# Patient Record
Sex: MALE | Race: WHITE | NOT HISPANIC OR LATINO | Employment: OTHER | URBAN - METROPOLITAN AREA
[De-identification: names, ages, dates, MRNs, and addresses within clinical notes are randomized per-mention and may not be internally consistent; named-entity substitution may affect disease eponyms.]

---

## 2017-11-05 ENCOUNTER — NURSE TRIAGE (OUTPATIENT)
Dept: ADMINISTRATIVE | Facility: CLINIC | Age: 67
End: 2017-11-05

## 2017-11-05 NOTE — TELEPHONE ENCOUNTER
Reason for Disposition   Busy signal.  First attempt to contact caller.  Follow-up call scheduled within 15 minutes.    Protocols used: ST NO CONTACT OR DUPLICATE CONTACT CALL-A-AH

## 2018-11-05 ENCOUNTER — TELEPHONE (OUTPATIENT)
Dept: INFECTIOUS DISEASES | Facility: CLINIC | Age: 68
End: 2018-11-05

## 2021-05-30 ENCOUNTER — HOSPITAL ENCOUNTER (EMERGENCY)
Facility: HOSPITAL | Age: 71
Discharge: HOME | End: 2021-05-30
Attending: EMERGENCY MEDICINE
Payer: COMMERCIAL

## 2021-05-30 VITALS
TEMPERATURE: 99 F | BODY MASS INDEX: 29.62 KG/M2 | HEIGHT: 69 IN | HEART RATE: 78 BPM | DIASTOLIC BLOOD PRESSURE: 60 MMHG | RESPIRATION RATE: 14 BRPM | OXYGEN SATURATION: 100 % | WEIGHT: 200 LBS | SYSTOLIC BLOOD PRESSURE: 110 MMHG

## 2021-05-30 DIAGNOSIS — R94.31 ABNORMAL EKG: ICD-10-CM

## 2021-05-30 DIAGNOSIS — N17.9 AKI (ACUTE KIDNEY INJURY): Primary | ICD-10-CM

## 2021-05-30 DIAGNOSIS — E83.42 HYPOMAGNESEMIA: ICD-10-CM

## 2021-05-30 DIAGNOSIS — R55 SYNCOPE: ICD-10-CM

## 2021-05-30 LAB
ALBUMIN SERPL BCP-MCNC: 2.9 G/DL (ref 3.5–5.2)
ALP SERPL-CCNC: 77 U/L (ref 55–135)
ALT SERPL W/O P-5'-P-CCNC: 34 U/L (ref 10–44)
ANION GAP SERPL CALC-SCNC: 14 MMOL/L (ref 8–16)
AST SERPL-CCNC: 40 U/L (ref 10–40)
BASOPHILS # BLD AUTO: 0.03 K/UL (ref 0–0.2)
BASOPHILS NFR BLD: 0.4 % (ref 0–1.9)
BILIRUB SERPL-MCNC: 1.2 MG/DL (ref 0.1–1)
BUN SERPL-MCNC: 19 MG/DL (ref 8–23)
BUN SERPL-MCNC: 23 MG/DL (ref 6–30)
CALCIUM SERPL-MCNC: 9.7 MG/DL (ref 8.7–10.5)
CHLORIDE SERPL-SCNC: 98 MMOL/L (ref 95–110)
CHLORIDE SERPL-SCNC: 98 MMOL/L (ref 95–110)
CO2 SERPL-SCNC: 20 MMOL/L (ref 23–29)
CREAT SERPL-MCNC: 1.5 MG/DL (ref 0.5–1.4)
CREAT SERPL-MCNC: 1.7 MG/DL (ref 0.5–1.4)
DIFFERENTIAL METHOD: ABNORMAL
EOSINOPHIL # BLD AUTO: 0.1 K/UL (ref 0–0.5)
EOSINOPHIL NFR BLD: 0.8 % (ref 0–8)
ERYTHROCYTE [DISTWIDTH] IN BLOOD BY AUTOMATED COUNT: 12.4 % (ref 11.5–14.5)
EST. GFR  (AFRICAN AMERICAN): 46.5 ML/MIN/1.73 M^2
EST. GFR  (NON AFRICAN AMERICAN): 40.3 ML/MIN/1.73 M^2
GLUCOSE SERPL-MCNC: 133 MG/DL (ref 70–110)
GLUCOSE SERPL-MCNC: 182 MG/DL (ref 70–110)
HCT VFR BLD AUTO: 36.4 % (ref 40–54)
HCT VFR BLD CALC: 35 %PCV (ref 36–54)
HGB BLD-MCNC: 12.4 G/DL (ref 14–18)
IMM GRANULOCYTES # BLD AUTO: 0.06 K/UL (ref 0–0.04)
IMM GRANULOCYTES NFR BLD AUTO: 0.7 % (ref 0–0.5)
LYMPHOCYTES # BLD AUTO: 1 K/UL (ref 1–4.8)
LYMPHOCYTES NFR BLD: 11.3 % (ref 18–48)
MAGNESIUM SERPL-MCNC: 1.2 MG/DL (ref 1.6–2.6)
MCH RBC QN AUTO: 34.7 PG (ref 27–31)
MCHC RBC AUTO-ENTMCNC: 34.1 G/DL (ref 32–36)
MCV RBC AUTO: 102 FL (ref 82–98)
MONOCYTES # BLD AUTO: 0.6 K/UL (ref 0.3–1)
MONOCYTES NFR BLD: 7.1 % (ref 4–15)
NEUTROPHILS # BLD AUTO: 6.8 K/UL (ref 1.8–7.7)
NEUTROPHILS NFR BLD: 79.7 % (ref 38–73)
NRBC BLD-RTO: 0 /100 WBC
PHOSPHATE SERPL-MCNC: 2.2 MG/DL (ref 2.7–4.5)
PLATELET # BLD AUTO: 99 K/UL (ref 150–450)
PMV BLD AUTO: 9.5 FL (ref 9.2–12.9)
POC IONIZED CALCIUM: 1.08 MMOL/L (ref 1.06–1.42)
POC TCO2 (MEASURED): 23 MMOL/L (ref 23–29)
POTASSIUM BLD-SCNC: 5.1 MMOL/L (ref 3.5–5.1)
POTASSIUM SERPL-SCNC: 4.6 MMOL/L (ref 3.5–5.1)
PROT SERPL-MCNC: 6 G/DL (ref 6–8.4)
RBC # BLD AUTO: 3.57 M/UL (ref 4.6–6.2)
SAMPLE: ABNORMAL
SODIUM BLD-SCNC: 131 MMOL/L (ref 136–145)
SODIUM SERPL-SCNC: 132 MMOL/L (ref 136–145)
WBC # BLD AUTO: 8.57 K/UL (ref 3.9–12.7)

## 2021-05-30 PROCEDURE — 93010 ELECTROCARDIOGRAM REPORT: CPT | Mod: ,,, | Performed by: INTERNAL MEDICINE

## 2021-05-30 PROCEDURE — 93010 EKG 12-LEAD: ICD-10-PCS | Mod: ,,, | Performed by: INTERNAL MEDICINE

## 2021-05-30 PROCEDURE — 80047 BASIC METABLC PNL IONIZED CA: CPT

## 2021-05-30 PROCEDURE — 85025 COMPLETE CBC W/AUTO DIFF WBC: CPT | Performed by: STUDENT IN AN ORGANIZED HEALTH CARE EDUCATION/TRAINING PROGRAM

## 2021-05-30 PROCEDURE — 86803 HEPATITIS C AB TEST: CPT | Performed by: EMERGENCY MEDICINE

## 2021-05-30 PROCEDURE — 99284 PR EMERGENCY DEPT VISIT,LEVEL IV: ICD-10-PCS | Mod: ,,, | Performed by: EMERGENCY MEDICINE

## 2021-05-30 PROCEDURE — 99284 EMERGENCY DEPT VISIT MOD MDM: CPT | Mod: ,,, | Performed by: EMERGENCY MEDICINE

## 2021-05-30 PROCEDURE — 80053 COMPREHEN METABOLIC PANEL: CPT | Performed by: STUDENT IN AN ORGANIZED HEALTH CARE EDUCATION/TRAINING PROGRAM

## 2021-05-30 PROCEDURE — 25000003 PHARM REV CODE 250: Performed by: STUDENT IN AN ORGANIZED HEALTH CARE EDUCATION/TRAINING PROGRAM

## 2021-05-30 PROCEDURE — 96361 HYDRATE IV INFUSION ADD-ON: CPT

## 2021-05-30 PROCEDURE — 83735 ASSAY OF MAGNESIUM: CPT | Performed by: STUDENT IN AN ORGANIZED HEALTH CARE EDUCATION/TRAINING PROGRAM

## 2021-05-30 PROCEDURE — 96365 THER/PROPH/DIAG IV INF INIT: CPT

## 2021-05-30 PROCEDURE — 82330 ASSAY OF CALCIUM: CPT

## 2021-05-30 PROCEDURE — 84100 ASSAY OF PHOSPHORUS: CPT | Performed by: STUDENT IN AN ORGANIZED HEALTH CARE EDUCATION/TRAINING PROGRAM

## 2021-05-30 PROCEDURE — 99284 EMERGENCY DEPT VISIT MOD MDM: CPT | Mod: 25

## 2021-05-30 PROCEDURE — 93005 ELECTROCARDIOGRAM TRACING: CPT

## 2021-05-30 PROCEDURE — 63600175 PHARM REV CODE 636 W HCPCS: Performed by: EMERGENCY MEDICINE

## 2021-05-30 RX ORDER — MAGNESIUM SULFATE HEPTAHYDRATE 40 MG/ML
2 INJECTION, SOLUTION INTRAVENOUS
Status: COMPLETED | OUTPATIENT
Start: 2021-05-30 | End: 2021-05-30

## 2021-05-30 RX ADMIN — MAGNESIUM SULFATE 2 G: 2 INJECTION INTRAVENOUS at 04:05

## 2021-05-30 RX ADMIN — SODIUM CHLORIDE, SODIUM LACTATE, POTASSIUM CHLORIDE, AND CALCIUM CHLORIDE 1000 ML: .6; .31; .03; .02 INJECTION, SOLUTION INTRAVENOUS at 04:05

## 2021-05-30 RX ADMIN — SODIUM CHLORIDE 1000 ML: 0.9 INJECTION, SOLUTION INTRAVENOUS at 03:05

## 2021-05-31 LAB — HCV AB SERPL QL IA: NEGATIVE

## 2021-07-08 ENCOUNTER — OFFICE VISIT (OUTPATIENT)
Dept: INTERNAL MEDICINE | Facility: CLINIC | Age: 71
End: 2021-07-08
Payer: COMMERCIAL

## 2021-07-08 VITALS
TEMPERATURE: 99 F | HEIGHT: 69 IN | SYSTOLIC BLOOD PRESSURE: 141 MMHG | HEART RATE: 112 BPM | DIASTOLIC BLOOD PRESSURE: 83 MMHG | WEIGHT: 207.31 LBS | BODY MASS INDEX: 30.7 KG/M2

## 2021-07-08 DIAGNOSIS — F51.01 PRIMARY INSOMNIA: ICD-10-CM

## 2021-07-08 DIAGNOSIS — R29.6 RECURRENT FALLS: ICD-10-CM

## 2021-07-08 DIAGNOSIS — E11.69 DIABETES MELLITUS TYPE 2 IN OBESE: ICD-10-CM

## 2021-07-08 DIAGNOSIS — E11.42 DIABETIC POLYNEUROPATHY ASSOCIATED WITH TYPE 2 DIABETES MELLITUS: ICD-10-CM

## 2021-07-08 DIAGNOSIS — Z98.84 HISTORY OF BARIATRIC SURGERY: ICD-10-CM

## 2021-07-08 DIAGNOSIS — R55 SYNCOPE, UNSPECIFIED SYNCOPE TYPE: ICD-10-CM

## 2021-07-08 DIAGNOSIS — K21.9 GASTROESOPHAGEAL REFLUX DISEASE WITHOUT ESOPHAGITIS: ICD-10-CM

## 2021-07-08 DIAGNOSIS — I10 ESSENTIAL HYPERTENSION: ICD-10-CM

## 2021-07-08 DIAGNOSIS — E66.9 DIABETES MELLITUS TYPE 2 IN OBESE: ICD-10-CM

## 2021-07-08 DIAGNOSIS — Z96.651 HISTORY OF TOTAL RIGHT KNEE REPLACEMENT: ICD-10-CM

## 2021-07-08 DIAGNOSIS — Z74.09 MOBILITY IMPAIRED: ICD-10-CM

## 2021-07-08 PROCEDURE — 1126F AMNT PAIN NOTED NONE PRSNT: CPT | Mod: S$GLB,,, | Performed by: INTERNAL MEDICINE

## 2021-07-08 PROCEDURE — 1159F MED LIST DOCD IN RCRD: CPT | Mod: S$GLB,,, | Performed by: INTERNAL MEDICINE

## 2021-07-08 PROCEDURE — 3008F BODY MASS INDEX DOCD: CPT | Mod: CPTII,S$GLB,, | Performed by: INTERNAL MEDICINE

## 2021-07-08 PROCEDURE — 99205 PR OFFICE/OUTPT VISIT, NEW, LEVL V, 60-74 MIN: ICD-10-PCS | Mod: S$GLB,,, | Performed by: INTERNAL MEDICINE

## 2021-07-08 PROCEDURE — 3288F PR FALLS RISK ASSESSMENT DOCUMENTED: ICD-10-PCS | Mod: CPTII,S$GLB,, | Performed by: INTERNAL MEDICINE

## 2021-07-08 PROCEDURE — 3008F PR BODY MASS INDEX (BMI) DOCUMENTED: ICD-10-PCS | Mod: CPTII,S$GLB,, | Performed by: INTERNAL MEDICINE

## 2021-07-08 PROCEDURE — 1159F PR MEDICATION LIST DOCUMENTED IN MEDICAL RECORD: ICD-10-PCS | Mod: S$GLB,,, | Performed by: INTERNAL MEDICINE

## 2021-07-08 PROCEDURE — 1100F PTFALLS ASSESS-DOCD GE2>/YR: CPT | Mod: CPTII,S$GLB,, | Performed by: INTERNAL MEDICINE

## 2021-07-08 PROCEDURE — 99205 OFFICE O/P NEW HI 60 MIN: CPT | Mod: S$GLB,,, | Performed by: INTERNAL MEDICINE

## 2021-07-08 PROCEDURE — 1100F PR PT FALLS ASSESS DOC 2+ FALLS/FALL W/INJURY/YR: ICD-10-PCS | Mod: CPTII,S$GLB,, | Performed by: INTERNAL MEDICINE

## 2021-07-08 PROCEDURE — 3288F FALL RISK ASSESSMENT DOCD: CPT | Mod: CPTII,S$GLB,, | Performed by: INTERNAL MEDICINE

## 2021-07-08 PROCEDURE — 1126F PR PAIN SEVERITY QUANTIFIED, NO PAIN PRESENT: ICD-10-PCS | Mod: S$GLB,,, | Performed by: INTERNAL MEDICINE

## 2021-07-08 PROCEDURE — 99999 PR PBB SHADOW E&M-EST. PATIENT-LVL IV: CPT | Mod: PBBFAC,,, | Performed by: INTERNAL MEDICINE

## 2021-07-08 PROCEDURE — 99999 PR PBB SHADOW E&M-EST. PATIENT-LVL IV: ICD-10-PCS | Mod: PBBFAC,,, | Performed by: INTERNAL MEDICINE

## 2021-07-08 RX ORDER — PREGABALIN 100 MG/1
100 CAPSULE ORAL 2 TIMES DAILY
COMMUNITY
End: 2021-07-15 | Stop reason: SDUPTHER

## 2021-07-08 RX ORDER — ZOLPIDEM TARTRATE 12.5 MG/1
12.5 TABLET, FILM COATED, EXTENDED RELEASE ORAL NIGHTLY PRN
COMMUNITY
End: 2022-01-13 | Stop reason: SDUPTHER

## 2021-07-08 RX ORDER — METFORMIN HYDROCHLORIDE 1000 MG/1
1000 TABLET ORAL 2 TIMES DAILY WITH MEALS
COMMUNITY
End: 2022-01-13 | Stop reason: ALTCHOICE

## 2021-07-08 RX ORDER — NAPROXEN SODIUM 220 MG/1
81 TABLET, FILM COATED ORAL DAILY
COMMUNITY
End: 2024-01-05

## 2021-07-08 RX ORDER — IRBESARTAN 75 MG/1
75 TABLET ORAL NIGHTLY
COMMUNITY
End: 2021-09-01 | Stop reason: SDUPTHER

## 2021-07-14 ENCOUNTER — TELEPHONE (OUTPATIENT)
Dept: NEUROLOGY | Facility: CLINIC | Age: 71
End: 2021-07-14

## 2021-07-15 ENCOUNTER — PATIENT MESSAGE (OUTPATIENT)
Dept: INTERNAL MEDICINE | Facility: CLINIC | Age: 71
End: 2021-07-15

## 2021-07-15 RX ORDER — PREGABALIN 100 MG/1
100 CAPSULE ORAL 2 TIMES DAILY
Qty: 180 CAPSULE | Refills: 3 | Status: SHIPPED | OUTPATIENT
Start: 2021-07-15 | End: 2022-05-25 | Stop reason: SDUPTHER

## 2021-07-21 ENCOUNTER — OFFICE VISIT (OUTPATIENT)
Dept: INTERNAL MEDICINE | Facility: CLINIC | Age: 71
End: 2021-07-21
Payer: COMMERCIAL

## 2021-07-21 VITALS
BODY MASS INDEX: 31.01 KG/M2 | WEIGHT: 210 LBS | TEMPERATURE: 99 F | SYSTOLIC BLOOD PRESSURE: 133 MMHG | DIASTOLIC BLOOD PRESSURE: 77 MMHG | HEART RATE: 120 BPM

## 2021-07-21 DIAGNOSIS — Z96.651 HISTORY OF TOTAL RIGHT KNEE REPLACEMENT: ICD-10-CM

## 2021-07-21 DIAGNOSIS — E11.42 DIABETIC POLYNEUROPATHY ASSOCIATED WITH TYPE 2 DIABETES MELLITUS: Primary | ICD-10-CM

## 2021-07-21 DIAGNOSIS — Z98.84 HISTORY OF BARIATRIC SURGERY: ICD-10-CM

## 2021-07-21 DIAGNOSIS — E66.9 DIABETES MELLITUS TYPE 2 IN OBESE: ICD-10-CM

## 2021-07-21 DIAGNOSIS — I25.10 ATHEROSCLEROSIS OF NATIVE CORONARY ARTERY OF NATIVE HEART WITHOUT ANGINA PECTORIS: ICD-10-CM

## 2021-07-21 DIAGNOSIS — R29.6 RECURRENT FALLS: ICD-10-CM

## 2021-07-21 DIAGNOSIS — K21.9 GASTROESOPHAGEAL REFLUX DISEASE WITHOUT ESOPHAGITIS: ICD-10-CM

## 2021-07-21 DIAGNOSIS — Z74.09 MOBILITY IMPAIRED: ICD-10-CM

## 2021-07-21 DIAGNOSIS — E11.69 DIABETES MELLITUS TYPE 2 IN OBESE: ICD-10-CM

## 2021-07-21 DIAGNOSIS — R55 SYNCOPE, UNSPECIFIED SYNCOPE TYPE: ICD-10-CM

## 2021-07-21 DIAGNOSIS — I10 ESSENTIAL HYPERTENSION: ICD-10-CM

## 2021-07-21 PROCEDURE — 1125F PR PAIN SEVERITY QUANTIFIED, PAIN PRESENT: ICD-10-PCS | Mod: CPTII,S$GLB,, | Performed by: INTERNAL MEDICINE

## 2021-07-21 PROCEDURE — 1159F MED LIST DOCD IN RCRD: CPT | Mod: CPTII,S$GLB,, | Performed by: INTERNAL MEDICINE

## 2021-07-21 PROCEDURE — 1101F PT FALLS ASSESS-DOCD LE1/YR: CPT | Mod: CPTII,S$GLB,, | Performed by: INTERNAL MEDICINE

## 2021-07-21 PROCEDURE — 99215 OFFICE O/P EST HI 40 MIN: CPT | Mod: 25,S$GLB,, | Performed by: INTERNAL MEDICINE

## 2021-07-21 PROCEDURE — 99999 PR PBB SHADOW E&M-EST. PATIENT-LVL II: ICD-10-PCS | Mod: PBBFAC,,, | Performed by: INTERNAL MEDICINE

## 2021-07-21 PROCEDURE — 3008F PR BODY MASS INDEX (BMI) DOCUMENTED: ICD-10-PCS | Mod: CPTII,S$GLB,, | Performed by: INTERNAL MEDICINE

## 2021-07-21 PROCEDURE — 90750 ZOSTER RECOMBINANT VACCINE: ICD-10-PCS | Mod: S$GLB,,, | Performed by: INTERNAL MEDICINE

## 2021-07-21 PROCEDURE — 90471 IMMUNIZATION ADMIN: CPT | Mod: S$GLB,,, | Performed by: INTERNAL MEDICINE

## 2021-07-21 PROCEDURE — 3075F PR MOST RECENT SYSTOLIC BLOOD PRESS GE 130-139MM HG: ICD-10-PCS | Mod: CPTII,S$GLB,, | Performed by: INTERNAL MEDICINE

## 2021-07-21 PROCEDURE — 1159F PR MEDICATION LIST DOCUMENTED IN MEDICAL RECORD: ICD-10-PCS | Mod: CPTII,S$GLB,, | Performed by: INTERNAL MEDICINE

## 2021-07-21 PROCEDURE — 3288F FALL RISK ASSESSMENT DOCD: CPT | Mod: CPTII,S$GLB,, | Performed by: INTERNAL MEDICINE

## 2021-07-21 PROCEDURE — 3075F SYST BP GE 130 - 139MM HG: CPT | Mod: CPTII,S$GLB,, | Performed by: INTERNAL MEDICINE

## 2021-07-21 PROCEDURE — 3008F BODY MASS INDEX DOCD: CPT | Mod: CPTII,S$GLB,, | Performed by: INTERNAL MEDICINE

## 2021-07-21 PROCEDURE — 99999 PR PBB SHADOW E&M-EST. PATIENT-LVL II: CPT | Mod: PBBFAC,,, | Performed by: INTERNAL MEDICINE

## 2021-07-21 PROCEDURE — 1101F PR PT FALLS ASSESS DOC 0-1 FALLS W/OUT INJ PAST YR: ICD-10-PCS | Mod: CPTII,S$GLB,, | Performed by: INTERNAL MEDICINE

## 2021-07-21 PROCEDURE — 90471 ZOSTER RECOMBINANT VACCINE: ICD-10-PCS | Mod: S$GLB,,, | Performed by: INTERNAL MEDICINE

## 2021-07-21 PROCEDURE — 3078F DIAST BP <80 MM HG: CPT | Mod: CPTII,S$GLB,, | Performed by: INTERNAL MEDICINE

## 2021-07-21 PROCEDURE — 3288F PR FALLS RISK ASSESSMENT DOCUMENTED: ICD-10-PCS | Mod: CPTII,S$GLB,, | Performed by: INTERNAL MEDICINE

## 2021-07-21 PROCEDURE — 90750 HZV VACC RECOMBINANT IM: CPT | Mod: S$GLB,,, | Performed by: INTERNAL MEDICINE

## 2021-07-21 PROCEDURE — 1125F AMNT PAIN NOTED PAIN PRSNT: CPT | Mod: CPTII,S$GLB,, | Performed by: INTERNAL MEDICINE

## 2021-07-21 PROCEDURE — 99215 PR OFFICE/OUTPT VISIT, EST, LEVL V, 40-54 MIN: ICD-10-PCS | Mod: 25,S$GLB,, | Performed by: INTERNAL MEDICINE

## 2021-07-21 PROCEDURE — 3078F PR MOST RECENT DIASTOLIC BLOOD PRESSURE < 80 MM HG: ICD-10-PCS | Mod: CPTII,S$GLB,, | Performed by: INTERNAL MEDICINE

## 2021-07-22 ENCOUNTER — HOSPITAL ENCOUNTER (OUTPATIENT)
Dept: RADIOLOGY | Facility: HOSPITAL | Age: 71
Discharge: HOME | End: 2021-07-22
Attending: INTERNAL MEDICINE
Payer: COMMERCIAL

## 2021-07-22 DIAGNOSIS — I25.10 ATHEROSCLEROSIS OF NATIVE CORONARY ARTERY OF NATIVE HEART WITHOUT ANGINA PECTORIS: ICD-10-CM

## 2021-07-22 PROCEDURE — 75571 CT HRT W/O DYE W/CA TEST: CPT | Mod: 26,,, | Performed by: RADIOLOGY

## 2021-07-22 PROCEDURE — 75571 CT CALCIUM SCORING CARDIAC: ICD-10-PCS | Mod: 26,,, | Performed by: RADIOLOGY

## 2021-07-22 PROCEDURE — 75571 CT HRT W/O DYE W/CA TEST: CPT | Mod: TC

## 2021-07-29 ENCOUNTER — PATIENT OUTREACH (OUTPATIENT)
Dept: ADMINISTRATIVE | Facility: OTHER | Age: 71
End: 2021-07-29

## 2021-07-29 DIAGNOSIS — Z12.11 ENCOUNTER FOR COLORECTAL CANCER SCREENING: ICD-10-CM

## 2021-07-29 DIAGNOSIS — E11.69 DIABETES MELLITUS TYPE 2 IN OBESE: Primary | ICD-10-CM

## 2021-07-29 DIAGNOSIS — E66.9 DIABETES MELLITUS TYPE 2 IN OBESE: Primary | ICD-10-CM

## 2021-07-29 DIAGNOSIS — Z12.12 ENCOUNTER FOR COLORECTAL CANCER SCREENING: ICD-10-CM

## 2021-08-02 ENCOUNTER — PATIENT MESSAGE (OUTPATIENT)
Dept: ADMINISTRATIVE | Facility: HOSPITAL | Age: 71
End: 2021-08-02

## 2021-08-02 ENCOUNTER — OFFICE VISIT (OUTPATIENT)
Dept: NEUROLOGY | Facility: CLINIC | Age: 71
End: 2021-08-02
Payer: MEDICARE

## 2021-08-02 VITALS
HEART RATE: 101 BPM | DIASTOLIC BLOOD PRESSURE: 61 MMHG | WEIGHT: 209.88 LBS | BODY MASS INDEX: 31.09 KG/M2 | SYSTOLIC BLOOD PRESSURE: 98 MMHG | HEIGHT: 69 IN

## 2021-08-02 DIAGNOSIS — G60.3 IDIOPATHIC PROGRESSIVE POLYNEUROPATHY: Primary | ICD-10-CM

## 2021-08-02 DIAGNOSIS — R29.898 WEAKNESS OF BOTH LOWER EXTREMITIES: ICD-10-CM

## 2021-08-02 DIAGNOSIS — F10.20 ALCOHOLISM, CHRONIC: ICD-10-CM

## 2021-08-02 DIAGNOSIS — E11.42 DIABETIC POLYNEUROPATHY ASSOCIATED WITH TYPE 2 DIABETES MELLITUS: ICD-10-CM

## 2021-08-02 PROCEDURE — 99999 PR PBB SHADOW E&M-EST. PATIENT-LVL IV: ICD-10-PCS | Mod: PBBFAC,,, | Performed by: PSYCHIATRY & NEUROLOGY

## 2021-08-02 PROCEDURE — 99214 OFFICE O/P EST MOD 30 MIN: CPT | Mod: PBBFAC | Performed by: PSYCHIATRY & NEUROLOGY

## 2021-08-02 PROCEDURE — 99999 PR PBB SHADOW E&M-EST. PATIENT-LVL IV: CPT | Mod: PBBFAC,,, | Performed by: PSYCHIATRY & NEUROLOGY

## 2021-08-02 PROCEDURE — 99205 OFFICE O/P NEW HI 60 MIN: CPT | Mod: S$PBB,,, | Performed by: PSYCHIATRY & NEUROLOGY

## 2021-08-02 PROCEDURE — 99205 PR OFFICE/OUTPT VISIT, NEW, LEVL V, 60-74 MIN: ICD-10-PCS | Mod: S$PBB,,, | Performed by: PSYCHIATRY & NEUROLOGY

## 2021-08-03 ENCOUNTER — PATIENT MESSAGE (OUTPATIENT)
Dept: ADMINISTRATIVE | Facility: HOSPITAL | Age: 71
End: 2021-08-03

## 2021-08-04 ENCOUNTER — PATIENT MESSAGE (OUTPATIENT)
Dept: INTERNAL MEDICINE | Facility: CLINIC | Age: 71
End: 2021-08-04

## 2021-08-04 ENCOUNTER — CLINICAL SUPPORT (OUTPATIENT)
Dept: REHABILITATION | Facility: HOSPITAL | Age: 71
End: 2021-08-04
Attending: PSYCHIATRY & NEUROLOGY
Payer: MEDICARE

## 2021-08-04 DIAGNOSIS — R29.898 WEAKNESS OF BOTH LOWER EXTREMITIES: ICD-10-CM

## 2021-08-04 DIAGNOSIS — R26.89 BALANCE PROBLEM: ICD-10-CM

## 2021-08-04 DIAGNOSIS — R26.9 ABNORMAL GAIT: ICD-10-CM

## 2021-08-04 PROCEDURE — 97110 THERAPEUTIC EXERCISES: CPT | Mod: PO

## 2021-08-04 PROCEDURE — 97161 PT EVAL LOW COMPLEX 20 MIN: CPT | Mod: PO

## 2021-08-05 PROBLEM — R26.9 ABNORMAL GAIT: Status: ACTIVE | Noted: 2021-08-05

## 2021-08-05 PROBLEM — R29.898 WEAKNESS OF BOTH LOWER EXTREMITIES: Status: ACTIVE | Noted: 2021-08-05

## 2021-08-05 PROBLEM — R26.89 BALANCE PROBLEM: Status: ACTIVE | Noted: 2021-08-05

## 2021-08-06 ENCOUNTER — CLINICAL SUPPORT (OUTPATIENT)
Dept: REHABILITATION | Facility: HOSPITAL | Age: 71
End: 2021-08-06
Attending: PSYCHIATRY & NEUROLOGY
Payer: MEDICARE

## 2021-08-06 DIAGNOSIS — R26.9 ABNORMAL GAIT: ICD-10-CM

## 2021-08-06 DIAGNOSIS — R26.89 BALANCE PROBLEM: ICD-10-CM

## 2021-08-06 DIAGNOSIS — R29.898 WEAKNESS OF BOTH LOWER EXTREMITIES: ICD-10-CM

## 2021-08-06 PROCEDURE — 97110 THERAPEUTIC EXERCISES: CPT | Mod: PO,CQ

## 2021-08-10 ENCOUNTER — CLINICAL SUPPORT (OUTPATIENT)
Dept: REHABILITATION | Facility: HOSPITAL | Age: 71
End: 2021-08-10
Attending: PSYCHIATRY & NEUROLOGY
Payer: MEDICARE

## 2021-08-10 DIAGNOSIS — R29.898 WEAKNESS OF BOTH LOWER EXTREMITIES: ICD-10-CM

## 2021-08-10 DIAGNOSIS — R26.9 ABNORMAL GAIT: ICD-10-CM

## 2021-08-10 DIAGNOSIS — R26.89 BALANCE PROBLEM: ICD-10-CM

## 2021-08-10 PROCEDURE — 97110 THERAPEUTIC EXERCISES: CPT | Mod: PO,CQ

## 2021-08-12 ENCOUNTER — PATIENT MESSAGE (OUTPATIENT)
Dept: INTERNAL MEDICINE | Facility: CLINIC | Age: 71
End: 2021-08-12

## 2021-08-13 ENCOUNTER — LAB VISIT (OUTPATIENT)
Dept: LAB | Facility: HOSPITAL | Age: 71
End: 2021-08-13
Attending: INTERNAL MEDICINE
Payer: MEDICARE

## 2021-08-13 DIAGNOSIS — Z12.11 ENCOUNTER FOR COLORECTAL CANCER SCREENING: ICD-10-CM

## 2021-08-13 DIAGNOSIS — Z12.12 ENCOUNTER FOR COLORECTAL CANCER SCREENING: ICD-10-CM

## 2021-08-13 PROCEDURE — 82274 ASSAY TEST FOR BLOOD FECAL: CPT | Performed by: INTERNAL MEDICINE

## 2021-08-17 ENCOUNTER — CLINICAL SUPPORT (OUTPATIENT)
Dept: REHABILITATION | Facility: HOSPITAL | Age: 71
End: 2021-08-17
Attending: PSYCHIATRY & NEUROLOGY
Payer: MEDICARE

## 2021-08-17 ENCOUNTER — IMMUNIZATION (OUTPATIENT)
Dept: OBSTETRICS AND GYNECOLOGY | Facility: CLINIC | Age: 71
End: 2021-08-17
Payer: MEDICARE

## 2021-08-17 DIAGNOSIS — R26.89 BALANCE PROBLEM: ICD-10-CM

## 2021-08-17 DIAGNOSIS — R26.9 ABNORMAL GAIT: ICD-10-CM

## 2021-08-17 DIAGNOSIS — Z23 NEED FOR VACCINATION: Primary | ICD-10-CM

## 2021-08-17 DIAGNOSIS — R29.898 WEAKNESS OF BOTH LOWER EXTREMITIES: ICD-10-CM

## 2021-08-17 PROCEDURE — 0003A COVID-19, MRNA, LNP-S, PF, 30 MCG/0.3 ML DOSE VACCINE: CPT | Mod: CV19,,, | Performed by: FAMILY MEDICINE

## 2021-08-17 PROCEDURE — 97110 THERAPEUTIC EXERCISES: CPT | Mod: PO

## 2021-08-17 PROCEDURE — 91300 COVID-19, MRNA, LNP-S, PF, 30 MCG/0.3 ML DOSE VACCINE: ICD-10-PCS | Mod: ,,, | Performed by: FAMILY MEDICINE

## 2021-08-17 PROCEDURE — 97112 NEUROMUSCULAR REEDUCATION: CPT | Mod: PO

## 2021-08-17 PROCEDURE — 91300 COVID-19, MRNA, LNP-S, PF, 30 MCG/0.3 ML DOSE VACCINE: CPT | Mod: ,,, | Performed by: FAMILY MEDICINE

## 2021-08-17 PROCEDURE — 0003A COVID-19, MRNA, LNP-S, PF, 30 MCG/0.3 ML DOSE VACCINE: ICD-10-PCS | Mod: CV19,,, | Performed by: FAMILY MEDICINE

## 2021-08-24 ENCOUNTER — CLINICAL SUPPORT (OUTPATIENT)
Dept: REHABILITATION | Facility: HOSPITAL | Age: 71
End: 2021-08-24
Attending: PSYCHIATRY & NEUROLOGY
Payer: MEDICARE

## 2021-08-24 DIAGNOSIS — R29.898 WEAKNESS OF BOTH LOWER EXTREMITIES: ICD-10-CM

## 2021-08-24 DIAGNOSIS — R26.89 BALANCE PROBLEM: ICD-10-CM

## 2021-08-24 DIAGNOSIS — R26.9 ABNORMAL GAIT: ICD-10-CM

## 2021-08-24 LAB — HEMOCCULT STL QL IA: NEGATIVE

## 2021-08-24 PROCEDURE — 97110 THERAPEUTIC EXERCISES: CPT | Mod: PO

## 2021-08-24 PROCEDURE — 97112 NEUROMUSCULAR REEDUCATION: CPT | Mod: PO

## 2021-08-31 ENCOUNTER — PATIENT MESSAGE (OUTPATIENT)
Dept: INTERNAL MEDICINE | Facility: CLINIC | Age: 71
End: 2021-08-31

## 2021-09-01 RX ORDER — PANTOPRAZOLE SODIUM 40 MG/1
40 TABLET, DELAYED RELEASE ORAL DAILY
Qty: 90 TABLET | Refills: 3 | Status: SHIPPED | OUTPATIENT
Start: 2021-09-01 | End: 2021-12-06 | Stop reason: SDUPTHER

## 2021-09-01 RX ORDER — IRBESARTAN 75 MG/1
75 TABLET ORAL NIGHTLY
Qty: 90 TABLET | Refills: 3 | Status: SHIPPED | OUTPATIENT
Start: 2021-09-01 | End: 2021-12-06 | Stop reason: SDUPTHER

## 2021-09-13 ENCOUNTER — PATIENT MESSAGE (OUTPATIENT)
Dept: INTERNAL MEDICINE | Facility: CLINIC | Age: 71
End: 2021-09-13

## 2021-09-14 ENCOUNTER — CLINICAL SUPPORT (OUTPATIENT)
Dept: REHABILITATION | Facility: HOSPITAL | Age: 71
End: 2021-09-14
Attending: PSYCHIATRY & NEUROLOGY
Payer: MEDICARE

## 2021-09-14 DIAGNOSIS — R29.898 WEAKNESS OF BOTH LOWER EXTREMITIES: ICD-10-CM

## 2021-09-14 DIAGNOSIS — R26.89 BALANCE PROBLEM: ICD-10-CM

## 2021-09-14 DIAGNOSIS — R26.9 ABNORMAL GAIT: ICD-10-CM

## 2021-09-14 PROCEDURE — 97140 MANUAL THERAPY 1/> REGIONS: CPT | Mod: PN

## 2021-09-14 PROCEDURE — 97110 THERAPEUTIC EXERCISES: CPT | Mod: PN

## 2021-09-16 ENCOUNTER — PATIENT MESSAGE (OUTPATIENT)
Dept: INTERNAL MEDICINE | Facility: CLINIC | Age: 71
End: 2021-09-16

## 2021-09-16 ENCOUNTER — CLINICAL SUPPORT (OUTPATIENT)
Dept: REHABILITATION | Facility: HOSPITAL | Age: 71
End: 2021-09-16
Attending: PSYCHIATRY & NEUROLOGY
Payer: MEDICARE

## 2021-09-16 DIAGNOSIS — R26.9 ABNORMAL GAIT: ICD-10-CM

## 2021-09-16 DIAGNOSIS — R26.89 BALANCE PROBLEM: ICD-10-CM

## 2021-09-16 DIAGNOSIS — R29.898 WEAKNESS OF BOTH LOWER EXTREMITIES: ICD-10-CM

## 2021-09-16 PROCEDURE — 97140 MANUAL THERAPY 1/> REGIONS: CPT | Mod: PN

## 2021-09-16 PROCEDURE — 97110 THERAPEUTIC EXERCISES: CPT | Mod: PN

## 2021-09-22 ENCOUNTER — CLINICAL SUPPORT (OUTPATIENT)
Dept: REHABILITATION | Facility: HOSPITAL | Age: 71
End: 2021-09-22
Attending: PSYCHIATRY & NEUROLOGY
Payer: MEDICARE

## 2021-09-22 DIAGNOSIS — R26.9 ABNORMAL GAIT: ICD-10-CM

## 2021-09-22 DIAGNOSIS — R26.89 BALANCE PROBLEM: ICD-10-CM

## 2021-09-22 DIAGNOSIS — R29.898 WEAKNESS OF BOTH LOWER EXTREMITIES: ICD-10-CM

## 2021-09-22 PROCEDURE — 97140 MANUAL THERAPY 1/> REGIONS: CPT | Mod: PO

## 2021-09-22 PROCEDURE — 97112 NEUROMUSCULAR REEDUCATION: CPT | Mod: PO

## 2021-09-22 PROCEDURE — 97110 THERAPEUTIC EXERCISES: CPT | Mod: PO

## 2021-09-27 ENCOUNTER — PATIENT MESSAGE (OUTPATIENT)
Dept: INTERNAL MEDICINE | Facility: CLINIC | Age: 71
End: 2021-09-27

## 2021-09-27 ENCOUNTER — CLINICAL SUPPORT (OUTPATIENT)
Dept: REHABILITATION | Facility: HOSPITAL | Age: 71
End: 2021-09-27
Attending: PSYCHIATRY & NEUROLOGY
Payer: MEDICARE

## 2021-09-27 DIAGNOSIS — R29.898 WEAKNESS OF BOTH LOWER EXTREMITIES: ICD-10-CM

## 2021-09-27 DIAGNOSIS — R26.9 ABNORMAL GAIT: ICD-10-CM

## 2021-09-27 DIAGNOSIS — R26.89 BALANCE PROBLEM: ICD-10-CM

## 2021-09-27 PROCEDURE — 97112 NEUROMUSCULAR REEDUCATION: CPT | Mod: PO

## 2021-09-27 PROCEDURE — 97110 THERAPEUTIC EXERCISES: CPT | Mod: PO

## 2021-09-28 ENCOUNTER — CLINICAL SUPPORT (OUTPATIENT)
Dept: INTERNAL MEDICINE | Facility: CLINIC | Age: 71
End: 2021-09-28
Payer: MEDICARE

## 2021-09-28 PROCEDURE — G0008 ADMIN INFLUENZA VIRUS VAC: HCPCS | Mod: PBBFAC

## 2021-09-28 PROCEDURE — 90694 VACC AIIV4 NO PRSRV 0.5ML IM: CPT | Mod: PBBFAC

## 2021-09-28 PROCEDURE — 90750 HZV VACC RECOMBINANT IM: CPT | Mod: PBBFAC

## 2021-10-06 DIAGNOSIS — E11.9 TYPE 2 DIABETES MELLITUS WITHOUT COMPLICATION, UNSPECIFIED WHETHER LONG TERM INSULIN USE: ICD-10-CM

## 2021-10-11 ENCOUNTER — CLINICAL SUPPORT (OUTPATIENT)
Dept: REHABILITATION | Facility: HOSPITAL | Age: 71
End: 2021-10-11
Attending: PSYCHIATRY & NEUROLOGY
Payer: MEDICARE

## 2021-10-11 DIAGNOSIS — R26.89 BALANCE PROBLEM: ICD-10-CM

## 2021-10-11 DIAGNOSIS — R26.9 ABNORMAL GAIT: ICD-10-CM

## 2021-10-11 DIAGNOSIS — R29.898 WEAKNESS OF BOTH LOWER EXTREMITIES: ICD-10-CM

## 2021-10-11 PROCEDURE — 97112 NEUROMUSCULAR REEDUCATION: CPT | Mod: PO

## 2021-10-11 PROCEDURE — 97110 THERAPEUTIC EXERCISES: CPT | Mod: PO

## 2021-10-13 ENCOUNTER — DOCUMENTATION ONLY (OUTPATIENT)
Dept: REHABILITATION | Facility: HOSPITAL | Age: 71
End: 2021-10-13

## 2021-10-21 ENCOUNTER — CLINICAL SUPPORT (OUTPATIENT)
Dept: REHABILITATION | Facility: HOSPITAL | Age: 71
End: 2021-10-21
Attending: PSYCHIATRY & NEUROLOGY
Payer: MEDICARE

## 2021-10-21 DIAGNOSIS — R26.89 BALANCE PROBLEM: ICD-10-CM

## 2021-10-21 DIAGNOSIS — R26.9 ABNORMAL GAIT: ICD-10-CM

## 2021-10-21 DIAGNOSIS — R29.898 WEAKNESS OF BOTH LOWER EXTREMITIES: ICD-10-CM

## 2021-10-21 PROCEDURE — 97112 NEUROMUSCULAR REEDUCATION: CPT | Mod: PO

## 2021-10-21 PROCEDURE — 97110 THERAPEUTIC EXERCISES: CPT | Mod: PO

## 2021-10-26 ENCOUNTER — CLINICAL SUPPORT (OUTPATIENT)
Dept: REHABILITATION | Facility: HOSPITAL | Age: 71
End: 2021-10-26
Attending: PSYCHIATRY & NEUROLOGY
Payer: MEDICARE

## 2021-10-26 DIAGNOSIS — R29.898 WEAKNESS OF BOTH LOWER EXTREMITIES: ICD-10-CM

## 2021-10-26 DIAGNOSIS — R26.89 BALANCE PROBLEM: ICD-10-CM

## 2021-10-26 DIAGNOSIS — R26.9 ABNORMAL GAIT: ICD-10-CM

## 2021-10-26 PROCEDURE — 97112 NEUROMUSCULAR REEDUCATION: CPT | Mod: PO

## 2021-10-26 PROCEDURE — 97110 THERAPEUTIC EXERCISES: CPT | Mod: PO

## 2021-11-08 ENCOUNTER — PATIENT MESSAGE (OUTPATIENT)
Dept: NEUROLOGY | Facility: CLINIC | Age: 71
End: 2021-11-08

## 2021-11-08 ENCOUNTER — OFFICE VISIT (OUTPATIENT)
Dept: NEUROLOGY | Facility: CLINIC | Age: 71
End: 2021-11-08
Payer: MEDICARE

## 2021-11-08 ENCOUNTER — PATIENT MESSAGE (OUTPATIENT)
Dept: NEUROLOGY | Facility: CLINIC | Age: 71
End: 2021-11-08
Payer: MEDICARE

## 2021-11-08 VITALS
DIASTOLIC BLOOD PRESSURE: 77 MMHG | SYSTOLIC BLOOD PRESSURE: 156 MMHG | HEIGHT: 69 IN | BODY MASS INDEX: 32.98 KG/M2 | WEIGHT: 222.69 LBS | HEART RATE: 91 BPM

## 2021-11-08 DIAGNOSIS — G62.1 ALCOHOLIC PERIPHERAL NEUROPATHY: Primary | ICD-10-CM

## 2021-11-08 DIAGNOSIS — G47.00 INSOMNIA, UNSPECIFIED TYPE: ICD-10-CM

## 2021-11-08 DIAGNOSIS — E11.42 DIABETIC POLYNEUROPATHY ASSOCIATED WITH TYPE 2 DIABETES MELLITUS: ICD-10-CM

## 2021-11-08 PROCEDURE — 99213 OFFICE O/P EST LOW 20 MIN: CPT | Mod: PBBFAC | Performed by: PSYCHIATRY & NEUROLOGY

## 2021-11-08 PROCEDURE — 99999 PR PBB SHADOW E&M-EST. PATIENT-LVL III: CPT | Mod: PBBFAC,,, | Performed by: PSYCHIATRY & NEUROLOGY

## 2021-11-08 PROCEDURE — 99999 PR PBB SHADOW E&M-EST. PATIENT-LVL III: ICD-10-PCS | Mod: PBBFAC,,, | Performed by: PSYCHIATRY & NEUROLOGY

## 2021-11-08 PROCEDURE — 99215 OFFICE O/P EST HI 40 MIN: CPT | Mod: S$PBB,,, | Performed by: PSYCHIATRY & NEUROLOGY

## 2021-11-08 PROCEDURE — 99215 PR OFFICE/OUTPT VISIT, EST, LEVL V, 40-54 MIN: ICD-10-PCS | Mod: S$PBB,,, | Performed by: PSYCHIATRY & NEUROLOGY

## 2021-11-11 ENCOUNTER — PATIENT MESSAGE (OUTPATIENT)
Dept: INTERNAL MEDICINE | Facility: CLINIC | Age: 71
End: 2021-11-11
Payer: MEDICARE

## 2021-11-11 DIAGNOSIS — Z12.5 PROSTATE CANCER SCREENING: ICD-10-CM

## 2021-11-11 DIAGNOSIS — E11.42 DIABETIC POLYNEUROPATHY ASSOCIATED WITH TYPE 2 DIABETES MELLITUS: Primary | ICD-10-CM

## 2021-12-06 ENCOUNTER — PATIENT MESSAGE (OUTPATIENT)
Dept: INTERNAL MEDICINE | Facility: CLINIC | Age: 71
End: 2021-12-06
Payer: MEDICARE

## 2021-12-06 RX ORDER — IRBESARTAN 75 MG/1
75 TABLET ORAL NIGHTLY
Qty: 90 TABLET | Refills: 3 | Status: SHIPPED | OUTPATIENT
Start: 2021-12-06 | End: 2022-11-08

## 2021-12-06 RX ORDER — METFORMIN HYDROCHLORIDE 1000 MG/1
1000 TABLET ORAL 2 TIMES DAILY WITH MEALS
Status: CANCELLED | OUTPATIENT
Start: 2021-12-06

## 2021-12-06 RX ORDER — PANTOPRAZOLE SODIUM 40 MG/1
40 TABLET, DELAYED RELEASE ORAL DAILY
Qty: 90 TABLET | Refills: 3 | Status: SHIPPED | OUTPATIENT
Start: 2021-12-06 | End: 2022-12-19

## 2021-12-13 ENCOUNTER — LAB VISIT (OUTPATIENT)
Dept: LAB | Facility: HOSPITAL | Age: 71
End: 2021-12-13
Attending: PSYCHIATRY & NEUROLOGY
Payer: MEDICARE

## 2021-12-13 DIAGNOSIS — Z12.5 PROSTATE CANCER SCREENING: ICD-10-CM

## 2021-12-13 DIAGNOSIS — E11.42 DIABETIC POLYNEUROPATHY ASSOCIATED WITH TYPE 2 DIABETES MELLITUS: ICD-10-CM

## 2021-12-13 DIAGNOSIS — G60.3 IDIOPATHIC PROGRESSIVE POLYNEUROPATHY: ICD-10-CM

## 2021-12-13 LAB
ALBUMIN SERPL BCP-MCNC: 3.7 G/DL (ref 3.5–5.2)
ALP SERPL-CCNC: 71 U/L (ref 55–135)
ALT SERPL W/O P-5'-P-CCNC: 12 U/L (ref 10–44)
ANION GAP SERPL CALC-SCNC: 10 MMOL/L (ref 8–16)
AST SERPL-CCNC: 14 U/L (ref 10–40)
BASOPHILS # BLD AUTO: 0.04 K/UL (ref 0–0.2)
BASOPHILS NFR BLD: 0.6 % (ref 0–1.9)
BILIRUB SERPL-MCNC: 0.9 MG/DL (ref 0.1–1)
BUN SERPL-MCNC: 19 MG/DL (ref 8–23)
CALCIUM SERPL-MCNC: 9.9 MG/DL (ref 8.7–10.5)
CHLORIDE SERPL-SCNC: 104 MMOL/L (ref 95–110)
CO2 SERPL-SCNC: 23 MMOL/L (ref 23–29)
COMPLEXED PSA SERPL-MCNC: 0.63 NG/ML (ref 0–4)
CREAT SERPL-MCNC: 1.4 MG/DL (ref 0.5–1.4)
DIFFERENTIAL METHOD: ABNORMAL
EOSINOPHIL # BLD AUTO: 0.2 K/UL (ref 0–0.5)
EOSINOPHIL NFR BLD: 3.1 % (ref 0–8)
ERYTHROCYTE [DISTWIDTH] IN BLOOD BY AUTOMATED COUNT: 12 % (ref 11.5–14.5)
EST. GFR  (AFRICAN AMERICAN): 58.8 ML/MIN/1.73 M^2
EST. GFR  (NON AFRICAN AMERICAN): 50.9 ML/MIN/1.73 M^2
ESTIMATED AVG GLUCOSE: 154 MG/DL (ref 68–131)
GLUCOSE SERPL-MCNC: 140 MG/DL (ref 70–110)
HBA1C MFR BLD: 7 % (ref 4–5.6)
HCT VFR BLD AUTO: 44.1 % (ref 40–54)
HGB BLD-MCNC: 14.7 G/DL (ref 14–18)
IMM GRANULOCYTES # BLD AUTO: 0.02 K/UL (ref 0–0.04)
IMM GRANULOCYTES NFR BLD AUTO: 0.3 % (ref 0–0.5)
LYMPHOCYTES # BLD AUTO: 1.3 K/UL (ref 1–4.8)
LYMPHOCYTES NFR BLD: 20.6 % (ref 18–48)
MCH RBC QN AUTO: 31.5 PG (ref 27–31)
MCHC RBC AUTO-ENTMCNC: 33.3 G/DL (ref 32–36)
MCV RBC AUTO: 95 FL (ref 82–98)
MONOCYTES # BLD AUTO: 0.4 K/UL (ref 0.3–1)
MONOCYTES NFR BLD: 6 % (ref 4–15)
NEUTROPHILS # BLD AUTO: 4.5 K/UL (ref 1.8–7.7)
NEUTROPHILS NFR BLD: 69.4 % (ref 38–73)
NRBC BLD-RTO: 0 /100 WBC
PLATELET # BLD AUTO: 173 K/UL (ref 150–450)
PMV BLD AUTO: 10.4 FL (ref 9.2–12.9)
POTASSIUM SERPL-SCNC: 5.1 MMOL/L (ref 3.5–5.1)
PROT SERPL-MCNC: 6.9 G/DL (ref 6–8.4)
RBC # BLD AUTO: 4.66 M/UL (ref 4.6–6.2)
SODIUM SERPL-SCNC: 137 MMOL/L (ref 136–145)
WBC # BLD AUTO: 6.47 K/UL (ref 3.9–12.7)

## 2021-12-13 PROCEDURE — 84153 ASSAY OF PSA TOTAL: CPT | Performed by: INTERNAL MEDICINE

## 2021-12-13 PROCEDURE — 85025 COMPLETE CBC W/AUTO DIFF WBC: CPT | Performed by: INTERNAL MEDICINE

## 2021-12-13 PROCEDURE — 80321 ALCOHOLS BIOMARKERS 1OR 2: CPT | Performed by: PSYCHIATRY & NEUROLOGY

## 2021-12-13 PROCEDURE — 83921 ORGANIC ACID SINGLE QUANT: CPT | Performed by: PSYCHIATRY & NEUROLOGY

## 2021-12-13 PROCEDURE — 80053 COMPREHEN METABOLIC PANEL: CPT | Performed by: INTERNAL MEDICINE

## 2021-12-13 PROCEDURE — 84425 ASSAY OF VITAMIN B-1: CPT | Performed by: PSYCHIATRY & NEUROLOGY

## 2021-12-13 PROCEDURE — 83036 HEMOGLOBIN GLYCOSYLATED A1C: CPT | Performed by: INTERNAL MEDICINE

## 2021-12-13 PROCEDURE — 36415 COLL VENOUS BLD VENIPUNCTURE: CPT | Performed by: PSYCHIATRY & NEUROLOGY

## 2021-12-13 PROCEDURE — 86592 SYPHILIS TEST NON-TREP QUAL: CPT | Performed by: PSYCHIATRY & NEUROLOGY

## 2021-12-14 LAB
RPR SER QL: NORMAL
VIT B12 SERPL-MCNC: 202 NG/L (ref 180–914)

## 2021-12-15 LAB — VIT B1 BLD-MCNC: 113 UG/L (ref 38–122)

## 2021-12-17 LAB
METHYLMALONATE SERPL-SCNC: 0.26 NMOL/ML
PETH 16:0/18.1 (POPETH): 472 NG/ML
PETH 16:0/18.2 (PLPETH): 306 NG/ML

## 2021-12-23 ENCOUNTER — PATIENT MESSAGE (OUTPATIENT)
Dept: NEUROLOGY | Facility: CLINIC | Age: 71
End: 2021-12-23
Payer: MEDICARE

## 2022-01-13 ENCOUNTER — PATIENT MESSAGE (OUTPATIENT)
Dept: INTERNAL MEDICINE | Facility: CLINIC | Age: 72
End: 2022-01-13
Payer: MEDICARE

## 2022-01-13 ENCOUNTER — PATIENT MESSAGE (OUTPATIENT)
Dept: NEUROLOGY | Facility: CLINIC | Age: 72
End: 2022-01-13
Payer: MEDICARE

## 2022-01-13 ENCOUNTER — TELEPHONE (OUTPATIENT)
Dept: INTERNAL MEDICINE | Facility: CLINIC | Age: 72
End: 2022-01-13
Payer: MEDICARE

## 2022-01-13 RX ORDER — ZOLPIDEM TARTRATE 12.5 MG/1
12.5 TABLET, FILM COATED, EXTENDED RELEASE ORAL NIGHTLY PRN
Qty: 30 TABLET | Refills: 3 | Status: SHIPPED | OUTPATIENT
Start: 2022-01-13 | End: 2022-07-21 | Stop reason: ALTCHOICE

## 2022-01-13 NOTE — TELEPHONE ENCOUNTER
----- Message from Ad Kim MD sent at 1/13/2022 12:36 PM CST -----  Angel Luis Parrish,     If he'd like to stick with a pill I would probably do Januvia 50 mg daily or Tradjenta 5 mg daily. This would have similar glucose lowering to metformin with little side effects.     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 A1c is 7. He is complaining about diarrhea from metformin and wondered about other oral alternatives. What do you think?  Thanks  Francois

## 2022-01-31 ENCOUNTER — PATIENT MESSAGE (OUTPATIENT)
Dept: INTERNAL MEDICINE | Facility: CLINIC | Age: 72
End: 2022-01-31
Payer: MEDICARE

## 2022-01-31 DIAGNOSIS — E11.42 DIABETIC POLYNEUROPATHY ASSOCIATED WITH TYPE 2 DIABETES MELLITUS: Primary | ICD-10-CM

## 2022-02-11 ENCOUNTER — PATIENT MESSAGE (OUTPATIENT)
Dept: INTERNAL MEDICINE | Facility: CLINIC | Age: 72
End: 2022-02-11
Payer: MEDICARE

## 2022-02-11 ENCOUNTER — LAB VISIT (OUTPATIENT)
Dept: LAB | Facility: HOSPITAL | Age: 72
End: 2022-02-11
Attending: INTERNAL MEDICINE
Payer: MEDICARE

## 2022-02-11 DIAGNOSIS — E11.42 DIABETIC POLYNEUROPATHY ASSOCIATED WITH TYPE 2 DIABETES MELLITUS: ICD-10-CM

## 2022-02-11 LAB
ESTIMATED AVG GLUCOSE: 143 MG/DL (ref 68–131)
GLUCOSE SERPL-MCNC: 193 MG/DL (ref 70–110)
HBA1C MFR BLD: 6.6 % (ref 4–5.6)

## 2022-02-11 PROCEDURE — 36415 COLL VENOUS BLD VENIPUNCTURE: CPT | Performed by: INTERNAL MEDICINE

## 2022-02-11 PROCEDURE — 83036 HEMOGLOBIN GLYCOSYLATED A1C: CPT | Performed by: INTERNAL MEDICINE

## 2022-02-11 PROCEDURE — 82947 ASSAY GLUCOSE BLOOD QUANT: CPT | Performed by: INTERNAL MEDICINE

## 2022-02-14 ENCOUNTER — PATIENT MESSAGE (OUTPATIENT)
Dept: INTERNAL MEDICINE | Facility: CLINIC | Age: 72
End: 2022-02-14
Payer: MEDICARE

## 2022-02-14 DIAGNOSIS — R93.1 ELEVATED CORONARY ARTERY CALCIUM SCORE: Primary | ICD-10-CM

## 2022-02-23 ENCOUNTER — PATIENT MESSAGE (OUTPATIENT)
Dept: INTERNAL MEDICINE | Facility: CLINIC | Age: 72
End: 2022-02-23
Payer: MEDICARE

## 2022-03-02 ENCOUNTER — LAB VISIT (OUTPATIENT)
Dept: INTERNAL MEDICINE | Facility: CLINIC | Age: 72
End: 2022-03-02
Payer: MEDICARE

## 2022-03-02 DIAGNOSIS — Z11.52 ENCOUNTER FOR SCREENING FOR COVID-19: Primary | ICD-10-CM

## 2022-03-02 LAB
CTP QC/QA: YES
SARS-COV-2 RDRP RESP QL NAA+PROBE: NEGATIVE

## 2022-03-02 PROCEDURE — U0002 COVID-19 LAB TEST NON-CDC: HCPCS | Mod: PBBFAC

## 2022-03-08 PROBLEM — E66.09 CLASS 1 OBESITY DUE TO EXCESS CALORIES WITH BODY MASS INDEX (BMI) OF 32.0 TO 32.9 IN ADULT: Status: ACTIVE | Noted: 2022-03-08

## 2022-03-08 PROBLEM — E66.811 CLASS 1 OBESITY DUE TO EXCESS CALORIES WITH BODY MASS INDEX (BMI) OF 32.0 TO 32.9 IN ADULT: Status: ACTIVE | Noted: 2022-03-08

## 2022-03-09 NOTE — PROGRESS NOTES
Subjective:   Patient ID:  Anthony Ball is a 71 y.o. 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   The patient has no chest pain, SOB, TIA, palpitations, syncope or pre-syncope.Patient does not exercise.Alcohol is high maybe 2 bottles wine/d        Review of Systems   Constitutional: Negative for chills, decreased appetite, diaphoresis, fever, malaise/fatigue, night sweats, weight gain and weight loss.   HENT: Negative for congestion, hoarse voice, nosebleeds, sore throat and tinnitus.    Eyes: Negative for blurred vision, double vision, vision loss in left eye, vision loss in right eye, visual disturbance and visual halos.   Cardiovascular: Negative for chest pain, claudication, cyanosis, dyspnea on exertion, irregular heartbeat, leg swelling, near-syncope, orthopnea, palpitations, paroxysmal nocturnal dyspnea and syncope.   Respiratory: Negative for cough, hemoptysis, shortness of breath, sleep disturbances due to breathing, snoring, sputum production and wheezing.    Endocrine: Negative for cold intolerance, heat intolerance, polydipsia, polyphagia and polyuria.   Hematologic/Lymphatic: Negative for adenopathy and bleeding problem. Does not bruise/bleed easily.   Skin: Negative for color change, dry skin, flushing, itching, nail changes, poor wound healing, rash, skin cancer, suspicious lesions and unusual hair distribution.   Musculoskeletal: Negative for arthritis, back pain, falls, gout, joint pain, joint swelling, muscle cramps, muscle weakness, myalgias and stiffness.   Gastrointestinal: Negative for abdominal pain, anorexia, change in bowel habit, constipation, diarrhea, dysphagia, heartburn, hematemesis, hematochezia, melena and vomiting.   Genitourinary: Negative for decreased libido, dysuria, hematuria, hesitancy and urgency.   Neurological: Negative for  "excessive daytime sleepiness, dizziness, focal weakness, headaches, light-headedness, loss of balance, numbness, paresthesias, seizures, sensory change, tremors, vertigo and weakness.   Psychiatric/Behavioral: Negative for altered mental status, depression, hallucinations, memory loss, substance abuse and suicidal ideas. The patient does not have insomnia and is not nervous/anxious.    Allergic/Immunologic: Negative for environmental allergies and hives.       Objective: BP (!) 116/56 (BP Location: Left arm, Patient Position: Sitting, BP Method: Medium (Automatic))   Pulse (!) 117   Ht 5' 9" (1.753 m)   Wt 98.7 kg (217 lb 9.5 oz)   BMI 32.13 kg/m²      Physical Exam  Constitutional:       General: He is not in acute distress.     Appearance: He is well-developed. He is not diaphoretic.   HENT:      Head: Normocephalic.   Eyes:      Pupils: Pupils are equal, round, and reactive to light.   Neck:      Thyroid: No thyromegaly.   Cardiovascular:      Rate and Rhythm: Normal rate and regular rhythm.      Pulses: Intact distal pulses.           Carotid pulses are 3+ on the right side and 3+ on the left side.       Radial pulses are 3+ on the right side and 3+ on the left side.        Femoral pulses are 3+ on the right side and 3+ on the left side.       Popliteal pulses are 3+ on the right side and 3+ on the left side.        Dorsalis pedis pulses are 3+ on the right side and 3+ on the left side.        Posterior tibial pulses are 3+ on the right side and 3+ on the left side.      Heart sounds: Normal heart sounds. No murmur heard.    No friction rub. No gallop.   Pulmonary:      Effort: Pulmonary effort is normal. No respiratory distress.      Breath sounds: Normal breath sounds. No wheezing or rales.   Chest:      Chest wall: No tenderness.   Abdominal:      General: There is no distension.      Palpations: Abdomen is soft. There is no mass.      Tenderness: There is no abdominal tenderness.   Musculoskeletal:       "   General: Normal range of motion.      Cervical back: Normal range of motion.   Lymphadenopathy:      Cervical: No cervical adenopathy.   Skin:     General: Skin is warm.      Nails: There is no clubbing.   Neurological:      Mental Status: He is alert and oriented to person, place, and time.   Psychiatric:         Speech: Speech normal.         Behavior: Behavior normal.         Thought Content: Thought content normal.         Judgment: Judgment normal.         Assessment:     1. Essential hypertension    2. Agatston CAC score, >400    3. Diabetic polyneuropathy associated with type 2 diabetes mellitus    4. Diabetes mellitus type 2 in obese    5. Syncope, unspecified syncope type    6. Recurrent falls    7. Balance problem    8. Abnormal gait    9. History of bariatric surgery    10. Class 1 obesity due to excess calories with serious comorbidity and body mass index (BMI) of 32.0 to 32.9 in adult    11. Elevated coronary artery calcium score    12. Tachycardia        Plan:   Discussed diet , achieving and maintaining ideal body weight, and exercise.   We reviewed meds in detail.  Reassured-Discussed goals, options, plan.  Omega-3 > 800 mg/d combined EPA/DHA.  Goal BP< 130/80.  Goal LDL < 70.  Would benefit from SGLT2I like Farxiga or Jardiance 10 mg/d  Should do stress test  Statin after seeing labs  Baby ASA  We discussed alcohol  Anthony was seen today for elevated coronary artery calcium score.    Diagnoses and all orders for this visit:    Essential hypertension  -     Lipid Panel; Future; Expected date: 03/11/2022  -     Comprehensive Metabolic Panel; Future; Expected date: 03/11/2022  -     TSH; Future; Expected date: 03/11/2022  -     CBC Auto Differential; Future; Expected date: 03/11/2022  -     EKG 12-lead; Future; Expected date: 03/11/2022  -     Nuclear Stress - Cardiology Interpreted; Future; Expected date: 03/11/2022    Agatston CAC score, >400  -     Lipid Panel; Future; Expected date: 03/11/2022  -      Comprehensive Metabolic Panel; Future; Expected date: 03/11/2022  -     TSH; Future; Expected date: 03/11/2022  -     PSA, Screening; Future; Expected date: 03/11/2022  -     CBC Auto Differential; Future; Expected date: 03/11/2022  -     EKG 12-lead; Future; Expected date: 03/11/2022  -     Nuclear Stress - Cardiology Interpreted; Future; Expected date: 03/11/2022    Diabetic polyneuropathy associated with type 2 diabetes mellitus  -     PSA, Screening; Future; Expected date: 03/11/2022  -     Hemoglobin A1C; Future; Expected date: 03/11/2022    Diabetes mellitus type 2 in obese  -     Comprehensive Metabolic Panel; Future; Expected date: 03/11/2022  -     PSA, Screening; Future; Expected date: 03/11/2022  -     Hemoglobin A1C; Future; Expected date: 03/11/2022    Syncope, unspecified syncope type    Recurrent falls  -     CBC Auto Differential; Future; Expected date: 03/11/2022    Balance problem    Abnormal gait  -     CBC Auto Differential; Future; Expected date: 03/11/2022    History of bariatric surgery  -     TSH; Future; Expected date: 03/11/2022    Class 1 obesity due to excess calories with serious comorbidity and body mass index (BMI) of 32.0 to 32.9 in adult  -     TSH; Future; Expected date: 03/11/2022    Elevated coronary artery calcium score  -     Ambulatory referral/consult to Cardiology    Tachycardia  -     EKG 12-lead; Future; Expected date: 03/11/2022    Other orders  -     diphenhydrAMINE (BENADRYL) 25 mg capsule; Take 25 mg by mouth every 6 (six) hours as needed for Itching.            Follow up for ECG now; lab next week; Regadenoscan Stress soon.

## 2022-03-10 ENCOUNTER — PATIENT MESSAGE (OUTPATIENT)
Dept: INTERNAL MEDICINE | Facility: CLINIC | Age: 72
End: 2022-03-10
Payer: MEDICARE

## 2022-03-10 ENCOUNTER — TELEPHONE (OUTPATIENT)
Dept: INTERNAL MEDICINE | Facility: CLINIC | Age: 72
End: 2022-03-10
Payer: MEDICARE

## 2022-03-10 ENCOUNTER — PATIENT MESSAGE (OUTPATIENT)
Dept: CARDIOLOGY | Facility: CLINIC | Age: 72
End: 2022-03-10

## 2022-03-10 ENCOUNTER — HOSPITAL ENCOUNTER (OUTPATIENT)
Dept: CARDIOLOGY | Facility: CLINIC | Age: 72
Discharge: HOME | End: 2022-03-10
Payer: MEDICARE

## 2022-03-10 ENCOUNTER — OFFICE VISIT (OUTPATIENT)
Dept: CARDIOLOGY | Facility: CLINIC | Age: 72
End: 2022-03-10
Payer: MEDICARE

## 2022-03-10 VITALS
HEART RATE: 117 BPM | DIASTOLIC BLOOD PRESSURE: 56 MMHG | BODY MASS INDEX: 32.24 KG/M2 | HEIGHT: 69 IN | WEIGHT: 217.63 LBS | SYSTOLIC BLOOD PRESSURE: 116 MMHG

## 2022-03-10 DIAGNOSIS — Z98.84 HISTORY OF BARIATRIC SURGERY: ICD-10-CM

## 2022-03-10 DIAGNOSIS — R93.1 AGATSTON CAC SCORE, >400: ICD-10-CM

## 2022-03-10 DIAGNOSIS — R00.0 TACHYCARDIA: ICD-10-CM

## 2022-03-10 DIAGNOSIS — E11.69 DIABETES MELLITUS TYPE 2 IN OBESE: Primary | ICD-10-CM

## 2022-03-10 DIAGNOSIS — E66.09 CLASS 1 OBESITY DUE TO EXCESS CALORIES WITH SERIOUS COMORBIDITY AND BODY MASS INDEX (BMI) OF 32.0 TO 32.9 IN ADULT: ICD-10-CM

## 2022-03-10 DIAGNOSIS — I10 ESSENTIAL HYPERTENSION: ICD-10-CM

## 2022-03-10 DIAGNOSIS — R26.9 ABNORMAL GAIT: ICD-10-CM

## 2022-03-10 DIAGNOSIS — E66.9 DIABETES MELLITUS TYPE 2 IN OBESE: Primary | ICD-10-CM

## 2022-03-10 DIAGNOSIS — E11.42 DIABETIC POLYNEUROPATHY ASSOCIATED WITH TYPE 2 DIABETES MELLITUS: ICD-10-CM

## 2022-03-10 DIAGNOSIS — R26.89 BALANCE PROBLEM: ICD-10-CM

## 2022-03-10 DIAGNOSIS — R29.6 RECURRENT FALLS: ICD-10-CM

## 2022-03-10 DIAGNOSIS — R93.1 ELEVATED CORONARY ARTERY CALCIUM SCORE: ICD-10-CM

## 2022-03-10 DIAGNOSIS — E66.9 DIABETES MELLITUS TYPE 2 IN OBESE: ICD-10-CM

## 2022-03-10 DIAGNOSIS — I10 ESSENTIAL HYPERTENSION: Primary | ICD-10-CM

## 2022-03-10 DIAGNOSIS — E11.69 DIABETES MELLITUS TYPE 2 IN OBESE: ICD-10-CM

## 2022-03-10 DIAGNOSIS — R55 SYNCOPE, UNSPECIFIED SYNCOPE TYPE: ICD-10-CM

## 2022-03-10 PROCEDURE — 99214 OFFICE O/P EST MOD 30 MIN: CPT | Mod: PBBFAC | Performed by: INTERNAL MEDICINE

## 2022-03-10 PROCEDURE — 99204 PR OFFICE/OUTPT VISIT, NEW, LEVL IV, 45-59 MIN: ICD-10-PCS | Mod: S$PBB,,, | Performed by: INTERNAL MEDICINE

## 2022-03-10 PROCEDURE — 93005 ELECTROCARDIOGRAM TRACING: CPT | Mod: PBBFAC | Performed by: INTERNAL MEDICINE

## 2022-03-10 PROCEDURE — 99999 PR PBB SHADOW E&M-EST. PATIENT-LVL IV: CPT | Mod: PBBFAC,,, | Performed by: INTERNAL MEDICINE

## 2022-03-10 PROCEDURE — 93010 ELECTROCARDIOGRAM REPORT: CPT | Mod: S$PBB,,, | Performed by: INTERNAL MEDICINE

## 2022-03-10 PROCEDURE — 99204 OFFICE O/P NEW MOD 45 MIN: CPT | Mod: S$PBB,,, | Performed by: INTERNAL MEDICINE

## 2022-03-10 PROCEDURE — 99999 PR PBB SHADOW E&M-EST. PATIENT-LVL IV: ICD-10-PCS | Mod: PBBFAC,,, | Performed by: INTERNAL MEDICINE

## 2022-03-10 PROCEDURE — 93010 EKG 12-LEAD: ICD-10-PCS | Mod: S$PBB,,, | Performed by: INTERNAL MEDICINE

## 2022-03-10 RX ORDER — DIPHENHYDRAMINE HCL 25 MG
25 CAPSULE ORAL EVERY 6 HOURS PRN
COMMUNITY
End: 2023-09-25

## 2022-03-10 NOTE — PATIENT INSTRUCTIONS
Discussed diet , achieving and maintaining ideal body weight, and exercise.   We reviewed meds in detail.  Reassured-Discussed goals, options, plan.  Omega-3 > 800 mg/d combined EPA/DHA.  Goal BP< 130/80.  Goal LDL < 70.  Would benefit from SGLT2I like Farxiga or Jardiance 10 mg/d  Should do stress test  Statin after seeing labs  Baby ASA  We discussed alcohol

## 2022-03-10 NOTE — TELEPHONE ENCOUNTER
----- Message from Phil De La Rosa MD sent at 3/10/2022  9:37 AM CST -----  Dr Ball said you were managing his DM-should be on an SGLT2I like Farxiga  or Jardiance 10 mg which has good clinical event data especially for HF-could be in a additiion or instead if Januvia,CJL

## 2022-03-10 NOTE — TELEPHONE ENCOUNTER
"----- Message from Ad Kim MD sent at 3/10/2022 10:35 AM CST -----  Angel Luis Parrish,     I think Jardiance 10 mg once daily would be a reasonable addition to his regimen for the cardiac benefit. As long as there is no history of significant PAD, or diabetic foot ulcer. I usually check a BMP on one month after starting it to make sure there is no significant decline in GFR.     -Ad  ----- Message -----  From: Hussain Hogan MD  Sent: 3/10/2022   9:48 AM CST  To: MD Ad Arnold saw Dr. Ball and said the following:    "Dr Ball said you were managing his DM-should be on an SGLT2I like Farxiga  or Jardiance 10 mg which has good clinical event data especially for HF-could be in a additiion or instead if BLANCA Doshi". What are your thoughts? He has significant CAD which is asymptomatic  Francois"

## 2022-03-11 RX ORDER — EMPAGLIFLOZIN 10 MG/1
10 TABLET, FILM COATED ORAL DAILY
Qty: 30 TABLET | Refills: 11 | Status: SHIPPED | OUTPATIENT
Start: 2022-03-11 | End: 2023-03-28 | Stop reason: SDUPTHER

## 2022-03-16 ENCOUNTER — TELEPHONE (OUTPATIENT)
Dept: CARDIOLOGY | Facility: HOSPITAL | Age: 72
End: 2022-03-16
Payer: MEDICARE

## 2022-03-16 ENCOUNTER — PATIENT MESSAGE (OUTPATIENT)
Dept: CARDIOLOGY | Facility: CLINIC | Age: 72
End: 2022-03-16
Payer: MEDICARE

## 2022-03-18 ENCOUNTER — HOSPITAL ENCOUNTER (OUTPATIENT)
Dept: CARDIOLOGY | Facility: HOSPITAL | Age: 72
Discharge: HOME | End: 2022-03-18
Attending: INTERNAL MEDICINE
Payer: MEDICARE

## 2022-03-18 ENCOUNTER — PATIENT MESSAGE (OUTPATIENT)
Dept: CARDIOLOGY | Facility: CLINIC | Age: 72
End: 2022-03-18

## 2022-03-18 VITALS
SYSTOLIC BLOOD PRESSURE: 150 MMHG | HEIGHT: 69 IN | BODY MASS INDEX: 32.14 KG/M2 | HEART RATE: 104 BPM | WEIGHT: 217 LBS | DIASTOLIC BLOOD PRESSURE: 77 MMHG

## 2022-03-18 DIAGNOSIS — I10 ESSENTIAL HYPERTENSION: ICD-10-CM

## 2022-03-18 DIAGNOSIS — I10 ESSENTIAL HYPERTENSION: Primary | ICD-10-CM

## 2022-03-18 DIAGNOSIS — R93.1 AGATSTON CAC SCORE, >400: ICD-10-CM

## 2022-03-18 LAB
CV PHARM DOSE: 0.4 MG
CV STRESS BASE HR: 104 BPM
DIASTOLIC BLOOD PRESSURE: 77 MMHG
EJECTION FRACTION- HIGH: 65 %
END DIASTOLIC INDEX-HIGH: 153 ML/M2
END DIASTOLIC INDEX-LOW: 93 ML/M2
END SYSTOLIC INDEX-HIGH: 71 ML/M2
END SYSTOLIC INDEX-LOW: 31 ML/M2
OHS CV CPX 85 PERCENT MAX PREDICTED HEART RATE MALE: 128
OHS CV CPX MAX PREDICTED HEART RATE: 150
OHS CV CPX PATIENT IS FEMALE: 0
OHS CV CPX PATIENT IS MALE: 1
OHS CV CPX PEAK DIASTOLIC BLOOD PRESSURE: 82 MMHG
OHS CV CPX PEAK HEAR RATE: 115 BPM
OHS CV CPX PEAK RATE PRESSURE PRODUCT: NORMAL
OHS CV CPX PEAK SYSTOLIC BLOOD PRESSURE: 160 MMHG
OHS CV CPX PERCENT MAX PREDICTED HEART RATE ACHIEVED: 77
OHS CV CPX RATE PRESSURE PRODUCT PRESENTING: NORMAL
RETIRED EF AND QEF - SEE NOTES: 53 %
SYSTOLIC BLOOD PRESSURE: 150 MMHG

## 2022-03-18 PROCEDURE — 93018 CV STRESS TEST I&R ONLY: CPT | Mod: ,,, | Performed by: INTERNAL MEDICINE

## 2022-03-18 PROCEDURE — 93016 CV STRESS TEST SUPVJ ONLY: CPT | Mod: ,,, | Performed by: INTERNAL MEDICINE

## 2022-03-18 PROCEDURE — A9502 TC99M TETROFOSMIN: HCPCS

## 2022-03-18 PROCEDURE — 93018 STRESS TEST WITH MYOCARDIAL PERFUSION (CUPID ONLY): ICD-10-PCS | Mod: ,,, | Performed by: INTERNAL MEDICINE

## 2022-03-18 PROCEDURE — 93016 STRESS TEST WITH MYOCARDIAL PERFUSION (CUPID ONLY): ICD-10-PCS | Mod: ,,, | Performed by: INTERNAL MEDICINE

## 2022-03-18 PROCEDURE — 63600175 PHARM REV CODE 636 W HCPCS: Performed by: INTERNAL MEDICINE

## 2022-03-18 PROCEDURE — 78452 STRESS TEST WITH MYOCARDIAL PERFUSION (CUPID ONLY): ICD-10-PCS | Mod: 26,,, | Performed by: INTERNAL MEDICINE

## 2022-03-18 PROCEDURE — 78452 HT MUSCLE IMAGE SPECT MULT: CPT | Mod: 26,,, | Performed by: INTERNAL MEDICINE

## 2022-03-18 RX ORDER — REGADENOSON 0.08 MG/ML
0.4 INJECTION, SOLUTION INTRAVENOUS
Status: COMPLETED | OUTPATIENT
Start: 2022-03-18 | End: 2022-03-18

## 2022-03-18 RX ADMIN — REGADENOSON 0.4 MG: 0.08 INJECTION, SOLUTION INTRAVENOUS at 08:03

## 2022-03-22 ENCOUNTER — PATIENT MESSAGE (OUTPATIENT)
Dept: CARDIOLOGY | Facility: CLINIC | Age: 72
End: 2022-03-22
Payer: MEDICARE

## 2022-03-23 ENCOUNTER — PATIENT MESSAGE (OUTPATIENT)
Dept: INTERNAL MEDICINE | Facility: CLINIC | Age: 72
End: 2022-03-23
Payer: MEDICARE

## 2022-03-24 ENCOUNTER — LAB VISIT (OUTPATIENT)
Dept: LAB | Facility: HOSPITAL | Age: 72
End: 2022-03-24
Attending: INTERNAL MEDICINE
Payer: MEDICARE

## 2022-03-24 ENCOUNTER — PATIENT MESSAGE (OUTPATIENT)
Dept: CARDIOLOGY | Facility: CLINIC | Age: 72
End: 2022-03-24
Payer: MEDICARE

## 2022-03-24 ENCOUNTER — IMMUNIZATION (OUTPATIENT)
Dept: INTERNAL MEDICINE | Facility: CLINIC | Age: 72
End: 2022-03-24
Payer: MEDICARE

## 2022-03-24 DIAGNOSIS — Z23 NEED FOR VACCINATION: Primary | ICD-10-CM

## 2022-03-24 DIAGNOSIS — I10 ESSENTIAL HYPERTENSION: ICD-10-CM

## 2022-03-24 LAB
ANION GAP SERPL CALC-SCNC: 11 MMOL/L (ref 8–16)
BUN SERPL-MCNC: 25 MG/DL (ref 8–23)
CALCIUM SERPL-MCNC: 10.1 MG/DL (ref 8.7–10.5)
CHLORIDE SERPL-SCNC: 94 MMOL/L (ref 95–110)
CO2 SERPL-SCNC: 24 MMOL/L (ref 23–29)
CREAT SERPL-MCNC: 2 MG/DL (ref 0.5–1.4)
EST. GFR  (AFRICAN AMERICAN): 38 ML/MIN/1.73 M^2
EST. GFR  (NON AFRICAN AMERICAN): 32.8 ML/MIN/1.73 M^2
GLUCOSE SERPL-MCNC: 189 MG/DL (ref 70–110)
POTASSIUM SERPL-SCNC: 5 MMOL/L (ref 3.5–5.1)
SODIUM SERPL-SCNC: 129 MMOL/L (ref 136–145)

## 2022-03-24 PROCEDURE — 80048 BASIC METABOLIC PNL TOTAL CA: CPT | Performed by: INTERNAL MEDICINE

## 2022-03-24 PROCEDURE — 91305 COVID-19, MRNA, LNP-S, PF, 30 MCG/0.3 ML DOSE VACCINE (PFIZER): CPT | Mod: PBBFAC

## 2022-03-24 PROCEDURE — 36415 COLL VENOUS BLD VENIPUNCTURE: CPT | Performed by: INTERNAL MEDICINE

## 2022-03-25 ENCOUNTER — PATIENT MESSAGE (OUTPATIENT)
Dept: CARDIOLOGY | Facility: CLINIC | Age: 72
End: 2022-03-25
Payer: MEDICARE

## 2022-04-05 ENCOUNTER — PATIENT MESSAGE (OUTPATIENT)
Dept: INTERNAL MEDICINE | Facility: CLINIC | Age: 72
End: 2022-04-05
Payer: MEDICARE

## 2022-04-19 ENCOUNTER — LAB VISIT (OUTPATIENT)
Dept: LAB | Facility: HOSPITAL | Age: 72
End: 2022-04-19
Attending: INTERNAL MEDICINE
Payer: MEDICARE

## 2022-04-19 ENCOUNTER — PATIENT MESSAGE (OUTPATIENT)
Dept: INTERNAL MEDICINE | Facility: CLINIC | Age: 72
End: 2022-04-19
Payer: MEDICARE

## 2022-04-19 DIAGNOSIS — R93.1 AGATSTON CAC SCORE, >400: ICD-10-CM

## 2022-04-19 DIAGNOSIS — E11.42 DIABETIC POLYNEUROPATHY ASSOCIATED WITH TYPE 2 DIABETES MELLITUS: Primary | ICD-10-CM

## 2022-04-19 DIAGNOSIS — E11.69 DIABETES MELLITUS TYPE 2 IN OBESE: ICD-10-CM

## 2022-04-19 DIAGNOSIS — E66.9 DIABETES MELLITUS TYPE 2 IN OBESE: ICD-10-CM

## 2022-04-19 LAB
ALBUMIN SERPL BCP-MCNC: 3.4 G/DL (ref 3.5–5.2)
ALP SERPL-CCNC: 77 U/L (ref 55–135)
ALT SERPL W/O P-5'-P-CCNC: 41 U/L (ref 10–44)
ANION GAP SERPL CALC-SCNC: 17 MMOL/L (ref 8–16)
AST SERPL-CCNC: 46 U/L (ref 10–40)
BILIRUB SERPL-MCNC: 0.9 MG/DL (ref 0.1–1)
BUN SERPL-MCNC: 17 MG/DL (ref 8–23)
CALCIUM SERPL-MCNC: 9.5 MG/DL (ref 8.7–10.5)
CHLORIDE SERPL-SCNC: 101 MMOL/L (ref 95–110)
CO2 SERPL-SCNC: 20 MMOL/L (ref 23–29)
CREAT SERPL-MCNC: 1.7 MG/DL (ref 0.5–1.4)
EST. GFR  (AFRICAN AMERICAN): 46.2 ML/MIN/1.73 M^2
EST. GFR  (NON AFRICAN AMERICAN): 40 ML/MIN/1.73 M^2
ESTIMATED AVG GLUCOSE: 140 MG/DL (ref 68–131)
GLUCOSE SERPL-MCNC: 159 MG/DL (ref 70–110)
HBA1C MFR BLD: 6.5 % (ref 4–5.6)
POTASSIUM SERPL-SCNC: 4.7 MMOL/L (ref 3.5–5.1)
PROT SERPL-MCNC: 6.9 G/DL (ref 6–8.4)
SODIUM SERPL-SCNC: 138 MMOL/L (ref 136–145)

## 2022-04-19 PROCEDURE — 36415 COLL VENOUS BLD VENIPUNCTURE: CPT | Performed by: INTERNAL MEDICINE

## 2022-04-19 PROCEDURE — 83036 HEMOGLOBIN GLYCOSYLATED A1C: CPT | Performed by: INTERNAL MEDICINE

## 2022-04-19 PROCEDURE — 80053 COMPREHEN METABOLIC PANEL: CPT | Performed by: INTERNAL MEDICINE

## 2022-04-20 ENCOUNTER — PATIENT MESSAGE (OUTPATIENT)
Dept: CARDIOLOGY | Facility: CLINIC | Age: 72
End: 2022-04-20
Payer: MEDICARE

## 2022-05-04 ENCOUNTER — PATIENT MESSAGE (OUTPATIENT)
Dept: INTERNAL MEDICINE | Facility: CLINIC | Age: 72
End: 2022-05-04
Payer: MEDICARE

## 2022-05-10 ENCOUNTER — PATIENT OUTREACH (OUTPATIENT)
Dept: ADMINISTRATIVE | Facility: OTHER | Age: 72
End: 2022-05-10
Payer: MEDICARE

## 2022-05-10 DIAGNOSIS — E11.9 TYPE 2 DIABETES MELLITUS WITHOUT COMPLICATION, UNSPECIFIED WHETHER LONG TERM INSULIN USE: Primary | ICD-10-CM

## 2022-05-11 NOTE — PROGRESS NOTES
Health Maintenance Due   Topic Date Due    Diabetes Urine Screening  Never done    Foot Exam  Never done    Eye Exam  Never done    Pneumococcal Vaccines (Age 65+) (1 - PCV) Never done     Updates were requested from care everywhere.  Chart was reviewed for overdue Proactive [facility] Encounters (YANET) topics (CRS, Breast Cancer Screening, Eye exam)  Health Maintenance has been updated.  LINKS immunization registry triggered.  Immunizations were reconciled.  Diabetic eye screening ordered.

## 2022-05-13 ENCOUNTER — OFFICE VISIT (OUTPATIENT)
Dept: ENDOCRINOLOGY | Facility: CLINIC | Age: 72
End: 2022-05-13
Payer: MEDICARE

## 2022-05-13 VITALS
HEIGHT: 69 IN | SYSTOLIC BLOOD PRESSURE: 124 MMHG | DIASTOLIC BLOOD PRESSURE: 74 MMHG | WEIGHT: 222 LBS | BODY MASS INDEX: 32.88 KG/M2

## 2022-05-13 DIAGNOSIS — I10 ESSENTIAL HYPERTENSION: ICD-10-CM

## 2022-05-13 DIAGNOSIS — E11.42 TYPE 2 DIABETES MELLITUS WITH DIABETIC POLYNEUROPATHY, WITHOUT LONG-TERM CURRENT USE OF INSULIN: Primary | ICD-10-CM

## 2022-05-13 PROBLEM — E11.49 TYPE 2 DIABETES MELLITUS WITH NEUROLOGIC COMPLICATION, WITHOUT LONG-TERM CURRENT USE OF INSULIN: Status: ACTIVE | Noted: 2021-07-08

## 2022-05-13 PROCEDURE — 99204 PR OFFICE/OUTPT VISIT, NEW, LEVL IV, 45-59 MIN: ICD-10-PCS | Mod: S$PBB,,, | Performed by: INTERNAL MEDICINE

## 2022-05-13 PROCEDURE — 99213 OFFICE O/P EST LOW 20 MIN: CPT | Mod: PBBFAC | Performed by: INTERNAL MEDICINE

## 2022-05-13 PROCEDURE — 99204 OFFICE O/P NEW MOD 45 MIN: CPT | Mod: S$PBB,,, | Performed by: INTERNAL MEDICINE

## 2022-05-13 PROCEDURE — 99999 PR PBB SHADOW E&M-EST. PATIENT-LVL III: CPT | Mod: PBBFAC,,, | Performed by: INTERNAL MEDICINE

## 2022-05-13 PROCEDURE — 99999 PR PBB SHADOW E&M-EST. PATIENT-LVL III: ICD-10-PCS | Mod: PBBFAC,,, | Performed by: INTERNAL MEDICINE

## 2022-05-13 RX ORDER — METFORMIN HYDROCHLORIDE 500 MG/1
500 TABLET, EXTENDED RELEASE ORAL
Qty: 90 TABLET | Refills: 3 | Status: SHIPPED | OUTPATIENT
Start: 2022-05-13 | End: 2022-07-22 | Stop reason: SDUPTHER

## 2022-05-13 NOTE — PROGRESS NOTES
Subjective:      Chief Complaint:diabetes mellitus management  HPI: Anthony Ball is a 72 y.o. male who is here for an initial evaluation for diabetes mellitus type 2     Now Regarding diabetes:  -Patient was initially diagnosed with Type 2 diabetes mellitus around 25 years ago  -When questioned about pertinent symptoms, denies any polyuria, polydipsia, nocturia, nausea vomiting, abdominal discomfort, numbness/tingling, visual change, weight change   -Last HbA1c in chart is 6.5 in April 2022  -Diabetic complications present  Neuropathy     : yes, on Pregablin  Retinopathy    : denies it  Nephropathy   : yes    Current diabetic medications include:   1. Januvia 50 mg daily  2. Jardiance 10 mg daily      Blood Sugar Range   Monitors finger sticks: does not do as he does not like needle sticks    Medication Compliance  -Patient has not missed a dose of medication yet and vouches to compliance      Cardiovascular risk factors:   -advanced age, diabetes mellitus, hypertension, male gender, and obesity (BMI >= 30 kg/m2)    Diet  Meals are: two meals  Breakfast:  Eggs, tortillos  Dinner:  Protein and multi grain bread    Does patient count carbohydrates? no    Diabetes History in Family  Maternal Grandmother    Exercise:  No    Hypoglycemic Episodes:  no    Episodes of Diabetic Ketoacidosis:   No    Previous Treatment:   Metformin (diarrhea)    Screening for Complications:    Dyslipidemia   Statin: Not taking    Nephropathy  ACE/ARB: Taking Irbesartan 75 mg daily      Diabetes Management Status  Screening or Prevention Patient's value Goal Complete/Controlled?   HgA1C Testing and Control   Lab Results   Component Value Date    HGBA1C 6.5 (H) 04/19/2022      Annually/Less than 8% Yes   Lipid profile : 03/18/2022 Annually Yes   LDL control Lab Results   Component Value Date    LDLCALC 49.2 (L) 03/18/2022    Annually/Less than 100 mg/dl  Yes   Nephropathy screening No results found for: LABMICR  No results found for: PROTEINUA  Annually No   Blood pressure BP Readings from Last 1 Encounters:   05/13/22 124/74    Less than 140/90 No   Dilated retinal exam Most Recent Eye Exam Date: Not Found Annually Yes   Foot exam   Most Recent Foot Exam Date: Not Found Annually No       Reviewed past medical, family, social history and updated as appropriate.    Review of Systems as above    Objective:     Vitals:    05/13/22 0811   BP: 124/74     BP Readings from Last 5 Encounters:   05/13/22 124/74   03/18/22 (!) 150/77   03/10/22 (!) 116/56   11/08/21 (!) 156/77   08/02/21 98/61       Physical Exam  Constitutional:       Appearance: He is obese.   HENT:      Head: Normocephalic and atraumatic.      Right Ear: External ear normal.      Left Ear: External ear normal.      Nose: Nose normal.   Eyes:      Extraocular Movements: Extraocular movements intact.   Cardiovascular:      Rate and Rhythm: Normal rate.   Pulmonary:      Effort: Pulmonary effort is normal.   Musculoskeletal:         General: Normal range of motion.      Cervical back: Normal range of motion.      Comments: Protective Sensation (w/ 10 gram monofilament):  Right: Decreased  Left: Decreased    Visual Inspection:  Normal -  Bilateral    Pedal Pulses:   Right: Present  Left: Present    Posterior tibialis:   Right:Present  Left: Present      Difficulty standing up from seated position  Mild imbalance present   Skin:     General: Skin is warm.   Neurological:      Mental Status: He is oriented to person, place, and time.   Psychiatric:         Mood and Affect: Mood normal.         Wt Readings from Last 10 Encounters:   05/13/22 0811 100.7 kg (222 lb 0.1 oz)   03/18/22 0831 98.4 kg (217 lb)   03/10/22 0910 98.7 kg (217 lb 9.5 oz)   11/08/21 1102 101 kg (222 lb 10.6 oz)   08/02/21 0942 95.2 kg (209 lb 14.1 oz)   07/21/21 1113 95.2 kg (209 lb 15.8 oz)   07/08/21 0924 94 kg (207 lb 5.5 oz)   05/30/21 1459 90.7 kg (200 lb)   11/03/17 1958 93 kg (205 lb)       Lab Results   Component Value Date     HGBA1C 6.5 (H) 04/19/2022     Lab Results   Component Value Date    CHOL 156 03/18/2022    HDL 93 (H) 03/18/2022    LDLCALC 49.2 (L) 03/18/2022    TRIG 69 03/18/2022    CHOLHDL 59.6 (H) 03/18/2022     Lab Results   Component Value Date     04/19/2022    K 4.7 04/19/2022     04/19/2022    CO2 20 (L) 04/19/2022     (H) 04/19/2022    BUN 17 04/19/2022    CREATININE 1.7 (H) 04/19/2022    CALCIUM 9.5 04/19/2022    PROT 6.9 04/19/2022    ALBUMIN 3.4 (L) 04/19/2022    BILITOT 0.9 04/19/2022    ALKPHOS 77 04/19/2022    AST 46 (H) 04/19/2022    ALT 41 04/19/2022    ANIONGAP 17 (H) 04/19/2022    ESTGFRAFRICA 46.2 (A) 04/19/2022    EGFRNONAA 40.0 (A) 04/19/2022    TSH 3.176 03/18/2022      No results found for: MICALBCREAT    Assessment/Plan:     Type 2 diabetes mellitus with neurologic complication, without long-term current use of insulin  - Last HbA1c at 6.5  - Patient doing well on Jardiance 10 mg and Januvia 50 mg daily  - He is motivated to decrease his HbA1c even further  - He already has a poor appetite and a history of gastric bypass surgery, so not keen on GLP -1 drugs  - He has a BMI of 32.78 and a component of Insulin Resistance. Would start him on low dose 500 mg Extended Release Metformin to be taken with dinner. He previously was not able to tolerate high doses of immediate release Metformin  - He is not keen on doing finger sticks to check blood sugar at home. Advised to check it periodically. Will repeat HbA1c in middle of July 2022  - Will check Urine microalbumin : creatinine in July 2022  - Foot exam performed today indicative of mild loss of proprioception in both feet  - He has not had an eye exam for a while, will provide referral to optometry for diabetes eye exam and check for retinopathy  - Will check renal function panel in July 2022, explained that it is common to have an initial mild decline in kidney function on starting SGL-2 inhibitors which was seen in his case as well.  Advised to maintain good hydration.  - Lifestyle modifications advised and exercise encouraged    Essential hypertension  - on Irbesartan 75 mg daily  - 124/74 today      Follow up in 6 months    Dr. Drew Kaur  [facility], [address]  [address] [place], [address]    Office: [phone]  Fax: [phone]    The above history labs imaging impression and plan were discussed with attending physician who is in agreement and also took part in this patient's care.  I personally reviewed all of the patients available medications, labs, imaging, vitals, allergies, medical history.

## 2022-05-13 NOTE — ASSESSMENT & PLAN NOTE
- Last HbA1c at 6.5  - Patient doing well on Jardiance 10 mg and Januvia 50 mg daily  - He is motivated to decrease his HbA1c even further  - He already has a poor appetite and a history of gastric bypass surgery, so not keen on GLP -1 drugs  - He has a BMI of 32.78 and a component of Insulin Resistance. Would start him on low dose 500 mg Extended Release Metformin to be taken with dinner. He previously was not able to tolerate high doses of immediate release Metformin  - He is not keen on doing finger sticks to check blood sugar at home. Advised to check it periodically. Will repeat HbA1c in middle of July 2022  - Will check Urine microalbumin : creatinine in July 2022  - Foot exam performed today indicative of mild loss of proprioception in both feet  - He has not had an eye exam for a while, will provide referral to optometry for diabetes eye exam and check for retinopathy  - Will check renal function panel in July 2022, explained that it is common to have an initial mild decline in kidney function on starting SGL-2 inhibitors which was seen in his case as well. Advised to maintain good hydration.  - Lifestyle modifications advised and exercise encouraged

## 2022-05-13 NOTE — PROGRESS NOTES
I have reviewed and concur with the fellow's history, physical, assessment, and plan.  I have personally interviewed the patient and all questions were answered.     Patient with well-controlled type 2 DM. He is motivated to lower his A1c further. After reviewing all options metformin would be the best option. He had significant diarrhea on the regular release metformin so will try 500 mg of XR with dinner. Renal function stabilizing.

## 2022-05-19 ENCOUNTER — PATIENT MESSAGE (OUTPATIENT)
Dept: ENDOCRINOLOGY | Facility: CLINIC | Age: 72
End: 2022-05-19
Payer: MEDICARE

## 2022-05-24 ENCOUNTER — PATIENT MESSAGE (OUTPATIENT)
Dept: INTERNAL MEDICINE | Facility: CLINIC | Age: 72
End: 2022-05-24
Payer: MEDICARE

## 2022-05-25 RX ORDER — PREGABALIN 100 MG/1
100 CAPSULE ORAL DAILY
Qty: 90 CAPSULE | Refills: 3 | Status: SHIPPED | OUTPATIENT
Start: 2022-05-25 | End: 2022-11-03

## 2022-06-07 ENCOUNTER — PATIENT MESSAGE (OUTPATIENT)
Dept: INTERNAL MEDICINE | Facility: CLINIC | Age: 72
End: 2022-06-07
Payer: MEDICARE

## 2022-06-09 ENCOUNTER — PATIENT MESSAGE (OUTPATIENT)
Dept: INTERNAL MEDICINE | Facility: CLINIC | Age: 72
End: 2022-06-09
Payer: MEDICARE

## 2022-06-21 ENCOUNTER — PATIENT MESSAGE (OUTPATIENT)
Dept: ENDOCRINOLOGY | Facility: CLINIC | Age: 72
End: 2022-06-21
Payer: MEDICARE

## 2022-07-01 ENCOUNTER — PATIENT MESSAGE (OUTPATIENT)
Dept: INTERNAL MEDICINE | Facility: CLINIC | Age: 72
End: 2022-07-01
Payer: MEDICARE

## 2022-07-01 ENCOUNTER — LAB VISIT (OUTPATIENT)
Dept: LAB | Facility: HOSPITAL | Age: 72
End: 2022-07-01
Payer: MEDICARE

## 2022-07-01 ENCOUNTER — PATIENT MESSAGE (OUTPATIENT)
Dept: ENDOCRINOLOGY | Facility: CLINIC | Age: 72
End: 2022-07-01
Payer: MEDICARE

## 2022-07-01 DIAGNOSIS — E11.42 TYPE 2 DIABETES MELLITUS WITH DIABETIC POLYNEUROPATHY, WITHOUT LONG-TERM CURRENT USE OF INSULIN: ICD-10-CM

## 2022-07-01 LAB
ALBUMIN SERPL BCP-MCNC: 3.3 G/DL (ref 3.5–5.2)
ANION GAP SERPL CALC-SCNC: 9 MMOL/L (ref 8–16)
BUN SERPL-MCNC: 14 MG/DL (ref 8–23)
CALCIUM SERPL-MCNC: 9.4 MG/DL (ref 8.7–10.5)
CHLORIDE SERPL-SCNC: 103 MMOL/L (ref 95–110)
CO2 SERPL-SCNC: 26 MMOL/L (ref 23–29)
CREAT SERPL-MCNC: 1.5 MG/DL (ref 0.5–1.4)
EST. GFR  (AFRICAN AMERICAN): 53.8 ML/MIN/1.73 M^2
EST. GFR  (NON AFRICAN AMERICAN): 46.5 ML/MIN/1.73 M^2
ESTIMATED AVG GLUCOSE: 134 MG/DL (ref 68–131)
GLUCOSE SERPL-MCNC: 149 MG/DL (ref 70–110)
HBA1C MFR BLD: 6.3 % (ref 4–5.6)
PHOSPHATE SERPL-MCNC: 3.2 MG/DL (ref 2.7–4.5)
POTASSIUM SERPL-SCNC: 4.5 MMOL/L (ref 3.5–5.1)
SODIUM SERPL-SCNC: 138 MMOL/L (ref 136–145)

## 2022-07-01 PROCEDURE — 36415 COLL VENOUS BLD VENIPUNCTURE: CPT | Performed by: STUDENT IN AN ORGANIZED HEALTH CARE EDUCATION/TRAINING PROGRAM

## 2022-07-01 PROCEDURE — 80069 RENAL FUNCTION PANEL: CPT | Performed by: STUDENT IN AN ORGANIZED HEALTH CARE EDUCATION/TRAINING PROGRAM

## 2022-07-01 PROCEDURE — 83036 HEMOGLOBIN GLYCOSYLATED A1C: CPT | Performed by: STUDENT IN AN ORGANIZED HEALTH CARE EDUCATION/TRAINING PROGRAM

## 2022-07-11 ENCOUNTER — PATIENT MESSAGE (OUTPATIENT)
Dept: INTERNAL MEDICINE | Facility: CLINIC | Age: 72
End: 2022-07-11
Payer: MEDICARE

## 2022-07-21 ENCOUNTER — TELEPHONE (OUTPATIENT)
Dept: INTERNAL MEDICINE | Facility: CLINIC | Age: 72
End: 2022-07-21

## 2022-07-21 ENCOUNTER — OFFICE VISIT (OUTPATIENT)
Dept: INTERNAL MEDICINE | Facility: CLINIC | Age: 72
End: 2022-07-21
Payer: MEDICARE

## 2022-07-21 ENCOUNTER — PATIENT MESSAGE (OUTPATIENT)
Dept: INTERNAL MEDICINE | Facility: CLINIC | Age: 72
End: 2022-07-21

## 2022-07-21 VITALS
HEART RATE: 102 BPM | BODY MASS INDEX: 32.65 KG/M2 | TEMPERATURE: 99 F | WEIGHT: 220.44 LBS | DIASTOLIC BLOOD PRESSURE: 50 MMHG | SYSTOLIC BLOOD PRESSURE: 100 MMHG | HEIGHT: 69 IN

## 2022-07-21 DIAGNOSIS — E11.42 DIABETIC POLYNEUROPATHY ASSOCIATED WITH TYPE 2 DIABETES MELLITUS: ICD-10-CM

## 2022-07-21 DIAGNOSIS — J45.909 CHRONIC ASTHMA, UNSPECIFIED ASTHMA SEVERITY, UNSPECIFIED WHETHER COMPLICATED, UNSPECIFIED WHETHER PERSISTENT: Primary | ICD-10-CM

## 2022-07-21 DIAGNOSIS — E11.42 TYPE 2 DIABETES MELLITUS WITH DIABETIC POLYNEUROPATHY, WITHOUT LONG-TERM CURRENT USE OF INSULIN: Primary | ICD-10-CM

## 2022-07-21 DIAGNOSIS — F51.01 PRIMARY INSOMNIA: ICD-10-CM

## 2022-07-21 DIAGNOSIS — Z98.84 HISTORY OF BARIATRIC SURGERY: ICD-10-CM

## 2022-07-21 DIAGNOSIS — L29.9 PRURITUS: ICD-10-CM

## 2022-07-21 DIAGNOSIS — R93.1 AGATSTON CAC SCORE, >400: ICD-10-CM

## 2022-07-21 DIAGNOSIS — K21.9 GASTROESOPHAGEAL REFLUX DISEASE WITHOUT ESOPHAGITIS: ICD-10-CM

## 2022-07-21 DIAGNOSIS — E66.09 CLASS 1 OBESITY DUE TO EXCESS CALORIES WITH SERIOUS COMORBIDITY AND BODY MASS INDEX (BMI) OF 32.0 TO 32.9 IN ADULT: ICD-10-CM

## 2022-07-21 DIAGNOSIS — I10 ESSENTIAL HYPERTENSION: ICD-10-CM

## 2022-07-21 PROCEDURE — 99999 PR PBB SHADOW E&M-EST. PATIENT-LVL III: CPT | Mod: PBBFAC,,, | Performed by: INTERNAL MEDICINE

## 2022-07-21 PROCEDURE — 99215 OFFICE O/P EST HI 40 MIN: CPT | Mod: S$PBB,,, | Performed by: INTERNAL MEDICINE

## 2022-07-21 PROCEDURE — 99215 PR OFFICE/OUTPT VISIT, EST, LEVL V, 40-54 MIN: ICD-10-PCS | Mod: S$PBB,,, | Performed by: INTERNAL MEDICINE

## 2022-07-21 PROCEDURE — 90677 PCV20 VACCINE IM: CPT | Mod: PBBFAC

## 2022-07-21 PROCEDURE — 99999 PR PBB SHADOW E&M-EST. PATIENT-LVL III: ICD-10-PCS | Mod: PBBFAC,,, | Performed by: INTERNAL MEDICINE

## 2022-07-21 PROCEDURE — 99213 OFFICE O/P EST LOW 20 MIN: CPT | Mod: PBBFAC | Performed by: INTERNAL MEDICINE

## 2022-07-21 RX ORDER — TRAZODONE HYDROCHLORIDE 50 MG/1
TABLET ORAL
Qty: 60 TABLET | Refills: 11 | Status: ON HOLD | OUTPATIENT
Start: 2022-07-21 | End: 2023-03-21 | Stop reason: HOSPADM

## 2022-07-21 NOTE — ASSESSMENT & PLAN NOTE
He has lost weight and is on avapro 75 mg daily (down from 300). He has some sx of postural hypotension and BP by me sitting with manual cuff (both arms) is 100/50 which may be too low for him. I suggested he reduce his avapro dose to 37.5 mg daily, keep daily BP log and send to me in a few weeks for review. He needs and ARB for nephroprotection from his diabetes.

## 2022-07-21 NOTE — PROGRESS NOTES
Subjective:       Patient ID: Anthony Ball is a 72 y.o. male.    Chief Complaint:  Followup on multiple medical problems     HPI     Type 2 diabetes mellitus with neurologic complication, without long-term current use of insulin  Pt is currently on metformin 500ER hs, januvia 50 mg daily and [id number] mg daily. His level of control has improved with current A1c of 6.3 down from 5.5%. He had some questions about his DM manament which I asked him to direct to Dr. Kim. His neuropathy is better on the Lyrica.    Essential hypertension  He has lost weight and is on avapro 75 mg daily (down from 300). He has some sx of postural hypotension and BP by me sitting with manual cuff (both arms) is 100/50 which may be too low for him. I suggested he reduce his avapro dose to 37.5 mg daily, keep daily BP log and send to me in a few weeks for review. He needs and ARB for nephroprotection from his diabetes.    Gastroesophageal reflux disease without esophagitis  He controls his GERD with pantoprozole 40 mg daily    Pruritus  He has had generalized pruritis for about 2 mos. He has no rash except for the self-induced excoriations from scratching. No obvious reason for it unless it is his DM. He is using a moisturizing lotion. I suggested he try Sarna and if that did not help, see dermatology    Primary insomnia  Is having success with trazodone 100 mg hs, so urged him to continue    Elevated Agatston score >400  He saw Dr. De La Rosa in March of this year for advice on this.    A nuclear stress test showed:    Normal myocardial perfusion scan. There is no evidence of myocardial ischemia or infarction.    The visually estimated ejection fraction is normal at rest and normal during stress.    There is normal wall motion at rest and post stress.    LV cavity size is normal at rest and normal at stress.    The EKG portion of this study is negative for ischemia.    The patient reported no chest pain during the stress test.    During  stress, occasional PACs are noted.    Lipids are at target with LDL<70:  Lab Results   Component Value Date    CHOL 156 03/18/2022    CHOL 132 10/30/2008    CHOL 104 (L) 09/16/2008     Lab Results   Component Value Date    HDL 93 (H) 03/18/2022    HDL 38 (L) 10/30/2008    HDL 33 (L) 09/16/2008     Lab Results   Component Value Date    LDLCALC 49.2 (L) 03/18/2022    LDLCALC 76.6 10/30/2008    LDLCALC 54.6 (L) 09/16/2008     Lab Results   Component Value Date    TRIG 69 03/18/2022    TRIG 87 10/30/2008    TRIG 82 09/16/2008     Lab Results   Component Value Date    CHOLHDL 59.6 (H) 03/18/2022    CHOLHDL 28.8 10/30/2008    CHOLHDL 31.7 09/16/2008     Covid vaccination status  Has had 4 COvid Pfizer vaccine doses.    Last colonoscopy was 7 years ago in [place] and he was told to rescreen in 10 years. He had a negative Cologuard about 9 months ago.    CBC is normal. CMP is normal except for creatinine 1.5 (down from 2.0) and albumin of 3.3.    Review of Systems   Constitutional: Positive for malaise/fatigue. Negative for chills, decreased appetite, fever, night sweats, weight gain and weight loss.   HENT: Negative for congestion, ear pain, hearing loss, hoarse voice, sore throat and tinnitus.    Eyes: Positive for visual disturbance. Negative for blurred vision and redness.   Cardiovascular: Negative for chest pain, leg swelling and palpitations.   Respiratory: Negative for cough, hemoptysis, shortness of breath and sputum production.    Hematologic/Lymphatic: Negative for adenopathy. Does not bruise/bleed easily.   Skin: Positive for itching. Negative for dry skin, rash and suspicious lesions.   Musculoskeletal: Positive for back pain and neck pain. Negative for joint pain and myalgias.   Gastrointestinal: Positive for heartburn. Negative for abdominal pain, constipation, diarrhea, nausea and vomiting.   Genitourinary: Negative for dysuria, flank pain, frequency, hematuria, hesitancy and urgency.   Neurological:  Negative for dizziness, headaches, numbness, paresthesias and weakness.   Psychiatric/Behavioral: Negative for depression and memory loss. The patient does not have insomnia and is not nervous/anxious.        Objective:      Physical Exam  Vitals reviewed.   Constitutional:       Appearance: He is well-developed.      Comments: overweight   HENT:      Head: Normocephalic and atraumatic.      Right Ear: External ear normal.      Left Ear: External ear normal.   Eyes:      Conjunctiva/sclera: Conjunctivae normal.      Pupils: Pupils are equal, round, and reactive to light.   Neck:      Thyroid: No thyromegaly.   Cardiovascular:      Rate and Rhythm: Normal rate and regular rhythm.      Heart sounds: Normal heart sounds. No murmur heard.  Pulmonary:      Effort: Pulmonary effort is normal.      Breath sounds: Normal breath sounds. No wheezing or rales.   Abdominal:      General: Bowel sounds are normal.      Palpations: Abdomen is soft. There is no mass.      Tenderness: There is no abdominal tenderness. There is no rebound.   Musculoskeletal:         General: Normal range of motion.      Cervical back: Normal range of motion and neck supple.   Lymphadenopathy:      Cervical: No cervical adenopathy.   Skin:     General: Skin is warm and dry.   Neurological:      Mental Status: He is alert and oriented to person, place, and time.   Psychiatric:         Behavior: Behavior normal.         Assessment:       1. Type 2 diabetes mellitus with diabetic polyneuropathy, without long-term current use of insulin    2. Essential hypertension hx, but now relatively hypotensive    3. Gastroesophageal reflux disease without esophagitis    4. Pruritus    5. Primary insomnia    6. Agatston CAC score, >400    7. Diabetic polyneuropathy associated with type 2 diabetes mellitus    8. History of bariatric surgery    9. Class 1 obesity due to excess calories with serious comorbidity and body mass index (BMI) of 32.0 to 32.9 in adult         Plan:        1. Decrease avapro to 1/2 tablet daily and keep home BP record for me to review   2. Discuss DM rx with Dr. Kim   3. Prevnar 20 today   4.  Continue other meds as is

## 2022-07-21 NOTE — ASSESSMENT & PLAN NOTE
He has had generalized pruritis for about 2 mos. He has no rash except for the self-induced excoriations from scratching. No obvious reason for it unless it is his DM. He is using a moisturizing lotion. I suggested he try Sarna and if that did not help, see dermatology

## 2022-07-21 NOTE — ASSESSMENT & PLAN NOTE
Pt is currently on metformin 500ER hs, januvia 50 mg daily and Krpidelwu69 mg daily. His level of control has improved with current A1c of 6.3 down from 5.5%. He had some questions about his DM manament which I asked him to direct to Dr. Kim. His neuropathy is better on the Lyrica.

## 2022-07-22 ENCOUNTER — PATIENT MESSAGE (OUTPATIENT)
Dept: INTERNAL MEDICINE | Facility: CLINIC | Age: 72
End: 2022-07-22
Payer: MEDICARE

## 2022-07-22 ENCOUNTER — PATIENT MESSAGE (OUTPATIENT)
Dept: ENDOCRINOLOGY | Facility: CLINIC | Age: 72
End: 2022-07-22
Payer: MEDICARE

## 2022-07-22 DIAGNOSIS — E11.42 TYPE 2 DIABETES MELLITUS WITH DIABETIC POLYNEUROPATHY, WITHOUT LONG-TERM CURRENT USE OF INSULIN: ICD-10-CM

## 2022-07-22 RX ORDER — ALBUTEROL SULFATE 90 UG/1
2 AEROSOL, METERED RESPIRATORY (INHALATION) EVERY 6 HOURS PRN
Qty: 8 G | Refills: 2 | Status: SHIPPED | OUTPATIENT
Start: 2022-07-22 | End: 2022-09-28 | Stop reason: SDUPTHER

## 2022-07-22 RX ORDER — METFORMIN HYDROCHLORIDE 500 MG/1
500 TABLET, EXTENDED RELEASE ORAL 2 TIMES DAILY WITH MEALS
Qty: 180 TABLET | Refills: 3 | Status: SHIPPED | OUTPATIENT
Start: 2022-07-22 | End: 2023-10-18

## 2022-07-22 NOTE — PROGRESS NOTES
Spoke to the patient regarding increasing Metformin to 500 mg xl twice daily and stopping Januvia due to cost issues.   He has no side effects from Metformin currently and is keen on going up on his daily dose of Metformin.

## 2022-08-03 ENCOUNTER — PATIENT MESSAGE (OUTPATIENT)
Dept: INTERNAL MEDICINE | Facility: CLINIC | Age: 72
End: 2022-08-03
Payer: MEDICARE

## 2022-08-03 ENCOUNTER — PATIENT MESSAGE (OUTPATIENT)
Dept: ADMINISTRATIVE | Facility: HOSPITAL | Age: 72
End: 2022-08-03
Payer: MEDICARE

## 2022-08-09 ENCOUNTER — PATIENT MESSAGE (OUTPATIENT)
Dept: INTERNAL MEDICINE | Facility: CLINIC | Age: 72
End: 2022-08-09
Payer: MEDICARE

## 2022-08-24 ENCOUNTER — PATIENT MESSAGE (OUTPATIENT)
Dept: INTERNAL MEDICINE | Facility: CLINIC | Age: 72
End: 2022-08-24
Payer: MEDICARE

## 2022-09-07 ENCOUNTER — PATIENT MESSAGE (OUTPATIENT)
Dept: INTERNAL MEDICINE | Facility: CLINIC | Age: 72
End: 2022-09-07
Payer: MEDICARE

## 2022-09-07 ENCOUNTER — PATIENT MESSAGE (OUTPATIENT)
Dept: ENDOCRINOLOGY | Facility: CLINIC | Age: 72
End: 2022-09-07
Payer: MEDICARE

## 2022-09-15 ENCOUNTER — IMMUNIZATION (OUTPATIENT)
Dept: INTERNAL MEDICINE | Facility: CLINIC | Age: 72
End: 2022-09-15
Payer: MEDICARE

## 2022-09-15 PROCEDURE — 90611 SMALLPOX&MONKEYPOX VAC 0.5ML: CPT | Mod: PBBFAC

## 2022-09-15 PROCEDURE — G0008 ADMIN INFLUENZA VIRUS VAC: HCPCS | Mod: PBBFAC

## 2022-09-20 ENCOUNTER — PATIENT MESSAGE (OUTPATIENT)
Dept: INTERNAL MEDICINE | Facility: CLINIC | Age: 72
End: 2022-09-20
Payer: MEDICARE

## 2022-09-21 ENCOUNTER — OFFICE VISIT (OUTPATIENT)
Dept: OPTOMETRY | Facility: CLINIC | Age: 72
End: 2022-09-21
Payer: MEDICARE

## 2022-09-21 ENCOUNTER — PATIENT MESSAGE (OUTPATIENT)
Dept: OPTOMETRY | Facility: CLINIC | Age: 72
End: 2022-09-21

## 2022-09-21 DIAGNOSIS — H52.13 MYOPIA, BILATERAL: ICD-10-CM

## 2022-09-21 DIAGNOSIS — H47.393 OPTIC NERVE CUPPING OF BOTH EYES: ICD-10-CM

## 2022-09-21 DIAGNOSIS — H52.4 PRESBYOPIA: ICD-10-CM

## 2022-09-21 DIAGNOSIS — E11.9 TYPE 2 DIABETES MELLITUS WITHOUT OPHTHALMIC MANIFESTATIONS: ICD-10-CM

## 2022-09-21 DIAGNOSIS — E11.42 TYPE 2 DIABETES MELLITUS WITH DIABETIC POLYNEUROPATHY, WITHOUT LONG-TERM CURRENT USE OF INSULIN: Primary | ICD-10-CM

## 2022-09-21 DIAGNOSIS — H25.13 NS (NUCLEAR SCLEROSIS), BILATERAL: ICD-10-CM

## 2022-09-21 PROCEDURE — 92004 COMPRE OPH EXAM NEW PT 1/>: CPT | Mod: S$PBB,,, | Performed by: OPTOMETRIST

## 2022-09-21 PROCEDURE — 92004 PR EYE EXAM, NEW PATIENT,COMPREHESV: ICD-10-PCS | Mod: S$PBB,,, | Performed by: OPTOMETRIST

## 2022-09-21 PROCEDURE — 92133 POSTERIOR SEGMENT OCT OPTIC NERVE(OCULAR COHERENCE TOMOGRAPHY) - OU - BOTH EYES: ICD-10-PCS | Mod: 26,S$PBB,, | Performed by: OPTOMETRIST

## 2022-09-21 PROCEDURE — 92015 PR REFRACTION: ICD-10-PCS | Mod: ,,, | Performed by: OPTOMETRIST

## 2022-09-21 PROCEDURE — 99999 PR PBB SHADOW E&M-EST. PATIENT-LVL II: CPT | Mod: PBBFAC,,, | Performed by: OPTOMETRIST

## 2022-09-21 PROCEDURE — 92015 DETERMINE REFRACTIVE STATE: CPT | Mod: ,,, | Performed by: OPTOMETRIST

## 2022-09-21 PROCEDURE — 99212 OFFICE O/P EST SF 10 MIN: CPT | Mod: PBBFAC | Performed by: OPTOMETRIST

## 2022-09-21 PROCEDURE — 92133 CPTRZD OPH DX IMG PST SGM ON: CPT | Mod: PBBFAC | Performed by: OPTOMETRIST

## 2022-09-21 PROCEDURE — 99999 PR PBB SHADOW E&M-EST. PATIENT-LVL II: ICD-10-PCS | Mod: PBBFAC,,, | Performed by: OPTOMETRIST

## 2022-09-21 RX ORDER — ALBUTEROL SULFATE 2.5 MG/.5ML
2.5 SOLUTION RESPIRATORY (INHALATION) EVERY 6 HOURS PRN
Qty: 6 EACH | Refills: 3 | Status: SHIPPED | OUTPATIENT
Start: 2022-09-21 | End: 2023-09-21

## 2022-09-21 NOTE — PROGRESS NOTES
HPI    Routine eye exam    NSC OU    Pt feels like glare is starting to negatively affect his vision. Also   feels like distance and reading vision has improved and does not wear   specs anymore.  No dryness or irritation.  Last edited by Abbi Alarcon on 9/21/2022  4:19 PM.            Assessment /Plan     For exam results, see Encounter Report.    Type 2 diabetes mellitus with diabetic polyneuropathy, without long-term current use of insulin  Type 2 diabetes mellitus without ophthalmic manifestations   No retinopathy, monitor yearly    Optic nerve cupping of both eyes  -     Posterior Segment OCT Optic Nerve-WNL OD, Borderline thin OS   IOP WNL/borderline high   Repeat next visit    NS (nuclear sclerosis), bilateral   Borderline VS, pt happy with vision at this time    Presbyopia   Ok to use readers PRN    Myopia, bilateral  Rx specs    RTC 1 year fundus photos

## 2022-09-26 ENCOUNTER — PATIENT MESSAGE (OUTPATIENT)
Dept: ENDOCRINOLOGY | Facility: CLINIC | Age: 72
End: 2022-09-26
Payer: MEDICARE

## 2022-09-26 ENCOUNTER — LAB VISIT (OUTPATIENT)
Dept: LAB | Facility: HOSPITAL | Age: 72
End: 2022-09-26
Payer: MEDICARE

## 2022-09-26 DIAGNOSIS — E11.42 TYPE 2 DIABETES MELLITUS WITH DIABETIC POLYNEUROPATHY, WITHOUT LONG-TERM CURRENT USE OF INSULIN: ICD-10-CM

## 2022-09-26 DIAGNOSIS — E11.42 TYPE 2 DIABETES MELLITUS WITH DIABETIC POLYNEUROPATHY, WITHOUT LONG-TERM CURRENT USE OF INSULIN: Primary | ICD-10-CM

## 2022-09-26 LAB
ESTIMATED AVG GLUCOSE: 128 MG/DL (ref 68–131)
HBA1C MFR BLD: 6.1 % (ref 4–5.6)

## 2022-09-26 PROCEDURE — 83036 HEMOGLOBIN GLYCOSYLATED A1C: CPT | Performed by: STUDENT IN AN ORGANIZED HEALTH CARE EDUCATION/TRAINING PROGRAM

## 2022-09-26 PROCEDURE — 36415 COLL VENOUS BLD VENIPUNCTURE: CPT | Performed by: STUDENT IN AN ORGANIZED HEALTH CARE EDUCATION/TRAINING PROGRAM

## 2022-09-27 ENCOUNTER — PATIENT MESSAGE (OUTPATIENT)
Dept: INTERNAL MEDICINE | Facility: CLINIC | Age: 72
End: 2022-09-27
Payer: MEDICARE

## 2022-09-28 DIAGNOSIS — J45.909 CHRONIC ASTHMA, UNSPECIFIED ASTHMA SEVERITY, UNSPECIFIED WHETHER COMPLICATED, UNSPECIFIED WHETHER PERSISTENT: ICD-10-CM

## 2022-09-28 RX ORDER — ALBUTEROL SULFATE 90 UG/1
2 AEROSOL, METERED RESPIRATORY (INHALATION) EVERY 6 HOURS PRN
Qty: 8 G | Refills: 2 | Status: SHIPPED | OUTPATIENT
Start: 2022-09-28

## 2022-10-26 ENCOUNTER — HOSPITAL ENCOUNTER (OUTPATIENT)
Dept: RADIOLOGY | Facility: HOSPITAL | Age: 72
Discharge: HOME | End: 2022-10-26
Attending: INTERNAL MEDICINE
Payer: MEDICARE

## 2022-10-26 ENCOUNTER — OFFICE VISIT (OUTPATIENT)
Dept: INTERNAL MEDICINE | Facility: CLINIC | Age: 72
End: 2022-10-26
Payer: MEDICARE

## 2022-10-26 VITALS
HEART RATE: 112 BPM | DIASTOLIC BLOOD PRESSURE: 49 MMHG | SYSTOLIC BLOOD PRESSURE: 78 MMHG | BODY MASS INDEX: 30.27 KG/M2 | WEIGHT: 204.38 LBS | HEIGHT: 69 IN

## 2022-10-26 DIAGNOSIS — R05.9 COUGH, UNSPECIFIED TYPE: ICD-10-CM

## 2022-10-26 DIAGNOSIS — R06.02 SHORTNESS OF BREATH: ICD-10-CM

## 2022-10-26 DIAGNOSIS — E83.42 HYPOMAGNESEMIA: ICD-10-CM

## 2022-10-26 DIAGNOSIS — J45.901 EXACERBATION OF ASTHMA, UNSPECIFIED ASTHMA SEVERITY, UNSPECIFIED WHETHER PERSISTENT: ICD-10-CM

## 2022-10-26 DIAGNOSIS — Z76.89 ENCOUNTER TO ESTABLISH CARE: Primary | ICD-10-CM

## 2022-10-26 DIAGNOSIS — R53.83 FATIGUE, UNSPECIFIED TYPE: ICD-10-CM

## 2022-10-26 DIAGNOSIS — J45.909 REACTIVE AIRWAY DISEASE WITHOUT COMPLICATION, UNSPECIFIED ASTHMA SEVERITY, UNSPECIFIED WHETHER PERSISTENT: ICD-10-CM

## 2022-10-26 PROCEDURE — 71046 XR CHEST PA AND LATERAL: ICD-10-PCS | Mod: 26,,, | Performed by: RADIOLOGY

## 2022-10-26 PROCEDURE — 99999 PR PBB SHADOW E&M-EST. PATIENT-LVL IV: CPT | Mod: PBBFAC,,, | Performed by: INTERNAL MEDICINE

## 2022-10-26 PROCEDURE — 99999 PR PBB SHADOW E&M-EST. PATIENT-LVL IV: ICD-10-PCS | Mod: PBBFAC,,, | Performed by: INTERNAL MEDICINE

## 2022-10-26 PROCEDURE — 99214 OFFICE O/P EST MOD 30 MIN: CPT | Mod: PBBFAC,25 | Performed by: INTERNAL MEDICINE

## 2022-10-26 PROCEDURE — 71046 X-RAY EXAM CHEST 2 VIEWS: CPT | Mod: TC,FY

## 2022-10-26 PROCEDURE — 71046 X-RAY EXAM CHEST 2 VIEWS: CPT | Mod: 26,,, | Performed by: RADIOLOGY

## 2022-10-26 PROCEDURE — 99215 PR OFFICE/OUTPT VISIT, EST, LEVL V, 40-54 MIN: ICD-10-PCS | Mod: S$PBB,,, | Performed by: INTERNAL MEDICINE

## 2022-10-26 PROCEDURE — 99215 OFFICE O/P EST HI 40 MIN: CPT | Mod: S$PBB,,, | Performed by: INTERNAL MEDICINE

## 2022-10-26 RX ORDER — AZITHROMYCIN 250 MG/1
TABLET, FILM COATED ORAL
Qty: 6 TABLET | Refills: 0 | Status: SHIPPED | OUTPATIENT
Start: 2022-10-26 | End: 2022-10-31

## 2022-10-26 RX ORDER — FLUTICASONE PROPIONATE AND SALMETEROL 250; 50 UG/1; UG/1
1 POWDER RESPIRATORY (INHALATION) 2 TIMES DAILY
Qty: 60 EACH | Refills: 2 | Status: SHIPPED | OUTPATIENT
Start: 2022-10-26 | End: 2023-01-18 | Stop reason: SDUPTHER

## 2022-10-28 NOTE — PROGRESS NOTES
Subjective:       Patient ID: Anthony Ball is a 72 y.o. male.    Chief Complaint: Establish Care      HPI  Establishing care. Reviewed medical, surgical, social and family history, medications, appropriate preventive health screenings, as well as vaccination history. Updates as noted below or in assessment and plan.    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 Here with partner. They note decline over past year with fatigue and weakness. BP has been low recently. Has been reducing Irbesartan, now on 37.5 daily. No obvious blood loss. No pain. No cp. Does note fatigue, poor balance, lightheadedness. Last month is dealing with a reactive airway flair. Notes productive cough now. Using albuterol up to 10 times daily per pt. Is noting a worsened hand tremor. Partner feels he lifts feet well, no shuffling gait. Hand tremor is more with intention, though maybe some slight movement at rest at times. No masked facies.      Past Medical History:   Diagnosis Date    Coronary artery disease     CAC score 1430    Depression     Diabetic neuropathy     Essential (primary) hypertension     GERD (gastroesophageal reflux disease)     Insomnia     Reactive airway disease     Type 2 diabetes mellitus          Current Outpatient Medications:     albuterol (VENTOLIN HFA) 90 mcg/actuation inhaler, Inhale 2 puffs into the lungs every 6 (six) hours as needed for Wheezing. Rescue, Disp: 8 g, Rfl: 2    albuterol sulfate 2.5 mg/0.5 mL Nebu, Take 2.5 mg by nebulization every 6 (six) hours as needed. Rescue, Disp: 6 each, Rfl: 3    aspirin 81 MG Chew, Take 81 mg by mouth once daily., Disp: , Rfl:     empagliflozin (JARDIANCE) 10 mg tablet, Take 1 tablet (10 mg total) by mouth once daily., Disp: 30 tablet, Rfl: 11    irbesartan (AVAPRO) 75 MG tablet, Take 1 tablet (75 mg total) by mouth every evening. (Patient taking differently: Take 75 mg by mouth every evening. 1/2 tablet daily for a total of 37.5 mg), Disp: 90 tablet, Rfl: 3    metFORMIN  (GLUCOPHAGE-XR) 500 MG ER 24hr tablet, Take 1 tablet (500 mg total) by mouth 2 (two) times daily with meals., Disp: 180 tablet, Rfl: 3    pantoprazole (PROTONIX) 40 MG tablet, Take 1 tablet (40 mg total) by mouth once daily., Disp: 90 tablet, Rfl: 3    pregabalin (LYRICA) 100 MG capsule, Take 1 capsule (100 mg total) by mouth once daily., Disp: 90 capsule, Rfl: 3    traZODone (DESYREL) 50 MG tablet, Two tablets hs as needed for sleep, Disp: 60 tablet, Rfl: 11    azithromycin (Z-STALIN) 250 MG tablet, Take 2 tablets by mouth on day 1; Take 1 tablet by mouth on days 2-5, Disp: 6 tablet, Rfl: 0    diphenhydrAMINE (BENADRYL) 25 mg capsule, Take 25 mg by mouth every 6 (six) hours as needed for Itching., Disp: , Rfl:     fluticasone-salmeterol diskus inhaler 250-50 mcg, Inhale 1 puff into the lungs 2 (two) times daily. Controller, Disp: 60 each, Rfl: 2    Past Surgical History:   Procedure Laterality Date    COLONOSCOPY      Normal around 2020    TOTAL KNEE ARTHROPLASTY Right        Family History   Problem Relation Age of Onset    Hypertension Mother        Social History     Tobacco Use    Smoking status: Never    Smokeless tobacco: Never   Substance Use Topics    Alcohol use: Yes     Comment: Former heavy use    Drug use: Never       Immunization History   Administered Date(s) Administered    COVID-19, MRNA, LN-S, PF (Pfizer) (Gray Cap) 03/24/2022    COVID-19, MRNA, LN-S, PF (Pfizer) (Purple Cap) 12/18/2020, 12/18/2020, 01/08/2021, 01/08/2021, 08/17/2021    Influenza (FLUAD) - Quadrivalent - Adjuvanted - PF *Preferred* (65+) 09/28/2021, 09/15/2022    Influenza - High Dose - PF (65 years and older) 06/30/2021    Influenza - Quadrivalent - PF *Preferred* (6 months and older) 09/28/2007    Pneumococcal Conjugate - 20 Valent 07/21/2022    Tdap 12/02/2014    Vaccinia, smallpox monkeypox vaccine live, PF 09/15/2022    Zoster Recombinant 07/21/2021, 09/28/2021         Objective:      Vitals:    10/26/22 1329   BP: (!) 78/49    Pulse:        Physical Exam  Constitutional:       Appearance: Normal appearance.   HENT:      Head: Normocephalic and atraumatic.      Nose: Nose normal.      Mouth/Throat:      Mouth: Mucous membranes are moist.      Pharynx: Oropharynx is clear.   Eyes:      Extraocular Movements: Extraocular movements intact.      Conjunctiva/sclera: Conjunctivae normal.      Pupils: Pupils are equal, round, and reactive to light.   Cardiovascular:      Rate and Rhythm: Regular rhythm. Tachycardia present.   Pulmonary:      Effort: Pulmonary effort is normal. No respiratory distress.      Breath sounds: Normal breath sounds. No wheezing, rhonchi or rales.   Musculoskeletal:      Cervical back: No tenderness.      Right lower leg: No edema.      Left lower leg: No edema.   Lymphadenopathy:      Cervical: No cervical adenopathy.   Skin:     Coloration: Skin is not pale.      Findings: No bruising.   Neurological:      Mental Status: He is alert and oriented to person, place, and time.      Cranial Nerves: No cranial nerve deficit.      Motor: Weakness (Generalized, moderate) present.      Deep Tendon Reflexes: Reflexes normal.      Comments: Hand tremor, with intention, left > right. Possibly mild left arm cogwheel rigidity.   Psychiatric:         Mood and Affect: Mood normal.         Behavior: Behavior normal.         Thought Content: Thought content normal.         Judgment: Judgment normal.       Recent Results (from the past 6720 hour(s))   Hemoglobin A1C    Collection Time: 02/11/22  7:30 AM   Result Value Ref Range    Hemoglobin A1C 6.6 (H) 4.0 - 5.6 %    Estimated Avg Glucose 143 (H) 68 - 131 mg/dL   Glucose, Fasting    Collection Time: 02/11/22  7:30 AM   Result Value Ref Range    Glucose, Fasting 193 (H) 70 - 110 mg/dL   POCT COVID-19 Rapid Screening    Collection Time: 03/02/22  9:30 AM   Result Value Ref Range    POC Rapid COVID Negative Negative     Acceptable Yes    Lipid Panel    Collection Time:  03/18/22  7:15 AM   Result Value Ref Range    Cholesterol 156 120 - 199 mg/dL    Triglycerides 69 30 - 150 mg/dL    HDL 93 (H) 40 - 75 mg/dL    LDL Cholesterol 49.2 (L) 63.0 - 159.0 mg/dL    HDL/Cholesterol Ratio 59.6 (H) 20.0 - 50.0 %    Total Cholesterol/HDL Ratio 1.7 (L) 2.0 - 5.0    Non-HDL Cholesterol 63 mg/dL   Comprehensive Metabolic Panel    Collection Time: 03/18/22  7:15 AM   Result Value Ref Range    Sodium 135 (L) 136 - 145 mmol/L    Potassium 5.8 (H) 3.5 - 5.1 mmol/L    Chloride 99 95 - 110 mmol/L    CO2 22 (L) 23 - 29 mmol/L    Glucose 158 (H) 70 - 110 mg/dL    BUN 27 (H) 8 - 23 mg/dL    Creatinine 1.8 (H) 0.5 - 1.4 mg/dL    Calcium 10.2 8.7 - 10.5 mg/dL    Total Protein 7.5 6.0 - 8.4 g/dL    Albumin 3.7 3.5 - 5.2 g/dL    Total Bilirubin 2.0 (H) 0.1 - 1.0 mg/dL    Alkaline Phosphatase 104 55 - 135 U/L    AST 53 (H) 10 - 40 U/L    ALT 40 10 - 44 U/L    Anion Gap 14 8 - 16 mmol/L    eGFR if African American 43.1 (A) >60 mL/min/1.73 m^2    eGFR if non  37.3 (A) >60 mL/min/1.73 m^2   TSH    Collection Time: 03/18/22  7:15 AM   Result Value Ref Range    TSH 3.176 0.400 - 4.000 uIU/mL   PSA, Screening    Collection Time: 03/18/22  7:15 AM   Result Value Ref Range    PSA, Screen 0.94 0.00 - 4.00 ng/mL   Hemoglobin A1C    Collection Time: 03/18/22  7:15 AM   Result Value Ref Range    Hemoglobin A1C 6.4 (H) 4.0 - 5.6 %    Estimated Avg Glucose 137 (H) 68 - 131 mg/dL   CBC Auto Differential    Collection Time: 03/18/22  7:15 AM   Result Value Ref Range    WBC 8.80 3.90 - 12.70 K/uL    RBC 4.48 (L) 4.60 - 6.20 M/uL    Hemoglobin 14.7 14.0 - 18.0 g/dL    Hematocrit 46.3 40.0 - 54.0 %     (H) 82 - 98 fL    MCH 32.8 (H) 27.0 - 31.0 pg    MCHC 31.7 (L) 32.0 - 36.0 g/dL    RDW 12.4 11.5 - 14.5 %    Platelets 200 150 - 450 K/uL    MPV 9.6 9.2 - 12.9 fL    Immature Granulocytes 0.5 0.0 - 0.5 %    Gran # (ANC) 6.2 1.8 - 7.7 K/uL    Immature Grans (Abs) 0.04 0.00 - 0.04 K/uL    Lymph # 1.7 1.0 - 4.8  K/uL    Mono # 0.6 0.3 - 1.0 K/uL    Eos # 0.2 0.0 - 0.5 K/uL    Baso # 0.07 0.00 - 0.20 K/uL    nRBC 0 0 /100 WBC    Gran % 70.2 38.0 - 73.0 %    Lymph % 18.9 18.0 - 48.0 %    Mono % 7.0 4.0 - 15.0 %    Eosinophil % 2.6 0.0 - 8.0 %    Basophil % 0.8 0.0 - 1.9 %    Differential Method Automated    Nuclear Stress - Cardiology Interpreted    Collection Time: 03/18/22  8:31 AM   Result Value Ref Range    85% Max Predicted      Max Predicted      [facility]HS CV CPX PATIENT IS MALE 1.0     [facility] CV CPX PATIENT IS FEMALE 0.0     HR at rest 104 bpm    Systolic blood pressure 150 mmHg    Diastolic blood pressure 77 mmHg    RPP 15,600     Peak  bpm    Peak Systolic  mmHg    Peak Diatolic BP 82 mmHg    Peak RPP 18,400     % Max HR Achieved 77     dose 0.4 mg    EF + QEF 53 %    Ejection Fraction- High Stress 65 %    End diastolic index (mL/m2) 93 mL/m2    End diastolic index (mL/m2) 153 mL/m2    End systolic index (mL/m2) 31 mL/m2    End systolic index (mL/m2) 71 mL/m2   Basic Metabolic Panel    Collection Time: 03/24/22 10:16 AM   Result Value Ref Range    Sodium 129 (L) 136 - 145 mmol/L    Potassium 5.0 3.5 - 5.1 mmol/L    Chloride 94 (L) 95 - 110 mmol/L    CO2 24 23 - 29 mmol/L    Glucose 189 (H) 70 - 110 mg/dL    BUN 25 (H) 8 - 23 mg/dL    Creatinine 2.0 (H) 0.5 - 1.4 mg/dL    Calcium 10.1 8.7 - 10.5 mg/dL    Anion Gap 11 8 - 16 mmol/L    eGFR if African American 38.0 (A) >60 mL/min/1.73 m^2    eGFR if non  32.8 (A) >60 mL/min/1.73 m^2   Comprehensive Metabolic Panel    Collection Time: 04/19/22  9:12 AM   Result Value Ref Range    Sodium 138 136 - 145 mmol/L    Potassium 4.7 3.5 - 5.1 mmol/L    Chloride 101 95 - 110 mmol/L    CO2 20 (L) 23 - 29 mmol/L    Glucose 159 (H) 70 - 110 mg/dL    BUN 17 8 - 23 mg/dL    Creatinine 1.7 (H) 0.5 - 1.4 mg/dL    Calcium 9.5 8.7 - 10.5 mg/dL    Total Protein 6.9 6.0 - 8.4 g/dL    Albumin 3.4 (L) 3.5 - 5.2 g/dL    Total Bilirubin 0.9 0.1 - 1.0 mg/dL     Alkaline Phosphatase 77 55 - 135 U/L    AST 46 (H) 10 - 40 U/L    ALT 41 10 - 44 U/L    Anion Gap 17 (H) 8 - 16 mmol/L    eGFR if African American 46.2 (A) >60 mL/min/1.73 m^2    eGFR if non African American 40.0 (A) >60 mL/min/1.73 m^2   Hemoglobin A1C    Collection Time: 04/19/22  9:12 AM   Result Value Ref Range    Hemoglobin A1C 6.5 (H) 4.0 - 5.6 %    Estimated Avg Glucose 140 (H) 68 - 131 mg/dL   Hemoglobin A1C    Collection Time: 07/01/22  8:27 AM   Result Value Ref Range    Hemoglobin A1C 6.3 (H) 4.0 - 5.6 %    Estimated Avg Glucose 134 (H) 68 - 131 mg/dL   RENAL FUNCTION PANEL    Collection Time: 07/01/22  8:27 AM   Result Value Ref Range    Glucose 149 (H) 70 - 110 mg/dL    Sodium 138 136 - 145 mmol/L    Potassium 4.5 3.5 - 5.1 mmol/L    Chloride 103 95 - 110 mmol/L    CO2 26 23 - 29 mmol/L    BUN 14 8 - 23 mg/dL    Calcium 9.4 8.7 - 10.5 mg/dL    Creatinine 1.5 (H) 0.5 - 1.4 mg/dL    Albumin 3.3 (L) 3.5 - 5.2 g/dL    Phosphorus 3.2 2.7 - 4.5 mg/dL    eGFR if African American 53.8 (A) >60 mL/min/1.73 m^2    eGFR if non  46.5 (A) >60 mL/min/1.73 m^2    Anion Gap 9 8 - 16 mmol/L   Hemoglobin A1C    Collection Time: 09/26/22  8:36 AM   Result Value Ref Range    Hemoglobin A1C 6.1 (H) 4.0 - 5.6 %    Estimated Avg Glucose 128 68 - 131 mg/dL   Renal Function Panel    Collection Time: 09/26/22  8:36 AM   Result Value Ref Range    Glucose 148 (H) 70 - 110 mg/dL    Sodium 138 136 - 145 mmol/L    Potassium 4.9 3.5 - 5.1 mmol/L    Chloride 104 95 - 110 mmol/L    CO2 24 23 - 29 mmol/L    BUN 13 8 - 23 mg/dL    Calcium 8.9 8.7 - 10.5 mg/dL    Creatinine 1.3 0.5 - 1.4 mg/dL    Albumin 2.9 (L) 3.5 - 5.2 g/dL    Phosphorus 2.9 2.7 - 4.5 mg/dL    eGFR 59.1 (A) >60 mL/min/1.73 m^2    Anion Gap 10 8 - 16 mmol/L           Assessment/Plan:     1) Establishing care  2) Reactive airway disease exacerbation - Lungs clear on CXR today. Start Advair bid. Short course Azithromycin. Continue albuterol inhaler prn.  3)  Fatigue, Generalized weakness, Hypotension - Ongoing decline this past year per pt and partner. Stop antihypertensives. Focus on hydration. Will get additional lab updates with cbc, tsh when back next week with partner for his vaccinations.  4) Intention tremor - Likely essential tremor worsened in setting of frequent albuterol use but need to keep Parkinsonism in differential in light of perceived decline this past yr.  5) GERD - Controlled on daily Protonix.  6) T2DM - Controlled recently on current meds. Following with Endo. Yearly eye and foot screening. Renal insufficiency noted previously, has been on Avapro, though holding now for low bp.  7) Insomnia, Depression - Continuing Trazodone nightly.  8) Elevated coronary calcium score - On daily aspirin. Notes Cardiology said didn't need statin in past? LDL low previously. Repeat with early 2023.    - Normal colonoscopy 2 to 3 yrs ago with plan for 10 yr f/u per pt.  - Vaccines up to date.    Will continue close f/u and clinical monitoring to determine next steps.

## 2022-11-02 ENCOUNTER — PATIENT MESSAGE (OUTPATIENT)
Dept: INTERNAL MEDICINE | Facility: CLINIC | Age: 72
End: 2022-11-02
Payer: MEDICARE

## 2022-11-03 ENCOUNTER — CLINICAL SUPPORT (OUTPATIENT)
Dept: INTERNAL MEDICINE | Facility: CLINIC | Age: 72
End: 2022-11-03
Payer: MEDICARE

## 2022-11-03 ENCOUNTER — PATIENT MESSAGE (OUTPATIENT)
Dept: INTERNAL MEDICINE | Facility: CLINIC | Age: 72
End: 2022-11-03

## 2022-11-03 DIAGNOSIS — R06.02 SHORTNESS OF BREATH: ICD-10-CM

## 2022-11-03 DIAGNOSIS — M79.2 NEUROPATHIC PAIN: Primary | ICD-10-CM

## 2022-11-03 DIAGNOSIS — R53.83 FATIGUE, UNSPECIFIED TYPE: ICD-10-CM

## 2022-11-03 DIAGNOSIS — E83.42 HYPOMAGNESEMIA: ICD-10-CM

## 2022-11-03 LAB
BASOPHILS # BLD AUTO: 0.05 K/UL (ref 0–0.2)
BASOPHILS NFR BLD: 0.8 % (ref 0–1.9)
DIFFERENTIAL METHOD: ABNORMAL
EOSINOPHIL # BLD AUTO: 0.2 K/UL (ref 0–0.5)
EOSINOPHIL NFR BLD: 2.9 % (ref 0–8)
ERYTHROCYTE [DISTWIDTH] IN BLOOD BY AUTOMATED COUNT: 14.7 % (ref 11.5–14.5)
HCT VFR BLD AUTO: 38.7 % (ref 40–54)
HGB BLD-MCNC: 12.8 G/DL (ref 14–18)
IMM GRANULOCYTES # BLD AUTO: 0.04 K/UL (ref 0–0.04)
IMM GRANULOCYTES NFR BLD AUTO: 0.6 % (ref 0–0.5)
LYMPHOCYTES # BLD AUTO: 1.3 K/UL (ref 1–4.8)
LYMPHOCYTES NFR BLD: 19.7 % (ref 18–48)
MAGNESIUM SERPL-MCNC: 1.4 MG/DL (ref 1.6–2.6)
MCH RBC QN AUTO: 32.8 PG (ref 27–31)
MCHC RBC AUTO-ENTMCNC: 33.1 G/DL (ref 32–36)
MCV RBC AUTO: 99 FL (ref 82–98)
MONOCYTES # BLD AUTO: 0.4 K/UL (ref 0.3–1)
MONOCYTES NFR BLD: 6.4 % (ref 4–15)
NEUTROPHILS # BLD AUTO: 4.6 K/UL (ref 1.8–7.7)
NEUTROPHILS NFR BLD: 69.6 % (ref 38–73)
NRBC BLD-RTO: 0 /100 WBC
PLATELET # BLD AUTO: 153 K/UL (ref 150–450)
PMV BLD AUTO: 10.7 FL (ref 9.2–12.9)
RBC # BLD AUTO: 3.9 M/UL (ref 4.6–6.2)
TSH SERPL DL<=0.005 MIU/L-ACNC: 2.6 UIU/ML (ref 0.4–4)
WBC # BLD AUTO: 6.54 K/UL (ref 3.9–12.7)

## 2022-11-03 PROCEDURE — 83735 ASSAY OF MAGNESIUM: CPT | Performed by: INTERNAL MEDICINE

## 2022-11-03 PROCEDURE — 85025 COMPLETE CBC W/AUTO DIFF WBC: CPT | Performed by: INTERNAL MEDICINE

## 2022-11-03 PROCEDURE — 36415 COLL VENOUS BLD VENIPUNCTURE: CPT | Performed by: INTERNAL MEDICINE

## 2022-11-03 PROCEDURE — 84443 ASSAY THYROID STIM HORMONE: CPT | Performed by: INTERNAL MEDICINE

## 2022-11-03 RX ORDER — PREGABALIN 100 MG/1
100 CAPSULE ORAL DAILY
Qty: 90 CAPSULE | Refills: 2 | Status: SHIPPED | OUTPATIENT
Start: 2022-11-03 | End: 2022-12-16

## 2022-11-04 ENCOUNTER — PATIENT MESSAGE (OUTPATIENT)
Dept: INTERNAL MEDICINE | Facility: CLINIC | Age: 72
End: 2022-11-04
Payer: MEDICARE

## 2022-11-08 ENCOUNTER — PATIENT MESSAGE (OUTPATIENT)
Dept: INTERNAL MEDICINE | Facility: CLINIC | Age: 72
End: 2022-11-08
Payer: MEDICARE

## 2022-12-07 ENCOUNTER — PATIENT MESSAGE (OUTPATIENT)
Dept: INTERNAL MEDICINE | Facility: CLINIC | Age: 72
End: 2022-12-07
Payer: MEDICARE

## 2022-12-07 DIAGNOSIS — M79.2 NEUROPATHIC PAIN: Primary | ICD-10-CM

## 2022-12-13 ENCOUNTER — IMMUNIZATION (OUTPATIENT)
Dept: INTERNAL MEDICINE | Facility: CLINIC | Age: 72
End: 2022-12-13
Payer: MEDICARE

## 2022-12-13 DIAGNOSIS — Z23 NEED FOR VACCINATION: Primary | ICD-10-CM

## 2022-12-13 PROCEDURE — 0124A COVID-19, MRNA, LNP-S, BIVALENT BOOSTER, PF, 30 MCG/0.3 ML DOSE: CPT | Mod: PBBFAC,CV19

## 2022-12-13 PROCEDURE — 91312 COVID-19, MRNA, LNP-S, BIVALENT BOOSTER, PF, 30 MCG/0.3 ML DOSE: CPT | Mod: PBBFAC

## 2022-12-16 RX ORDER — PREGABALIN 75 MG/1
CAPSULE ORAL
Qty: 270 CAPSULE | Refills: 1 | Status: ON HOLD | OUTPATIENT
Start: 2022-12-16 | End: 2023-03-21 | Stop reason: HOSPADM

## 2022-12-19 ENCOUNTER — PATIENT MESSAGE (OUTPATIENT)
Dept: INTERNAL MEDICINE | Facility: CLINIC | Age: 72
End: 2022-12-19
Payer: MEDICARE

## 2022-12-19 RX ORDER — PANTOPRAZOLE SODIUM 40 MG/1
40 TABLET, DELAYED RELEASE ORAL DAILY
Qty: 90 TABLET | Refills: 3 | Status: SHIPPED | OUTPATIENT
Start: 2022-12-19 | End: 2023-10-23

## 2023-01-03 ENCOUNTER — PATIENT MESSAGE (OUTPATIENT)
Dept: INTERNAL MEDICINE | Facility: CLINIC | Age: 73
End: 2023-01-03
Payer: MEDICARE

## 2023-01-03 DIAGNOSIS — R53.81 PHYSICAL DECONDITIONING: ICD-10-CM

## 2023-01-03 DIAGNOSIS — R53.1 GENERALIZED WEAKNESS: ICD-10-CM

## 2023-01-03 DIAGNOSIS — R29.898 LEG WEAKNESS, BILATERAL: Primary | ICD-10-CM

## 2023-01-17 ENCOUNTER — PATIENT MESSAGE (OUTPATIENT)
Dept: INTERNAL MEDICINE | Facility: CLINIC | Age: 73
End: 2023-01-17
Payer: MEDICARE

## 2023-01-17 DIAGNOSIS — E11.42 DIABETIC POLYNEUROPATHY ASSOCIATED WITH TYPE 2 DIABETES MELLITUS: Primary | ICD-10-CM

## 2023-01-17 RX ORDER — DULOXETIN HYDROCHLORIDE 30 MG/1
30 CAPSULE, DELAYED RELEASE ORAL DAILY
Qty: 90 CAPSULE | Refills: 1 | Status: ON HOLD | OUTPATIENT
Start: 2023-01-17 | End: 2023-03-21 | Stop reason: HOSPADM

## 2023-01-18 DIAGNOSIS — J45.901 EXACERBATION OF ASTHMA, UNSPECIFIED ASTHMA SEVERITY, UNSPECIFIED WHETHER PERSISTENT: ICD-10-CM

## 2023-01-18 DIAGNOSIS — J45.909 REACTIVE AIRWAY DISEASE WITHOUT COMPLICATION, UNSPECIFIED ASTHMA SEVERITY, UNSPECIFIED WHETHER PERSISTENT: ICD-10-CM

## 2023-01-18 RX ORDER — FLUTICASONE PROPIONATE AND SALMETEROL 250; 50 UG/1; UG/1
1 POWDER RESPIRATORY (INHALATION) 2 TIMES DAILY
Qty: 60 EACH | Refills: 2 | Status: SHIPPED | OUTPATIENT
Start: 2023-01-18 | End: 2023-02-20 | Stop reason: SDUPTHER

## 2023-01-21 ENCOUNTER — PATIENT MESSAGE (OUTPATIENT)
Dept: INTERNAL MEDICINE | Facility: CLINIC | Age: 73
End: 2023-01-21
Payer: MEDICARE

## 2023-01-31 ENCOUNTER — CLINICAL SUPPORT (OUTPATIENT)
Dept: REHABILITATION | Facility: HOSPITAL | Age: 73
End: 2023-01-31
Attending: INTERNAL MEDICINE
Payer: MEDICARE

## 2023-01-31 DIAGNOSIS — R53.1 GENERALIZED WEAKNESS: ICD-10-CM

## 2023-01-31 DIAGNOSIS — R53.81 PHYSICAL DECONDITIONING: ICD-10-CM

## 2023-01-31 DIAGNOSIS — R29.898 LEG WEAKNESS, BILATERAL: ICD-10-CM

## 2023-01-31 DIAGNOSIS — R26.89 BALANCE DISORDER: ICD-10-CM

## 2023-01-31 PROCEDURE — 97110 THERAPEUTIC EXERCISES: CPT | Mod: PO

## 2023-01-31 PROCEDURE — 97112 NEUROMUSCULAR REEDUCATION: CPT | Mod: PO

## 2023-01-31 PROCEDURE — 97161 PT EVAL LOW COMPLEX 20 MIN: CPT | Mod: PO

## 2023-01-31 NOTE — PLAN OF CARE
[facility]   Physical Therapy Initial Evaluation     Date: 1/31/2023   Name: Anthony Ball  Clinic Number: [id number]    Therapy Diagnosis:   Encounter Diagnoses   Name Primary?    Leg weakness, bilateral     Physical deconditioning     Generalized weakness     Balance disorder      Physician: Isaias Myles MD    Physician Orders: PT Eval and Treat  Medical Diagnosis from Referral:   R29.898 (ICD-10-CM) - Leg weakness, bilateral   R53.81 (ICD-10-CM) - Physical deconditioning   R53.1 (ICD-10-CM) - Generalized weakness   Evaluation Date: 1/31/2023  Authorization Period Expiration: 01/05/2024  Plan of Care Expiration: 5/30/2023  Progress Note Due: 2/01/2023  Visit # / Visits authorized: 1/ 1   FOTO: 1/1    Precautions: Standard and Diabetes     Time In: 245  Time Out: 330  Total Appointment Time (timed & untimed codes): 45 minutes      SUBJECTIVE     Date of onset: 2022    History of current condition - Anthony reports: Pt is a 72 y.o. y.o male  presenting with c.o LE weakness. Pt states that he was doing well after his last episode of PT and fell ill and was bed ridden for a long period of time. Pt states that he is afraid of falling and wants to get stronger.    Falls: None reported this year    Imaging: See chart    Prior Therapy: For same condition  Social History: Pt lives in a house lives with their spouse  Occupation: Retired  Prior Level of Function: Limited with walking and transfers  Current Level of Function: Weakness, difficulty with sit to stands without UE support, stair navigation    Pain:  Current 0/10, worst 0/10, best 0/10   Location: bilateral lower extremities  Description: Unsteady  Aggravating Factors: N/A  Easing Factors: N/A    Patients goals: get back to exercising, get better balance     Medical History:   Past Medical History:   Diagnosis Date    Coronary artery disease     CAC score 1430    Depression     Diabetic neuropathy     Essential (primary) hypertension      GERD (gastroesophageal reflux disease)     Insomnia     Reactive airway disease     Type 2 diabetes mellitus        Surgical History:   Anthony Ball  has a past surgical history that includes Colonoscopy and Total knee arthroplasty (Right).    Medications:   Anthony has a current medication list which includes the following prescription(s): albuterol, albuterol sulfate, aspirin, diphenhydramine, duloxetine, jardiance, fluticasone-salmeterol 250-50 mcg/dose, metformin, pantoprazole, pregabalin, and trazodone.    Allergies:   Review of patient's allergies indicates:  No Known Allergies       OBJECTIVE        Lumbar ROM: (measured in degrees) grossly WFL BLEs knee and hips and lumbar    Degrees Quality   flexion    90     extension    10     Rotation R WFL     Rotation L WFL        Active/Passive Knee ROM: (measured in degrees)     RLE LLE   Flexion 120/125 120/125   Extension 0/2 -3/0         Lower Extremity Strength (graded 0-5 out of 5)    RLE LLE   Hip flexion: 4-/5 4-/5   Hip extension: 4-/5 4-/5   Ankle dorsiflexion: 4/5 4/5   Ankle plantarflexion 4/5 4/5   Knee flexion 4+/5 4+/5   Knee extension 4/5 4-/5   Hip adduction 4/5 4/5   Hip abduction 4-/5 4-/5      Special Tests: ((+): pos.; (-): neg.)     Balance Assessment:       NEUMANN Assessment    1. Sitting to Standing   3 - able to stand independely using hands  2. Standing Unsupported   3 - able to stand 2 minutes with supervision  3. Sitting Unsupported   4 - able to sit safely and securely 2 minutes  4. Standing to Sitting   3 - controls descent by using hands  5. Pivot Transfer   3 - able to transfer safely with definite use of hands  6. Standing with Eyes Closed   4 - albe to stand 10 seconds safely  7. Standing with Feet Together   3 - able to place feet together independently and stand for 1 minute with supervision  8. Reaching Forward with Outstretched Arm   2 - can reach forward 5 cm/2 inches safely  9. Retrieving Object from Floor   2 - unable to   but reaches 2-5 cm from slipper and keeps balance independently  10. Turning to Look Behind   3 - looks behind one side only, other side less weight shift  11. Turning 360 Degrees   2 - able to turn 360 safely but slowly  12. Placing Alternate Foot on Step   2 - able to complete 4 steps without aid with supervision  13. Standing with One Foot in Front   0 - Looses balance while stepping or standing  14. Standing on One Foot   0 - unable to try or needs assistance to prevent fall  Total: 34  Maximum: 56     Functional: transfers sit to stand Mod I/S with SPC  Supine to sit to supine S overall due to increased time to execute  Rolling supine to prone to supine S/SBA  Fabers Test: neg  Slump Test: neg  SLR Test: neg  Palpation: neg grossly lumbar and bilateral lower extremity  Gait: increased SIERRA, decreased anjel, increased ER, decreased heel strike and toe off, mild B trendelenburg     CMS Impairment/Limitation/Restriction for FOTO lower extremity Survey     Therapist reviewed FOTO scores for Anthony Ball on 1/31/2023.   FOTO documents entered into [employer] - see Media section.     Limitation Score: 56%              TREATMENT     Total Treatment time (time-based codes) separate from Evaluation: 30 minutes      Anthony received the treatments listed below:      therapeutic exercises to develop strength and endurance for 15 minutes including:  Sit to stands 3x10  LAQ 3x10    neuromuscular re-education activities to improve: Balance for 15 minutes. The following activities were included:  Araujo Balance test  PATIENT EDUCATION AND HOME EXERCISES     Education provided:   - Balance and its types  -Balance confidence    Written Home Exercises Provided: yes. Exercises were reviewed and Anthony was able to demonstrate them prior to the end of the session.  Anthony demonstrated good  understanding of the education provided. See EMR under Patient Instructions for exercises provided during therapy sessions.    ASSESSMENT     Anthony is a 72  y.o. male referred to outpatient Physical Therapy with a medical diagnosis of   R29.898 (ICD-10-CM) - Leg weakness, bilateral   R53.81 (ICD-10-CM) - Physical deconditioning   R53.1 (ICD-10-CM) - Generalized weakness     Pt presents with marked decreased strength in B hips and lower extremities grossly mostly proximal and general trunk weakness leading to gait instability complicated with hx of polyneuropathy. PT to address weakness first and assess gait and balance deficits as strengthening progression.     Patient prognosis is Good.   Patient will benefit from skilled outpatient Physical Therapy to address the deficits stated above and in the chart below, provide patient /family education, and to maximize patientt's level of independence.     Plan of care discussed with patient: Yes  Patient's spiritual, cultural and educational needs considered and patient is agreeable to the plan of care and goals as stated below:     Anticipated Barriers for therapy: none     Medical Necessity is demonstrated by the following  History  Co-morbidities and personal factors that may impact the plan of care Co-morbidities:   See PMHx     Personal Factors:   age  lifestyle       low   Examination  Body Structures and Functions, activity limitations and participation restrictions that may impact the plan of care Body Regions:   back  lower extremities     Body Systems:    gross symmetry  strength  gross coordinated movement  balance  gait  transfers     Participation Restrictions:   none     Activity limitations:   Learning and applying knowledge  no deficits     General Tasks and Commands  no deficits     Communication  no deficits     Mobility  lifting and carrying objects  walking  driving (bike, car, motorcycle)     Self care  dressing     Domestic Life  shopping  cooking  doing house work (cleaning house, washing dishes, laundry)     Interactions/Relationships  no deficits     Life Areas  no deficits     Community and Social  Life  community life  recreation and leisure             moderate      Clinical Presentation stable and uncomplicated low   Decision Making/ Complexity Score: low      Goals:STG 3 weeks  1.  Pt to be independent with HEP for increased functional mobility and pain control.  2.  Pt to increase AROM at 100 degs trunk flexion for increased functional mobility and transfers.  3.  Pt to increased functional mobility/transfers to independent with supine to sit to supine and rolling to prone and to supine  without increased time to execute.  4. Pt to ambulate 250' Mod I with SPC without LOB for increased tolerance of functional mobility.  5. Complete howell balance test and set proper long term goals for balance.     Long Term Goals: 8 weeks   1.  Pt to be independent with pain management strategies and verbalize 2 strategies for increased functional mobility and pain control.  2.  Pt to increase B hip extension and abduction to 4+/5  for increased functional mobility and transfers.  3.  Pt to increased gait to 500' feet Mod I with SPC to include outdoors up and down ramps and 6 steps with 1 Hr step over step pattern.    PLAN   Plan of care Certification: 1/31/2023 to 4/30/2023.    Outpatient Physical Therapy 2 times weekly for 10 weeks to include the following interventions: Gait Training, Manual Therapy, Moist Heat/ Ice, Neuromuscular Re-ed, Therapeutic Activities, and Therapeutic Exercise.     Colt Harris, PT, DPT      I CERTIFY THE NEED FOR THESE SERVICES FURNISHED UNDER THIS PLAN OF TREATMENT AND WHILE UNDER MY CARE   Physician's comments:     Physician's Signature: ___________________________________________________

## 2023-02-08 ENCOUNTER — CLINICAL SUPPORT (OUTPATIENT)
Dept: REHABILITATION | Facility: HOSPITAL | Age: 73
End: 2023-02-08
Attending: INTERNAL MEDICINE
Payer: MEDICARE

## 2023-02-08 DIAGNOSIS — R26.89 BALANCE DISORDER: Primary | ICD-10-CM

## 2023-02-08 PROCEDURE — 97110 THERAPEUTIC EXERCISES: CPT | Mod: PO,CQ

## 2023-02-08 NOTE — PROGRESS NOTES
[facility]   Physical Therapy Treatment Note     Name: Anthony Ball  Clinic Number: [id number]    Therapy Diagnosis:   Encounter Diagnosis   Name Primary?    Balance disorder Yes     Physician: Isaias Myles MD    Visit Date: 2/8/2023    Physician Orders: PT Eval and Treat  Medical Diagnosis from Referral:   R29.898 (ICD-10-CM) - Leg weakness, bilateral   R53.81 (ICD-10-CM) - Physical deconditioning   R53.1 (ICD-10-CM) - Generalized weakness   Evaluation Date: 1/31/2023  Authorization Period Expiration: 01/05/2024  Plan of Care Expiration: 5/30/2023  Progress Note Due: 2/01/2023  Visit # / Visits authorized: 1/ 40  FOTO: 1/1     Precautions: Standard and Diabetes     PTA Visit #: 1/5     Time In: 8:50 am  Time Out: 9:30 am  Total Billable Time: 40 minutes    SUBJECTIVE     Pt reports: he fell twice since he was here last. He reports having some bruising, but no other serious injuries  He was not compliant with home exercise program.  Response to previous treatment: initial evaluation performed  Functional change: ongoing    Pain: 0/10  Location: n/a    OBJECTIVE     Objective Measures updated at progress report unless specified.     Treatment     Anthony received the treatments listed below:      therapeutic exercises to develop strength and endurance for 40 minutes including:  Sit to stands x10  LAQ 3x10  Seated marching 3x10  Supine hip abduction with green band 3x10  Seated hip adduction ball squeeze 3x10     neuromuscular re-education activities to improve: Balance for 00 minutes. The following activities were included:  Araujo Balance test    therapeutic activities to improve functional performance for 00  minutes, including:          Patient Education and Home Exercises     Home Exercises Provided and Patient Education Provided     Education provided:   - HEP    Written Home Exercises Provided: Patient instructed to cont prior HEP. Exercises were reviewed and Anthony was able to  demonstrate them prior to the end of the session.  Anthony demonstrated good  understanding of the education provided. See EMR under Patient Instructions for exercises provided during therapy sessions    ASSESSMENT     Anthony presents to treatment ambulating with SPC with slowed, unsteady gait. He reported nausea throughout session.  He did report his doctor increased the does on one of his medication. Advised him to discuss if nausea could be from medication change. Difficulty tolerating exercises today due to fatigue. He performed to tolerance with no pain or adverse effects reported. Continue to progress as tolerated.     Anthony Is progressing well towards his goals.   Pt prognosis is Good.     Pt will continue to benefit from skilled outpatient physical therapy to address the deficits listed in the problem list box on initial evaluation, provide pt/family education and to maximize pt's level of independence in the home and community environment.     Pt's spiritual, cultural and educational needs considered and pt agreeable to plan of care and goals.     Anticipated Barriers for therapy: none     Goals:STG 3 weeks  1.  Pt to be independent with HEP for increased functional mobility and pain control.  2.  Pt to increase AROM at 100 degs trunk flexion for increased functional mobility and transfers.  3.  Pt to increased functional mobility/transfers to independent with supine to sit to supine and rolling to prone and to supine  without increased time to execute.  4. Pt to ambulate 250' Mod I with SPC without LOB for increased tolerance of functional mobility.  5. Complete howell balance test and set proper long term goals for balance.     Long Term Goals: 8 weeks   1.  Pt to be independent with pain management strategies and verbalize 2 strategies for increased functional mobility and pain control.  2.  Pt to increase B hip extension and abduction to 4+/5  for increased functional mobility and transfers.  3.  Pt to  increased gait to 500' feet Mod I with SPC to include outdoors up and down ramps and 6 steps with 1 Hr step over step pattern.       PLAN     Continue to progress strength and balance for improved functional mobility     Candy Saldivar PTA

## 2023-02-16 ENCOUNTER — PATIENT MESSAGE (OUTPATIENT)
Dept: INTERNAL MEDICINE | Facility: CLINIC | Age: 73
End: 2023-02-16
Payer: MEDICARE

## 2023-02-17 ENCOUNTER — CLINICAL SUPPORT (OUTPATIENT)
Dept: REHABILITATION | Facility: HOSPITAL | Age: 73
End: 2023-02-17
Attending: INTERNAL MEDICINE
Payer: MEDICARE

## 2023-02-17 DIAGNOSIS — R26.89 BALANCE DISORDER: Primary | ICD-10-CM

## 2023-02-17 PROCEDURE — 97110 THERAPEUTIC EXERCISES: CPT | Mod: PO,CQ

## 2023-02-17 NOTE — PROGRESS NOTES
[facility]   Physical Therapy Treatment Note     Name: Anthony Ball  Clinic Number: [id number]    Therapy Diagnosis:   Encounter Diagnosis   Name Primary?    Balance disorder Yes       Physician: Isaias Myles MD    Visit Date: 2/17/2023    Physician Orders: PT Eval and Treat  Medical Diagnosis from Referral:   R29.898 (ICD-10-CM) - Leg weakness, bilateral   R53.81 (ICD-10-CM) - Physical deconditioning   R53.1 (ICD-10-CM) - Generalized weakness   Evaluation Date: 1/31/2023  Authorization Period Expiration: 01/05/2024  Plan of Care Expiration: 5/30/2023  Progress Note Due: 3/01/2023  Visit # / Visits authorized: 2/ 40   FOTO: 1/1     Precautions: Standard and Diabetes     PTA Visit #: 2/5     Time In: 10:45 am  Time Out: 11:32 am  Total Billable Time: 47 minutes    SUBJECTIVE     Pt reports: he had another fall earlier this week because he had a severe back spasm that caused him to collapse  He was not compliant with home exercise program.  Response to previous treatment: fatigue  Functional change: ongoing    Pain: 0/10  Location: n/a    OBJECTIVE     Objective Measures updated at progress report unless specified.     Treatment     Anthony received the treatments listed below:      therapeutic exercises to develop strength and endurance for 47 minutes including:  Sit to stands x10  LAQ 3x10  Seated marching 3x10  Supine hip abduction with green band 3x10  Seated hip adduction ball squeeze 3x10  Nu step 5'     neuromuscular re-education activities to improve: Balance for 00 minutes. The following activities were included:  Araujo Balance test    therapeutic activities to improve functional performance for 00  minutes, including:          Patient Education and Home Exercises     Home Exercises Provided and Patient Education Provided     Education provided:   - HEP    Written Home Exercises Provided: Patient instructed to cont prior HEP. Exercises were reviewed and Anthony was able to demonstrate  them prior to the end of the session.  Anthony demonstrated good  understanding of the education provided. See EMR under Patient Instructions for exercises provided during therapy sessions    ASSESSMENT     Anthony presents to treatment ambulating with SPC with slowed, unsteady gait. He continues with difficulty getting into and out of the clinic due to poor endurance, fatigue and risk of falls. He has had multiple falls since starting outpatient physical therapy. He requires multiple rest breaks during treatment and fatigues quickly. He would benefit from home health services due to increased difficulty getting to appointments and continued risk of falls. He required SBA-CGA when leaving the clinic today due to unsteady gait.     Anthony Is progressing well towards his goals.   Pt prognosis is Fair.     Pt will continue to benefit from skilled outpatient physical therapy to address the deficits listed in the problem list box on initial evaluation, provide pt/family education and to maximize pt's level of independence in the home and community environment.     Pt's spiritual, cultural and educational needs considered and pt agreeable to plan of care and goals.     Anticipated Barriers for therapy: none     Goals:STG 3 weeks  1.  Pt to be independent with HEP for increased functional mobility and pain control.  2.  Pt to increase AROM at 100 degs trunk flexion for increased functional mobility and transfers.  3.  Pt to increased functional mobility/transfers to independent with supine to sit to supine and rolling to prone and to supine  without increased time to execute.  4. Pt to ambulate 250' Mod I with SPC without LOB for increased tolerance of functional mobility.  5. Complete howell balance test and set proper long term goals for balance.     Long Term Goals: 8 weeks   1.  Pt to be independent with pain management strategies and verbalize 2 strategies for increased functional mobility and pain control.  2.  Pt to increase  B hip extension and abduction to 4+/5  for increased functional mobility and transfers.  3.  Pt to increased gait to 500' feet Mod I with SPC to include outdoors up and down ramps and 6 steps with 1 Hr step over step pattern.       PLAN     Continue to progress strength and balance for improved functional mobility     Candy Saldivar, PTA

## 2023-02-20 DIAGNOSIS — J45.901 EXACERBATION OF ASTHMA, UNSPECIFIED ASTHMA SEVERITY, UNSPECIFIED WHETHER PERSISTENT: ICD-10-CM

## 2023-02-20 DIAGNOSIS — J45.909 REACTIVE AIRWAY DISEASE WITHOUT COMPLICATION, UNSPECIFIED ASTHMA SEVERITY, UNSPECIFIED WHETHER PERSISTENT: ICD-10-CM

## 2023-02-20 RX ORDER — FLUTICASONE PROPIONATE AND SALMETEROL 250; 50 UG/1; UG/1
1 POWDER RESPIRATORY (INHALATION) 2 TIMES DAILY
Qty: 60 EACH | Refills: 2 | Status: SHIPPED | OUTPATIENT
Start: 2023-02-20 | End: 2024-02-20

## 2023-02-22 ENCOUNTER — CLINICAL SUPPORT (OUTPATIENT)
Dept: REHABILITATION | Facility: HOSPITAL | Age: 73
End: 2023-02-22
Attending: INTERNAL MEDICINE
Payer: MEDICARE

## 2023-02-22 ENCOUNTER — PATIENT MESSAGE (OUTPATIENT)
Dept: INTERNAL MEDICINE | Facility: CLINIC | Age: 73
End: 2023-02-22
Payer: MEDICARE

## 2023-02-22 DIAGNOSIS — R26.89 BALANCE DISORDER: Primary | ICD-10-CM

## 2023-02-22 PROCEDURE — 97110 THERAPEUTIC EXERCISES: CPT | Mod: PO,CQ

## 2023-02-22 NOTE — PROGRESS NOTES
[facility]   Physical Therapy Treatment Note     Name: Anthony Ball  Clinic Number: [id number]    Therapy Diagnosis:   Encounter Diagnosis   Name Primary?    Balance disorder Yes         Physician: Isaias Myles MD    Visit Date: 2/22/2023    Physician Orders: PT Eval and Treat  Medical Diagnosis from Referral:   R29.898 (ICD-10-CM) - Leg weakness, bilateral   R53.81 (ICD-10-CM) - Physical deconditioning   R53.1 (ICD-10-CM) - Generalized weakness   Evaluation Date: 1/31/2023  Authorization Period Expiration: 01/05/2024  Plan of Care Expiration: 5/30/2023  Progress Note Due: 3/01/2023  Visit # / Visits authorized: 3/ 40   FOTO: 1/1     Precautions: Standard and Diabetes     PTA Visit #: 3/5     Time In: 8:00 am  Time Out: 8:50 am  Total Billable Time: 50 minutes    SUBJECTIVE     Pt reports: his doctor told him he will put in for home health but it could take 2 weeks  He was not compliant with home exercise program.  Response to previous treatment: fatigue  Functional change: ongoing    Pain: 0/10  Location: n/a    OBJECTIVE     Objective Measures updated at progress report unless specified.     Treatment     Anthony received the treatments listed below:      therapeutic exercises to develop strength and endurance for 50 minutes including:  Sit to stands x10  LAQ 3x10  Seated marching 3x10  Supine hip abduction with green band 3x10  Seated hip adduction ball squeeze 3x10  Nu step 10'  Gait training with SPC     neuromuscular re-education activities to improve: Balance for 00 minutes. The following activities were included:  Araujo Balance test    therapeutic activities to improve functional performance for 00  minutes, including:          Patient Education and Home Exercises     Home Exercises Provided and Patient Education Provided     Education provided:   - HEP    Written Home Exercises Provided: Patient instructed to cont prior HEP. Exercises were reviewed and Anthony was able to demonstrate  them prior to the end of the session.  Anthony demonstrated good  understanding of the education provided. See EMR under Patient Instructions for exercises provided during therapy sessions    ASSESSMENT     Anthony presents to treatment ambulating with SPC with slowed unsteady gait with wide SIERRA. He is progressing slowly with endurance and was able to tolerate exercises with less rest breaks and  increased time on the nu step. Will attempt to add balance activities next session as tolerated to decrease fall risk and improve safety.      Anthony Is progressing well towards his goals.   Pt prognosis is Fair.     Pt will continue to benefit from skilled outpatient physical therapy to address the deficits listed in the problem list box on initial evaluation, provide pt/family education and to maximize pt's level of independence in the home and community environment.     Pt's spiritual, cultural and educational needs considered and pt agreeable to plan of care and goals.     Anticipated Barriers for therapy: none     Goals:STG 3 weeks  1.  Pt to be independent with HEP for increased functional mobility and pain control.  2.  Pt to increase AROM at 100 degs trunk flexion for increased functional mobility and transfers.  3.  Pt to increased functional mobility/transfers to independent with supine to sit to supine and rolling to prone and to supine  without increased time to execute.  4. Pt to ambulate 250' Mod I with SPC without LOB for increased tolerance of functional mobility.  5. Complete howell balance test and set proper long term goals for balance.     Long Term Goals: 8 weeks   1.  Pt to be independent with pain management strategies and verbalize 2 strategies for increased functional mobility and pain control.  2.  Pt to increase B hip extension and abduction to 4+/5  for increased functional mobility and transfers.  3.  Pt to increased gait to 500' feet Mod I with SPC to include outdoors up and down ramps and 6 steps with 1  Hr step over step pattern.       PLAN     Continue to progress strength and balance for improved functional mobility     Candy Saldivar, PTA

## 2023-02-24 ENCOUNTER — CLINICAL SUPPORT (OUTPATIENT)
Dept: REHABILITATION | Facility: HOSPITAL | Age: 73
End: 2023-02-24
Attending: INTERNAL MEDICINE
Payer: MEDICARE

## 2023-02-24 DIAGNOSIS — R26.89 BALANCE DISORDER: Primary | ICD-10-CM

## 2023-02-24 PROCEDURE — 97110 THERAPEUTIC EXERCISES: CPT | Mod: PO

## 2023-03-07 ENCOUNTER — PATIENT MESSAGE (OUTPATIENT)
Dept: INTERNAL MEDICINE | Facility: CLINIC | Age: 73
End: 2023-03-07
Payer: MEDICARE

## 2023-03-07 DIAGNOSIS — R29.898 LEG WEAKNESS, BILATERAL: Primary | ICD-10-CM

## 2023-03-07 DIAGNOSIS — R53.81 PHYSICAL DECONDITIONING: ICD-10-CM

## 2023-03-07 DIAGNOSIS — E11.42 DIABETIC POLYNEUROPATHY ASSOCIATED WITH TYPE 2 DIABETES MELLITUS: ICD-10-CM

## 2023-03-09 ENCOUNTER — PATIENT MESSAGE (OUTPATIENT)
Dept: INTERNAL MEDICINE | Facility: CLINIC | Age: 73
End: 2023-03-09
Payer: MEDICARE

## 2023-03-09 PROCEDURE — G0180 PR HOME HEALTH MD CERTIFICATION: ICD-10-PCS | Mod: ,,, | Performed by: INTERNAL MEDICINE

## 2023-03-09 PROCEDURE — G0180 MD CERTIFICATION HHA PATIENT: HCPCS | Mod: ,,, | Performed by: INTERNAL MEDICINE

## 2023-03-13 ENCOUNTER — TELEPHONE (OUTPATIENT)
Dept: INTERNAL MEDICINE | Facility: CLINIC | Age: 73
End: 2023-03-13
Payer: MEDICARE

## 2023-03-16 NOTE — TELEPHONE ENCOUNTER
Pt's partner, Jacob Melotn, sent message today noting pt will be missing appt today due to severe back pain. Also notes he is drinking heavily again. I attempted to call after receiving message, no answer today but my staff notes he called to schedule appt tomorrow fortunately. Alcohol use issues were a noted hx in past. Has been recently c/o neuopathy pains which have been felt likely alcohol and/or diabetes related. Pt prefers to avoid coming into clinic but at this point would be difficult to make any adjustments without doing so. With alcohol use and dm hx, and no recent labs, all potential pain relief meds are with significant side effect concern. In addition, I am now concerned with the recent addition of Lyrica and Cymbalta in light of alcohol use daily. If pt present tomorrow, will assess and make sure not missing significant lumbar etiology.

## 2023-03-17 ENCOUNTER — PATIENT MESSAGE (OUTPATIENT)
Dept: INTERNAL MEDICINE | Facility: CLINIC | Age: 73
End: 2023-03-17
Payer: MEDICARE

## 2023-03-19 ENCOUNTER — HOSPITAL ENCOUNTER (INPATIENT)
Facility: HOSPITAL | Age: 73
LOS: 2 days | Discharge: NURSING, REHAB OR LONG-TERM CARE | DRG: 552 | End: 2023-03-21
Attending: EMERGENCY MEDICINE | Admitting: STUDENT IN AN ORGANIZED HEALTH CARE EDUCATION/TRAINING PROGRAM
Payer: MEDICARE

## 2023-03-19 DIAGNOSIS — R29.898 WEAKNESS OF BOTH LOWER EXTREMITIES: Primary | ICD-10-CM

## 2023-03-19 DIAGNOSIS — M62.830 BACK SPASM: ICD-10-CM

## 2023-03-19 DIAGNOSIS — Z78.9 ALCOHOL USE: ICD-10-CM

## 2023-03-19 DIAGNOSIS — M54.9 BACK PAIN: ICD-10-CM

## 2023-03-19 DIAGNOSIS — R07.9 CHEST PAIN: ICD-10-CM

## 2023-03-19 DIAGNOSIS — R26.89 BALANCE PROBLEM: ICD-10-CM

## 2023-03-19 PROBLEM — F10.90 ALCOHOL USE: Status: ACTIVE | Noted: 2023-03-19

## 2023-03-19 LAB
ALBUMIN SERPL BCP-MCNC: 2.5 G/DL (ref 3.5–5.2)
ALP SERPL-CCNC: 165 U/L (ref 55–135)
ALT SERPL W/O P-5'-P-CCNC: 49 U/L (ref 10–44)
ANION GAP SERPL CALC-SCNC: 9 MMOL/L (ref 8–16)
AST SERPL-CCNC: 81 U/L (ref 10–40)
BASOPHILS # BLD AUTO: 0.04 K/UL (ref 0–0.2)
BASOPHILS NFR BLD: 0.6 % (ref 0–1.9)
BILIRUB SERPL-MCNC: 1 MG/DL (ref 0.1–1)
BUN SERPL-MCNC: 13 MG/DL (ref 8–23)
CALCIUM SERPL-MCNC: 9.3 MG/DL (ref 8.7–10.5)
CHLORIDE SERPL-SCNC: 100 MMOL/L (ref 95–110)
CO2 SERPL-SCNC: 24 MMOL/L (ref 23–29)
CREAT SERPL-MCNC: 1.1 MG/DL (ref 0.5–1.4)
DIFFERENTIAL METHOD: ABNORMAL
EOSINOPHIL # BLD AUTO: 0.2 K/UL (ref 0–0.5)
EOSINOPHIL NFR BLD: 2.5 % (ref 0–8)
ERYTHROCYTE [DISTWIDTH] IN BLOOD BY AUTOMATED COUNT: 13.6 % (ref 11.5–14.5)
EST. GFR  (NO RACE VARIABLE): >60 ML/MIN/1.73 M^2
ESTIMATED AVG GLUCOSE: 97 MG/DL (ref 68–131)
GLUCOSE SERPL-MCNC: 148 MG/DL (ref 70–110)
HBA1C MFR BLD: 5 % (ref 4–5.6)
HCT VFR BLD AUTO: 43.1 % (ref 40–54)
HGB BLD-MCNC: 14.6 G/DL (ref 14–18)
IMM GRANULOCYTES # BLD AUTO: 0.07 K/UL (ref 0–0.04)
IMM GRANULOCYTES NFR BLD AUTO: 1 % (ref 0–0.5)
LYMPHOCYTES # BLD AUTO: 1.2 K/UL (ref 1–4.8)
LYMPHOCYTES NFR BLD: 17.4 % (ref 18–48)
MAGNESIUM SERPL-MCNC: 1.4 MG/DL (ref 1.6–2.6)
MCH RBC QN AUTO: 34.3 PG (ref 27–31)
MCHC RBC AUTO-ENTMCNC: 33.9 G/DL (ref 32–36)
MCV RBC AUTO: 101 FL (ref 82–98)
MONOCYTES # BLD AUTO: 0.4 K/UL (ref 0.3–1)
MONOCYTES NFR BLD: 6.1 % (ref 4–15)
NEUTROPHILS # BLD AUTO: 5 K/UL (ref 1.8–7.7)
NEUTROPHILS NFR BLD: 72.4 % (ref 38–73)
NRBC BLD-RTO: 0 /100 WBC
PHOSPHATE SERPL-MCNC: 2.9 MG/DL (ref 2.7–4.5)
PLATELET # BLD AUTO: 132 K/UL (ref 150–450)
PMV BLD AUTO: 9.9 FL (ref 9.2–12.9)
POTASSIUM SERPL-SCNC: 4.2 MMOL/L (ref 3.5–5.1)
PROT SERPL-MCNC: 6.4 G/DL (ref 6–8.4)
RBC # BLD AUTO: 4.26 M/UL (ref 4.6–6.2)
SODIUM SERPL-SCNC: 133 MMOL/L (ref 136–145)
T4 FREE SERPL-MCNC: 1.02 NG/DL (ref 0.71–1.51)
TSH SERPL DL<=0.005 MIU/L-ACNC: 4.25 UIU/ML (ref 0.4–4)
WBC # BLD AUTO: 6.9 K/UL (ref 3.9–12.7)

## 2023-03-19 PROCEDURE — 99285 PR EMERGENCY DEPT VISIT,LEVEL V: ICD-10-PCS | Mod: GC,,, | Performed by: EMERGENCY MEDICINE

## 2023-03-19 PROCEDURE — 96366 THER/PROPH/DIAG IV INF ADDON: CPT

## 2023-03-19 PROCEDURE — 83036 HEMOGLOBIN GLYCOSYLATED A1C: CPT | Performed by: STUDENT IN AN ORGANIZED HEALTH CARE EDUCATION/TRAINING PROGRAM

## 2023-03-19 PROCEDURE — 99222 1ST HOSP IP/OBS MODERATE 55: CPT | Mod: AI,GC,, | Performed by: STUDENT IN AN ORGANIZED HEALTH CARE EDUCATION/TRAINING PROGRAM

## 2023-03-19 PROCEDURE — 25000003 PHARM REV CODE 250

## 2023-03-19 PROCEDURE — 99285 EMERGENCY DEPT VISIT HI MDM: CPT | Mod: 25

## 2023-03-19 PROCEDURE — 96365 THER/PROPH/DIAG IV INF INIT: CPT

## 2023-03-19 PROCEDURE — 99285 EMERGENCY DEPT VISIT HI MDM: CPT | Mod: GC,,, | Performed by: EMERGENCY MEDICINE

## 2023-03-19 PROCEDURE — 63600175 PHARM REV CODE 636 W HCPCS: Performed by: STUDENT IN AN ORGANIZED HEALTH CARE EDUCATION/TRAINING PROGRAM

## 2023-03-19 PROCEDURE — 85025 COMPLETE CBC W/AUTO DIFF WBC: CPT | Performed by: STUDENT IN AN ORGANIZED HEALTH CARE EDUCATION/TRAINING PROGRAM

## 2023-03-19 PROCEDURE — 63600175 PHARM REV CODE 636 W HCPCS

## 2023-03-19 PROCEDURE — 84443 ASSAY THYROID STIM HORMONE: CPT | Performed by: STUDENT IN AN ORGANIZED HEALTH CARE EDUCATION/TRAINING PROGRAM

## 2023-03-19 PROCEDURE — 84439 ASSAY OF FREE THYROXINE: CPT | Performed by: STUDENT IN AN ORGANIZED HEALTH CARE EDUCATION/TRAINING PROGRAM

## 2023-03-19 PROCEDURE — 12000002 HC ACUTE/MED SURGE SEMI-PRIVATE ROOM

## 2023-03-19 PROCEDURE — 84100 ASSAY OF PHOSPHORUS: CPT | Performed by: STUDENT IN AN ORGANIZED HEALTH CARE EDUCATION/TRAINING PROGRAM

## 2023-03-19 PROCEDURE — 80053 COMPREHEN METABOLIC PANEL: CPT | Performed by: STUDENT IN AN ORGANIZED HEALTH CARE EDUCATION/TRAINING PROGRAM

## 2023-03-19 PROCEDURE — 83735 ASSAY OF MAGNESIUM: CPT | Performed by: STUDENT IN AN ORGANIZED HEALTH CARE EDUCATION/TRAINING PROGRAM

## 2023-03-19 PROCEDURE — 99222 PR INITIAL HOSPITAL CARE,LEVL II: ICD-10-PCS | Mod: AI,GC,, | Performed by: STUDENT IN AN ORGANIZED HEALTH CARE EDUCATION/TRAINING PROGRAM

## 2023-03-19 RX ORDER — TRAZODONE HYDROCHLORIDE 50 MG/1
50 TABLET ORAL NIGHTLY
Status: DISCONTINUED | OUTPATIENT
Start: 2023-03-19 | End: 2023-03-21 | Stop reason: HOSPADM

## 2023-03-19 RX ORDER — NAPROXEN SODIUM 220 MG/1
81 TABLET, FILM COATED ORAL DAILY
Status: DISCONTINUED | OUTPATIENT
Start: 2023-03-20 | End: 2023-03-21 | Stop reason: HOSPADM

## 2023-03-19 RX ORDER — FOLIC ACID 1 MG/1
1 TABLET ORAL DAILY
Status: DISCONTINUED | OUTPATIENT
Start: 2023-03-20 | End: 2023-03-21 | Stop reason: HOSPADM

## 2023-03-19 RX ORDER — DULOXETIN HYDROCHLORIDE 60 MG/1
60 CAPSULE, DELAYED RELEASE ORAL 2 TIMES DAILY
Status: DISCONTINUED | OUTPATIENT
Start: 2023-03-19 | End: 2023-03-21 | Stop reason: HOSPADM

## 2023-03-19 RX ORDER — THIAMINE HCL 100 MG
100 TABLET ORAL DAILY
Status: DISCONTINUED | OUTPATIENT
Start: 2023-03-20 | End: 2023-03-19

## 2023-03-19 RX ORDER — DEXTROSE 40 %
15 GEL (GRAM) ORAL
Status: DISCONTINUED | OUTPATIENT
Start: 2023-03-19 | End: 2023-03-21 | Stop reason: HOSPADM

## 2023-03-19 RX ORDER — POLYETHYLENE GLYCOL 3350 17 G/17G
17 POWDER, FOR SOLUTION ORAL DAILY
Status: DISCONTINUED | OUTPATIENT
Start: 2023-03-20 | End: 2023-03-21 | Stop reason: HOSPADM

## 2023-03-19 RX ORDER — ENOXAPARIN SODIUM 100 MG/ML
40 INJECTION SUBCUTANEOUS EVERY 24 HOURS
Status: DISCONTINUED | OUTPATIENT
Start: 2023-03-19 | End: 2023-03-21 | Stop reason: HOSPADM

## 2023-03-19 RX ORDER — GLUCAGON 1 MG
1 KIT INJECTION
Status: DISCONTINUED | OUTPATIENT
Start: 2023-03-19 | End: 2023-03-21 | Stop reason: HOSPADM

## 2023-03-19 RX ORDER — SODIUM CHLORIDE 0.9 % (FLUSH) 0.9 %
10 SYRINGE (ML) INJECTION EVERY 12 HOURS PRN
Status: DISCONTINUED | OUTPATIENT
Start: 2023-03-19 | End: 2023-03-21 | Stop reason: HOSPADM

## 2023-03-19 RX ORDER — DEXTROSE 40 %
30 GEL (GRAM) ORAL
Status: DISCONTINUED | OUTPATIENT
Start: 2023-03-19 | End: 2023-03-21 | Stop reason: HOSPADM

## 2023-03-19 RX ORDER — INSULIN ASPART 100 [IU]/ML
0-5 INJECTION, SOLUTION INTRAVENOUS; SUBCUTANEOUS
Status: DISCONTINUED | OUTPATIENT
Start: 2023-03-19 | End: 2023-03-21 | Stop reason: HOSPADM

## 2023-03-19 RX ORDER — TALC
6 POWDER (GRAM) TOPICAL NIGHTLY PRN
Status: DISCONTINUED | OUTPATIENT
Start: 2023-03-19 | End: 2023-03-21 | Stop reason: HOSPADM

## 2023-03-19 RX ORDER — PREGABALIN 75 MG/1
150 CAPSULE ORAL 2 TIMES DAILY
Status: DISCONTINUED | OUTPATIENT
Start: 2023-03-19 | End: 2023-03-21 | Stop reason: HOSPADM

## 2023-03-19 RX ORDER — METHOCARBAMOL 500 MG/1
500 TABLET, FILM COATED ORAL 4 TIMES DAILY PRN
Status: DISCONTINUED | OUTPATIENT
Start: 2023-03-19 | End: 2023-03-21 | Stop reason: HOSPADM

## 2023-03-19 RX ORDER — ALBUTEROL SULFATE 90 UG/1
2 AEROSOL, METERED RESPIRATORY (INHALATION) EVERY 6 HOURS PRN
Status: DISCONTINUED | OUTPATIENT
Start: 2023-03-19 | End: 2023-03-21 | Stop reason: HOSPADM

## 2023-03-19 RX ORDER — NALOXONE HCL 0.4 MG/ML
0.02 VIAL (ML) INJECTION
Status: DISCONTINUED | OUTPATIENT
Start: 2023-03-19 | End: 2023-03-21 | Stop reason: HOSPADM

## 2023-03-19 RX ORDER — ONDANSETRON 2 MG/ML
4 INJECTION INTRAMUSCULAR; INTRAVENOUS EVERY 8 HOURS PRN
Status: DISCONTINUED | OUTPATIENT
Start: 2023-03-19 | End: 2023-03-21 | Stop reason: HOSPADM

## 2023-03-19 RX ORDER — AMOXICILLIN 250 MG
1 CAPSULE ORAL 2 TIMES DAILY
Status: DISCONTINUED | OUTPATIENT
Start: 2023-03-19 | End: 2023-03-21 | Stop reason: HOSPADM

## 2023-03-19 RX ORDER — PANTOPRAZOLE SODIUM 40 MG/1
40 TABLET, DELAYED RELEASE ORAL DAILY
Status: DISCONTINUED | OUTPATIENT
Start: 2023-03-20 | End: 2023-03-21 | Stop reason: HOSPADM

## 2023-03-19 RX ORDER — DIAZEPAM 5 MG/1
5 TABLET ORAL EVERY 8 HOURS
Status: DISCONTINUED | OUTPATIENT
Start: 2023-03-19 | End: 2023-03-21 | Stop reason: HOSPADM

## 2023-03-19 RX ORDER — LORAZEPAM 0.5 MG/1
2 TABLET ORAL EVERY 4 HOURS PRN
Status: DISCONTINUED | OUTPATIENT
Start: 2023-03-19 | End: 2023-03-21 | Stop reason: HOSPADM

## 2023-03-19 RX ORDER — MAGNESIUM SULFATE HEPTAHYDRATE 40 MG/ML
2 INJECTION, SOLUTION INTRAVENOUS
Status: COMPLETED | OUTPATIENT
Start: 2023-03-19 | End: 2023-03-19

## 2023-03-19 RX ADMIN — TRAZODONE HYDROCHLORIDE 50 MG: 50 TABLET ORAL at 10:03

## 2023-03-19 RX ADMIN — DULOXETINE HYDROCHLORIDE 60 MG: 60 CAPSULE, DELAYED RELEASE ORAL at 10:03

## 2023-03-19 RX ADMIN — SODIUM CHLORIDE, POTASSIUM CHLORIDE, SODIUM LACTATE AND CALCIUM CHLORIDE 1000 ML: 600; 310; 30; 20 INJECTION, SOLUTION INTRAVENOUS at 07:03

## 2023-03-19 RX ADMIN — PREGABALIN 150 MG: 75 CAPSULE ORAL at 10:03

## 2023-03-19 RX ADMIN — DIAZEPAM 5 MG: 5 TABLET ORAL at 07:03

## 2023-03-19 RX ADMIN — MAGNESIUM SULFATE 2 G: 2 INJECTION INTRAVENOUS at 06:03

## 2023-03-19 RX ADMIN — SODIUM CHLORIDE, POTASSIUM CHLORIDE, SODIUM LACTATE AND CALCIUM CHLORIDE 1000 ML: 600; 310; 30; 20 INJECTION, SOLUTION INTRAVENOUS at 10:03

## 2023-03-19 RX ADMIN — ENOXAPARIN SODIUM 40 MG: 40 INJECTION SUBCUTANEOUS at 07:03

## 2023-03-19 RX ADMIN — SENNOSIDES AND DOCUSATE SODIUM 1 TABLET: 50; 8.6 TABLET ORAL at 10:03

## 2023-03-19 NOTE — ED NOTES
Pt fell in shower after having painful back spasm. Denies head injury, LOC or apparent injury from fall. Progressing generalized weakness, not acute. States intermittent numbness to HILDA feet r/t diabetic neuropathy, not acute. Partner called EMS. States he drinks a bottle of wine a day, does not withdraw and has not drank today. Feels as though he is experiencing failure to thrive. Does not believe he needs to be here, and has no acute complaints. Sees a doctor at home and partakes in PT. No pain at this moment.    Patient identifiers for Anthony Ball 73 y.o. male checked and correct.  Chief Complaint   Patient presents with    Weakness     Increasing weakness of last 3 weeks with back spasms. Denies falls or injuries. Requesting social work consult     Past Medical History:   Diagnosis Date    Coronary artery disease     CAC score 1430    Depression     Diabetic neuropathy     Essential (primary) hypertension     GERD (gastroesophageal reflux disease)     Insomnia     Neuromyopathy     Possibly DM and/or alcohol related.    Reactive airway disease     Type 2 diabetes mellitus      Allergies reported: Review of patient's allergies indicates:  No Known Allergies    Appearance: Pt awake, alert & oriented to person, place & time. Pt in no acute distress at present time. Pt is clean and well groomed with clothes appropriately fastened.   Skin: Skin warm, dry. Color consistent with ethnicity. Mucous membranes moist. Small round scabs to body upper/lower extremities.  Musculoskeletal: Patient moving all extremities well, no obvious swelling or deformities noted.   Respiratory: Respirations spontaneous, even, and non-labored. Visible chest rise noted. Airway is open and patent. No accessory muscle use noted.   Neurologic: Sensation is intact. Speech is clear and appropriate. Eyes open spontaneously, behavior appropriate to situation, follows commands, facial expression symmetrical, bilateral hand grasp equal and even,  purposeful motor response noted.  Cardiac: All peripheral pulses present. No Bilateral lower extremity edema. Cap refill is <3 seconds.  Abdomen: Abdomen soft, non distended, non tender to palpation.   : Pt voids independently, denies dysuria, hematuria, frequency.

## 2023-03-19 NOTE — ED PROVIDER NOTES
"Encounter Date: 3/19/2023       History     Chief Complaint   Patient presents with    Weakness     Increasing weakness of last 3 weeks with back spasms. Denies falls or injuries. Requesting social work consult     73-year-old male with past medical history of diabetes and chronic back spasms presenting to ED after episode of chronic back spasms occurred while standing in the shower.  Patient states the symptoms have resolved and he has no complaints at present.  Patient's partner is at bedside and reports patient has had decreased appetite over the past 3 weeks and has only been consuming guacamole only.  Partner also reports patient has been experiencing frequent falls.  He states patient is only able to stand or walk for very short distances and durations before spasms occur causing him to fall.  He has had no head trauma or LOC. The last time the patient fell, his partner hurt his own back while trying to help the patient up.  Today, patient was standing in the shower when back spasm occurred and he slowly fell to the ground.  Partner was unable to help him up this time and decided to call EMS.  Partner states they have attempted to make appointments with their  physician over the past few weeks but have had to canceled due to the patient's inability to ambulate long enough to be seen.  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  He drinks "at least" 1 bottle of wine daily.    Review of patient's allergies indicates:  No Known Allergies  Past Medical History:   Diagnosis Date    Coronary artery disease     CAC score 1430    Depression     Diabetic neuropathy     Essential (primary) hypertension     GERD (gastroesophageal reflux disease)     Insomnia     Neuromyopathy     Possibly DM and/or alcohol related.    Reactive airway disease     Type 2 diabetes mellitus      Past Surgical History:   Procedure Laterality Date    COLONOSCOPY      Normal around 2020    TOTAL KNEE ARTHROPLASTY Right  "     Family History   Problem Relation Age of Onset    Hypertension Mother      Social History     Tobacco Use    Smoking status: Never    Smokeless tobacco: Never   Substance Use Topics    Alcohol use: Yes     Comment: Former heavy use    Drug use: Never     Review of Systems   Constitutional:  Negative for chills and fever.   HENT:  Negative for congestion and sore throat.    Eyes:  Negative for pain and discharge.   Respiratory:  Negative for shortness of breath and wheezing.    Cardiovascular:  Negative for chest pain and palpitations.   Gastrointestinal:  Negative for abdominal pain, nausea and vomiting.   Genitourinary:  Negative for difficulty urinating and dysuria.   Musculoskeletal:  Positive for back pain and gait problem.   Skin:  Negative for color change and rash.   Neurological:  Negative for weakness and numbness.   Hematological:  Does not bruise/bleed easily.     Physical Exam     Initial Vitals [03/19/23 1559]   BP Pulse Resp Temp SpO2   109/77 104 16 98 °F (36.7 °C) 100 %      MAP       --         Physical Exam    Nursing note and vitals reviewed.  Constitutional: He is not diaphoretic. No distress.   HENT:   Head: Normocephalic and atraumatic.   Mouth/Throat: Oropharynx is clear and moist.   Eyes: Conjunctivae and EOM are normal.   Neck: Neck supple.   Normal range of motion.  Cardiovascular:  Normal rate, regular rhythm, normal heart sounds and intact distal pulses.           Pulmonary/Chest: Breath sounds normal. He has no wheezes. He has no rhonchi. He has no rales.   Abdominal: Abdomen is soft. He exhibits no distension. There is no abdominal tenderness.   Musculoskeletal:         General: No tenderness or edema. Normal range of motion.      Cervical back: Normal range of motion and neck supple.     Neurological: He is alert and oriented to person, place, and time. No cranial nerve deficit or sensory deficit.   Equal bilateral lower extremity weakness.  Unable to tolerate standing for longer  than a few minutes.   Skin: Skin is warm and dry. Capillary refill takes less than 2 seconds.       ED Course   Procedures  Labs Reviewed   CBC W/ AUTO DIFFERENTIAL - Abnormal; Notable for the following components:       Result Value    RBC 4.26 (*)      (*)     MCH 34.3 (*)     Platelets 132 (*)     Immature Granulocytes 1.0 (*)     Immature Grans (Abs) 0.07 (*)     Lymph % 17.4 (*)     All other components within normal limits   COMPREHENSIVE METABOLIC PANEL - Abnormal; Notable for the following components:    Sodium 133 (*)     Glucose 148 (*)     Albumin 2.5 (*)     Alkaline Phosphatase 165 (*)     AST 81 (*)     ALT 49 (*)     All other components within normal limits   MAGNESIUM - Abnormal; Notable for the following components:    Magnesium 1.4 (*)     All other components within normal limits   TSH - Abnormal; Notable for the following components:    TSH 4.250 (*)     All other components within normal limits   PHOSPHORUS   HEMOGLOBIN A1C   T4, FREE   POCT GLUCOSE MONITORING CONTINUOUS          Imaging Results               MRI Lumbar Spine Without Contrast (Final result)  Result time 03/19/23 23:28:30      Final result by Jim Mendoza MD (03/19/23 23:28:30)                   Impression:      1.  Minimal anterolisthesis of L4 on L5 with uncovering of the disc and superimposed central disc protrusion.  No significant spinal canal narrowing.  Mild bilateral neural foramina at this level.    2.  Additional mild multilevel degenerative changes in the lumbar spine.    3.  Distended urinary bladder.    This report was flagged in Epic as abnormal.    Electronically signed by resident: Wilfredo Mccain  Date:    03/19/2023  Time:    22:42    Electronically signed by: Jim Mendoza MD  Date:    03/19/2023  Time:    23:28               Narrative:    EXAMINATION:  MRI LUMBAR SPINE WITHOUT CONTRAST    CLINICAL HISTORY:  Ataxia or coordination problem (Ped 0-18y);    TECHNIQUE:  Multiplanar, multisequence MR imaging  of the lumbar spine without the use of intravenous contrast.    COMPARISON:  None    FINDINGS:  Alignment: Grade 1 anterolisthesis of L4 on L5. The remainder of the lumbar alignment is within normal limits.    Vertebrae: 5 lumbar-type vertebral bodies. No aggressive marrow replacement process or fracture.  There is probable intraosseous hemangioma in the S1 vertebral body.    Discs: Diffuse disc height loss worst at L4-L5.    Cord: Normal.  Conus terminates T11-T12.  The conus is normal in morphology.    Degenerative findings:    T12-L1: No significant spinal canal stenosis or neural foraminal narrowing.    L1-L2:  Small diffuse posterior disc bulge and bilateral facet arthropathy.  No significant spinal canal stenosis or neural foraminal narrowing.    L2-L3: Diffuse asymmetric disc bulge to the left, bilateral facet arthropathy with mild left neural foraminal narrowing.  No spinal canal stenosis.    L3-L4: Diffuse asymmetric disc bulge to the left, bilateral facet arthropathy with mild left neural foraminal narrowing.  No spinal canal stenosis.    L4-L5: There is uncovering the disc.  There is superimposed central disc protrusion.  The spinal canal is within normal limits.  There is mild bilateral neural foraminal narrowing.  There is fluid within the facet joints.    L5-S1: No significant spinal canal stenosis or neural foraminal narrowing.    Paraspinous soft tissues: Bilateral renal cysts.  Paraspinal muscle atrophy.  There is distention of the urinary bladder.                                        MRI Brain Without Contrast (Final result)  Result time 03/19/23 23:19:41      Final result by Jim Mendoza MD (03/19/23 23:19:41)                   Impression:      No acute intracranial process.    Diffuse cerebral volume loss with mild-to-moderate cerebellar atrophy.    Minimal supratentorial white matter T2 FLAIR signal, nonspecific likely sequel of chronic microvascular ischemic changes.    Paranasal sinus  disease.    This report was flagged in Epic as abnormal.    Electronically signed by resident: Wilfredo Mccain  Date:    03/19/2023  Time:    22:20    Electronically signed by: Jim Mendoza MD  Date:    03/19/2023  Time:    23:19               Narrative:    EXAMINATION:  MRI BRAIN WITHOUT CONTRAST    CLINICAL HISTORY:  Ataxia or coordination problem (Ped 0-18y);.    TECHNIQUE:  Multiplanar multisequence MR imaging of the brain was performed without contrast.    COMPARISON:  No priors.    FINDINGS:  Diffuse cerebral volume loss with prominent sulci and ventricles in addition to mild to moderate cerebellar atrophy.  No hydrocephalus.    DWI hyperintensity is noted in the bilateral superior frontal gyrus, likely due to motion artifact.  Minimal supratentorial white matter T2 FLAIR hyperintense foci, nonspecific likely sequel of chronic microvascular ischemic changes. No areas of gradient susceptibility to suggest intraparenchymal hemorrhage.  No mass lesion or edema.    No extra-axial blood or fluid collections.    Normal T2 skull base vascular flow voids are preserved.    Bone marrow signal intensity is within normal limits.    Bilateral sphenoid and maxillary mucosal thickening more on the left.                                       Medications   sodium chloride 0.9% flush 10 mL (has no administration in time range)   naloxone 0.4 mg/mL injection 0.02 mg (has no administration in time range)   glucagon (human recombinant) injection 1 mg (has no administration in time range)   dextrose 10% bolus 125 mL 125 mL (has no administration in time range)   dextrose 10% bolus 250 mL 250 mL (has no administration in time range)   dextrose 40 % gel 15,000 mg (has no administration in time range)   dextrose 40 % gel 30,000 mg (has no administration in time range)   enoxaparin injection 40 mg (40 mg Subcutaneous Given 3/19/23 1957)   ondansetron injection 4 mg (has no administration in time range)   insulin aspart U-100 pen 0-5  Units (has no administration in time range)   melatonin tablet 6 mg (has no administration in time range)   LORazepam tablet 2 mg (has no administration in time range)   multivitamin tablet (has no administration in time range)   folic acid tablet 1 mg (has no administration in time range)   thiamine (B-1) 100 mg in dextrose 5 % (D5W) 100 mL IVPB (has no administration in time range)   albuterol inhaler 2 puff (has no administration in time range)   aspirin chewable tablet 81 mg (has no administration in time range)   DULoxetine DR capsule 60 mg (60 mg Oral Given 3/19/23 2227)   pantoprazole EC tablet 40 mg (has no administration in time range)   pregabalin capsule 150 mg (150 mg Oral Given 3/19/23 2227)   traZODone tablet 50 mg (50 mg Oral Given 3/19/23 2227)   methocarbamoL tablet 500 mg (has no administration in time range)   diazePAM tablet 5 mg (5 mg Oral Given 3/19/23 1957)   senna-docusate 8.6-50 mg per tablet 1 tablet (1 tablet Oral Given 3/19/23 2227)   polyethylene glycol packet 17 g (has no administration in time range)   lactated ringers bolus 1,000 mL (1,000 mLs Intravenous New Bag 3/19/23 2240)   magnesium sulfate 2g in water 50mL IVPB (premix) (0 g Intravenous Stopped 3/19/23 2034)   lactated ringers bolus 1,000 mL (0 mLs Intravenous Stopped 3/19/23 2039)     Medical Decision Making:   History:   Old Medical Records: I decided to obtain old medical records.  Differential Diagnosis:   Electrolyte derangement  Dehydration  Failure to thrive  Clinical Tests:   Lab Tests: Ordered and Reviewed  Radiological Study: Ordered  ED Management:  Patient is hemodynamically stable though mildly tachycardic which he reports is baseline. Labs notable for mild transaminitis and hypoMg which is being repleted in ED. Patient continues to have severe diffuse weakness and partner states that he is unable to assist patient into their home alone.  After discussion with patient, patient agrees to be admitted for evaluation  by PT/OT and further workup of weakness.  Patient admitted to Hospital Medicine.          Attending Attestation:   Physician Attestation Statement for Resident:  As the supervising MD   Physician Attestation Statement: I have personally seen and examined this patient.   I agree with the above history.  -:   As the supervising MD I agree with the above PE.     As the supervising MD I agree with the above treatment, course, plan, and disposition.                               Clinical Impression:   Final diagnoses:  [M54.9] Back pain        ED Disposition Condition    Admit                 Francisca Paez MD  Resident  03/19/23 3238       Abbi Diaz MD  03/21/23 0600

## 2023-03-19 NOTE — HPI
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 Fall described as a slide down to the ground. He has had progressive weakness and back spasms for about a year, however the spasms have acutely exacerbated over the past several weeks. He cannot identify any exacerbating factor. In addition, he has had more difficulty ambulating and poor balance. Notably, he does have severe diabetic neuropathy. Over the past 2 days, he has not been out of bed at all due to inability to walk. Today, he was in the shower with the assistance of his partner, and he experienced a spasm so severe it caused him to fall. He did not hit his head, does not have any residual trauma. He has not taken any muscle relaxers at home. He denies bowel/bladder incontinent and saddle anesthesia. He has only been eating guacamole the past few days. He also drinks alcohol - previously would have 2-3 glasses of wine a day but has increased drinking to help with the pain of the spasms. Currently drinks about 3 bottles of wine daily for about 3 weeks. The last time he drank was this morning. Otherwise, he has never smoked cigarettes, no other illicit drug use. 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     In the ED, he was AF, , /77, 100% RA. CBC unremarkable. CMP pertinent for AST 81, ALT 49, otherwise unremarkable. He will be admitted to hospital medicine for evaluation and management.

## 2023-03-20 LAB
LACTATE SERPL-SCNC: 1.6 MMOL/L (ref 0.5–2.2)
POCT GLUCOSE: 107 MG/DL (ref 70–110)

## 2023-03-20 PROCEDURE — 97530 THERAPEUTIC ACTIVITIES: CPT

## 2023-03-20 PROCEDURE — 12000002 HC ACUTE/MED SURGE SEMI-PRIVATE ROOM

## 2023-03-20 PROCEDURE — 83605 ASSAY OF LACTIC ACID: CPT | Performed by: STUDENT IN AN ORGANIZED HEALTH CARE EDUCATION/TRAINING PROGRAM

## 2023-03-20 PROCEDURE — 36415 COLL VENOUS BLD VENIPUNCTURE: CPT | Performed by: STUDENT IN AN ORGANIZED HEALTH CARE EDUCATION/TRAINING PROGRAM

## 2023-03-20 PROCEDURE — 97162 PT EVAL MOD COMPLEX 30 MIN: CPT

## 2023-03-20 PROCEDURE — 97116 GAIT TRAINING THERAPY: CPT

## 2023-03-20 PROCEDURE — 63600175 PHARM REV CODE 636 W HCPCS

## 2023-03-20 PROCEDURE — 25000003 PHARM REV CODE 250

## 2023-03-20 PROCEDURE — 97165 OT EVAL LOW COMPLEX 30 MIN: CPT

## 2023-03-20 PROCEDURE — 99232 SBSQ HOSP IP/OBS MODERATE 35: CPT | Mod: GC,,, | Performed by: STUDENT IN AN ORGANIZED HEALTH CARE EDUCATION/TRAINING PROGRAM

## 2023-03-20 PROCEDURE — 99232 PR SUBSEQUENT HOSPITAL CARE,LEVL II: ICD-10-PCS | Mod: GC,,, | Performed by: STUDENT IN AN ORGANIZED HEALTH CARE EDUCATION/TRAINING PROGRAM

## 2023-03-20 RX ADMIN — PREGABALIN 150 MG: 75 CAPSULE ORAL at 08:03

## 2023-03-20 RX ADMIN — SENNOSIDES AND DOCUSATE SODIUM 1 TABLET: 50; 8.6 TABLET ORAL at 08:03

## 2023-03-20 RX ADMIN — METHOCARBAMOL 500 MG: 500 TABLET ORAL at 11:03

## 2023-03-20 RX ADMIN — SENNOSIDES AND DOCUSATE SODIUM 1 TABLET: 50; 8.6 TABLET ORAL at 09:03

## 2023-03-20 RX ADMIN — ASPIRIN 81 MG 81 MG: 81 TABLET ORAL at 09:03

## 2023-03-20 RX ADMIN — PREGABALIN 150 MG: 75 CAPSULE ORAL at 09:03

## 2023-03-20 RX ADMIN — FOLIC ACID 1 MG: 1 TABLET ORAL at 09:03

## 2023-03-20 RX ADMIN — THERA TABS 1 TABLET: TAB at 09:03

## 2023-03-20 RX ADMIN — THIAMINE HYDROCHLORIDE 100 MG: 100 INJECTION, SOLUTION INTRAMUSCULAR; INTRAVENOUS at 09:03

## 2023-03-20 RX ADMIN — DULOXETINE HYDROCHLORIDE 60 MG: 60 CAPSULE, DELAYED RELEASE ORAL at 08:03

## 2023-03-20 RX ADMIN — TRAZODONE HYDROCHLORIDE 50 MG: 50 TABLET ORAL at 08:03

## 2023-03-20 RX ADMIN — DULOXETINE HYDROCHLORIDE 60 MG: 60 CAPSULE, DELAYED RELEASE ORAL at 09:03

## 2023-03-20 RX ADMIN — ENOXAPARIN SODIUM 40 MG: 40 INJECTION SUBCUTANEOUS at 06:03

## 2023-03-20 RX ADMIN — DIAZEPAM 5 MG: 5 TABLET ORAL at 05:03

## 2023-03-20 RX ADMIN — PANTOPRAZOLE SODIUM 40 MG: 40 TABLET, DELAYED RELEASE ORAL at 09:03

## 2023-03-20 RX ADMIN — METHOCARBAMOL 500 MG: 500 TABLET ORAL at 08:03

## 2023-03-20 RX ADMIN — DIAZEPAM 5 MG: 5 TABLET ORAL at 03:03

## 2023-03-20 RX ADMIN — DIAZEPAM 5 MG: 5 TABLET ORAL at 09:03

## 2023-03-20 NOTE — ASSESSMENT & PLAN NOTE
See weakness of lower extremities  - Consider myelopathic injury, diabetic neuropathy, Wernike's encephalopathy possible given patients alcohol use but he is without ocular or mentation concerns

## 2023-03-20 NOTE — PROGRESS NOTES
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 Fall described as a slide down to the ground. He has had progressive weakness and back spasms for about a year, however the spasms have acutely exacerbated over the past several weeks. He cannot identify any exacerbating factor. In addition, he has had more difficulty ambulating and poor balance. Notably, he does have severe diabetic neuropathy. Over the past 2 days, he has not been out of bed at all due to inability to walk. Today, he was in the shower with the assistance of his partner, and he experienced a spasm so severe it caused him to fall. He did not hit his head, does not have any residual trauma. He has not taken any muscle relaxers at home. He denies bowel/bladder incontinent and saddle anesthesia. He has only been eating guacamole the past few days. He also drinks alcohol - previously would have 2-3 glasses of wine a day but has increased drinking to help with the pain of the spasms. Currently drinks about 3 bottles of wine daily for about 3 weeks. The last time he drank was this morning. Otherwise, he has never smoked cigarettes, no other illicit drug use. 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     In the ED, he was AF, , /77, 100% RA. CBC unremarkable. CMP pertinent for AST 81, ALT 49, otherwise unremarkable. He will be admitted to hospital medicine for  evaluation and management.       Overview/Hospital Course:  Patient admitted to hospital medicine for evaluation and management of progressive bilateral lower extremity weakness, spasms, and imbalance. MRI brain with mild to moderate cerebellar atrophy. MRI L spine with anterolisthesis of L4 on L5 uncovering the disc as well as central disc protrusion. Neurosurgery consulted for evaluation. PT recommending rehab after discharge. Placed on CIWA protocol for alcohol withdrawal, as well as diazepam taper. Prn ativan not required.      Interval History: NAEON. Patient continued to report significant weakness. NSGY consulted for recs. CIWA in place, pt currently stable and without symptoms of withdrawal ~ 36 hours after last drink    Review of Systems   Constitutional:  Negative for chills and fever.   HENT:  Negative for trouble swallowing.    Eyes:  Negative for visual disturbance.   Respiratory:  Negative for shortness of breath and wheezing.    Cardiovascular:  Negative for chest pain and palpitations.   Gastrointestinal:  Positive for nausea. Negative for abdominal pain, diarrhea and vomiting.   Genitourinary:  Negative for dysuria and hematuria.   Musculoskeletal:  Positive for back pain and gait problem.   Neurological:  Positive for weakness and numbness. Negative for dizziness, syncope, speech difficulty and headaches.   Psychiatric/Behavioral:  Negative for confusion.    Objective:     Vital Signs (Most Recent):  Temp: 97.8 °F (36.6 °C) (03/20/23 0930)  Pulse: 96 (03/20/23 1137)  Resp: 18 (03/20/23 1137)  BP: (!) 97/59 (03/20/23 1137)  SpO2: 95 % (03/20/23 1137)   Vital Signs (24h Range):  Temp:  [97 °F (36.1 °C)-98 °F (36.7 °C)] 97.8 °F (36.6 °C)  Pulse:  [] 96  Resp:  [16-21] 18  SpO2:  [94 %-100 %] 95 %  BP: ()/(52-77) 97/59     Weight: 91.4 kg (201 lb 8 oz)  Body mass index is 29.76 kg/m².    Intake/Output Summary (Last 24 hours) at 3/20/2023 7170  Last data filed at 3/20/2023 1510  Gross per 24  hour   Intake 2048 ml   Output 175 ml   Net 1873 ml      Physical Exam  Vitals and nursing note reviewed.   Constitutional:       General: He is not in acute distress.     Appearance: He is obese. He is not toxic-appearing.   HENT:      Nose: Nose normal.      Mouth/Throat:      Mouth: Mucous membranes are moist.   Eyes:      Extraocular Movements: Extraocular movements intact.      Conjunctiva/sclera: Conjunctivae normal.      Pupils: Pupils are equal, round, and reactive to light.   Cardiovascular:      Rate and Rhythm: Normal rate and regular rhythm.      Pulses: Normal pulses.      Heart sounds: Normal heart sounds. No murmur heard.  Pulmonary:      Effort: Pulmonary effort is normal. No respiratory distress.      Breath sounds: Normal breath sounds. No wheezing.   Chest:      Chest wall: No tenderness.   Abdominal:      General: Bowel sounds are normal.      Palpations: Abdomen is soft.      Tenderness: There is no abdominal tenderness.   Musculoskeletal:      Right lower leg: No edema.      Left lower leg: No edema.   Skin:     General: Skin is warm and dry.   Neurological:      Mental Status: He is alert and oriented to person, place, and time.      Sensory: Sensory deficit (decreased sensation bilateral LE) present.      Comments: 3/5 strength in bilateral LE       Significant Labs: All pertinent labs within the past 24 hours have been reviewed.    Significant Imaging: I have reviewed all pertinent imaging results/findings within the past 24 hours.      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 Fall described as a slide down to the ground. Denies bowel/bladder incontinence or saddle anesthesia. Concern for spinal cord impingement vs Wernicke's encephalopathy (though no ocular  or mentation involvement) vs diabetic neuropathy  - MRI brain with mild to moderate cerebellar atrophy  - MRI L spine with anterolisthesis of L4 on L5 uncovering the disc and superimposed central disc protrusion    Plan:  - Consulted neurosurgery for possible intervention, will follow up   - Robaxin prn spasms  - IV thiamine 100 mg qd, MV, folic  - encouraged alcohol cessation, pre-contemplative   - Fall precuations    Alcohol use  Patient reports drinking 3 bottles of wine daily for the last few weeks. Last drink was morning of admission (3/19). HDS on arrival, but states he feels like he needs a drink. Has not required any prn medications thus far    - CIWA q 4  - Diazepam 5mg q 8 scheduled; plan to taper if remains stable  - Ativan prn CIWA > 8  - seizure precautions      Back spasm  PRN robaxin  See weakness of lower extremities    Balance problem  See weakness of lower extremities  - Consider myelopathic injury, diabetic neuropathy, Wernike's encephalopathy possible given patients alcohol use but he is without ocular or mentation concerns      Gastroesophageal reflux disease without esophagitis  Home protonix      Essential hypertension  Diet controlled. Not on home meds.   - continue to monitor    Type 2 diabetes mellitus with neurologic complication, without long-term current use of insulin  Patient's FSGs are controlled on current medication regimen.  Last A1c reviewed-   Lab Results   Component Value Date    HGBA1C 5.0 03/19/2023     Most recent fingerstick glucose reviewed-   Recent Labs   Lab 03/20/23  0928   POCTGLUCOSE 107     Current correctional scale  Low  Maintain anti-hyperglycemic dose as follows-   Antihyperglycemics (From admission, onward)    Start     Stop Route Frequency Ordered    03/19/23 1944  insulin aspart U-100 pen 0-5 Units         -- SubQ Before meals & nightly PRN 03/19/23 1845        Hold Oral hypoglycemics while patient is in the hospital.    - titrate insulin for inpatient BG goal  of 140-180      VTE Risk Mitigation (From admission, onward)         Ordered     enoxaparin injection 40 mg  Daily         03/19/23 1845     IP VTE HIGH RISK PATIENT  Once         03/19/23 1845     Place sequential compression device  Until discontinued         03/19/23 1845                Discharge Planning   CHRISTA: 3/23/2023     Code Status: Full Code   Is the patient medically ready for discharge?: No    Reason for patient still in hospital (select all that apply): Patient trending condition  Discharge Plan A: Rehab        Liliana Chavez DO PGY1  Department of Hospital Medicine   [facility] - Intensive Care ([place])

## 2023-03-20 NOTE — SUBJECTIVE & OBJECTIVE
Past Medical History:   Diagnosis Date    Coronary artery disease     CAC score 1430    Depression     Diabetic neuropathy     Essential (primary) hypertension     GERD (gastroesophageal reflux disease)     Insomnia     Neuromyopathy     Possibly DM and/or alcohol related.    Reactive airway disease     Type 2 diabetes mellitus        Past Surgical History:   Procedure Laterality Date    COLONOSCOPY      Normal around 2020    TOTAL KNEE ARTHROPLASTY Right        Review of patient's allergies indicates:  No Known Allergies    No current facility-administered medications on file prior to encounter.     Current Outpatient Medications on File Prior to Encounter   Medication Sig    albuterol (VENTOLIN HFA) 90 mcg/actuation inhaler Inhale 2 puffs into the lungs every 6 (six) hours as needed for Wheezing. Rescue    albuterol sulfate 2.5 mg/0.5 mL Nebu Take 2.5 mg by nebulization every 6 (six) hours as needed. Rescue    aspirin 81 MG Chew Take 81 mg by mouth once daily.    diphenhydrAMINE (BENADRYL) 25 mg capsule Take 25 mg by mouth every 6 (six) hours as needed for Itching.    DULoxetine (CYMBALTA) 30 MG capsule Take 1 capsule (30 mg total) by mouth once daily.    empagliflozin (JARDIANCE) 10 mg tablet Take 1 tablet (10 mg total) by mouth once daily.    fluticasone-salmeterol diskus inhaler 250-50 mcg Inhale 1 puff into the lungs 2 (two) times daily. Controller    metFORMIN (GLUCOPHAGE-XR) 500 MG ER 24hr tablet Take 1 tablet (500 mg total) by mouth 2 (two) times daily with meals.    pantoprazole (PROTONIX) 40 MG tablet Take 1 tablet (40 mg total) by mouth once daily.    pregabalin (LYRICA) 75 MG capsule Take 1 capsule in the morning and 2 capsules in the evening.    traZODone (DESYREL) 50 MG tablet Two tablets hs as needed for sleep     Family History       Problem Relation (Age of Onset)    Hypertension Mother          Tobacco Use    Smoking status: Never    Smokeless tobacco: Never   Substance and Sexual Activity     Alcohol use: Yes     Comment: Former heavy use    Drug use: Never    Sexual activity: Yes     Comment: unknown     Review of Systems   Constitutional:  Negative for chills and fever.   HENT:  Negative for trouble swallowing.    Eyes:  Negative for visual disturbance.   Respiratory:  Negative for shortness of breath and wheezing.    Cardiovascular:  Negative for chest pain and palpitations.   Gastrointestinal:  Positive for nausea. Negative for abdominal pain, diarrhea and vomiting.   Genitourinary:  Negative for dysuria and hematuria.   Musculoskeletal:  Positive for back pain and gait problem.   Neurological:  Positive for weakness and numbness. Negative for dizziness, syncope, speech difficulty and headaches.   Objective:     Vital Signs (Most Recent):  Temp: 98 °F (36.7 °C) (03/19/23 1559)  Pulse: 102 (03/19/23 1903)  Resp: 20 (03/19/23 1903)  BP: 110/71 (03/19/23 1903)  SpO2: 98 % (03/19/23 1903) Vital Signs (24h Range):  Temp:  [98 °F (36.7 °C)] 98 °F (36.7 °C)  Pulse:  [102-104] 102  Resp:  [16-20] 20  SpO2:  [98 %-100 %] 98 %  BP: (109-110)/(71-77) 110/71     Weight: 92.5 kg (204 lb)  Body mass index is 30.13 kg/m².    Physical Exam  Vitals and nursing note reviewed.   Constitutional:       General: He is not in acute distress.     Appearance: He is obese. He is not toxic-appearing.   HENT:      Nose: Nose normal.      Mouth/Throat:      Mouth: Mucous membranes are moist.   Eyes:      Extraocular Movements: Extraocular movements intact.      Conjunctiva/sclera: Conjunctivae normal.      Pupils: Pupils are equal, round, and reactive to light.   Cardiovascular:      Rate and Rhythm: Normal rate and regular rhythm.      Pulses: Normal pulses.      Heart sounds: Normal heart sounds. No murmur heard.  Pulmonary:      Effort: Pulmonary effort is normal. No respiratory distress.      Breath sounds: Normal breath sounds. No wheezing.   Chest:      Chest wall: No tenderness.   Abdominal:      General: Bowel sounds are  normal.      Palpations: Abdomen is soft.      Tenderness: There is no abdominal tenderness.   Musculoskeletal:      Right lower leg: No edema.      Left lower leg: No edema.   Skin:     General: Skin is warm and dry.   Neurological:      Mental Status: He is alert and oriented to person, place, and time.      Sensory: Sensory deficit (decreased sensation bilateral LE) present.      Comments: 3/5 strength in bilateral LE. [place] sensation         CRANIAL NERVES     CN III, IV, VI   Pupils are equal, round, and reactive to light.     Significant Labs: All pertinent labs within the past 24 hours have been reviewed.  CBC:   Recent Labs   Lab 03/19/23  1731   WBC 6.90   HGB 14.6   HCT 43.1   *     CMP:   Recent Labs   Lab 03/19/23  1731   *   K 4.2      CO2 24   *   BUN 13   CREATININE 1.1   CALCIUM 9.3   PROT 6.4   ALBUMIN 2.5*   BILITOT 1.0   ALKPHOS 165*   AST 81*   ALT 49*   ANIONGAP 9       Significant Imaging: I have reviewed all pertinent imaging results/findings within the past 24 hours.

## 2023-03-20 NOTE — ASSESSMENT & PLAN NOTE
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 Fall described as a slide down to the ground.    Given alcohol use and coordination difficulties will tx for Wernicke's. Has peripheral neuropathy to account for some LE sensory deficit, however given weakness will obtain further imaging.     - f/u XR thoracolumbar  - f/u MRI Lumbar  - IV thiamine 100 mg qd, MV, folic  - f/u MRI brain  - encouraged alcohol cessation, pre-contemplative   - Fall precuations

## 2023-03-20 NOTE — PLAN OF CARE
[facility] - Int[facility] Care ([place] [address])  Initial Discharge Assessment       Primary Care Provider: Isaias Myles MD    Admission Diagnosis: Back pain [M54.9]  Chest pain [R07.9]    Admission Date: 3/19/2023  Expected Discharge Date: 3/23/2023         Payor: MEDICARE / Plan: MEDICARE PART A & B / Product Type: Government /     Extended Emergency Contact Information  Primary Emergency Contact: Bulmaro Melton   [place]  Mobile Phone: [phone]  Relation: Spouse    Discharge Plan A: (P) Rehab  Discharge Plan B: (P) Skilled Nursing Facility      [id number] [place] [place] - [address] AT [facility] [address]  [address] [place] [address]  Phone: [phone] Fax: [phone]      Initial Assessment (most recent)       Adult Discharge Assessment - 03/20/23 1505          Discharge Assessment    Assessment Type Discharge Planning Assessment (P)      Confirmed/corrected address, phone number and insurance Yes (P)      Confirmed Demographics Correct on Facesheet (P)      Source of Information patient (P)      Reason For Admission weakness (P)      People in Home spouse (P)      Do you expect to return to your current living situation? No (P)    would like rehab    Do you have help at home or someone to help you manage your care at home? Yes (P)      Who are your caregiver(s) and their phone number(s)? spouse (P)      Prior to hospitilization cognitive status: Alert/Oriented (P)      Current cognitive status: Alert/Oriented (P)      Walking or Climbing Stairs ambulation difficulty, requires equipment;stair climbing difficulty, assistance 1 person (P)      Mobility Management electric scooter (P)      Equipment Currently Used at Home other (see comments) (P)    electric scooter    Readmission within 30 days? No (P)      Patient currently being followed by outpatient case management? No (P)      Do you currently have service(s) that help you manage  your care at home? Yes (P)      Name and Contact number of agency home therapyr (P)      Is the pt/caregiver preference to resume services with current agency No (P)      Do you take prescription medications? Yes (P)      Do you have prescription coverage? Yes (P)      Coverage medicare part d (P)      Who is going to help you get home at discharge? spouse (P)      How do you get to doctors appointments? family or friend will provide (P)      Are you on dialysis? No (P)      Do you take coumadin? No (P)      Discharge Plan A Rehab (P)      Discharge Plan B Skilled Nursing Facility (P)      DME Needed Upon Discharge  none (P)      Discharge Plan discussed with: Patient;Spouse/sig other (P)                           Spoke with pt and spouse at bedside he would like to go to [facility] if unable to go there then [facility], he has an electric scooter concerned over going home with 3-5 steps to enter house referrals sent to both facilities pmr order placed

## 2023-03-20 NOTE — ASSESSMENT & PLAN NOTE
Patient reports drinking 3 bottles of wine daily for the last few weeks. Last drink was morning of admission (3/19). HDS on arrival, but states he feels like he needs a drink. Has not required any prn medications thus far    - CIWA q 4  - Diazepam 5mg q 8 scheduled; plan to taper if remains stable  - Ativan prn CIWA > 8  - seizure precautions

## 2023-03-20 NOTE — ASSESSMENT & PLAN NOTE
Patient's FSGs are controlled on current medication regimen.  Last A1c reviewed-   Lab Results   Component Value Date    HGBA1C 5.0 03/19/2023     Most recent fingerstick glucose reviewed-   Recent Labs   Lab 03/20/23  0928   POCTGLUCOSE 107     Current correctional scale  Low  Maintain anti-hyperglycemic dose as follows-   Antihyperglycemics (From admission, onward)    Start     Stop Route Frequency Ordered    03/19/23 1944  insulin aspart U-100 pen 0-5 Units         -- SubQ Before meals & nightly PRN 03/19/23 1845        Hold Oral hypoglycemics while patient is in the hospital.    - titrate insulin for inpatient BG goal of 140-180

## 2023-03-20 NOTE — PLAN OF CARE
Problem: Occupational Therapy  Goal: Occupational Therapy Goal  Description: Goals to be met by: 4/3/2023     Patient will increase functional independence with ADLs by performing:    UE Dressing with [place].  LE Dressing with Modified [place].  Grooming while standing with Supervision.  Bathing from  shower chair/bench with Supervision.  Step transfer with Supervision  Toilet transfer to toilet with Supervision.    Outcome: Ongoing, Progressing

## 2023-03-20 NOTE — PLAN OF CARE
Problem: Adult Inpatient Plan of Care  Goal: Plan of Care Review  Outcome: Ongoing, Progressing  Goal: Patient-Specific Goal (Individualized)  Outcome: Ongoing, Progressing  Goal: Absence of Hospital-Acquired Illness or Injury  Outcome: Ongoing, Progressing  Goal: Optimal Comfort and Wellbeing  Outcome: Ongoing, Progressing     POC reviewed with patient. All questions and concerns addressed. Fall/safety precautions implemented and maintained. Urinal provided and within reach. Refused accu checks. No acute events noted this shift. Please see flowsheet for full assessment and vitals. Bed locked in lowest position. Side rails up x2. Call bell within reach. Will continue to monitor.

## 2023-03-20 NOTE — PLAN OF CARE
Problem: Physical Therapy  Goal: Physical Therapy Goal  Description: Goals to be met by: 2023     Patient will increase functional independence with mobility by performin. Supine to sit with [place]  2. Sit to supine with [place]  3. Rolling to Left and Right with [place].  4. Sit to stand transfer with Stand-by Assistance  5. Bed to chair transfer with Stand-by Assistance using Rolling Walker  6. Ascend/descend 4 stairs with bilateral Handrails Supervision using No Assistive Device.     Outcome: Ongoing, Progressing     Patient evaluated, goals appropriate

## 2023-03-20 NOTE — PT/OT/SLP EVAL
Physical Therapy Co-Evaluation    Patient Name:  Anthony Ball   MRN:  [id number]    Recommendations:     Discharge Recommendations: rehabilitation facility   Discharge Equipment Recommendations: shower chair   Barriers to discharge: Inaccessible home    Assessment:     Anthony Ball is a 73 y.o. male admitted with a medical diagnosis of Weakness of both lower extremities.  He presents with the following impairments/functional limitations: weakness, impaired endurance, impaired self care skills, impaired functional mobility, gait instability, impaired balance, pain, decreased upper extremity function, decreased lower extremity function, decreased coordination, decreased safety awareness, decreased ROM. Patient agreeable to therapy session. He was able to perform bed mobility with modified independence with use of bed-rail and increased time. He required min A x2 / mod A x1 and RW for sit--stand transfers x2 trials. During the first trial, patient reported feeling really weak/unsteady, and needed to sit back down to rest. During second trial, patient was able to remain standing, and then ambulate approx. 3' from EOB to chair with min-mod A and RW. He demonstrated decreased anjel, decreased stride length, decreased step length, and unsteadiness during ambulation trial. He will continue to benefit from skilled acute PT services while in the hospital to improve overall functional mobility and OOB activity tolerance.    ** Co-treatment performed due to patient's multiple deficits requiring two skilled therapists to appropriately and safely assess patient's strength and endurance while facilitating functional tasks in addition to accommodating for patient's activity tolerance.     Rehab Prognosis: Fair; patient would benefit from acute skilled PT services to address these deficits and reach maximum level of function.    Recent Surgery: * No surgery found *      Plan:     During this hospitalization, patient to be seen 4 x/week  "to address the identified rehab impairments via gait training, therapeutic activities, therapeutic exercises, neuromuscular re-education and progress toward the following goals:    Plan of Care Expires:  04/19/23    Subjective     Chief Complaint: "My back started spasming recently"  Patient/Family Comments/goals: to decrease pain and return home  Pain/Comfort:  Pain Rating 1: 3/10  Location - Side 1: Bilateral  Location - Orientation 1: lower  Location 1: back  Pain Addressed 1: Reposition, Distraction  Pain Rating Post-Intervention 1: 5/10    Patients cultural, spiritual, Alevism conflicts given the current situation: no    Living Environment:  Patient lives with spouse in [facility] with 4 HEIKE and BHR. Patient has a WIS with built-in bath bench patient reports not feeling comfortable using. He is a retired physician.   Prior to admission, patients level of function was requiring assistance from spouse for ADLs and use of rollator for mobility.  Equipment used at home: walker, rolling. Upon discharge, patient will have assistance from spouse.    Objective:     Communicated with nursing prior to session.  Patient found supine with telemetry, peripheral IV, pulse ox (continuous)  upon PT entry to room.    General Precautions: Standard, fall  Orthopedic Precautions:N/A   Braces: N/A  Respiratory Status: Room air    Exams:  Cognitive Exam:  Patient is oriented to Person, Place, Time, and Situation  RUE ROM: WFL  RUE Strength: WFL  LUE ROM: WFL  LUE Strength: WFL  RLE ROM: WFL except hip flexion  RLE Strength: WFL except hip flexion  LLE ROM: WFL except hip flexion  LLE Strength: WFL except hip flexion    Functional Mobility:  Bed Mobility:     Rolling Left:  modified independence  Scooting: modified independence  Supine to Sit: modified independence  Transfers:     Sit to Stand: min A x2 / mod A x1 with rolling walker  Gait: Patient able to ambulate approx. 3' from EOB to chair with min-mod A and RW. He demonstrated " decreased anjel, decreased stride length, decreased step length, and unsteadiness during ambulation trial.       AM-PAC 6 CLICK MOBILITY  Total Score:17       Treatment & Education:  Patient educated on role of acute care PT and PT POC, safety while in hospital including calling nurse for mobility, call light usage, benefits of out of bed mobility, walker management, fall risk, transfers, gait technique, posture, and benefits of continued PT by explanation.    Patient demonstrates good understanding of education provided this day.   Whiteboard updated      Patient left up in chair with all lines intact and call button in reach.    GOALS:   Multidisciplinary Problems       Physical Therapy Goals          Problem: Physical Therapy    Goal Priority Disciplines Outcome Goal Variances Interventions   Physical Therapy Goal     PT, PT/OT Ongoing, Progressing     Description: Goals to be met by: 2023     Patient will increase functional independence with mobility by performin. Supine to sit with Nahma  2. Sit to supine with Nahma  3. Rolling to Left and Right with Nahma.  4. Sit to stand transfer with Stand-by Assistance  5. Bed to chair transfer with Stand-by Assistance using Rolling Walker  6. Ascend/descend 4 stairs with bilateral Handrails Supervision using No Assistive Device.                          History:     Past Medical History:   Diagnosis Date    Coronary artery disease     CAC score 1430    Depression     Diabetic neuropathy     Essential (primary) hypertension     GERD (gastroesophageal reflux disease)     Insomnia     Neuromyopathy     Possibly DM and/or alcohol related.    Reactive airway disease     Type 2 diabetes mellitus        Past Surgical History:   Procedure Laterality Date    COLONOSCOPY      Normal around     TOTAL KNEE ARTHROPLASTY Right        Time Tracking:     PT Received On: 23  PT Start Time: 1019     PT Stop Time: 1052  PT Total Time (min): 33  min     Billable Minutes: Evaluation 10 minutes, Gait Training 10 minutes, and Therapeutic Activity 13 minutes      03/20/2023

## 2023-03-20 NOTE — ASSESSMENT & PLAN NOTE
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 Fall described as a slide down to the ground. Denies bowel/bladder incontinence or saddle anesthesia. Concern for spinal cord impingement vs Wernicke's encephalopathy (though no ocular or mentation involvement) vs diabetic neuropathy  - MRI brain with mild to moderate cerebellar atrophy  - MRI L spine with anterolisthesis of L4 on L5 uncovering the disc and superimposed central disc protrusion    Plan:  - Consulted neurosurgery for possible intervention, will follow up   - Robaxin prn spasms  - IV thiamine 100 mg qd, MV, folic  - encouraged alcohol cessation, pre-contemplative   - Fall precuations

## 2023-03-20 NOTE — ASSESSMENT & PLAN NOTE
Patient reports drinking 3 bottles of wine daily for the last few weeks. Last drink was morning of admission. HDS on arrival, but states he feels like he needs a drink    - CIWA q 4  - Diazepam 5mg q 8 scheduled  - Ativan prn CIWA > 8  - seizure precautions

## 2023-03-20 NOTE — SUBJECTIVE & OBJECTIVE
Interval History: NAEON. Patient continued to report significant weakness. NSGY consulted for recs. CIWA in place, pt currently stable and without symptoms of withdrawal ~ 36 hours after last drink    Review of Systems   Constitutional:  Negative for chills and fever.   HENT:  Negative for trouble swallowing.    Eyes:  Negative for visual disturbance.   Respiratory:  Negative for shortness of breath and wheezing.    Cardiovascular:  Negative for chest pain and palpitations.   Gastrointestinal:  Positive for nausea. Negative for abdominal pain, diarrhea and vomiting.   Genitourinary:  Negative for dysuria and hematuria.   Musculoskeletal:  Positive for back pain and gait problem.   Neurological:  Positive for weakness and numbness. Negative for dizziness, syncope, speech difficulty and headaches.   Psychiatric/Behavioral:  Negative for confusion.    Objective:     Vital Signs (Most Recent):  Temp: 97.8 °F (36.6 °C) (03/20/23 0930)  Pulse: 96 (03/20/23 1137)  Resp: 18 (03/20/23 1137)  BP: (!) 97/59 (03/20/23 1137)  SpO2: 95 % (03/20/23 1137)   Vital Signs (24h Range):  Temp:  [97 °F (36.1 °C)-98 °F (36.7 °C)] 97.8 °F (36.6 °C)  Pulse:  [] 96  Resp:  [16-21] 18  SpO2:  [94 %-100 %] 95 %  BP: ()/(52-77) 97/59     Weight: 91.4 kg (201 lb 8 oz)  Body mass index is 29.76 kg/m².    Intake/Output Summary (Last 24 hours) at 3/20/2023 1538  Last data filed at 3/20/2023 1510  Gross per 24 hour   Intake 2048 ml   Output 175 ml   Net 1873 ml      Physical Exam  Vitals and nursing note reviewed.   Constitutional:       General: He is not in acute distress.     Appearance: He is obese. He is not toxic-appearing.   HENT:      Nose: Nose normal.      Mouth/Throat:      Mouth: Mucous membranes are moist.   Eyes:      Extraocular Movements: Extraocular movements intact.      Conjunctiva/sclera: Conjunctivae normal.      Pupils: Pupils are equal, round, and reactive to light.   Cardiovascular:      Rate and Rhythm: Normal  rate and regular rhythm.      Pulses: Normal pulses.      Heart sounds: Normal heart sounds. No murmur heard.  Pulmonary:      Effort: Pulmonary effort is normal. No respiratory distress.      Breath sounds: Normal breath sounds. No wheezing.   Chest:      Chest wall: No tenderness.   Abdominal:      General: Bowel sounds are normal.      Palpations: Abdomen is soft.      Tenderness: There is no abdominal tenderness.   Musculoskeletal:      Right lower leg: No edema.      Left lower leg: No edema.   Skin:     General: Skin is warm and dry.   Neurological:      Mental Status: He is alert and oriented to person, place, and time.      Sensory: Sensory deficit (decreased sensation bilateral LE) present.      Comments: 3/5 strength in bilateral LE       Significant Labs: All pertinent labs within the past 24 hours have been reviewed.    Significant Imaging: I have reviewed all pertinent imaging results/findings within the past 24 hours.

## 2023-03-20 NOTE — PLAN OF CARE
Patient stable. Medicated as ordered. No agitation present during the AM shift. Denies pain or discomfort. Nad. Safety measures in place.

## 2023-03-20 NOTE — PT/OT/SLP EVAL
"Occupational Therapy  Co- Evaluation/Treatment    Name: Anthony Ball  MRN: [id number]  Admitting Diagnosis: Weakness of both lower extremities  Recent Surgery: * No surgery found *      Recommendations:     Discharge Recommendations: rehabilitation facility  Discharge Equipment Recommendations:  shower chair  Barriers to discharge:  Inaccessible home environment    Assessment:     Anthony Ball is a 73 y.o. male with a medical diagnosis of Weakness of both lower extremities.  He presents with globalized weakness, impaired balance, and decreased self-care skills. Pt was able to perform bed mobility with Mod I and sit-stand with Mod A with RW and required a seated break after 1st trial 2' feeling "unstedy" and fatigue. Pt was encouraged to stand up again and walk to the chair, pt required min A with RW for step transfer to the chair. Pt reported recent fall in the shower and was encouraged to use shower chair, however pt reported that he likes taking his RW in the shower with him and he won't have a space for it with shower chair in. Pt is cooperative, however is performing ADL below baseline and is a high fall risk. Performance deficits affecting function: weakness, impaired endurance, impaired self care skills, impaired functional mobility, gait instability, impaired balance, decreased coordination, decreased lower extremity function, decreased safety awareness, pain, decreased ROM, impaired cardiopulmonary response to activity. Pt would benefit from continued skilled OT services to address current deficits in performing ADL and other occupational tasks. Given pt's decreased strength and endurance and a potential for improvement with daily activity tasks, rehabilitation facility is a good plan for patient upon discharge.       Rehab Prognosis: Good; patient would benefit from acute skilled OT services to address these deficits and reach maximum level of function.       Plan:     Patient to be seen 4 x/week to address the " "above listed problems via self-care/home management, therapeutic activities, therapeutic exercises  Plan of Care Expires: 04/19/23  Plan of Care Reviewed with: patient    Subjective   "I want to go home with home health"  Chief Complaint: back pain  Patient/Family Comments/goals: return home safely    Occupational Profile:  Living Environment: Pt lives with his spouse in a H with 4 HEIKE and handrail going up that spouse put in. However, pt prefers to use the courtyard entrance. Pt has walk-in shower with a build-in low marble counter to sit on, however, pt reported that he does not use it because he does not want to crack it.   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  Patient found supine with telemetry, peripheral IV, pulse ox (continuous) upon OT entry to room.    General Precautions: Standard, fall  Orthopedic Precautions: N/A  Braces: N/A  Respiratory Status: Room air    Occupational Performance:    Bed Mobility:    Patient completed Rolling/Turning to Left with  modified independence using bedrail  Patient completed Scooting/Bridging with modified independence using bedrail  Patient completed Supine to Sit with modified independence using bedrail    Functional Mobility/Transfers:  Patient completed Sit <> Stand Transfer x2 trials with moderate assistance  with  rolling walker   Patient completed Bed <> Chair Transfer using Step Transfer " technique with minimum assistance with rolling walker  Functional Mobility: Pt ambulated ~3ft distance with min A and RW for increased fx mobility and independence with ADL tasks     Activities of Daily Living:  Lower Body Dressing: supervision to janice shoes on BLE using figure 4 and handrail with increased time required     Cognitive/Visual Perceptual:  Cognitive/Psychosocial Skills:     -       Oriented to: Person, Place, Time, and Situation   -       Follows Commands/attention:Follows multistep  commands  -       Communication: clear/fluent  -       Memory: No Deficits noted  -       Safety awareness/insight to disability: impaired   -       Mood/Affect/Coping skills/emotional control: Appropriate to situation  Visual/Perceptual:      -Intact      Physical Exam:  Balance:  Dynamic stand: min A  Sitting: Mod I to I  Postural examination/scapula alignment:    -       No postural abnormalities identified  Skin integrity: Visible skin intact  Edema:  None noted  Sensation:    -       Intact  Motor Planning: WFL  Dominant hand: Right   Upper Extremity Range of Motion:     -       Right Upper Extremity: WFL  -       Left Upper Extremity: WFL  Upper Extremity Strength:    -       Right Upper Extremity: WFL  -       Left Upper Extremity: WFL   Strength:    -       Right Upper Extremity: WFL  -       Left Upper Extremity: WFL  Fine Motor Coordination:    -       Intact    AMPAC 6 Click ADL:  AMPAC Total Score: 18    Treatment & Education:  Pt participated in fx mobility and ADLs  to address current status of decreased mobility, endurance, and, safety awareness. Pt was educated on tx plan for the session, role of OT, and OT POC.  Pt was educated on importance safe mobility, pacing, and therapeutic activities during the recovery process and upon discharge.     Pt was educated on importance of safe mobility and was provided with a RW for ambulation around the room. Co-tx with PT performed due to need for education and  assistance from two skilled therapy disciplines at pt's current functional level.      Pt voiced no further concern regarding the OT tx and is scheduled to be followed up as appropriated.       Patient left up in chair with all lines intact, call button in reach, and RN notified    GOALS:   Multidisciplinary Problems       Occupational Therapy Goals          Problem: Occupational Therapy    Goal Priority Disciplines Outcome Interventions   Occupational Therapy Goal     OT, PT/OT Ongoing, Progressing    Description: Goals to be met by: 4/3/2023     Patient will increase functional independence with ADLs by performing:    UE Dressing with [place].  LE Dressing with Modified [place].  Grooming while standing with Supervision.  Bathing from  shower chair/bench with Supervision.  Step transfer with Supervision  Toilet transfer to toilet with Supervision.                         History:     Past Medical History:   Diagnosis Date    Coronary artery disease     CAC score 1430    Depression     Diabetic neuropathy     Essential (primary) hypertension     GERD (gastroesophageal reflux disease)     Insomnia     Neuromyopathy     Possibly DM and/or alcohol related.    Reactive airway disease     Type 2 diabetes mellitus          Past Surgical History:   Procedure Laterality Date    COLONOSCOPY      Normal around 2020    TOTAL KNEE ARTHROPLASTY Right        Time Tracking:     OT Date of Treatment: 03/20/23  OT Start Time: 1019  OT Stop Time: 1054  OT Total Time (min): 35 min    Billable Minutes:Evaluation 20  Therapeutic Activity 10    3/20/2023

## 2023-03-20 NOTE — ASSESSMENT & PLAN NOTE
Patient's FSGs are controlled on current medication regimen.  Last A1c reviewed-   Lab Results   Component Value Date    HGBA1C 5.0 03/19/2023     Most recent fingerstick glucose reviewed- No results for input(s): POCTGLUCOSE in the last 24 hours.  Current correctional scale  Low  Maintain anti-hyperglycemic dose as follows-   Antihyperglycemics (From admission, onward)    Start     Stop Route Frequency Ordered    03/19/23 1944  insulin aspart U-100 pen 0-5 Units         -- SubQ Before meals & nightly PRN 03/19/23 1845        Hold Oral hypoglycemics while patient is in the hospital.    - titrate insulin for inpatient BG goal of 140-180

## 2023-03-20 NOTE — HOSPITAL COURSE
Patient admitted to hospital medicine for evaluation and management of progressive bilateral lower extremity weakness, spasms, and imbalance. MRI brain with mild to moderate cerebellar atrophy. MRI L spine with anterolisthesis of L4 on L5 uncovering the disc as well as central disc protrusion. Strength somewhat improved with PT and OT. Spasms relieved with prn robaxin. PT recommending rehab after discharge. Placed on CIWA protocol for alcohol withdrawal, as well as diazepam taper. Prn ativan not required, CIWA consistently < 8, no signs or symptoms of withdrawal.    Pt to discharge to [facility] for continued strength training, leaving with 2 days of Valium taper. He will follow up with outpatient neurosurgery for further evaluation.     Constitutional:       General: alert, no apparent distress  HENT:      Head: Normocephalic and atraumatic.   Cardiovascular:      Rate and Rhythm: Normal rate and regular rhythm.      Heart sounds: Normal heart sounds.   Pulmonary:      Effort: Pulmonary effort is normal.      Breath sounds: Normal breath sounds.   Abdominal:      General: Abdomen is flat.      Palpations: Abdomen is soft.   Musculoskeletal:      Bilateral lower extremities 4/5 strength, much improved from admission. Sensation decreased in bilateral lower extremities. Feet are cold, doralis pedis pulses palpable.   Skin:     General: Skin is warm and dry   Neurological:      Mental Status: alert and oriented to person, place, and time.   Psychiatric:         Mood and Affect: Mood normal.         Behavior: Behavior normal.

## 2023-03-20 NOTE — NURSING
2215: Patient arrived via stretcher per transportation in stable condition. AAO x4. Respirations even and unlabored. No s/s of distress noted. Denies any pain at this time. Urinal within reach. Oriented patient to room and call bell. Bed locked in lowest position. Side rails up x2. Call bell within reach.

## 2023-03-20 NOTE — H&P
[facility] - Emergency Dept  [facility]  History & Physical    Patient Name: Anthony Ball  MRN: [id number]  Patient Class: IP- Inpatient  Admission Date: 3/19/2023  Attending Physician: Aaron Beckett MD   Primary Care Provider: Isaias Myles MD         Patient information was obtained from patient, spouse/SO, past medical records and ER records.     Subjective:     Principal Problem:Weakness of both lower extremities    Chief Complaint:   Chief Complaint   Patient presents with    Weakness     Increasing weakness of last 3 weeks with back spasms. Denies falls or injuries. Requesting social work consult        HPI: 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 Fall described as a slide down to the ground. He has had progressive weakness and back spasms for about a year, however the spasms have acutely exacerbated over the past several weeks. He cannot identify any exacerbating factor. In addition, he has had more difficulty ambulating and poor balance. Notably, he does have severe diabetic neuropathy. Over the past 2 days, he has not been out of bed at all due to inability to walk. Today, he was in the shower with the assistance of his partner, and he experienced a spasm so severe it caused him to fall. He did not hit his head, does not have any residual trauma. He has not taken any muscle relaxers at home. He denies bowel/bladder incontinent and saddle anesthesia. He has only been eating guacamole the past few days. He also drinks alcohol - previously would have 2-3 glasses of wine a day but has increased drinking to help with the pain of the spasms. Currently drinks about 3 bottles of wine daily for about 3 weeks. The last time he drank was this morning. Otherwise, he has never smoked cigarettes, no other illicit drug use. 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     In the ED, he was AF, , /77, 100% RA. CBC unremarkable. CMP pertinent for AST 81, ALT 49, otherwise unremarkable. He will be admitted to hospital medicine for evaluation and management.       Past Medical History:   Diagnosis Date    Coronary artery disease     CAC score 1430    Depression     Diabetic neuropathy     Essential (primary) hypertension     GERD (gastroesophageal reflux disease)     Insomnia     Neuromyopathy     Possibly DM and/or alcohol related.    Reactive airway disease     Type 2 diabetes mellitus        Past Surgical History:   Procedure Laterality Date    COLONOSCOPY      Normal around 2020    TOTAL KNEE ARTHROPLASTY Right        Review of patient's allergies indicates:  No Known Allergies    No current facility-administered medications on file prior to encounter.     Current Outpatient Medications on File Prior to Encounter   Medication Sig    albuterol (VENTOLIN HFA) 90 mcg/actuation inhaler Inhale 2 puffs into the lungs every 6 (six) hours as needed for Wheezing. Rescue    albuterol sulfate 2.5 mg/0.5 mL Nebu Take 2.5 mg by nebulization every 6 (six) hours as needed. Rescue    aspirin 81 MG Chew Take 81 mg by mouth once daily.    diphenhydrAMINE (BENADRYL) 25 mg capsule Take 25 mg by mouth every 6 (six) hours as needed for Itching.    DULoxetine (CYMBALTA) 30 MG capsule Take 1 capsule (30 mg total) by mouth once daily.    empagliflozin (JARDIANCE) 10 mg tablet Take 1 tablet (10 mg total) by mouth once daily.    fluticasone-salmeterol diskus inhaler 250-50 mcg Inhale 1 puff into the lungs 2 (two) times daily. Controller    metFORMIN (GLUCOPHAGE-XR) 500 MG ER 24hr tablet Take 1 tablet (500 mg total) by mouth 2 (two) times daily with meals.    pantoprazole (PROTONIX) 40 MG tablet Take 1 tablet (40 mg total) by mouth once daily.    pregabalin (LYRICA) 75 MG capsule Take 1  capsule in the morning and 2 capsules in the evening.    traZODone (DESYREL) 50 MG tablet Two tablets hs as needed for sleep     Family History       Problem Relation (Age of Onset)    Hypertension Mother          Tobacco Use    Smoking status: Never    Smokeless tobacco: Never   Substance and Sexual Activity    Alcohol use: Yes     Comment: Former heavy use    Drug use: Never    Sexual activity: Yes     Comment: unknown     Review of Systems   Constitutional:  Negative for chills and fever.   HENT:  Negative for trouble swallowing.    Eyes:  Negative for visual disturbance.   Respiratory:  Negative for shortness of breath and wheezing.    Cardiovascular:  Negative for chest pain and palpitations.   Gastrointestinal:  Positive for nausea. Negative for abdominal pain, diarrhea and vomiting.   Genitourinary:  Negative for dysuria and hematuria.   Musculoskeletal:  Positive for back pain and gait problem.   Neurological:  Positive for weakness and numbness. Negative for dizziness, syncope, speech difficulty and headaches.   Objective:     Vital Signs (Most Recent):  Temp: 98 °F (36.7 °C) (03/19/23 1559)  Pulse: 102 (03/19/23 1903)  Resp: 20 (03/19/23 1903)  BP: 110/71 (03/19/23 1903)  SpO2: 98 % (03/19/23 1903) Vital Signs (24h Range):  Temp:  [98 °F (36.7 °C)] 98 °F (36.7 °C)  Pulse:  [102-104] 102  Resp:  [16-20] 20  SpO2:  [98 %-100 %] 98 %  BP: (109-110)/(71-77) 110/71     Weight: 92.5 kg (204 lb)  Body mass index is 30.13 kg/m².    Physical Exam  Vitals and nursing note reviewed.   Constitutional:       General: He is not in acute distress.     Appearance: He is obese. He is not toxic-appearing.   HENT:      Nose: Nose normal.      Mouth/Throat:      Mouth: Mucous membranes are moist.   Eyes:      Extraocular Movements: Extraocular movements intact.      Conjunctiva/sclera: Conjunctivae normal.      Pupils: Pupils are equal, round, and reactive to light.   Cardiovascular:      Rate and Rhythm: Normal rate  and regular rhythm.      Pulses: Normal pulses.      Heart sounds: Normal heart sounds. No murmur heard.  Pulmonary:      Effort: Pulmonary effort is normal. No respiratory distress.      Breath sounds: Normal breath sounds. No wheezing.   Chest:      Chest wall: No tenderness.   Abdominal:      General: Bowel sounds are normal.      Palpations: Abdomen is soft.      Tenderness: There is no abdominal tenderness.   Musculoskeletal:      Right lower leg: No edema.      Left lower leg: No edema.   Skin:     General: Skin is warm and dry.   Neurological:      Mental Status: He is alert and oriented to person, place, and time.      Sensory: Sensory deficit (decreased sensation bilateral LE) present.      Comments: 3/5 strength in bilateral LE. Polyskin sensation         CRANIAL NERVES     CN III, IV, VI   Pupils are equal, round, and reactive to light.     Significant Labs: All pertinent labs within the past 24 hours have been reviewed.  CBC:   Recent Labs   Lab 03/19/23  1731   WBC 6.90   HGB 14.6   HCT 43.1   *     CMP:   Recent Labs   Lab 03/19/23  1731   *   K 4.2      CO2 24   *   BUN 13   CREATININE 1.1   CALCIUM 9.3   PROT 6.4   ALBUMIN 2.5*   BILITOT 1.0   ALKPHOS 165*   AST 81*   ALT 49*   ANIONGAP 9       Significant Imaging: I have reviewed all pertinent imaging results/findings within the past 24 hours.    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 Fall described as a slide down to the ground.    Given alcohol use and coordination difficulties will tx for Wernicke's. Has peripheral neuropathy to account for some LE sensory deficit, however given weakness will obtain further imaging.     - f/u XR thoracolumbar  - f/u MRI Lumbar  - IV thiamine 100 mg qd,  MV, folic  - f/u MRI brain  - encouraged alcohol cessation, pre-contemplative   - Fall precuations    Alcohol use  Patient reports drinking 3 bottles of wine daily for the last few weeks. Last drink was morning of admission. HDS on arrival, but states he feels like he needs a drink    - CIWA q 4  - Diazepam 5mg q 8 scheduled  - Ativan prn CIWA > 8  - seizure precautions      Back spasm  PRN robaxin  See weakness of lower extremities    Balance problem  See weakness of lower extremities  - Consider myelopathic injury, diabetic neuropathy, Wernike's encephalopathy possible given patients alcohol use but he is without ocular or mentation concerns      Gastroesophageal reflux disease without esophagitis  Home protonix      Essential hypertension  Diet controlled. Not on home meds.   - ctm    Type 2 diabetes mellitus with neurologic complication, without long-term current use of insulin  Patient's FSGs are controlled on current medication regimen.  Last A1c reviewed-   Lab Results   Component Value Date    HGBA1C 5.0 03/19/2023     Most recent fingerstick glucose reviewed- No results for input(s): POCTGLUCOSE in the last 24 hours.  Current correctional scale  Low  Maintain anti-hyperglycemic dose as follows-   Antihyperglycemics (From admission, onward)    Start     Stop Route Frequency Ordered    03/19/23 1944  insulin aspart U-100 pen 0-5 Units         -- SubQ Before meals & nightly PRN 03/19/23 1845        Hold Oral hypoglycemics while patient is in the hospital.    - titrate insulin for inpatient BG goal of 140-180      VTE Risk Mitigation (From admission, onward)         Ordered     enoxaparin injection 40 mg  Daily         03/19/23 1845     IP VTE HIGH RISK PATIENT  Once         03/19/23 1845     Place sequential compression device  Until discontinued         03/19/23 1845                   On 03/19/2023, patient should be placed in hospital observation services under my care.        Liliana Chavez, DO  PGY1  Department of Hospital Medicine  [facility] - Emergency Dept

## 2023-03-21 VITALS
HEIGHT: 69 IN | TEMPERATURE: 98 F | OXYGEN SATURATION: 96 % | BODY MASS INDEX: 29.84 KG/M2 | RESPIRATION RATE: 20 BRPM | HEART RATE: 85 BPM | SYSTOLIC BLOOD PRESSURE: 117 MMHG | DIASTOLIC BLOOD PRESSURE: 71 MMHG | WEIGHT: 201.5 LBS

## 2023-03-21 PROCEDURE — 63600175 PHARM REV CODE 636 W HCPCS

## 2023-03-21 PROCEDURE — 99239 HOSP IP/OBS DSCHRG MGMT >30: CPT | Mod: GC,,, | Performed by: STUDENT IN AN ORGANIZED HEALTH CARE EDUCATION/TRAINING PROGRAM

## 2023-03-21 PROCEDURE — 99239 PR HOSPITAL DISCHARGE DAY,>30 MIN: ICD-10-PCS | Mod: GC,,, | Performed by: STUDENT IN AN ORGANIZED HEALTH CARE EDUCATION/TRAINING PROGRAM

## 2023-03-21 PROCEDURE — 99222 1ST HOSP IP/OBS MODERATE 55: CPT | Mod: ,,, | Performed by: NURSE PRACTITIONER

## 2023-03-21 PROCEDURE — 25000003 PHARM REV CODE 250

## 2023-03-21 PROCEDURE — 99222 PR INITIAL HOSPITAL CARE,LEVL II: ICD-10-PCS | Mod: ,,, | Performed by: PHYSICAL MEDICINE & REHABILITATION

## 2023-03-21 PROCEDURE — 97530 THERAPEUTIC ACTIVITIES: CPT

## 2023-03-21 PROCEDURE — 99222 PR INITIAL HOSPITAL CARE,LEVL II: ICD-10-PCS | Mod: ,,, | Performed by: NURSE PRACTITIONER

## 2023-03-21 PROCEDURE — 99222 1ST HOSP IP/OBS MODERATE 55: CPT | Mod: ,,, | Performed by: PHYSICAL MEDICINE & REHABILITATION

## 2023-03-21 RX ORDER — DIAZEPAM 2 MG/1
2 TABLET ORAL EVERY 12 HOURS
Qty: 2 TABLET | Refills: 0
Start: 2023-03-23 | End: 2023-04-12

## 2023-03-21 RX ORDER — DULOXETIN HYDROCHLORIDE 60 MG/1
60 CAPSULE, DELAYED RELEASE ORAL 2 TIMES DAILY
Qty: 60 CAPSULE | Refills: 11
Start: 2023-03-21 | End: 2023-04-18 | Stop reason: SDUPTHER

## 2023-03-21 RX ORDER — METHOCARBAMOL 500 MG/1
500 TABLET, FILM COATED ORAL 4 TIMES DAILY PRN
Start: 2023-03-21 | End: 2023-03-31

## 2023-03-21 RX ORDER — DIAZEPAM 5 MG/1
5 TABLET ORAL EVERY 12 HOURS
Qty: 2 TABLET | Refills: 0
Start: 2023-03-21 | End: 2023-03-22

## 2023-03-21 RX ORDER — TRAZODONE HYDROCHLORIDE 50 MG/1
50 TABLET ORAL NIGHTLY
Qty: 30 TABLET | Refills: 11
Start: 2023-03-21 | End: 2023-04-12

## 2023-03-21 RX ORDER — FOLIC ACID 1 MG/1
1 TABLET ORAL DAILY
Qty: 90 TABLET | Refills: 3
Start: 2023-03-22 | End: 2023-04-12

## 2023-03-21 RX ORDER — DIAZEPAM 5 MG/1
5 TABLET ORAL EVERY 8 HOURS
Status: CANCELLED | OUTPATIENT
Start: 2023-03-21

## 2023-03-21 RX ORDER — LANOLIN ALCOHOL/MO/W.PET/CERES
100 CREAM (GRAM) TOPICAL DAILY
Start: 2023-03-21 | End: 2023-04-12

## 2023-03-21 RX ORDER — PREGABALIN 150 MG/1
150 CAPSULE ORAL 2 TIMES DAILY
Qty: 60 CAPSULE | Refills: 6
Start: 2023-03-21 | End: 2023-06-20

## 2023-03-21 RX ADMIN — DULOXETINE HYDROCHLORIDE 60 MG: 60 CAPSULE, DELAYED RELEASE ORAL at 08:03

## 2023-03-21 RX ADMIN — POLYETHYLENE GLYCOL 3350 17 G: 17 POWDER, FOR SOLUTION ORAL at 08:03

## 2023-03-21 RX ADMIN — THIAMINE HYDROCHLORIDE 100 MG: 100 INJECTION, SOLUTION INTRAMUSCULAR; INTRAVENOUS at 08:03

## 2023-03-21 RX ADMIN — PREGABALIN 150 MG: 75 CAPSULE ORAL at 08:03

## 2023-03-21 RX ADMIN — DIAZEPAM 5 MG: 5 TABLET ORAL at 05:03

## 2023-03-21 RX ADMIN — SENNOSIDES AND DOCUSATE SODIUM 1 TABLET: 50; 8.6 TABLET ORAL at 08:03

## 2023-03-21 RX ADMIN — DIAZEPAM 5 MG: 5 TABLET ORAL at 02:03

## 2023-03-21 RX ADMIN — PANTOPRAZOLE SODIUM 40 MG: 40 TABLET, DELAYED RELEASE ORAL at 08:03

## 2023-03-21 RX ADMIN — ASPIRIN 81 MG 81 MG: 81 TABLET ORAL at 08:03

## 2023-03-21 RX ADMIN — METHOCARBAMOL 500 MG: 500 TABLET ORAL at 05:03

## 2023-03-21 RX ADMIN — FOLIC ACID 1 MG: 1 TABLET ORAL at 08:03

## 2023-03-21 RX ADMIN — THERA TABS 1 TABLET: TAB at 08:03

## 2023-03-21 RX ADMIN — METHOCARBAMOL 500 MG: 500 TABLET ORAL at 02:03

## 2023-03-21 NOTE — PT/OT/SLP PROGRESS
Occupational Therapy   Treatment    Name: Anthony Ball  MRN: [id number]  Admitting Diagnosis:  Weakness of both lower extremities       Recommendations:     Discharge Recommendations: rehabilitation facility  Discharge Equipment Recommendations:  shower chair  Barriers to discharge:  Inaccessible home environment    Assessment:     Anthony Ball is a 73 y.o. male with a medical diagnosis of Weakness of both lower extremities.  He presents with weakness, impaired balance, and decreased self-care abilities. Pt reported that he was leaving to go to rehab this day and was provided transfer practices, safety awareness, and education on use of RW for safe mobility. Pt was receptive to the education and trasnfer practices and required min-moderate assistance from two people for all tranfers. Pt is able to perform LBD for shoes with SBA. Pt is performing ADL below baseline. Performance deficits affecting function are weakness, impaired endurance, impaired self care skills, impaired functional mobility, impaired balance, decreased lower extremity function, decreased safety awareness, pain, impaired cardiopulmonary response to activity. Pt would benefit from continued skilled OT services to address current deficits in performing ADL and other occupational tasks. Given pt's decreased strength and endurance and a potential for improvement with daily activity tasks, rehabilitation facility is a good plan for patient upon discharge.      Rehab Prognosis:  Good; patient would benefit from acute skilled OT services to address these deficits and reach maximum level of function.       Plan:     Patient to be seen 4 x/week to address the above listed problems via self-care/home management, therapeutic activities, therapeutic exercises, neuromuscular re-education  Plan of Care Expires: 04/19/23  Plan of Care Reviewed with: patient, spouse    Subjective     Pain/Comfort:  Pain Rating 1: 0/10    Objective:     Communicated with: RN prior to  session.  Patient found HOB elevated with peripheral IV upon OT entry to room.    General Precautions: Standard, fall    Orthopedic Precautions:N/A  Braces: N/A  Respiratory Status: Room air     Occupational Performance:     Bed Mobility:    Patient completed Rolling/Turning to Right with modified independence  Patient completed Scooting/Bridging with modified independence  Patient completed Supine to Sit with minimum assistance     Functional Mobility/Transfers:  Patient completed Sit <> Stand Transfer with moderate assistance  with  rolling walker   Trial 1 from EOB: Moderate assistance x2 person with RW  Trial 2 from chair elevated with blankets/pillows Min A x 2 person with HHA  Trial 3 from EOB: Min A x2 with RW  Patient completed Bed <> Chair Transfer using Step Transfer technique with Min A x 2 with RW  Functional Mobility: Pt ambulated 3-4 steps with min A x2 and RW for increased fx mobility and independence with ADL tasks     Activities of Daily Living:  Lower Body Dressing: stand by assistance to don shoes on BLE using figure 4 methods at EOB      [facility] 6 Click ADL: 18    Treatment & Education:  Pt participated in fx mobility and ADLs  to address current status of decreased mobility, endurance, and, safety awareness. Pt was educated on tx plan for the session, role of OT, and OT POC.  Pt was educated on importance safe mobility, pacing, and therapeutic activities during the recovery process and upon discharge.     Pt and his spouse educated on safe tranfers, use of available handles and furniture of safety and mobility, and use of AD for increased safety and mobility with transfers.     Pt voiced no further concern regarding the OT tx and is scheduled to be followed up as appropriated.      Patient left up in chair with all lines intact, call button in reach, RN notified, and spouse present    GOALS:   Multidisciplinary Problems       Occupational Therapy Goals          Problem: Occupational Therapy     Goal Priority Disciplines Outcome Interventions   Occupational Therapy Goal     OT, PT/OT Ongoing, Progressing    Description: Goals to be met by: 4/3/2023     Patient will increase functional independence with ADLs by performing:    UE Dressing with [place].  LE Dressing with Modified [place].  Grooming while standing with Supervision.  Bathing from  shower chair/bench with Supervision.  Step transfer with Supervision  Toilet transfer to toilet with Supervision.                         Time Tracking:     OT Date of Treatment: 03/21/23  OT Start Time: 1355  OT Stop Time: 1420  OT Total Time (min): 25 min    Billable Minutes:Therapeutic Activity 25    OT/TOM: OT     Number of TMO visits since last OT visit: 0    3/21/2023

## 2023-03-21 NOTE — PLAN OF CARE
Problem: Adult Inpatient Plan of Care  Goal: Plan of Care Review  Outcome: Ongoing, Progressing     Patient remained in stable condition through shift. Remained free of falls and other injuries. Able to reposition self independently. Denied any pain or discomfort. Patient refused accuchecks and AM labs, MD notified. Bed in locked and lowest position, call light in reach, all questions answered, declines any further needs at this time. Frequent monitoring maintained.

## 2023-03-21 NOTE — CONSULTS
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 Patient [facility] to [facility] for BLE weakness, imbalance, and severe muscle spasms leading to a fall in the shower. MRI brain with mild to moderate cerebellar atrophy. MRI L spine with anterolisthesis of L4 on L5 uncovering the disc as well as central disc protrusion. Hospital course course consulted alcohol use (Ativan scheduled), back spasms (PRN Robaxin).     Functional History: Patient lives with partner in a single story home with 4 steps to enter.  Prior to admission, [facility]. DME: [employer]ator.     Hospital Course:   3/20/23: Participated w/ OT. Bed mob Homero. Sit to stand Arianna-modA w/ RW. Patient able to ambulate approx. 3' from EOB to chair with min-mod A and RW. He demonstrated decreased anjel, decreased stride length, decreased step length, and unsteadiness during ambulation trial. [facility] [address].       Past Medical History:   Diagnosis Date    Coronary artery disease     CAC score 1430    Depression     Diabetic neuropathy     Essential (primary) hypertension     GERD (gastroesophageal reflux disease)     Insomnia     Neuromyopathy     Possibly DM and/or alcohol related.    Reactive airway disease     Type 2 diabetes mellitus      Past Surgical History:   Procedure Laterality Date    COLONOSCOPY      Normal around 2020     TOTAL KNEE ARTHROPLASTY Right      Review of patient's allergies indicates:  No Known Allergies    Scheduled Medications:    aspirin  81 mg Oral Daily    diazePAM  5 mg Oral Q8H    DULoxetine  60 mg Oral BID    enoxaparin  40 mg Subcutaneous Daily    folic acid  1 mg Oral Daily    multivitamin  1 tablet Oral Daily    pantoprazole  40 mg Oral Daily    polyethylene glycol  17 g Oral Daily    pregabalin  150 mg Oral BID    senna-docusate 8.6-50 mg  1 tablet Oral BID    thiamine (VITAMIN B1) IVPB  100 mg Intravenous Daily    traZODone  50 mg Oral QHS       PRN Medications: albuterol, dextrose 10%, dextrose 10%, dextrose, dextrose, glucagon (human recombinant), insulin aspart U-100, LORazepam, melatonin, methocarbamoL, naloxone, ondansetron, sodium chloride 0.9%    Family History       Problem Relation (Age of Onset)    Hypertension Mother          Tobacco Use    Smoking status: Never    Smokeless tobacco: Never   Substance and Sexual Activity    Alcohol use: Yes     Comment: Former heavy use    Drug use: Never    Sexual activity: Yes     Comment: unknown     Review of Systems   Constitutional:  Positive for activity change. Negative for fatigue and fever.   HENT:  Negative for trouble swallowing and voice change.    Respiratory:  Negative for cough and shortness of breath.    Cardiovascular:  Negative for chest pain and leg swelling.   Gastrointestinal:  Negative for abdominal distention and abdominal pain.   Genitourinary:  Negative for difficulty urinating and flank pain.   Musculoskeletal:  Positive for gait problem. Negative for back pain.   Skin:  Negative for color change and rash.   Neurological:  Positive for weakness. Negative for speech difficulty and numbness.   Psychiatric/Behavioral:  Negative for agitation and confusion.      Objective:     Vital Signs (Most Recent):  Temp: 98.2 °F (36.8 °C) (03/21/23 1135)  Pulse: 85 (03/21/23 1135)  Resp: 20 (03/21/23 1135)  BP: 117/71 (03/21/23  1135)  SpO2: 96 % (03/21/23 1135)    Vital Signs (24h Range):  Temp:  [97.7 °F (36.5 °C)-98.6 °F (37 °C)] 98.2 °F (36.8 °C)  Pulse:  [] 85  Resp:  [18-20] 20  SpO2:  [95 %-98 %] 96 %  BP: ()/(49-77) 117/71     Body mass index is 29.76 kg/m².    Physical Exam  Vitals and nursing note reviewed.   Constitutional:       Appearance: Normal appearance. He is well-developed.   HENT:      Head: Normocephalic and atraumatic.      Nose: Nose normal.      Mouth/Throat:      Mouth: Mucous membranes are moist.   Eyes:      General:         Right eye: No discharge.         Left eye: No discharge.      Pupils: Pupils are equal, round, and reactive to light.   Pulmonary:      Effort: Pulmonary effort is normal. No respiratory distress.   Abdominal:      General: There is no distension.      Tenderness: There is no abdominal tenderness.   Musculoskeletal:         General: No deformity. Normal range of motion.      Cervical back: Neck supple.      Comments: Deconditioned    Skin:     General: Skin is warm and dry.   Neurological:      Mental Status: He is alert.      Sensory: No sensory deficit.      Motor: Weakness present. No abnormal muscle tone.      Gait: Gait abnormal.   Psychiatric:         Mood and Affect: Mood normal.         Behavior: Behavior normal.    Diagnostic Results:   Labs: Reviewed  ECG: Reviewed  CT: Reviewed    Assessment/Plan:     * Weakness of both lower extremities  - PT & OT following     Alcohol use  - Ativan scheduled     Back spasm  - PRN Robaxin    Balance problem  - Related to prolonged/acute hospital course.     Recommendations  -  Encourage mobility, OOB in chair at least 3 hours per day, and early ambulation as appropriate  -  PT/OT evaluate and treat  -  Pain management  -  Monitor for and prevent skin breakdown and pressure ulcers  · Early mobility, repositioning/weight shifting every 20-30 minutes when sitting, turn patient every 2 hours, proper mattress/overlay and chair cushioning,  pressure relief/heel protector boots  -  DVT prophylaxis    -  Reviewed discharge options (IP rehab, SNF, HH therapy, and OP therapy)    PM&R Recommendation:     At this time, the PM&R team has reviewed this patient's ongoing medical case including inpatient diagnosis, medical history, clinical examination, labs, vitals, current social and functional history to provide the post-acute recommendation as follows:     RECOMMENDATIONS: inpatient rehabilitation due to good motivation/participation with therapies, has been determined to tolerate 3 hours of therapy and good potential for recovery.     The patient will be admitted for comprehensive interdisciplinary inpatient rehabilitation to address the impairments due to medical diagnosis of Weakness of both lower extremities. The patient will benefit from an inpatient rehabilitation program to promote functional recovery, implement compensatory strategies and will undergo assessment for needs for durable medical equipment for safe discharge to the community. This patient will benefit from a coordinated interdisciplinary rehabilitation program services that require close monitoring and treatment with 24-hour rehabilitative nursing and physical/occupational therapies for 3 hours/day for 5 days/week.This interdisciplinary program will be performed under the direction of a physiatrist.    MEDICAL STABILITY:     At this time, no barriers for post-acute rehab admission.    I will sign off with the transfer of the patient to [facility] liaison who will continue to follow going forward until admission to [facility].     Thank you for your consult.     Winifred Mckeon NP  Department of Physical Medicine & Rehab  [facility] - Intensive Care ([place])

## 2023-03-21 NOTE — DISCHARGE SUMMARY
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 Fall described as a slide down to the ground. He has had progressive weakness and back spasms for about a year, however the spasms have acutely exacerbated over the past several weeks. He cannot identify any exacerbating factor. In addition, he has had more difficulty ambulating and poor balance. Notably, he does have severe diabetic neuropathy. Over the past 2 days, he has not been out of bed at all due to inability to walk. Today, he was in the shower with the assistance of his partner, and he experienced a spasm so severe it caused him to fall. He did not hit his head, does not have any residual trauma. He has not taken any muscle relaxers at home. He denies bowel/bladder incontinent and saddle anesthesia. He has only been eating guacamole the past few days. He also drinks alcohol - previously would have 2-3 glasses of wine a day but has increased drinking to help with the pain of the spasms. Currently drinks about 3 bottles of wine daily for about 3 weeks. The last time he drank was this morning. Otherwise, he has never smoked cigarettes, no other illicit drug use. 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     In the ED, he was AF, , /77,  100% RA. CBC unremarkable. CMP pertinent for AST 81, ALT 49, otherwise unremarkable. He will be admitted to hospital medicine for evaluation and management.       * No surgery found *      Hospital Course:   Patient admitted to hospital medicine for evaluation and management of progressive bilateral lower extremity weakness, spasms, and imbalance. MRI brain with mild to moderate cerebellar atrophy. MRI L spine with anterolisthesis of L4 on L5 uncovering the disc as well as central disc protrusion. Strength somewhat improved with PT and OT. Spasms relieved with prn robaxin. PT recommending rehab after discharge. Placed on CIWA protocol for alcohol withdrawal, as well as diazepam taper. Prn ativan not required, CIWA consistently < 8, no signs or symptoms of withdrawal.    Pt to discharge to [facility] for continued strength training, leaving with 2 days of Valium taper. He will follow up with outpatient neurosurgery for further evaluation.     Constitutional:       General: alert, no apparent distress  HENT:      Head: Normocephalic and atraumatic.   Cardiovascular:      Rate and Rhythm: Normal rate and regular rhythm.      Heart sounds: Normal heart sounds.   Pulmonary:      Effort: Pulmonary effort is normal.      Breath sounds: Normal breath sounds.   Abdominal:      General: Abdomen is flat.      Palpations: Abdomen is soft.   Musculoskeletal:      Bilateral lower extremities 4/5 strength, much improved from admission. Sensation decreased in bilateral lower extremities. Feet are cold, doralis pedis pulses palpable.   Skin:     General: Skin is warm and dry   Neurological:      Mental Status: alert and oriented to person, place, and time.   Psychiatric:         Mood and Affect: Mood normal.         Behavior: Behavior normal.             Goals of Care Treatment Preferences:  Code Status: Full Code      Consults:   Consults (From admission, onward)        Status Ordering Provider     Inpatient consult to Physical  Medicine Rehab  Once        Provider:  (Not yet assigned)    Completed AMARJIT HURLEY     Inpatient consult to Neurosurgery  Once        Provider:  (Not yet assigned)    Acknowledged MAGUI CORDERO          No new Assessment & Plan notes have been filed under this hospital service since the last note was generated.  Service: Hospital Medicine    Final Active Diagnoses:    Diagnosis Date Noted POA    PRINCIPAL PROBLEM:  Weakness of both lower extremities [R29.898] 08/05/2021 Yes    Back spasm [M62.830] 03/19/2023 Yes    Alcohol use [Z78.9] 03/19/2023 Yes    Balance problem [R26.89] 08/05/2021 Yes    Type 2 diabetes mellitus with neurologic complication, without long-term current use of insulin [E11.49] 07/08/2021 Yes    Essential hypertension [I10] 07/08/2021 Yes    Gastroesophageal reflux disease without esophagitis [K21.9] 07/08/2021 Yes      Problems Resolved During this Admission:       Discharged Condition: stable    Disposition: Rehab Facility    Follow Up:    Patient Instructions:      Ambulatory referral/consult to Neurosurgery   Standing Status: Future   Referral Priority: Routine Referral Type: Consultation   Referral Reason: Specialty Services Required   Requested Specialty: Neurosurgery   Number of Visits Requested: 1       Significant Diagnostic Studies: Labs:   CMP   Recent Labs   Lab 03/19/23  1731   *   K 4.2      CO2 24   *   BUN 13   CREATININE 1.1   CALCIUM 9.3   PROT 6.4   ALBUMIN 2.5*   BILITOT 1.0   ALKPHOS 165*   AST 81*   ALT 49*   ANIONGAP 9    and CBC   Recent Labs   Lab 03/19/23  1731   WBC 6.90   HGB 14.6   HCT 43.1   *     Radiology: MRI L spine:   1.  Minimal anterolisthesis of L4 on L5 with uncovering of the disc and superimposed central disc protrusion.  No significant spinal canal narrowing.  Mild bilateral neural foramina at this level.     2.  Additional mild multilevel degenerative changes in the lumbar spine.     3.  Distended urinary bladder.        MRI Brain:  No acute intracranial process.     Diffuse cerebral volume loss with mild-to-moderate cerebellar atrophy.     Minimal supratentorial white matter T2 FLAIR signal, nonspecific likely sequel of chronic microvascular ischemic changes.     Paranasal sinus disease.     Pending Diagnostic Studies:     None         Medications:  Reconciled Home Medications:      Medication List      START taking these medications    * diazePAM 5 MG tablet  Commonly known as: VALIUM  Take 1 tablet (5 mg total) by mouth every 12 (twelve) hours. for 1 day     * diazePAM 2 MG tablet  Commonly known as: VALIUM  Take 1 tablet (2 mg total) by mouth every 12 (twelve) hours. for 1 day  Start taking on: March 23, 2023     folic acid 1 MG tablet  Commonly known as: FOLVITE  Take 1 tablet (1 mg total) by mouth once daily.  Start taking on: March 22, 2023     methocarbamoL 500 MG Tab  Commonly known as: ROBAXIN  Take 1 tablet (500 mg total) by mouth 4 (four) times daily as needed (back pain/spasm).     multivitamin Tab  Take 1 tablet by mouth once daily.  Start taking on: March 22, 2023     thiamine 100 MG tablet  Take 1 tablet (100 mg total) by mouth once daily.         * This list has 2 medication(s) that are the same as other medications prescribed for you. Read the directions carefully, and ask your doctor or other care provider to review them with you.            CHANGE how you take these medications    DULoxetine 60 MG capsule  Commonly known as: CYMBALTA  Take 1 capsule (60 mg total) by mouth 2 (two) times daily.  What changed:   · medication strength  · how much to take  · when to take this     pregabalin 150 MG capsule  Commonly known as: LYRICA  Take 1 capsule (150 mg total) by mouth 2 (two) times daily.  What changed:   · medication strength  · how much to take  · how to take this  · when to take this  · additional instructions     traZODone 50 MG tablet  Commonly known as: DESYREL  Take 1 tablet (50 mg total) by mouth  every evening.  What changed:   · how much to take  · how to take this  · when to take this  · additional instructions        CONTINUE taking these medications    * albuterol sulfate 2.5 mg/0.5 mL Nebu  Take 2.5 mg by nebulization every 6 (six) hours as needed. Rescue     * albuterol 90 mcg/actuation inhaler  Commonly known as: VENTOLIN HFA  Inhale 2 puffs into the lungs every 6 (six) hours as needed for Wheezing. Rescue     aspirin 81 MG Chew  Take 81 mg by mouth once daily.     diphenhydrAMINE 25 mg capsule  Commonly known as: BENADRYL  Take 25 mg by mouth every 6 (six) hours as needed for Itching.     fluticasone-salmeterol 250-50 mcg/dose 250-50 mcg/dose diskus inhaler  Commonly known as: ADVAIR  Inhale 1 puff into the lungs 2 (two) times daily. Controller     JARDIANCE 10 mg tablet  Generic drug: empagliflozin  Take 1 tablet (10 mg total) by mouth once daily.     metFORMIN 500 MG ER 24hr tablet  Commonly known as: GLUCOPHAGE-XR  Take 1 tablet (500 mg total) by mouth 2 (two) times daily with meals.     pantoprazole 40 MG tablet  Commonly known as: PROTONIX  Take 1 tablet (40 mg total) by mouth once daily.         * This list has 2 medication(s) that are the same as other medications prescribed for you. Read the directions carefully, and ask your doctor or other care provider to review them with you.                Indwelling Lines/Drains at time of discharge:   Lines/Drains/Airways     None                 Time spent on the discharge of patient: 35 minutes         Liliana Chavez DO PGY1  Department of Hospital Medicine  [facility] - Intensive Care ([place]-16)

## 2023-03-21 NOTE — PLAN OF CARE
Pt will dc to [facility] today spoke with pt he notified his spouse, will transport via w/c tae requested 230 nurse notified to call report to [phone]

## 2023-03-21 NOTE — NURSING
Patient's spouse gathered all belongings. Transported via wheelchair to rehab. Nad present at time of discharge.

## 2023-03-21 NOTE — SUBJECTIVE & OBJECTIVE
Past Medical History:   Diagnosis Date    Coronary artery disease     CAC score 1430    Depression     Diabetic neuropathy     Essential (primary) hypertension     GERD (gastroesophageal reflux disease)     Insomnia     Neuromyopathy     Possibly DM and/or alcohol related.    Reactive airway disease     Type 2 diabetes mellitus      Past Surgical History:   Procedure Laterality Date    COLONOSCOPY      Normal around 2020    TOTAL KNEE ARTHROPLASTY Right      Review of patient's allergies indicates:  No Known Allergies    Scheduled Medications:    aspirin  81 mg Oral Daily    diazePAM  5 mg Oral Q8H    DULoxetine  60 mg Oral BID    enoxaparin  40 mg Subcutaneous Daily    folic acid  1 mg Oral Daily    multivitamin  1 tablet Oral Daily    pantoprazole  40 mg Oral Daily    polyethylene glycol  17 g Oral Daily    pregabalin  150 mg Oral BID    senna-docusate 8.6-50 mg  1 tablet Oral BID    thiamine (VITAMIN B1) IVPB  100 mg Intravenous Daily    traZODone  50 mg Oral QHS       PRN Medications: albuterol, dextrose 10%, dextrose 10%, dextrose, dextrose, glucagon (human recombinant), insulin aspart U-100, LORazepam, melatonin, methocarbamoL, naloxone, ondansetron, sodium chloride 0.9%    Family History       Problem Relation (Age of Onset)    Hypertension Mother          Tobacco Use    Smoking status: Never    Smokeless tobacco: Never   Substance and Sexual Activity    Alcohol use: Yes     Comment: Former heavy use    Drug use: Never    Sexual activity: Yes     Comment: unknown     Review of Systems   Constitutional:  Positive for activity change. Negative for fatigue and fever.   HENT:  Negative for trouble swallowing and voice change.    Respiratory:  Negative for cough and shortness of breath.    Cardiovascular:  Negative for chest pain and leg swelling.   Gastrointestinal:  Negative for abdominal distention and abdominal pain.   Genitourinary:  Negative for difficulty urinating and flank pain.   Musculoskeletal:   Positive for gait problem. Negative for back pain.   Skin:  Negative for color change and rash.   Neurological:  Positive for weakness. Negative for speech difficulty and numbness.   Psychiatric/Behavioral:  Negative for agitation and confusion.    Objective:     Vital Signs (Most Recent):  Temp: 98.2 °F (36.8 °C) (03/21/23 1135)  Pulse: 85 (03/21/23 1135)  Resp: 20 (03/21/23 1135)  BP: 117/71 (03/21/23 1135)  SpO2: 96 % (03/21/23 1135)    Vital Signs (24h Range):  Temp:  [97.7 °F (36.5 °C)-98.6 °F (37 °C)] 98.2 °F (36.8 °C)  Pulse:  [] 85  Resp:  [18-20] 20  SpO2:  [95 %-98 %] 96 %  BP: ()/(49-77) 117/71     Body mass index is 29.76 kg/m².    Physical Exam  Vitals and nursing note reviewed.   Constitutional:       Appearance: Normal appearance. He is well-developed.   HENT:      Head: Normocephalic and atraumatic.      Nose: Nose normal.      Mouth/Throat:      Mouth: Mucous membranes are moist.   Eyes:      General:         Right eye: No discharge.         Left eye: No discharge.      Pupils: Pupils are equal, round, and reactive to light.   Pulmonary:      Effort: Pulmonary effort is normal. No respiratory distress.   Abdominal:      General: There is no distension.      Tenderness: There is no abdominal tenderness.   Musculoskeletal:         General: No deformity. Normal range of motion.      Cervical back: Neck supple.      Comments: Deconditioned    Skin:     General: Skin is warm and dry.   Neurological:      Mental Status: He is alert.      Sensory: No sensory deficit.      Motor: Weakness present. No abnormal muscle tone.      Gait: Gait abnormal.   Psychiatric:         Mood and Affect: Mood normal.         Behavior: Behavior normal.     NEUROLOGICAL EXAMINATION:     CRANIAL NERVES     CN III, IV, VI   Pupils are equal, round, and reactive to light.    Diagnostic Results: Labs: Reviewed  ECG: Reviewed  CT: Reviewed

## 2023-03-21 NOTE — HOSPITAL COURSE
3/20/23: Participated w/ OT. Bed mob Homero. Sit to stand Arianna-modA w/ RW. Patient able to ambulate approx. 3' from EOB to chair with min-mod A and RW. He demonstrated decreased anjel, decreased stride length, decreased step length, and unsteadiness during ambulation trial. [facility] 17.

## 2023-03-21 NOTE — HPI
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 Patient presented to [facility] on 3/19/23 for BLE weakness, imbalance, and severe muscle spasms leading to a fall in the shower. MRI brain with mild to moderate cerebellar atrophy. MRI L spine with anterolisthesis of L4 on L5 uncovering the disc as well as central disc protrusion. Hospital course course consulted alcohol use (Ativan scheduled), back spasms (PRN Robaxin).     Functional History: Patient lives with partner in a single story home with 4 steps to enter.  Prior to admission, Homero. DME: [employer]ator.

## 2023-03-21 NOTE — CONSULTS
Inpatient consult to Physical Medicine Rehab  Consult performed by: Estela Ledesma NP  Consult ordered by: Aaron Beckett MD    Consult received.  Reviewed patient history and current admission.  PM&R following.    Estela Ledesma NP  Physical Medicine & Rehabilitation   03/21/2023

## 2023-03-21 NOTE — PLAN OF CARE
[facility]    FACILITY TRANSFER ORDERS      Patient Name: Anthony Ball  YOB: 1950    PCP: Isaias Myles MD   PCP Address: [address] [facility] [place] [address]  PCP Phone Number: [phone]  PCP Fax: [phone]    Encounter Date: 03/21/2023    Admit to: [facility]    Vital Signs:  Routine    Diagnoses:   Active Hospital Problems    Diagnosis  POA    *Weakness of both lower extremities [R29.898]  Yes    Back spasm [M62.830]  Yes    Alcohol use [Z78.9]  Yes    Balance problem [R26.89]  Yes    Type 2 diabetes mellitus with neurologic complication, without long-term current use of insulin [E11.49]  Yes    Essential hypertension [I10]  Yes    Gastroesophageal reflux disease without esophagitis [K21.9]  Yes      Resolved Hospital Problems   No resolved problems to display.       Allergies:Review of patient's allergies indicates:  No Known Allergies    Diet: diabetic diet: 2000 calorie    Activities: Activity as tolerated    Goals of Care Treatment Preferences:  Code Status: Full Code      Nursing: routine     Labs:  none      CONSULTS:    Physical Therapy to evaluate and treat.  and Occupational Therapy to evaluate and treat.    MISCELLANEOUS CARE:  Diabetes Care:   SN to perform and educate Diabetic management with blood glucose monitoring: and Report CBG < 60 or > 350 to physician.    WOUND CARE ORDERS  None    Medications: Review discharge medications with patient and family and provide education.      Diazepam taper: give 5mg diazepam twice daily 3/22/23, followed by 2mg twice daily 3/23/22, then discontinue    Current Discharge Medication List        START taking these medications    Details   !! diazePAM (VALIUM) 2 MG tablet Take 1 tablet (2 mg total) by mouth every 12 (twelve) hours. for 1 day  Qty: 2 tablet, Refills: 0      !! diazePAM (VALIUM) 5 MG tablet Take 1 tablet (5 mg total) by mouth every 12 (twelve) hours. for 1 day  Qty: 2 tablet, Refills: 0      folic acid  (FOLVITE) 1 MG tablet Take 1 tablet (1 mg total) by mouth once daily.  Qty: 90 tablet, Refills: 3      methocarbamoL (ROBAXIN) 500 MG Tab Take 1 tablet (500 mg total) by mouth 4 (four) times daily as needed (back pain/spasm).      multivitamin Tab Take 1 tablet by mouth once daily.      thiamine 100 MG tablet Take 1 tablet (100 mg total) by mouth once daily.       !! - Potential duplicate medications found. Please discuss with provider.        CONTINUE these medications which have CHANGED    Details   DULoxetine (CYMBALTA) 60 MG capsule Take 1 capsule (60 mg total) by mouth 2 (two) times daily.  Qty: 60 capsule, Refills: 11      pregabalin (LYRICA) 150 MG capsule Take 1 capsule (150 mg total) by mouth 2 (two) times daily.  Qty: 60 capsule, Refills: 6      traZODone (DESYREL) 50 MG tablet Take 1 tablet (50 mg total) by mouth every evening.  Qty: 30 tablet, Refills: 11           CONTINUE these medications which have NOT CHANGED    Details   albuterol (VENTOLIN HFA) 90 mcg/actuation inhaler Inhale 2 puffs into the lungs every 6 (six) hours as needed for Wheezing. Rescue  Qty: 8 g, Refills: 2    Associated Diagnoses: Chronic asthma, unspecified asthma severity, unspecified whether complicated, unspecified whether persistent      albuterol sulfate 2.5 mg/0.5 mL Nebu Take 2.5 mg by nebulization every 6 (six) hours as needed. Rescue  Qty: 6 each, Refills: 3      aspirin 81 MG Chew Take 81 mg by mouth once daily.      diphenhydrAMINE (BENADRYL) 25 mg capsule Take 25 mg by mouth every 6 (six) hours as needed for Itching.      empagliflozin (JARDIANCE) 10 mg tablet Take 1 tablet (10 mg total) by mouth once daily.  Qty: 30 tablet, Refills: 11    Associated Diagnoses: Diabetes mellitus type 2 in obese; Agatston CAC score, >400      fluticasone-salmeterol diskus inhaler 250-50 mcg Inhale 1 puff into the lungs 2 (two) times daily. Controller  Qty: 60 each, Refills: 2    Associated Diagnoses: Exacerbation of asthma, unspecified  asthma severity, unspecified whether persistent; Reactive airway disease without complication, unspecified asthma severity, unspecified whether persistent      metFORMIN (GLUCOPHAGE-XR) 500 MG ER 24hr tablet Take 1 tablet (500 mg total) by mouth 2 (two) times daily with meals.  Qty: 180 tablet, Refills: 3    Associated Diagnoses: Type 2 diabetes mellitus with diabetic polyneuropathy, without long-term current use of insulin      pantoprazole (PROTONIX) 40 MG tablet Take 1 tablet (40 mg total) by mouth once daily.  Qty: 90 tablet, Refills: 3                Immunizations Administered as of 3/21/2023       Name Date Dose VIS Date Route Exp Date    COVID-19, MRNA, LN-S, PF (Pfizer) (Purple Cap) 8/17/2021 10:38 AM 0.3 mL 12/12/2020 Intramuscular 10/31/2021    Site: Left deltoid     Given By: Saira Saunders RN     : [employer]     Lot: [id number]     COVID-19, MRNA, LN-S, PF (Pfizer) (Purple Cap) 1/8/2021 -- -- -- --    COVID-19, MRNA, LN-S, PF (Pfizer) (Purple Cap) 1/8/2021 -- -- -- --    COVID-19, MRNA, LN-S, PF (Pfizer) (Purple Cap) 12/18/2020 -- -- -- --    COVID-19, MRNA, LN-S, PF (Pfizer) (Purple Cap) 12/18/2020 -- -- -- --            This patient has had both covid vaccinations    Some patients may experience side effects after vaccination.  These may include fever, headache, muscle or joint aches.  Most symptoms resolve with 24-48 hours and do not require urgent medical evaluation unless they persist for more than 72 hours or symptoms are concerning for an unrelated medical condition.          _________________________________  Liliana Chavez DO PGMARA  03/21/2023

## 2023-03-28 DIAGNOSIS — E66.9 DIABETES MELLITUS TYPE 2 IN OBESE: ICD-10-CM

## 2023-03-28 DIAGNOSIS — R93.1 AGATSTON CAC SCORE, >400: ICD-10-CM

## 2023-03-28 DIAGNOSIS — E11.69 DIABETES MELLITUS TYPE 2 IN OBESE: ICD-10-CM

## 2023-03-28 NOTE — TELEPHONE ENCOUNTER
No new care gaps identified.  [facility] Embedded Care Gaps. Reference number: [id number]. 3/28/2023   10:45:43 AM KHAIT

## 2023-03-30 DIAGNOSIS — E11.42 POLYNEUROPATHY DUE TO TYPE 2 DIABETES MELLITUS: Primary | ICD-10-CM

## 2023-04-02 ENCOUNTER — PATIENT MESSAGE (OUTPATIENT)
Dept: INTERNAL MEDICINE | Facility: CLINIC | Age: 73
End: 2023-04-02
Payer: MEDICARE

## 2023-04-06 ENCOUNTER — PATIENT MESSAGE (OUTPATIENT)
Dept: INTERNAL MEDICINE | Facility: CLINIC | Age: 73
End: 2023-04-06
Payer: MEDICARE

## 2023-04-12 ENCOUNTER — CLINICAL SUPPORT (OUTPATIENT)
Dept: INTERNAL MEDICINE | Facility: CLINIC | Age: 73
End: 2023-04-12
Payer: MEDICARE

## 2023-04-12 ENCOUNTER — OFFICE VISIT (OUTPATIENT)
Dept: INTERNAL MEDICINE | Facility: CLINIC | Age: 73
End: 2023-04-12
Payer: MEDICARE

## 2023-04-12 VITALS
TEMPERATURE: 98 F | BODY MASS INDEX: 29.2 KG/M2 | HEART RATE: 85 BPM | OXYGEN SATURATION: 99 % | SYSTOLIC BLOOD PRESSURE: 99 MMHG | WEIGHT: 197.75 LBS | DIASTOLIC BLOOD PRESSURE: 67 MMHG

## 2023-04-12 DIAGNOSIS — E83.42 HYPOMAGNESEMIA: ICD-10-CM

## 2023-04-12 DIAGNOSIS — R41.89 COGNITIVE AND BEHAVIORAL CHANGES: ICD-10-CM

## 2023-04-12 DIAGNOSIS — D64.9 ANEMIA, UNSPECIFIED TYPE: ICD-10-CM

## 2023-04-12 DIAGNOSIS — E46 PROTEIN-CALORIE MALNUTRITION, UNSPECIFIED SEVERITY: ICD-10-CM

## 2023-04-12 DIAGNOSIS — R46.89 COGNITIVE AND BEHAVIORAL CHANGES: ICD-10-CM

## 2023-04-12 DIAGNOSIS — F32.A DEPRESSION, UNSPECIFIED DEPRESSION TYPE: ICD-10-CM

## 2023-04-12 DIAGNOSIS — L30.9 DERMATITIS: ICD-10-CM

## 2023-04-12 DIAGNOSIS — Z09 HOSPITAL DISCHARGE FOLLOW-UP: Primary | ICD-10-CM

## 2023-04-12 LAB
ALBUMIN SERPL BCP-MCNC: 2.7 G/DL (ref 3.5–5.2)
ALP SERPL-CCNC: 95 U/L (ref 55–135)
ALT SERPL W/O P-5'-P-CCNC: 16 U/L (ref 10–44)
ANION GAP SERPL CALC-SCNC: 12 MMOL/L (ref 8–16)
AST SERPL-CCNC: 22 U/L (ref 10–40)
BILIRUB SERPL-MCNC: 0.5 MG/DL (ref 0.1–1)
BUN SERPL-MCNC: 15 MG/DL (ref 8–23)
CALCIUM SERPL-MCNC: 9.3 MG/DL (ref 8.7–10.5)
CHLORIDE SERPL-SCNC: 107 MMOL/L (ref 95–110)
CO2 SERPL-SCNC: 23 MMOL/L (ref 23–29)
CREAT SERPL-MCNC: 1 MG/DL (ref 0.5–1.4)
ERYTHROCYTE [DISTWIDTH] IN BLOOD BY AUTOMATED COUNT: 13.1 % (ref 11.5–14.5)
EST. GFR  (NO RACE VARIABLE): >60 ML/MIN/1.73 M^2
FERRITIN SERPL-MCNC: 374 NG/ML (ref 20–300)
FOLATE SERPL-MCNC: 13.1 NG/ML (ref 4–24)
GLUCOSE SERPL-MCNC: 120 MG/DL (ref 70–110)
HCT VFR BLD AUTO: 39.8 % (ref 40–54)
HGB BLD-MCNC: 13.5 G/DL (ref 14–18)
IRON SERPL-MCNC: 64 UG/DL (ref 45–160)
MAGNESIUM SERPL-MCNC: 1.4 MG/DL (ref 1.6–2.6)
MCH RBC QN AUTO: 32.9 PG (ref 27–31)
MCHC RBC AUTO-ENTMCNC: 33.9 G/DL (ref 32–36)
MCV RBC AUTO: 97 FL (ref 82–98)
PLATELET # BLD AUTO: 243 K/UL (ref 150–450)
PMV BLD AUTO: 10.2 FL (ref 9.2–12.9)
POTASSIUM SERPL-SCNC: 4.5 MMOL/L (ref 3.5–5.1)
PROT SERPL-MCNC: 6.5 G/DL (ref 6–8.4)
RBC # BLD AUTO: 4.1 M/UL (ref 4.6–6.2)
SATURATED IRON: 32 % (ref 20–50)
SODIUM SERPL-SCNC: 142 MMOL/L (ref 136–145)
TOTAL IRON BINDING CAPACITY: 203 UG/DL (ref 250–450)
TRANSFERRIN SERPL-MCNC: 137 MG/DL (ref 200–375)
VIT B12 SERPL-MCNC: 397 PG/ML (ref 210–950)
WBC # BLD AUTO: 9.91 K/UL (ref 3.9–12.7)

## 2023-04-12 PROCEDURE — 83735 ASSAY OF MAGNESIUM: CPT | Performed by: INTERNAL MEDICINE

## 2023-04-12 PROCEDURE — 99213 PR OFFICE/OUTPT VISIT, EST, LEVL III, 20-29 MIN: ICD-10-PCS | Mod: S$PBB,,, | Performed by: INTERNAL MEDICINE

## 2023-04-12 PROCEDURE — 82746 ASSAY OF FOLIC ACID SERUM: CPT | Performed by: INTERNAL MEDICINE

## 2023-04-12 PROCEDURE — 82607 VITAMIN B-12: CPT | Performed by: INTERNAL MEDICINE

## 2023-04-12 PROCEDURE — 99999 PR PBB SHADOW E&M-EST. PATIENT-LVL III: CPT | Mod: PBBFAC,,, | Performed by: INTERNAL MEDICINE

## 2023-04-12 PROCEDURE — 99213 OFFICE O/P EST LOW 20 MIN: CPT | Mod: S$PBB,,, | Performed by: INTERNAL MEDICINE

## 2023-04-12 PROCEDURE — 84425 ASSAY OF VITAMIN B-1: CPT | Performed by: INTERNAL MEDICINE

## 2023-04-12 PROCEDURE — 99999 PR PBB SHADOW E&M-EST. PATIENT-LVL III: ICD-10-PCS | Mod: PBBFAC,,, | Performed by: INTERNAL MEDICINE

## 2023-04-12 PROCEDURE — 99213 OFFICE O/P EST LOW 20 MIN: CPT | Mod: PBBFAC | Performed by: INTERNAL MEDICINE

## 2023-04-12 PROCEDURE — 36415 COLL VENOUS BLD VENIPUNCTURE: CPT | Performed by: INTERNAL MEDICINE

## 2023-04-12 PROCEDURE — 80053 COMPREHEN METABOLIC PANEL: CPT | Performed by: INTERNAL MEDICINE

## 2023-04-12 PROCEDURE — 85027 COMPLETE CBC AUTOMATED: CPT | Performed by: INTERNAL MEDICINE

## 2023-04-12 PROCEDURE — 82728 ASSAY OF FERRITIN: CPT | Performed by: INTERNAL MEDICINE

## 2023-04-12 PROCEDURE — 84466 ASSAY OF TRANSFERRIN: CPT | Performed by: INTERNAL MEDICINE

## 2023-04-12 RX ORDER — TRAZODONE HYDROCHLORIDE 100 MG/1
200 TABLET ORAL NIGHTLY
Qty: 180 TABLET | Refills: 1 | Status: SHIPPED | OUTPATIENT
Start: 2023-04-12 | End: 2023-08-08

## 2023-04-12 RX ORDER — TRIAMCINOLONE ACETONIDE 1 MG/G
CREAM TOPICAL 2 TIMES DAILY
Qty: 453.6 G | Refills: 0 | Status: SHIPPED | OUTPATIENT
Start: 2023-04-12

## 2023-04-12 RX ORDER — HYDROXYZINE HYDROCHLORIDE 25 MG/1
TABLET, FILM COATED ORAL
COMMUNITY
Start: 2023-04-06 | End: 2023-05-11

## 2023-04-12 NOTE — PROGRESS NOTES
"  [facility]   Occupational Therapy Neurological Rehabilitation   Initial Evaluation     Patient: Anthony Ball "Dr Ball"  MRN: [id number]  Today's Date: 4/14/2023      See plan of care for details. JOSH Fernández 4/14/2023   "

## 2023-04-13 ENCOUNTER — PATIENT MESSAGE (OUTPATIENT)
Dept: INTERNAL MEDICINE | Facility: CLINIC | Age: 73
End: 2023-04-13
Payer: MEDICARE

## 2023-04-14 ENCOUNTER — CLINICAL SUPPORT (OUTPATIENT)
Dept: REHABILITATION | Facility: HOSPITAL | Age: 73
End: 2023-04-14
Attending: PHYSICAL MEDICINE & REHABILITATION
Payer: MEDICARE

## 2023-04-14 DIAGNOSIS — E11.42 POLYNEUROPATHY DUE TO TYPE 2 DIABETES MELLITUS: ICD-10-CM

## 2023-04-14 DIAGNOSIS — Z78.9 IMPAIRED MOBILITY AND ADLS: ICD-10-CM

## 2023-04-14 DIAGNOSIS — R29.898 DECREASED GRIP STRENGTH: ICD-10-CM

## 2023-04-14 DIAGNOSIS — R27.8 COORDINATION IMPAIRMENT: ICD-10-CM

## 2023-04-14 DIAGNOSIS — R29.898 WEAKNESS OF BOTH LOWER EXTREMITIES: ICD-10-CM

## 2023-04-14 DIAGNOSIS — R26.89 BALANCE PROBLEM: Primary | ICD-10-CM

## 2023-04-14 DIAGNOSIS — Z74.09 IMPAIRED MOBILITY AND ADLS: ICD-10-CM

## 2023-04-14 PROCEDURE — 97165 OT EVAL LOW COMPLEX 30 MIN: CPT | Mod: PO

## 2023-04-14 PROCEDURE — 97162 PT EVAL MOD COMPLEX 30 MIN: CPT | Mod: PO

## 2023-04-14 NOTE — PLAN OF CARE
" [facility]   Occupational Therapy Neurological Rehabilitation   Initial Evaluation     Patient: Anthony Ball "Dr Ball"  MRN: [id number]  Today's Date: 4/14/2023    Therapy Diagnosis:   Encounter Diagnoses   Name Primary?    Polyneuropathy due to type 2 diabetes mellitus     Impaired mobility and ADLs     Coordination impairment     Decreased  strength      Physician: Lyubov Yu MD  Physician Orders: Neuro Program     Medical Diagnosis: E11.42 (ICD-10-CM) - Polyneuropathy due to type 2 diabetes mellitus   Evaluation Date: 4/14/2023  Plan of Care Expiration Period: 6/9/2023  Insurance Authorization period Expiration: 5/14/2023  Date of Return to MD: nothing on Epic at this time   Visit # / Visits Authorized: 1 / 1  FOTO: 1/3     Time In:10:15  Time Out: 10:57  Total Billable (one on one) Time: 42 minutes    Precautions: Standard and Fall    Subjective   Occupational Profile:  Prior Level of Function: active and indep until about 8 weeks ago. Has reported a decline overall since he retired.     History of Current Level of Function: Pt admitted to ED on  3/19/2023  following ~8 weeks of going weakness. Patient with episode of back spasm occurring in shower while standing. His partner was unable to get him up. He presents following ongoing weakness. Patient d/c from acute care to inpatient rehab on 3/21/2023 for 8 weeks. He reported a lot of gains with therapy but still has ongoing issues with balance, walking and coordination.     Living Environment: Pt lives with his spouse and 3 dogs in a [facility] with 4 HEIKE and handrail going up that spouse put in. However, pt prefers to use the courtyard entrance. Pt has walk-in shower with a build-in seat.   Roles and Routines: , retired anesthesiologist ~1 year ago, does not drive  Leisure: in past- breeding dogs; currently: mainly at home watching TV.   Equipment Used at Home: walker, rolling; shower chair   Owns but does not use: wheelchair " "    Previous Therapy: inpatient rehab     Involved Side: Right  Dominant Side/Hand Dominance: Right  Date of Onset: 3/19/2023  Imaging: MRI studies 3/19/2023:   1.  Minimal anterolisthesis of L4 on L5 with uncovering of the disc and superimposed central disc protrusion.  No significant spinal canal narrowing.  Mild bilateral neural foramina at this level.  2.  Additional mild multilevel degenerative changes in the lumbar spine.  3.  Distended urinary bladder.    Patient's Goals for Occupational Therapy: tremor managment  and balance   Walking indep with no help - discussed this being a physical therapy goal     Pain:  Pain Related Behaviors Observed: no   Functional Pain Scale Rating 0-10:   on average- hard to rate due to being episodic   0/10 at best  10/10 at worst  Location: back spasms (lower back)  Description: "like vertebrae are breaking"    Functional Status      Functional Mobility:   Bed mobility: Mod I  Roll to left: Mod I  Roll to right: Mod I  Supine to sit: Mod I  Sit to supine: Mod I  Transfers to bed: Mod I  Transfers to toilet: Mod I  Car transfers: Mod I    ADL's:   Feeding: mod(I); drops from fork/spoon  Grooming: I  Hygiene: I  Upper Body Dressing: Mod I  Lower Body Dressing: Mod I  Toileting: Mod I  Bathing: Mod A- supervision    IADL's:  Homecare, etc is spouse's role   Medication management: Mod I; pill organizer. Did get them mixed up last week   Handwriting: increased difficulty due to tremor   Technology Use: increased difficulty due to tremor     Comments: hasn't had a drink since the hospital     Objective   Cognitive Exam:  Oriented: Person, Place, Time, and Situation  Behaviors: Cooperative  Follows Commands/attention: Follows multistep  commands  Communication: clear/fluent  Safety awareness/insight to disability: aware of diagnosis, treatment, and prognosis  Coping skills/emotional control: Appropriate to situation    Visual/Perceptual:  Tracking: intact all directions   Saccades: " intact all directions   Acuity: corrected by glasses  Convergence: WFL  Nystagmus: WFL     Physical Exam:  Postural examination/scapula alignment: Rounded shoulder and Head forward  Joint integrity: Firm end feeling  Skin integrity: Dry  Edema: none noted      Joint evaluation: AROM WFL for bilateral upper extremities     Fist: normal    Strength 4/14/2023 4/14/2023   **within available ROM** Right Left   Shoulder flexion 5/5 4+/5   Shoulder abduction 5/5 4+/5   Shoulder External Rotation 5/5 5/5   Shoulder Internal Rotation 5/5 5/5   Shoulder Extension 5/5 5/5   Shoulder Horizontal Adduction 5/5 5/5   Biceps 5/5 5/5   Triceps 5/5 5/5   Wrist flexion 5/5 5/5   Wrist extension  5/5 5/5      Strength: (MELONIE Dynamometer in lbs.) Position II: average of three trials     4/14/2023 4/14/2023    Right Left   Rung II 35.7 # 36.6 #     Pinch Strength (Measured in psi)     4/14/2023 4/14/2023    Right Left   Key Pinch 8 psi 6 psi   2pt Pinch 3 psi 2 psi   *use of large gauge ; digital battery broken    Fine Motor Coordination: 9 Hole Peg Test    Right   4/14/2023 Left   4/14/2023                53.90 s              53.85 s     Fine Motor Coordination: Box and Block    Right   4/14/2023 Left   4/14/2023                21              25     Gross motor coordination:   MICHAEL (Rapid Alternating Movements): delayed on right   Finger to Nose (3 times): slowed bilateral  ; no dysmetria   Finger Flicks (coordination moving from digit flexion to digit extension): slowed bilateral  ; increased delayed on right     Tone: bilateral upper extremities  Modified Vandana Scale:   0 - No increase in muscle tone    Sensation:  Anthony  reports with medication neuropathy in feet is gone; he reported no numbness/tingling in bilateral upper extremities     Balance:   Not assessed on this date     Endurance Deficit: moderate                      CMS Impairment/Limitation/Restriction for FOTO Survey    Therapist reviewed FOTO scores for Anthony  Quinn on 4/14/2023.   FOTO documents entered into [employer] - see Media section.    Limitation Score: 66%         Additional Treatment:   Evaluation only     Education provided:   - role of Occupational Therapy in care, goals for Occupational Therapy for this plan of care,  scheduling  - edu on importance of continuation of sobriety at this time   - discussed importance of leisure activities and hobbies in post retired life       Assessment     Anthony Ball is a 73 y.o. male referred to outpatient neurological occupational therapy and presents with a medical diagnosis of debility following weakness and back spasms. Pt presents today with bilateral upper extremity tremors impacting his self feeding and higher level ADLs. He has difficulty with standing for longer durations of time impacting his prior roles and routines. Following medical record review it is determined that patient will benefit from occupational therapy services in order to maximize pain free and/or functional use of bilateral upper extremities.     Patient prognosis is Fair+  Patient will benefit from skilled outpatient Occupational Therapy to address the deficits stated above and in the chart below, provide patient/family education, and to maximize patient's level of independence.     Plan of care discussed with patient: Yes  Patient's spiritual, cultural and educational needs considered and patient is agreeable to the plan of care and goals as stated below.     Anticipated Barriers for therapy: sedentary lifestyle     Past Medical History/Physical Systems Review:   Past Medical History:  Anthony Ball  has a past medical history of Coronary artery disease, Depression, Diabetic neuropathy, Essential (primary) hypertension, GERD (gastroesophageal reflux disease), Insomnia, Neuromyopathy, Reactive airway disease, and Type 2 diabetes mellitus.     Past Surgical History:  Anthony Ball  has a past surgical history that includes Colonoscopy and Total knee  arthroplasty (Right).    Current Medications:  Anthony has a current medication list which includes the following prescription(s): albuterol, albuterol sulfate, aspirin, diphenhydramine, duloxetine, empagliflozin, fluticasone-salmeterol 250-50 mcg/dose, hydroxyzine hcl, metformin, pantoprazole, pregabalin, trazodone, and triamcinolone acetonide 0.1%.    Allergies:  Review of patient's allergies indicates:  No Known Allergies     Medical Necessity is demonstrated by the following  Profile and History Assessment of Occupational Performance Level of Clinical Decision Making Complexity Score   Occupational Profile:   Anthony Ball is a 73 y.o. male who lives with their spouse and is currently retired.  Anthony Ball has difficulty with  feeding  And higher level balance  affecting his/her daily functional abilities. His/her main goal for therapy is tremor managment  and balance.     Comorbidities:    has a past medical history of Coronary artery disease, Depression, Diabetic neuropathy, Essential (primary) hypertension, GERD (gastroesophageal reflux disease), Insomnia, Neuromyopathy, Reactive airway disease, and Type 2 diabetes mellitus.    Medical and Therapy History Review:   Brief   Performance Deficits    Physical:  Joint Mobility  Joint Stability  Muscle Power/Strength  Muscle Endurance  Control of Voluntary Movement   Strength  Pinch Strength  Fine Motor Coordination  Postural Control  Pain    Cognitive:  No Deficits    Psychosocial:    Social Interaction  Routines     Clinical Decision Making:  low    Assessment Process:  Detailed Assessments    Modification/Need for Assistance:  Minimal-Moderate Modifications/Assistance    Intervention Selection:  Limited Treatment Options       low  Based on PMHX, co morbidities , data from assessments and functional level of assistance required with task and clinical presentation directly impacting function.         Goals:  Long Term Goals:  Time frame: 8 weeks    - Pt jamie arellano  with while seated on shower chair, utilizing adaptive equipment and/or compensatory strategies as needed with mod(I).   - Pt will demo ability to bring food to mouth with utensils with minimal to no spillage with AD as needed.   - Pt will perform dynamic standing with use of bilateral upper extremities in kitchen for 15 minutes with distant supervision to improve performance with homemaking tasks, cooking tasks, standing at the sink for grooming and hygiene, decrease risk for falls, and maximize safety.  - Pt will improve FOTO limitation score to less than or equal to 50% for improved self perception of functional performance with daily activities.   - Pt will be independent with Home Exercise Program (HEP)/Home Activity Program (HAP) to promote long term maintenance of progress made in therapy.     Short Term Goals:  Time frame: 4 weeks  - Pt will demonstrate ability to cut food safely with butter/steak knife or AD as needed.   - Pt will demonstrate consistent use of mindfulness techniques to manage stress, anxiety, and depression and will incorporate them into daily routine.  - Pt will perform dynamic standing task x a minimum of 10 minutes with zero losses of balance, no more than 2 brief seated rest breaks, and minimal verbals cues for (posture) to increase independence for home managment task.    - Pt will increase  strength by 3-5 # for BUE to increase performance during daily tasks (opening jars, holding telephone)     Goals to be adjusted as needed.   The following goals were discussed with the patient and patient is in agreement with them as to be addressed in the treatment plan.     Plan   Certification Period/Plan of care expiration: 4/14/2023 to 6/9/2023.    Outpatient Occupational Therapy 2 times weekly for 8 weeks to include the following interventions: Moist Heat/ Ice, Neuromuscular Re-ed, Paraffin, Patient Education, Self Care, Therapeutic Activities, and Therapeutic Exercise.      Eugenia Barth  JOSH  Neuro Occupational Therapist   [facility]  4/14/2023       I certify the need for these services furnished under this plan of treatment and while under my care.  ____________________________________ Physician/Referring Practitioner   Date of Signature

## 2023-04-14 NOTE — PLAN OF CARE
[facility]  Physical Therapy Neurological Rehabilitation Initial Evaluation    Name: Anthony Ball  Clinic Number: [id number]    Therapy Diagnosis:   Encounter Diagnoses   Name Primary?    Polyneuropathy due to type 2 diabetes mellitus     Balance problem Yes    Weakness of both lower extremities      Physician: Lyubov Yu MD    Physician Orders: PT Eval and Treat   Medical Diagnosis from Referral: Polyneuropathy due to type 2 diabetes mellitus  Evaluation Date: 4/14/2023  Authorization Period Expiration: 3/29/24  Plan of Care Expiration: 6/9/23  Visit # / Visits authorized: 1/ 1    Time In: 0945( pt arrived late)  Time Out: 1015  Total Billable Time: 30 minutes    Precautions: Standard and Fall    Subjective   Date of onset: October of 2019? after R knee replacement~ pt unable to recall year of surgery  History of current condition - Anthony reports:  he is having issues with neuropathy but it has improved after his last round of therapy. Pt reports he had problems with asthma in the past and was in bed for a prolonged period. Pt just finished his steroids about one month ago. Pt endorses some upper extremity weakness as well. Pt reports R leg has always been better than the L leg with regard to strength. Pt indicates he struggles to get out of a standard chair currently. Pt is not driving at this time but states he feels that he could.      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 Fall described as a slide down to the ground. He has had progressive weakness and back spasms for about a year, however the spasms have acutely exacerbated over the past several weeks. He cannot identify any exacerbating factor. In addition, he has had more difficulty  ambulating and poor balance. Notably, he does have severe diabetic neuropathy. Over the past 2 days, he has not been out of bed at all due to inability to walk. Today, he was in the shower with the assistance of his partner, and he experienced a spasm so severe it caused him to fall. He did not hit his head, does not have any residual trauma. He has not taken any muscle relaxers at home. He denies bowel/bladder incontinent and saddle anesthesia. He has only been eating guacamole the past few days. He also drinks alcohol - previously would have 2-3 glasses of wine a day but has increased drinking to help with the pain of the spasms. Currently drinks about 3 bottles of wine daily for about 3 weeks. The last time he drank was this morning. Otherwise, he has never smoked cigarettes, no other illicit drug use. 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     Medical History:   Past Medical History:   Diagnosis Date    Coronary artery disease     CAC score 1430    Depression     Diabetic neuropathy     Essential (primary) hypertension     GERD (gastroesophageal reflux disease)     Insomnia     Neuromyopathy     Possibly DM and/or alcohol related.    Reactive airway disease     Type 2 diabetes mellitus        Surgical History:   Anthony Ball  has a past surgical history that includes Colonoscopy and Total knee arthroplasty (Right).    Medications:   Anthony has a current medication list which includes the following prescription(s): albuterol, albuterol sulfate, aspirin, diphenhydramine, duloxetine, empagliflozin, fluticasone-salmeterol 250-50 mcg/dose, hydroxyzine hcl, metformin, pantoprazole, pregabalin, trazodone, and triamcinolone acetonide 0.1%.    Allergies:   Review of patient's allergies indicates:  No Known Allergies     Imaging, MRI studies: brain, 3/19/23:     Impression:     No acute intracranial process.     Diffuse cerebral volume loss with mild-to-moderate cerebellar  "atrophy.     Minimal supratentorial white matter T2 FLAIR signal, nonspecific likely sequel of chronic microvascular ischemic changes.     Paranasal sinus disease.    MRI lumbar spine, 3/19/23:    Impression:     1.  Minimal anterolisthesis of L4 on L5 with uncovering of the disc and superimposed central disc protrusion.  No significant spinal canal narrowing.  Mild bilateral neural foramina at this level.     2.  Additional mild multilevel degenerative changes in the lumbar spine.     3.  Distended urinary bladder.    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  Current Level of Function: having trouble with balance, walking, etc. Falling frequently.    Pain:  Current 0/10, worst 8/10, best 0/10   Location: lower back    Description: spasms  Aggravating Factors: Standing and Walking  Easing Factors: takes tylenol    Pts goals: " walking and being able to stand from a chair."    Objective     Observation: pleasant and cooperative   Speech: clear, though pt reports he gets confused at times    Mental status: alert, oriented to person, place, and time( with the exception of day of week~ pt reports " Wednesday")  Appearance: Casually dressed and solemn    Behavior:  calm, cooperative, and adequate rapport can be established  Attention Span and Concentration:  Normal    Posture Alignment : slouched when sitting in standard chair; forward head, rounded shoulders and PPT when sitting on mat    Dominant hand:  right "     Skin integrity:  scattered scrapes with residual blood smears on forearms    Visual/Auditory: ~ needs new glasses; hearing intact    ROM:   GROSS AROM/PROM  UPPER EXTREMITY  (R) UE: WFL  (L) UE: WFL  LOWER EXTREMITY  (R) LE: limited as follows: hip IR in supine~ not formally measured  (L) LE: limited as follows: hip IR in supine~ not formally measured       Lower Extremity Strength  Right LE  Left LE    Hip flexion:  3+/5 Hip flexion: 3+/5   Knee extension: 4/5 Knee extension: 3+/5   Knee flexion: 4-/5 Knee flexion: 4/5   Ankle dorsiflexion:  4-/5 Ankle dorsiflexion: 4-/5   Ankle plantarflexion:  4/5 Ankle plantarflexion: 4+/5   Hip abduction: 4+/5 Hip abduction: 4/5   Hip adduction: 5/5 Hip adduction 5/5   Hip extension: 4+/5 Hip extension: 4+/5     Upper extremity strength: See OT evaluation    Coordination:   Rapid Alternating Movements: NT due to time constraints  Point to Point:    -Finger to Nose: NT due to time constraints   -Heel to Shin: NT due to time constraints    Sensation: intact to B UE for LT; pt reports acuity diminished slightly to B LE  Proprioception: NT    Tone/Spasticity: No abnormal tone or spasticity noted to B UE or LE    Functional Mobility (Bed mobility, transfers)    Supine to sit: Mod I  Sit to supine: S with cues to align LEs with trunk  Rolling: NT  Transfers to mat: CGA  Sit to stand:  CGA to occasional SBA for safety  Stand pivot:  CGA for safety          Evaluation   Single Limb Stance R LE NT  (<10 sec = HIGH FALL RISK)   Single Limb Stance L LE NT  (<10 sec = HIGH FALL RISK)   30 second Chair Rise 3 completed with arms, light CGA   5 times sit to stand NT   TUG 30 seconds, CGA   Self selected walking speed 0.33 m/sec (6m/18s)   Fast walking speed NT     30 second chair rise below average score:   Age  Men  Women    70-74  <15  <14  A below average score indicates a risk for fall.    5x sit to stand normative information:   >12 sec= fall risk for general elderly  >16 sec= fall  risk for Parkinson's disease  >10 sec= balance/vestibular dysfunction (<61 y/o)  >14.2 sec= balance/vestibular dysfunction (>61 y/o)  >12 sec= fall risk for CVA    Postural control:  Unable to perform today due to time constraints; will test at first follow-up.    Gait Assessment:   - AD used: none~ pt occasionally reaches for wall or objects in gym  - Assistance: CGA to SBA  - Stairs: NT due to time constraints    GAIT DEVIATIONS:   Anthony displays the following deviations with ambulation: wide base of support, ER of feet, decreased anjel, decreased heel strike, lateral instability and difficulty maintaining straight path   Impairments contributing to deviations: impaired motor control, decreased LE strength, impaired balance, decreased endurance        Endurance Deficit: yes, at least moderate based on today's evaluation    PT Evaluation Completed? Yes      CMS Impairment/Limitation/Restriction for FOTO Endocrine, Metabolic and Immunity Disorders Survey    Therapist reviewed FOTO scores for Anthony Ball on 4/14/2023.   FOTO documents entered into EPIC - see Media section.    Survey will be provided at first follow-up session, 4/17/23.         TREATMENT     No treatment provided; evaluation only.    Home Exercises and Patient Education Provided    Education provided:   - Pt educated regarding evaluation findings, potential plan of care and scheduling process.      Written Home Exercises Provided: to be issued at a future session.    Assessment   Anthony is a 73 y.o. male referred to outpatient Physical Therapy with a medical diagnosis of Polyneuropathy due to type 2 diabetes mellitus. Pt presents to PT with the following impairments leading to his/her functional decline: decreased strength, decreased balance, decreased endurance. Dr. Ball presents to evaluation today about 20 minutes late. PT unable to start evaluation until about 9:45 PM due to confusion as to which PT would see pt at that point. Even with a limited  physical evaluation, pt is clearly a candidate for therapy. First, he appears to need the use of an AD to ambulate safely. He ambulated into clinic and into PT gym, but relied on a wheelchair to move from PT gym to OT gym and then to exit the clinic. Pt self reports 5 to 7 falls in the past month, which is not surprising after observing him for a short time frame. With regard to objective testing, pt demonstrates scattered B LE weakness, most notably to hip flexors and knee extensors.30 second chair rise score is very poor, with pt only able to complete 3 repetitions. This score is very indicative of fall risk. Pt's TUG score of 30 seconds is also quite limited, as is his SSWS of 0.33 m/sec. All of these scores are reflective of one who is at high risk for falls. PT did not have time to perform formal standing balance test, but this will be completed at first follow-up. PT also did not have time to issue FOTO survey, and this will be provided at first follow-up session as well. Pt's need for CGA during many transfers today is another indication that he is at risk for falls. PT recommends 2 x weekly therapy visits to address the above problems.    Pt prognosis is Fair.   Pt will benefit from skilled outpatient Physical Therapy to address the deficits stated above and in the chart below, provide pt/family education, and to maximize pt's level of independence.     Plan of care discussed with patient: Yes  Pt's spiritual, cultural and educational needs considered and patient is agreeable to the plan of care and goals as stated below:     Anticipated Barriers for therapy: history of alcohol use, frequent falls    Medical Necessity is demonstrated by the following  History  Co-morbidities and personal factors that may impact the plan of care Co-morbidities:   difficulty sleeping, HTN, prior abdominal surgery, transportation assistance required, and R knee replacement, GERD, syncope    Personal Factors:   lifestyle      high   Examination  Body Structures and Functions, activity limitations and participation restrictions that may impact the plan of care Body Regions:   lower extremities  upper extremities  trunk    Body Systems:    gross symmetry  strength  gross coordinated movement  balance  gait  transfers  transitions    Participation Restrictions:   Pt does not drive    Activity limitations:   Learning and applying knowledge  no deficits    General Tasks and Commands  no deficits    Communication  no deficits    Mobility  lifting and carrying objects  walking  moving around using equipment (WC)  using transportation (bus, train, plane, car)  driving (bike, car, motorcycle)    Self care  looking after one's health    Domestic Life  shopping  cooking  doing house work (cleaning house, washing dishes, laundry)  assisting others    Interactions/Relationships  no deficits    Life Areas  no deficits    Community and Social Life  community life  recreation and leisure         high   Clinical Presentation evolving clinical presentation with changing clinical characteristics moderate   Decision Making/ Complexity Score: moderate     Goals:  Short Term Goals: 4 weeks   Pt will be issued first installment of HEP and report at least partial compliance.  Pt will improve his 30 second chair rise to 4-5 reps to demonstrate increased LE strength and muscular endurance  Pt will improve his TUG score to 25 seconds or < with or without appropriate AD to improve household ambulation and decrease fall risk  Pt will increase his SSWS to 0.375 m/sec with or without appropriate AD to improve community ambulation and decrease fall risk  Pt will complete mCTSIB balance testing and PT to set appropriate goals  Pt will improve any 3 LE muscle grades by 1/3 to help with functional mobility skills  Pt will perform all sit<> stand and stand pivot transfers with no more than SBA    Long Term Goals: 8 weeks   Pt will be partially compliant with finalized  HEP to help maintain potential gains realized in PT  Pt will improve his 30 second chair rise to 5-6 reps to demonstrate increased LE strength and muscular endurance  Pt will improve his TUG score to 20 seconds or < with or without appropriate AD to improve household ambulation and decrease fall risk  Pt will increase his SSWS to 0.43 m/sec with or without appropriate AD to improve community ambulation and decrease fall risk  Pt will improve any 3 LE muscle grades by 2/3 to help with functional mobility skills   Pt will perform all sit<> stand and stand pivot transfers with no more than S    Plan   Plan of care Certification: 4/14/2023 to 6/9/23.    Outpatient Physical Therapy 2 times weekly for 8 weeks to include the following interventions: Gait Training, Neuromuscular Re-ed, Patient Education, Therapeutic Activities, and Therapeutic Exercise.     Ba Diaz, PT  4/14/2023

## 2023-04-16 NOTE — PROGRESS NOTES
[facility]   Physical Therapy Treatment Note     Name: Anthony Ball  Clinic Number: [id number]    Therapy Diagnosis:   Encounter Diagnoses   Name Primary?    Balance problem Yes    Weakness of both lower extremities      Physician: Lyubov Yu MD    Visit Date: 4/17/2023    Physician Orders: PT Eval and Treat   Medical Diagnosis from Referral: Polyneuropathy due to type 2 diabetes mellitus  Evaluation Date: 4/14/2023  Authorization Period Expiration: 12/31/23  Plan of Care Expiration: 6/9/23  Visit # / Visits authorized: 1/ 20(+eval)        PTA Visit #: 0/5     Time In: 1032  Time Out: 1115  Total Billable Time: 43 minutes    Precautions: Standard and Fall      SUBJECTIVE     Pt reports: feeling nauseated upon arrival.  He was issued first installment of home exercise program.  Response to previous treatment: no adverse effects from evaluation  Functional change: ongoing    Pain: 0/10  Location: N/A       OBJECTIVE     Objective Measures updated at progress report unless specified. See below:    MCTSIB:  1. Eyes Open/feet together/Firm: 30 seconds  2. Eyes Closed/feet together/Firm: 16 seconds  3. Eyes Open/feet together/Foam: 30 seconds~ mild sway  4. Eyes Closed/feet together/Foam: 7 seconds      FOTO intake survey:    CMS Impairment/Limitation/Restriction for FOTO Endocrine, Metabolic and Immunity Disorders Survey  Status Limitation G-Code CMS Severity Modifier  Intake 40% 60% Current Status CL - At least 60 percent but less than 80 percent  Predicted 51% 49% Goal Status+ CK - At least 40 percent but less than 60 percent  D/C Status CL **only report if this is a one time visit    Treatment     Anthony received the treatments listed below:      therapeutic exercises to develop strength, endurance, ROM, flexibility, posture, and core stabilization for 43 minutes including:  (Includes above tests and measures)    Pt instructed in and performs the following as first installment to  HEP:    Seated:  2 x 10 B LE hip flexion marches  2 x 10 B LAQ    Transitions:  2 x 5 squats over chair with B UE support on RW~ cues for improved technique    Includes time needed for frequent rest periods and assisting pt to ambulate from lobby to gym~ HHA, min A( pt holds onto therapist's L forearm and occasionally braces wall with R hand. PT tech escorted pt to first floor lobby via wheelchair at end of session.      neuromuscular re-education activities to improve: Balance, Coordination, Kinesthetic, Sense, Proprioception, and Posture for 0 minutes. The following activities were included:        therapeutic activities to improve functional performance for 0  minutes, including:      gait training to improve functional mobility and safety for 0  minutes, including:            Patient Education and Home Exercises     Home Exercises Provided and Patient Education Provided     Education provided:   - 4/17/23: Pt instructed in first installment of HEP. Suggested pt use his RW when he comes for future visits.    Written Home Exercises Provided: yes. Exercises were reviewed and Anthony was able to demonstrate them prior to the end of the session.  Anthony demonstrated good  understanding of the education provided. See EMR under Patient Instructions for exercises provided during therapy sessions    ASSESSMENT     Dr. Ball tolerated his first follow-up session fairly at best and was instructed in first installment of his HEP. PT also completed static balance assessment via the mCTSIB. Today's session was very limited by pt's need for frequent rest periods and initial complaints of nausea. Pt also presented again without an AD and continues to struggle with ambulation and balance. PT suggested that pt may benefit from bringing his RW to his next session. Pt's future sessions likely to be limited by decreased activity tolerance. Pt remains appropriate for continued PT at 2 x weekly frequency.    Anthony Is progressing well  towards his goals.   Pt prognosis is Fair.     Pt will continue to benefit from skilled outpatient physical therapy to address the deficits listed in the problem list box on initial evaluation, provide pt/family education and to maximize pt's level of independence in the home and community environment.     Pt's spiritual, cultural and educational needs considered and pt agreeable to plan of care and goals.     Anticipated barriers to physical therapy: history of alcohol use, frequent falls    Goals:    Short Term Goals: 4 weeks   Pt will be issued first installment of HEP and report at least partial compliance~in progress  Pt will improve his 30 second chair rise to 4-5 reps to demonstrate increased LE strength and muscular endurance~in progress  Pt will improve his TUG score to 25 seconds or < with or without appropriate AD to improve household ambulation and decrease fall risk~in progress  Pt will increase his SSWS to 0.375 m/sec with or without appropriate AD to improve community ambulation and decrease fall risk~in progress  Pt will complete mCTSIB balance testing and PT to set appropriate goals~MET, 4/17/23  Pt will improve any 3 LE muscle grades by 1/3 to help with functional mobility skills~in progress  Pt will perform all sit<> stand and stand pivot transfers with no more than SBA~in progress  (NEW GOAL, 4/17/23): Pt will improve his score on condition # 2 of the mCTSIB to 19 seconds for improved balance in low/eliminated vision environments     Long Term Goals: 8 weeks   Pt will be partially compliant with finalized HEP to help maintain potential gains realized in PT~ongoing  Pt will improve his 30 second chair rise to 5-6 reps to demonstrate increased LE strength and muscular endurance~ongoing  Pt will improve his TUG score to 20 seconds or < with or without appropriate AD to improve household ambulation and decrease fall risk~ongoing  Pt will increase his SSWS to 0.43 m/sec with or without appropriate AD  to improve community ambulation and decrease fall risk~ongoing  Pt will improve any 3 LE muscle grades by 2/3 to help with functional mobility skills ~ongoing  Pt will perform all sit<> stand and stand pivot transfers with no more than S~ongoing  (NEW GOAL, 4/17/23): Pt will improve his score on condition # 2 of the mCTSIB to 22 seconds for improved balance in low/eliminated vision environments    PLAN     Continue to progress strengthening, balance and endurance to tolerance.    Ba Diaz, PT    4/17/2023

## 2023-04-17 ENCOUNTER — CLINICAL SUPPORT (OUTPATIENT)
Dept: REHABILITATION | Facility: HOSPITAL | Age: 73
End: 2023-04-17
Payer: MEDICARE

## 2023-04-17 DIAGNOSIS — R29.898 WEAKNESS OF BOTH LOWER EXTREMITIES: ICD-10-CM

## 2023-04-17 DIAGNOSIS — R26.89 BALANCE PROBLEM: Primary | ICD-10-CM

## 2023-04-17 LAB — VIT B1 BLD-MCNC: 85 UG/L (ref 38–122)

## 2023-04-17 PROCEDURE — 97110 THERAPEUTIC EXERCISES: CPT | Mod: PO

## 2023-04-18 NOTE — TELEPHONE ENCOUNTER
----- Message from Kerry Mcgregor sent at 4/18/2023  1:07 PM CDT -----  Regarding: Prescription  Contact: Corin phelan/[facility] [phone]  Calling to request prescription for Rx DULoxetine (CYMBALTA) 60 MG capsule to be sent to [facility] fax [phone]. Please call to confirm receipt.    [id number] - [place] - [facility] [address] [facility] [address] [address]  Phone: [phone] Fax: [phone]

## 2023-04-19 ENCOUNTER — CLINICAL SUPPORT (OUTPATIENT)
Dept: REHABILITATION | Facility: HOSPITAL | Age: 73
End: 2023-04-19
Payer: MEDICARE

## 2023-04-19 DIAGNOSIS — R29.898 WEAKNESS OF BOTH LOWER EXTREMITIES: ICD-10-CM

## 2023-04-19 DIAGNOSIS — R26.89 BALANCE PROBLEM: Primary | ICD-10-CM

## 2023-04-19 PROCEDURE — 97110 THERAPEUTIC EXERCISES: CPT | Mod: PO,CQ

## 2023-04-19 PROCEDURE — 97530 THERAPEUTIC ACTIVITIES: CPT | Mod: PO,CQ

## 2023-04-19 NOTE — PROGRESS NOTES
"[facility]   Physical Therapy Treatment Note     Name: Anthony Ball  Clinic Number: [id number]    Therapy Diagnosis:   Encounter Diagnoses   Name Primary?    Balance problem Yes    Weakness of both lower extremities      Physician: Lyubov Yu MD    Visit Date: 4/19/2023    Physician Orders: PT Eval and Treat   Medical Diagnosis from Referral: Polyneuropathy due to type 2 diabetes mellitus  Evaluation Date: 4/14/2023  Authorization Period Expiration: 12/31/23  Plan of Care Expiration: 6/9/23  Visit # / Visits authorized: 2/ 20(+eval)        PTA Visit #: 1/5     Time In: 11:20  Time Out: 12:00  Total Billable Time: 40 minutes    Precautions: Standard and Fall      SUBJECTIVE     Pt reports: " I'm having a lot of back spasms today.  I'm in a lot of pain."   He was compliant with your HEP  Response to previous treatment: Some soreness  Functional change: ongoing    Pain: 5-6/10  Location: lower back       OBJECTIVE     Objective Measures updated at progress report unless specified. See below:      Treatment   Pt arrived with SC, but asked for a W/C to make it down the pendleton due to lower back pain  Anthony received the treatments listed below:      therapeutic exercises to develop strength, endurance, ROM, flexibility, posture, and core stabilization for 32 minutes including:  X 7 min on Sci Fit recumbent stepper.  B UE/B LE on level 1.0      Seated:  2 x 10 reps of B LE DF with RTB  2 x 10 reps of B LE PF with RTB  2 x 10 B LE hip flexion marches  2 x 10 B LAQ    Transitions:  2 x 5 squats over chair with B UE support on RW~ cues for improved technique    Pt was to fatigued and having to much pain that he asked to be transported down the pendleton with a W/C.      neuromuscular re-education activities to improve: Balance, Coordination, Kinesthetic, Sense, Proprioception, and Posture for 0 minutes. The following activities were included:        therapeutic activities to improve functional " performance for 8 minutes, including:  Pt encouraged to use A.D that he brought, a SC, but did not want to use it  Sit to stand from lobby chair with no A.D and SBA  SPT from lobby chair to W/C with no A.D And CGA.    SPT from W/C ---> Stepper seat with no A.D and CGA    gait training to improve functional mobility and safety for 0  minutes, including:            Patient Education and Home Exercises     Home Exercises Provided and Patient Education Provided     Education provided:   - 4/17/23: Pt instructed in first installment of HEP. Suggested pt use his RW when he comes for future visits.    Written Home Exercises Provided: yes. Exercises were reviewed and Anthony was able to demonstrate them prior to the end of the session.  Anthony demonstrated good  understanding of the education provided. 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  Anthony brought a straight cane with him, but did not utilize it much and required a transport in and out of the clinic with a wheelchair due to increased back pain and spasms.  Anthony was able to begin cardiovascular endurance today on the stepper and performed increased sitting exercises, and did not have any increase of pain today.   Pt remains appropriate for continued PT at 2 x weekly frequency.    Anthony Is progressing well towards his goals.   Pt prognosis is Fair.     Pt will continue to benefit from skilled outpatient physical therapy to address the deficits listed in the problem list box on initial evaluation, provide pt/family education and to maximize pt's level of independence in the home and community environment.     Pt's spiritual, cultural and educational needs considered and pt agreeable to plan of care and goals.     Anticipated barriers to physical therapy: history of alcohol use, frequent falls    Goals:    Short Term Goals: 4 weeks   Pt will be issued first  installment of HEP and report at least partial compliance~in progress  Pt will improve his 30 second chair rise to 4-5 reps to demonstrate increased LE strength and muscular endurance~in progress  Pt will improve his TUG score to 25 seconds or < with or without appropriate AD to improve household ambulation and decrease fall risk~in progress  Pt will increase his SSWS to 0.375 m/sec with or without appropriate AD to improve community ambulation and decrease fall risk~in progress  Pt will complete mCTSIB balance testing and PT to set appropriate goals~MET, 4/17/23  Pt will improve any 3 LE muscle grades by 1/3 to help with functional mobility skills~in progress  Pt will perform all sit<> stand and stand pivot transfers with no more than SBA~in progress  (NEW GOAL, 4/17/23): Pt will improve his score on condition # 2 of the mCTSIB to 19 seconds for improved balance in low/eliminated vision environments     Long Term Goals: 8 weeks   Pt will be partially compliant with finalized HEP to help maintain potential gains realized in PT~ongoing  Pt will improve his 30 second chair rise to 5-6 reps to demonstrate increased LE strength and muscular endurance~ongoing  Pt will improve his TUG score to 20 seconds or < with or without appropriate AD to improve household ambulation and decrease fall risk~ongoing  Pt will increase his SSWS to 0.43 m/sec with or without appropriate AD to improve community ambulation and decrease fall risk~ongoing  Pt will improve any 3 LE muscle grades by 2/3 to help with functional mobility skills ~ongoing  Pt will perform all sit<> stand and stand pivot transfers with no more than S~ongoing  (NEW GOAL, 4/17/23): Pt will improve his score on condition # 2 of the mCTSIB to 22 seconds for improved balance in low/eliminated vision environments    PLAN     Continue to progress strengthening, balance and endurance to tolerance.    Ciara Pro, PTA    4/19/2023

## 2023-04-20 RX ORDER — DULOXETIN HYDROCHLORIDE 60 MG/1
60 CAPSULE, DELAYED RELEASE ORAL 2 TIMES DAILY
Qty: 60 CAPSULE | Refills: 11 | Status: SHIPPED | OUTPATIENT
Start: 2023-04-20 | End: 2023-05-11 | Stop reason: SDUPTHER

## 2023-04-21 NOTE — PROGRESS NOTES
[facility]   Occupational Therapy Neurological Rehabilitation   Treatment Note   Name: Anthony Ball  MRN: [id number]  Today's Date: 4/24/2023    Therapy Diagnosis:   Encounter Diagnoses   Name Primary?    Impaired mobility and ADLs Yes    Coordination impairment     Decreased  strength      Physician: Lyubov Yu MD  Physician Orders: Neuro Program      Medical Diagnosis: E11.42 (ICD-10-CM) - Polyneuropathy due to type 2 diabetes mellitus   Evaluation Date: 4/14/2023  Plan of Care Expiration Period: 6/9/2023  Insurance Authorization period Expiration: 5/14/2023  Date of Return to MD: nothing on Epic at this time   FOTO: 1/3     Visit # / Visits Authorized: 1 / 20 (+evaluation)    Time In:11:28 (late to 11:15 appt time)  Time Out: 12:18  Total Billable Time: 50 minutes    Precautions:   Standard and Fall    Subjective   Pt reports: he feels stronger since evaluation- has been walking to his kitchen more and preparing breakfast foods    Response to previous treatment:evaluation only   Functional change: feeling stronger   Patient's Goals for Occupational Therapy: tremor managment  and balance   Walking indep with no help - discussed this being a physical therapy goal     Pain upon arrival: not given on numerical scale   Location: BLE (quads) - sore from exercise Saturday   Pain at cessation of session: n/a    Objective   Hand held assistance for functional mobility from waiting room to gym area     Anthony received therapeutic exercises for 46 minutes including:  -seated upper body ergonometer on level 2.5; completed for 10 minutes switching directions mid way. Focus on postural control and breathing techniques with exhale with exertion of force. MET average 1.4     Supine: left upper extremity proximal stability exercises  - serratus push ups x2 sets of 10 reps with 2# weight   - left CW/CCW pendulums x20 reps each with 2# weight     -issued endurance and strength HEP for bilateral upper  extremities: red resistance band with teach back understanding x10 reps each    Rest breaks and water breaks as needed   Home Exercises and Education Provided   Education provided:   - postural control   - role of Occupational Therapy in care, goals for Occupational Therapy for this plan of care,  scheduling  - edu on importance of continuation of sobriety at this time   - discussed importance of leisure activities and hobbies in post retired life     Written Home Exercises Provided: yes.  Exercises were reviewed and Anthony was able to demonstrate them prior to the end of the session.  Anthony demonstrated good  understanding of the HEP provided.   .   See EMR under Patient Instructions for exercises provided 4/24/2023.      Assessment     Anthony tolerated session fairly today. He requires mod cues throughout for upright postural control and upward gaze. He demo generalized weakness throughout with poor endurance with activity. He will continue to benefit from OT services to address stated goals listed below.     Anthony is progressing well towards his goals and there are no updates to goals at this time. Pt prognosis is Good.     Pt will continue to benefit from skilled outpatient occupational therapy to address the deficits listed in the problem list on initial evaluation provide pt/family education and to maximize pt's level of independence in the home and community environment.     Anticipated barriers to occupational therapy: carry over ; pt to go on cruise in 4 weeks     Pt's spiritual, cultural and educational needs considered and pt agreeable to plan of care and goals.    Goals:  Long Term Goals:  Time frame: 8 weeks    - Pt will bathe with while seated on shower chair, utilizing adaptive equipment and/or compensatory strategies as needed with mod(I). Ongoing   - Pt will demo ability to bring food to mouth with utensils with minimal to no spillage with AD as needed. Ongoing   - Pt will perform dynamic standing with  use of bilateral upper extremities in kitchen for 15 minutes with distant supervision to improve performance with homemaking tasks, cooking tasks, standing at the sink for grooming and hygiene, decrease risk for falls, and maximize safety.Ongoing   - Pt will improve FOTO limitation score to less than or equal to 50% for improved self perception of functional performance with daily activities. Ongoing   - Pt will be independent with Home Exercise Program (HEP)/Home Activity Program (HAP) to promote long term maintenance of progress made in therapy. Ongoing      Short Term Goals:  Time frame: 4 weeks    - Pt will demonstrate ability to cut food safely with butter/steak knife or AD as needed. Ongoing   - Pt will demonstrate consistent use of mindfulness techniques to manage stress, anxiety, and depression and will incorporate them into daily routine.Ongoing   - Pt will perform dynamic standing task x a minimum of 10 minutes with zero losses of balance, no more than 2 brief seated rest breaks, and minimal verbals cues for (posture) to increase independence for home managment task.  Ongoing   - Pt will increase  strength by 3-5 # for BUE to increase performance during daily tasks (opening jars, holding telephone) Ongoing      Goals to be adjusted as needed.   The following goals were discussed with the patient and patient is in agreement with them as to be addressed in the treatment plan.     Plan   Certification Period/Plan of care expiration: 4/14/2023 to 6/9/2023.     Outpatient Occupational Therapy 2 times weekly for 8 weeks to include the following interventions: Moist Heat/ Ice, Neuromuscular Re-ed, Paraffin, Patient Education, Self Care, Therapeutic Activities, and Therapeutic Exercise.      JOSH Roe  Neuro Occupational Therapist   [facility] - [facility]   4/24/2023

## 2023-04-24 ENCOUNTER — CLINICAL SUPPORT (OUTPATIENT)
Dept: REHABILITATION | Facility: HOSPITAL | Age: 73
End: 2023-04-24
Payer: MEDICARE

## 2023-04-24 DIAGNOSIS — Z78.9 IMPAIRED MOBILITY AND ADLS: Primary | ICD-10-CM

## 2023-04-24 DIAGNOSIS — R27.8 COORDINATION IMPAIRMENT: ICD-10-CM

## 2023-04-24 DIAGNOSIS — R29.898 DECREASED GRIP STRENGTH: ICD-10-CM

## 2023-04-24 DIAGNOSIS — Z74.09 IMPAIRED MOBILITY AND ADLS: Primary | ICD-10-CM

## 2023-04-24 PROCEDURE — 97110 THERAPEUTIC EXERCISES: CPT | Mod: PO

## 2023-04-24 NOTE — PROGRESS NOTES
Subjective:       Patient ID: Anthony Ball is a 73 y.o. male.    Chief Complaint: Hospital Follow Up      HPI:  Hospital f/u. Was having severe back pain with spasms, difficulty ambulating. Course of Inpatient rehab afterwards. Hx heavy alcohol use in past and was drinking more prior to admission, partner notes partly for back pain. Hx of peripheral neuropathy, likely diabetic and alcohol related. Recently feels great benefit in LE symptoms with Lyrica and Cymbalta. Using Trazodone 200 mg qhs for sleep with good effect. Not drinking since hospital.  Itching has been an issue, generalized. Some effect with Vistaril prn.  Of note, no urgent findings noted on MRI lumbar or brain in hospital.      Past Medical History:   Diagnosis Date    Coronary artery disease     CAC score 1430    Depression     Diabetic neuropathy     Essential (primary) hypertension     GERD (gastroesophageal reflux disease)     Insomnia     Neuromyopathy     Possibly DM and/or alcohol related.    Reactive airway disease     Type 2 diabetes mellitus          Current Outpatient Medications:     aspirin 81 MG Chew, Take 81 mg by mouth once daily., Disp: , Rfl:     diphenhydrAMINE (BENADRYL) 25 mg capsule, Take 25 mg by mouth every 6 (six) hours as needed for Itching., Disp: , Rfl:     empagliflozin (JARDIANCE) 10 mg tablet, Take 1 tablet (10 mg total) by mouth once daily., Disp: 90 tablet, Rfl: 3    hydrOXYzine HCL (ATARAX) 25 MG tablet, Take by mouth., Disp: , Rfl:     metFORMIN (GLUCOPHAGE-XR) 500 MG ER 24hr tablet, Take 1 tablet (500 mg total) by mouth 2 (two) times daily with meals., Disp: 180 tablet, Rfl: 3    pantoprazole (PROTONIX) 40 MG tablet, Take 1 tablet (40 mg total) by mouth once daily., Disp: 90 tablet, Rfl: 3    pregabalin (LYRICA) 150 MG capsule, Take 1 capsule (150 mg total) by mouth 2 (two) times daily., Disp: 60 capsule, Rfl: 6    albuterol (VENTOLIN HFA) 90 mcg/actuation inhaler, Inhale 2 puffs into the lungs every 6 (six) hours  as needed for Wheezing. Rescue (Patient not taking: Reported on 4/12/2023), Disp: 8 g, Rfl: 2    albuterol sulfate 2.5 mg/0.5 mL Nebu, Take 2.5 mg by nebulization every 6 (six) hours as needed. Rescue (Patient not taking: Reported on 4/12/2023), Disp: 6 each, Rfl: 3    DULoxetine (CYMBALTA) 60 MG capsule, Take 1 capsule (60 mg total) by mouth 2 (two) times daily., Disp: 60 capsule, Rfl: 11    fluticasone-salmeterol diskus inhaler 250-50 mcg, Inhale 1 puff into the lungs 2 (two) times daily. Controller (Patient not taking: Reported on 4/12/2023), Disp: 60 each, Rfl: 2    traZODone (DESYREL) 100 MG tablet, Take 2 tablets (200 mg total) by mouth every evening., Disp: 180 tablet, Rfl: 1    triamcinolone acetonide 0.1% (KENALOG) 0.1 % cream, Apply topically 2 (two) times daily. Use for 1 to 2 weeks as needed for rash., Disp: 453.6 g, Rfl: 0    Past Surgical History:   Procedure Laterality Date    COLONOSCOPY      Normal around 2020    TOTAL KNEE ARTHROPLASTY Right        Social History     Tobacco Use    Smoking status: Never    Smokeless tobacco: Never   Substance Use Topics    Alcohol use: Yes     Comment: Former heavy use    Drug use: Never         Objective:      Vitals:    04/12/23 1302   BP: 99/67   Pulse: 85   Temp: 97.9 °F (36.6 °C)       Physical Exam  Constitutional:       General: He is not in acute distress.     Appearance: Normal appearance. He is not ill-appearing.   HENT:      Head: Normocephalic and atraumatic.   Eyes:      Extraocular Movements: Extraocular movements intact.   Cardiovascular:      Rate and Rhythm: Normal rate.   Pulmonary:      Effort: Pulmonary effort is normal. No respiratory distress.   Skin:     Findings: Lesion (Scattered areas of recent excoriation on arms/leg/torso.) present.   Neurological:      Mental Status: He is alert and oriented to person, place, and time. Mental status is at baseline.      Motor: Weakness present.      Gait: Gait abnormal.      Comments: Generalized  weakness, particularly LE's. Moderate tremor (intention).   Psychiatric:         Mood and Affect: Mood normal.         Behavior: Behavior normal.         Recent Results (from the past 504 hour(s))   COMPREHENSIVE METABOLIC PANEL    Collection Time: 04/12/23  2:01 PM   Result Value Ref Range    Sodium 142 136 - 145 mmol/L    Potassium 4.5 3.5 - 5.1 mmol/L    Chloride 107 95 - 110 mmol/L    CO2 23 23 - 29 mmol/L    Glucose 120 (H) 70 - 110 mg/dL    BUN 15 8 - 23 mg/dL    Creatinine 1.0 0.5 - 1.4 mg/dL    Calcium 9.3 8.7 - 10.5 mg/dL    Total Protein 6.5 6.0 - 8.4 g/dL    Albumin 2.7 (L) 3.5 - 5.2 g/dL    Total Bilirubin 0.5 0.1 - 1.0 mg/dL    Alkaline Phosphatase 95 55 - 135 U/L    AST 22 10 - 40 U/L    ALT 16 10 - 44 U/L    Anion Gap 12 8 - 16 mmol/L    eGFR >60.0 >60 mL/min/1.73 m^2   CBC Without Differential    Collection Time: 04/12/23  2:01 PM   Result Value Ref Range    WBC 9.91 3.90 - 12.70 K/uL    RBC 4.10 (L) 4.60 - 6.20 M/uL    Hemoglobin 13.5 (L) 14.0 - 18.0 g/dL    Hematocrit 39.8 (L) 40.0 - 54.0 %    MCV 97 82 - 98 fL    MCH 32.9 (H) 27.0 - 31.0 pg    MCHC 33.9 32.0 - 36.0 g/dL    RDW 13.1 11.5 - 14.5 %    Platelets 243 150 - 450 K/uL    MPV 10.2 9.2 - 12.9 fL   Magnesium    Collection Time: 04/12/23  2:01 PM   Result Value Ref Range    Magnesium 1.4 (L) 1.6 - 2.6 mg/dL   VITAMIN B1    Collection Time: 04/12/23  2:01 PM   Result Value Ref Range    Thiamine 85 38 - 122 ug/L   VITAMIN B12    Collection Time: 04/12/23  2:01 PM   Result Value Ref Range    Vitamin B-12 397 210 - 950 pg/mL   FOLATE    Collection Time: 04/12/23  2:01 PM   Result Value Ref Range    Folate 13.1 4.0 - 24.0 ng/mL   Iron and TIBC    Collection Time: 04/12/23  2:01 PM   Result Value Ref Range    Iron 64 45 - 160 ug/dL    Transferrin 137 (L) 200 - 375 mg/dL    TIBC 203 (L) 250 - 450 ug/dL    Saturated Iron 32 20 - 50 %   Ferritin    Collection Time: 04/12/23  2:01 PM   Result Value Ref Range    Ferritin 374 (H) 20.0 - 300.0 ng/mL       Assessment/Plan:     1) Depression, Insomnia - Relatively stable on Cymbalta, Trazodone qhs. Denies SI. Offered Psych referral, declines for now. Partner present is very supportive.  2) Alcohol use - Was heavy again prior to hospitalization. Noted back pain part of reason for use which has now abated.  3) LBP - Improved. No urgent MRI lumbar findings, some areas of djd and mild compression to be considered depending on future progress. Continuing PT.  4) Hypomagnesemia - Recommend starting daily Mag supplement (400 mg). Regardless, agreed need to get healthy calorie intake back up. Repeat levels 1 to 3 mths.  5) Peripheral neuropathy - Chronic. Likely dm and/or alcohol related. Stable. Feels Cymbalta and Lyrica have had great benefit thus far.  6) Pruritus - Generalized. No obvious underlying Continuing Vistaril prn. Sending steroid cream to be used prn areas of heavy pruritus and excoriation. Advised not using on any open wounds.

## 2023-04-27 NOTE — PROGRESS NOTES
[facility]   Occupational Therapy Neurological Rehabilitation   Treatment Note   Name: Anthony Ball  MRN: [id number]  Today's Date: 4/28/2023    Therapy Diagnosis:   Encounter Diagnoses   Name Primary?    Impaired mobility and ADLs Yes    Coordination impairment     Decreased  strength      Physician: Lyubov Yu MD  Physician Orders: Neuro Program      Medical Diagnosis: E11.42 (ICD-10-CM) - Polyneuropathy due to type 2 diabetes mellitus   Evaluation Date: 4/14/2023  Plan of Care Expiration Period: 6/9/2023  Insurance Authorization period Expiration: 5/14/2023  Date of Return to MD: nothing on Epic at this time   FOTO: 1/3     Visit # / Visits Authorized: 2 / 20 (+evaluation)    Time In: 11:00am  Time Out: 11:45am  Total Billable Time: 45 minutes    Precautions:   Standard and Fall    Subjective   Pt reports: hurting form recent fall    Response to previous treatment: positive   Functional change: feeling stronger   Patient's Goals for Occupational Therapy: tremor managment  and balance   Walking indep with no help - discussed this being a physical therapy goal     Pain upon arrival: not given on numerical scale   Location: R knee  Pain at cessation of session: n/a    Objective   Hand held assistance for functional mobility from waiting room to gym area     Anthony received therapeutic exercises for 45 minutes including:  -seated upper body ergonometer on level 2.5; completed for 10 minutes switching directions mid way. Focus on postural control and breathing techniques with exhale with exertion of force. MET average 1.4   - /pinch HEP B hands x20, patient requested we stop prior to completing all exercises    - 2pt pinch   - lateral pinch   - gross grasp  - Supine L shoulder FF 2# DB    Rest breaks and water breaks as needed   Home Exercises and Education Provided   Education provided:   - postural control   - role of Occupational Therapy in care, goals for Occupational Therapy for this  plan of care,  scheduling  - edu on importance of continuation of sobriety at this time   - discussed importance of leisure activities and hobbies in post retired life     Written Home Exercises Provided: yes.  Exercises were reviewed and Anthony was able to demonstrate them prior to the end of the session.  Anthony demonstrated good  understanding of the HEP provided.   .   See EMR under Patient Instructions for exercises provided 4/24/2023.      Assessment     Anthony had fair tolerance to tx again this date. He was not agreeable to completing all HEP exercises for review. He was also a bit argumentative stating therapist lied to him on how many he had left to do. Review of exercises needed. He will continue to benefit from OT services to address stated goals listed below.     Anthony is progressing well towards his goals and there are no updates to goals at this time. Pt prognosis is Good.     Pt will continue to benefit from skilled outpatient occupational therapy to address the deficits listed in the problem list on initial evaluation provide pt/family education and to maximize pt's level of independence in the home and community environment.     Anticipated barriers to occupational therapy: carry over ; pt to go on cruise in 4 weeks     Pt's spiritual, cultural and educational needs considered and pt agreeable to plan of care and goals.    Goals:  Long Term Goals:  Time frame: 8 weeks    - Pt will bathe with while seated on shower chair, utilizing adaptive equipment and/or compensatory strategies as needed with mod(I). Ongoing   - Pt will demo ability to bring food to mouth with utensils with minimal to no spillage with AD as needed. Ongoing   - Pt will perform dynamic standing with use of bilateral upper extremities in kitchen for 15 minutes with distant supervision to improve performance with homemaking tasks, cooking tasks, standing at the sink for grooming and hygiene, decrease risk for falls, and maximize  safety.Ongoing   - Pt will improve FOTO limitation score to less than or equal to 50% for improved self perception of functional performance with daily activities. Ongoing   - Pt will be independent with Home Exercise Program (HEP)/Home Activity Program (HAP) to promote long term maintenance of progress made in therapy. Ongoing      Short Term Goals:  Time frame: 4 weeks    - Pt will demonstrate ability to cut food safely with butter/steak knife or AD as needed. Ongoing   - Pt will demonstrate consistent use of mindfulness techniques to manage stress, anxiety, and depression and will incorporate them into daily routine.Ongoing   - Pt will perform dynamic standing task x a minimum of 10 minutes with zero losses of balance, no more than 2 brief seated rest breaks, and minimal verbals cues for (posture) to increase independence for home managment task.  Ongoing   - Pt will increase  strength by 3-5 # for BUE to increase performance during daily tasks (opening jars, holding telephone) Ongoing      Goals to be adjusted as needed.   The following goals were discussed with the patient and patient is in agreement with them as to be addressed in the treatment plan.     Plan   Certification Period/Plan of care expiration: 4/14/2023 to 6/9/2023.     Outpatient Occupational Therapy 2 times weekly for 8 weeks to include the following interventions: Moist Heat/ Ice, Neuromuscular Re-ed, Paraffin, Patient Education, Self Care, Therapeutic Activities, and Therapeutic Exercise.    JOSH Rubi    4/27/2023

## 2023-04-27 NOTE — PROGRESS NOTES
"[facility]   Physical Therapy Treatment Note     Name: Anthony Ball  Clinic Number: [id number]    Therapy Diagnosis:   Encounter Diagnoses   Name Primary?    Balance problem Yes    Weakness of both lower extremities        Physician: Lyubov Yu MD    Visit Date: 4/28/2023    Physician Orders: PT Eval and Treat   Medical Diagnosis from Referral: Polyneuropathy due to type 2 diabetes mellitus  Evaluation Date: 4/14/2023  Authorization Period Expiration: 12/31/23  Plan of Care Expiration: 6/9/23  Visit # / Visits authorized: 3/ 20(+eval)        PTA Visit #: 0/5     Time In: 1015   Time Out: 1100   Total Billable Time: 45  minutes    Precautions: Standard and Fall      SUBJECTIVE     Pt reports: he is having a miserable day and a difficult week due to instability. He also reports having a fall last night in the bathroom, during which he landed on his R knee. He also mentions that he is scheduled to go on a cruise at the end of May.  He was compliant with home exercise program.  Response to previous treatment: no adverse effects   Functional change: ongoing    Pain: 3/10  Location: R knee, B shoulders       OBJECTIVE   PT meets pt just outside clinic door to offer a wheelchair to travel to 2nd floor. Pt observed to be having difficulty ambulating into clinic with his partner.      Objective Measures updated at progress report unless specified.       Treatment     Anthony received the treatments listed below:      therapeutic exercises to develop strength, endurance, ROM, flexibility, posture, and core stabilization for 35 minutes including:      X 7 min on SCI-FIT recumbent stepper, level 1.0, for B UE and LE muscular and CV endurance     Seated:  2 x 10 B LE hip flexion marches~ PT places 2 " block beneath feet for 2nd set and assists some with L LE movement  2 x 10 B LAQ~ 1 # ankle weight added    2 gait trials: 31 feet and 68 feet with RW, SBA~ slow, shuffling gait pattern observed with " "wide base of support and feet in ER       Includes time needed for frequent rest periods, water and assisting pt to ambulate from just outside clinic to first floor lobby~ HHA, min A      neuromuscular re-education activities to improve: Balance, Coordination, Kinesthetic, Sense, Proprioception, and Posture for 0 minutes. The following activities were included:        therapeutic activities to improve functional performance for 10  minutes, including:    -CGA stand to sit into clinic wheelchair  -Mod A for hip elevation, sit to stand from wheelchair before attempting to sit on SCI-FIT stepper seat    Transitions:  2 x 5 sit to stand trials from gold chair with RW placed in front, S~ cues for proper hand placement on arm rests    2 trials of practicing stand> sit from RW to gold chair with SBA ~ cues to properly align self with chair and not " abandon" walker too soon by pushing to the side    gait training to improve functional mobility and safety for 0  minutes, including:            Patient Education and Home Exercises     Home Exercises Provided and Patient Education Provided     Education provided:   - 4/17/23: Pt instructed in first installment of HEP. Suggested pt use his RW when he comes for future visits.  -4/28/23: Pt again reminded that he would be safer to come to clinic with his RW.    Written Home Exercises Provided: yes. Exercises were reviewed and Anthony was able to demonstrate them prior to the end of the session.  Anthony demonstrated good  understanding of the education provided. 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 He continues to require assistance to ambulate into the clinic and would be much safer bringing his RW to sessions. PT is attempting to progress pt in a piecemeal fashion, but there are some challenges here. He needs " frequent rest periods throughout each session, and the intensity of most activities remains very low. Pt's perception of how much progress he may potentially make by the time of his cruise also seems to be inconsistent with his current abilities. As mentioned previously, future sessions are likely to be limited by decreased activity tolerance and pt's actual desire to push himself. Pt remains appropriate for continued PT at 2 x weekly frequency.    Anthony Is progressing well towards his goals.   Pt prognosis is Fair.     Pt will continue to benefit from skilled outpatient physical therapy to address the deficits listed in the problem list box on initial evaluation, provide pt/family education and to maximize pt's level of independence in the home and community environment.     Pt's spiritual, cultural and educational needs considered and pt agreeable to plan of care and goals.     Anticipated barriers to physical therapy: history of alcohol use, frequent falls    Goals:    Short Term Goals: 4 weeks   Pt will be issued first installment of HEP and report at least partial compliance~in progress  Pt will improve his 30 second chair rise to 4-5 reps to demonstrate increased LE strength and muscular endurance~in progress  Pt will improve his TUG score to 25 seconds or < with or without appropriate AD to improve household ambulation and decrease fall risk~in progress  Pt will increase his SSWS to 0.375 m/sec with or without appropriate AD to improve community ambulation and decrease fall risk~in progress  Pt will complete mCTSIB balance testing and PT to set appropriate goals~MET, 4/17/23  Pt will improve any 3 LE muscle grades by 1/3 to help with functional mobility skills~in progress  Pt will perform all sit<> stand and stand pivot transfers with no more than SBA~in progress  (NEW GOAL, 4/17/23): Pt will improve his score on condition # 2 of the mCTSIB to 19 seconds for improved balance in low/eliminated vision  environments     Long Term Goals: 8 weeks   Pt will be partially compliant with finalized HEP to help maintain potential gains realized in PT~ongoing  Pt will improve his 30 second chair rise to 5-6 reps to demonstrate increased LE strength and muscular endurance~ongoing  Pt will improve his TUG score to 20 seconds or < with or without appropriate AD to improve household ambulation and decrease fall risk~ongoing  Pt will increase his SSWS to 0.43 m/sec with or without appropriate AD to improve community ambulation and decrease fall risk~ongoing  Pt will improve any 3 LE muscle grades by 2/3 to help with functional mobility skills ~ongoing  Pt will perform all sit<> stand and stand pivot transfers with no more than S~ongoing  (NEW GOAL, 4/17/23): Pt will improve his score on condition # 2 of the mCTSIB to 22 seconds for improved balance in low/eliminated vision environments    PLAN     Continue to progress strengthening, balance and endurance to tolerance.    Ba Diaz, PT    4/28/2023

## 2023-04-28 ENCOUNTER — CLINICAL SUPPORT (OUTPATIENT)
Dept: REHABILITATION | Facility: HOSPITAL | Age: 73
End: 2023-04-28
Payer: MEDICARE

## 2023-04-28 DIAGNOSIS — R29.898 WEAKNESS OF BOTH LOWER EXTREMITIES: ICD-10-CM

## 2023-04-28 DIAGNOSIS — R29.898 DECREASED GRIP STRENGTH: ICD-10-CM

## 2023-04-28 DIAGNOSIS — R26.89 BALANCE PROBLEM: Primary | ICD-10-CM

## 2023-04-28 DIAGNOSIS — Z74.09 IMPAIRED MOBILITY AND ADLS: Primary | ICD-10-CM

## 2023-04-28 DIAGNOSIS — Z78.9 IMPAIRED MOBILITY AND ADLS: Primary | ICD-10-CM

## 2023-04-28 DIAGNOSIS — R27.8 COORDINATION IMPAIRMENT: ICD-10-CM

## 2023-04-28 PROCEDURE — 97110 THERAPEUTIC EXERCISES: CPT | Mod: PO

## 2023-04-28 PROCEDURE — 97530 THERAPEUTIC ACTIVITIES: CPT | Mod: PO

## 2023-05-02 NOTE — PROGRESS NOTES
[facility]   Occupational Therapy Neurological Rehabilitation   Treatment Note   Name: Anthony Ball  MRN: [id number]  Today's Date: 5/3/2023    Therapy Diagnosis:   Encounter Diagnoses   Name Primary?    Impaired mobility and ADLs Yes    Coordination impairment     Decreased  strength      Physician: Lyubov Yu MD  Physician Orders: Neuro Program      Medical Diagnosis: E11.42 (ICD-10-CM) - Polyneuropathy due to type 2 diabetes mellitus   Evaluation Date: 4/14/2023  Plan of Care Expiration Period: 6/9/2023  Insurance Authorization period Expiration: 5/14/2023  Date of Return to MD: nothing on Epic at this time   FOTO: 1/3     Visit # / Visits Authorized: 3 / 20 (+evaluation)    Time In: 11:23  Time Out: 12:24  Total Billable Time: 59 minutes    Precautions:   Standard and Fall    Subjective   Pt reports: he was out and about all day yesterday with his partner- used his walker. Felt good.     Going on a cruise on May 25th for 10 days  Response to previous treatment: no complaints   Functional change: feeling stronger   Patient's Goals for Occupational Therapy: tremor management and balance     Pain upon arrival: none reported    Location: n/a  Pain at cessation of session: n/a    Objective   Hand held assistance for functional mobility from waiting room to gym area - contact guard assist     Anthony received therapeutic exercises for 28 minutes including:  -seated upper body ergonometer on level 2.5; completed for 10 minutes switching directions mid way. Focus on postural control and breathing techniques with exhale with exertion of force. MET average 1.5    Seated edge of mat: x1 set of 10  -5# dowel: chest press, shoulder press, bicep curls  Standing: tricep extension     Anthony participated in dynamic functional therapeutic activities to improve functional performance for 31  minutes, including:  Standing at table top: x5.5 min and x7 min trials   -2# B wrist weights - reaching task for  clothes pins     -edu on bringing walker into therapy clinic for visits ; use of rolling walker throughout session with supervision and added time     Rest breaks and water breaks as needed   Home Exercises and Education Provided   Education provided:   - postural control   - role of Occupational Therapy in care, goals for Occupational Therapy for this plan of care,  scheduling  - edu on importance of continuation of sobriety at this time   - discussed importance of leisure activities and hobbies in post retired life     Written Home Exercises Provided: yes.  Exercises were reviewed and Anthony was able to demonstrate them prior to the end of the session.  Anthony demonstrated good  understanding of the HEP provided.   .   See EMR under Patient Instructions for exercises provided 4/24/2023. 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 He continues to demo most functional limitations with his overall endurance. Pt continues to progress towards OT goals and remain motivated for rehab process.     Anthony is progressing well towards his goals and there are no updates to goals at this time. Pt prognosis is Good.     Pt will continue to benefit from skilled outpatient occupational therapy to address the deficits listed in the problem list on initial evaluation provide pt/family education and to maximize pt's level of independence in the home and community environment.     Anticipated barriers to occupational therapy: carry over     Pt's spiritual, cultural and educational needs considered and pt agreeable to plan of care and goals.    Goals:  Long Term Goals:  Time frame: 8 weeks  - Pt will bathe with while seated on shower chair, utilizing adaptive equipment and/or compensatory strategies as needed with mod(I). Ongoing   - Pt will demo ability to bring food to mouth with utensils with minimal to no spillage with AD as needed. Ongoing (able to complete with open mouth cup to  mouth)  - Pt will perform dynamic standing with use of bilateral upper extremities in kitchen for 15 minutes with distant supervision to improve performance with homemaking tasks, cooking tasks, standing at the sink for grooming and hygiene, decrease risk for falls, and maximize safety.Ongoing   - Pt will improve FOTO limitation score to less than or equal to 50% for improved self perception of functional performance with daily activities. Ongoing   - Pt will be independent with Home Exercise Program (HEP)/Home Activity Program (HAP) to promote long term maintenance of progress made in therapy. Ongoing      Short Term Goals:  Time frame: 4 weeks  - Pt will demonstrate ability to cut food safely with butter/steak knife or AD as needed. Ongoing   - Pt will demonstrate consistent use of mindfulness techniques to manage stress, anxiety, and depression and will incorporate them into daily routine.Ongoing   - Pt will perform dynamic standing task x a minimum of 10 minutes with zero losses of balance, no more than 2 brief seated rest breaks, and minimal verbals cues for (posture) to increase independence for home managment task. Progressing; 7 min before rest break 5/3/2023   - Pt will increase  strength by 3-5 # for BUE to increase performance during daily tasks (opening jars, holding telephone) Ongoing      Goals to be adjusted as needed.   The following goals were discussed with the patient and patient is in agreement with them as to be addressed in the treatment plan.     Plan   Certification Period/Plan of care expiration: 4/14/2023 to 6/9/2023.     Outpatient Occupational Therapy 2 times weekly for 8 weeks to include the following interventions: Moist Heat/ Ice, Neuromuscular Re-ed, Paraffin, Patient Education, Self Care, Therapeutic Activities, and Therapeutic Exercise.    JOSH Fernández 5/3/2023

## 2023-05-03 ENCOUNTER — CLINICAL SUPPORT (OUTPATIENT)
Dept: REHABILITATION | Facility: HOSPITAL | Age: 73
End: 2023-05-03
Payer: MEDICARE

## 2023-05-03 DIAGNOSIS — R29.898 WEAKNESS OF BOTH LOWER EXTREMITIES: ICD-10-CM

## 2023-05-03 DIAGNOSIS — R29.898 DECREASED GRIP STRENGTH: ICD-10-CM

## 2023-05-03 DIAGNOSIS — Z78.9 IMPAIRED MOBILITY AND ADLS: Primary | ICD-10-CM

## 2023-05-03 DIAGNOSIS — Z74.09 IMPAIRED MOBILITY AND ADLS: Primary | ICD-10-CM

## 2023-05-03 DIAGNOSIS — R27.8 COORDINATION IMPAIRMENT: ICD-10-CM

## 2023-05-03 DIAGNOSIS — R26.89 BALANCE PROBLEM: Primary | ICD-10-CM

## 2023-05-03 PROCEDURE — 97530 THERAPEUTIC ACTIVITIES: CPT | Mod: PO

## 2023-05-03 PROCEDURE — 97110 THERAPEUTIC EXERCISES: CPT | Mod: PO,CQ

## 2023-05-03 PROCEDURE — 97110 THERAPEUTIC EXERCISES: CPT | Mod: PO

## 2023-05-03 PROCEDURE — 97112 NEUROMUSCULAR REEDUCATION: CPT | Mod: PO,CQ

## 2023-05-03 NOTE — PROGRESS NOTES
"[facility]   Physical Therapy Treatment Note     Name: Anthony Ball  Clinic Number: [id number]    Therapy Diagnosis:   Encounter Diagnoses   Name Primary?    Balance problem Yes    Weakness of both lower extremities              Physician: Lyubov Yu MD    Visit Date: 5/3/2023    Physician Orders: PT Eval and Treat   Medical Diagnosis from Referral: Polyneuropathy due to type 2 diabetes mellitus  Evaluation Date: 4/14/2023  Authorization Period Expiration: 12/31/23  Plan of Care Expiration: 6/9/23  Visit # / Visits authorized: 4/ 20(+eval)        PTA Visit #: 1/5     Time In: 1300  Time Out: 1345  Total Billable Time: 45  minutes    Precautions: Standard and Fall      SUBJECTIVE     Pt reports: he is having a miserable day and a difficult week due to instability. He also reports having a fall last night in the bathroom, during which he landed on his R knee. He also mentions that he is scheduled to go on a cruise at the end of May.  He was compliant with home exercise program.  Response to previous treatment: no adverse effects   Functional change: ongoing    Pain: 3/10  Location: R knee, B shoulders       OBJECTIVE   PT meets pt just outside clinic door to offer a wheelchair to travel to 2nd floor. Pt observed to be having difficulty ambulating into clinic with his partner.      Objective Measures updated at progress report unless specified.       Treatment     Anthony received the treatments listed below:      therapeutic exercises to develop strength, endurance, ROM, flexibility, posture, and core stabilization for 30 minutes including:      X 7 min on SCI-FIT recumbent stepper, level 1.0, for B UE and LE muscular and CV endurance     Seated:  2 x 10 B LE hip flexion marches~4 " block beneath feet for 2nd set and assists some with L LE movement  2 x 10 B LAQ~ 1.5 # ankle weight added    2 gait trials: 76 feet and 71 feet (in 3:45) + 55 feet with RW, SBA~ slow, shuffling gait pattern " "observed with wide base of support and feet in ER       Includes time needed for frequent rest periods, water and assisting pt to ambulate from just outside clinic to first floor lob~ HHA, min A      neuromuscular re-education activities to improve: Balance, Coordination, Kinesthetic, Sense, Proprioception, and Posture for 0 minutes. The following activities were included:        therapeutic activities to improve functional performance for 15  minutes, including:    -CGA stand to sit into clinic wheelchair  -Mod A for hip elevation, sit to stand from wheelchair before attempting to sit on SCI-FIT stepper seat    Transitions:  2 x 5 sit to stand trials from gold chair with RW placed in front, S~ cues for proper hand placement on arm rests    2 trials of practicing stand> sit from RW to gold chair with SBA ~ cues to properly align self with chair and not " abandon" walker too soon by pushing to the side    gait training to improve functional mobility and safety for 0  minutes, including:        Patient Education and Home Exercises     Home Exercises Provided and Patient Education Provided     Education provided:   - 4/17/23: Pt instructed in first installment of HEP. Suggested pt use his RW when he comes for future visits.  -4/28/23: Pt again reminded that he would be safer to come to clinic with his RW.    Written Home Exercises Provided: yes. Exercises were reviewed and Anthony was able to demonstrate them prior to the end of the session.  Anthony demonstrated good  understanding of the education provided. 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 He continues to require assistance to ambulate into the clinic and would be much safer bringing his RW to sessions. He was escorted by [employer] to [facility] following treatment . Continues to need frequent rest breaks " due to fatigue. Pt remains appropriate for continued PT at 2 x weekly frequency.    Anthony Is progressing well towards his goals.   Pt prognosis is Fair.     Pt will continue to benefit from skilled outpatient physical therapy to address the deficits listed in the problem list box on initial evaluation, provide pt/family education and to maximize pt's level of independence in the home and community environment.     Pt's spiritual, cultural and educational needs considered and pt agreeable to plan of care and goals.     Anticipated barriers to physical therapy: history of alcohol use, frequent falls    Goals:    Short Term Goals: 4 weeks   Pt will be issued first installment of HEP and report at least partial compliance~in progress  Pt will improve his 30 second chair rise to 4-5 reps to demonstrate increased LE strength and muscular endurance~in progress  Pt will improve his TUG score to 25 seconds or < with or without appropriate AD to improve household ambulation and decrease fall risk~in progress  Pt will increase his SSWS to 0.375 m/sec with or without appropriate AD to improve community ambulation and decrease fall risk~in progress  Pt will complete mCTSIB balance testing and PT to set appropriate goals~MET, 4/17/23  Pt will improve any 3 LE muscle grades by 1/3 to help with functional mobility skills~in progress  Pt will perform all sit<> stand and stand pivot transfers with no more than SBA~in progress  (NEW GOAL, 4/17/23): Pt will improve his score on condition # 2 of the mCTSIB to 19 seconds for improved balance in low/eliminated vision environments     Long Term Goals: 8 weeks   Pt will be partially compliant with finalized HEP to help maintain potential gains realized in PT~ongoing  Pt will improve his 30 second chair rise to 5-6 reps to demonstrate increased LE strength and muscular endurance~ongoing  Pt will improve his TUG score to 20 seconds or < with or without appropriate AD to improve household  ambulation and decrease fall risk~ongoing  Pt will increase his SSWS to 0.43 m/sec with or without appropriate AD to improve community ambulation and decrease fall risk~ongoing  Pt will improve any 3 LE muscle grades by 2/3 to help with functional mobility skills ~ongoing  Pt will perform all sit<> stand and stand pivot transfers with no more than S~ongoing  (NEW GOAL, 4/17/23): Pt will improve his score on condition # 2 of the mCTSIB to 22 seconds for improved balance in low/eliminated vision environments    PLAN     Continue to progress strengthening, balance and endurance to tolerance.    Dat Savage, PTA    5/3/2023

## 2023-05-04 NOTE — PROGRESS NOTES
"[facility]   Physical Therapy Treatment Note     Name: Anthony Ball  Clinic Number: [id number]    Therapy Diagnosis:   Encounter Diagnoses   Name Primary?    Balance problem Yes    Weakness of both lower extremities          Physician: Lyubov Yu MD    Visit Date: 5/5/2023    Physician Orders: PT Eval and Treat   Medical Diagnosis from Referral: Polyneuropathy due to type 2 diabetes mellitus  Evaluation Date: 4/14/2023  Authorization Period Expiration: 12/31/23  Plan of Care Expiration: 6/9/23  Visit # / Visits authorized: 5/ 20(+eval)        PTA Visit #: 0/5     Time In: 1150   Time Out: 1235    Total Billable Time: 45   minutes    Precautions: Standard and Fall      SUBJECTIVE     Pt reports: no falls since his previous therapy session.  He was compliant with home exercise program.  Response to previous treatment: no adverse effects   Functional change: ongoing    Pain: 0/10  Location: N/A       OBJECTIVE   Pt found seated on bench in PT gym after working with OT.    Objective Measures updated at progress report unless specified.       Treatment     Anthony received the treatments listed below:      therapeutic exercises to develop strength, endurance, ROM, flexibility, posture, and core stabilization for 30 minutes including:      X 6 min on SCI-FIT recumbent stepper, level 1.0, for B UE and LE muscular and CV endurance     Seated:  2 x 10 B LE hip flexion marches~ PT places 2 " block beneath feet for 2nd set and assists some with L LE movement  2 x 10 B LAQ~ 2 # ankle weight added    Standing inside parallel bars:  1 x 10 B LE step ups/downs on 4 " block, B UE support, CGA  1 x 10 B LE step ups/downs on 6 " block, B UE support, CGA      Includes time needed for occasional seated rest periods, water and ambulating without device from one treatment area to another. PT also provides SBA for pt to ambulate from gym to lobby at end of session~ 190 feet.      neuromuscular re-education " "activities to improve: Balance, Coordination, Kinesthetic, Sense, Proprioception, and Posture for 5 minutes. The following activities were included:    Inside parallel bars:    1 x 30" static standing on foam fitter, no UE support, CGA  1 x 30" static standing on foam fitter, eyes closed, no UE support, CGA to slight min A  1 x 30" split stance with R foot on foam fitter and L foot on floor, no UE support, CGA to slight min A  1 x 30" split stance with L foot on foam fitter and R foot on floor, no UE support, CGA to slight min A      therapeutic activities to improve functional performance for 10  minutes, including:      Transitions:  2 x 5 sit to stand trials from gold chair, S  Multiple trials of sit<> stand from gold chair throughout session, SBA  SBA sit<> stand SCI-FIT stepper seat    gait training to improve functional mobility and safety for 0  minutes, including:            Patient Education and Home Exercises     Home Exercises Provided and Patient Education Provided     Education provided:   - 4/17/23: Pt instructed in first installment of HEP. Suggested pt use his RW when he comes for future visits.  -4/28/23: Pt again reminded that he would be safer to come to clinic with his RW.  5/5/23: OT and PT provided pt a schedule slip so he can cancel some sessions in late May and early June( due to a planned cruise) and add other sessions when he returns in June.    Written Home Exercises Provided: yes. Exercises were reviewed and Anthony was able to demonstrate them prior to the end of the session.  Anthony demonstrated good  understanding of the education provided. 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 Pt arrived without an assistive device, and though his gait is slow and cautious, he looks much better than in previous sessions. PT added 1 # to cuff " "weights during performance of LAQ exercise and pt seemed more able to perform seated hip flexion marches. Pt performed 1 x 10 reps of stepping up and down on 4 " and 6 " blocks inside the parallel bars for additional strengthening. Session ended with introduction of simple balance exercises; will build on these at pt's next treatment. Pt remains appropriate for continued PT at 2 x weekly frequency.    Anthony Is progressing well towards his goals.   Pt prognosis is Fair.     Pt will continue to benefit from skilled outpatient physical therapy to address the deficits listed in the problem list box on initial evaluation, provide pt/family education and to maximize pt's level of independence in the home and community environment.     Pt's spiritual, cultural and educational needs considered and pt agreeable to plan of care and goals.     Anticipated barriers to physical therapy: history of alcohol use, frequent falls    Goals:    Short Term Goals: 4 weeks   Pt will be issued first installment of HEP and report at least partial compliance~in progress  Pt will improve his 30 second chair rise to 4-5 reps to demonstrate increased LE strength and muscular endurance~in progress  Pt will improve his TUG score to 25 seconds or < with or without appropriate AD to improve household ambulation and decrease fall risk~in progress  Pt will increase his SSWS to 0.375 m/sec with or without appropriate AD to improve community ambulation and decrease fall risk~in progress  Pt will complete mCTSIB balance testing and PT to set appropriate goals~MET, 4/17/23  Pt will improve any 3 LE muscle grades by 1/3 to help with functional mobility skills~in progress  Pt will perform all sit<> stand and stand pivot transfers with no more than SBA~in progress  (NEW GOAL, 4/17/23): Pt will improve his score on condition # 2 of the mCTSIB to 19 seconds for improved balance in low/eliminated vision environments     Long Term Goals: 8 weeks   Pt will be " partially compliant with finalized HEP to help maintain potential gains realized in PT~ongoing  Pt will improve his 30 second chair rise to 5-6 reps to demonstrate increased LE strength and muscular endurance~ongoing  Pt will improve his TUG score to 20 seconds or < with or without appropriate AD to improve household ambulation and decrease fall risk~ongoing  Pt will increase his SSWS to 0.43 m/sec with or without appropriate AD to improve community ambulation and decrease fall risk~ongoing  Pt will improve any 3 LE muscle grades by 2/3 to help with functional mobility skills ~ongoing  Pt will perform all sit<> stand and stand pivot transfers with no more than S~ongoing  (NEW GOAL, 4/17/23): Pt will improve his score on condition # 2 of the mCTSIB to 22 seconds for improved balance in low/eliminated vision environments    PLAN     Continue to progress strengthening, balance and endurance to tolerance. Increase focus on balance in future sessions.    Ba Diaz, PT    5/5/2023

## 2023-05-04 NOTE — PROGRESS NOTES
[facility]   Occupational Therapy Neurological Rehabilitation   Treatment Note   Name: Anthony Ball  MRN: [id number]  Today's Date: 5/5/2023    Therapy Diagnosis:   Encounter Diagnoses   Name Primary?    Impaired mobility and ADLs Yes    Coordination impairment     Decreased  strength      Physician: Lyubov Yu MD  Physician Orders: Neuro Program      Medical Diagnosis: E11.42 (ICD-10-CM) - Polyneuropathy due to type 2 diabetes mellitus   Evaluation Date: 4/14/2023  Plan of Care Expiration Period: 6/9/2023  Insurance Authorization period Expiration: 5/14/2023  Date of Return to MD: nothing on Epic at this time   FOTO: 1/3     Visit # / Visits Authorized: 4 / 20 (+evaluation)    Time In: 11:05am  Time Out: 11:45am  Total Billable Time: 40 minutes    Precautions:   Standard and Fall    Subjective   Pt reports: doing better after a day of rest    Going on a cruise on May 25th for 10 days  Response to previous treatment: no complaints   Functional change: feeling stronger   Patient's Goals for Occupational Therapy: tremor management and balance     Pain upon arrival: none reported    Location: n/a  Pain at cessation of session: n/a    Objective   SBA for functional mobility from waiting room to gym area    Anthony received therapeutic exercises for 40 minutes including:  -seated upper body ergonometer on level 3.0; completed for 10 minutes switching directions mid way. Focus on postural control and breathing techniques with exhale with exertion of force. MET average 1.7    Seated edge of mat: x2 set of 10  -5# dowel: chest press, shoulder press, bicep curls, shoulder forward flexion   - shoulder abd 2# DB each hand , after 1st set completed with no weights   Standing: shoulder extension     Rest breaks and water breaks as needed   Home Exercises and Education Provided   Education provided:   - postural control   - role of Occupational Therapy in care, goals for Occupational Therapy for this plan  of care,  scheduling  - edu on importance of continuation of sobriety at this time   - discussed importance of leisure activities and hobbies in post retired life     Written Home Exercises Provided: yes.  Exercises were reviewed and Anthony was able to demonstrate them prior to the end of the session.  Anthony demonstrated good  understanding of the HEP provided.   .   See EMR under Patient Instructions for exercises provided 4/24/2023. 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 He was able to complete all exercises with increased time and rest breaks. Of note, he was able to ambulate into clinic without assistance. Pt continues to progress towards OT goals and remains motivated for rehab process.     Anthony is progressing well towards his goals and there are no updates to goals at this time. Pt prognosis is Good.     Pt will continue to benefit from skilled outpatient occupational therapy to address the deficits listed in the problem list on initial evaluation provide pt/family education and to maximize pt's level of independence in the home and community environment.     Anticipated barriers to occupational therapy: carry over     Pt's spiritual, cultural and educational needs considered and pt agreeable to plan of care and goals.    Goals:  Long Term Goals:  Time frame: 8 weeks  - Pt will bathe with while seated on shower chair, utilizing adaptive equipment and/or compensatory strategies as needed with mod(I). Ongoing   - Pt will demo ability to bring food to mouth with utensils with minimal to no spillage with AD as needed. Ongoing (able to complete with open mouth cup to mouth)  - Pt will perform dynamic standing with use of bilateral upper extremities in kitchen for 15 minutes with distant supervision to improve performance with homemaking tasks, cooking tasks, standing at the sink for grooming and hygiene, decrease risk for falls, and maximize safety.Ongoing   - Pt  will improve FOTO limitation score to less than or equal to 50% for improved self perception of functional performance with daily activities. Ongoing   - Pt will be independent with Home Exercise Program (HEP)/Home Activity Program (HAP) to promote long term maintenance of progress made in therapy. Ongoing      Short Term Goals:  Time frame: 4 weeks  - Pt will demonstrate ability to cut food safely with butter/steak knife or AD as needed. Ongoing   - Pt will demonstrate consistent use of mindfulness techniques to manage stress, anxiety, and depression and will incorporate them into daily routine.Ongoing   - Pt will perform dynamic standing task x a minimum of 10 minutes with zero losses of balance, no more than 2 brief seated rest breaks, and minimal verbals cues for (posture) to increase independence for home managment task. Progressing; 7 min before rest break 5/3/2023   - Pt will increase  strength by 3-5 # for BUE to increase performance during daily tasks (opening jars, holding telephone) Ongoing      Goals to be adjusted as needed.   The following goals were discussed with the patient and patient is in agreement with them as to be addressed in the treatment plan.     Plan   Certification Period/Plan of care expiration: 4/14/2023 to 6/9/2023.     Outpatient Occupational Therapy 2 times weekly for 8 weeks to include the following interventions: Moist Heat/ Ice, Neuromuscular Re-ed, Paraffin, Patient Education, Self Care, Therapeutic Activities, and Therapeutic Exercise.    JOSH Rubi 5/5/2023

## 2023-05-05 ENCOUNTER — CLINICAL SUPPORT (OUTPATIENT)
Dept: REHABILITATION | Facility: HOSPITAL | Age: 73
End: 2023-05-05
Payer: MEDICARE

## 2023-05-05 DIAGNOSIS — R26.89 BALANCE PROBLEM: Primary | ICD-10-CM

## 2023-05-05 DIAGNOSIS — R29.898 DECREASED GRIP STRENGTH: ICD-10-CM

## 2023-05-05 DIAGNOSIS — Z78.9 IMPAIRED MOBILITY AND ADLS: Primary | ICD-10-CM

## 2023-05-05 DIAGNOSIS — R27.8 COORDINATION IMPAIRMENT: ICD-10-CM

## 2023-05-05 DIAGNOSIS — Z74.09 IMPAIRED MOBILITY AND ADLS: Primary | ICD-10-CM

## 2023-05-05 DIAGNOSIS — R29.898 WEAKNESS OF BOTH LOWER EXTREMITIES: ICD-10-CM

## 2023-05-05 PROCEDURE — 97110 THERAPEUTIC EXERCISES: CPT | Mod: PO

## 2023-05-05 PROCEDURE — 97530 THERAPEUTIC ACTIVITIES: CPT | Mod: PO

## 2023-05-08 ENCOUNTER — DOCUMENTATION ONLY (OUTPATIENT)
Dept: REHABILITATION | Facility: HOSPITAL | Age: 73
End: 2023-05-08
Payer: MEDICARE

## 2023-05-08 NOTE — PROGRESS NOTES
Face to face meeting completed with Ba Diaz PT regarding current status and progress of   Anthony Ball .  Dat Savage, PTA

## 2023-05-09 ENCOUNTER — DOCUMENTATION ONLY (OUTPATIENT)
Dept: REHABILITATION | Facility: HOSPITAL | Age: 73
End: 2023-05-09
Payer: MEDICARE

## 2023-05-09 ENCOUNTER — CLINICAL SUPPORT (OUTPATIENT)
Dept: REHABILITATION | Facility: HOSPITAL | Age: 73
End: 2023-05-09
Payer: MEDICARE

## 2023-05-09 DIAGNOSIS — R26.89 BALANCE PROBLEM: Primary | ICD-10-CM

## 2023-05-09 DIAGNOSIS — R29.898 DECREASED GRIP STRENGTH: ICD-10-CM

## 2023-05-09 DIAGNOSIS — Z78.9 IMPAIRED MOBILITY AND ADLS: Primary | ICD-10-CM

## 2023-05-09 DIAGNOSIS — R27.8 COORDINATION IMPAIRMENT: ICD-10-CM

## 2023-05-09 DIAGNOSIS — R29.898 WEAKNESS OF BOTH LOWER EXTREMITIES: ICD-10-CM

## 2023-05-09 DIAGNOSIS — Z74.09 IMPAIRED MOBILITY AND ADLS: Primary | ICD-10-CM

## 2023-05-09 PROCEDURE — 97530 THERAPEUTIC ACTIVITIES: CPT | Mod: PO,CQ

## 2023-05-09 PROCEDURE — 97110 THERAPEUTIC EXERCISES: CPT | Mod: PO,CQ

## 2023-05-09 PROCEDURE — 97110 THERAPEUTIC EXERCISES: CPT | Mod: PO

## 2023-05-09 NOTE — PROGRESS NOTES
PT/PTA met face to face to discuss pt's treatment plan and progress towards established goals.  Continue with current PT POC with focus on gait with out support, balance and seated exercises.  Patient will be seen by physical therapist at least every sixth treatment or 30 days, whichever occurs first.    Ciara Pro, PTA  05/09/2023

## 2023-05-09 NOTE — PROGRESS NOTES
[facility]   Occupational Therapy Neurological Rehabilitation   Treatment Note   Name: Anthony Ball  MRN: [id number]  Today's Date: 5/9/2023    Therapy Diagnosis:   Encounter Diagnoses   Name Primary?    Impaired mobility and ADLs Yes    Coordination impairment     Decreased  strength      Physician: Lyubov Yu MD  Physician Orders: Neuro Program      Medical Diagnosis: E11.42 (ICD-10-CM) - Polyneuropathy due to type 2 diabetes mellitus   Evaluation Date: 4/14/2023  Plan of Care Expiration Period: 6/9/2023  Insurance Authorization period Expiration: 5/14/2023  Date of Return to MD: nothing on Epic at this time   FOTO: 1/3     Visit # / Visits Authorized: 5 / 20 (+evaluation)    Time In: 11:15am  Time Out: 12:00pm  Total Billable Time: 45 minutes    Precautions:   Standard and Fall    Subjective   Pt reports: feeling better lately    Going on a cruise on May 25th for 10 days  Response to previous treatment: no complaints   Functional change: feeling stronger   Patient's Goals for Occupational Therapy: tremor management and balance     Pain upon arrival: none reported    Location: n/a  Pain at cessation of session: n/a    Objective   SBA for functional mobility from waiting room to gym area    Anthony received therapeutic exercises for 45 minutes including:  -seated upper body ergonometer on level 3.0; completed for 10 minutes switching directions mid way. Focus on postural control and breathing techniques with exhale with exertion of force. MET average 1.7  - standing at table top no breaks x10 minutes   - connect 4 x2 trials with 3# cuff weight on wrists  - seated in chair shoulder FF 5# dowel 3x10  - seated biceps curls 5# dowel 3x10    Rest breaks and water breaks as needed   Home Exercises and Education Provided   Education provided:   - postural control   - role of Occupational Therapy in care, goals for Occupational Therapy for this plan of care,  scheduling  - edu on importance of  continuation of sobriety at this time   - discussed importance of leisure activities and hobbies in post retired life     Written Home Exercises Provided: yes.  Exercises were reviewed and Anthony was able to demonstrate them prior to the end of the session.  Anthony demonstrated good  understanding of the HEP provided.   .   See EMR under Patient Instructions for exercises provided 4/24/2023. 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 Several rest breaks required during session. His standing and overall endurance is improving but slowly. Pt continues to progress towards OT goals and remains motivated for rehab process.     Anthony is progressing well towards his goals and there are no updates to goals at this time. Pt prognosis is Good.     Pt will continue to benefit from skilled outpatient occupational therapy to address the deficits listed in the problem list on initial evaluation provide pt/family education and to maximize pt's level of independence in the home and community environment.     Anticipated barriers to occupational therapy: carry over     Pt's spiritual, cultural and educational needs considered and pt agreeable to plan of care and goals.    Goals:  Long Term Goals:  Time frame: 8 weeks  - Pt will bathe with while seated on shower chair, utilizing adaptive equipment and/or compensatory strategies as needed with mod(I). Ongoing   - Pt will demo ability to bring food to mouth with utensils with minimal to no spillage with AD as needed. Ongoing (able to complete with open mouth cup to mouth)  - Pt will perform dynamic standing with use of bilateral upper extremities in kitchen for 15 minutes with distant supervision to improve performance with homemaking tasks, cooking tasks, standing at the sink for grooming and hygiene, decrease risk for falls, and maximize safety.Ongoing   - Pt will improve FOTO limitation score to less than or equal to 50% for improved  self perception of functional performance with daily activities. Ongoing   - Pt will be independent with Home Exercise Program (HEP)/Home Activity Program (HAP) to promote long term maintenance of progress made in therapy. Ongoing      Short Term Goals:  Time frame: 4 weeks  - Pt will demonstrate ability to cut food safely with butter/steak knife or AD as needed. Ongoing   - Pt will demonstrate consistent use of mindfulness techniques to manage stress, anxiety, and depression and will incorporate them into daily routine.Ongoing   - Pt will perform dynamic standing task x a minimum of 10 minutes with zero losses of balance, no more than 2 brief seated rest breaks, and minimal verbals cues for (posture) to increase independence for home managment task. Progressing; 7 min before rest break 5/3/2023   - Pt will increase  strength by 3-5 # for BUE to increase performance during daily tasks (opening jars, holding telephone) Ongoing      Goals to be adjusted as needed.   The following goals were discussed with the patient and patient is in agreement with them as to be addressed in the treatment plan.     Plan   Certification Period/Plan of care expiration: 4/14/2023 to 6/9/2023.     Outpatient Occupational Therapy 2 times weekly for 8 weeks to include the following interventions: Moist Heat/ Ice, Neuromuscular Re-ed, Paraffin, Patient Education, Self Care, Therapeutic Activities, and Therapeutic Exercise.    JOSH Rubi 5/9/2023

## 2023-05-09 NOTE — PROGRESS NOTES
"[facility]   Physical Therapy Treatment Note     Name: Anthony Ball  Clinic Number: [id number]    Therapy Diagnosis:   Encounter Diagnoses   Name Primary?    Balance problem Yes    Weakness of both lower extremities          Physician: Lyubov Yu MD    Visit Date: 5/9/2023    Physician Orders: PT Eval and Treat   Medical Diagnosis from Referral: Polyneuropathy due to type 2 diabetes mellitus  Evaluation Date: 4/14/2023  Authorization Period Expiration: 12/31/23  Plan of Care Expiration: 6/9/23  Visit # / Visits authorized: 6/ 20(+eval)        PTA Visit #: 1/5     Time In: 1200   Time Out: 1245    Total Billable Time: 45  minutes    Precautions: Standard and Fall      SUBJECTIVE     Pt reports: " I'm doing ok."   He was compliant with home exercise program.  Response to previous treatment: no adverse effects   Functional change: ongoing    Pain: 0/10  Location: N/A       OBJECTIVE   Pt found seated on bench in PT gym after working with OT.    Objective Measures updated at progress report unless specified.       Treatment   Pt arrived with no A.D  Anthony received the treatments listed below:      therapeutic exercises to develop strength, endurance, ROM, flexibility, posture, and core stabilization for 20 minutes including:      X 7 min on SCI-FIT recumbent stepper, level 1.0, for B UE and LE muscular and CV endurance     Seated:  2 x 10 B LE hip flexion marches with #2lb cuff weights  2 x 10 B LAQ~ 2 # ankle weight added        neuromuscular re-education activities to improve: Balance, Coordination, Kinesthetic, Sense, Proprioception, and Posture for 0 minutes. The following activities were included:      therapeutic activities to improve functional performance for 25 minutes, including:      Transitions:  2 x 5 sit to stand trials from black mat  Multiple trials of sit<> stand from gold chair throughout session, SBA  SBA sit<> stand SCI-FIT stepper seat  X 2 trials of " ascending/descending 4 steps with 2 UE support.  Reciprocal gait in both directions  X 1 trial of weaving in and around 6 orange cones with SC and CGA.        Includes time needed for occasional seated rest periods, water and ambulating without device from one treatment area to another. PTA also provides SBA for pt to ambulate from gym to lobby at end of session~ 190 feet.    gait training to improve functional mobility and safety for 0  minutes, including:            Patient Education and Home Exercises     Home Exercises Provided and Patient Education Provided     Education provided:   - 4/17/23: Pt instructed in first installment of HEP. Suggested pt use his RW when he comes for future visits.  -4/28/23: Pt again reminded that he would be safer to come to clinic with his RW.  5/5/23: OT and PT provided pt a schedule slip so he can cancel some sessions in late May and early June( due to a planned cruise) and add other sessions when he returns in June.    Written Home Exercises Provided: yes. Exercises were reviewed and Anthony was able to demonstrate them prior to the end of the session.  Anthony demonstrated good  understanding of the education provided. 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  Anthony was able to increase his time on the stepper slightly and was able to perform seated marching today with weights on his legs.  Anthony was mostly challenged by the stairs today and weaving around the cones.  Anthony required the use of a straight cane for weaving for safety.  Cont with plan of care.     Anthony Is progressing well towards his goals.   Pt prognosis is Fair.     Pt will continue to benefit from skilled outpatient physical therapy to address the deficits listed in the problem list box on initial evaluation, provide pt/family education and to maximize pt's level of independence in the  home and community environment.     Pt's spiritual, cultural and educational needs considered and pt agreeable to plan of care and goals.     Anticipated barriers to physical therapy: history of alcohol use, frequent falls    Goals:    Short Term Goals: 4 weeks   Pt will be issued first installment of HEP and report at least partial compliance~in progress  Pt will improve his 30 second chair rise to 4-5 reps to demonstrate increased LE strength and muscular endurance~in progress  Pt will improve his TUG score to 25 seconds or < with or without appropriate AD to improve household ambulation and decrease fall risk~in progress  Pt will increase his SSWS to 0.375 m/sec with or without appropriate AD to improve community ambulation and decrease fall risk~in progress  Pt will complete mCTSIB balance testing and PT to set appropriate goals~MET, 4/17/23  Pt will improve any 3 LE muscle grades by 1/3 to help with functional mobility skills~in progress  Pt will perform all sit<> stand and stand pivot transfers with no more than SBA~in progress  (NEW GOAL, 4/17/23): Pt will improve his score on condition # 2 of the mCTSIB to 19 seconds for improved balance in low/eliminated vision environments     Long Term Goals: 8 weeks   Pt will be partially compliant with finalized HEP to help maintain potential gains realized in PT~ongoing  Pt will improve his 30 second chair rise to 5-6 reps to demonstrate increased LE strength and muscular endurance~ongoing  Pt will improve his TUG score to 20 seconds or < with or without appropriate AD to improve household ambulation and decrease fall risk~ongoing  Pt will increase his SSWS to 0.43 m/sec with or without appropriate AD to improve community ambulation and decrease fall risk~ongoing  Pt will improve any 3 LE muscle grades by 2/3 to help with functional mobility skills ~ongoing  Pt will perform all sit<> stand and stand pivot transfers with no more than S~ongoing  (NEW GOAL, 4/17/23): Pt  will improve his score on condition # 2 of the mCTSIB to 22 seconds for improved balance in low/eliminated vision environments    PLAN     Continue to progress strengthening, balance and endurance to tolerance. Increase focus on balance in future sessions.    Ciara Pro, PTA    5/9/2023

## 2023-05-11 ENCOUNTER — PATIENT MESSAGE (OUTPATIENT)
Dept: INTERNAL MEDICINE | Facility: CLINIC | Age: 73
End: 2023-05-11
Payer: MEDICARE

## 2023-05-11 DIAGNOSIS — E11.42 DIABETIC POLYNEUROPATHY ASSOCIATED WITH TYPE 2 DIABETES MELLITUS: Primary | ICD-10-CM

## 2023-05-11 DIAGNOSIS — L29.9 PRURITUS: ICD-10-CM

## 2023-05-11 RX ORDER — DULOXETIN HYDROCHLORIDE 60 MG/1
60 CAPSULE, DELAYED RELEASE ORAL 2 TIMES DAILY
Qty: 180 CAPSULE | Refills: 3 | Status: SHIPPED | OUTPATIENT
Start: 2023-05-11 | End: 2024-05-10

## 2023-05-11 RX ORDER — HYDROXYZINE HYDROCHLORIDE 25 MG/1
25 TABLET, FILM COATED ORAL 3 TIMES DAILY PRN
Qty: 90 TABLET | Refills: 1 | Status: SHIPPED | OUTPATIENT
Start: 2023-05-11 | End: 2023-06-18 | Stop reason: SDUPTHER

## 2023-05-11 NOTE — PROGRESS NOTES
"[facility]   Physical Therapy Treatment Note     Name: Anthony Ball  Clinic Number: [id number]    Therapy Diagnosis:   Encounter Diagnoses   Name Primary?    Balance problem Yes    Weakness of both lower extremities            Physician: Lyubov Yu MD    Visit Date: 5/12/2023    Physician Orders: PT Eval and Treat   Medical Diagnosis from Referral: Polyneuropathy due to type 2 diabetes mellitus  Evaluation Date: 4/14/2023  Authorization Period Expiration: 12/31/23  Plan of Care Expiration: 6/9/23  Visit # / Visits authorized: 6/ 20(+eval)        PTA Visit #: 1/5     Time In: 1145  Time Out: 1230   Total Billable Time: 45  minutes    Precautions: Standard and Fall      SUBJECTIVE     Pt reports: that he feels fine and has no new complaints   He was compliant with home exercise program.  Response to previous treatment: no adverse effects   Functional change: ongoing    Pain: 0/10  Location: N/A       OBJECTIVE   Pt found seated on bench in PT gym after working with OT.    Objective Measures updated at progress report unless specified.       Treatment   Pt arrived with no A.D  Anthony received the treatments listed below:      therapeutic exercises to develop strength, endurance, ROM, flexibility, posture, and core stabilization for 25 minutes including:      X 10 min on SCI-FIT recumbent stepper, level 1.0, for B UE and LE muscular and CV endurance     3 x 10 reps on leg press 90#  2 x 10 LLE leg press 45#    Time above includes time for  multiple seated rest breaks throughout session    neuromuscular re-education activities to improve: Balance, Coordination, Kinesthetic, Sense, Proprioception, and Posture for 10 minutes. The following activities were included:    1 x 30" static standing on foam fitter, no UE support, CGA  1 x 30" static standing on foam fitter, eyes closed, no UE support, CGA to slight min A  1 x 30" split stance with R foot on foam fitter and L foot on floor, no UE " "support, CGA to slight min A  1 x 30" split stance with L foot on foam fitter and R foot on floor, no UE support, CGA to slight min A    therapeutic activities to improve functional performance for 10 minutes, including:    2 x 10 reps sit to stands from raised mat, SBA     Multiple trials of sit<> stand from gold chair throughout session, SBA  SBA sit<> stand SCI-FIT stepper seat    Time above includes time to ambulate to/from the waiting room with SBA and no AD    gait training to improve functional mobility and safety for 0  minutes, including:            Patient Education and Home Exercises     Home Exercises Provided and Patient Education Provided     Education provided:   - 4/17/23: Pt instructed in first installment of HEP. Suggested pt use his RW when he comes for future visits.  -4/28/23: Pt again reminded that he would be safer to come to clinic with his RW.  5/5/23: OT and PT provided pt a schedule slip so he can cancel some sessions in late May and early June( due to a planned cruise) and add other sessions when he returns in June.    Written Home Exercises Provided: yes. Exercises were reviewed and Anthony was able to demonstrate them prior to the end of the session.  Anthony demonstrated good  understanding of the education provided. 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 The patient required frequent seated rest breaks and demonstrates poor activity tolerance. The patient reports the leg press is appropriately challenging for him. The patient requires encouragement for participation in balance activities. The patient will benefit from continued physical therapy intervention to address remaining functional mobility deficits.        Anthony Is progressing well towards his goals.   Pt prognosis is Fair.     Pt will continue to benefit from skilled outpatient physical therapy to address the deficits " listed in the problem list box on initial evaluation, provide pt/family education and to maximize pt's level of independence in the home and community environment.     Pt's spiritual, cultural and educational needs considered and pt agreeable to plan of care and goals.     Anticipated barriers to physical therapy: history of alcohol use, frequent falls    Goals:    Short Term Goals: 4 weeks   Pt will be issued first installment of HEP and report at least partial compliance~in progress  Pt will improve his 30 second chair rise to 4-5 reps to demonstrate increased LE strength and muscular endurance~in progress  Pt will improve his TUG score to 25 seconds or < with or without appropriate AD to improve household ambulation and decrease fall risk~in progress  Pt will increase his SSWS to 0.375 m/sec with or without appropriate AD to improve community ambulation and decrease fall risk~in progress  Pt will complete mCTSIB balance testing and PT to set appropriate goals~MET, 4/17/23  Pt will improve any 3 LE muscle grades by 1/3 to help with functional mobility skills~in progress  Pt will perform all sit<> stand and stand pivot transfers with no more than SBA~in progress  (NEW GOAL, 4/17/23): Pt will improve his score on condition # 2 of the mCTSIB to 19 seconds for improved balance in low/eliminated vision environments     Long Term Goals: 8 weeks   Pt will be partially compliant with finalized HEP to help maintain potential gains realized in PT~ongoing  Pt will improve his 30 second chair rise to 5-6 reps to demonstrate increased LE strength and muscular endurance~ongoing  Pt will improve his TUG score to 20 seconds or < with or without appropriate AD to improve household ambulation and decrease fall risk~ongoing  Pt will increase his SSWS to 0.43 m/sec with or without appropriate AD to improve community ambulation and decrease fall risk~ongoing  Pt will improve any 3 LE muscle grades by 2/3 to help with functional  mobility skills ~ongoing  Pt will perform all sit<> stand and stand pivot transfers with no more than S~ongoing  (NEW GOAL, 4/17/23): Pt will improve his score on condition # 2 of the mCTSIB to 22 seconds for improved balance in low/eliminated vision environments    PLAN     Continue to progress strengthening, balance and endurance to tolerance. Increase focus on balance in future sessions.    Monica Cruz, PT    5/12/2023

## 2023-05-12 ENCOUNTER — CLINICAL SUPPORT (OUTPATIENT)
Dept: REHABILITATION | Facility: HOSPITAL | Age: 73
End: 2023-05-12
Payer: MEDICARE

## 2023-05-12 ENCOUNTER — EXTERNAL HOME HEALTH (OUTPATIENT)
Dept: HOME HEALTH SERVICES | Facility: HOSPITAL | Age: 73
End: 2023-05-12
Payer: MEDICARE

## 2023-05-12 DIAGNOSIS — R27.8 COORDINATION IMPAIRMENT: ICD-10-CM

## 2023-05-12 DIAGNOSIS — R29.898 DECREASED GRIP STRENGTH: ICD-10-CM

## 2023-05-12 DIAGNOSIS — R26.89 BALANCE PROBLEM: Primary | ICD-10-CM

## 2023-05-12 DIAGNOSIS — Z78.9 IMPAIRED MOBILITY AND ADLS: Primary | ICD-10-CM

## 2023-05-12 DIAGNOSIS — R29.898 WEAKNESS OF BOTH LOWER EXTREMITIES: ICD-10-CM

## 2023-05-12 DIAGNOSIS — Z74.09 IMPAIRED MOBILITY AND ADLS: Primary | ICD-10-CM

## 2023-05-12 PROCEDURE — 97530 THERAPEUTIC ACTIVITIES: CPT | Mod: PO

## 2023-05-12 PROCEDURE — 97110 THERAPEUTIC EXERCISES: CPT | Mod: PO

## 2023-05-12 PROCEDURE — 97112 NEUROMUSCULAR REEDUCATION: CPT | Mod: PO

## 2023-05-12 NOTE — PROGRESS NOTES
[facility]   Occupational Therapy Neurological Rehabilitation   Treatment Note   Name: Anthony Ball  MRN: [id number]  Today's Date: 5/12/2023    Therapy Diagnosis:   Encounter Diagnoses   Name Primary?    Impaired mobility and ADLs Yes    Coordination impairment     Decreased  strength      Physician: Lyubov Yu MD  Physician Orders: Neuro Program      Medical Diagnosis: E11.42 (ICD-10-CM) - Polyneuropathy due to type 2 diabetes mellitus   Evaluation Date: 4/14/2023  Plan of Care Expiration Period: 6/9/2023  Insurance Authorization period Expiration: 5/14/2023  Date of Return to MD: nothing on Epic at this time   FOTO: 2/3     Visit # / Visits Authorized: 6 / 20 (+evaluation)    Time In: 12:45  Time Out: 1:27  Total Billable Time: 42 minutes    Precautions:   Standard and Fall    Subjective   Pt reports: tremor managed with self care- only a problem with self feeding- noticed out at dinner last night with son     Going on a cruise on May 25th for 10 days  Response to previous treatment: no complaints   Functional change: dressing and showering indep; not using walker at home   Patient's Goals for Occupational Therapy: tremor management and balance     Pain upon arrival: none reported    Location: n/a  Pain at cessation of session: n/a    Objective     Received from waiting room- pt grabbing walls and window for mobility-- brought therapy RW- supervision     Anthony received therapeutic exercises for 12 minutes including:  -seated upper body ergonometer on level 3.5; completed for 12 minutes switching directions every 3 minutes. Focus on postural control and breathing techniques with exhale with exertion of force. MET average 1.6    Anthony participated in dynamic functional therapeutic activities to improve functional performance for 30  minutes, including:  Seated at table top:  -edu on tremor managment and adaptations with self feeding task with demo understanding   -edu on heavier cups with  drinking task; closed lid options   -completion of FOTO at table top    Home Exercises and Education Provided   Education provided:   - postural control   - role of Occupational Therapy in care, goals for Occupational Therapy for this plan of care,  scheduling  - edu on importance of continuation of sobriety at this time   - discussed importance of leisure activities and hobbies in post retired life     Written Home Exercises Provided: yes.  Exercises were reviewed and Anthony was able to demonstrate them prior to the end of the session.  Anthony demonstrated good  understanding of the HEP provided.   .   See EMR under Patient Instructions for exercises provided 4/24/2023. - re printed out on 5/3/2023  5/12/2023: weighted utensils for tremor managment ([employer] brand and [employer]-steady)     Assessment     Dr. Ball was open to education for adaptive techniques for self feeding with mild improvement noted.       Anthony is progressing well towards his goals and there are no updates to goals at this time. Pt prognosis is Good.     Pt will continue to benefit from skilled outpatient occupational therapy to address the deficits listed in the problem list on initial evaluation provide pt/family education and to maximize pt's level of independence in the home and community environment.     Anticipated barriers to occupational therapy: carry over     Pt's spiritual, cultural and educational needs considered and pt agreeable to plan of care and goals.    Goals:  Long Term Goals:  Time frame: 8 weeks  - Pt will bathe with while seated on shower chair, utilizing adaptive equipment and/or compensatory strategies as needed with mod(I). Met per report; using chair with back pain   - Pt will demo ability to bring food to mouth with utensils with minimal to no spillage with AD as needed. Ongoing (able to complete with open mouth cup to mouth)  - Pt will perform dynamic standing with use of bilateral upper extremities in kitchen for 15  minutes with distant supervision to improve performance with homemaking tasks, cooking tasks, standing at the sink for grooming and hygiene, decrease risk for falls, and maximize safety. progressing   - Pt will improve FOTO limitation score to less than or equal to 50% for improved self perception of functional performance with daily activities. Ongoing ; 55% on 5/12  - Pt will be independent with Home Exercise Program (HEP)/Home Activity Program (HAP) to promote long term maintenance of progress made in therapy. Ongoing      Short Term Goals:  Time frame: 4 weeks  - Pt will demonstrate ability to cut food safely with butter/steak knife or AD as needed. Ongoing   - Pt will demonstrate consistent use of mindfulness techniques to manage stress, anxiety, and depression and will incorporate them into daily routine.Ongoing   - Pt will perform dynamic standing task x a minimum of 10 minutes with zero losses of balance, no more than 2 brief seated rest breaks, and minimal verbals cues for (posture) to increase independence for home managment task. Progressing; 7 min before rest break 5/3/2023   - Pt will increase  strength by 3-5 # for BUE to increase performance during daily tasks (opening jars, holding telephone) Ongoing      Goals to be adjusted as needed.   The following goals were discussed with the patient and patient is in agreement with them as to be addressed in the treatment plan.     Plan   Certification Period/Plan of care expiration: 4/14/2023 to 6/9/2023.     Outpatient Occupational Therapy 2 times weekly for 8 weeks to include the following interventions: Moist Heat/ Ice, Neuromuscular Re-ed, Paraffin, Patient Education, Self Care, Therapeutic Activities, and Therapeutic Exercise.    JOSH Fernández 5/12/2023

## 2023-05-16 NOTE — PROGRESS NOTES
[facility]   Occupational Therapy Neurological Rehabilitation   Treatment Note   Name: Anthony Ball  MRN: [id number]  Today's Date: 5/17/2023    Therapy Diagnosis:   Encounter Diagnoses   Name Primary?    Impaired mobility and ADLs Yes    Coordination impairment     Decreased  strength        Physician: Lyubov Yu MD  Physician Orders: Neuro Program      Medical Diagnosis: E11.42 (ICD-10-CM) - Polyneuropathy due to type 2 diabetes mellitus   Evaluation Date: 4/14/2023  Plan of Care Expiration Period: 6/9/2023  Insurance Authorization period Expiration: 5/14/2023  Date of Return to MD: nothing on Epic at this time   FOTO: 2/3     Visit # / Visits Authorized: 7 / 20 (+evaluation)    Time In: 10:35  Time Out: 11:00  Total Billable Time: 40 minutes    Precautions:   Standard and Fall    Subjective   Pt reports: he has been doing his exercises daily. Has a scooter for the cruise. Is planning to complete exercises while on trip     Going on a cruise on May 25th for 10 days  Response to previous treatment: no complaints   Functional change: dressing and showering indep; not using walker at home   Patient's Goals for Occupational Therapy: tremor management and balance     Pain upon arrival: none reported    Location: n/a  Pain at cessation of session: n/a    Objective     Anthony participated in dynamic functional therapeutic activities to improve functional performance for 28  minutes, including:  -re-assessment:   Functional Status      Functional Mobility:   Bed mobility: Mod I  Roll to left: Mod I  Roll to right: Mod I  Supine to sit: Mod I  Sit to supine: Mod I  Transfers to bed: Mod I  Transfers to toilet: Mod I  Car transfers: Mod I     ADL's:   Feeding: mod(I); utilizing weighted built up utensils   Grooming: I  Hygiene: I  Upper Body Dressing: Mod I  Lower Body Dressing: Mod I  Toileting: Mod I  Bathing: Mod(I); seated      IADL's:  Homecare, etc is spouse's role   Medication management: Mod  I; pill organizer.   Handwriting: increased difficulty due to tremor   Technology Use: increased difficulty due to tremor      Objective   Cognitive Exam:  Oriented: Person, Place, Time, and Situation  Behaviors: Cooperative  Follows Commands/attention: Follows multistep  commands  Communication: clear/fluent  Safety awareness/insight to disability: aware of diagnosis, treatment, and prognosis  Coping skills/emotional control: Appropriate to situation     Physical Exam:  Postural examination/scapula alignment: Rounded shoulder and Head forward  Joint integrity: Firm end feeling  Skin integrity: Dry  Edema: none noted       Joint evaluation: AROM WFL for bilateral upper extremities   Left: shoulder abd: active: 151*; 168* passive      Fist: normal     Strength 4/14/2023 4/14/2023 5/17/2023 5/17/2023   **within available ROM** Right Left Right Left   Shoulder flexion 5/5 4+/5 5/5 4/5   Shoulder abduction 5/5 4+/5 5/5 4/5   Shoulder External Rotation 5/5 5/5 5/5 5/5   Shoulder Internal Rotation 5/5 5/5 5/5 5/5   Shoulder Extension 5/5 5/5 5/5 5/5   Shoulder Horizontal Adduction 5/5 5/5 5/5 5/5   Biceps 5/5 5/5 5/5 5/5   Triceps 5/5 5/5 5/5 5/5   Wrist flexion 5/5 5/5 5/5 5/5   Wrist extension  5/5 5/5 5/5 5/5       Strength: (MELONIE Dynamometer in lbs.) Position II: average of three trials       4/14/2023 4/14/2023 5/17/2023 5/17/2023     Right Left Right Left   Rung II 35.7 # 36.6 # 40 # 41.9 #    Norms for  Strength: standard deviation +/- 12#  Males: 70+  Male: Right Left   54 lbs 48 lbs      Pinch Strength (Measured in psi)       4/14/2023 4/14/2023 5/17/2023 5/17/2023     Right Left Right Left   Key Pinch 8 psi 6 psi 12 8.1   2pt Pinch 3 psi 2 psi 5.9 5.4   *use of large gauge ; digital battery broken     Fine Motor Coordination: 9 Hole Peg Test     Right   4/14/2023 Left   4/14/2023 Right  5/17/2023 Left  5/17/2023                53.90 s              53.85 s 41.23 s 48.52 s       Fine Motor Coordination:  Box and Block     Right   4/14/2023 Left   4/14/2023 Right  5/17/2023 Left  5/17/2023                21              25 27 30      Gross motor coordination:   MICHAEL (Rapid Alternating Movements): delayed on left   Finger to Nose (3 times): slowed bilateral  ; no dysmetria   Finger Flicks (coordination moving from digit flexion to digit extension): slowed bilateral  ; increased delayed on right      Tone: bilateral upper extremities  Modified Vandana Scale:   0 - No increase in muscle tone     Sensation:  Anthony  reports with medication neuropathy in feet is gone; he reported no numbness/tingling in bilateral upper extremities      Balance:   Impaired static and dynamic standing    Functional mobility: stand by assist throughout gym; occasional contact guard assist- pt does not bring his RW into sessions      Endurance Deficit: moderate    Anthony received therapeutic exercises for 12 minutes including:  seated edge of mat:   -re edu with teach back for therapy band exercises for shoulder flex, abd, chest opener     Supine:   X2 sets of 10: serratus punches   -sustained pec stretch for ER bilateral      Handoff to PTA      Home Exercises and Education Provided   Education provided:   - postural control   - role of Occupational Therapy in care, goals for Occupational Therapy for this plan of care,  scheduling  - edu on importance of continuation of sobriety at this time   - discussed importance of leisure activities and hobbies in post retired life     Written Home Exercises Provided: yes.  Exercises were reviewed and Anthony was able to demonstrate them prior to the end of the session.  Anthony demonstrated good  understanding of the HEP provided.   .   See EMR under Patient Instructions for exercises provided 4/24/2023. 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 Moreover, he  demo improvement in bilateral coordination. He remains with tremor noted in left hand and has proximal weakness in left shoulder with abduction and flexion.       Anthony is progressing well towards his goals and there are no updates to goals at this time. Pt prognosis is Good.     Pt will continue to benefit from skilled outpatient occupational therapy to address the deficits listed in the problem list on initial evaluation provide pt/family education and to maximize pt's level of independence in the home and community environment.     Anticipated barriers to occupational therapy: carry over     Pt's spiritual, cultural and educational needs considered and pt agreeable to plan of care and goals.    Goals:  Long Term Goals:  Time frame: 8 weeks  - Pt will bathe with while seated on shower chair, utilizing adaptive equipment and/or compensatory strategies as needed with mod(I). Met per report; using chair with back pain   - Pt will demo ability to bring food to mouth with utensils with minimal to no spillage with AD as needed. Ongoing (able to complete with open mouth cup to mouth)  - Pt will perform dynamic standing with use of bilateral upper extremities in kitchen for 15 minutes with distant supervision to improve performance with homemaking tasks, cooking tasks, standing at the sink for grooming and hygiene, decrease risk for falls, and maximize safety. progressing   - Pt will improve FOTO limitation score to less than or equal to 50% for improved self perception of functional performance with daily activities. Ongoing ; 55% on 5/12  - Pt will be independent with Home Exercise Program (HEP)/Home Activity Program (HAP) to promote long term maintenance of progress made in therapy. Ongoing      Short Term Goals:  Time frame: 4 weeks  - Pt will demonstrate ability to cut food safely with butter/steak knife or AD as needed. Ongoing   - Pt will demonstrate consistent use of mindfulness techniques to manage stress,  anxiety, and depression and will incorporate them into daily routine.Ongoing   - Pt will perform dynamic standing task x a minimum of 10 minutes with zero losses of balance, no more than 2 brief seated rest breaks, and minimal verbals cues for (posture) to increase independence for home managment task. Progressing; 7 min before rest break 5/3/2023   - Pt will increase  strength by 3-5 # for BUE to increase performance during daily tasks (opening jars, holding telephone) Ongoing      Goals to be adjusted as needed.   The following goals were discussed with the patient and patient is in agreement with them as to be addressed in the treatment plan.     Plan   Certification Period/Plan of care expiration: 4/14/2023 to 6/9/2023.     Outpatient Occupational Therapy 2 times weekly for 8 weeks to include the following interventions: Moist Heat/ Ice, Neuromuscular Re-ed, Paraffin, Patient Education, Self Care, Therapeutic Activities, and Therapeutic Exercise.    JOSH Frenández 5/17/2023

## 2023-05-17 ENCOUNTER — CLINICAL SUPPORT (OUTPATIENT)
Dept: REHABILITATION | Facility: HOSPITAL | Age: 73
End: 2023-05-17
Payer: MEDICARE

## 2023-05-17 DIAGNOSIS — Z78.9 IMPAIRED MOBILITY AND ADLS: Primary | ICD-10-CM

## 2023-05-17 DIAGNOSIS — Z74.09 IMPAIRED MOBILITY AND ADLS: Primary | ICD-10-CM

## 2023-05-17 DIAGNOSIS — R29.898 DECREASED GRIP STRENGTH: ICD-10-CM

## 2023-05-17 DIAGNOSIS — R26.89 BALANCE PROBLEM: Primary | ICD-10-CM

## 2023-05-17 DIAGNOSIS — R29.898 WEAKNESS OF BOTH LOWER EXTREMITIES: ICD-10-CM

## 2023-05-17 DIAGNOSIS — R27.8 COORDINATION IMPAIRMENT: ICD-10-CM

## 2023-05-17 PROCEDURE — 97110 THERAPEUTIC EXERCISES: CPT | Mod: PO,CQ

## 2023-05-17 PROCEDURE — 97530 THERAPEUTIC ACTIVITIES: CPT | Mod: PO

## 2023-05-17 PROCEDURE — 97110 THERAPEUTIC EXERCISES: CPT | Mod: PO

## 2023-05-17 PROCEDURE — 97530 THERAPEUTIC ACTIVITIES: CPT | Mod: PO,CQ

## 2023-05-17 PROCEDURE — 97112 NEUROMUSCULAR REEDUCATION: CPT | Mod: PO,CQ

## 2023-05-17 NOTE — PROGRESS NOTES
"[facility]   Physical Therapy Treatment Note     Name: Anthony Ball  Clinic Number: [id number]    Therapy Diagnosis:   Encounter Diagnoses   Name Primary?    Balance problem Yes    Weakness of both lower extremities                Physician: Lyubov Yu MD    Visit Date: 5/17/2023    Physician Orders: PT Eval and Treat   Medical Diagnosis from Referral: Polyneuropathy due to type 2 diabetes mellitus  Evaluation Date: 4/14/2023  Authorization Period Expiration: 12/31/23  Plan of Care Expiration: 6/9/23  Visit # / Visits authorized: 6/ 20(+eval)        PTA Visit #: 1/5     Time In: 1115  Time Out: 1200   Total Billable Time: 45 minutes    Precautions: Standard and Fall      SUBJECTIVE     Pt reports:"Havin occasional back spasms. But they are not painful."  He was compliant with home exercise program.  Response to previous treatment: no adverse effects   Functional change: ongoing    Pain: 0/10  Location: N/A       OBJECTIVE   Ambulates from OT gym with supervision to PT gym after working with OT.    Objective Measures updated at progress report unless specified.       Treatment   Pt arrived with no A.D  Anthony received the treatments listed below:      therapeutic exercises to develop strength, endurance, ROM, flexibility, posture, and core stabilization for 15 minutes including:      X 10 min on SCI-FIT recumbent stepper, level 2.0, for B UE and LE muscular and CV endurance     3 x 10 reps on leg press 100#  2 x 10 LLE leg press 45#    Time above includes time for  multiple seated rest breaks throughout session    neuromuscular re-education activities to improve: Balance, Coordination, Kinesthetic, Sense, Proprioception, and Posture for 20 minutes. The following activities were included:    1 x 30" static standing on foam fitter, no UE support, CGA  1 x 30" static standing on foam fitter, eyes closed, no UE support, CGA to slight min A  1 x 30" split stance with R foot on foam fitter and " "L foot on floor, no UE support, CGA to slight min A  1 x 30" split stance with L foot on foam fitter and R foot on floor, no UE support, CGA to slight min A  1 lap of side stepping in //Bars No upper extremity support CGA   1/2 lap of retro stepping CGA (poor left step length)    therapeutic activities to improve functional performance for 10 minutes, including:    2 x 10 reps sit to stands from raised mat, SBA     Multiple trials of sit<> stand from gold chair throughout session, SBA  SBA sit<> stand SCI-FIT stepper seat    Time above includes time to ambulate to/from the waiting room with SBA and no AD    gait training to improve functional mobility and safety for 0  minutes, including:            Patient Education and Home Exercises     Home Exercises Provided and Patient Education Provided     Education provided:   - 4/17/23: Pt instructed in first installment of HEP. Suggested pt use his RW when he comes for future visits.  -4/28/23: Pt again reminded that he would be safer to come to clinic with his RW.  5/5/23: OT and PT provided pt a schedule slip so he can cancel some sessions in late May and early June( due to a planned cruise) and add other sessions when he returns in June.    Written Home Exercises Provided: yes. Exercises were reviewed and Anthony was able to demonstrate them prior to the end of the session.  Anthony demonstrated good  understanding of the education provided. 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 He tolerated his tx session well this morning. The patient required frequent seated rest breaks and demonstrates poor activity tolerance. The patient reports the leg press is appropriately challenging for him. The patient requires encouragement for participation in balance activities. Very reluctant to release upper extremity support for balance challenges.The patient will benefit from continued " physical therapy intervention to address remaining functional mobility deficits.        Anthony Is progressing well towards his goals.   Pt prognosis is Fair.     Pt will continue to benefit from skilled outpatient physical therapy to address the deficits listed in the problem list box on initial evaluation, provide pt/family education and to maximize pt's level of independence in the home and community environment.     Pt's spiritual, cultural and educational needs considered and pt agreeable to plan of care and goals.     Anticipated barriers to physical therapy: history of alcohol use, frequent falls    Goals:    Short Term Goals: 4 weeks   Pt will be issued first installment of HEP and report at least partial compliance~in progress  Pt will improve his 30 second chair rise to 4-5 reps to demonstrate increased LE strength and muscular endurance~in progress  Pt will improve his TUG score to 25 seconds or < with or without appropriate AD to improve household ambulation and decrease fall risk~in progress  Pt will increase his SSWS to 0.375 m/sec with or without appropriate AD to improve community ambulation and decrease fall risk~in progress  Pt will complete mCTSIB balance testing and PT to set appropriate goals~MET, 4/17/23  Pt will improve any 3 LE muscle grades by 1/3 to help with functional mobility skills~in progress  Pt will perform all sit<> stand and stand pivot transfers with no more than SBA~in progress  (NEW GOAL, 4/17/23): Pt will improve his score on condition # 2 of the mCTSIB to 19 seconds for improved balance in low/eliminated vision environments     Long Term Goals: 8 weeks   Pt will be partially compliant with finalized HEP to help maintain potential gains realized in PT~ongoing  Pt will improve his 30 second chair rise to 5-6 reps to demonstrate increased LE strength and muscular endurance~ongoing  Pt will improve his TUG score to 20 seconds or < with or without appropriate AD to improve household  ambulation and decrease fall risk~ongoing  Pt will increase his SSWS to 0.43 m/sec with or without appropriate AD to improve community ambulation and decrease fall risk~ongoing  Pt will improve any 3 LE muscle grades by 2/3 to help with functional mobility skills ~ongoing  Pt will perform all sit<> stand and stand pivot transfers with no more than S~ongoing  (NEW GOAL, 4/17/23): Pt will improve his score on condition # 2 of the mCTSIB to 22 seconds for improved balance in low/eliminated vision environments    PLAN     Continue to progress strengthening, balance and endurance to tolerance. Increase focus on balance in future sessions.    Dat Savage, PTA    5/17/2023

## 2023-05-18 NOTE — PROGRESS NOTES
[facility]   Occupational Therapy Neurological Rehabilitation   Treatment Note   Name: Anthony Ball  MRN: [id number]  Today's Date: 5/19/2023    Therapy Diagnosis:   Encounter Diagnoses   Name Primary?    Impaired mobility and ADLs Yes    Coordination impairment     Decreased  strength          Physician: Lyubov Yu MD  Physician Orders: Neuro Program      Medical Diagnosis: E11.42 (ICD-10-CM) - Polyneuropathy due to type 2 diabetes mellitus   Evaluation Date: 4/14/2023  Plan of Care Expiration Period: 6/9/2023  Insurance Authorization period Expiration: 5/14/2023  Date of Return to MD: nothing on Epic at this time   FOTO: 2/3     Visit # / Visits Authorized: 8 / 20 (+evaluation)    Time In: 11:00  Time Out: 11:45  Total Billable Time: 45 minutes    Precautions:   Standard and Fall    Subjective   Pt reports: yesterday was a good day     Going on a cruise on May 25th for 10 days  Response to previous treatment: no complaints   Functional change: dressing and showering indep; not using walker at home   Patient's Goals for Occupational Therapy: tremor management and balance     Pain upon arrival: none reported    Location: n/a  Pain at cessation of session: n/a    Objective     Anthony received therapeutic exercises for 30 minutes including:  -seated for upper body ergonometer on level 3.8; completed for 8 minutes switching directions mid way. Focus on postural control and breathing techniques with exhale with exertion of force. MET average 1.9     Standing:   -red resistance band: x2 sets of 10 reps: shoulder flex and abduction   -re edu on breathing technique to task   Seated: chest opener x10 reps     Anthony participated in dynamic functional therapeutic activities to improve functional performance for 15  minutes, including:  -standing in kitchen for dynamic reaching task x5.19 min - unilateral upper extremity support  -functional mobility - house hold distance while carrying plate with  mod(I)    Hand off to PT    Home Exercises and Education Provided   Education provided:   - postural control   - role of Occupational Therapy in care, goals for Occupational Therapy for this plan of care,  scheduling  - edu on importance of continuation of sobriety at this time   - discussed importance of leisure activities and hobbies in post retired life     Written Home Exercises Provided: yes.  Exercises were reviewed and Anthony was able to demonstrate them prior to the end of the session.  Anthony demonstrated good  understanding of the HEP provided.   .   See EMR under Patient Instructions for exercises provided 4/24/2023. 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 He was able to tolerate dynamic standing activity x5 min duration- he requires unilateral upper extremity support for best safety. With functional carry task, edu on switching of hands and taking rest breaks as needed with demo carry over. Pt is going out of town for 10 days- will follow up as appropriate.       Anthony is progressing well towards his goals and there are no updates to goals at this time. Pt prognosis is Good.     Pt will continue to benefit from skilled outpatient occupational therapy to address the deficits listed in the problem list on initial evaluation provide pt/family education and to maximize pt's level of independence in the home and community environment.     Anticipated barriers to occupational therapy: carry over     Pt's spiritual, cultural and educational needs considered and pt agreeable to plan of care and goals.    Goals:  Long Term Goals:  Time frame: 8 weeks  - Pt will bathe with while seated on shower chair, utilizing adaptive equipment and/or compensatory strategies as needed with mod(I). Met per report; using chair with back pain   - Pt will demo ability to bring food to mouth with utensils  with minimal to no spillage with AD as needed. Ongoing (able to complete with open mouth cup to mouth)  - Pt will perform dynamic standing with use of bilateral upper extremities in kitchen for 15 minutes with distant supervision to improve performance with homemaking tasks, cooking tasks, standing at the sink for grooming and hygiene, decrease risk for falls, and maximize safety. progressing   - Pt will improve FOTO limitation score to less than or equal to 50% for improved self perception of functional performance with daily activities. Ongoing ; 55% on 5/12  - Pt will be independent with Home Exercise Program (HEP)/Home Activity Program (HAP) to promote long term maintenance of progress made in therapy. Ongoing      Short Term Goals:  Time frame: 4 weeks  - Pt will demonstrate ability to cut food safely with butter/steak knife or AD as needed. Ongoing   - Pt will demonstrate consistent use of mindfulness techniques to manage stress, anxiety, and depression and will incorporate them into daily routine.Ongoing   - Pt will perform dynamic standing task x a minimum of 10 minutes with zero losses of balance, no more than 2 brief seated rest breaks, and minimal verbals cues for (posture) to increase independence for home managment task. Progressing; 7 min before rest break 5/3/2023   - Pt will increase  strength by 3-5 # for BUE to increase performance during daily tasks (opening jars, holding telephone) Met 5/17     Goals to be adjusted as needed.   The following goals were discussed with the patient and patient is in agreement with them as to be addressed in the treatment plan.     Plan   Certification Period/Plan of care expiration: 4/14/2023 to 6/9/2023.     Outpatient Occupational Therapy 2 times weekly for 8 weeks to include the following interventions: Moist Heat/ Ice, Neuromuscular Re-ed, Paraffin, Patient Education, Self Care, Therapeutic Activities, and Therapeutic Exercise.    JOSH Fernández  5/19/2023

## 2023-05-18 NOTE — PROGRESS NOTES
[facility]   Physical Therapy Treatment Note     Name: Anthony Ball  Clinic Number: [id number]    Therapy Diagnosis:   Encounter Diagnoses   Name Primary?    Balance problem Yes    Weakness of both lower extremities        Physician: Lyubov Yu MD    Visit Date: 5/19/2023    Physician Orders: PT Eval and Treat   Medical Diagnosis from Referral: Polyneuropathy due to type 2 diabetes mellitus  Evaluation Date: 4/14/2023  Authorization Period Expiration: 12/31/23  Plan of Care Expiration: 6/9/23  Plan of care extended through: 7/31/2023  Visit # / Visits authorized: 7/ 20(+eval)    PTA Visit #: 0/5     Time In: 1145  Time Out: 1230   Total Billable Time: 45 minutes    Precautions: Standard and Fall      SUBJECTIVE     Pt reports: I think I am doing much better since starting here. I came in here using a wheelchair. He is leaving for a cruise tomorrow so will miss the next two weeks of therapy.    He was compliant with home exercise program.  Response to previous treatment: no adverse effects   Functional change: ongoing    Pain: 0/10  Location: N/A       OBJECTIVE   Ambulates from OT gym with supervision to PT gym after working with OT.    Objective Measures updated at progress report unless specified.       Treatment   Pt arrived with no A.D.    Anthony received the treatments listed below:      therapeutic exercises to develop strength, endurance, ROM, flexibility, posture, and core stabilization for 15 minutes including:    X 8 min on SCI-FIT recumbent stepper, level 2.0, for B UE and LE muscular and CV endurance    Lower Extremity Strength  Right LE  eval 5/19/23 Left LE eval 5/19/23   Hip flexion:  3+/5 4-/5 Hip flexion: 3+/5 4-/5   Knee extension: 4/5 5/5 Knee extension: 3+/5 5/5   Knee flexion: 4-/5 4+/5 Knee flexion: 4/5 4+/5   Ankle dorsiflexion:  4-/5 4/5 Ankle dorsiflexion: 4-/5 4/5   Ankle plantarflexion:  4/5 5/5 Ankle plantarflexion: 4+/5 5/5   Hip abduction: 4+/5 5/5 Hip  abduction: 4/5 5/5   Hip adduction: 5/5 5/5 Hip adduction 5/5 5/5   Hip extension: 4+/5 5/5 Hip extension: 4+/5 4+/5       Time above includes time for  multiple seated rest breaks throughout session    neuromuscular re-education activities to improve: Balance, Coordination, Kinesthetic, Sense, Proprioception, and Posture for 15 minutes. The following activities were included:    Postural control:  MCTSIB:  1. Eyes Open/feet together/Firm: 30 seconds  2. Eyes Closed/feet together/Firm: 30 seconds  3. Eyes Open/feet together/Foam: 30 seconds  4. Eyes Closed/feet together/Foam: 5 seconds      2 x 30 sec split stance with R foot on foam fitter and L foot on floor, no UE support, CGA   2 x 30 sec split stance with L foot on foam fitter and R foot on floor, no UE support, CGA  2 lap of side stepping in //Bars No upper extremity support CGA     X10 reps ea LE lead step onto and off of 4 pt fitter, 1-2 UE support despite cues to decrease dependence       therapeutic activities to improve functional performance for 15 minutes, including:       Evaluation 5/19/2023   30 second Chair Rise 3 completed with arms, light CGA 5 completed with arms, cues to stand fully    TUG 30 seconds, CGA 21.8 seconds, CGA   Self selected walking speed 0.33 m/sec (6m/18s) .6 m/sec (6m/10s)     Multiple trials of sit<> stand from gold chair throughout session, SBA  SBA sit<> stand SCI-FIT stepper seat    Time above includes time to ambulate to/from the waiting room with SBA and no AD    gait training to improve functional mobility and safety for 0  minutes, including:            Patient Education and Home Exercises     Home Exercises Provided and Patient Education Provided     Education provided:   - 4/17/23: Pt instructed in first installment of HEP. Suggested pt use his RW when he comes for future visits.  -4/28/23: Pt again reminded that he would be safer to come to clinic with his RW.  5/5/23: OT and PT provided pt a schedule slip so he can  cancel some sessions in late May and early June( due to a planned cruise) and add other sessions when he returns in June.    Written Home Exercises Provided: yes. Exercises were reviewed and Anthony was able to demonstrate them prior to the end of the session.  Anthony demonstrated good  understanding of the education provided. See EMR under Patient Instructions for exercises provided during therapy sessions    ASSESSMENT     Dr. Ball reassessed for progress during todays visit. He demonstrates significant progress since his evaluation only one month ago. Pt was able to achieve all of the set STGs and 3/6 LTGs. Two additional LTGs added today to continue to address patient walking speed and balance. Dr. Ball continues to demonstrate a WBOS with ambulation and decreased overall endurance and balance with tasks. He will continue to benefit from skilled PT services to address these deficits and continue to decrease his risk for fall and dependence on caregivers for mobility. For these reasons pt's plan of care extended through 7/31/2023 at a frequency of 2x per week. Pt agreeable to this plan and plan of care updated slightly early due to patient's absence during the next two weeks for cruise out of the country.         Anthony Is progressing well towards his goals.   Pt prognosis is Fair.     Pt will continue to benefit from skilled outpatient physical therapy to address the deficits listed in the problem list box on initial evaluation, provide pt/family education and to maximize pt's level of independence in the home and community environment.     Pt's spiritual, cultural and educational needs considered and pt agreeable to plan of care and goals.     Anticipated barriers to physical therapy: history of alcohol use, frequent falls    Goals:  Short Term Goals: 4 weeks   Pt will be issued first installment of HEP and report at least partial compliance~ MET 5/19/2023  Pt will improve his 30 second chair rise to 4-5 reps to  demonstrate increased LE strength and muscular endurance~ MET 5/19/2023  Pt will improve his TUG score to 25 seconds or < with or without appropriate AD to improve household ambulation and decrease fall risk~ MET 5/19/2023  Pt will increase his SSWS to 0.375 m/sec with or without appropriate AD to improve community ambulation and decrease fall risk~ MET 5/19/2023  Pt will complete mCTSIB balance testing and PT to set appropriate goals~MET, 4/17/23  Pt will improve any 3 LE muscle grades by 1/3 to help with functional mobility skills~ MET 5/19/2023  Pt will perform all sit<> stand and stand pivot transfers with no more than SBA~ MET 5/19/2023  (NEW GOAL, 4/17/23): Pt will improve his score on condition # 2 of the mCTSIB to 19 seconds for improved balance in low/eliminated vision environments~ MET 5/19/2023     Long Term Goals: 8 weeks   Pt will be partially compliant with finalized HEP to help maintain potential gains realized in PT~ongoing  Pt will improve his 30 second chair rise to 5-6 reps to demonstrate increased LE strength and muscular endurance~ongoing  Pt will improve his TUG score to 20 seconds or < with or without appropriate AD to improve household ambulation and decrease fall risk~ongoing  Pt will increase his SSWS to 0.43 m/sec with or without appropriate AD to improve community ambulation and decrease fall risk~ MET 5/19/2023  Pt will improve any 3 LE muscle grades by 2/3 to help with functional mobility skills ~ MET 5/19/2023  Pt will perform all sit<> stand and stand pivot transfers with no more than S~ongoing  (NEW GOAL, 4/17/23): Pt will improve his score on condition # 2 of the mCTSIB to 22 seconds for improved balance in low/eliminated vision environments~ MET 5/19/2023  NEW GOAL 5/19/2023: Pt will improve his score on condition #4 of the mCTSIB to at least 10 seconds for improved balance in low/eliminated vision environments.   NEW GOAL 5/19/2023: Pt will increase his SSWS to at least .7 m/s  with or without appropriate AD to improve community ambulation and decrease fall risk.     PLAN     Plan of care extended through 7/31/2023 to work on remaining LTGs and improve functional balance and mobility at a frequency of 2x per week.     Christiano Clemens, PT    5/19/2023

## 2023-05-19 ENCOUNTER — CLINICAL SUPPORT (OUTPATIENT)
Dept: REHABILITATION | Facility: HOSPITAL | Age: 73
End: 2023-05-19
Payer: MEDICARE

## 2023-05-19 DIAGNOSIS — Z78.9 IMPAIRED MOBILITY AND ADLS: Primary | ICD-10-CM

## 2023-05-19 DIAGNOSIS — R29.898 WEAKNESS OF BOTH LOWER EXTREMITIES: ICD-10-CM

## 2023-05-19 DIAGNOSIS — R27.8 COORDINATION IMPAIRMENT: ICD-10-CM

## 2023-05-19 DIAGNOSIS — Z74.09 IMPAIRED MOBILITY AND ADLS: Primary | ICD-10-CM

## 2023-05-19 DIAGNOSIS — R29.898 DECREASED GRIP STRENGTH: ICD-10-CM

## 2023-05-19 DIAGNOSIS — R26.89 BALANCE PROBLEM: Primary | ICD-10-CM

## 2023-05-19 PROCEDURE — 97112 NEUROMUSCULAR REEDUCATION: CPT | Mod: PO

## 2023-05-19 PROCEDURE — 97110 THERAPEUTIC EXERCISES: CPT | Mod: PO

## 2023-05-19 PROCEDURE — 97530 THERAPEUTIC ACTIVITIES: CPT | Mod: PO

## 2023-05-19 NOTE — PLAN OF CARE
[facility]   Physical Therapy Treatment Note     Name: Anthony Ball  Clinic Number: [id number]    Therapy Diagnosis:   Encounter Diagnoses   Name Primary?    Balance problem Yes    Weakness of both lower extremities        Physician: Lyubov Yu MD    Visit Date: 5/19/2023    Physician Orders: PT Eval and Treat   Medical Diagnosis from Referral: Polyneuropathy due to type 2 diabetes mellitus  Evaluation Date: 4/14/2023  Authorization Period Expiration: 12/31/23  Plan of Care Expiration: 6/9/23  Plan of care extended through: 7/31/2023  Visit # / Visits authorized: 7/ 20(+eval)    PTA Visit #: 0/5     Time In: 1145  Time Out: 1230   Total Billable Time: 45 minutes    Precautions: Standard and Fall      SUBJECTIVE     Pt reports: I think I am doing much better since starting here. I came in here using a wheelchair. He is leaving for a cruise tomorrow so will miss the next two weeks of therapy.    He was compliant with home exercise program.  Response to previous treatment: no adverse effects   Functional change: ongoing    Pain: 0/10  Location: N/A       OBJECTIVE   Ambulates from OT gym with supervision to PT gym after working with OT.    Objective Measures updated at progress report unless specified.       Treatment   Pt arrived with no A.D.    Anthony received the treatments listed below:      therapeutic exercises to develop strength, endurance, ROM, flexibility, posture, and core stabilization for 15 minutes including:    X 8 min on SCI-FIT recumbent stepper, level 2.0, for B UE and LE muscular and CV endurance    Lower Extremity Strength  Right LE  eval 5/19/23 Left LE eval 5/19/23   Hip flexion:  3+/5 4-/5 Hip flexion: 3+/5 4-/5   Knee extension: 4/5 5/5 Knee extension: 3+/5 5/5   Knee flexion: 4-/5 4+/5 Knee flexion: 4/5 4+/5   Ankle dorsiflexion:  4-/5 4/5 Ankle dorsiflexion: 4-/5 4/5   Ankle plantarflexion:  4/5 5/5 Ankle plantarflexion: 4+/5 5/5   Hip abduction: 4+/5 5/5 Hip  abduction: 4/5 5/5   Hip adduction: 5/5 5/5 Hip adduction 5/5 5/5   Hip extension: 4+/5 5/5 Hip extension: 4+/5 4+/5       Time above includes time for  multiple seated rest breaks throughout session    neuromuscular re-education activities to improve: Balance, Coordination, Kinesthetic, Sense, Proprioception, and Posture for 15 minutes. The following activities were included:    Postural control:  MCTSIB:  1. Eyes Open/feet together/Firm: 30 seconds  2. Eyes Closed/feet together/Firm: 30 seconds  3. Eyes Open/feet together/Foam: 30 seconds  4. Eyes Closed/feet together/Foam: 5 seconds      2 x 30 sec split stance with R foot on foam fitter and L foot on floor, no UE support, CGA   2 x 30 sec split stance with L foot on foam fitter and R foot on floor, no UE support, CGA  2 lap of side stepping in //Bars No upper extremity support CGA     X10 reps ea LE lead step onto and off of 4 pt fitter, 1-2 UE support despite cues to decrease dependence       therapeutic activities to improve functional performance for 15 minutes, including:       Evaluation 5/19/2023   30 second Chair Rise 3 completed with arms, light CGA 5 completed with arms, cues to stand fully    TUG 30 seconds, CGA 21.8 seconds, CGA   Self selected walking speed 0.33 m/sec (6m/18s) .6 m/sec (6m/10s)     Multiple trials of sit<> stand from gold chair throughout session, SBA  SBA sit<> stand SCI-FIT stepper seat    Time above includes time to ambulate to/from the waiting room with SBA and no AD    gait training to improve functional mobility and safety for 0  minutes, including:            Patient Education and Home Exercises     Home Exercises Provided and Patient Education Provided     Education provided:   - 4/17/23: Pt instructed in first installment of HEP. Suggested pt use his RW when he comes for future visits.  -4/28/23: Pt again reminded that he would be safer to come to clinic with his RW.  5/5/23: OT and PT provided pt a schedule slip so he can  cancel some sessions in late May and early June( due to a planned cruise) and add other sessions when he returns in June.    Written Home Exercises Provided: yes. Exercises were reviewed and Anthony was able to demonstrate them prior to the end of the session.  Anthony demonstrated good  understanding of the education provided. See EMR under Patient Instructions for exercises provided during therapy sessions    ASSESSMENT     Dr. Ball reassessed for progress during todays visit. He demonstrates significant progress since his evaluation only one month ago. Pt was able to achieve all of the set STGs and 3/6 LTGs. Two additional LTGs added today to continue to address patient walking speed and balance. Dr. Ball continues to demonstrate a WBOS with ambulation and decreased overall endurance and balance with tasks. He will continue to benefit from skilled PT services to address these deficits and continue to decrease his risk for fall and dependence on caregivers for mobility. For these reasons pt's plan of care extended through 7/31/2023 at a frequency of 2x per week. Pt agreeable to this plan and plan of care updated slightly early due to patient's absence during the next two weeks for cruise out of the country.         Anthony Is progressing well towards his goals.   Pt prognosis is Fair.     Pt will continue to benefit from skilled outpatient physical therapy to address the deficits listed in the problem list box on initial evaluation, provide pt/family education and to maximize pt's level of independence in the home and community environment.     Pt's spiritual, cultural and educational needs considered and pt agreeable to plan of care and goals.     Anticipated barriers to physical therapy: history of alcohol use, frequent falls    Goals:  Short Term Goals: 4 weeks   Pt will be issued first installment of HEP and report at least partial compliance~ MET 5/19/2023  Pt will improve his 30 second chair rise to 4-5 reps to  demonstrate increased LE strength and muscular endurance~ MET 5/19/2023  Pt will improve his TUG score to 25 seconds or < with or without appropriate AD to improve household ambulation and decrease fall risk~ MET 5/19/2023  Pt will increase his SSWS to 0.375 m/sec with or without appropriate AD to improve community ambulation and decrease fall risk~ MET 5/19/2023  Pt will complete mCTSIB balance testing and PT to set appropriate goals~MET, 4/17/23  Pt will improve any 3 LE muscle grades by 1/3 to help with functional mobility skills~ MET 5/19/2023  Pt will perform all sit<> stand and stand pivot transfers with no more than SBA~ MET 5/19/2023  (NEW GOAL, 4/17/23): Pt will improve his score on condition # 2 of the mCTSIB to 19 seconds for improved balance in low/eliminated vision environments~ MET 5/19/2023     Long Term Goals: 8 weeks   Pt will be partially compliant with finalized HEP to help maintain potential gains realized in PT~ongoing  Pt will improve his 30 second chair rise to 5-6 reps to demonstrate increased LE strength and muscular endurance~ongoing  Pt will improve his TUG score to 20 seconds or < with or without appropriate AD to improve household ambulation and decrease fall risk~ongoing  Pt will increase his SSWS to 0.43 m/sec with or without appropriate AD to improve community ambulation and decrease fall risk~ MET 5/19/2023  Pt will improve any 3 LE muscle grades by 2/3 to help with functional mobility skills ~ MET 5/19/2023  Pt will perform all sit<> stand and stand pivot transfers with no more than S~ongoing  (NEW GOAL, 4/17/23): Pt will improve his score on condition # 2 of the mCTSIB to 22 seconds for improved balance in low/eliminated vision environments~ MET 5/19/2023  NEW GOAL 5/19/2023: Pt will improve his score on condition #4 of the mCTSIB to at least 10 seconds for improved balance in low/eliminated vision environments.   NEW GOAL 5/19/2023: Pt will increase his SSWS to at least .7 m/s  with or without appropriate AD to improve community ambulation and decrease fall risk.     PLAN     Plan of care extended through 7/31/2023 to work on remaining LTGs and improve functional balance and mobility at a frequency of 2x per week.     Christiano Clemens, PT    5/19/2023

## 2023-06-12 ENCOUNTER — HOSPITAL ENCOUNTER (OUTPATIENT)
Dept: RADIOLOGY | Facility: HOSPITAL | Age: 73
Discharge: HOME | End: 2023-06-12
Attending: INTERNAL MEDICINE
Payer: MEDICARE

## 2023-06-12 ENCOUNTER — OFFICE VISIT (OUTPATIENT)
Dept: INTERNAL MEDICINE | Facility: CLINIC | Age: 73
End: 2023-06-12
Payer: MEDICARE

## 2023-06-12 VITALS
WEIGHT: 197 LBS | DIASTOLIC BLOOD PRESSURE: 81 MMHG | HEIGHT: 69 IN | BODY MASS INDEX: 29.18 KG/M2 | HEART RATE: 102 BPM | SYSTOLIC BLOOD PRESSURE: 124 MMHG

## 2023-06-12 DIAGNOSIS — E83.42 HYPOMAGNESEMIA: ICD-10-CM

## 2023-06-12 DIAGNOSIS — R41.0 CONFUSION: ICD-10-CM
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
## 2023-06-12 DIAGNOSIS — R07.81 RIB PAIN ON LEFT SIDE: Primary | ICD-10-CM

## 2023-06-12 DIAGNOSIS — R07.81 RIB PAIN ON LEFT SIDE: ICD-10-CM

## 2023-06-12 DIAGNOSIS — E11.42 TYPE 2 DIABETES MELLITUS WITH DIABETIC POLYNEUROPATHY, WITHOUT LONG-TERM CURRENT USE OF INSULIN: ICD-10-CM

## 2023-06-12 PROCEDURE — 71101 XR RIBS MIN 3 VIEWS W/ PA CHEST LEFT: ICD-10-PCS | Mod: 26,LT,, | Performed by: RADIOLOGY

## 2023-06-12 PROCEDURE — 99999 PR PBB SHADOW E&M-EST. PATIENT-LVL III: ICD-10-PCS | Mod: PBBFAC,,, | Performed by: INTERNAL MEDICINE

## 2023-06-12 PROCEDURE — 71101 X-RAY EXAM UNILAT RIBS/CHEST: CPT | Mod: TC,LT

## 2023-06-12 PROCEDURE — 71101 X-RAY EXAM UNILAT RIBS/CHEST: CPT | Mod: 26,LT,, | Performed by: RADIOLOGY

## 2023-06-12 PROCEDURE — 99213 OFFICE O/P EST LOW 20 MIN: CPT | Mod: PBBFAC | Performed by: INTERNAL MEDICINE

## 2023-06-12 PROCEDURE — 99213 PR OFFICE/OUTPT VISIT, EST, LEVL III, 20-29 MIN: ICD-10-PCS | Mod: S$PBB,,, | Performed by: INTERNAL MEDICINE

## 2023-06-12 PROCEDURE — 99213 OFFICE O/P EST LOW 20 MIN: CPT | Mod: S$PBB,,, | Performed by: INTERNAL MEDICINE

## 2023-06-12 PROCEDURE — 99999 PR PBB SHADOW E&M-EST. PATIENT-LVL III: CPT | Mod: PBBFAC,,, | Performed by: INTERNAL MEDICINE

## 2023-06-12 RX ORDER — METHOCARBAMOL 750 MG/1
750 TABLET, FILM COATED ORAL 3 TIMES DAILY PRN
Qty: 40 TABLET | Refills: 0 | Status: SHIPPED | OUTPATIENT
Start: 2023-06-12 | End: 2023-06-20

## 2023-06-12 RX ORDER — LIDOCAINE 50 MG/G
3 PATCH TOPICAL DAILY PRN
Qty: 30 PATCH | Refills: 1 | Status: SHIPPED | OUTPATIENT
Start: 2023-06-12

## 2023-06-13 ENCOUNTER — TELEPHONE (OUTPATIENT)
Dept: INTERNAL MEDICINE | Facility: CLINIC | Age: 73
End: 2023-06-13
Payer: MEDICARE

## 2023-06-13 ENCOUNTER — PATIENT MESSAGE (OUTPATIENT)
Dept: INTERNAL MEDICINE | Facility: CLINIC | Age: 73
End: 2023-06-13
Payer: MEDICARE

## 2023-06-13 NOTE — TELEPHONE ENCOUNTER
Called and spoke to patient. X-ray results were given to him. He can not open his results in patient portal at this time. Patient verbalized understanding.

## 2023-06-13 NOTE — PROGRESS NOTES
Subjective:       Patient ID: Anthony Ball is a 73 y.o. male.    Chief Complaint: Rib Injury      HPI:  Left lower rib pain and bruising since mechanical fall onto toilet rails last week. Pain with deep breaths. Notes hydrocodone made very nauseous and would like to avoid any opiates. Using lidocaine patch. Wonders if Robaxin may help since was very helpful with back pain in past.    Does note intermittent confusion, partner confirms. No other specific symptoms. No urinary changes. No cough or fever.      Past Medical History:   Diagnosis Date    Coronary artery disease     CAC score 1430    Depression     Diabetic neuropathy     Essential (primary) hypertension     GERD (gastroesophageal reflux disease)     Insomnia     Neuromyopathy     Possibly DM and/or alcohol related.    Reactive airway disease     Type 2 diabetes mellitus          Current Outpatient Medications:     albuterol (VENTOLIN HFA) 90 mcg/actuation inhaler, Inhale 2 puffs into the lungs every 6 (six) hours as needed for Wheezing. Rescue, Disp: 8 g, Rfl: 2    aspirin 81 MG Chew, Take 81 mg by mouth once daily., Disp: , Rfl:     DULoxetine (CYMBALTA) 60 MG capsule, Take 1 capsule (60 mg total) by mouth 2 (two) times daily., Disp: 180 capsule, Rfl: 3    empagliflozin (JARDIANCE) 10 mg tablet, Take 1 tablet (10 mg total) by mouth once daily., Disp: 90 tablet, Rfl: 3    fluticasone-salmeterol diskus inhaler 250-50 mcg, Inhale 1 puff into the lungs 2 (two) times daily. Controller, Disp: 60 each, Rfl: 2    hydrOXYzine HCL (ATARAX) 25 MG tablet, Take 1 tablet (25 mg total) by mouth 3 (three) times daily as needed for Itching., Disp: 90 tablet, Rfl: 1    metFORMIN (GLUCOPHAGE-XR) 500 MG ER 24hr tablet, Take 1 tablet (500 mg total) by mouth 2 (two) times daily with meals., Disp: 180 tablet, Rfl: 3    pantoprazole (PROTONIX) 40 MG tablet, Take 1 tablet (40 mg total) by mouth once daily., Disp: 90 tablet, Rfl: 3    pregabalin (LYRICA) 150 MG capsule, Take 1  capsule (150 mg total) by mouth 2 (two) times daily., Disp: 60 capsule, Rfl: 6    traZODone (DESYREL) 100 MG tablet, Take 2 tablets (200 mg total) by mouth every evening., Disp: 180 tablet, Rfl: 1    albuterol sulfate 2.5 mg/0.5 mL Nebu, Take 2.5 mg by nebulization every 6 (six) hours as needed. Rescue (Patient not taking: Reported on 4/12/2023), Disp: 6 each, Rfl: 3    diphenhydrAMINE (BENADRYL) 25 mg capsule, Take 25 mg by mouth every 6 (six) hours as needed for Itching., Disp: , Rfl:     LIDOcaine (LIDODERM) 5 %, Place 3 patches onto the skin daily as needed (Rib pain). Okay to use 1 to 3 patches. Remove & Discard patches within 12 hours or as directed by MD, Disp: 30 patch, Rfl: 1    methocarbamoL (ROBAXIN) 750 MG Tab, Take 1 tablet (750 mg total) by mouth 3 (three) times daily as needed (Back pain)., Disp: 40 tablet, Rfl: 0    triamcinolone acetonide 0.1% (KENALOG) 0.1 % cream, Apply topically 2 (two) times daily. Use for 1 to 2 weeks as needed for rash., Disp: 453.6 g, Rfl: 0    Past Surgical History:   Procedure Laterality Date    COLONOSCOPY      Normal around 2020    TOTAL KNEE ARTHROPLASTY Right        Social History     Tobacco Use    Smoking status: Never    Smokeless tobacco: Never   Substance Use Topics    Alcohol use: Yes     Comment: Former heavy use    Drug use: Never         Objective:      Vitals:    06/12/23 1346   BP: 124/81   Pulse: 102       Physical Exam  Constitutional:       General: He is not in acute distress.     Appearance: Normal appearance. He is not ill-appearing.   HENT:      Head: Normocephalic and atraumatic.   Eyes:      Conjunctiva/sclera: Conjunctivae normal.   Cardiovascular:      Rate and Rhythm: Normal rate and regular rhythm.   Pulmonary:      Effort: Pulmonary effort is normal. No respiratory distress.      Breath sounds: Normal breath sounds.   Musculoskeletal:         General: No swelling or deformity. Normal range of motion.   Skin:     Findings: Bruising (Overlying  right lower anterior ribs, upper right abdomen.) present.   Neurological:      Mental Status: He is alert and oriented to person, place, and time. Mental status is at baseline.   Psychiatric:         Mood and Affect: Mood normal.         Behavior: Behavior normal.         Thought Content: Thought content normal.         Judgment: Judgment normal.         Assessment/Plan:     1) Mechanical fall, Left lower rib pain/bruising - Checking plain films of right ribs with chest films. Continue lidocaine topical prn. Okay with trying Robaxin prn short-term.    2) Confusion - Intermittent issue with no other concerning new neuro s/s. MRI brain with cerebral volume loss recently, no specifically concerning acute changes. Hx of heavy alcohol use in past which possibly plays some role. Advised labs today. Declines for today but notes he will come in later this week to have drawn. Meds may play some role as well so discussed may reduce Lyrica, will likely reduce Trazodone as well.

## 2023-06-18 DIAGNOSIS — L29.9 PRURITUS: ICD-10-CM

## 2023-06-19 ENCOUNTER — PATIENT MESSAGE (OUTPATIENT)
Dept: INTERNAL MEDICINE | Facility: CLINIC | Age: 73
End: 2023-06-19
Payer: MEDICARE

## 2023-06-19 DIAGNOSIS — E11.42 DIABETIC POLYNEUROPATHY ASSOCIATED WITH TYPE 2 DIABETES MELLITUS: Primary | ICD-10-CM

## 2023-06-19 DIAGNOSIS — R07.81 RIB PAIN ON LEFT SIDE: ICD-10-CM

## 2023-06-20 ENCOUNTER — TELEPHONE (OUTPATIENT)
Dept: REHABILITATION | Facility: HOSPITAL | Age: 73
End: 2023-06-20
Payer: MEDICARE

## 2023-06-20 RX ORDER — PREGABALIN 150 MG/1
150 CAPSULE ORAL 2 TIMES DAILY PRN
Qty: 60 CAPSULE | Refills: 2 | Status: SHIPPED | OUTPATIENT
Start: 2023-06-20 | End: 2023-10-18

## 2023-06-20 RX ORDER — METHOCARBAMOL 750 MG/1
750 TABLET, FILM COATED ORAL 3 TIMES DAILY PRN
Qty: 90 TABLET | Refills: 0 | Status: SHIPPED | OUTPATIENT
Start: 2023-06-20

## 2023-06-20 RX ORDER — HYDROXYZINE HYDROCHLORIDE 25 MG/1
25 TABLET, FILM COATED ORAL 3 TIMES DAILY PRN
Qty: 90 TABLET | Refills: 1 | Status: SHIPPED | OUTPATIENT
Start: 2023-06-20 | End: 2023-09-25

## 2023-06-20 NOTE — TELEPHONE ENCOUNTER
PT called pt in an attempt to check on him. He has cancelled several previous therapy sessions(including today) and PT  was attempting to see if pt has plans to continue his therapy or not. Pt's mailbox is full and PT unable to leave message. He is scheduled to return on 6/22/23.    Ba Diaz, PT  6/20/2023

## 2023-06-20 NOTE — PROGRESS NOTES
[facility]   Occupational Therapy Neurological Rehabilitation   Plan of Care and Re-Assessment    Name: Anthony Ball  MRN: [id number]  Today's Date: 6/22/2023    Therapy Diagnosis:   Encounter Diagnoses   Name Primary?    Impaired mobility and ADLs Yes    Coordination impairment     Decreased  strength        Physician: Lyubov Yu MD  Physician Orders: Neuro Program      Medical Diagnosis: E11.42 (ICD-10-CM) - Polyneuropathy due to type 2 diabetes mellitus   Evaluation Date: 4/14/2023; re-assessment 6/22/2023  Plan of Care Expiration Period: 8/4/2023  Insurance Authorization period Expiration: 12/31/2023  Date of Return to MD: nothing on Epic at this time   FOTO: 3/3     Visit # / Visits Authorized: 9 / 20 (+evaluation)    Time In: 11:01  Time Out: 11:45  Total Billable Time:44 minutes    Precautions:   Standard and Fall    Subjective   Pt reports: June 4th fell/almost fell in bathroom and cracked his rib on the grab bar   Patient reported while on cruise for 10 days- was mod(I) with mobility and ADLs; able to walk and transfer to smaller boats, etc. Using scooter for longer distances     Response to previous treatment: no complaints   Functional change: dressing and showering indep; not using walker at home   Patient's Goals for Occupational Therapy: tremor management and balance     Pain upon arrival: none reported    Location: n/a  Pain at cessation of session: n/a    Objective     Anthony participated in dynamic functional therapeutic activities to improve functional performance for 34  minutes, including:  -re-assessment:   Functional Status      Functional Mobility:   Bed mobility: Mod I  Roll to left: Mod I  Roll to right: Mod I  Supine to sit: Mod I  Sit to supine: Mod I  Transfers to bed: Mod I  Transfers to toilet: Mod I  Car transfers: Mod I  Functional mobility with cane and held held assist at times      ADL's:   Feeding: mod(I); utilizing weighted built up utensils  as needed;  reported did not use/need on vacation  Grooming: I  Hygiene: I  Upper Body Dressing: Mod I  Lower Body Dressing: Mod I  Toileting: Mod I  Bathing: Mod(I); seated      IADL's:  Homecare, etc is spouse's role   Medication management: Mod I; pill organizer.   Handwriting: increased difficulty due to tremor   Technology Use: increased difficulty due to tremor      Objective   Cognitive Exam:  Oriented: Person, Place, Time, and Situation  Behaviors: Cooperative  Follows Commands/attention: Follows multistep  commands  Communication: clear/fluent  Safety awareness/insight to disability: aware of diagnosis, treatment, and prognosis  Coping skills/emotional control: Appropriate to situation     Physical Exam:  Postural examination/scapula alignment: Rounded shoulder and Head forward  Joint integrity: Firm end feeling  Skin integrity: Dry  Edema: none noted       Joint evaluation: AROM WFL for bilateral upper extremities   Left: shoulder abd: active: 151*; 168* passive      Fist: normal     Strength 4/14/2023 4/14/2023 5/17/2023 5/17/2023 6/8/2023 6/8/2023   **within available ROM** Right Left Right Left Right Left   Shoulder flexion 5/5 4+/5 5/5 4/5 5/5 4+/5   Shoulder abduction 5/5 4+/5 5/5 4/5 5/5 4+/5   Shoulder External Rotation 5/5 5/5 5/5 5/5 5/5 5/5   Shoulder Internal Rotation 5/5 5/5 5/5 5/5 5/5 5/5   Shoulder Extension 5/5 5/5 5/5 5/5 5/5 5/5   Shoulder Horizontal Adduction 5/5 5/5 5/5 5/5 5/5 5/5   Biceps 5/5 5/5 5/5 5/5 5/5 5/5   Triceps 5/5 5/5 5/5 5/5 5/5 5/5   Wrist flexion 5/5 5/5 5/5 5/5 5/5 5/5   Wrist extension  5/5 5/5 5/5 5/5 5/5 5/5       Strength: (MELONIE Dynamometer in lbs.) Position II: average of three trials       4/14/2023 4/14/2023 5/17/2023 5/17/2023 6/8/2023 6/8/2023     Right Left Right Left Right Left   Rung II 35.7 # 36.6 # 40 # 41.9 # 42.6# 50.2#    Norms for  Strength: standard deviation +/- 12#  Males: 70+  Male: Right Left   54 lbs 48 lbs      Pinch Strength (Measured in psi)        4/14/2023 4/14/2023 5/17/2023 5/17/2023 6/8/2023 6/8/2023     Right Left Right Left Right Left   Key Pinch 8 psi 6 psi 12 8.1 10.5 9.7   2pt Pinch 3 psi 2 psi 5.9 5.4 6.6 4.3        Fine Motor Coordination: 9 Hole Peg Test     Right   4/14/2023 Left   4/14/2023 Right  5/17/2023 Left  5/17/2023 Right  6/8/2023 Left  6/8/2023                53.90 s              53.85 s 41.23 s 48.52 s  43.84 s 44.73 s      Fine Motor Coordination: Box and Block     Right   4/14/2023 Left   4/14/2023 Right  5/17/2023 Left  5/17/2023 Right  6/8/2023 Left  6/8/2023                21              25 27 30 20 20      Gross motor coordination:   MICHAEL (Rapid Alternating Movements): intact   Finger to Nose (3 times): improvement; intact. Mild instability on left   Finger Flicks (coordination moving from digit flexion to digit extension): intact     Tone: bilateral upper extremities  Modified Vandana Scale:   0 - No increase in muscle tone     Sensation:  Anthony reports no complaints/changes at this time     Balance:   Impaired static and dynamic standing     Functional mobility: stand by assist throughout gym with straight cane on this date      Endurance Deficit: moderate    Anthony received therapeutic exercises for 10 minutes including:  -seated for upper body ergonometer on level 2.8; completed for 10 minutes switching directions mid way. Focus on postural control and breathing techniques with exhale with exertion of force. MET average 1.4       Hand off to PT    Home Exercises and Education Provided   Education provided:   - postural control   - role of Occupational Therapy in care, goals for Occupational Therapy for this plan of care,  scheduling  - edu on importance of continuation of sobriety at this time   - discussed importance of leisure activities and hobbies in post retired life     Written Home Exercises Provided: yes.  Exercises were reviewed and Anthony was able to demonstrate them prior to the end of the session.  Anthony  demonstrated good  understanding of the HEP provided.   .   See EMR under Patient Instructions for exercises provided 4/24/2023. 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 He remains with general instability with a decline in his functional strength and endurance. Plan of care for 6 weeks to address functional strength and endurance needed to return to prior roles and routines. See goals added below.               Anthony is progressing well towards his goals and there are no updates to goals at this time. Pt prognosis is Good.     Pt will continue to benefit from skilled outpatient occupational therapy to address the deficits listed in the problem list on initial evaluation provide pt/family education and to maximize pt's level of independence in the home and community environment.     Anticipated barriers to occupational therapy: carry over     Pt's spiritual, cultural and educational needs considered and pt agreeable to plan of care and goals.    Goals:  Long Term Goals:  Time frame: 8 weeks  - Pt will bathe with while seated on shower chair, utilizing adaptive equipment and/or compensatory strategies as needed with mod(I). Met per report; using chair with back pain   - Pt will demo ability to bring food to mouth with utensils with minimal to no spillage with AD as needed. Ongoing (able to complete with open mouth cup to mouth)  - Pt will perform dynamic standing with use of bilateral upper extremities in kitchen for 15 minutes with distant supervision to improve performance with homemaking tasks, cooking tasks, standing at the sink for grooming and hygiene, decrease risk for falls, and maximize safety. progressing   - Pt will improve FOTO limitation score to less than or equal to 50% for improved self perception of functional performance with  daily activities. Met  - Pt will be independent with Home Exercise Program (HEP)/Home Activity Program (HAP) to promote long term maintenance of progress made in therapy. Ongoing / Progressing      Short Term Goals:  Time frame: 4 weeks  - Pt will demonstrate ability to cut food safely with butter/steak knife or AD as needed. Met  - Pt will demonstrate consistent use of mindfulness techniques to manage stress, anxiety, and depression and will incorporate them into daily routine.Ongoing   - Pt will perform dynamic standing task x a minimum of 10 minutes with zero losses of balance, no more than 2 brief seated rest breaks, and minimal verbals cues for (posture) to increase independence for home managment task. Progressing; 7 min before rest break 5/3/2023   - Pt will increase  strength by 3-5 # for BUE to increase performance during daily tasks (opening jars, holding telephone) Met 5/17    Goals added:   Pt will be able to complete functional mobility at household distance while carrying plate with stand by assist.   Pt will be able to complete gathering of home related items at various heights while walking with stand by assist.   Pt will demo 5/5 strength throughout bilateral upper extremities.   Pt will demo within age related norms for fine motor strength bilaterally as needed for IADL/ADL completion.   Pt will be able to complete functional mobility with dog walking x20 ft with stand by assist.      Goals to be adjusted as needed.   The following goals were discussed with the patient and patient is in agreement with them as to be addressed in the treatment plan.     Plan   Certification Period/Plan of care expiration: 8/4/2023     Continue Outpatient Occupational Therapy 2 times weekly for 6 weeks to include the following interventions: Moist Heat/ Ice, Neuromuscular Re-ed, Paraffin, Patient Education, Self Care, Therapeutic Activities, and Therapeutic Exercise.    Next sessions: strength and endurance      Eugenia Barth, LOTR 6/22/2023

## 2023-06-21 NOTE — PROGRESS NOTES
[facility]   Physical Therapy Treatment Note     Name: Anthony Ball  Clinic Number: [id number]    Therapy Diagnosis:   Encounter Diagnoses   Name Primary?    Balance problem Yes    Weakness of both lower extremities          Physician: Lyubov Yu MD    Visit Date: 6/22/2023    Physician Orders: PT Eval and Treat   Medical Diagnosis from Referral: Polyneuropathy due to type 2 diabetes mellitus  Evaluation Date: 4/14/2023  Authorization Period Expiration: 12/31/23  Plan of Care Expiration: 6/9/23  Plan of care extended through: 7/31/2023  Visit # / Visits authorized: 10/ 20(+eval)    PTA Visit #: 0/5     Time In: 1148   Time Out: 1233    Total Billable Time: 45  minutes    Precautions: Standard and Fall      SUBJECTIVE     Pt reports: his cruise was good but he fell the day he arrived at home. Feels like he cracked a rib but the X-rays did not support this.    He was compliant with home exercise program.  Response to previous treatment: no adverse effects   Functional change: ongoing    Pain: 7/10 with movement, 0/10 at rest  Location: L flank       OBJECTIVE   Ambulates from OT gym with supervision to PT gym after working with OT. Pt uses a SC today.    Objective Measures updated at progress report unless specified. See below:      Lower Extremity Strength  Right LE  eval 5/19/23 6/22/23 Left LE eval 5/19/23 6/22/23   Hip flexion:  3+/5 4-/5 3+/5* Hip flexion: 3+/5 4-/5 3+/5*   Knee extension: 4/5 5/5 5/5 Knee extension: 3+/5 5/5 5/5   Knee flexion: 4-/5 4+/5 4-/5 Knee flexion: 4/5 4+/5 4/5   Ankle dorsiflexion:  4-/5 4/5 4/5~ has difficulty isolating from quad Ankle dorsiflexion: 4-/5 4/5 3+/5   Ankle plantarflexion:  4/5 5/5 4/5~has difficulty isolating from quad Ankle plantarflexion: 4+/5 5/5 4+/5   Hip abduction: 4+/5 5/5 5/5 Hip abduction: 4/5 5/5 5/5   Hip adduction: 5/5 5/5 5/5 Hip adduction 5/5 5/5 5/5   Hip extension: 4+/5 5/5 5/5 Hip extension: 4+/5 4+/5           5/5      *Within ROM         Evaluation 5/19/2023 6/22/23   30 second Chair Rise 3 completed with arms, light CGA 5 completed with arms, cues to stand fully  4.5 completed with arms, cues to stand fully, CGA    TUG 30 seconds, CGA 21.8 seconds, CGA 25 seconds, SBA   Self selected walking speed 0.33 m/sec (6m/18s) .6 m/sec (6m/10s) 0.6 m/sec (6m/10s)     5/19/23  Postural control:  MCTSIB:  1. Eyes Open/feet together/Firm: 30 seconds  2. Eyes Closed/feet together/Firm: 30 seconds  3. Eyes Open/feet together/Foam: 30 seconds  4. Eyes Closed/feet together/Foam: 5 seconds    6/22/23  Postural control:  MCTSIB:  1. Eyes Open/feet together/Firm: 30 seconds  2. Eyes Closed/feet together/Firm: 21 seconds  3. Eyes Open/feet together/Foam: 30 seconds  4. Eyes Closed/feet together/Foam: 4 seconds      CMS Impairment/Limitation/Restriction for FOTO Endocrine, Metabolic and Immunity Disorders Survey  Status Limitation G-Code CMS Severity Modifier  Intake 40% 60%  Predicted 51% 49% Goal Status+ CK - At least 40 percent but less than 60 percent  5/19/2023 49% 51%  6/22/2023 49% 51% Current Status CK - At least 40 percent but less than 60 percent  D/C Status CK **only report if this is discharge survey        Treatment       Anthony received the treatments listed below:      therapeutic exercises to develop strength, endurance, ROM, flexibility, posture, and core stabilization for 15 minutes including:  (Includes time for above tests and measures)    X 8 min on SCI-FIT recumbent stepper, level 2.0, for B UE and LE muscular and CV endurance       therapeutic activities to improve functional performance for 30  minutes, including:  (Includes time for above tests and measures)    Transitions:  Multiple trials of sit<> stand from gold chair throughout session, SBA  SBA sit<> stand SCI-FIT stepper seat    Ascend and descend 12 six inch steps with B rails, SBA~ pt demonstrates reciprocal pattern to ascend and non-reciprocal pattern to  descend    Ambulation without AD around gym from one point to another throughout session, SBA     PT also escorts pt to first floor lobby via wheelchair due to fatigue from session.      neuromuscular re-education activities to improve: Balance, Coordination, Kinesthetic, Sense, Proprioception, and Posture for 0 minutes. The following activities were included:        gait training to improve functional mobility and safety for 0  minutes, including:            Patient Education and Home Exercises     Home Exercises Provided and Patient Education Provided     Education provided:   - 4/17/23: Pt instructed in first installment of HEP. Suggested pt use his RW when he comes for future visits.  -4/28/23: Pt again reminded that he would be safer to come to clinic with his RW.  5/5/23: OT and PT provided pt a schedule slip so he can cancel some sessions in late May and early June( due to a planned cruise) and add other sessions when he returns in June.  -6/22/23: PT provided pt with a schedule slip for the next 6 weeks(until 8/4/23).    Written Home Exercises Provided: yes. Exercises were reviewed and Anthony was able to demonstrate them prior to the end of the session.  Anthony demonstrated good  understanding of the education provided. 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 Unfortunately, he has not been in the clinic since 5/19/23. This is due to his being on a cruise for 2 weeks and then sustaining a fall when he returned home. Not surprisingly, he did not demonstrate much improvement from the prior assessment. Several LE muscle grades tested lower and his TUG, 30 second chair rise and mCTSIB performances all declined from the prior assessment. Pt had been progressing fairly well prior to his vacation, and PT feels pt has potential to get back to where he was 4 weeks ago. Pt remains  appropriate for continued PT at 2 x weekly frequency.       Anthony Is progressing well towards his goals.   Pt prognosis is Fair.     Pt will continue to benefit from skilled outpatient physical therapy to address the deficits listed in the problem list box on initial evaluation, provide pt/family education and to maximize pt's level of independence in the home and community environment.     Pt's spiritual, cultural and educational needs considered and pt agreeable to plan of care and goals.     Anticipated barriers to physical therapy: history of alcohol use, frequent falls    Goals:  Short Term Goals: 4 weeks   Pt will be issued first installment of HEP and report at least partial compliance~ MET 5/19/2023  Pt will improve his 30 second chair rise to 4-5 reps to demonstrate increased LE strength and muscular endurance~ MET 5/19/2023, 6/22/23  Pt will improve his TUG score to 25 seconds or < with or without appropriate AD to improve household ambulation and decrease fall risk~ MET 5/19/2023, 6/22/23  Pt will increase his SSWS to 0.375 m/sec with or without appropriate AD to improve community ambulation and decrease fall risk~ MET 5/19/2023, NOT MET, 6/22/23  Pt will complete mCTSIB balance testing and PT to set appropriate goals~MET, 4/17/23  Pt will improve any 3 LE muscle grades by 1/3 to help with functional mobility skills~ MET 5/19/2023, 6/22/23  Pt will perform all sit<> stand and stand pivot transfers with no more than SBA~ MET 5/19/2023, 6/22/23  (NEW GOAL, 4/17/23): Pt will improve his score on condition # 2 of the mCTSIB to 19 seconds for improved balance in low/eliminated vision environments~ MET 5/19/2023, NOT MET, 6/22/23     Long Term Goals: 8 weeks   Pt will be partially compliant with finalized HEP to help maintain potential gains realized in PT~ongoing  Pt will improve his 30 second chair rise to 5-6 reps to demonstrate increased LE strength and muscular endurance~ongoing  Pt will improve his TUG  score to 20 seconds or < with or without appropriate AD to improve household ambulation and decrease fall risk~ongoing  Pt will increase his SSWS to 0.43 m/sec with or without appropriate AD to improve community ambulation and decrease fall risk~ MET 5/19/2023  Pt will improve any 3 LE muscle grades by 2/3 to help with functional mobility skills ~ MET 5/19/2023, NOT MET, 6/22/23  Pt will perform all sit<> stand and stand pivot transfers with no more than S~ongoing  (NEW GOAL, 4/17/23): Pt will improve his score on condition # 2 of the mCTSIB to 22 seconds for improved balance in low/eliminated vision environments~ MET 5/19/2023, NOT MET, 6/22/23  NEW GOAL 5/19/2023: Pt will improve his score on condition #4 of the mCTSIB to at least 10 seconds for improved balance in low/eliminated vision environments~ongoing   NEW GOAL 5/19/2023: Pt will increase his SSWS to at least .7 m/s with or without appropriate AD to improve community ambulation and decrease fall risk~ongoing     PLAN     Continue to progress strengthening, balance and endurance to tolerance. Increase focus on balance in future sessions.      Ba Diaz, PT    6/22/2023

## 2023-06-22 ENCOUNTER — CLINICAL SUPPORT (OUTPATIENT)
Dept: REHABILITATION | Facility: HOSPITAL | Age: 73
End: 2023-06-22
Payer: MEDICARE

## 2023-06-22 DIAGNOSIS — R29.898 WEAKNESS OF BOTH LOWER EXTREMITIES: ICD-10-CM

## 2023-06-22 DIAGNOSIS — R27.8 COORDINATION IMPAIRMENT: ICD-10-CM

## 2023-06-22 DIAGNOSIS — R26.89 BALANCE PROBLEM: Primary | ICD-10-CM

## 2023-06-22 DIAGNOSIS — Z74.09 IMPAIRED MOBILITY AND ADLS: Primary | ICD-10-CM

## 2023-06-22 DIAGNOSIS — R29.898 DECREASED GRIP STRENGTH: ICD-10-CM

## 2023-06-22 DIAGNOSIS — Z78.9 IMPAIRED MOBILITY AND ADLS: Primary | ICD-10-CM

## 2023-06-22 PROCEDURE — 97530 THERAPEUTIC ACTIVITIES: CPT | Mod: PO

## 2023-06-22 PROCEDURE — 97110 THERAPEUTIC EXERCISES: CPT | Mod: PO

## 2023-06-22 NOTE — PLAN OF CARE
[facility]   Occupational Therapy Neurological Rehabilitation   Plan of Care and Re-Assessment    Name: Anthony Ball  MRN: [id number]  Today's Date: 6/22/2023    Therapy Diagnosis:   Encounter Diagnoses   Name Primary?    Impaired mobility and ADLs Yes    Coordination impairment     Decreased  strength        Physician: Lyubov Yu MD  Physician Orders: Neuro Program      Medical Diagnosis: E11.42 (ICD-10-CM) - Polyneuropathy due to type 2 diabetes mellitus   Evaluation Date: 4/14/2023; re-assessment 6/22/2023  Plan of Care Expiration Period: 8/4/2023  Insurance Authorization period Expiration: 12/31/2023  Date of Return to MD: nothing on Epic at this time   FOTO: 3/3     Visit # / Visits Authorized: 9 / 20 (+evaluation)    Time In: 11:01  Time Out: 11:45  Total Billable Time:44 minutes    Precautions:   Standard and Fall    Subjective   Pt reports: June 4th fell/almost fell in bathroom and cracked his rib on the grab bar   Patient reported while on cruise for 10 days- was mod(I) with mobility and ADLs; able to walk and transfer to smaller boats, etc. Using scooter for longer distances     Response to previous treatment: no complaints   Functional change: dressing and showering indep; not using walker at home   Patient's Goals for Occupational Therapy: tremor management and balance     Pain upon arrival: none reported    Location: n/a  Pain at cessation of session: n/a    Objective     Anthony participated in dynamic functional therapeutic activities to improve functional performance for 34  minutes, including:  -re-assessment:   Functional Status      Functional Mobility:   Bed mobility: Mod I  Roll to left: Mod I  Roll to right: Mod I  Supine to sit: Mod I  Sit to supine: Mod I  Transfers to bed: Mod I  Transfers to toilet: Mod I  Car transfers: Mod I  Functional mobility with cane and held held assist at times      ADL's:   Feeding: mod(I); utilizing weighted built up utensils  as needed;  reported did not use/need on vacation  Grooming: I  Hygiene: I  Upper Body Dressing: Mod I  Lower Body Dressing: Mod I  Toileting: Mod I  Bathing: Mod(I); seated      IADL's:  Homecare, etc is spouse's role   Medication management: Mod I; pill organizer.   Handwriting: increased difficulty due to tremor   Technology Use: increased difficulty due to tremor      Objective   Cognitive Exam:  Oriented: Person, Place, Time, and Situation  Behaviors: Cooperative  Follows Commands/attention: Follows multistep  commands  Communication: clear/fluent  Safety awareness/insight to disability: aware of diagnosis, treatment, and prognosis  Coping skills/emotional control: Appropriate to situation     Physical Exam:  Postural examination/scapula alignment: Rounded shoulder and Head forward  Joint integrity: Firm end feeling  Skin integrity: Dry  Edema: none noted       Joint evaluation: AROM WFL for bilateral upper extremities   Left: shoulder abd: active: 151*; 168* passive      Fist: normal     Strength 4/14/2023 4/14/2023 5/17/2023 5/17/2023 6/8/2023 6/8/2023   **within available ROM** Right Left Right Left Right Left   Shoulder flexion 5/5 4+/5 5/5 4/5 5/5 4+/5   Shoulder abduction 5/5 4+/5 5/5 4/5 5/5 4+/5   Shoulder External Rotation 5/5 5/5 5/5 5/5 5/5 5/5   Shoulder Internal Rotation 5/5 5/5 5/5 5/5 5/5 5/5   Shoulder Extension 5/5 5/5 5/5 5/5 5/5 5/5   Shoulder Horizontal Adduction 5/5 5/5 5/5 5/5 5/5 5/5   Biceps 5/5 5/5 5/5 5/5 5/5 5/5   Triceps 5/5 5/5 5/5 5/5 5/5 5/5   Wrist flexion 5/5 5/5 5/5 5/5 5/5 5/5   Wrist extension  5/5 5/5 5/5 5/5 5/5 5/5       Strength: (MELONIE Dynamometer in lbs.) Position II: average of three trials       4/14/2023 4/14/2023 5/17/2023 5/17/2023 6/8/2023 6/8/2023     Right Left Right Left Right Left   Rung II 35.7 # 36.6 # 40 # 41.9 # 42.6# 50.2#    Norms for  Strength: standard deviation +/- 12#  Males: 70+  Male: Right Left   54 lbs 48 lbs      Pinch Strength (Measured in psi)        4/14/2023 4/14/2023 5/17/2023 5/17/2023 6/8/2023 6/8/2023     Right Left Right Left Right Left   Key Pinch 8 psi 6 psi 12 8.1 10.5 9.7   2pt Pinch 3 psi 2 psi 5.9 5.4 6.6 4.3        Fine Motor Coordination: 9 Hole Peg Test     Right   4/14/2023 Left   4/14/2023 Right  5/17/2023 Left  5/17/2023 Right  6/8/2023 Left  6/8/2023                53.90 s              53.85 s 41.23 s 48.52 s  43.84 s 44.73 s      Fine Motor Coordination: Box and Block     Right   4/14/2023 Left   4/14/2023 Right  5/17/2023 Left  5/17/2023 Right  6/8/2023 Left  6/8/2023                21              25 27 30 20 20      Gross motor coordination:   MICHAEL (Rapid Alternating Movements): intact   Finger to Nose (3 times): improvement; intact. Mild instability on left   Finger Flicks (coordination moving from digit flexion to digit extension): intact     Tone: bilateral upper extremities  Modified Vandana Scale:   0 - No increase in muscle tone     Sensation:  Anthony reports no complaints/changes at this time     Balance:   Impaired static and dynamic standing     Functional mobility: stand by assist throughout gym with straight cane on this date      Endurance Deficit: moderate    Anthony received therapeutic exercises for 10 minutes including:  -seated for upper body ergonometer on level 2.8; completed for 10 minutes switching directions mid way. Focus on postural control and breathing techniques with exhale with exertion of force. MET average 1.4       Hand off to PT    Home Exercises and Education Provided   Education provided:   - postural control   - role of Occupational Therapy in care, goals for Occupational Therapy for this plan of care,  scheduling  - edu on importance of continuation of sobriety at this time   - discussed importance of leisure activities and hobbies in post retired life     Written Home Exercises Provided: yes.  Exercises were reviewed and Anthony was able to demonstrate them prior to the end of the session.  Anthony  demonstrated good  understanding of the HEP provided.   .   See EMR under Patient Instructions for exercises provided 4/24/2023. 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 He remains with general instability with a decline in his functional strength and endurance. Plan of care for 6 weeks to address functional strength and endurance needed to return to prior roles and routines. See goals added below.               Anthony is progressing well towards his goals and there are no updates to goals at this time. Pt prognosis is Good.     Pt will continue to benefit from skilled outpatient occupational therapy to address the deficits listed in the problem list on initial evaluation provide pt/family education and to maximize pt's level of independence in the home and community environment.     Anticipated barriers to occupational therapy: carry over     Pt's spiritual, cultural and educational needs considered and pt agreeable to plan of care and goals.    Goals:  Long Term Goals:  Time frame: 8 weeks  - Pt will bathe with while seated on shower chair, utilizing adaptive equipment and/or compensatory strategies as needed with mod(I). Met per report; using chair with back pain   - Pt will demo ability to bring food to mouth with utensils with minimal to no spillage with AD as needed. Ongoing (able to complete with open mouth cup to mouth)  - Pt will perform dynamic standing with use of bilateral upper extremities in kitchen for 15 minutes with distant supervision to improve performance with homemaking tasks, cooking tasks, standing at the sink for grooming and hygiene, decrease risk for falls, and maximize safety. progressing   - Pt will improve FOTO limitation score to less than or equal to 50% for improved self perception of functional performance with  daily activities. Met  - Pt will be independent with Home Exercise Program (HEP)/Home Activity Program (HAP) to promote long term maintenance of progress made in therapy. Ongoing / Progressing      Short Term Goals:  Time frame: 4 weeks  - Pt will demonstrate ability to cut food safely with butter/steak knife or AD as needed. Met  - Pt will demonstrate consistent use of mindfulness techniques to manage stress, anxiety, and depression and will incorporate them into daily routine.Ongoing   - Pt will perform dynamic standing task x a minimum of 10 minutes with zero losses of balance, no more than 2 brief seated rest breaks, and minimal verbals cues for (posture) to increase independence for home managment task. Progressing; 7 min before rest break 5/3/2023   - Pt will increase  strength by 3-5 # for BUE to increase performance during daily tasks (opening jars, holding telephone) Met 5/17    Goals added:   Pt will be able to complete functional mobility at household distance while carrying plate with stand by assist.   Pt will be able to complete gathering of home related items at various heights while walking with stand by assist.   Pt will demo 5/5 strength throughout bilateral upper extremities.   Pt will demo within age related norms for fine motor strength bilaterally as needed for IADL/ADL completion.   Pt will be able to complete functional mobility with dog walking x20 ft with stand by assist.      Goals to be adjusted as needed.   The following goals were discussed with the patient and patient is in agreement with them as to be addressed in the treatment plan.     Plan   Certification Period/Plan of care expiration: 8/4/2023     Continue Outpatient Occupational Therapy 2 times weekly for 6 weeks to include the following interventions: Moist Heat/ Ice, Neuromuscular Re-ed, Paraffin, Patient Education, Self Care, Therapeutic Activities, and Therapeutic Exercise.    Next sessions: strength and endurance      Eugenia Barth, LOTR 6/22/2023

## 2023-06-26 NOTE — PROGRESS NOTES
[facility]  Occupational Therapy Treatment Note     Date: 6/27/2023  Name: Anthony Ball  Clinic Number: [id number]    Therapy Diagnosis:   Encounter Diagnoses   Name Primary?    Impaired mobility and ADLs Yes    Coordination impairment     Decreased  strength      Physician: Lyubov Yu MD  Physician Orders: Neuro Program      Medical Diagnosis: E11.42 (ICD-10-CM) - Polyneuropathy due to type 2 diabetes mellitus   Evaluation Date: 4/14/2023; re-assessment 6/22/2023  Plan of Care Expiration Period: 8/4/2023  Insurance Authorization period Expiration: 12/31/2023  Date of Return to MD: nothing on Epic at this time   FOTO: 3/3      Visit # / Visits Authorized: 10 / 20 (+evaluation)    Time In: 11:17  Time Out: 11:57  Total Billable Time: 40 minutes    Subjective     Pt reports: June 4th fell/almost fell in bathroom and cracked his rib on the grab bar   Patient reported while on cruise for 10 days- was mod(I) with mobility and ADLs; able to walk and transfer to smaller boats, etc. Using scooter for longer distances      Response to previous treatment: no complaints   Functional change: dressing and showering indep; not using walker at home   Patient's Goals for Occupational Therapy: tremor management and balance     Pain: 5/10 with movement   Location: left side (rib region)      Objective     Objective Measures updated at progress report unless specified.    Treatment   Anthony received therapeutic exercises for 40 minutes including:  -seated upper body ergonometer on level 2.8; completed for 10 minutes switching directions mid way. Focus on postural control and breathing techniques with exhale with exertion of force. MET average 1.5     Standing:   -green resistance band : lat pull downs x2 sets of 10    Seated:  5# tidal tank: shoulder press and chest press x10 reps each  2# free weights: flies x2 sets of 15    Sit<>Stand x5 reps with unilateral grasp on 2# weight: chest press at  stance     Rest and water breaks as needed throughout    Patient Education and Home Exercises     Education provided:   - postural control   - role of Occupational Therapy in care, goals for Occupational Therapy for this plan of care,  scheduling  - edu on importance of continuation of sobriety at this time   - discussed importance of leisure activities and hobbies in post retired life      Written Home Exercises Provided: yes.  Exercises were reviewed and Anthony was able to demonstrate them prior to the end of the session.  Anthony demonstrated good  understanding of the HEP provided.   .   See EMR under Patient Instructions for exercises provided 4/24/2023. - re printed out on 5/3/2023  5/12/2023: weighted utensils for tremor managment ([employer] brand and [employer]-steady)     Assessment     Dr Ball tolerated session fairly on this date. He continues to require added rest breaks due to his endurance and requires added motivation with increased activity.     Anthony is progressing well towards his goals and there are no updates to goals at this time. Pt prognosis is Good.     Pt will continue to benefit from skilled outpatient occupational therapy to address the deficits listed in the problem list on initial evaluation provide pt/family education and to maximize pt's level of independence in the home and community environment.     Pt's spiritual, cultural and educational needs considered and pt agreeable to plan of care and goals.    Anticipated barriers to occupational therapy: carry over at home     Pt's spiritual, cultural and educational needs considered and pt agreeable to plan of care and goals.     Goals:  Long Term Goals:  Time frame: 8 weeks  - Pt will bathe with while seated on shower chair, utilizing adaptive equipment and/or compensatory strategies as needed with mod(I). Met per report; using chair with back pain   - Pt will demo ability to bring food to mouth with utensils with minimal to no spillage with AD as  needed. Ongoing (able to complete with open mouth cup to mouth)  - Pt will perform dynamic standing with use of bilateral upper extremities in kitchen for 15 minutes with distant supervision to improve performance with homemaking tasks, cooking tasks, standing at the sink for grooming and hygiene, decrease risk for falls, and maximize safety. progressing   - Pt will improve FOTO limitation score to less than or equal to 50% for improved self perception of functional performance with daily activities. Met  - Pt will be independent with Home Exercise Program (HEP)/Home Activity Program (HAP) to promote long term maintenance of progress made in therapy. Ongoing / Progressing      Short Term Goals:  Time frame: 4 weeks  - Pt will demonstrate ability to cut food safely with butter/steak knife or AD as needed. Met  - Pt will demonstrate consistent use of mindfulness techniques to manage stress, anxiety, and depression and will incorporate them into daily routine.Ongoing   - Pt will perform dynamic standing task x a minimum of 10 minutes with zero losses of balance, no more than 2 brief seated rest breaks, and minimal verbals cues for (posture) to increase independence for home managment task. Progressing; 7 min before rest break 5/3/2023   - Pt will increase  strength by 3-5 # for BUE to increase performance during daily tasks (opening jars, holding telephone) Met 5/17     Goals added:   Pt will be able to complete functional mobility at household distance while carrying plate with stand by assist.   Pt will be able to complete gathering of home related items at various heights while walking with stand by assist.   Pt will demo 5/5 strength throughout bilateral upper extremities.   Pt will demo within age related norms for fine motor strength bilaterally as needed for IADL/ADL completion.   Pt will be able to complete functional mobility with dog walking x20 ft with stand by assist.      Goals to be adjusted as  needed.   The following goals were discussed with the patient and patient is in agreement with them as to be addressed in the treatment plan.     Plan   Certification Period/Plan of care expiration: 8/4/2023     Continue Outpatient Occupational Therapy 2 times weekly for 6 weeks to include the following interventions: Moist Heat/ Ice, Neuromuscular Re-ed, Paraffin, Patient Education, Self Care, Therapeutic Activities, and Therapeutic Exercise.     Next sessions: strength and endurance   Updates/Grading for next session: endurance exercise    JOSH Fernández

## 2023-06-27 ENCOUNTER — CLINICAL SUPPORT (OUTPATIENT)
Dept: REHABILITATION | Facility: HOSPITAL | Age: 73
End: 2023-06-27
Payer: MEDICARE

## 2023-06-27 DIAGNOSIS — Z74.09 IMPAIRED MOBILITY AND ADLS: Primary | ICD-10-CM

## 2023-06-27 DIAGNOSIS — R29.898 DECREASED GRIP STRENGTH: ICD-10-CM

## 2023-06-27 DIAGNOSIS — R27.8 COORDINATION IMPAIRMENT: ICD-10-CM

## 2023-06-27 DIAGNOSIS — R29.898 WEAKNESS OF BOTH LOWER EXTREMITIES: ICD-10-CM

## 2023-06-27 DIAGNOSIS — R26.89 BALANCE PROBLEM: Primary | ICD-10-CM

## 2023-06-27 DIAGNOSIS — Z78.9 IMPAIRED MOBILITY AND ADLS: Primary | ICD-10-CM

## 2023-06-27 PROCEDURE — 97530 THERAPEUTIC ACTIVITIES: CPT | Mod: PO

## 2023-06-27 PROCEDURE — 97110 THERAPEUTIC EXERCISES: CPT | Mod: PO

## 2023-06-27 PROCEDURE — 97112 NEUROMUSCULAR REEDUCATION: CPT | Mod: PO

## 2023-06-27 NOTE — PROGRESS NOTES
[facility]   Physical Therapy Treatment Note     Name: Anthony Ball  Clinic Number: [id number]    Therapy Diagnosis:   Encounter Diagnoses   Name Primary?    Balance problem Yes    Weakness of both lower extremities            Physician: Lyubov Yu MD    Visit Date: 6/27/2023    Physician Orders: PT Eval and Treat   Medical Diagnosis from Referral: Polyneuropathy due to type 2 diabetes mellitus  Evaluation Date: 4/14/2023  Authorization Period Expiration: 12/31/23  Plan of Care Expiration: 6/9/23  Plan of care extended through: 7/31/2023  Visit # / Visits authorized: 11/ 20(+eval)    PTA Visit #: 0/5     Time In: 1031    Time Out: 1116     Total Billable Time: 45   minutes    Precautions: Standard and Fall      SUBJECTIVE     Pt reports: he continues to feel soreness to his L side. When questioned further, pt does admit to some pain in this region with movement.    He was compliant with home exercise program. 2 new exercises added today.  Response to previous treatment: no adverse effects   Functional change: ongoing    Pain: 5/10 with movement, 0/10 at rest  Location: L flank       OBJECTIVE   Ambulates from lobby to PT gym using his SC, supervision.    Objective Measures updated at progress report unless specified.    Treatment       Anthony received the treatments listed below:      therapeutic exercises to develop strength, endurance, ROM, flexibility, posture, and core stabilization for 20 minutes including:      X 8 min on SCI-FIT recumbent stepper, level 2.0, for B UE and LE muscular and CV endurance    Pt instructed in and performs the following as additions to his HEP:    Seated:   2 x 10 B ankle dorsiflexion against green theraband resistance  2 x 10 B ankle plantarflexion against green theraband resistance  (Pt issued piece of green theraband for home use)    Other exercises:  2 x 10 B hamstring curls in sitting against green theraband resistance~ PT places and holds  band      therapeutic activities to improve functional performance for 15  minutes, including:      Transitions:  1 trial of sit<> stand from gold chair throughout session, S  S sit<> stand SCI-FIT stepper seat      Ambulation with AD around gym from one point to another throughout session, S     Time also includes ambulation from lobby to PT gym and PT gym to OT gym at end of session    neuromuscular re-education activities to improve: Balance, Coordination, Kinesthetic, Sense, Proprioception, and Posture for 10 minutes. The following activities were included:      Inside parallel bars:    1 x 10 B LE forward/backward step ups/downs on foam fitter, light 1 to occasional 2 UE support, CGA  1 x 10 single limb alternate step overs on foam fitter, 1 UE support, CGA ~ frequent cues to step to floor with rear foot and to not use B UEs for support  1 x 10 B LE alternate cone taps with green cone placed on top of foam fitter, 1 UE support, CGA~ frequent cues to not use B UEs for support             gait training to improve functional mobility and safety for 0  minutes, including:            Patient Education and Home Exercises     Home Exercises Provided and Patient Education Provided     Education provided:   - 4/17/23: Pt instructed in first installment of HEP. Suggested pt use his RW when he comes for future visits.  -4/28/23: Pt again reminded that he would be safer to come to clinic with his RW.  5/5/23: OT and PT provided pt a schedule slip so he can cancel some sessions in late May and early June( due to a planned cruise) and add other sessions when he returns in June.  -6/22/23: PT provided pt with a schedule slip for the next 6 weeks(until 8/4/23).    Written Home Exercises Provided: yes. Exercises were reviewed and Anthony was able to demonstrate them prior to the end of the session.  Anthony demonstrated good  understanding of the education provided. See EMR under Patient Instructions for exercises provided during  therapy sessions    ASSESSMENT     Dr. 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 He demonstrated a more fluid gait pattern when ambulating with his cane today, and this resulted in less time to navigate from one area of the gym to another. Despite this improvement, his overall movements remain slow and careful. PT updated HEP today, adding ankle dorsiflexion and plantarflexion against green theraband resistance. PT also presented new balance challenges inside the parallel bars; pt was reluctant to try and perform without UE support. Even when PT allowed use of 1 UE, pt tended to use other UE as well(unless prevented by PT). PT feels pt lacks confidence in any single limb stance scenario, but he has potential to improve with practice.  Pt remains appropriate for continued PT at 2 x weekly frequency.       Anthony Is progressing well towards his goals.   Pt prognosis is Fair.     Pt will continue to benefit from skilled outpatient physical therapy to address the deficits listed in the problem list box on initial evaluation, provide pt/family education and to maximize pt's level of independence in the home and community environment.     Pt's spiritual, cultural and educational needs considered and pt agreeable to plan of care and goals.     Anticipated barriers to physical therapy: history of alcohol use, frequent falls    Goals:  Short Term Goals: 4 weeks   Pt will be issued first installment of HEP and report at least partial compliance~ MET 5/19/2023  Pt will improve his 30 second chair rise to 4-5 reps to demonstrate increased LE strength and muscular endurance~ MET 5/19/2023, 6/22/23  Pt will improve his TUG score to 25 seconds or < with or without appropriate AD to improve household ambulation and decrease fall risk~ MET 5/19/2023, 6/22/23  Pt will increase his SSWS to 0.375 m/sec with or without appropriate AD to improve community ambulation and decrease fall risk~ MET 5/19/2023, NOT MET, 6/22/23  Pt will  complete mCTSIB balance testing and PT to set appropriate goals~MET, 4/17/23  Pt will improve any 3 LE muscle grades by 1/3 to help with functional mobility skills~ MET 5/19/2023, 6/22/23  Pt will perform all sit<> stand and stand pivot transfers with no more than SBA~ MET 5/19/2023, 6/22/23  (NEW GOAL, 4/17/23): Pt will improve his score on condition # 2 of the mCTSIB to 19 seconds for improved balance in low/eliminated vision environments~ MET 5/19/2023, NOT MET, 6/22/23     Long Term Goals: 8 weeks   Pt will be partially compliant with finalized HEP to help maintain potential gains realized in PT~ongoing  Pt will improve his 30 second chair rise to 5-6 reps to demonstrate increased LE strength and muscular endurance~ongoing  Pt will improve his TUG score to 20 seconds or < with or without appropriate AD to improve household ambulation and decrease fall risk~ongoing  Pt will increase his SSWS to 0.43 m/sec with or without appropriate AD to improve community ambulation and decrease fall risk~ MET 5/19/2023  Pt will improve any 3 LE muscle grades by 2/3 to help with functional mobility skills ~ MET 5/19/2023, NOT MET, 6/22/23  Pt will perform all sit<> stand and stand pivot transfers with no more than S~ongoing  (NEW GOAL, 4/17/23): Pt will improve his score on condition # 2 of the mCTSIB to 22 seconds for improved balance in low/eliminated vision environments~ MET 5/19/2023, NOT MET, 6/22/23  NEW GOAL 5/19/2023: Pt will improve his score on condition #4 of the mCTSIB to at least 10 seconds for improved balance in low/eliminated vision environments~ongoing   NEW GOAL 5/19/2023: Pt will increase his SSWS to at least .7 m/s with or without appropriate AD to improve community ambulation and decrease fall risk~ongoing     PLAN     Continue to progress strengthening, balance and endurance to tolerance. Increase focus on balance in future sessions.      Ba Diaz, PT    6/27/2023

## 2023-06-27 NOTE — PROGRESS NOTES
[facility]ER[facility]   Physical Therapy Treatment Note     Name: Anthony Ball  Clinic Number: [id number]    Therapy Diagnosis:   Encounter Diagnoses   Name Primary?    Balance problem Yes    Weakness of both lower extremities              Physician: Lyubov Yu MD    Visit Date: 6/29/2023    Physician Orders: PT Eval and Treat   Medical Diagnosis from Referral: Polyneuropathy due to type 2 diabetes mellitus  Evaluation Date: 4/14/2023  Authorization Period Expiration: 12/31/23  Plan of Care Expiration: 6/9/23  Plan of care extended through: 7/31/2023  Visit # / Visits authorized: 12/ 20(+eval)    PTA Visit #: 0/5     Time In: 1148 ( pt received from OT at this time)   Time Out: 1230      Total Billable Time: 40  minutes( pt also on his phone with [facility] for about 2 minutes)    Precautions: Standard and Fall      SUBJECTIVE     Pt reports: he had a bad asthma attack last night, but the L side of his trunk feels better.    He was compliant with home exercise program, including 2 exercises added last session.  Response to previous treatment: no adverse effects   Functional change: ongoing    Pain: 2/10 with movement, 0/10 at rest  Location: L flank       OBJECTIVE   Ambulates from OT gym to PT gym with no AD, SBA    Objective Measures updated at progress report unless specified.    Treatment       Anthony received the treatments listed below:      therapeutic exercises to develop strength, endurance, ROM, flexibility, posture, and core stabilization for 10 minutes including:      X 8 min on SCI-FIT recumbent stepper, level 2.5, for B UE and LE muscular and CV endurance      1 x 10 B hamstring curls in sitting against green theraband resistance~ PT places and holds band      therapeutic activities to improve functional performance for 15  minutes, including:      Transitions:  1 trial of sit<> stand from gold chair to rest after balance exercises, S  S sit<> stand SCI-FIT stepper seat  1 x 10  sit<> stand trials from gold chair with use of armrests and SBA    Ambulation without AD around gym from one point to another throughout session, SBA     Time also includes ambulation from PT gym to lobby at end of session      neuromuscular re-education activities to improve: Balance, Coordination, Kinesthetic, Sense, Proprioception, and Posture for 15 minutes. The following activities were included:      Inside parallel bars:    X 2 laps tandem ambulation, 1 UE support, CGA  X 2 laps high marching, 1 UE support, CGA, more difficulty lifting L LE  X 2 laps backward walking, 1 UE support, CGA    1 x 10 B LE forward/backward step ups/downs on foam fitter, light 1 support, CGA  1 x 10 single limb alternate step overs on foam fitter, 1 UE support, CGA ~ frequent cues to step to floor with rear foot and to not use B UEs for support               gait training to improve functional mobility and safety for 0  minutes, including:            Patient Education and Home Exercises     Home Exercises Provided and Patient Education Provided     Education provided:   - 4/17/23: Pt instructed in first installment of HEP. Suggested pt use his RW when he comes for future visits.  -4/28/23: Pt again reminded that he would be safer to come to clinic with his RW.  5/5/23: OT and PT provided pt a schedule slip so he can cancel some sessions in late May and early June( due to a planned cruise) and add other sessions when he returns in June.  -6/22/23: PT provided pt with a schedule slip for the next 6 weeks(until 8/4/23).    Written Home Exercises Provided: yes. Exercises were reviewed and Anthony was able to demonstrate them prior to the end of the session.  Anthony demonstrated good  understanding of the education provided. 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 He demonstrated a more fluid gait pattern when ambulating WITHOUT his  cane today, though he still needs cues to avoid placing his hands on nearby objects. PT increased resistance slightly on SCI-FIT recumbent stepper to level 2.5, and pt tolerated this well. PT also presented new balance challenges inside the parallel bars, including tandem walking, high knee marching and backward walking. All these were performed with only 1 UE support. Pt did better when performing step ups/downs and overs on foam fitter, consistently using 1 UE for support( though PT still has to watch pt carefully here, as he sometimes tries to lightly place his other hand on bar.) Pt remains appropriate for continued PT at 2 x weekly frequency.       Anthony Is progressing well towards his goals.   Pt prognosis is Fair.     Pt will continue to benefit from skilled outpatient physical therapy to address the deficits listed in the problem list box on initial evaluation, provide pt/family education and to maximize pt's level of independence in the home and community environment.     Pt's spiritual, cultural and educational needs considered and pt agreeable to plan of care and goals.     Anticipated barriers to physical therapy: history of alcohol use, frequent falls    Goals:  Short Term Goals: 4 weeks   Pt will be issued first installment of HEP and report at least partial compliance~ MET 5/19/2023  Pt will improve his 30 second chair rise to 4-5 reps to demonstrate increased LE strength and muscular endurance~ MET 5/19/2023, 6/22/23  Pt will improve his TUG score to 25 seconds or < with or without appropriate AD to improve household ambulation and decrease fall risk~ MET 5/19/2023, 6/22/23  Pt will increase his SSWS to 0.375 m/sec with or without appropriate AD to improve community ambulation and decrease fall risk~ MET 5/19/2023, NOT MET, 6/22/23  Pt will complete mCTSIB balance testing and PT to set appropriate goals~MET, 4/17/23  Pt will improve any 3 LE muscle grades by 1/3 to help with functional mobility  skills~ MET 5/19/2023, 6/22/23  Pt will perform all sit<> stand and stand pivot transfers with no more than SBA~ MET 5/19/2023, 6/22/23  (NEW GOAL, 4/17/23): Pt will improve his score on condition # 2 of the mCTSIB to 19 seconds for improved balance in low/eliminated vision environments~ MET 5/19/2023, NOT MET, 6/22/23     Long Term Goals: 8 weeks   Pt will be partially compliant with finalized HEP to help maintain potential gains realized in PT~ongoing  Pt will improve his 30 second chair rise to 5-6 reps to demonstrate increased LE strength and muscular endurance~ongoing  Pt will improve his TUG score to 20 seconds or < with or without appropriate AD to improve household ambulation and decrease fall risk~ongoing  Pt will increase his SSWS to 0.43 m/sec with or without appropriate AD to improve community ambulation and decrease fall risk~ MET 5/19/2023  Pt will improve any 3 LE muscle grades by 2/3 to help with functional mobility skills ~ MET 5/19/2023, NOT MET, 6/22/23  Pt will perform all sit<> stand and stand pivot transfers with no more than S~ongoing  (NEW GOAL, 4/17/23): Pt will improve his score on condition # 2 of the mCTSIB to 22 seconds for improved balance in low/eliminated vision environments~ MET 5/19/2023, NOT MET, 6/22/23  NEW GOAL 5/19/2023: Pt will improve his score on condition #4 of the mCTSIB to at least 10 seconds for improved balance in low/eliminated vision environments~ongoing   NEW GOAL 5/19/2023: Pt will increase his SSWS to at least .7 m/s with or without appropriate AD to improve community ambulation and decrease fall risk~ongoing     PLAN     Continue to progress strengthening, balance and endurance to tolerance. Increase focus on balance in future sessions.      Ba Diaz, PT    6/29/2023

## 2023-06-28 NOTE — PROGRESS NOTES
"  [facility]  Occupational Therapy Treatment Note     Date: 6/29/2023  Name: Anthony Ball  Clinic Number: [id number]    Therapy Diagnosis:   Encounter Diagnoses   Name Primary?    Impaired mobility and ADLs Yes    Coordination impairment     Decreased  strength        Physician: Lyubov Yu MD  Physician Orders: Neuro Program      Medical Diagnosis: E11.42 (ICD-10-CM) - Polyneuropathy due to type 2 diabetes mellitus   Evaluation Date: 4/14/2023; re-assessment 6/22/2023  Plan of Care Expiration Period: 8/4/2023  Insurance Authorization period Expiration: 12/31/2023  Date of Return to MD: nothing on Epic at this time   FOTO: 3 completed   Visit # / Visits Authorized: 11 / 20 (+evaluation)    Time In: 11:00  Time Out: 11:47  Total Billable Time: 47 minutes    Subjective     Pt reports: "I'm feeling hopeful"  Plans on getting a stationary bike for home soon     Response to previous treatment: no complaints   Functional change: dressing and showering indep; not using walker at home   Patient's Goals for Occupational Therapy: tremor management and balance     Pain: 5/10 with movement   Location: left side (rib region)      Objective     Objective Measures updated at progress report unless specified.    Treatment   Anthony received therapeutic exercises for 47 minutes including:  -seated upper body ergonometer on level 3.0; completed for 10 minutes switching directions mid way. Focus on postural control and breathing techniques with exhale with exertion of force. MET average 1.6    Functional mobility while carrying 2# weight at chest level (bilateral  grasp) x167 ft    Standing:  -stacking 5# tank onto over head shelf x2 sets of 10 reps     seated  -2# weights: shoulder press; rows x2 sets of 15   -x2 min each: 5# digit flex for digit isolation and composite grasp (bilateral hands)    Rest and water breaks as needed throughout; hand off to PT    Patient Education and Home Exercises "     Education provided:   - postural control   - role of Occupational Therapy in care, goals for Occupational Therapy for this plan of care,  scheduling  - edu on importance of continuation of sobriety at this time   - discussed importance of leisure activities and hobbies in post retired life      Written Home Exercises Provided: yes.  Exercises were reviewed and Anthony was able to demonstrate them prior to the end of the session.  Anthony demonstrated good  understanding of the HEP provided.   .   See EMR under Patient Instructions for exercises provided 4/24/2023. 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 He demo improvement in tolerance for activities and exercises with fewer requested breaks. Patient able to demo functional mobility with bilateral  carry of item at chest level with supervision/no AD needed.    Anthony is progressing well towards his goals and there are no updates to goals at this time. Pt prognosis is Good.     Pt will continue to benefit from skilled outpatient occupational therapy to address the deficits listed in the problem list on initial evaluation provide pt/family education and to maximize pt's level of independence in the home and community environment.     Pt's spiritual, cultural and educational needs considered and pt agreeable to plan of care and goals.    Anticipated barriers to occupational therapy: carry over at home     Pt's spiritual, cultural and educational needs considered and pt agreeable to plan of care and goals.     Goals:  Long Term Goals:  Time frame: 8 weeks  - Pt will bathe with while seated on shower chair, utilizing adaptive equipment and/or compensatory strategies as needed with mod(I). Met per report; using chair with back pain   - Pt will demo ability to bring food to mouth with utensils with minimal to no spillage with AD as needed.  Ongoing (able to complete with open mouth cup to mouth)  - Pt will perform dynamic standing with use of bilateral upper extremities in kitchen for 15 minutes with distant supervision to improve performance with homemaking tasks, cooking tasks, standing at the sink for grooming and hygiene, decrease risk for falls, and maximize safety. progressing   - Pt will improve FOTO limitation score to less than or equal to 50% for improved self perception of functional performance with daily activities. Met  - Pt will be independent with Home Exercise Program (HEP)/Home Activity Program (HAP) to promote long term maintenance of progress made in therapy. Ongoing / Progressing      Short Term Goals:  Time frame: 4 weeks  - Pt will demonstrate ability to cut food safely with butter/steak knife or AD as needed. Met  - Pt will demonstrate consistent use of mindfulness techniques to manage stress, anxiety, and depression and will incorporate them into daily routine.Ongoing   - Pt will perform dynamic standing task x a minimum of 10 minutes with zero losses of balance, no more than 2 brief seated rest breaks, and minimal verbals cues for (posture) to increase independence for home managment task. Progressing; 7 min before rest break 5/3/2023   - Pt will increase  strength by 3-5 # for BUE to increase performance during daily tasks (opening jars, holding telephone) Met 5/17     Goals added:   Pt will be able to complete functional mobility at household distance while carrying plate with stand by assist. Ongoing   Pt will be able to complete gathering of home related items at various heights while walking with stand by assist. Ongoing   Pt will demo 5/5 strength throughout bilateral upper extremities. Ongoing   Pt will demo within age related norms for fine motor strength bilaterally as needed for IADL/ADL completion. Ongoing   Pt will be able to complete functional mobility with dog walking x20 ft with stand by assist. Ongoing       Goals to be adjusted as needed.   The following goals were discussed with the patient and patient is in agreement with them as to be addressed in the treatment plan.     Plan   Certification Period/Plan of care expiration: 8/4/2023     Continue Outpatient Occupational Therapy 2 times weekly for 6 weeks to include the following interventions: Moist Heat/ Ice, Neuromuscular Re-ed, Paraffin, Patient Education, Self Care, Therapeutic Activities, and Therapeutic Exercise.     Updates/Grading for next session: endurance exercise    JOSH Fernández

## 2023-06-29 ENCOUNTER — CLINICAL SUPPORT (OUTPATIENT)
Dept: REHABILITATION | Facility: HOSPITAL | Age: 73
End: 2023-06-29
Payer: MEDICARE

## 2023-06-29 DIAGNOSIS — R27.8 COORDINATION IMPAIRMENT: ICD-10-CM

## 2023-06-29 DIAGNOSIS — R29.898 DECREASED GRIP STRENGTH: ICD-10-CM

## 2023-06-29 DIAGNOSIS — Z78.9 IMPAIRED MOBILITY AND ADLS: Primary | ICD-10-CM

## 2023-06-29 DIAGNOSIS — R26.89 BALANCE PROBLEM: Primary | ICD-10-CM

## 2023-06-29 DIAGNOSIS — Z74.09 IMPAIRED MOBILITY AND ADLS: Primary | ICD-10-CM

## 2023-06-29 DIAGNOSIS — R29.898 WEAKNESS OF BOTH LOWER EXTREMITIES: ICD-10-CM

## 2023-06-29 PROCEDURE — 97110 THERAPEUTIC EXERCISES: CPT | Mod: PO

## 2023-06-29 PROCEDURE — 97112 NEUROMUSCULAR REEDUCATION: CPT | Mod: PO

## 2023-06-29 PROCEDURE — 97530 THERAPEUTIC ACTIVITIES: CPT | Mod: PO

## 2023-07-05 NOTE — PROGRESS NOTES
[facility]  Occupational Therapy Treatment Note     Date: 7/7/2023  Name: Anthony Ball  Clinic Number: [id number]    Therapy Diagnosis:   Encounter Diagnoses   Name Primary?    Impaired mobility and ADLs Yes    Coordination impairment     Decreased  strength      Physician: Lyubov Yu MD  Physician Orders: Neuro Program      Medical Diagnosis: E11.42 (ICD-10-CM) - Polyneuropathy due to type 2 diabetes mellitus   Evaluation Date: 4/14/2023; re-assessment 6/22/2023  Plan of Care Expiration Period: 8/4/2023  Insurance Authorization period Expiration: 12/31/2023  Date of Return to MD: nothing on Epic at this time   FOTO: 3 completed   Visit # / Visits Authorized: 12 / 20 (+evaluation)    Time In: 11:00  Time Out: 11:44  Total Billable Time: 44 minutes    Subjective     Pt reports:  missed on the 5th due to back pain; has noticed a big difference this week with being able to walk and carry things  Plans on getting a stationary bike for home soon     Response to previous treatment: no complaints   Functional change: dressing and showering indep; not using walker at home   Patient's Goals for Occupational Therapy: tremor management and balance     Pain: 0/10 upon arrival  Location: n/a    Objective     Objective Measures updated at progress report unless specified.    Treatment   Anthony received therapeutic exercises for 44 minutes including:  -seated upper body ergonometer on level 3.0; completed for 10 minutes switching directions mid way. Focus on postural control and breathing techniques with exhale with exertion of force. MET average 1.6    Seated in chair:  -5# tidal tank: shoulder press and chest press x1 set of 15 reps  -pilates ring: x10 reps with 5 s hold: ER/IR (bilateral); standing for tricep extension x10 reps each     Functional mobility with bilateral carry of 5# tidal tank x201 ft    Rest and water breaks as needed throughout; hand off to PT    Patient Education and Home  Exercises     Education provided:   - postural control   - role of Occupational Therapy in care, goals for Occupational Therapy for this plan of care,  scheduling  - edu on importance of continuation of sobriety at this time   - discussed importance of leisure activities and hobbies in post retired life      Written Home Exercises Provided: yes.  Exercises were reviewed and Anthony was able to demonstrate them prior to the end of the session.  Anthony demonstrated good  understanding of the HEP provided.   .   See EMR under Patient Instructions for exercises provided 4/24/2023. 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     Anthony is progressing well towards his goals and there are no updates to goals at this time. Pt prognosis is Good.     Pt will continue to benefit from skilled outpatient occupational therapy to address the deficits listed in the problem list on initial evaluation provide pt/family education and to maximize pt's level of independence in the home and community environment.     Pt's spiritual, cultural and educational needs considered and pt agreeable to plan of care and goals.    Anticipated barriers to occupational therapy: carry over at home     Pt's spiritual, cultural and educational needs considered and pt agreeable to plan of care and goals.     Goals:  Long Term Goals:  Time frame: 8 weeks  - Pt will bathe with while seated on shower chair, utilizing adaptive equipment and/or compensatory strategies as needed with mod(I). Met per report; using chair with back pain   - Pt will demo ability to bring food to mouth with utensils with minimal to no spillage with AD as needed. Ongoing (able to complete with open mouth cup to mouth)  - Pt will perform  dynamic standing with use of bilateral upper extremities in kitchen for 15 minutes with distant supervision to improve performance with homemaking tasks, cooking tasks, standing at the sink for grooming and hygiene, decrease risk for falls, and maximize safety. progressing   - Pt will improve FOTO limitation score to less than or equal to 50% for improved self perception of functional performance with daily activities. Met  - Pt will be independent with Home Exercise Program (HEP)/Home Activity Program (HAP) to promote long term maintenance of progress made in therapy. Ongoing / Progressing      Short Term Goals:  Time frame: 4 weeks  - Pt will demonstrate ability to cut food safely with butter/steak knife or AD as needed. Met  - Pt will demonstrate consistent use of mindfulness techniques to manage stress, anxiety, and depression and will incorporate them into daily routine.Ongoing   - Pt will perform dynamic standing task x a minimum of 10 minutes with zero losses of balance, no more than 2 brief seated rest breaks, and minimal verbals cues for (posture) to increase independence for home managment task. Progressing; 7 min before rest break 5/3/2023   - Pt will increase  strength by 3-5 # for BUE to increase performance during daily tasks (opening jars, holding telephone) Met 5/17     Goals added:   Pt will be able to complete functional mobility at household distance while carrying plate with stand by assist. Ongoing   Pt will be able to complete gathering of home related items at various heights while walking with stand by assist. Ongoing /progressing   Pt will demo 5/5 strength throughout bilateral upper extremities. Ongoing   Pt will demo within age related norms for fine motor strength bilaterally as needed for IADL/ADL completion. Ongoing   Pt will be able to complete functional mobility with dog walking x20 ft with stand by assist. Ongoing      Goals to be adjusted as needed.   The following goals were  discussed with the patient and patient is in agreement with them as to be addressed in the treatment plan.     Plan   Certification Period/Plan of care expiration: 8/4/2023     Continue Outpatient Occupational Therapy 2 times weekly for 6 weeks to include the following interventions: Moist Heat/ Ice, Neuromuscular Re-ed, Paraffin, Patient Education, Self Care, Therapeutic Activities, and Therapeutic Exercise.     Updates/Grading for next session: exercises to be completing with his free weights at home ; functional mobility with gathering component     JOSH Fernández

## 2023-07-06 NOTE — PROGRESS NOTES
[facility]   Physical Therapy Treatment Note     Name: Anthony Ball  Clinic Number: [id number]    Therapy Diagnosis:   Encounter Diagnoses   Name Primary?    Balance problem Yes    Weakness of both lower extremities                Physician: Lyubov Yu MD    Visit Date: 7/7/2023    Physician Orders: PT Eval and Treat   Medical Diagnosis from Referral: Polyneuropathy due to type 2 diabetes mellitus  Evaluation Date: 4/14/2023  Authorization Period Expiration: 12/31/23  Plan of Care Expiration: 6/9/23  Plan of care extended through: 7/31/2023  Visit # / Visits authorized: 13/ 20(+eval)    PTA Visit #: 0/5     Time In: 1146 (pt received from OT)  Time Out: 1231       Total Billable Time: 45   minutes    Precautions: Standard and Fall      SUBJECTIVE     Pt reports: he is doing better with his walking in the home.   He was partially compliant with home exercise program.  Response to previous treatment: no adverse effects   Functional change: ongoing~ doing better with steps. He was able to walk while holding a bowel of guacamole in one hand yesterday.    Pain: 0/10   Location: N/A      OBJECTIVE   Ambulates from OT gym to PT gym with SC, distant supervision. Pt occasionally walks with his cane suspended in the air.    Objective Measures updated at progress report unless specified.    Treatment       Anthony received the treatments listed below:      therapeutic exercises to develop strength, endurance, ROM, flexibility, posture, and core stabilization for 24 minutes including:      X 8 min on SCI-FIT recumbent stepper, level 3, for B UE and LE muscular and CV endurance      Horizontal Leg Press:  2 x 10 reps of B presses with 70 # applied  2 x 10 reps of B presses with 80 # applied    Ambulation without AD around gym from one point to another throughout session, SBA    Time also includes ambulation from PT gym to lobby at end of session, SBA    therapeutic activities to improve functional  performance for 0  minutes, including:        neuromuscular re-education activities to improve: Balance, Coordination, Kinesthetic, Sense, Proprioception, and Posture for 21 minutes. The following activities were included:      Inside parallel bars:    X 2 laps tandem ambulation, 1 UE support, CGA  X 4 laps forward ambulation with 1 or 2 UE support, S, cues to increase speed as much as possible  X 2 laps backward walking, 1 UE support, CGA~ cues for proper weight shift and improved step length    1 x 5 B LE forward/backward step ups/downs on foam fitter, no UE support, min/mod A~ increased time to complete  1 x 10 single limb alternate step overs on foam fitter, 1 UE support, CGA ~  cues to step to floor with rear foot and to not use B UEs for support       Pt requires one seated rest break to complete the above balance tasks.      gait training to improve functional mobility and safety for 0  minutes, including:            Patient Education and Home Exercises     Home Exercises Provided and Patient Education Provided     Education provided:   - 4/17/23: Pt instructed in first installment of HEP. Suggested pt use his RW when he comes for future visits.  -4/28/23: Pt again reminded that he would be safer to come to clinic with his RW.  5/5/23: OT and PT provided pt a schedule slip so he can cancel some sessions in late May and early June( due to a planned cruise) and add other sessions when he returns in June.  -6/22/23: PT provided pt with a schedule slip for the next 6 weeks(until 8/4/23).    Written Home Exercises Provided: yes. Exercises were reviewed and Anthony was able to demonstrate them prior to the end of the session.  Anthony demonstrated good  understanding of the education provided. 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 He demonstrated a more fluid gait pattern when ambulating WITH and WITHOUT his  cane today, appearing much more comfortable. PT increased resistance minimally on SCI-FIT recumbent stepper to level 3 and pt tolerated this well. Pt also worked on the horizontal leg press for strengthening and he was able to manage up to 80 #. With regard to balance activities inside the parallel bars, PT was finally able to have pt perform one task without UE support! Pt's fear of falling continues to be a limiting factor here, though PT does his best to encourage pt that he can perform the requested task. Pt remains appropriate for continued PT at 2 x weekly frequency.       Anthony Is progressing well towards his goals.   Pt prognosis is Fair.     Pt will continue to benefit from skilled outpatient physical therapy to address the deficits listed in the problem list box on initial evaluation, provide pt/family education and to maximize pt's level of independence in the home and community environment.     Pt's spiritual, cultural and educational needs considered and pt agreeable to plan of care and goals.     Anticipated barriers to physical therapy: history of alcohol use, frequent falls    Goals:  Short Term Goals: 4 weeks   Pt will be issued first installment of HEP and report at least partial compliance~ MET 5/19/2023  Pt will improve his 30 second chair rise to 4-5 reps to demonstrate increased LE strength and muscular endurance~ MET 5/19/2023, 6/22/23  Pt will improve his TUG score to 25 seconds or < with or without appropriate AD to improve household ambulation and decrease fall risk~ MET 5/19/2023, 6/22/23  Pt will increase his SSWS to 0.375 m/sec with or without appropriate AD to improve community ambulation and decrease fall risk~ MET 5/19/2023, NOT MET, 6/22/23  Pt will complete mCTSIB balance testing and PT to set appropriate goals~MET, 4/17/23  Pt will improve any 3 LE muscle grades by 1/3 to help with functional mobility skills~ MET 5/19/2023, 6/22/23  Pt will perform all sit<> stand and stand pivot  transfers with no more than SBA~ MET 5/19/2023, 6/22/23  (NEW GOAL, 4/17/23): Pt will improve his score on condition # 2 of the mCTSIB to 19 seconds for improved balance in low/eliminated vision environments~ MET 5/19/2023, NOT MET, 6/22/23     Long Term Goals: 8 weeks   Pt will be partially compliant with finalized HEP to help maintain potential gains realized in PT~ongoing  Pt will improve his 30 second chair rise to 5-6 reps to demonstrate increased LE strength and muscular endurance~ongoing  Pt will improve his TUG score to 20 seconds or < with or without appropriate AD to improve household ambulation and decrease fall risk~ongoing  Pt will increase his SSWS to 0.43 m/sec with or without appropriate AD to improve community ambulation and decrease fall risk~ MET 5/19/2023  Pt will improve any 3 LE muscle grades by 2/3 to help with functional mobility skills ~ MET 5/19/2023, NOT MET, 6/22/23  Pt will perform all sit<> stand and stand pivot transfers with no more than S~ongoing  (NEW GOAL, 4/17/23): Pt will improve his score on condition # 2 of the mCTSIB to 22 seconds for improved balance in low/eliminated vision environments~ MET 5/19/2023, NOT MET, 6/22/23  NEW GOAL 5/19/2023: Pt will improve his score on condition #4 of the mCTSIB to at least 10 seconds for improved balance in low/eliminated vision environments~ongoing   NEW GOAL 5/19/2023: Pt will increase his SSWS to at least .7 m/s with or without appropriate AD to improve community ambulation and decrease fall risk~ongoing     PLAN     Continue to progress strengthening, balance and endurance to tolerance. Increase focus on balance in future sessions.      Ba Diaz, PT    7/7/2023

## 2023-07-07 ENCOUNTER — CLINICAL SUPPORT (OUTPATIENT)
Dept: REHABILITATION | Facility: HOSPITAL | Age: 73
End: 2023-07-07
Payer: MEDICARE

## 2023-07-07 DIAGNOSIS — Z74.09 IMPAIRED MOBILITY AND ADLS: Primary | ICD-10-CM

## 2023-07-07 DIAGNOSIS — R29.898 WEAKNESS OF BOTH LOWER EXTREMITIES: ICD-10-CM

## 2023-07-07 DIAGNOSIS — R26.89 BALANCE PROBLEM: Primary | ICD-10-CM

## 2023-07-07 DIAGNOSIS — R29.898 DECREASED GRIP STRENGTH: ICD-10-CM

## 2023-07-07 DIAGNOSIS — R27.8 COORDINATION IMPAIRMENT: ICD-10-CM

## 2023-07-07 DIAGNOSIS — Z78.9 IMPAIRED MOBILITY AND ADLS: Primary | ICD-10-CM

## 2023-07-07 PROCEDURE — 97112 NEUROMUSCULAR REEDUCATION: CPT | Mod: PO

## 2023-07-07 PROCEDURE — 97110 THERAPEUTIC EXERCISES: CPT | Mod: PO

## 2023-07-11 ENCOUNTER — CLINICAL SUPPORT (OUTPATIENT)
Dept: REHABILITATION | Facility: HOSPITAL | Age: 73
End: 2023-07-11
Payer: MEDICARE

## 2023-07-11 DIAGNOSIS — R27.8 COORDINATION IMPAIRMENT: ICD-10-CM

## 2023-07-11 DIAGNOSIS — Z74.09 IMPAIRED MOBILITY AND ADLS: Primary | ICD-10-CM

## 2023-07-11 DIAGNOSIS — R29.898 WEAKNESS OF BOTH LOWER EXTREMITIES: ICD-10-CM

## 2023-07-11 DIAGNOSIS — R29.898 DECREASED GRIP STRENGTH: ICD-10-CM

## 2023-07-11 DIAGNOSIS — R26.89 BALANCE PROBLEM: Primary | ICD-10-CM

## 2023-07-11 DIAGNOSIS — Z78.9 IMPAIRED MOBILITY AND ADLS: Primary | ICD-10-CM

## 2023-07-11 PROCEDURE — 97530 THERAPEUTIC ACTIVITIES: CPT | Mod: PO

## 2023-07-11 PROCEDURE — 97112 NEUROMUSCULAR REEDUCATION: CPT | Mod: PO,CQ

## 2023-07-11 PROCEDURE — 97110 THERAPEUTIC EXERCISES: CPT | Mod: PO,CQ

## 2023-07-11 PROCEDURE — 97110 THERAPEUTIC EXERCISES: CPT | Mod: PO

## 2023-07-11 NOTE — PROGRESS NOTES
"[facility]   Physical Therapy Treatment Note     Name: Anthony Ball  Clinic Number: [id number]    Therapy Diagnosis:   Encounter Diagnoses   Name Primary?    Balance problem Yes    Weakness of both lower extremities                Physician: Lyubov Yu MD    Visit Date: 7/11/2023    Physician Orders: PT Eval and Treat   Medical Diagnosis from Referral: Polyneuropathy due to type 2 diabetes mellitus  Evaluation Date: 4/14/2023  Authorization Period Expiration: 12/31/23  Plan of Care Expiration: 6/9/23  Plan of care extended through: 7/31/2023  Visit # / Visits authorized: 14/ 20(+eval)    PTA Visit #: 1/5     Time In: 1115 (pt received from OT)  Time Out: 1200       Total Billable Time: 45   minutes    Precautions: Standard and Fall      SUBJECTIVE     Pt reports: " I'm doing well, they hurt me in OT."   He was partially compliant with home exercise program.  Response to previous treatment: no adverse effects   Functional change: ongoing~ doing better with steps. He was able to walk while holding a bowel of guacamole in one hand yesterday.    Pain: 0/10   Location: N/A      OBJECTIVE   Ambulates from OT gym to PT gym with SC, distant supervision. Pt occasionally walks with his cane suspended in the air.    Objective Measures updated at progress report unless specified.    Treatment       Anthony received the treatments listed below:      therapeutic exercises to develop strength, endurance, ROM, flexibility, posture, and core stabilization for 25 minutes including:      X 8 min on SCI-FIT recumbent stepper, level 4, for B UE and LE muscular and CV endurance      Horizontal Leg Press:  3 x 10 reps of B presses with 80 # applied    Ambulation without AD around gym from one point to another throughout session, SBA    Time also includes ambulation from PT gym to lobby at end of session, SBA    therapeutic activities to improve functional performance for 0  minutes, " including:        neuromuscular re-education activities to improve: Balance, Coordination, Kinesthetic, Sense, Proprioception, and Posture for 20 minutes. The following activities were included:      Inside parallel bars:    4 point foam fitter board:   2 x 30 sec of static standing with WBOS, occ 1 UE support  2 x 30 sec of static standing with NBOS, occ 1 UE support  X 30 sec of LTR with 3 lb weight  X 30 sec each of B UE chest press with #3 lb weight, occ 1 UE support on unilateral side  2 x 30 sec each of B LE single leg stance with unilateral support and no UE support     Pt requires one seated rest break to complete the above balance tasks.      gait training to improve functional mobility and safety for 0  minutes, including:            Patient Education and Home Exercises     Home Exercises Provided and Patient Education Provided     Education provided:   - 4/17/23: Pt instructed in first installment of HEP. Suggested pt use his RW when he comes for future visits.  -4/28/23: Pt again reminded that he would be safer to come to clinic with his RW.  5/5/23: OT and PT provided pt a schedule slip so he can cancel some sessions in late May and early June( due to a planned cruise) and add other sessions when he returns in June.  -6/22/23: PT provided pt with a schedule slip for the next 6 weeks(until 8/4/23).    Written Home Exercises Provided: yes. Exercises were reviewed and Anthony was able to demonstrate them prior to the end of the session.  Anthony demonstrated good  understanding of the education provided. 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  Dr. Ball was able to increase his resistance on the stepper and was able to perform all his balance activities today with no UE support.   Pt remains appropriate for continued PT at 2 x weekly frequency.       Anthony Is progressing  well towards his goals.   Pt prognosis is Fair.     Pt will continue to benefit from skilled outpatient physical therapy to address the deficits listed in the problem list box on initial evaluation, provide pt/family education and to maximize pt's level of independence in the home and community environment.     Pt's spiritual, cultural and educational needs considered and pt agreeable to plan of care and goals.     Anticipated barriers to physical therapy: history of alcohol use, frequent falls    Goals:  Short Term Goals: 4 weeks   Pt will be issued first installment of HEP and report at least partial compliance~ MET 5/19/2023  Pt will improve his 30 second chair rise to 4-5 reps to demonstrate increased LE strength and muscular endurance~ MET 5/19/2023, 6/22/23  Pt will improve his TUG score to 25 seconds or < with or without appropriate AD to improve household ambulation and decrease fall risk~ MET 5/19/2023, 6/22/23  Pt will increase his SSWS to 0.375 m/sec with or without appropriate AD to improve community ambulation and decrease fall risk~ MET 5/19/2023, NOT MET, 6/22/23  Pt will complete mCTSIB balance testing and PT to set appropriate goals~MET, 4/17/23  Pt will improve any 3 LE muscle grades by 1/3 to help with functional mobility skills~ MET 5/19/2023, 6/22/23  Pt will perform all sit<> stand and stand pivot transfers with no more than SBA~ MET 5/19/2023, 6/22/23  (NEW GOAL, 4/17/23): Pt will improve his score on condition # 2 of the mCTSIB to 19 seconds for improved balance in low/eliminated vision environments~ MET 5/19/2023, NOT MET, 6/22/23     Long Term Goals: 8 weeks   Pt will be partially compliant with finalized HEP to help maintain potential gains realized in PT~ongoing  Pt will improve his 30 second chair rise to 5-6 reps to demonstrate increased LE strength and muscular endurance~ongoing  Pt will improve his TUG score to 20 seconds or < with or without appropriate AD to improve household  ambulation and decrease fall risk~ongoing  Pt will increase his SSWS to 0.43 m/sec with or without appropriate AD to improve community ambulation and decrease fall risk~ MET 5/19/2023  Pt will improve any 3 LE muscle grades by 2/3 to help with functional mobility skills ~ MET 5/19/2023, NOT MET, 6/22/23  Pt will perform all sit<> stand and stand pivot transfers with no more than S~ongoing  (NEW GOAL, 4/17/23): Pt will improve his score on condition # 2 of the mCTSIB to 22 seconds for improved balance in low/eliminated vision environments~ MET 5/19/2023, NOT MET, 6/22/23  NEW GOAL 5/19/2023: Pt will improve his score on condition #4 of the mCTSIB to at least 10 seconds for improved balance in low/eliminated vision environments~ongoing   NEW GOAL 5/19/2023: Pt will increase his SSWS to at least .7 m/s with or without appropriate AD to improve community ambulation and decrease fall risk~ongoing     PLAN     Continue to progress strengthening, balance and endurance to tolerance. Increase focus on balance in future sessions.      Ciara Pro, PTA    7/11/2023

## 2023-07-11 NOTE — PROGRESS NOTES
[facility]PAT[facility] THERAPY [facility]  Occupational Therapy Treatment Note     Date: 7/11/2023  Name: Anthony Ball  Clinic Number: [id number]    Therapy Diagnosis:   Encounter Diagnoses   Name Primary?    Impaired mobility and ADLs Yes    Coordination impairment     Decreased  strength      Physician: Lyubov Yu MD  Physician Orders: Neuro Program      Medical Diagnosis: E11.42 (ICD-10-CM) - Polyneuropathy due to type 2 diabetes mellitus   Evaluation Date: 4/14/2023; re-assessment 6/22/2023  Plan of Care Expiration Period: 8/4/2023  Insurance Authorization period Expiration: 12/31/2023  Date of Return to MD: nothing on Epic at this time   FOTO: 3 completed   Visit # / Visits Authorized: 13 / 20 (+evaluation)    Time In: 10:29 am  Time Out: 11:15  Total Billable Time: 46 minutes    Subjective     Pt reports: That he walked a block yesterday but is feeling dehydrated today.     Response to previous treatment: no complaints   Functional change: dressing and showering indep; not using walker at home, walking more in neighborhood and using free weights at home  Patient's Goals for Occupational Therapy: tremor management and balance     Pain: 0/10  Location:N/A  Objective     Objective Measures updated at progress report unless specified.    Treatment   Anthony received therapeutic exercises for 38 minutes including:  -seated upper body ergonometer on level 3.5; completed for 10 minutes switching directions mid way. Focus on postural control and breathing techniques with exhale with exertion of force. MET average 1.7    Seated on mat  -Arnold press with 3# dumbbells 2x10  -Chest flys with 3# dumbbells 2x10  - Deltoid raises with 3# dumbbells   2x10    Anthony participated in therapeutic activities for 8 minutes to improve functional performance including:   -standing functional reaching task placing PVC pieces on matching color griffith then removing them.     Rest and water breaks as needed throughout; hand off to  PT    Patient Education and Home Exercises     Education provided:   - postural control   - role of Occupational Therapy in care, goals for Occupational Therapy for this plan of care,  scheduling  - edu on importance of continuation of sobriety at this time   - discussed importance of leisure activities and hobbies in post retired life      Written Home Exercises Provided: yes.  Exercises were reviewed and Anthony was able to demonstrate them prior to the end of the session.  Anthony demonstrated good  understanding of the HEP provided.   .   See EMR under Patient Instructions for exercises provided 4/24/2023. 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 Pt able to tolerate increased resistance on UBE and completed all upper body exercises with good form and control. Pt required multiple rest breaks throughout session. Pt reported that he has been walking more at home, including walking while carrying objects in home.Good performance noted in standing dynamic functional reach task, and pt was able to complete activity without any rest breaks. Pt remains appropriate for skilled OT services.     Anthony is progressing well towards his goals and there are no updates to goals at this time. Pt prognosis is Good.     Pt will continue to benefit from skilled outpatient occupational therapy to address the deficits listed in the problem list on initial evaluation provide pt/family education and to maximize pt's level of independence in the home and community environment.     Pt's spiritual, cultural and educational needs considered and pt agreeable to plan of care and goals.    Anticipated barriers to occupational therapy: carry over at home     Pt's spiritual, cultural and educational needs considered and pt agreeable to plan of care and goals.     Goals:  Long Term Goals:  Time frame: 8 weeks  - Pt will  bathe with while seated on shower chair, utilizing adaptive equipment and/or compensatory strategies as needed with mod(I). Met per report; using chair with back pain   - Pt will demo ability to bring food to mouth with utensils with minimal to no spillage with AD as needed. Ongoing (able to complete with open mouth cup to mouth)  - Pt will perform dynamic standing with use of bilateral upper extremities in kitchen for 15 minutes with distant supervision to improve performance with homemaking tasks, cooking tasks, standing at the sink for grooming and hygiene, decrease risk for falls, and maximize safety. progressing   - Pt will improve FOTO limitation score to less than or equal to 50% for improved self perception of functional performance with daily activities. Met  - Pt will be independent with Home Exercise Program (HEP)/Home Activity Program (HAP) to promote long term maintenance of progress made in therapy. Ongoing / Progressing      Short Term Goals:  Time frame: 4 weeks  - Pt will demonstrate ability to cut food safely with butter/steak knife or AD as needed. Met  - Pt will demonstrate consistent use of mindfulness techniques to manage stress, anxiety, and depression and will incorporate them into daily routine.Ongoing   - Pt will perform dynamic standing task x a minimum of 10 minutes with zero losses of balance, no more than 2 brief seated rest breaks, and minimal verbals cues for (posture) to increase independence for home managment task. Progressing; 7 min before rest break 5/3/2023   - Pt will increase  strength by 3-5 # for BUE to increase performance during daily tasks (opening jars, holding telephone) Met 5/17     Goals added:   Pt will be able to complete functional mobility at household distance while carrying plate with stand by assist. Ongoing   Pt will be able to complete gathering of home related items at various heights while walking with stand by assist. Ongoing /progressing   Pt will  demo 5/5 strength throughout bilateral upper extremities. Ongoing   Pt will demo within age related norms for fine motor strength bilaterally as needed for IADL/ADL completion. Ongoing   Pt will be able to complete functional mobility with dog walking x20 ft with stand by assist. Ongoing      Goals to be adjusted as needed.   The following goals were discussed with the patient and patient is in agreement with them as to be addressed in the treatment plan.     Plan   Certification Period/Plan of care expiration: 8/4/2023     Continue Outpatient Occupational Therapy 2 times weekly for 6 weeks to include the following interventions: Moist Heat/ Ice, Neuromuscular Re-ed, Paraffin, Patient Education, Self Care, Therapeutic Activities, and Therapeutic Exercise.     Updates/Grading for next session: exercises to be completing with his free weights at home ; functional mobility with gathering component     ALLEN Patel

## 2023-07-13 NOTE — PROGRESS NOTES
"[facility]PAT[facility]ERAP[facility] AND [facility]   Physical Therapy Treatment Note     Name: Anthony Ball  Clinic Number: [id number]    Therapy Diagnosis:   Encounter Diagnoses   Name Primary?    Balance problem Yes    Weakness of both lower extremities                  Physician: Lyubov Yu MD    Visit Date: 7/14/2023    Physician Orders: PT Eval and Treat   Medical Diagnosis from Referral: Polyneuropathy due to type 2 diabetes mellitus  Evaluation Date: 4/14/2023  Authorization Period Expiration: 12/31/23  Plan of Care Expiration: 6/9/23  Plan of care extended through: 7/31/2023  Visit # / Visits authorized: 15/ 20(+eval)~ KX modifier    PTA Visit #: 0/5     Time In: 1147   Time Out: 1232        Total Billable Time: 45    minutes    Precautions: Standard and Fall      SUBJECTIVE     Pt reports: he is feeling " funny" today.   He was partially compliant with home exercise program.  Response to previous treatment: no adverse effects   Functional change: ongoing~ doing better with steps.    Pain: 3/10   Location: low back ~ reported during balance training      OBJECTIVE   Ambulates from OT gym to PT gym with no AD, SBA.     Objective Measures updated at progress report unless specified.    Treatment       Anthony received the treatments listed below:      therapeutic exercises to develop strength, endurance, ROM, flexibility, posture, and core stabilization for 20 minutes including:      X 8 min on SCI-FIT recumbent stepper, level 4, for B UE and LE muscular and CV endurance      Horizontal Leg Press:  3 x 10 reps of B presses with 90 #  applied  1 x 10 reps of L LE presses with 60 # applied      therapeutic activities to improve functional performance for 10  minutes, including:    Ambulation without AD around gym from one point to another throughout session, SBA (including walking to restroom)    Time also includes review of patient's schedule and PT educating pt about providing another schedule slip for the month of " August.    Time includes sit<> stand transfers from stepper, mat and horizontal leg press    neuromuscular re-education activities to improve: Balance, Coordination, Kinesthetic, Sense, Proprioception, and Posture for 15 minutes. The following activities were included:      Inside parallel bars:    X 2 laps tandem ambulation, 1 UE support, CGA  X 2 laps backward walking, 1 UE support, CGA/min A~ cues for proper weight shift and improved step length, proper movement of hand along bar    1 x 10 B LE forward/backward step ups/downs on foam fitter, 1 UE support, CGA~ increased time to complete  1 x 10 alternate single limb step overs on foam fitter, 1 UE support, CGA ~  cues to step to floor with rear foot and to not use B UEs for support~ numerous cues provided initially for pt to understand task       Pt requires one seated rest break to complete the above balance tasks.      gait training to improve functional mobility and safety for 0  minutes, including:            Patient Education and Home Exercises     Home Exercises Provided and Patient Education Provided     Education provided:   - 4/17/23: Pt instructed in first installment of HEP. Suggested pt use his RW when he comes for future visits.  -4/28/23: Pt again reminded that he would be safer to come to clinic with his RW.  5/5/23: OT and PT provided pt a schedule slip so he can cancel some sessions in late May and early June( due to a planned cruise) and add other sessions when he returns in June.  -6/22/23: PT provided pt with a schedule slip for the next 6 weeks(until 8/4/23).  -7/14/23: PT provided pt with a schedule slip for the month of August.    Written Home Exercises Provided: yes. Exercises were reviewed and Anthony was able to demonstrate them prior to the end of the session.  Anthony demonstrated good  understanding of the education provided. 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 He continually reaches for the parallel bar with his contralateral hand when PT is trying to have him perform activity with 1 UE only. He also experienced some back pain during balance exercises, which has not been a problem in the past. He also had more difficulty following commands for unilateral LE step overs on foam fitter. 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 He also requested to perform one set with L LE and this weight was set at 60 #.  Pt remains appropriate for continued PT at 2 x weekly frequency.       Anthony Is progressing well towards his goals.   Pt prognosis is Fair.     Pt will continue to benefit from skilled outpatient physical therapy to address the deficits listed in the problem list box on initial evaluation, provide pt/family education and to maximize pt's level of independence in the home and community environment.     Pt's spiritual, cultural and educational needs considered and pt agreeable to plan of care and goals.     Anticipated barriers to physical therapy: history of alcohol use, frequent falls    Goals:  Short Term Goals: 4 weeks   Pt will be issued first installment of HEP and report at least partial compliance~ MET 5/19/2023  Pt will improve his 30 second chair rise to 4-5 reps to demonstrate increased LE strength and muscular endurance~ MET 5/19/2023, 6/22/23  Pt will improve his TUG score to 25 seconds or < with or without appropriate AD to improve household ambulation and decrease fall risk~ MET 5/19/2023, 6/22/23  Pt will increase his SSWS to 0.375 m/sec with or without appropriate AD to improve community ambulation and decrease fall risk~ MET 5/19/2023, NOT MET, 6/22/23  Pt will complete mCTSIB balance testing and PT to set appropriate goals~MET, 4/17/23  Pt will improve any 3 LE  muscle grades by 1/3 to help with functional mobility skills~ MET 5/19/2023, 6/22/23  Pt will perform all sit<> stand and stand pivot transfers with no more than SBA~ MET 5/19/2023, 6/22/23  (NEW GOAL, 4/17/23): Pt will improve his score on condition # 2 of the mCTSIB to 19 seconds for improved balance in low/eliminated vision environments~ MET 5/19/2023, NOT MET, 6/22/23     Long Term Goals: 8 weeks   Pt will be partially compliant with finalized HEP to help maintain potential gains realized in PT~ongoing  Pt will improve his 30 second chair rise to 5-6 reps to demonstrate increased LE strength and muscular endurance~ongoing  Pt will improve his TUG score to 20 seconds or < with or without appropriate AD to improve household ambulation and decrease fall risk~ongoing  Pt will increase his SSWS to 0.43 m/sec with or without appropriate AD to improve community ambulation and decrease fall risk~ MET 5/19/2023  Pt will improve any 3 LE muscle grades by 2/3 to help with functional mobility skills ~ MET 5/19/2023, NOT MET, 6/22/23  Pt will perform all sit<> stand and stand pivot transfers with no more than S~ongoing  (NEW GOAL, 4/17/23): Pt will improve his score on condition # 2 of the mCTSIB to 22 seconds for improved balance in low/eliminated vision environments~ MET 5/19/2023, NOT MET, 6/22/23  NEW GOAL 5/19/2023: Pt will improve his score on condition #4 of the mCTSIB to at least 10 seconds for improved balance in low/eliminated vision environments~ongoing   NEW GOAL 5/19/2023: Pt will increase his SSWS to at least .7 m/s with or without appropriate AD to improve community ambulation and decrease fall risk~ongoing     PLAN     Continue to progress strengthening, balance and endurance to tolerance. Increase focus on balance in future sessions.      Ba Diaz, PT    7/14/2023

## 2023-07-13 NOTE — PROGRESS NOTES
" [facility]  Occupational Therapy Treatment Note     Date: 7/14/2023  Name: Anthony Ball  Clinic Number: [id number]    Therapy Diagnosis:   Encounter Diagnoses   Name Primary?    Impaired mobility and ADLs Yes    Coordination impairment     Decreased  strength      Physician: Lyubov Yu MD  Physician Orders: Neuro Program      Medical Diagnosis: E11.42 (ICD-10-CM) - Polyneuropathy due to type 2 diabetes mellitus   Evaluation Date: 4/14/2023; re-assessment 6/22/2023  Plan of Care Expiration Period: 8/4/2023  Insurance Authorization period Expiration: 12/31/2023  Date of Return to MD: nothing on Epic at this time   FOTO: 3 completed   Visit # / Visits Authorized: 14 / 20 (+evaluation)    Time In: 11:05  Time Out: 11:45  Total Billable Time: 40 minutes    Subjective     Pt reports: feeling good this morning      Response to previous treatment: "we pushed everything and It felt good"  Functional change: dressing and showering indep; not using walker at home, walking more in neighborhood and using free weights at home  Patient's Goals for Occupational Therapy: tremor management and balance     Pain: 0/10  Location: sore pain in pecs following UBE  Objective     Objective Measures updated at progress report unless specified.    Treatment   Anthony received therapeutic exercises for 38 minutes including:  -seated upper body ergonometer on level 4.0; completed for 10 minutes switching directions mid way. Focus on postural control and breathing techniques with exhale with exertion of force.   MET average 1.6    Seated on mat  -Arnold press with 4# dumbbells 2x10  -Chest flies with 4# dumbbells 2x10  -tricep extension with 4# dumbbells   2x10 (standing)    Rest and water breaks as needed    Anthony participated in therapeutic activities for 2 minutes to improve functional performance including:   -attempt walking with 5# tidal tank in tray carry - deferred due to instability     Hand off to PT at " cessation of session     Patient Education and Home Exercises     Education provided:   - postural control   - role of Occupational Therapy in care, goals for Occupational Therapy for this plan of care,  scheduling  - edu on importance of continuation of sobriety at this time   - discussed importance of leisure activities and hobbies in post retired life      Written Home Exercises Provided: yes.  Exercises were reviewed and Anthony was able to demonstrate them prior to the end of the session.  Anthony demonstrated good  understanding of the HEP provided.   .   See EMR under Patient Instructions for exercises provided 4/24/2023. 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 Following exercise, pt with increased difficulty with functional carry task and item unable to be completed on this date. He did verbalize improvement with carrying plates at home while walking.     Anthony is progressing well towards his goals and there are no updates to goals at this time. Pt prognosis is Good.     Pt will continue to benefit from skilled outpatient occupational therapy to address the deficits listed in the problem list on initial evaluation provide pt/family education and to maximize pt's level of independence in the home and community environment.     Pt's spiritual, cultural and educational needs considered and pt agreeable to plan of care and goals.    Anticipated barriers to occupational therapy: carry over at home     Pt's spiritual, cultural and educational needs considered and pt agreeable to plan of care and goals.     Goals:  Long Term Goals:  Time frame: 8 weeks  - Pt will bathe with while seated on shower chair, utilizing adaptive equipment and/or compensatory strategies as needed with mod(I). Met per report; using chair with back pain   - Pt  will demo ability to bring food to mouth with utensils with minimal to no spillage with AD as needed. Ongoing (able to complete with open mouth cup to mouth)  - Pt will perform dynamic standing with use of bilateral upper extremities in kitchen for 15 minutes with distant supervision to improve performance with homemaking tasks, cooking tasks, standing at the sink for grooming and hygiene, decrease risk for falls, and maximize safety. progressing   - Pt will improve FOTO limitation score to less than or equal to 50% for improved self perception of functional performance with daily activities. Met  - Pt will be independent with Home Exercise Program (HEP)/Home Activity Program (HAP) to promote long term maintenance of progress made in therapy. Ongoing / Progressing      Short Term Goals:  Time frame: 4 weeks  - Pt will demonstrate ability to cut food safely with butter/steak knife or AD as needed. Met  - Pt will demonstrate consistent use of mindfulness techniques to manage stress, anxiety, and depression and will incorporate them into daily routine.Ongoing   - Pt will perform dynamic standing task x a minimum of 10 minutes with zero losses of balance, no more than 2 brief seated rest breaks, and minimal verbals cues for (posture) to increase independence for home managment task. Progressing; 7 min before rest break 5/3/2023   - Pt will increase  strength by 3-5 # for BUE to increase performance during daily tasks (opening jars, holding telephone) Met 5/17     Goals added:   Pt will be able to complete functional mobility at household distance while carrying plate with stand by assist. Ongoing   Pt will be able to complete gathering of home related items at various heights while walking with stand by assist. Ongoing /progressing   Pt will demo 5/5 strength throughout bilateral upper extremities. Ongoing   Pt will demo within age related norms for fine motor strength bilaterally as needed for IADL/ADL completion.  Ongoing   Pt will be able to complete functional mobility with dog walking x20 ft with stand by assist. Ongoing      Goals to be adjusted as needed.   The following goals were discussed with the patient and patient is in agreement with them as to be addressed in the treatment plan.     Plan   Certification Period/Plan of care expiration: 8/4/2023     Continue Outpatient Occupational Therapy 2 times weekly for 6 weeks to include the following interventions: Moist Heat/ Ice, Neuromuscular Re-ed, Paraffin, Patient Education, Self Care, Therapeutic Activities, and Therapeutic Exercise.     Updates/Grading for next session: exercises to be completing with his free weights at home ; functional mobility with gathering component     JOSH Fernández

## 2023-07-14 ENCOUNTER — CLINICAL SUPPORT (OUTPATIENT)
Dept: REHABILITATION | Facility: HOSPITAL | Age: 73
End: 2023-07-14
Payer: MEDICARE

## 2023-07-14 DIAGNOSIS — R29.898 DECREASED GRIP STRENGTH: ICD-10-CM

## 2023-07-14 DIAGNOSIS — R26.89 BALANCE PROBLEM: Primary | ICD-10-CM

## 2023-07-14 DIAGNOSIS — Z74.09 IMPAIRED MOBILITY AND ADLS: Primary | ICD-10-CM

## 2023-07-14 DIAGNOSIS — Z78.9 IMPAIRED MOBILITY AND ADLS: Primary | ICD-10-CM

## 2023-07-14 DIAGNOSIS — R29.898 WEAKNESS OF BOTH LOWER EXTREMITIES: ICD-10-CM

## 2023-07-14 DIAGNOSIS — R27.8 COORDINATION IMPAIRMENT: ICD-10-CM

## 2023-07-14 PROCEDURE — 97530 THERAPEUTIC ACTIVITIES: CPT | Mod: KX,PO

## 2023-07-14 PROCEDURE — 97110 THERAPEUTIC EXERCISES: CPT | Mod: PO

## 2023-07-14 PROCEDURE — 97110 THERAPEUTIC EXERCISES: CPT | Mod: KX,PO

## 2023-07-14 PROCEDURE — 97112 NEUROMUSCULAR REEDUCATION: CPT | Mod: KX,PO

## 2023-07-14 NOTE — PROGRESS NOTES
[facility]  Occupational Therapy Treatment Note     Date: 7/17/2023  Name: Anthony Ball  Clinic Number: [id number]    Therapy Diagnosis:   Encounter Diagnoses   Name Primary?    Impaired mobility and ADLs Yes    Coordination impairment     Decreased  strength      Physician: Lyubov Yu MD  Physician Orders: Neuro Program      Medical Diagnosis: E11.42 (ICD-10-CM) - Polyneuropathy due to type 2 diabetes mellitus   Evaluation Date: 4/14/2023; re-assessment 6/22/2023  Plan of Care Expiration Period: 8/4/2023  Insurance Authorization period Expiration: 12/31/2023  Date of Return to MD: nothing on Epic at this time   FOTO: 3 completed   Visit # / Visits Authorized: 15 / 20 (+evaluation)    Time In: 10:30  Time Out: 11:16  Total Billable Time: 46 minutes    Subjective     Pt reports: stayed in bed a lot of the weekend      Response to previous treatment: sore in his arms  Functional change: dressing and showering indep; not using walker at home, walking more in neighborhood and using free weights at home  Patient's Goals for Occupational Therapy: tremor management and balance     Pain: 0/10  Location: sore bilateral upper extremities   Objective     Objective Measures updated at progress report unless specified.    Treatment   Anthony received therapeutic exercises for 18 nminutes including:  -seated upper body ergonometer on level 4.0; completed for 10 minutes switching directions mid way. Focus on postural control and breathing techniques with exhale with exertion of force.   MET average 1.8    Standing:   -chest press with 5# tidal tank x10 reps    Seated: piliates ring  -x10 reps each: pec press, tricep dips    Anthony participated in therapeutic activities for 27 minutes to improve functional performance including:   -functional mobility with tidal tank carry (5#) x200 ft  -functional mobility for item  around the gym area x15 (high and low surfaces)    Seated at table top: fine  motor coordination and dexterity  -grooved peg board:  Right: in: 2:21.43 min out: 41.97 s total time: 3:03.11 min  Left: in: 2:59.38 min out: 1:11.39 min (added time due to needing to wipe his nose) total time: error for total due to stated above    Hand off to PT at cessation of session     Patient Education and Home Exercises     Education provided:   - postural control   - role of Occupational Therapy in care, goals for Occupational Therapy for this plan of care,  scheduling  - edu on importance of continuation of sobriety at this time   - discussed importance of leisure activities and hobbies in post retired life      Written Home Exercises Provided: yes.  Exercises were reviewed and Anthony was able to demonstrate them prior to the end of the session.  Anthony demonstrated good  understanding of the HEP provided.   .   See EMR under Patient Instructions for exercises provided 4/24/2023. 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 He remains with fine motor coordination deficits for left (non dominant) hand at this time. He remains on track for d/c at the end of this plan of care.  Pt will continue to benefit from skilled outpatient occupational therapy to address the deficits listed in the problem list on initial evaluation provide pt/family education and to maximize pt's level of independence in the home and community environment.    Anthony is progressing well towards his goals and there are no updates to goals at this time. Pt prognosis is Good.      Pt's spiritual, cultural and educational needs considered and pt agreeable to plan of care and goals.    Anticipated barriers to occupational therapy: carry over at home     Pt's spiritual, cultural and educational needs considered and pt agreeable to plan of care and goals.     Goals:  Long Term Goals:  Time frame:  8 weeks  - Pt will bathe with while seated on shower chair, utilizing adaptive equipment and/or compensatory strategies as needed with mod(I). Met per report; using chair with back pain   - Pt will demo ability to bring food to mouth with utensils with minimal to no spillage with AD as needed. Met  - Pt will perform dynamic standing with use of bilateral upper extremities in kitchen for 15 minutes with distant supervision to improve performance with homemaking tasks, cooking tasks, standing at the sink for grooming and hygiene, decrease risk for falls, and maximize safety. progressing   - Pt will improve FOTO limitation score to less than or equal to 50% for improved self perception of functional performance with daily activities. Met  - Pt will be independent with Home Exercise Program (HEP)/Home Activity Program (HAP) to promote long term maintenance of progress made in therapy. Ongoing / Progressing      Short Term Goals:  Time frame: 4 weeks  - Pt will demonstrate ability to cut food safely with butter/steak knife or AD as needed. Met  - Pt will demonstrate consistent use of mindfulness techniques to manage stress, anxiety, and depression and will incorporate them into daily routine.Ongoing   - Pt will perform dynamic standing task x a minimum of 10 minutes with zero losses of balance, no more than 2 brief seated rest breaks, and minimal verbals cues for (posture) to increase independence for home managment task. Progressing; 7 min before rest break 5/3/2023   - Pt will increase  strength by 3-5 # for BUE to increase performance during daily tasks (opening jars, holding telephone) Met 5/17     Goals added:   Pt will be able to complete functional mobility at household distance while carrying plate with stand by assist. Met per report  Pt will be able to complete gathering of home related items at various heights while walking with stand by assist. Met 7/17/2023  Pt will demo 5/5 strength throughout bilateral  upper extremities. Ongoing   Pt will demo within age related norms for fine motor strength bilaterally as needed for IADL/ADL completion. Ongoing   Pt will be able to complete functional mobility with dog walking x20 ft with stand by assist. Ongoing      Goals to be adjusted as needed.   The following goals were discussed with the patient and patient is in agreement with them as to be addressed in the treatment plan.     Plan   Certification Period/Plan of care expiration: 8/4/2023     Continue Outpatient Occupational Therapy 2 times weekly for 6 weeks to include the following interventions: Moist Heat/ Ice, Neuromuscular Re-ed, Paraffin, Patient Education, Self Care, Therapeutic Activities, and Therapeutic Exercise.     Updates/Grading for next session: kitchen reaching ; information on gyms within his area    JOSH Fernández

## 2023-07-17 ENCOUNTER — CLINICAL SUPPORT (OUTPATIENT)
Dept: REHABILITATION | Facility: HOSPITAL | Age: 73
End: 2023-07-17
Payer: MEDICARE

## 2023-07-17 DIAGNOSIS — R29.898 WEAKNESS OF BOTH LOWER EXTREMITIES: ICD-10-CM

## 2023-07-17 DIAGNOSIS — R29.898 DECREASED GRIP STRENGTH: ICD-10-CM

## 2023-07-17 DIAGNOSIS — Z78.9 IMPAIRED MOBILITY AND ADLS: Primary | ICD-10-CM

## 2023-07-17 DIAGNOSIS — R27.8 COORDINATION IMPAIRMENT: ICD-10-CM

## 2023-07-17 DIAGNOSIS — Z74.09 IMPAIRED MOBILITY AND ADLS: Primary | ICD-10-CM

## 2023-07-17 DIAGNOSIS — R26.89 BALANCE PROBLEM: Primary | ICD-10-CM

## 2023-07-17 PROCEDURE — 97112 NEUROMUSCULAR REEDUCATION: CPT | Mod: PO

## 2023-07-17 PROCEDURE — 97110 THERAPEUTIC EXERCISES: CPT | Mod: PO

## 2023-07-17 PROCEDURE — 97530 THERAPEUTIC ACTIVITIES: CPT | Mod: PO

## 2023-07-17 NOTE — PROGRESS NOTES
[facility]   Physical Therapy Treatment Note     Name: Anthony Ball  Clinic Number: [id number]    Therapy Diagnosis:   Encounter Diagnoses   Name Primary?    Balance problem Yes    Weakness of both lower extremities        Physician: Lyubov Yu MD    Visit Date: 7/17/2023    Physician Orders: PT Eval and Treat   Medical Diagnosis from Referral: Polyneuropathy due to type 2 diabetes mellitus  Evaluation Date: 4/14/2023  Authorization Period Expiration: 12/31/23  Plan of Care Expiration: 6/9/23  Plan of care extended through: 7/31/2023  Visit # / Visits authorized: 16/ 20(+eval)~ KX modifier    PTA Visit #: 0/5     Time In: 1115   Time Out: 1200        Total Billable Time: 45    minutes    Precautions: Standard and Fall      SUBJECTIVE     Pt reports: he is feeling surprisingly ok this AM.    He was partially compliant with home exercise program.  Response to previous treatment: no adverse effects   Functional change: ongoing~ doing better with steps.    Pain: 3/10   Location: low back ~ reported during balance training      OBJECTIVE   Ambulates from OT gym to PT gym with no AD, SBA.     Objective Measures updated at progress report unless specified.    Treatment       Anthony received the treatments listed below:      therapeutic exercises to develop strength, endurance, ROM, flexibility, posture, and core stabilization for 20 minutes including:    X 8 min on SCI-FIT recumbent stepper, level 4, for B UE and LE muscular and CV endurance    Horizontal Leg Press:  3 x 10 reps of B presses with 100 #  applied  1 x 10 reps of L LE presses with 60 # applied      therapeutic activities to improve functional performance for 10  minutes, including:    Ambulation without AD around gym from one point to another throughout session, SBA     Time includes sit<> stand transfers from stepper, mat and horizontal leg press    neuromuscular re-education activities to improve: Balance, Coordination,  Kinesthetic, Sense, Proprioception, and Posture for 15 minutes. The following activities were included:    Inside parallel bars:    X 5 laps tandem ambulation, half the length of // bars, 1 UE support, CGA  X 5 laps backward walking, half the length of // bars, 1 UE support, CGA    1 x 10 B LE forward/backward step ups/downs on foam fitter, 1 UE support, CGA~ increased time to complete       Pt requires one seated rest break and significantly increased time to complete the above balance tasks.      gait training to improve functional mobility and safety for 0  minutes, including:            Patient Education and Home Exercises     Home Exercises Provided and Patient Education Provided     Education provided:   - 4/17/23: Pt instructed in first installment of HEP. Suggested pt use his RW when he comes for future visits.  -4/28/23: Pt again reminded that he would be safer to come to clinic with his RW.  5/5/23: OT and PT provided pt a schedule slip so he can cancel some sessions in late May and early June( due to a planned cruise) and add other sessions when he returns in June.  -6/22/23: PT provided pt with a schedule slip for the next 6 weeks(until 8/4/23).  -7/14/23: PT provided pt with a schedule slip for the month of August.    Written Home Exercises Provided: yes. Exercises were reviewed and Anthony was able to demonstrate them prior to the end of the session.  Anthony demonstrated good  understanding of the education provided. 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 He continues to be most reluctant to perform balance tasks due to his fear of falling, pt self reports awareness of this fear as well. He tolerates all tasks well but does require significant increased time to complete all that was asked today, though less consistent cueing for releasing 2nd hand support required. He again increased the weight on  leg press with BLE and appears to genuinely enjoy this task. Pt remains appropriate for continued PT at 2 x weekly frequency.       Anthony Is progressing well towards his goals.   Pt prognosis is Fair.     Pt will continue to benefit from skilled outpatient physical therapy to address the deficits listed in the problem list box on initial evaluation, provide pt/family education and to maximize pt's level of independence in the home and community environment.     Pt's spiritual, cultural and educational needs considered and pt agreeable to plan of care and goals.     Anticipated barriers to physical therapy: history of alcohol use, frequent falls    Goals:  Short Term Goals: 4 weeks   Pt will be issued first installment of HEP and report at least partial compliance~ MET 5/19/2023  Pt will improve his 30 second chair rise to 4-5 reps to demonstrate increased LE strength and muscular endurance~ MET 5/19/2023, 6/22/23  Pt will improve his TUG score to 25 seconds or < with or without appropriate AD to improve household ambulation and decrease fall risk~ MET 5/19/2023, 6/22/23  Pt will increase his SSWS to 0.375 m/sec with or without appropriate AD to improve community ambulation and decrease fall risk~ MET 5/19/2023, NOT MET, 6/22/23  Pt will complete mCTSIB balance testing and PT to set appropriate goals~MET, 4/17/23  Pt will improve any 3 LE muscle grades by 1/3 to help with functional mobility skills~ MET 5/19/2023, 6/22/23  Pt will perform all sit<> stand and stand pivot transfers with no more than SBA~ MET 5/19/2023, 6/22/23  (NEW GOAL, 4/17/23): Pt will improve his score on condition # 2 of the mCTSIB to 19 seconds for improved balance in low/eliminated vision environments~ MET 5/19/2023, NOT MET, 6/22/23     Long Term Goals: 8 weeks   Pt will be partially compliant with finalized HEP to help maintain potential gains realized in PT~ongoing  Pt will improve his 30 second chair rise to 5-6 reps to demonstrate increased  LE strength and muscular endurance~ongoing  Pt will improve his TUG score to 20 seconds or < with or without appropriate AD to improve household ambulation and decrease fall risk~ongoing  Pt will increase his SSWS to 0.43 m/sec with or without appropriate AD to improve community ambulation and decrease fall risk~ MET 5/19/2023  Pt will improve any 3 LE muscle grades by 2/3 to help with functional mobility skills ~ MET 5/19/2023, NOT MET, 6/22/23  Pt will perform all sit<> stand and stand pivot transfers with no more than S~ongoing  (NEW GOAL, 4/17/23): Pt will improve his score on condition # 2 of the mCTSIB to 22 seconds for improved balance in low/eliminated vision environments~ MET 5/19/2023, NOT MET, 6/22/23  NEW GOAL 5/19/2023: Pt will improve his score on condition #4 of the mCTSIB to at least 10 seconds for improved balance in low/eliminated vision environments~ongoing   NEW GOAL 5/19/2023: Pt will increase his SSWS to at least .7 m/s with or without appropriate AD to improve community ambulation and decrease fall risk~ongoing     PLAN     Continue to progress strengthening, balance and endurance to tolerance. Increase focus on balance in future sessions.      Christiano Clemens, PT    7/17/2023

## 2023-07-20 NOTE — PROGRESS NOTES
[facility]  Occupational Therapy Treatment Note     Date: 2023  Name: Anthony Ball  Clinic Number: [id number]    Therapy Diagnosis:   Encounter Diagnoses   Name Primary?    Impaired mobility and ADLs Yes    Coordination impairment     Decreased  strength      Physician: Lyubov Yu MD  Physician Orders: Neuro Program      Medical Diagnosis: E11.42 (ICD-10-CM) - Polyneuropathy due to type 2 diabetes mellitus   Evaluation Date: 2023; re-assessment 2023  Plan of Care Expiration Period: 2023  Insurance Authorization period Expiration: 2023  Date of Return to MD: nothing on Epic at this time   FOTO: 3 completed   Visit # / Visits Authorized:  (+evaluation)    Time In: 11:00  Time Out: 11:45  Total Billable Time: 45 minutes    Subjective     Pt reports: he passed out the other night; feeling a little off again today     Response to previous treatment: sore in his arms  Functional change: dressing and showering indep; not using walker at home, walking more in neighborhood and using free weights at home  Patient's Goals for Occupational Therapy: tremor management and balance     Pain: 0/10  Location: left flank  Objective   Upon arrival:  BP: 95/66  O2: 95%  HR: 112    Treatment   Anthony received therapeutic exercises for 40 nminutes including:  -seated upper body ergonometer on level 4.0; completed for 10 minutes switching directions mid way. Focus on postural control and breathing techniques with exhale with exertion of force.   MET average 1.4  Vitals followin/96 and HR: 111    Supine: x2 sets of 10  5#: bench press and shoulder press     Seated edge of mat:   3# ball toss x15 reps    -added rest and water breaks throughout today's session     Hand off to PT at cessation of session     Patient Education and Home Exercises     Education provided:   - postural control   - role of Occupational Therapy in care, goals for Occupational Therapy for this  plan of care,  scheduling  - edu on importance of continuation of sobriety at this time   - discussed importance of leisure activities and hobbies in post retired life      Written Home Exercises Provided: yes.  Exercises were reviewed and Anthony was able to demonstrate them prior to the end of the session.  Anthony demonstrated good  understanding of the HEP provided.   .   See EMR under Patient Instructions for exercises provided 4/24/2023. 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 Session today requiring modifications for participation due to general uneasiness and not feeling well- vitals WFL throughout session.  Pt will continue to benefit from skilled outpatient occupational therapy to address the deficits listed in the problem list on initial evaluation provide pt/family education and to maximize pt's level of independence in the home and community environment.    Anthony is progressing well towards his goals and there are no updates to goals at this time. Pt prognosis is Good.      Pt's spiritual, cultural and educational needs considered and pt agreeable to plan of care and goals.    Anticipated barriers to occupational therapy: carry over at home     Pt's spiritual, cultural and educational needs considered and pt agreeable to plan of care and goals.     Goals:  Long Term Goals:  Time frame: 8 weeks  - Pt will bathe with while seated on shower chair, utilizing adaptive equipment and/or compensatory strategies as needed with mod(I). Met per report; using chair with back pain   - Pt will demo ability to bring food to mouth with utensils with minimal to no spillage with AD as needed. Met  - Pt will perform dynamic standing with use of bilateral upper extremities  in kitchen for 15 minutes with distant supervision to improve performance with homemaking tasks, cooking tasks, standing at the sink for grooming and hygiene, decrease risk for falls, and maximize safety. progressing   - Pt will improve FOTO limitation score to less than or equal to 50% for improved self perception of functional performance with daily activities. Met  - Pt will be independent with Home Exercise Program (HEP)/Home Activity Program (HAP) to promote long term maintenance of progress made in therapy. Ongoing / Progressing      Short Term Goals:  Time frame: 4 weeks  - Pt will demonstrate ability to cut food safely with butter/steak knife or AD as needed. Met  - Pt will demonstrate consistent use of mindfulness techniques to manage stress, anxiety, and depression and will incorporate them into daily routine.Ongoing   - Pt will perform dynamic standing task x a minimum of 10 minutes with zero losses of balance, no more than 2 brief seated rest breaks, and minimal verbals cues for (posture) to increase independence for home managment task. Progressing; 7 min before rest break 5/3/2023   - Pt will increase  strength by 3-5 # for BUE to increase performance during daily tasks (opening jars, holding telephone) Met 5/17     Goals added:   Pt will be able to complete functional mobility at household distance while carrying plate with stand by assist. Met per report  Pt will be able to complete gathering of home related items at various heights while walking with stand by assist. Met 7/17/2023  Pt will demo 5/5 strength throughout bilateral upper extremities. Ongoing   Pt will demo within age related norms for fine motor strength bilaterally as needed for IADL/ADL completion. Ongoing   Pt will be able to complete functional mobility with dog walking x20 ft with stand by assist. Ongoing      Goals to be adjusted as needed.   The following goals were discussed with the patient and patient is in agreement with  them as to be addressed in the treatment plan.     Plan   Certification Period/Plan of care expiration: 8/4/2023     Continue Outpatient Occupational Therapy 2 times weekly for 6 weeks to include the following interventions: Moist Heat/ Ice, Neuromuscular Re-ed, Paraffin, Patient Education, Self Care, Therapeutic Activities, and Therapeutic Exercise.     Updates/Grading for next session: kitchen reaching ; information on gyms within his area    JOSH Fernández

## 2023-07-21 ENCOUNTER — CLINICAL SUPPORT (OUTPATIENT)
Dept: REHABILITATION | Facility: HOSPITAL | Age: 73
End: 2023-07-21
Payer: MEDICARE

## 2023-07-21 DIAGNOSIS — Z74.09 IMPAIRED MOBILITY AND ADLS: Primary | ICD-10-CM

## 2023-07-21 DIAGNOSIS — Z78.9 IMPAIRED MOBILITY AND ADLS: Primary | ICD-10-CM

## 2023-07-21 DIAGNOSIS — R26.89 BALANCE PROBLEM: Primary | ICD-10-CM

## 2023-07-21 DIAGNOSIS — R29.898 DECREASED GRIP STRENGTH: ICD-10-CM

## 2023-07-21 DIAGNOSIS — R27.8 COORDINATION IMPAIRMENT: ICD-10-CM

## 2023-07-21 DIAGNOSIS — R29.898 WEAKNESS OF BOTH LOWER EXTREMITIES: ICD-10-CM

## 2023-07-21 DIAGNOSIS — Z74.09 IMPAIRED MOBILITY AND ADLS: ICD-10-CM

## 2023-07-21 DIAGNOSIS — Z78.9 IMPAIRED MOBILITY AND ADLS: ICD-10-CM

## 2023-07-21 PROCEDURE — 97110 THERAPEUTIC EXERCISES: CPT | Mod: PO

## 2023-07-21 PROCEDURE — 97530 THERAPEUTIC ACTIVITIES: CPT | Mod: KX,PO

## 2023-07-21 PROCEDURE — 97110 THERAPEUTIC EXERCISES: CPT | Mod: KX,PO

## 2023-07-21 NOTE — PROGRESS NOTES
"[facility]   Physical Therapy Treatment Note     Name: Anthony Ball  Clinic Number: [id number]    Therapy Diagnosis:   Encounter Diagnoses   Name Primary?    Balance problem Yes    Weakness of both lower extremities                    Physician: Lyubov Yu MD    Visit Date: 7/21/2023    Physician Orders: PT Eval and Treat   Medical Diagnosis from Referral: Polyneuropathy due to type 2 diabetes mellitus  Evaluation Date: 4/14/2023  Authorization Period Expiration: 12/31/23  Plan of Care Expiration: 6/9/23  Plan of care extended through: 7/31/2023  Visit # / Visits authorized: 17/ 20(+eval)~ KX modifier    PTA Visit #: 0/5     Time In: 1147( pt received from OT)   Time Out: 1227         Total Billable Time: 40  minutes    Precautions: Standard and Fall      SUBJECTIVE     Pt reports: he had a syncopal episode the other day and fell when he got out of bed to walk to the bathroom. He now has pain to his L flank. " I can't do balance today."  He was partially compliant with home exercise program. 2 new exercises added today.  Response to previous treatment: no adverse effects   Functional change: ongoing~ doing better with steps.    Pain: 2-3/10   Location: L flank     OBJECTIVE   Ambulates from OT gym to PT gym with no AD, close SBA. Light bruising and laceration noted to L side of face.    Objective Measures updated at progress report unless specified.    Treatment       Anthony received the treatments listed below:      therapeutic exercises to develop strength, endurance, ROM, flexibility, posture, and core stabilization for 30 minutes including:      X 8 min on SCI-FIT recumbent stepper, level 4.5, for B UE and LE muscular and CV endurance  Post exercise vitals: BP 82/57 and       Horizontal Leg Press:  2 x 10 reps of B presses with 100 #  applied  2 x 10 reps of L LE presses with 80 # applied  Post exercise vitals: /71 and     PT instructs pt in and he performs the " Patient has what appears to be depression. Patient is instructed to follow-up with, crisis center and call for an appointment immediately. He is also instructed to follow-up with primary care physician to do so within the next 1 to 2 days. Patient is instructed return to the nearest emergency room immediately for any new or worsening complaints. below exercises as additions to his HEP:    2 x 10 B heel raises, UE support at counter  2 x 10 B hip flexion, UE support at counter        therapeutic activities to improve functional performance for 10  minutes, including:     Limited ambulation without AD around gym from one point to another throughout session, CGA    Time includes sit<> stand transfers from stepper and horizontal leg press    Time also includes PT escorting pt to first floor lobby as he ambulates with rollator for safety, SBA    neuromuscular re-education activities to improve: Balance, Coordination, Kinesthetic, Sense, Proprioception, and Posture for 0 minutes. The following activities were included:          gait training to improve functional mobility and safety for 0  minutes, including:            Patient Education and Home Exercises     Home Exercises Provided and Patient Education Provided     Education provided:   - 4/17/23: Pt instructed in first installment of HEP. Suggested pt use his RW when he comes for future visits.  -4/28/23: Pt again reminded that he would be safer to come to clinic with his RW.  5/5/23: OT and PT provided pt a schedule slip so he can cancel some sessions in late May and early June( due to a planned cruise) and add other sessions when he returns in June.  -6/22/23: PT provided pt with a schedule slip for the next 6 weeks(until 8/4/23).  -7/14/23: PT provided pt with a schedule slip for the month of August.  -7/21/23: additions to HEP( standing heel raises and hip flexion)    Written Home Exercises Provided: yes. Exercises were reviewed and Anthony was able to demonstrate them prior to the end of the session.  Anthony demonstrated good  understanding of the education provided. 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 He did demonstrate hypotensive readings  with recorded blood pressures, despite his denial of significant symptoms. Understandably, pt was limited in his ability to ambulate freely around the gym and he declined any balance training. Even when he is feeling well, pt does not look forward to performing balance interventions. PT did instruct pt in 2 new standing exercises for his home program and he performed these well. He also increased his weight on the horizontal leg press to 80 # when performing L LE presses. Pt remains appropriate for continued PT at 2 x weekly frequency.       Anthony Is progressing well towards his goals.   Pt prognosis is Fair.     Pt will continue to benefit from skilled outpatient physical therapy to address the deficits listed in the problem list box on initial evaluation, provide pt/family education and to maximize pt's level of independence in the home and community environment.     Pt's spiritual, cultural and educational needs considered and pt agreeable to plan of care and goals.     Anticipated barriers to physical therapy: history of alcohol use, frequent falls    Goals:  Short Term Goals: 4 weeks   Pt will be issued first installment of HEP and report at least partial compliance~ MET 5/19/2023  Pt will improve his 30 second chair rise to 4-5 reps to demonstrate increased LE strength and muscular endurance~ MET 5/19/2023, 6/22/23  Pt will improve his TUG score to 25 seconds or < with or without appropriate AD to improve household ambulation and decrease fall risk~ MET 5/19/2023, 6/22/23  Pt will increase his SSWS to 0.375 m/sec with or without appropriate AD to improve community ambulation and decrease fall risk~ MET 5/19/2023, NOT MET, 6/22/23  Pt will complete mCTSIB balance testing and PT to set appropriate goals~MET, 4/17/23  Pt will improve any 3 LE muscle grades by 1/3 to help with functional mobility skills~ MET 5/19/2023, 6/22/23  Pt will perform all sit<> stand and stand pivot transfers with no more than SBA~ MET  5/19/2023, 6/22/23  (NEW GOAL, 4/17/23): Pt will improve his score on condition # 2 of the mCTSIB to 19 seconds for improved balance in low/eliminated vision environments~ MET 5/19/2023, NOT MET, 6/22/23     Long Term Goals: 8 weeks   Pt will be partially compliant with finalized HEP to help maintain potential gains realized in PT~ongoing  Pt will improve his 30 second chair rise to 5-6 reps to demonstrate increased LE strength and muscular endurance~ongoing  Pt will improve his TUG score to 20 seconds or < with or without appropriate AD to improve household ambulation and decrease fall risk~ongoing  Pt will increase his SSWS to 0.43 m/sec with or without appropriate AD to improve community ambulation and decrease fall risk~ MET 5/19/2023  Pt will improve any 3 LE muscle grades by 2/3 to help with functional mobility skills ~ MET 5/19/2023, NOT MET, 6/22/23  Pt will perform all sit<> stand and stand pivot transfers with no more than S~ongoing  (NEW GOAL, 4/17/23): Pt will improve his score on condition # 2 of the mCTSIB to 22 seconds for improved balance in low/eliminated vision environments~ MET 5/19/2023, NOT MET, 6/22/23  NEW GOAL 5/19/2023: Pt will improve his score on condition #4 of the mCTSIB to at least 10 seconds for improved balance in low/eliminated vision environments~ongoing   NEW GOAL 5/19/2023: Pt will increase his SSWS to at least .7 m/s with or without appropriate AD to improve community ambulation and decrease fall risk~ongoing     PLAN     Continue to progress strengthening, balance and endurance to tolerance. Increase focus on balance in future sessions.      Ba Diaz, PT    7/21/2023

## 2023-07-24 ENCOUNTER — DOCUMENTATION ONLY (OUTPATIENT)
Dept: REHABILITATION | Facility: HOSPITAL | Age: 73
End: 2023-07-24
Payer: MEDICARE

## 2023-07-24 NOTE — PROGRESS NOTES
PT called pt to check on him since he did not show for today's 11:15 AM appointment. However, PT unable to leave a voice message as the mailbox is full. Pt's next PT session is scheduled for Wednesday, 7/26/23. No charges posted today.    Ba Diaz, PT  7/24/2023

## 2023-07-25 ENCOUNTER — CLINICAL SUPPORT (OUTPATIENT)
Dept: REHABILITATION | Facility: HOSPITAL | Age: 73
End: 2023-07-25
Payer: MEDICARE

## 2023-07-25 DIAGNOSIS — Z74.09 IMPAIRED MOBILITY AND ADLS: Primary | ICD-10-CM

## 2023-07-25 DIAGNOSIS — R29.898 DECREASED GRIP STRENGTH: ICD-10-CM

## 2023-07-25 DIAGNOSIS — R27.8 COORDINATION IMPAIRMENT: ICD-10-CM

## 2023-07-25 DIAGNOSIS — Z78.9 IMPAIRED MOBILITY AND ADLS: Primary | ICD-10-CM

## 2023-07-25 PROCEDURE — 97110 THERAPEUTIC EXERCISES: CPT | Mod: PO

## 2023-07-25 NOTE — PROGRESS NOTES
[facility]  Occupational Therapy Treatment Note     Date: 7/25/2023  Name: Anthony Ball  Clinic Number: [id number]    Therapy Diagnosis:   Encounter Diagnoses   Name Primary?    Impaired mobility and ADLs Yes    Coordination impairment     Decreased  strength      Physician: Lyubov Yu MD  Physician Orders: Neuro Program      Medical Diagnosis: E11.42 (ICD-10-CM) - Polyneuropathy due to type 2 diabetes mellitus   Evaluation Date: 4/14/2023; re-assessment 6/22/2023  Plan of Care Expiration Period: 8/4/2023  Insurance Authorization period Expiration: 12/31/2023  Date of Return to MD: nothing on Epic at this time   FOTO: 3 completed   Visit # / Visits Authorized: 16 / 20 (+evaluation)    Time In:  12:57 pm  Time Out: 1:45  Total Billable Time: 48 minutes    Subjective     Pt reports: That he feels better as far as breathing but he is really sore today and does not know how much he can do.     Response to previous treatment: sore in his arms  Functional change: dressing and showering indep; not using walker at home, walking more in neighborhood and using free weights at home  Patient's Goals for Occupational Therapy: tremor management and balance     Pain: 7/10  Location: left ribs  Objective     Treatment   Anthony received therapeutic exercises for 48 minutes including:  -seated upper body ergonometer on level 4.5 (per pt's request) completed for 10 minutes switching directions mid way. Focus on postural control and breathing techniques with exhale with exertion of force.   MET average 1.5    Seated in chair    -Rows with red theraband, 2x10  -bilateral external rotation with red theraband, 2x10  -Pilate's ring bilateral isometric internal rotation, 15xea  -Arnold press with 4# dumbbells, 2x10  -D1/D2 PNF with red theraband, 10x    Patient Education and Home Exercises     Education provided:   - postural control   - role of Occupational Therapy in care, goals for Occupational Therapy  for this plan of care,  scheduling  - edu on importance of continuation of sobriety at this time   - discussed importance of leisure activities and hobbies in post retired life      Written Home Exercises Provided: yes.  Exercises were reviewed and Anthony was able to demonstrate them prior to the end of the session.  Anthony demonstrated good  understanding of the HEP provided.   .   See EMR under Patient Instructions for exercises provided 4/24/2023. 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 Pt with increased L sided pain and demo difficulty ambulating and standing due to recent fall , so session activities were performed in chair due to pain and fatigue. Pt able to tolerate increased resistance on UBE, but demo difficulty standing up from seat, and fatigued easily throughout 10 minute period. Fair performance noted with seated strengthening activities and pt required minimal cueing to maintain correct form. Once pt is able to tolerate standing/ambulating for longer periods and pain had decreased, future sessions should focus dynamic standing tasks. Pt remains appropriate for skilled OT services at this time.     Anthony is progressing well towards his goals and there are no updates to goals at this time. Pt prognosis is Good.      Pt's spiritual, cultural and educational needs considered and pt agreeable to plan of care and goals.    Anticipated barriers to occupational therapy: carry over at home     Pt's spiritual, cultural and educational needs considered and pt agreeable to plan of care and goals.     Goals:  Long Term Goals:  Time frame: 8 weeks  - Pt will bathe with while seated on shower chair, utilizing adaptive equipment and/or compensatory strategies as needed with mod(I). Met per report; using chair with back pain   - Pt will demo ability to bring food to mouth with utensils with  minimal to no spillage with AD as needed. Met  - Pt will perform dynamic standing with use of bilateral upper extremities in kitchen for 15 minutes with distant supervision to improve performance with homemaking tasks, cooking tasks, standing at the sink for grooming and hygiene, decrease risk for falls, and maximize safety. progressing   - Pt will improve FOTO limitation score to less than or equal to 50% for improved self perception of functional performance with daily activities. Met  - Pt will be independent with Home Exercise Program (HEP)/Home Activity Program (HAP) to promote long term maintenance of progress made in therapy. Ongoing / Progressing      Short Term Goals:  Time frame: 4 weeks  - Pt will demonstrate ability to cut food safely with butter/steak knife or AD as needed. Met  - Pt will demonstrate consistent use of mindfulness techniques to manage stress, anxiety, and depression and will incorporate them into daily routine.Ongoing   - Pt will perform dynamic standing task x a minimum of 10 minutes with zero losses of balance, no more than 2 brief seated rest breaks, and minimal verbals cues for (posture) to increase independence for home managment task. Progressing; 7 min before rest break 5/3/2023   - Pt will increase  strength by 3-5 # for BUE to increase performance during daily tasks (opening jars, holding telephone) Met 5/17     Goals added:   Pt will be able to complete functional mobility at household distance while carrying plate with stand by assist. Met per report  Pt will be able to complete gathering of home related items at various heights while walking with stand by assist. Met 7/17/2023  Pt will demo 5/5 strength throughout bilateral upper extremities. Ongoing   Pt will demo within age related norms for fine motor strength bilaterally as needed for IADL/ADL completion. Ongoing   Pt will be able to complete functional mobility with dog walking x20 ft with stand by assist. Ongoing       Goals to be adjusted as needed.   The following goals were discussed with the patient and patient is in agreement with them as to be addressed in the treatment plan.     Plan   Certification Period/Plan of care expiration: 8/4/2023     Continue Outpatient Occupational Therapy 2 times weekly for 6 weeks to include the following interventions: Moist Heat/ Ice, Neuromuscular Re-ed, Paraffin, Patient Education, Self Care, Therapeutic Activities, and Therapeutic Exercise.     Updates/Grading for next session: kitchen reaching ; information on gyms within his area    ALLEN Patel

## 2023-07-27 ENCOUNTER — LAB VISIT (OUTPATIENT)
Dept: LAB | Facility: HOSPITAL | Age: 73
End: 2023-07-27
Attending: INTERNAL MEDICINE
Payer: MEDICARE

## 2023-07-27 DIAGNOSIS — R41.0 CONFUSION: ICD-10-CM

## 2023-07-27 DIAGNOSIS — E11.42 TYPE 2 DIABETES MELLITUS WITH DIABETIC POLYNEUROPATHY, WITHOUT LONG-TERM CURRENT USE OF INSULIN: ICD-10-CM

## 2023-07-27 DIAGNOSIS — E83.42 HYPOMAGNESEMIA: ICD-10-CM

## 2023-07-27 LAB
ALBUMIN SERPL BCP-MCNC: 3.4 G/DL (ref 3.5–5.2)
ALP SERPL-CCNC: 98 U/L (ref 55–135)
ALT SERPL W/O P-5'-P-CCNC: 8 U/L (ref 10–44)
AMMONIA PLAS-SCNC: 25 UMOL/L (ref 10–50)
ANION GAP SERPL CALC-SCNC: 16 MMOL/L (ref 8–16)
AST SERPL-CCNC: 11 U/L (ref 10–40)
BASOPHILS # BLD AUTO: 0.04 K/UL (ref 0–0.2)
BASOPHILS NFR BLD: 0.5 % (ref 0–1.9)
BILIRUB SERPL-MCNC: 0.7 MG/DL (ref 0.1–1)
BUN SERPL-MCNC: 19 MG/DL (ref 8–23)
CALCIUM SERPL-MCNC: 9.6 MG/DL (ref 8.7–10.5)
CHLORIDE SERPL-SCNC: 101 MMOL/L (ref 95–110)
CO2 SERPL-SCNC: 21 MMOL/L (ref 23–29)
CREAT SERPL-MCNC: 1.3 MG/DL (ref 0.5–1.4)
DIFFERENTIAL METHOD: ABNORMAL
EOSINOPHIL # BLD AUTO: 0.2 K/UL (ref 0–0.5)
EOSINOPHIL NFR BLD: 2.5 % (ref 0–8)
ERYTHROCYTE [DISTWIDTH] IN BLOOD BY AUTOMATED COUNT: 13.4 % (ref 11.5–14.5)
EST. GFR  (NO RACE VARIABLE): 58.7 ML/MIN/1.73 M^2
ESTIMATED AVG GLUCOSE: 134 MG/DL (ref 68–131)
GLUCOSE SERPL-MCNC: 223 MG/DL (ref 70–110)
HBA1C MFR BLD: 6.3 % (ref 4–5.6)
HCT VFR BLD AUTO: 45.2 % (ref 40–54)
HGB BLD-MCNC: 15.1 G/DL (ref 14–18)
HIV 1+2 AB+HIV1 P24 AG SERPL QL IA: NORMAL
IMM GRANULOCYTES # BLD AUTO: 0.06 K/UL (ref 0–0.04)
IMM GRANULOCYTES NFR BLD AUTO: 0.7 % (ref 0–0.5)
LYMPHOCYTES # BLD AUTO: 1.7 K/UL (ref 1–4.8)
LYMPHOCYTES NFR BLD: 19.2 % (ref 18–48)
MAGNESIUM SERPL-MCNC: 1.7 MG/DL (ref 1.6–2.6)
MCH RBC QN AUTO: 31.2 PG (ref 27–31)
MCHC RBC AUTO-ENTMCNC: 33.4 G/DL (ref 32–36)
MCV RBC AUTO: 93 FL (ref 82–98)
MONOCYTES # BLD AUTO: 0.5 K/UL (ref 0.3–1)
MONOCYTES NFR BLD: 5.2 % (ref 4–15)
NEUTROPHILS # BLD AUTO: 6.2 K/UL (ref 1.8–7.7)
NEUTROPHILS NFR BLD: 71.9 % (ref 38–73)
NRBC BLD-RTO: 0 /100 WBC
PLATELET # BLD AUTO: 161 K/UL (ref 150–450)
PMV BLD AUTO: 10.9 FL (ref 9.2–12.9)
POTASSIUM SERPL-SCNC: 4.9 MMOL/L (ref 3.5–5.1)
PROT SERPL-MCNC: 7.4 G/DL (ref 6–8.4)
RBC # BLD AUTO: 4.84 M/UL (ref 4.6–6.2)
SODIUM SERPL-SCNC: 138 MMOL/L (ref 136–145)
TSH SERPL DL<=0.005 MIU/L-ACNC: 2.42 UIU/ML (ref 0.4–4)
WBC # BLD AUTO: 8.63 K/UL (ref 3.9–12.7)

## 2023-07-27 PROCEDURE — 36415 COLL VENOUS BLD VENIPUNCTURE: CPT | Performed by: INTERNAL MEDICINE

## 2023-07-27 PROCEDURE — 87389 HIV-1 AG W/HIV-1&-2 AB AG IA: CPT | Performed by: INTERNAL MEDICINE

## 2023-07-27 PROCEDURE — 82140 ASSAY OF AMMONIA: CPT | Performed by: INTERNAL MEDICINE

## 2023-07-27 PROCEDURE — 80053 COMPREHEN METABOLIC PANEL: CPT | Performed by: INTERNAL MEDICINE

## 2023-07-27 PROCEDURE — 86592 SYPHILIS TEST NON-TREP QUAL: CPT | Performed by: INTERNAL MEDICINE

## 2023-07-27 PROCEDURE — 80321 ALCOHOLS BIOMARKERS 1OR 2: CPT | Performed by: INTERNAL MEDICINE

## 2023-07-27 PROCEDURE — 83036 HEMOGLOBIN GLYCOSYLATED A1C: CPT | Performed by: INTERNAL MEDICINE

## 2023-07-27 PROCEDURE — 84443 ASSAY THYROID STIM HORMONE: CPT | Performed by: INTERNAL MEDICINE

## 2023-07-27 PROCEDURE — 85025 COMPLETE CBC W/AUTO DIFF WBC: CPT | Performed by: INTERNAL MEDICINE

## 2023-07-27 PROCEDURE — 83735 ASSAY OF MAGNESIUM: CPT | Performed by: INTERNAL MEDICINE

## 2023-07-28 ENCOUNTER — PATIENT MESSAGE (OUTPATIENT)
Dept: INTERNAL MEDICINE | Facility: CLINIC | Age: 73
End: 2023-07-28
Payer: MEDICARE

## 2023-07-28 LAB — RPR SER QL: NORMAL

## 2023-07-30 NOTE — PROGRESS NOTES
[facility]   Physical Therapy Treatment Note     Name: Anthony Ball  Clinic Number: [id number]    Therapy Diagnosis:   Encounter Diagnoses   Name Primary?    Balance problem Yes    Weakness of both lower extremities            Physician: Lyubov Yu MD    Visit Date: 7/31/2023    Physician Orders: PT Eval and Treat   Medical Diagnosis from Referral: Polyneuropathy due to type 2 diabetes mellitus  Evaluation Date: 4/14/2023  Authorization Period Expiration: 12/31/23  Plan of Care Expiration: 6/9/23  Plan of care extended through: 7/31/2023  Updated Plan of Care Expiration: 9/25/2023  Visit # / Visits authorized: 18/ 32(+eval)~ KX modifier    PTA Visit #: 0/5     Time In: 1120    Time Out: 1205     Total Billable Time: 45   minutes    Precautions: Standard and Fall      SUBJECTIVE     Pt reports: he has some back spasms from standing during his OT session. He endorses fatigue at the start of PT session. Pt states he will be going to [place] August 8-12 and will not be able to make his appointment on August 11.     He was compliant with home exercise program.  Response to previous treatment: no adverse effects   Functional change: ongoing    Pain: 0/10  Location: N/A       OBJECTIVE   Ambulates from OT gym with supervision to PT gym after working with OT. PT tech providing SBA.    Objective Measures updated at progress report unless specified. See below:      Lower Extremity Strength  Right LE  eval 5/19/23 6/22/23 7/31/23 Left LE eval 5/19/23 6/22/23 7/31/23   Hip flexion:  3+/5 4-/5 3+/5* 3+/5* Hip flexion: 3+/5 4-/5 3+/5* 3+/5*   Knee extension: 4/5 5/5 5/5 5/5 Knee extension: 3+/5 5/5 5/5 5/5   Knee flexion: 4-/5 4+/5 4-/5 4/5 Knee flexion: 4/5 4+/5 4/5 4+/5   Ankle dorsiflexion:  4-/5 4/5 4/5~ has difficulty isolating from quad 4-/5*~ has difficulty isolating from quad Ankle dorsiflexion: 4-/5 4/5 3+/5 3+/5*~ has difficulty isolating from quad   Ankle plantarflexion:  4/5 5/5 4/5~has  difficulty isolating from quad 4/5 Ankle plantarflexion: 4+/5 5/5 4+/5 4+/5   Hip abduction: 4+/5 5/5 5/5 5/5 Hip abduction: 4/5 5/5 5/5 5/5   Hip adduction: 5/5 5/5 5/5 5/5 Hip adduction 5/5 5/5 5/5 5/5   Hip extension: 4+/5 5/5 5/5 5/5 Hip extension: 4+/5 4+/5           5/5          5/5     *Within ROM         Evaluation 5/19/2023 6/22/23 7/31/23   30 second Chair Rise 3 completed with arms, light CGA 5 completed with arms, cues to stand fully  4.5 completed with arms, cues to stand fully, CGA  4.5 completed with arms, cues to stand fully, SBA   TUG 30 seconds, CGA 21.8 seconds, CGA 25 seconds, SBA 27 seconds, SBA   Self selected walking speed 0.33 m/sec (6m/18s) .6 m/sec (6m/10s) 0.6 m/sec (6m/10s) 0.55 m/sec (6m/11s)     5/19/23  Postural control:  MCTSIB:  1. Eyes Open/feet together/Firm: 30 seconds  2. Eyes Closed/feet together/Firm: 30 seconds  3. Eyes Open/feet together/Foam: 30 seconds  4. Eyes Closed/feet together/Foam: 5 seconds    6/22/23  Postural control:  MCTSIB:  1. Eyes Open/feet together/Firm: 30 seconds  2. Eyes Closed/feet together/Firm: 21 seconds  3. Eyes Open/feet together/Foam: 30 seconds  4. Eyes Closed/feet together/Foam: 4 seconds    6/22/23  Postural control:  MCTSIB:  1. Eyes Open/feet together/Firm: 30 seconds~ mild sway  2. Eyes Closed/feet together/Firm: 30 seconds~ min sway  3. Eyes Open/feet together/Foam: 30 seconds~ mild sway  4. Eyes Closed/feet together/Foam: 7 seconds      CMS Impairment/Limitation/Restriction for FOTO Endocrine, Metabolic and Immunity Disorders Survey  Status Limitation G-Code CMS Severity Modifier  Intake 40% 60%  Predicted 51% 49% Goal Status+ CK - At least 40 percent but less than 60 percent  5/19/2023 49% 51%  6/22/2023 49% 51%  7/31/2023 54% 46% Current Status CK - At least 40 percent but less than 60 percent  D/C Status CK **only report if this is discharge survey        Treatment       Anthony received the treatments listed below:      therapeutic  exercises to develop strength, endurance, ROM, flexibility, posture, and core stabilization for 15 minutes including:  (Includes time for above tests and measures)    X 5 min on SCI-FIT recumbent stepper, level 4.0, for B UE and LE muscular and CV endurance       therapeutic activities to improve functional performance for 30  minutes, including:  (Includes time for above tests and measures)    Transitions:  Multiple trials of sit<> stand from gold chair throughout session, SBA  S sit<> stand SCI-FIT stepper seat    Ascend and descend 12 six inch steps with B rails, S~ pt demonstrates reciprocal pattern to ascend and reciprocal to non-reciprocal pattern to descend    Ambulation without AD around gym from one point to another throughout session, SBA. PT also escorts pt from gym to 2nd floor lobby, SBA. Pt encouraged to not touch walls or nearby objects for balance assistance.           neuromuscular re-education activities to improve: Balance, Coordination, Kinesthetic, Sense, Proprioception, and Posture for 0 minutes. The following activities were included:        gait training to improve functional mobility and safety for 0  minutes, including:            Patient Education and Home Exercises     Home Exercises Provided and Patient Education Provided     Education provided:   - 4/17/23: Pt instructed in first installment of HEP. Suggested pt use his RW when he comes for future visits.  -4/28/23: Pt again reminded that he would be safer to come to clinic with his RW.  5/5/23: OT and PT provided pt a schedule slip so he can cancel some sessions in late May and early June( due to a planned cruise) and add other sessions when he returns in June.  -6/22/23: PT provided pt with a schedule slip for the next 6 weeks(until 8/4/23).  -7/31/23: PT reviewed all objective tests and measures and encouraged pt to be more willing to work on balance and allow PT to progress him appropriately here.     Written Home Exercises  Provided: yes. Exercises were reviewed and Anthony was able to demonstrate them prior to the end of the session.  Anthony demonstrated good  understanding of the education provided. 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 Unfortunately, he only improved in a select few areas. First, his B LE hamstring strength is better than the previous reassessment( with most other muscle groups remaining unchanged.) He did manage to increase his score on condition # 4 of the mCTSIB by 3 seconds. He also demonstrates an improved FOTO score with intake now at 46 %. Dr. Ball also performed limited descent of stairs with a reciprocal pattern. Pt did not improve with 30 second chair rise score, TUG or SSWS.  PT had a candid conversation with pt regarding his reluctance to perform balance tasks and how his fear of falling is preventing him from progressing. PT understands pt's fear, especially since he has encountered numerous falls in the past. PT is willing to extend the current plan of care once more by 8 weeks( until 9/25/23) to see if pt can improve more with respect to mobility and balance. He remains at very high risk for falls and is appropriate for continued PT at 2 x weekly frequency. See below for most recent comments regarding goal achievement and any revisions.       Anthony Is progressing well towards his goals.   Pt prognosis is Fair.     Pt will continue to benefit from skilled outpatient physical therapy to address the deficits listed in the problem list box on initial evaluation, provide pt/family education and to maximize pt's level of independence in the home and community environment.     Pt's spiritual, cultural and educational needs considered and pt agreeable to plan of care and goals.     Anticipated barriers to physical therapy: history of alcohol use, frequent  falls    Goals:  Short Term Goals: 4 weeks   Pt will be issued first installment of HEP and report at least partial compliance~ MET 5/19/2023  Pt will improve his 30 second chair rise to 4-5 reps to demonstrate increased LE strength and muscular endurance~ MET 5/19/2023, 6/22/23  Pt will improve his TUG score to 25 seconds or < with or without appropriate AD to improve household ambulation and decrease fall risk~ MET 5/19/2023, 6/22/23, NOT MET, 7/31/23  Pt will increase his SSWS to 0.375 m/sec with or without appropriate AD to improve community ambulation and decrease fall risk~ MET 5/19/2023, NOT MET, 6/22/23  Pt will complete mCTSIB balance testing and PT to set appropriate goals~MET, 4/17/23  Pt will improve any 3 LE muscle grades by 1/3 to help with functional mobility skills~ MET 5/19/2023, 6/22/23  Pt will perform all sit<> stand and stand pivot transfers with no more than SBA~ MET 5/19/2023, 6/22/23  (NEW GOAL, 4/17/23): Pt will improve his score on condition # 2 of the mCTSIB to 19 seconds for improved balance in low/eliminated vision environments~ MET 5/19/2023, 6/22/23     Long Term Goals: 8 weeks   Pt will be partially compliant with finalized HEP to help maintain potential gains realized in PT~ongoing  Pt will improve his 30 second chair rise to 5-6 reps to demonstrate increased LE strength and muscular endurance~ongoing  Pt will improve his TUG score to 20 seconds or < with or without appropriate AD to improve household ambulation and decrease fall risk~ongoing  Pt will increase his SSWS to 0.43 m/sec with or without appropriate AD to improve community ambulation and decrease fall risk~ MET 5/19/2023  Pt will improve any 3 LE muscle grades by 2/3 to help with functional mobility skills ~ MET 5/19/2023, NOT MET, 6/22/23, 7/31/23  Pt will perform all sit<> stand and stand pivot transfers with no more than S~ongoing  (NEW GOAL, 4/17/23): Pt will improve his score on condition # 2 of the mCTSIB to 22  seconds for improved balance in low/eliminated vision environments~ MET 5/19/2023, NOT MET, 6/22/23, MET, 7/31/23  NEW GOAL 5/19/2023: Pt will improve his score on condition #4 of the mCTSIB to at least 10 seconds for improved balance in low/eliminated vision environments~ongoing and progressing, 7/31/23   NEW GOAL 5/19/2023: Pt will increase his SSWS to at least .7 m/s with or without appropriate AD to improve community ambulation and decrease fall risk~ongoing     PLAN     Continue outpatient physical therapy 2 x weekly under updated Plan of Care, 7/31/23 to 9/25/23, with treatment to include: pt education, HEP, therapeutic exercises, neuromuscular re-education/balance exercises, therapeutic activities, joint mobilizations, and modalities PRN, to work towards established goals. Pt may be seen by PTA to carry out plan of care.         Continue to progress strengthening, balance and endurance to tolerance. Increase focus on balance in future sessions.      Ba Diaz, PT    7/31/2023

## 2023-07-31 ENCOUNTER — CLINICAL SUPPORT (OUTPATIENT)
Dept: REHABILITATION | Facility: HOSPITAL | Age: 73
End: 2023-07-31
Payer: MEDICARE

## 2023-07-31 DIAGNOSIS — R27.8 COORDINATION IMPAIRMENT: ICD-10-CM

## 2023-07-31 DIAGNOSIS — Z74.09 IMPAIRED MOBILITY AND ADLS: Primary | ICD-10-CM

## 2023-07-31 DIAGNOSIS — R26.89 BALANCE PROBLEM: Primary | ICD-10-CM

## 2023-07-31 DIAGNOSIS — R29.898 WEAKNESS OF BOTH LOWER EXTREMITIES: ICD-10-CM

## 2023-07-31 DIAGNOSIS — R29.898 DECREASED GRIP STRENGTH: ICD-10-CM

## 2023-07-31 DIAGNOSIS — Z78.9 IMPAIRED MOBILITY AND ADLS: Primary | ICD-10-CM

## 2023-07-31 LAB
CLINICAL BIOCHEMIST REVIEW: NORMAL
PLPETH BLD-MCNC: 240 NG/ML
POPETH BLD-MCNC: 319 NG/ML

## 2023-07-31 PROCEDURE — 97110 THERAPEUTIC EXERCISES: CPT | Mod: KX,PO

## 2023-07-31 PROCEDURE — 97110 THERAPEUTIC EXERCISES: CPT | Mod: PO

## 2023-07-31 PROCEDURE — 97530 THERAPEUTIC ACTIVITIES: CPT | Mod: KX,PO

## 2023-07-31 PROCEDURE — 97530 THERAPEUTIC ACTIVITIES: CPT | Mod: PO

## 2023-07-31 NOTE — PROGRESS NOTES
[facility]  Occupational Therapy Treatment Note     Date: 7/31/2023  Name: Anthony Ball  Clinic Number: [id number]    Therapy Diagnosis:   Encounter Diagnoses   Name Primary?    Impaired mobility and ADLs Yes    Coordination impairment     Decreased  strength      Physician: Lyubov Yu MD  Physician Orders: Neuro Program      Medical Diagnosis: E11.42 (ICD-10-CM) - Polyneuropathy due to type 2 diabetes mellitus   Evaluation Date: 4/14/2023; re-assessment 6/22/2023  Plan of Care Expiration Period: 8/4/2023  Insurance Authorization period Expiration: 12/31/2023  Date of Return to MD: nothing on Epic at this time   FOTO: 3 completed   Visit # / Visits Authorized: 17 / 20 (+evaluation)    Time In:  10:30  Time Out: 11:15  Total Billable Time: 45 minutes    Subjective     Pt reports: he is feeling good this morning      Response to previous treatment: had to cancel last appointment due to needing to son to the hospital   Functional change: dressing and showering indep; not using walker at home, walking more in neighborhood and using free weights at home  Patient's Goals for Occupational Therapy: tremor management and balance     Pain: 0/10  Location: n/a    Treatment   Anthony received therapeutic exercises for 15 minutes including:  -seated upper body ergonometer on level 4.5 completed for 10 minutes switching directions mid way. Focus on postural control and breathing techniques with exhale with exertion of force. MET average 1.7    Seated:   -shoulder flex with 3000g ball x15 reps   -D2 flex/ext x10 each     Anthony participated in dynamic functional therapeutic activities to improve functional performance for 30 minutes, including:  -functional mobility for collecting items from table for placement into cabinet - x5 rounds  -seated rest breaks as needed (1st at 3 min marker due to back spasms)    Hand off to PT at cessation of session   Patient Education and Home Exercises     Education  provided:   - discussed continued exercise program at local gym in his area ([place] [facility])  - postural control   - role of Occupational Therapy in care, goals for Occupational Therapy for this plan of care,  scheduling  - edu on importance of continuation of sobriety at this time   - discussed importance of leisure activities and hobbies in post retired life      Written Home Exercises Provided: yes.  Exercises were reviewed and Anthony was able to demonstrate them prior to the end of the session.  Anthony demonstrated good  understanding of the HEP provided.   .   See EMR under Patient Instructions for exercises provided 4/24/2023. - re printed out on 5/3/2023  5/12/2023: weighted utensils for tremor managment ([employer] brand and [employer]-steady)     Assessment     Dr Ball tolerated session fairly on this date. With functional mobility/home managment task, pt required frequent rest breaks due to back spasms/pain. He continues to demo gains towards OT goals and is on track to d/c at the end of this plan of care.     Anthony is progressing well towards his goals and there are no updates to goals at this time. Pt prognosis is Good.      Pt's spiritual, cultural and educational needs considered and pt agreeable to plan of care and goals.    Anticipated barriers to occupational therapy: carry over at home     Pt's spiritual, cultural and educational needs considered and pt agreeable to plan of care and goals.     Goals:  Long Term Goals:  Time frame: 8 weeks  - Pt will bathe with while seated on shower chair, utilizing adaptive equipment and/or compensatory strategies as needed with mod(I). Met per report; using chair with back pain   - Pt will demo ability to bring food to mouth with utensils with minimal to no spillage with AD as needed. Met  - Pt will perform dynamic standing with use of bilateral upper extremities in kitchen for 15 minutes with distant supervision to improve performance with homemaking tasks,  cooking tasks, standing at the sink for grooming and hygiene, decrease risk for falls, and maximize safety. progressing   - Pt will improve FOTO limitation score to less than or equal to 50% for improved self perception of functional performance with daily activities. Met  - Pt will be independent with Home Exercise Program (HEP)/Home Activity Program (HAP) to promote long term maintenance of progress made in therapy. Ongoing / Progressing      Short Term Goals:  Time frame: 4 weeks  - Pt will demonstrate ability to cut food safely with butter/steak knife or AD as needed. Met  - Pt will demonstrate consistent use of mindfulness techniques to manage stress, anxiety, and depression and will incorporate them into daily routine.Ongoing   - Pt will perform dynamic standing task x a minimum of 10 minutes with zero losses of balance, no more than 2 brief seated rest breaks, and minimal verbals cues for (posture) to increase independence for home managment task. Progressing; 7 min before rest break 5/3/2023   - Pt will increase  strength by 3-5 # for BUE to increase performance during daily tasks (opening jars, holding telephone) Met 5/17     Goals added:   Pt will be able to complete functional mobility at household distance while carrying plate with stand by assist. Met per report  Pt will be able to complete gathering of home related items at various heights while walking with stand by assist. Met 7/17/2023  Pt will demo 5/5 strength throughout bilateral upper extremities. Ongoing   Pt will demo within age related norms for fine motor strength bilaterally as needed for IADL/ADL completion. Ongoing    Goal removed; pt taking dog to park only at this time      Goals to be adjusted as needed.   The following goals were discussed with the patient and patient is in agreement with them as to be addressed in the treatment plan.     Plan   Certification Period/Plan of care expiration: 8/4/2023     Continue Outpatient  Occupational Therapy 2 times weekly for 6 weeks to include the following interventions: Moist Heat/ Ice, Neuromuscular Re-ed, Paraffin, Patient Education, Self Care, Therapeutic Activities, and Therapeutic Exercise.     Updates/Grading for next session: information on gyms within his area    JOSH Fernández

## 2023-07-31 NOTE — PLAN OF CARE
[facility]   Physical Therapy Treatment Note     Name: Anthony Ball  Clinic Number: [id number]    Therapy Diagnosis:   Encounter Diagnoses   Name Primary?    Balance problem Yes    Weakness of both lower extremities            Physician: Lyubov Yu MD    Visit Date: 7/31/2023    Physician Orders: PT Eval and Treat   Medical Diagnosis from Referral: Polyneuropathy due to type 2 diabetes mellitus  Evaluation Date: 4/14/2023  Authorization Period Expiration: 12/31/23  Plan of Care Expiration: 6/9/23  Plan of care extended through: 7/31/2023  Updated Plan of Care Expiration: 9/25/2023  Visit # / Visits authorized: 18/ 32(+eval)~ KX modifier    PTA Visit #: 0/5     Time In: 1120    Time Out: 1205     Total Billable Time: 45   minutes    Precautions: Standard and Fall      SUBJECTIVE     Pt reports: he has some back spasms from standing during his OT session. He endorses fatigue at the start of PT session. Pt states he will be going to [place] August 8-12 and will not be able to make his appointment on August 11.     He was compliant with home exercise program.  Response to previous treatment: no adverse effects   Functional change: ongoing    Pain: 0/10  Location: N/A       OBJECTIVE   Ambulates from OT gym with supervision to PT gym after working with OT. PT tech providing SBA.    Objective Measures updated at progress report unless specified. See below:      Lower Extremity Strength  Right LE  eval 5/19/23 6/22/23 7/31/23 Left LE eval 5/19/23 6/22/23 7/31/23   Hip flexion:  3+/5 4-/5 3+/5* 3+/5* Hip flexion: 3+/5 4-/5 3+/5* 3+/5*   Knee extension: 4/5 5/5 5/5 5/5 Knee extension: 3+/5 5/5 5/5 5/5   Knee flexion: 4-/5 4+/5 4-/5 4/5 Knee flexion: 4/5 4+/5 4/5 4+/5   Ankle dorsiflexion:  4-/5 4/5 4/5~ has difficulty isolating from quad 4-/5*~ has difficulty isolating from quad Ankle dorsiflexion: 4-/5 4/5 3+/5 3+/5*~ has difficulty isolating from quad   Ankle plantarflexion:  4/5 5/5 4/5~has  difficulty isolating from quad 4/5 Ankle plantarflexion: 4+/5 5/5 4+/5 4+/5   Hip abduction: 4+/5 5/5 5/5 5/5 Hip abduction: 4/5 5/5 5/5 5/5   Hip adduction: 5/5 5/5 5/5 5/5 Hip adduction 5/5 5/5 5/5 5/5   Hip extension: 4+/5 5/5 5/5 5/5 Hip extension: 4+/5 4+/5           5/5          5/5     *Within ROM         Evaluation 5/19/2023 6/22/23 7/31/23   30 second Chair Rise 3 completed with arms, light CGA 5 completed with arms, cues to stand fully  4.5 completed with arms, cues to stand fully, CGA  4.5 completed with arms, cues to stand fully, SBA   TUG 30 seconds, CGA 21.8 seconds, CGA 25 seconds, SBA 27 seconds, SBA   Self selected walking speed 0.33 m/sec (6m/18s) .6 m/sec (6m/10s) 0.6 m/sec (6m/10s) 0.55 m/sec (6m/11s)     5/19/23  Postural control:  MCTSIB:  1. Eyes Open/feet together/Firm: 30 seconds  2. Eyes Closed/feet together/Firm: 30 seconds  3. Eyes Open/feet together/Foam: 30 seconds  4. Eyes Closed/feet together/Foam: 5 seconds    6/22/23  Postural control:  MCTSIB:  1. Eyes Open/feet together/Firm: 30 seconds  2. Eyes Closed/feet together/Firm: 21 seconds  3. Eyes Open/feet together/Foam: 30 seconds  4. Eyes Closed/feet together/Foam: 4 seconds    6/22/23  Postural control:  MCTSIB:  1. Eyes Open/feet together/Firm: 30 seconds~ mild sway  2. Eyes Closed/feet together/Firm: 30 seconds~ min sway  3. Eyes Open/feet together/Foam: 30 seconds~ mild sway  4. Eyes Closed/feet together/Foam: 7 seconds      CMS Impairment/Limitation/Restriction for FOTO Endocrine, Metabolic and Immunity Disorders Survey  Status Limitation G-Code CMS Severity Modifier  Intake 40% 60%  Predicted 51% 49% Goal Status+ CK - At least 40 percent but less than 60 percent  5/19/2023 49% 51%  6/22/2023 49% 51%  7/31/2023 54% 46% Current Status CK - At least 40 percent but less than 60 percent  D/C Status CK **only report if this is discharge survey        Treatment       Anthony received the treatments listed below:      therapeutic  exercises to develop strength, endurance, ROM, flexibility, posture, and core stabilization for 15 minutes including:  (Includes time for above tests and measures)    X 5 min on SCI-FIT recumbent stepper, level 4.0, for B UE and LE muscular and CV endurance       therapeutic activities to improve functional performance for 30  minutes, including:  (Includes time for above tests and measures)    Transitions:  Multiple trials of sit<> stand from gold chair throughout session, SBA  S sit<> stand SCI-FIT stepper seat    Ascend and descend 12 six inch steps with B rails, S~ pt demonstrates reciprocal pattern to ascend and reciprocal to non-reciprocal pattern to descend    Ambulation without AD around gym from one point to another throughout session, SBA. PT also escorts pt from gym to 2nd floor lobby, SBA. Pt encouraged to not touch walls or nearby objects for balance assistance.           neuromuscular re-education activities to improve: Balance, Coordination, Kinesthetic, Sense, Proprioception, and Posture for 0 minutes. The following activities were included:        gait training to improve functional mobility and safety for 0  minutes, including:            Patient Education and Home Exercises     Home Exercises Provided and Patient Education Provided     Education provided:   - 4/17/23: Pt instructed in first installment of HEP. Suggested pt use his RW when he comes for future visits.  -4/28/23: Pt again reminded that he would be safer to come to clinic with his RW.  5/5/23: OT and PT provided pt a schedule slip so he can cancel some sessions in late May and early June( due to a planned cruise) and add other sessions when he returns in June.  -6/22/23: PT provided pt with a schedule slip for the next 6 weeks(until 8/4/23).  -7/31/23: PT reviewed all objective tests and measures and encouraged pt to be more willing to work on balance and allow PT to progress him appropriately here.     Written Home Exercises  Provided: yes. Exercises were reviewed and Anthony was able to demonstrate them prior to the end of the session.  Anthony demonstrated good  understanding of the education provided. 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 Unfortunately, he only improved in a select few areas. First, his B LE hamstring strength is better than the previous reassessment( with most other muscle groups remaining unchanged.) He did manage to increase his score on condition # 4 of the mCTSIB by 3 seconds. He also demonstrates an improved FOTO score with intake now at 46 %. Dr. Ball also performed limited descent of stairs with a reciprocal pattern. Pt did not improve with 30 second chair rise score, TUG or SSWS.  PT had a candid conversation with pt regarding his reluctance to perform balance tasks and how his fear of falling is preventing him from progressing. PT understands pt's fear, especially since he has encountered numerous falls in the past. PT is willing to extend the current plan of care once more by 8 weeks( until 9/25/23) to see if pt can improve more with respect to mobility and balance. He remains at very high risk for falls and is appropriate for continued PT at 2 x weekly frequency. See below for most recent comments regarding goal achievement and any revisions.       Anthony Is progressing well towards his goals.   Pt prognosis is Fair.     Pt will continue to benefit from skilled outpatient physical therapy to address the deficits listed in the problem list box on initial evaluation, provide pt/family education and to maximize pt's level of independence in the home and community environment.     Pt's spiritual, cultural and educational needs considered and pt agreeable to plan of care and goals.     Anticipated barriers to physical therapy: history of alcohol use, frequent  falls    Goals:  Short Term Goals: 4 weeks   Pt will be issued first installment of HEP and report at least partial compliance~ MET 5/19/2023  Pt will improve his 30 second chair rise to 4-5 reps to demonstrate increased LE strength and muscular endurance~ MET 5/19/2023, 6/22/23  Pt will improve his TUG score to 25 seconds or < with or without appropriate AD to improve household ambulation and decrease fall risk~ MET 5/19/2023, 6/22/23, NOT MET, 7/31/23  Pt will increase his SSWS to 0.375 m/sec with or without appropriate AD to improve community ambulation and decrease fall risk~ MET 5/19/2023, NOT MET, 6/22/23  Pt will complete mCTSIB balance testing and PT to set appropriate goals~MET, 4/17/23  Pt will improve any 3 LE muscle grades by 1/3 to help with functional mobility skills~ MET 5/19/2023, 6/22/23  Pt will perform all sit<> stand and stand pivot transfers with no more than SBA~ MET 5/19/2023, 6/22/23  (NEW GOAL, 4/17/23): Pt will improve his score on condition # 2 of the mCTSIB to 19 seconds for improved balance in low/eliminated vision environments~ MET 5/19/2023, 6/22/23     Long Term Goals: 8 weeks   Pt will be partially compliant with finalized HEP to help maintain potential gains realized in PT~ongoing  Pt will improve his 30 second chair rise to 5-6 reps to demonstrate increased LE strength and muscular endurance~ongoing  Pt will improve his TUG score to 20 seconds or < with or without appropriate AD to improve household ambulation and decrease fall risk~ongoing  Pt will increase his SSWS to 0.43 m/sec with or without appropriate AD to improve community ambulation and decrease fall risk~ MET 5/19/2023  Pt will improve any 3 LE muscle grades by 2/3 to help with functional mobility skills ~ MET 5/19/2023, NOT MET, 6/22/23, 7/31/23  Pt will perform all sit<> stand and stand pivot transfers with no more than S~ongoing  (NEW GOAL, 4/17/23): Pt will improve his score on condition # 2 of the mCTSIB to 22  seconds for improved balance in low/eliminated vision environments~ MET 5/19/2023, NOT MET, 6/22/23, MET, 7/31/23  NEW GOAL 5/19/2023: Pt will improve his score on condition #4 of the mCTSIB to at least 10 seconds for improved balance in low/eliminated vision environments~ongoing and progressing, 7/31/23   NEW GOAL 5/19/2023: Pt will increase his SSWS to at least .7 m/s with or without appropriate AD to improve community ambulation and decrease fall risk~ongoing     PLAN     Continue outpatient physical therapy 2 x weekly under updated Plan of Care, 7/31/23 to 9/25/23, with treatment to include: pt education, HEP, therapeutic exercises, neuromuscular re-education/balance exercises, therapeutic activities, joint mobilizations, and modalities PRN, to work towards established goals. Pt may be seen by PTA to carry out plan of care.         Continue to progress strengthening, balance and endurance to tolerance. Increase focus on balance in future sessions.      Ba Diaz, PT    7/31/2023

## 2023-08-01 ENCOUNTER — PATIENT MESSAGE (OUTPATIENT)
Dept: INTERNAL MEDICINE | Facility: CLINIC | Age: 73
End: 2023-08-01
Payer: MEDICARE

## 2023-08-02 NOTE — PROGRESS NOTES
[facility]   Physical Therapy Treatment Note     Name: Anthony Ball  Clinic Number: [id number]    Therapy Diagnosis:   Encounter Diagnoses   Name Primary?    Balance problem Yes    Weakness of both lower extremities              Physician: Lyubov Yu MD    Visit Date: 8/4/2023    Physician Orders: PT Eval and Treat   Medical Diagnosis from Referral: Polyneuropathy due to type 2 diabetes mellitus  Evaluation Date: 4/14/2023  Authorization Period Expiration: 12/31/23  Plan of Care Expiration: 6/9/23  Plan of care extended through: 7/31/2023  Updated Plan of Care Expiration: 9/25/2023  Visit # / Visits authorized: 19/ 32(+eval)~ KX modifier    PTA Visit #: 0/5     Time In: 1151     Time Out: 1236      Total Billable Time: 45    minutes    Precautions: Standard and Fall      SUBJECTIVE     Pt reports: he fell in his bathroom last night at about midnight and bumped his head against the vanity.    He was compliant with home exercise program.  Response to previous treatment: no adverse effects   Functional change: ongoing    Pain: 2-3/10  Location: lower back       OBJECTIVE   Pt seen on first floor(ortho gym) due to elevator malfunctioning.  Pt received from OT. PT notes ecchymotic region to L temporal area from fall last night.    Objective Measures updated at progress report unless specified.       Treatment       Anthony received the treatments listed below:      therapeutic exercises to develop strength, endurance, ROM, flexibility, posture, and core stabilization for 20 minutes including:         X 8 min on SCI-FIT recumbent stepper, level 2.0, for B UE and LE muscular and CV endurance          Seated in gold chair:  2 x 10 B hip flexion with 1 # ankle weights  2 x 10 B LAQ with 3 # ankle weights       therapeutic activities to improve functional performance for 10  minutes, including:      Transitions:  Multiple trials of sit<> stand from gold chair throughout session, SBA  S sit<> stand  "SCI-FIT stepper seat    Limited ambulation without AD around gym from one point to another throughout session, SBA.          neuromuscular re-education activities to improve: Balance, Coordination, Kinesthetic, Sense, Proprioception, and Posture for 15 minutes. The following activities were included:      Inside parallel bars:     X 2 laps tandem ambulation, 1 UE support, CGA  X 2 laps backward walking, 1 UE support, CGA with cues for proper movement of hand along bar     2 x 30" static standing on foam pad, no UE support, CGA  1 x 30" B LE split stance balance with lead foot on large disc, no UE support, CGA/min A    1 x 10 B single leg step overs and back with 1/2 foam roller, 1 UE support, CGA      1 x 30" B placing one foot on football and rolling forward and back, 1 UE support, CGA       Pt requires two seated rest break to complete the above balance tasks.        gait training to improve functional mobility and safety for 0  minutes, including:            Patient Education and Home Exercises     Home Exercises Provided and Patient Education Provided     Education provided:   - 4/17/23: Pt instructed in first installment of HEP. Suggested pt use his RW when he comes for future visits.  -4/28/23: Pt again reminded that he would be safer to come to clinic with his RW.  5/5/23: OT and PT provided pt a schedule slip so he can cancel some sessions in late May and early June( due to a planned cruise) and add other sessions when he returns in June.  -6/22/23: PT provided pt with a schedule slip for the next 6 weeks(until 8/4/23).  -7/31/23: PT reviewed all objective tests and measures and encouraged pt to be more willing to work on balance and allow PT to progress him appropriately here.     Written Home Exercises Provided: yes. Exercises were reviewed and Anthony was able to demonstrate them prior to the end of the session.  Anthony demonstrated good  understanding of the education provided. 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 PT is doing all he can to progress pt's balance, though as mentioned previously, pt's fear of falling limits the intensity of interventions. He was mildly limited today by back pain due to his recent fall and required occasional rest periods. PT reduced the resistance on the SCI-FIT to level 2.0 to try and prevent any increase in pain here. Pt completed his final OT session today and will see only PT in the future. He is going to [place] next week and will return to clinic the following week. Pt remains appropriate for continued PT at 2 x weekly frequency.       Anthony Is progressing well towards his goals.   Pt prognosis is Fair.     Pt will continue to benefit from skilled outpatient physical therapy to address the deficits listed in the problem list box on initial evaluation, provide pt/family education and to maximize pt's level of independence in the home and community environment.     Pt's spiritual, cultural and educational needs considered and pt agreeable to plan of care and goals.     Anticipated barriers to physical therapy: history of alcohol use, frequent falls    Goals:  Short Term Goals: 4 weeks   Pt will be issued first installment of HEP and report at least partial compliance~ MET 5/19/2023  Pt will improve his 30 second chair rise to 4-5 reps to demonstrate increased LE strength and muscular endurance~ MET 5/19/2023, 6/22/23  Pt will improve his TUG score to 25 seconds or < with or without appropriate AD to improve household ambulation and decrease fall risk~ MET 5/19/2023, 6/22/23, NOT MET, 7/31/23  Pt will increase his SSWS to 0.375 m/sec with or without appropriate AD to improve community ambulation and decrease fall risk~ MET 5/19/2023, NOT MET, 6/22/23  Pt will complete mCTSIB balance testing and PT to set appropriate goals~MET,  4/17/23  Pt will improve any 3 LE muscle grades by 1/3 to help with functional mobility skills~ MET 5/19/2023, 6/22/23  Pt will perform all sit<> stand and stand pivot transfers with no more than SBA~ MET 5/19/2023, 6/22/23  (NEW GOAL, 4/17/23): Pt will improve his score on condition # 2 of the mCTSIB to 19 seconds for improved balance in low/eliminated vision environments~ MET 5/19/2023, 6/22/23     Long Term Goals: 8 weeks   Pt will be partially compliant with finalized HEP to help maintain potential gains realized in PT~ongoing  Pt will improve his 30 second chair rise to 5-6 reps to demonstrate increased LE strength and muscular endurance~ongoing  Pt will improve his TUG score to 20 seconds or < with or without appropriate AD to improve household ambulation and decrease fall risk~ongoing  Pt will increase his SSWS to 0.43 m/sec with or without appropriate AD to improve community ambulation and decrease fall risk~ MET 5/19/2023  Pt will improve any 3 LE muscle grades by 2/3 to help with functional mobility skills ~ MET 5/19/2023, NOT MET, 6/22/23, 7/31/23  Pt will perform all sit<> stand and stand pivot transfers with no more than S~ongoing  (NEW GOAL, 4/17/23): Pt will improve his score on condition # 2 of the mCTSIB to 22 seconds for improved balance in low/eliminated vision environments~ MET 5/19/2023, NOT MET, 6/22/23, MET, 7/31/23  NEW GOAL 5/19/2023: Pt will improve his score on condition #4 of the mCTSIB to at least 10 seconds for improved balance in low/eliminated vision environments~ongoing and progressing, 7/31/23   NEW GOAL 5/19/2023: Pt will increase his SSWS to at least .7 m/s with or without appropriate AD to improve community ambulation and decrease fall risk~ongoing     PLAN       Continue to progress strengthening, balance and endurance to tolerance. Increase focus on balance in future sessions.      Ba Diaz, PT    8/4/2023

## 2023-08-03 NOTE — PROGRESS NOTES
[facility]  Occupational Therapy Discharge      Date: 8/4/2023  Name: Anthony Ball  Clinic Number: [id number]    Therapy Diagnosis:   Encounter Diagnoses   Name Primary?    Impaired mobility and ADLs Yes    Coordination impairment     Decreased  strength      Physician: Lyubov Yu MD  Physician Orders: Neuro Program      Medical Diagnosis: E11.42 (ICD-10-CM) - Polyneuropathy due to type 2 diabetes mellitus   Evaluation Date: 4/14/2023; re-assessment 6/22/2023  Plan of Care Expiration Period: 8/4/2023  Insurance Authorization period Expiration: 12/31/2023  Date of Return to MD: nothing on Epic at this time   FOTO: 3 completed   Visit # / Visits Authorized: 18 / 20 (+evaluation)    Time In:  11:10 (late to appointment)  Time Out: 11:50  Total Billable Time: 40 minutes    Subjective     Pt reports: had a fall at midnight walking around the bar at his house on the way to the kitchen.   Feeling more unstable on his feet lately ; reported he did a lot of sit<>stands yesterday      Response to previous treatment: had to cancel last appointment due to needing to son to the hospital   Functional change: dressing and showering indep; not using walker at home, walking more in neighborhood and using free weights at home; can carry things in his hands while walking   Patient's Goals for Occupational Therapy: tremor management and balance     Pain: 2-3/10  Location: back     Treatment   Anthony received therapeutic exercises for 8 minutes including:  -seated upper body ergonometer on manual level 1 x8 min      Anthony participated in dynamic functional therapeutic activities to improve functional performance for 32 minutes, including:   Strength: (MELONIE Dynamometer in lbs.) Position II: average of three trials       4/14/2023 4/14/2023 5/17/2023 5/17/2023 6/8/2023 6/8/2023 8/4/23 8/4/23     Right Left Right Left Right Left Right Left   Rung II 35.7 # 36.6 # 40 # 41.9 # 42.6# 50.2# 52.2# 53.3#     Norms for  Strength: standard deviation +/- 12#  Males: 70+  Male: Right Left   54 lbs 48 lbs      Pinch Strength (Measured in psi)       4/14/2023 4/14/2023 5/17/2023 5/17/2023 6/8/2023 6/8/2023 8/4/23 8/4/23     Right Left Right Left Right Left Right Left   Key Pinch 8 psi 6 psi 12 8.1 10.5 9.7 12.7 9.5   2pt Pinch 3 psi 2 psi 5.9 5.4 6.6 4.3 6.8 6.6         Fine Motor Coordination: 9 Hole Peg Test     Right   4/14/2023 Left   4/14/2023 Right  5/17/2023 Left  5/17/2023 Right  6/8/2023 Left  6/8/2023 Right  8/4/23 Left  8/4/23                53.90 s              53.85 s 41.23 s 48.52 s  43.84 s 44.73 s 35.40 s 37.25 s      Fine Motor Coordination: Box and Block     Right   4/14/2023 Left   4/14/2023 Right  5/17/2023 Left  5/17/2023 Right  6/8/2023 Left  6/8/2023 Right  8/4/23 Left  8/4/23                21              25 27 30 20 20 31 25        Strength: bilateral shoulder abduction remains 4/5; 5/5 throughout otherwise     Patient Education and Home Exercises     Education provided:   - discussed continued exercise program at local gym in his area ([employer])  - postural control   - role of Occupational Therapy in care, goals for Occupational Therapy for this plan of care,  scheduling  - edu on importance of continuation of sobriety at this time   - discussed importance of leisure activities and hobbies in post retired life      Written Home Exercises Provided: yes.  Exercises were reviewed and Anthony was able to demonstrate them prior to the end of the session.  Anthony demonstrated good  understanding of the HEP provided.   .   See EMR under Patient Instructions for exercises provided 4/24/2023. 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 He demo no further OT needs at this time. His biggest barriers are his continuation of falls and  his ongoing fear of falling. Pt with another fall over night (midnight) with contusion noted to his left frontal lobe and his arm. Educated patient on going to emergency room following any fall where he hits his head. Educated on neurological s/s with indications of need for ED admit.     Patient has returned to completion of all ADLs independently. Has returned to all prior roles and routines at this time.      Goals:  Long Term Goals:  Time frame: 8 weeks  - Pt will bathe with while seated on shower chair, utilizing adaptive equipment and/or compensatory strategies as needed with mod(I). Met per report; using chair with back pain   - Pt will demo ability to bring food to mouth with utensils with minimal to no spillage with AD as needed. Met  - Pt will perform dynamic standing with use of bilateral upper extremities in kitchen for 15 minutes with distant supervision to improve performance with homemaking tasks, cooking tasks, standing at the sink for grooming and hygiene, decrease risk for falls, and maximize safety. Not met due to back spasms   - Pt will improve FOTO limitation score to less than or equal to 50% for improved self perception of functional performance with daily activities. Met  - Pt will be independent with Home Exercise Program (HEP)/Home Activity Program (HAP) to promote long term maintenance of progress made in therapy. Inconsistent      Short Term Goals:  Time frame: 4 weeks  - Pt will demonstrate ability to cut food safely with butter/steak knife or AD as needed. Met  - Pt will demonstrate consistent use of mindfulness techniques to manage stress, anxiety, and depression and will incorporate them into daily routine.Ongoing   - Pt will perform dynamic standing task x a minimum of 10 minutes with zero losses of balance, no more than 2 brief seated rest breaks, and minimal verbals cues for (posture) to increase independence for home managment task. Progressing; 7 min before rest break 5/3/2023    - Pt will increase  strength by 3-5 # for BUE to increase performance during daily tasks (opening jars, holding telephone) Met 5/17     Goals added:   Pt will be able to complete functional mobility at household distance while carrying plate with stand by assist. Met per report  Pt will be able to complete gathering of home related items at various heights while walking with stand by assist. Met 7/17/2023  Pt will demo 5/5 strength throughout bilateral upper extremities. Partially met; 4/5 for bilateral shoulder abduction   Pt will demo within age related norms for fine motor strength bilaterally as needed for IADL/ADL completion. Met   Goal removed; pt taking dog to park only at this time      Goals to be adjusted as needed.   The following goals were discussed with the patient and patient is in agreement with them as to be addressed in the treatment plan.     Plan   Patient discharged from outpatient OT at this time. His biggest limitations at this time are his impaired dynamic balance and continued falls. He will remain in outpatient PT at this time.     JOSH Fernández

## 2023-08-04 ENCOUNTER — CLINICAL SUPPORT (OUTPATIENT)
Dept: REHABILITATION | Facility: HOSPITAL | Age: 73
End: 2023-08-04
Payer: MEDICARE

## 2023-08-04 ENCOUNTER — PATIENT MESSAGE (OUTPATIENT)
Dept: ADMINISTRATIVE | Facility: HOSPITAL | Age: 73
End: 2023-08-04
Payer: MEDICARE

## 2023-08-04 DIAGNOSIS — Z74.09 IMPAIRED MOBILITY AND ADLS: Primary | ICD-10-CM

## 2023-08-04 DIAGNOSIS — R27.8 COORDINATION IMPAIRMENT: ICD-10-CM

## 2023-08-04 DIAGNOSIS — R26.89 BALANCE PROBLEM: Primary | ICD-10-CM

## 2023-08-04 DIAGNOSIS — R29.898 DECREASED GRIP STRENGTH: ICD-10-CM

## 2023-08-04 DIAGNOSIS — Z78.9 IMPAIRED MOBILITY AND ADLS: Primary | ICD-10-CM

## 2023-08-04 DIAGNOSIS — R29.898 WEAKNESS OF BOTH LOWER EXTREMITIES: ICD-10-CM

## 2023-08-04 PROCEDURE — 97110 THERAPEUTIC EXERCISES: CPT | Mod: KX,PO

## 2023-08-04 PROCEDURE — 97530 THERAPEUTIC ACTIVITIES: CPT | Mod: KX,PO

## 2023-08-04 PROCEDURE — 97112 NEUROMUSCULAR REEDUCATION: CPT | Mod: KX,PO

## 2023-08-10 ENCOUNTER — HOSPITAL ENCOUNTER (EMERGENCY)
Facility: HOSPITAL | Age: 73
Discharge: HOME | End: 2023-08-10
Attending: EMERGENCY MEDICINE
Payer: MEDICARE

## 2023-08-10 VITALS
BODY MASS INDEX: 29.3 KG/M2 | RESPIRATION RATE: 17 BRPM | WEIGHT: 198.44 LBS | DIASTOLIC BLOOD PRESSURE: 69 MMHG | TEMPERATURE: 98 F | SYSTOLIC BLOOD PRESSURE: 115 MMHG | OXYGEN SATURATION: 98 % | HEART RATE: 90 BPM

## 2023-08-10 DIAGNOSIS — M25.551 RIGHT HIP PAIN: ICD-10-CM

## 2023-08-10 DIAGNOSIS — M79.604 RIGHT LEG PAIN: ICD-10-CM

## 2023-08-10 DIAGNOSIS — M25.561 RIGHT KNEE PAIN: ICD-10-CM

## 2023-08-10 PROCEDURE — 99284 EMERGENCY DEPT VISIT MOD MDM: CPT | Mod: 25

## 2023-08-10 NOTE — ED PROVIDER NOTES
Encounter Date: 8/10/2023       History     Chief Complaint   Patient presents with    Hip Pain     Rt hip and knee pain.  Fell out of bed Monday evening.  PMH, diabetes, Asthma, HTN.     Anthony Ball is a 73 M hx of diabetic neuropathy, GERD, hypertension, DM here for fall.  Patient states he fell out of his bed on Monday afternoon.  He landed on his right side.  He was unable to get up without assistance, had to call EMS for help.  They put him back in his bed.  He rested in his bed until the next day.  He was then able to get up and ambulate with a walker ( usually ambulates w/o assistance).  He denies any new numbness/tingling/weakness of the right extremity.  He is noted bruising to his right knee.  When he fell he did hit the right side of his head, did notice a small bruise which is improving.  No headache, vision change, confusion.  No fevers or chills.      Review of patient's allergies indicates:  No Known Allergies  Past Medical History:   Diagnosis Date    Coronary artery disease     CAC score 1430    Depression     Diabetic neuropathy     Essential (primary) hypertension     GERD (gastroesophageal reflux disease)     Insomnia     Neuromyopathy     Possibly DM and/or alcohol related.    Reactive airway disease     Type 2 diabetes mellitus      Past Surgical History:   Procedure Laterality Date    COLONOSCOPY      Normal around 2020    TOTAL KNEE ARTHROPLASTY Right      Family History   Problem Relation Age of Onset    Hypertension Mother      Social History     Tobacco Use    Smoking status: Never    Smokeless tobacco: Never   Substance Use Topics    Alcohol use: Yes     Comment: Former heavy use    Drug use: Never     Review of Systems    Physical Exam     Initial Vitals [08/10/23 1359]   BP Pulse Resp Temp SpO2   112/64 106 18 98.4 °F (36.9 °C) 96 %      MAP       --         Physical Exam    Nursing note and vitals reviewed.  Constitutional: He appears well-developed and well-nourished. No distress.    HENT:   Mouth/Throat: Oropharynx is clear and moist.   + healing ecchymosis to right temporal area   Eyes: Conjunctivae and EOM are normal. Pupils are equal, round, and reactive to light.   Neck: Neck supple.   Cardiovascular:  Normal rate, regular rhythm and intact distal pulses.           Pulmonary/Chest: Breath sounds normal. He has no wheezes. He has no rales.   Abdominal: Abdomen is soft. Bowel sounds are normal. There is no abdominal tenderness.   Musculoskeletal:         General: No edema.      Cervical back: Neck supple.      Comments: + post op scar of right knee with healing ecchymosis.  Able to range hip and knee but slow.    - midline lumbar, thoracic, or cervical tenderness       Lymphadenopathy:     He has no cervical adenopathy.   Neurological: He is alert and oriented to person, place, and time. He has normal strength. No cranial nerve deficit or sensory deficit.   Skin: No rash noted.   Psychiatric: He has a normal mood and affect.         ED Course   Procedures  Labs Reviewed - No data to display       Imaging Results              X-Ray Hips Bilateral 2 View Incl AP Pelvis (Final result)  Result time 08/10/23 15:44:14      Final result by Daryl Heart MD (08/10/23 15:44:14)                   Impression:      No acute displaced fracture-dislocation identified.    Right knee total arthroplasty.      Electronically signed by: Daryl Heart MD  Date:    08/10/2023  Time:    15:44               Narrative:    EXAMINATION:  XR HIPS BILATERAL 2 VIEW INCL AP PELVIS; XR FEMUR 2 VIEW RIGHT    CLINICAL HISTORY:  Pain in right hip; Pain in right leg    TECHNIQUE:  AP view of the pelvis and frogleg lateral views of both hips; right femur series    COMPARISON:  MRI lumbar spine 03/19/2023, right hip series 08/31/2006 and right knee series 11/22/2006    FINDINGS:  Overall alignment is within normal limits. No displaced fracture, dislocation or destructive osseous process.  Moderate to advanced degenerative  change at the imaged lower lumbosacral spine.  Minimal to mild degenerative change at the pubic symphysis and bilateral sacroiliac and femoroacetabular joints.  Right knee total arthroplasty demonstrating anatomic positioning and alignment.  No evidence of acute hardware malalignment or loosening.  No large suprapatellar joint effusion.  Few scattered small nonspecific soft tissue calcifications about the anterior knee.  Scattered atherosclerotic vascular calcifications noted.  No subcutaneous emphysema or radiodense retained foreign body.                                       X-Ray Femur Ap/Lat Right (Final result)  Result time 08/10/23 15:44:14      Final result by Daryl Heart MD (08/10/23 15:44:14)                   Impression:      No acute displaced fracture-dislocation identified.    Right knee total arthroplasty.      Electronically signed by: Daryl Heart MD  Date:    08/10/2023  Time:    15:44               Narrative:    EXAMINATION:  XR HIPS BILATERAL 2 VIEW INCL AP PELVIS; XR FEMUR 2 VIEW RIGHT    CLINICAL HISTORY:  Pain in right hip; Pain in right leg    TECHNIQUE:  AP view of the pelvis and frogleg lateral views of both hips; right femur series    COMPARISON:  MRI lumbar spine 03/19/2023, right hip series 08/31/2006 and right knee series 11/22/2006    FINDINGS:  Overall alignment is within normal limits. No displaced fracture, dislocation or destructive osseous process.  Moderate to advanced degenerative change at the imaged lower lumbosacral spine.  Minimal to mild degenerative change at the pubic symphysis and bilateral sacroiliac and femoroacetabular joints.  Right knee total arthroplasty demonstrating anatomic positioning and alignment.  No evidence of acute hardware malalignment or loosening.  No large suprapatellar joint effusion.  Few scattered small nonspecific soft tissue calcifications about the anterior knee.  Scattered atherosclerotic vascular calcifications noted.  No subcutaneous  emphysema or radiodense retained foreign body.                                       CT Head Without Contrast (Final result)  Result time 08/10/23 15:34:30      Final result by Gumaro Pruitt DO (08/10/23 15:34:30)                   Impression:      No acute intracranial findings specifically without evidence for acute intracranial hemorrhage or sulcal effacement to suggest large territory recent infarction.    Clinical correlation and further evaluation as warranted    Please see above for additional details.    Electronically signed by resident: Estrellita Ceballos  Date:    08/10/2023  Time:    14:55    Electronically signed by: Gumaro Pruitt DO  Date:    08/10/2023  Time:    15:34               Narrative:    EXAMINATION:  CT HEAD WITHOUT CONTRAST    CLINICAL HISTORY:  Head trauma, minor (Age >= 65y);    TECHNIQUE:  Low dose axial CT images obtained throughout the head without the use of intravenous contrast.  Axial, sagittal and coronal reconstructions were performed.    COMPARISON:  MRI brain 03/19/2023    FINDINGS:  Generalized cerebral volume loss similar to prior.  Slight prominence of the ventricles similar to prior which may be compensatory to volume loss.  Please note volume loss somewhat more prominent involving the parietal lobes.    There is no evidence for acute intracranial hemorrhage or sulcal effacement to suggest large territory recent infarction.  Small probable lobular mucosal thickening visualized maxillary antra.                                       Medications - No data to display  Medical Decision Making:   History:   Old Medical Records: I decided to obtain old medical records.  Initial Assessment:   73-year-old male presenting today for right hip, right knee pain s/p fall on Monday.  Has been able to ambulate with a walker for the past 2 days but wanted to come for x-rays to ensure he did not have a fracture.    He is mildly tachycardic at 106, BP stable.  Overall well-appearing, no  distress.  Differential Diagnosis:   Knee sprain, knee contusion, hip sprain, pelvic fracture, hip fracture  No midline lumbar tenderness, new numbness/weakness-in the lower extremities-low suspicion for vertebral fracture.  Denies confusion, vision change, headache, nausea/vomiting however is on aspirin with obvious head injury, will obtain CTA to rule out subdural  Clinical Tests:   Radiological Study: Reviewed and Ordered  ED Management:  - CT head, xray pelvis, right hip, right knee             ED Course as of 08/10/23 1953   Thu Aug 10, 2023   1546 Impression:     No acute intracranial findings specifically without evidence for acute intracranial hemorrhage or sulcal effacement to suggest large territory recent infarction.     Clinical correlation and further evaluation as warranted     Please see above for additional details.     Electronically signed by resident: Estrellita Ceballos  Date:                                            08/10/2023  Time:                                           14:55     Electronically signed by: Gumaro Pruitt DO  Date:                                            08/10/2023  Time:                                           15:34 [GM]   1547 Impression:     No acute displaced fracture-dislocation identified.     Right knee total arthroplasty.        Electronically signed by: Daryl Heart MD  Date:                                            08/10/2023  Time:                                           15:44 [GM]   1547 Impression:     No acute displaced fracture-dislocation identified.     Right knee total arthroplasty.        Electronically signed by: Daryl Heart MD  Date:                                            08/10/2023  Time:                                           15:44        Exam Ended: 08/10/23 15:21         [GM]   1547 X-rays reviewed with no acute fracture or dislocation.  Head CT is negative for traumatic bleed.    Vitals remained stable.  Discussed findings with patient  at bedside.  Comfortable with plan for discharge.  Recommend rest, ice, elevation.  Use walker for ambulation until symptoms improve.       [GM]      ED Course User Index  [GM] Carmen Lewis MD                 Clinical Impression:   Final diagnoses:  [M25.551] Right hip pain  [M79.604] Right leg pain  [M25.561] Right knee pain               Carmen Lewis MD  08/10/23 1954

## 2023-08-10 NOTE — DISCHARGE INSTRUCTIONS
Your x-rays were negative for acute fracture dislocation.  Your CT head was negative for traumatic bleed.    Continue to rest, ice, elevate.  Use your walker as needed for ambulation.  Return to emergency department for any concerning symptom otherwise follow-up with primary doctor.

## 2023-08-10 NOTE — ED TRIAGE NOTES
Patient to ED with complaints of R hip and R posterior knee pain s/p roll/fall out of bed x4 days ago. Reports able to bare weight since incident but reports ROM limited 2/2 pain. Denies pain while at rest. Abrasion noted to R knee otherwise no obvious trauma noted. PMH R total knee replacement. Patient aaox4. Respirations even and unlabored. Skin warm and dry. Plan of care discussed with patient - verbalized understanding. Pending ED workup at this time.

## 2023-08-11 ENCOUNTER — DOCUMENTATION ONLY (OUTPATIENT)
Dept: REHABILITATION | Facility: HOSPITAL | Age: 73
End: 2023-08-11

## 2023-08-11 NOTE — PROGRESS NOTES
PT/PTA met face to face to discuss pt's treatment plan and progress towards established goals.  Continue with current PT POC with focus on balance without UE support.  Patient will be seen by physical therapist at least every sixth treatment or 30 days, whichever occurs first.    Ciara Pro, PTA  08/11/2023

## 2023-08-21 ENCOUNTER — PATIENT MESSAGE (OUTPATIENT)
Dept: ENDOCRINOLOGY | Facility: CLINIC | Age: 73
End: 2023-08-21
Payer: MEDICARE

## 2023-08-21 ENCOUNTER — PATIENT MESSAGE (OUTPATIENT)
Dept: INTERNAL MEDICINE | Facility: CLINIC | Age: 73
End: 2023-08-21
Payer: MEDICARE

## 2023-08-21 DIAGNOSIS — E11.42 TYPE 2 DIABETES MELLITUS WITH DIABETIC POLYNEUROPATHY, WITHOUT LONG-TERM CURRENT USE OF INSULIN: ICD-10-CM

## 2023-08-21 DIAGNOSIS — Z01.00 ROUTINE EYE EXAM: Primary | ICD-10-CM

## 2023-08-21 DIAGNOSIS — E11.42 DIABETIC POLYNEUROPATHY ASSOCIATED WITH TYPE 2 DIABETES MELLITUS: Primary | ICD-10-CM

## 2023-08-23 ENCOUNTER — PATIENT MESSAGE (OUTPATIENT)
Dept: OPTOMETRY | Facility: CLINIC | Age: 73
End: 2023-08-23
Payer: MEDICARE

## 2023-08-24 ENCOUNTER — TELEPHONE (OUTPATIENT)
Dept: REHABILITATION | Facility: HOSPITAL | Age: 73
End: 2023-08-24
Payer: MEDICARE

## 2023-08-24 NOTE — TELEPHONE ENCOUNTER
"PT called pt to check on his status due to his cancelling many recent appointments. Pt indicates he had a fall and was in pain for about 2 weeks. He made a " test run" to the clinic yesterday, but then he apparently " passed out" in the car for several minutes. Pt asked about home therapy, since he is worried about leaving the home and managing his steps. PT stated he will message Dr. Myles and request HHPT orders. PT will cancel pt's remaining 3 visits at [facility] and write a formal discharge summary.    Ba Diaz, PT  8/24/2023    "

## 2023-08-25 ENCOUNTER — DOCUMENTATION ONLY (OUTPATIENT)
Dept: REHABILITATION | Facility: HOSPITAL | Age: 73
End: 2023-08-25
Payer: MEDICARE

## 2023-08-25 ENCOUNTER — TELEPHONE (OUTPATIENT)
Dept: INTERNAL MEDICINE | Facility: CLINIC | Age: 73
End: 2023-08-25
Payer: MEDICARE

## 2023-08-25 DIAGNOSIS — R29.898 LEG WEAKNESS, BILATERAL: Primary | ICD-10-CM

## 2023-08-25 DIAGNOSIS — R53.81 PHYSICAL DECONDITIONING: ICD-10-CM

## 2023-08-25 DIAGNOSIS — R26.89 IMPAIRED GAIT AND MOBILITY: ICD-10-CM

## 2023-08-25 NOTE — TELEPHONE ENCOUNTER
----- Message from Ba Diaz PT sent at 8/24/2023  3:07 PM CDT -----  Regarding: Home health PT  Hi Dr. Myles,    I spoke with the above patient today. He has not been to our clinic for the past 3 weeks due to a recent fall. He is still a bit uncomfortable leaving the home due to the 5 steps he must negotiate. He is interested in trying home physical therapy. Would you kindly write orders for home PT? During the many months I have treated him, he continues to fall and set himself back. I think it's time to try another approach.    Thank you in advance,    Ba Diaz PT

## 2023-08-25 NOTE — PROGRESS NOTES
OUTPATIENT PHYSICAL THERAPY DISCHARGE SUMMARY     Name: Anthony Ball  Clinic Number: [id number]    Diagnosis: Polyneuropathy due to type 2 diabetes mellitus  Physician: Lyubov Yu MD   Treatment Orders: PT Eval and Treat  Past Medical History:   Diagnosis Date    Coronary artery disease     CAC score 1430    Depression     Diabetic neuropathy     Essential (primary) hypertension     GERD (gastroesophageal reflux disease)     Insomnia     Neuromyopathy     Possibly DM and/or alcohol related.    Reactive airway disease     Type 2 diabetes mellitus        Initial visit: 4/14/23  Date of Last visit: 8/4/23  Date of Discharge Note:  8/25/23  Total Visits Received: 20  Missed Visits: multiple cancellations     ASSESSMENT   Status Towards Goals Met:       The below represents pt's most recent goal achievement as of his final session on 8/4/23:    Goals:  Short Term Goals: 4 weeks   Pt will be issued first installment of HEP and report at least partial compliance~ MET 5/19/2023  Pt will improve his 30 second chair rise to 4-5 reps to demonstrate increased LE strength and muscular endurance~ MET 5/19/2023, 6/22/23  Pt will improve his TUG score to 25 seconds or < with or without appropriate AD to improve household ambulation and decrease fall risk~ MET 5/19/2023, 6/22/23, NOT MET, 7/31/23  Pt will increase his SSWS to 0.375 m/sec with or without appropriate AD to improve community ambulation and decrease fall risk~ MET 5/19/2023, NOT MET, 6/22/23  Pt will complete mCTSIB balance testing and PT to set appropriate goals~MET, 4/17/23  Pt will improve any 3 LE muscle grades by 1/3 to help with functional mobility skills~ MET 5/19/2023, 6/22/23  Pt will perform all sit<> stand and stand pivot transfers with no more than SBA~ MET 5/19/2023, 6/22/23  (NEW GOAL, 4/17/23): Pt will improve his score on condition # 2 of the mCTSIB to 19 seconds for improved balance in low/eliminated vision environments~ MET 5/19/2023, 6/22/23      Long Term Goals: 8 weeks   Pt will be partially compliant with finalized HEP to help maintain potential gains realized in PT~ongoing  Pt will improve his 30 second chair rise to 5-6 reps to demonstrate increased LE strength and muscular endurance~ongoing  Pt will improve his TUG score to 20 seconds or < with or without appropriate AD to improve household ambulation and decrease fall risk~ongoing  Pt will increase his SSWS to 0.43 m/sec with or without appropriate AD to improve community ambulation and decrease fall risk~ MET 5/19/2023  Pt will improve any 3 LE muscle grades by 2/3 to help with functional mobility skills ~ MET 5/19/2023, NOT MET, 6/22/23, 7/31/23  Pt will perform all sit<> stand and stand pivot transfers with no more than S~ongoing  (NEW GOAL, 4/17/23): Pt will improve his score on condition # 2 of the mCTSIB to 22 seconds for improved balance in low/eliminated vision environments~ MET 5/19/2023, NOT MET, 6/22/23, MET, 7/31/23  NEW GOAL 5/19/2023: Pt will improve his score on condition #4 of the mCTSIB to at least 10 seconds for improved balance in low/eliminated vision environments~ongoing and progressing, 7/31/23   NEW GOAL 5/19/2023: Pt will increase his SSWS to at least .7 m/s with or without appropriate AD to improve community ambulation and decrease fall risk~ongoing         Goals Not achieved and why:     Many goals not achieved due to multiple falls, pt fear of performance balance tasks without UE support and continued cancellation of visits. Ultimately, pt has elected to try HHPT and this therapist feels it to be an appropriate avenue to pursue. Pt is simply unable to attend his OP visits consistently due to recurrent falls, which sometimes result in injury.        Discharge reason : Patient self discharge    PLAN   This patient is discharged from Outpatient Physical Therapy Services.     Ba Diaz, PT  08/25/2023

## 2023-08-28 PROCEDURE — G0180 MD CERTIFICATION HHA PATIENT: HCPCS | Mod: ,,, | Performed by: INTERNAL MEDICINE

## 2023-08-28 PROCEDURE — G0180 PR HOME HEALTH MD CERTIFICATION: ICD-10-PCS | Mod: ,,, | Performed by: INTERNAL MEDICINE

## 2023-09-13 ENCOUNTER — PATIENT MESSAGE (OUTPATIENT)
Dept: INTERNAL MEDICINE | Facility: CLINIC | Age: 73
End: 2023-09-13
Payer: MEDICARE

## 2023-09-13 DIAGNOSIS — N30.00 ACUTE CYSTITIS WITHOUT HEMATURIA: Primary | ICD-10-CM

## 2023-09-20 RX ORDER — SULFAMETHOXAZOLE AND TRIMETHOPRIM 800; 160 MG/1; MG/1
1 TABLET ORAL 2 TIMES DAILY
Qty: 14 TABLET | Refills: 0 | Status: SHIPPED | OUTPATIENT
Start: 2023-09-20 | End: 2023-09-27

## 2023-09-23 DIAGNOSIS — L29.9 PRURITUS: ICD-10-CM

## 2023-09-23 NOTE — TELEPHONE ENCOUNTER
Refill Routing Note   Medication(s) are not appropriate for processing by [facility] for the following reason(s):      Medication outside of protocol    [facility] action(s):  Route Care Due:  None identified            Appointments  past 12m or future 3m with PCP    Date Provider   Last Visit   6/12/2023 Isaias Myles MD   Next Visit   Visit date not found Isaias Myles MD   ED visits in past 90 days: 1        Note composed:10:50 AM 09/23/2023

## 2023-09-25 RX ORDER — HYDROXYZINE HYDROCHLORIDE 25 MG/1
TABLET, FILM COATED ORAL
Qty: 90 TABLET | Refills: 1 | Status: SHIPPED | OUTPATIENT
Start: 2023-09-25 | End: 2023-11-24

## 2023-10-12 ENCOUNTER — PATIENT MESSAGE (OUTPATIENT)
Dept: INTERNAL MEDICINE | Facility: CLINIC | Age: 73
End: 2023-10-12
Payer: MEDICARE

## 2023-10-12 DIAGNOSIS — L03.90 CELLULITIS, UNSPECIFIED CELLULITIS SITE: Primary | ICD-10-CM

## 2023-10-13 ENCOUNTER — EXTERNAL HOME HEALTH (OUTPATIENT)
Dept: HOME HEALTH SERVICES | Facility: HOSPITAL | Age: 73
End: 2023-10-13
Payer: MEDICARE

## 2023-10-13 RX ORDER — DOXYCYCLINE HYCLATE 100 MG
100 TABLET ORAL 2 TIMES DAILY
Qty: 14 TABLET | Refills: 0 | Status: SHIPPED | OUTPATIENT
Start: 2023-10-13 | End: 2023-10-20

## 2023-10-18 DIAGNOSIS — E11.42 TYPE 2 DIABETES MELLITUS WITH DIABETIC POLYNEUROPATHY, WITHOUT LONG-TERM CURRENT USE OF INSULIN: ICD-10-CM

## 2023-10-18 DIAGNOSIS — E11.42 DIABETIC POLYNEUROPATHY ASSOCIATED WITH TYPE 2 DIABETES MELLITUS: ICD-10-CM

## 2023-10-18 RX ORDER — METFORMIN HYDROCHLORIDE 500 MG/1
500 TABLET, EXTENDED RELEASE ORAL 2 TIMES DAILY WITH MEALS
Qty: 180 TABLET | Refills: 3 | Status: SHIPPED | OUTPATIENT
Start: 2023-10-18 | End: 2024-01-05

## 2023-10-18 RX ORDER — METFORMIN HYDROCHLORIDE 500 MG/1
500 TABLET, EXTENDED RELEASE ORAL 2 TIMES DAILY WITH MEALS
Qty: 180 TABLET | Refills: 3 | Status: CANCELLED | OUTPATIENT
Start: 2023-10-18 | End: 2024-10-17

## 2023-10-18 RX ORDER — PREGABALIN 150 MG/1
CAPSULE ORAL
Qty: 180 CAPSULE | Refills: 1 | Status: ON HOLD | OUTPATIENT
Start: 2023-10-18 | End: 2024-01-17

## 2023-10-18 NOTE — TELEPHONE ENCOUNTER
No care due was identified.  [facility] Embedded Care Due Messages. Reference number: [id number].   10/18/2023 11:15:15 AM CDT

## 2023-10-18 NOTE — TELEPHONE ENCOUNTER
Refill Routing Note   Medication(s) are not appropriate for processing by [facility] for the following reason(s):      Medication outside of protocol    [facility] action(s):  Route Care Due:  None identified              Appointments  past 12m or future 3m with PCP    Date Provider   Last Visit   6/12/2023 Isaias Myles MD   Next Visit   Visit date not found Isaias Myles MD   ED visits in past 90 days: 1        Note composed:11:49 AM 10/18/2023

## 2023-10-19 ENCOUNTER — PATIENT MESSAGE (OUTPATIENT)
Dept: INTERNAL MEDICINE | Facility: CLINIC | Age: 73
End: 2023-10-19
Payer: MEDICARE

## 2023-10-20 NOTE — TELEPHONE ENCOUNTER
No care due was identified.  [facility] Embedded Care Due Messages. Reference number: [id number].   10/20/2023 8:01:35 AM CDT

## 2023-10-20 NOTE — TELEPHONE ENCOUNTER
Refill request routed to [facility] Review Pool for Pharmacist Review.

## 2023-10-22 NOTE — TELEPHONE ENCOUNTER
Refill Routing Note   Medication(s) are not appropriate for processing by [facility] for the following reason(s):      Patient seen in ED/Hospital since LOV with PCP    [facility] action(s):  Defer Care Due:  None identified              Appointments  past 12m or future 3m with PCP    Date Provider   Last Visit   6/12/2023 Isaias Myles MD   Next Visit   Visit date not found Isaias Myles MD   ED visits in past 90 days: 1        Note composed:2:19 AM 10/22/2023

## 2023-10-23 ENCOUNTER — PATIENT MESSAGE (OUTPATIENT)
Dept: INTERNAL MEDICINE | Facility: CLINIC | Age: 73
End: 2023-10-23
Payer: MEDICARE

## 2023-10-23 ENCOUNTER — DOCUMENT SCAN (OUTPATIENT)
Dept: HOME HEALTH SERVICES | Facility: HOSPITAL | Age: 73
End: 2023-10-23
Payer: MEDICARE

## 2023-10-23 RX ORDER — PANTOPRAZOLE SODIUM 40 MG/1
40 TABLET, DELAYED RELEASE ORAL
Qty: 90 TABLET | Refills: 3 | Status: SHIPPED | OUTPATIENT
Start: 2023-10-23

## 2023-10-25 ENCOUNTER — PATIENT MESSAGE (OUTPATIENT)
Dept: INTERNAL MEDICINE | Facility: CLINIC | Age: 73
End: 2023-10-25
Payer: MEDICARE

## 2023-10-26 ENCOUNTER — TELEPHONE (OUTPATIENT)
Dept: INTERNAL MEDICINE | Facility: CLINIC | Age: 73
End: 2023-10-26
Payer: MEDICARE

## 2023-10-26 DIAGNOSIS — R29.898 WEAKNESS OF BOTH LOWER EXTREMITIES: ICD-10-CM

## 2023-10-26 DIAGNOSIS — R26.89 IMPAIRED GAIT AND MOBILITY: ICD-10-CM

## 2023-10-26 DIAGNOSIS — R53.81 PHYSICAL DECONDITIONING: Primary | ICD-10-CM

## 2023-10-26 NOTE — TELEPHONE ENCOUNTER
----- Message from Chelsea Christina LPN sent at 10/25/2023  9:44 AM CDT -----  Regarding: FW: Advice  Contact: [phone]    ----- Message -----  From: Talisha Fisher  Sent: 10/25/2023   9:21 AM CDT  To: Shar Esparza Staff  Subject: Advice                                           Anabela from [facility] is calling due to pt being discharged today need orders sent to outpatient therapy  Please contact pt

## 2023-11-01 ENCOUNTER — OFFICE VISIT (OUTPATIENT)
Dept: OPHTHALMOLOGY | Facility: CLINIC | Age: 73
End: 2023-11-01
Payer: MEDICARE

## 2023-11-01 DIAGNOSIS — H25.13 NUCLEAR SCLEROSIS, BILATERAL: Primary | ICD-10-CM

## 2023-11-01 PROCEDURE — 92004 COMPRE OPH EXAM NEW PT 1/>: CPT | Mod: S$PBB,,, | Performed by: OPHTHALMOLOGY

## 2023-11-01 PROCEDURE — 99999 PR PBB SHADOW E&M-EST. PATIENT-LVL III: ICD-10-PCS | Mod: PBBFAC,,, | Performed by: OPHTHALMOLOGY

## 2023-11-01 PROCEDURE — 92004 PR EYE EXAM, NEW PATIENT,COMPREHESV: ICD-10-PCS | Mod: S$PBB,,, | Performed by: OPHTHALMOLOGY

## 2023-11-01 PROCEDURE — 99999 PR PBB SHADOW E&M-EST. PATIENT-LVL III: CPT | Mod: PBBFAC,,, | Performed by: OPHTHALMOLOGY

## 2023-11-01 PROCEDURE — 99213 OFFICE O/P EST LOW 20 MIN: CPT | Mod: PBBFAC | Performed by: OPHTHALMOLOGY

## 2023-11-01 NOTE — PROGRESS NOTES
HPI    Last eye exam was 9/21/22 with Dr. Adorno.    Last edited by Abbi Meredith MA on 11/1/2023  9:18 AM.            Assessment /Plan     For exam results, see Encounter Report.    Nuclear sclerosis, bilateral      Visually Significant Cataract: Patient reports decreased vision consistent with the clinical amount of lenticular opacity, which reaches the level of visual significance and affects activities of daily living.     Specifically, this patient describes difficulty with:  - driving safely at night  - reading road signs  - reading small print  - deciphering medicine bottles  - reading the newspaper  - using the phone  - reading texts     Risks, benefits, and alternatives to cataract surgery were discussed and the consent reviewed. IOL options were discussed, including ATIOLs and the associated side effects and additional patient cost associated with them.   IOL Selections:   Right eye  IOL: CNA0T0 17.0     Left eye  IOL: CNA0T0 16.5    Pt wishes to have RIGHT eye done first.      Will try glasses first, but with 3+ NSC, plan for phaco soon

## 2023-11-09 ENCOUNTER — PATIENT MESSAGE (OUTPATIENT)
Dept: INTERNAL MEDICINE | Facility: CLINIC | Age: 73
End: 2023-11-09
Payer: MEDICARE

## 2023-11-16 ENCOUNTER — CLINICAL SUPPORT (OUTPATIENT)
Dept: REHABILITATION | Facility: HOSPITAL | Age: 73
End: 2023-11-16
Attending: INTERNAL MEDICINE
Payer: MEDICARE

## 2023-11-16 DIAGNOSIS — Z74.09 IMPAIRED FUNCTIONAL MOBILITY, BALANCE, GAIT, AND ENDURANCE: Primary | ICD-10-CM

## 2023-11-16 DIAGNOSIS — R29.898 WEAKNESS OF BOTH LOWER EXTREMITIES: ICD-10-CM

## 2023-11-16 DIAGNOSIS — R53.81 PHYSICAL DECONDITIONING: ICD-10-CM

## 2023-11-16 DIAGNOSIS — R26.89 IMPAIRED GAIT AND MOBILITY: ICD-10-CM

## 2023-11-16 PROCEDURE — 97162 PT EVAL MOD COMPLEX 30 MIN: CPT | Mod: KX,PO

## 2023-11-17 NOTE — PLAN OF CARE
[facility]  Physical Therapy Neurological Rehabilitation Initial Evaluation     Name: Anthony Ball  Clinic Number: [id number]    Therapy Diagnosis:   Encounter Diagnoses   Name Primary?    Physical deconditioning     Impaired gait and mobility     Weakness of both lower extremities     Impaired functional mobility, balance, gait, and endurance Yes     Physician: Isaias Myles MD    Physician Orders: PT Eval and Treat   Medical Diagnosis from Referral:   Diagnosis   R53.81 (ICD-10-CM) - Physical deconditioning   R26.89 (ICD-10-CM) - Impaired gait and mobility   R29.898 (ICD-10-CM) - Weakness of both lower extremities     Evaluation Date: 11/16/2023  Authorization Period Expiration: 10/25/24  Plan of Care Expiration: 1/11/24  Progress Note Due: 12/16/23  Visit # / Visits authorized: 1/ 1  FOTO: 1/3    Precautions: Standard, Diabetes, Fall, and anxiety    Time In: 1415  Time Out: 1500  Total Billable Time: 45 minutes~ KX modifier    Subjective      Date of onset: current mobility limitations date back to around 2019, following R TKA    History of current condition - Anthony reports: he has had 3 falls since his most recent OPPT therapy episode here at [facility]. Most of these occur when he is in bed and he rolls off the edge. Often, he reports that his dog tends to move around in bed with him and contributes to the above falls. He recently completed about 6 weeks of home health PT and feels he is ready to return to OP therapy.     Imaging, CT scan films:  head, 8/10/23:    FINDINGS:  Generalized cerebral volume loss similar to prior.  Slight prominence of the ventricles similar to prior which may be compensatory to volume loss.  Please note volume loss somewhat more prominent involving the parietal lobes.     There is no evidence for acute intracranial hemorrhage or sulcal effacement to suggest large territory recent infarction.  Small probable lobular mucosal thickening visualized maxillary  antra.     Impression:     No acute intracranial findings specifically without evidence for acute intracranial hemorrhage or sulcal effacement to suggest large territory recent infarction.     Clinical correlation and further evaluation as warranted    X-ray, R femur, 8/10/23:    FINDINGS:  Overall alignment is within normal limits. No displaced fracture, dislocation or destructive osseous process.  Moderate to advanced degenerative change at the imaged lower lumbosacral spine.  Minimal to mild degenerative change at the pubic symphysis and bilateral sacroiliac and femoroacetabular joints.  Right knee total arthroplasty demonstrating anatomic positioning and alignment.  No evidence of acute hardware malalignment or loosening.  No large suprapatellar joint effusion.  Few scattered small nonspecific soft tissue calcifications about the anterior knee.  Scattered atherosclerotic vascular calcifications noted.  No subcutaneous emphysema or radiodense retained foreign body.     Impression:     No acute displaced fracture-dislocation identified.     Right knee total arthroplasty.         Prior Therapy: yes, OPPT at [facility](most recently from 4/23-8/23) and inpatient rehab from 3/21/23-4/4/23; most recent therapy= 6 weeks of HHPT  Social History:  lives with an adult   Falls: 3 falls since he left OPPT in 8/23  DME: Straight cane, 2 walkers( one with big wheels and one with small wheels), bench in shower, transport chair(recently purchased)  Home Environment: home with 4-5 steps to enter, no railing  Exercise Routine / History: performs some exercises provided by HHPT; still practices sit<> stand  Family Present at time of Eval: partner Jacob escorts pt up to 2nd floor via transport chair   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  Current Level of Function: having trouble with balance, walking, etc. Falling  "frequently. Walks some in the house with RW, but limited distances. Partner occasionally helps with some ADL due to instability    Pain:  Current 0/10, worst 3/10, best 0/10   Location: lower back and hip area    Description: spasm  Aggravating Factors: Walking  Easing Factors: medication    Patient's goals: " I'd like to walk independently and be able to drive."    Medical History:   Past Medical History:   Diagnosis Date    Cataract     Coronary artery disease     CAC score 1430    Depression     Diabetic neuropathy     Essential (primary) hypertension     GERD (gastroesophageal reflux disease)     Insomnia     Neuromyopathy     Possibly DM and/or alcohol related.    Reactive airway disease     Type 2 diabetes mellitus        Surgical History:   Anthony Ball  has a past surgical history that includes Colonoscopy and Total knee arthroplasty (Right).    Medications:   Anthony has a current medication list which includes the following prescription(s): albuterol, aspirin, duloxetine, empagliflozin, fluticasone-salmeterol 250-50 mcg/dose, hydroxyzine hcl, lidocaine, metformin, methocarbamol, pantoprazole, pregabalin, trazodone, and triamcinolone acetonide 0.1%.    Allergies:   Review of patient's allergies indicates:  Not on File     Objective     Observation: pleasant and cooperative   Speech: clear    Mental status: alert, oriented to person, place, and time(with exception of day)  Appearance: Casually dressed and Well groomed  Behavior:  calm, cooperative, and adequate rapport can be established  Attention Span and Concentration:  Normal    Posture Alignment :slouched posture with forward head and rounded shoulders; increased thoracic kyhposis    Dominant hand:  right     Skin integrity:  Intact    Visual/Auditory: reports he needs cataract surgery after the holidays~ currently needs new pair of bifocals    ROM:   GROSS AROM/PROM  UPPER EXTREMITY  (R) UE: WFL  (L) UE: WFL  LOWER EXTREMITY  (R) LE: WFL~ as observed " during MMT   (L) LE: WFL~ as observed during MMT       Lower Extremity Strength  Right LE  Left LE    Hip flexion:  3-/5 Hip flexion: 3-/5   Knee extension: 4/5 Knee extension: 4-/5   Knee flexion: 4-/5 Knee flexion: 4/5   Ankle dorsiflexion:  3+/5* Ankle dorsiflexion: 3+/5*   Ankle plantarflexion:  4-/5 Ankle plantarflexion: 4+/5   Hip abduction: 4/5 Hip abduction: 4+/5   Hip adduction: 4+/5 Hip adduction 4+/5   Hip extension: 4+/5 Hip extension: 4+/5   *Difficulty isolating from quads      Upper extremity strength: at least 4/5 for B shoulder flexion/abduction, elbow flexion/extension and  strength      Coordination:   Rapid Alternating Movements: NT  Point to Point:    -Finger to Nose: NT   -Heel to Shin: NT    Sensation: intact to B UE for LT; impaired at ankle and distal to ankle B LE(secondary to neuropathy)  Proprioception: NT    Tone/Spasticity: No abnormal tone or spasticity noted to B UE or LE    Functional Mobility (Bed mobility, transfers)  CGA for stand pivot and sit<> stand transfers; bed mobility not assessed     Evaluation   Single Limb Stance R LE NT  (<10 sec = HIGH FALL RISK)   Single Limb Stance L LE NT  (<10 sec = HIGH FALL RISK)   30 second Chair Rise 3 completed with arms   5 times sit to stand NT   TUG 49 seconds with RW, CGA   Self selected walking speed 0.26 m/sec (6m/23s)   Fast walking speed NT     30 second chair rise below average score:   Age  Men  Women  70-74  <15  <14  A below average score indicates a risk for fall.    5x sit to stand normative information:   >12 sec= fall risk for general elderly  >16 sec= fall risk for Parkinson's disease  >10 sec= balance/vestibular dysfunction (<59 y/o)  >14.2 sec= balance/vestibular dysfunction (>59 y/o)  >12 sec= fall risk for CVA      Postural control:  Unable to perform today due to time constraints; will test at first follow-up.          Gait Assessment:   - AD used: RW  - Assistance: CGA  - Stairs: NT due to time constraints     GAIT  DEVIATIONS:   Anthony displays the following deviations with ambulation: decreased anjel, decreased step length, wide SIERRA, external rotation of feet, decreased heel strike   Impairments contributing to deviations:  impaired motor control, decreased LE strength, impaired balance, decreased endurance, decreased LE sensation    Endurance Deficit: severe~ pt needed several rest breaks during today's testing     PT Evaluation Completed? Yes      Intake Outcome Measure for FOTO NOC Neuromuscular Disorder Survey           Treatment     Total Treatment time separate from Evaluation: 0 minutes    No treatment provided; evaluation only.      Patient Education and Home Exercises     Education provided:   - Pt educated regarding evaluation findings, potential plan of care and scheduling process.      Written Home Exercises Provided: to be provided during a future session.    Assessment     Anthony is a 73 y.o. male referred to outpatient Physical Therapy with a medical diagnosis of Physical deconditioning, impaired gait and mobility, weakness of both lower extremities. Patient demonstrates a similar presentation to that observed in April of this year(most recent therapy episode at [facility]). At that time, pt required similar assistance for transfers, was very limited with ambulation and gait speed and demonstrated considerable deconditioning. Despite pt's recent completion of 6 weeks of home health PT, he presents slightly more limited physically and even more limited with regard to his activity tolerance. As with the prior evaluation, PT was unable to perform any balance testing. This will be performed at a future follow-up session. PT is certain that pt's deficits will be considerable here as well. He has a tremendous fear of falling and had difficulty performing balance training last episode as a result. With today's tests and measures, pt demonstrates high fall risk with his TUG score of 49 seconds and his SSWS(self-selected  walking speed) of 0.26 m/sec. Both of these values are more limited than those he posted in April. Pt's 30 second chair rise score of 3 reps is identical to his prior performance at evaluation. In addition to the above, pt is limited by general anxiety, which he verbalized at end of today's evaluation. PT expects pt's progress to be slow, as it was before. A frequency of 2 x weekly visits is recommended to address pt's current strength, mobility and balance limitations.    Patient prognosis is Fair.   Patient will benefit from skilled outpatient Physical Therapy to address the deficits stated above and in the chart below, provide patient/family education, and to maximize patient's level of independence.     Plan of care discussed with patient: Yes  Patient's spiritual, cultural and educational needs considered and patient is agreeable to the plan of care and goals as stated below:     Anticipated Barriers for therapy: history of multiple falls; fear of falling, general anxiety and pt does not drive    Medical Necessity is demonstrated by the following  History  Co-morbidities and personal factors that may impact the plan of care [] LOW: no personal factors / co-morbidities  [] MODERATE: 1-2 personal factors / co-morbidities  [x] HIGH: 3+ personal factors / co-morbidities    Moderate / High Support Documentation:     difficulty sleeping, HTN, prior abdominal surgery, transportation assistance required, R knee replacement, GERD, syncope, diabetes, polyneuropathy, asthma      Examination  Body Structures and Functions, activity limitations and participation restrictions that may impact the plan of care [] LOW: addressing 1-2 elements  [] MODERATE: 3+ elements  [x] HIGH: 4+ elements (please support below)    Moderate / High Support Documentation: balance deficits, strength deficits, limited ambulation skills, decreased endurance     Clinical Presentation [] LOW: stable  [x] MODERATE: Evolving  [] HIGH: Unstable      Decision Making/ Complexity Score: moderate       Goals:    Short Term Goals: 4 weeks   Pt will be issued first installment of HEP and report at least partial compliance.  Pt will improve his 30 second chair rise to 4-5 reps to demonstrate increased LE strength and muscular endurance  Pt will improve his TUG score to 43 seconds or < with or without appropriate AD to improve household ambulation and decrease fall risk  Pt will increase his SSWS to 0.29 m/sec with or without appropriate AD to improve community ambulation and decrease fall risk  Pt will complete mCTSIB balance testing and PT to set appropriate goals  Pt will improve any 3 LE muscle grades by 1/3 to help with functional mobility skills  Pt will perform all sit<> stand and stand pivot transfers with no more than SBA     Long Term Goals: 8 weeks   Pt will be partially compliant with finalized HEP to help maintain potential gains realized in PT  Pt will improve his 30 second chair rise to 5-6 reps to demonstrate increased LE strength and muscular endurance  Pt will improve his TUG score to 37 seconds or < with or without appropriate AD to improve household ambulation and decrease fall risk  Pt will increase his SSWS to 0.32 m/sec with or without appropriate AD to improve community ambulation and decrease fall risk  Pt will improve any 3 LE muscle grades by 2/3 to help with functional mobility skills   Pt will perform all sit<> stand and stand pivot transfers with no more than S    Plan     Plan of care Certification: 11/16/2023 to 1/11/23.    Outpatient Physical Therapy 2 times weekly for 8 weeks to include the following interventions: Gait Training, Neuromuscular Re-ed, Patient Education, Therapeutic Activities, and Therapeutic Exercise.     Ba Diaz, PT  11/16/2023

## 2023-11-20 DIAGNOSIS — F32.A DEPRESSION, UNSPECIFIED DEPRESSION TYPE: ICD-10-CM

## 2023-11-20 RX ORDER — TRAZODONE HYDROCHLORIDE 100 MG/1
200 TABLET ORAL NIGHTLY PRN
Qty: 180 TABLET | Refills: 3 | Status: ON HOLD | OUTPATIENT
Start: 2023-11-20 | End: 2024-01-17

## 2023-11-20 NOTE — TELEPHONE ENCOUNTER
Care Due:                  Date            Visit Type   Department     Provider  --------------------------------------------------------------------------------                                             [facility]   INTERNAL  Last Visit: 06-      PATIENT      MEDICINE       Isaias Myles  Next Visit: None Scheduled  None         None Found                                                            Last  Test          Frequency    Reason                     Performed    Due Date  --------------------------------------------------------------------------------    HBA1C.......  6 months...  empagliflozin, metFORMIN.  07- 01-    [facility] Embedded Care Due Messages. Reference number: [id number].   11/20/2023 12:12:26 PM CST

## 2023-11-21 ENCOUNTER — PATIENT MESSAGE (OUTPATIENT)
Dept: INTERNAL MEDICINE | Facility: CLINIC | Age: 73
End: 2023-11-21
Payer: MEDICARE

## 2023-11-21 DIAGNOSIS — N30.00 ACUTE CYSTITIS WITHOUT HEMATURIA: Primary | ICD-10-CM

## 2023-11-21 RX ORDER — CEFUROXIME AXETIL 250 MG/1
250 TABLET ORAL 2 TIMES DAILY
Qty: 14 TABLET | Refills: 0 | Status: SHIPPED | OUTPATIENT
Start: 2023-11-21 | End: 2023-11-28

## 2023-11-24 DIAGNOSIS — L29.9 PRURITUS: ICD-10-CM

## 2023-11-24 RX ORDER — HYDROXYZINE HYDROCHLORIDE 25 MG/1
TABLET, FILM COATED ORAL
Qty: 90 TABLET | Refills: 1 | Status: SHIPPED | OUTPATIENT
Start: 2023-11-24 | End: 2024-01-05

## 2023-11-24 NOTE — TELEPHONE ENCOUNTER
Refill Routing Note   Medication(s) are not appropriate for processing by [facility] for the following reason(s):        Outside of protocol    [facility] action(s):  Route               Appointments  past 12m or future 3m with PCP    Date Provider   Last Visit   6/12/2023 Isaias Myles MD   Next Visit   Visit date not found Isaias Myles MD   ED visits in past 90 days: 0        Note composed:10:40 AM 11/24/2023

## 2023-12-04 ENCOUNTER — CLINICAL SUPPORT (OUTPATIENT)
Dept: REHABILITATION | Facility: HOSPITAL | Age: 73
End: 2023-12-04
Payer: MEDICARE

## 2023-12-04 DIAGNOSIS — R26.89 BALANCE PROBLEM: Primary | ICD-10-CM

## 2023-12-04 DIAGNOSIS — Z74.09 IMPAIRED FUNCTIONAL MOBILITY, BALANCE, GAIT, AND ENDURANCE: ICD-10-CM

## 2023-12-04 DIAGNOSIS — R29.898 WEAKNESS OF BOTH LOWER EXTREMITIES: ICD-10-CM

## 2023-12-04 PROCEDURE — 97110 THERAPEUTIC EXERCISES: CPT | Mod: PO,CQ

## 2023-12-04 NOTE — PROGRESS NOTES
"[facility]   Physical Therapy Treatment Note      Name: Anthony Ball  Clinic Number: [id number]    Therapy Diagnosis:  Encounter Diagnoses   Name Primary?    Balance problem Yes    Weakness of both lower extremities     Impaired functional mobility, balance, gait, and endurance        Physician: Isaias Myles MD    Visit Date: 12/4/2023         Encounter Diagnoses   Name Primary?    Physical deconditioning      Impaired gait and mobility      Weakness of both lower extremities      Impaired functional mobility, balance, gait, and endurance Yes      Physician: Isaias Myles MD     Physician Orders: PT Eval and Treat   Medical Diagnosis from Referral:   Diagnosis   R53.81 (ICD-10-CM) - Physical deconditioning   R26.89 (ICD-10-CM) - Impaired gait and mobility   R29.898 (ICD-10-CM) - Weakness of both lower extremities      Evaluation Date: 11/16/2023  Authorization Period Expiration: 10/25/24  Plan of Care Expiration: 1/11/24  Progress Note Due: 12/16/23  Visit # / Visits authorized: 1/ 1  FOTO: 1/3     Precautions: Standard, Diabetes, Fall, and anxiety     Time In: 1115  Time Out: 1200  Total Billable Time: 40 minutes~ KX modifier    PTA Visit #: 1/5       Subjective     Patient reports: "Aminta been sick since Thanksgiving. I haven't been able to get out of bed."  He was not compliant with home exercise program.  Response to previous treatment: Eval only  Functional change: Ongoing    Pain: 3/10  Location: bilateral back      Objective      Objective Measures updated at progress report unless specified.     Treatment     Anthony received the treatments listed below:      therapeutic exercises to develop strength, endurance, ROM, flexibility, posture, and core stabilization for 40 minutes including:  Sci Fit bilateral upper extremity and lower extremity reciprocation 10 minutes for activity tolerance and CV endurance.      //Bars:  2x10 reps heel raises   2x10 reps of Hip Abd    2x10 reps " of HIp flex  2x10 reps of Hip Ext  X5 reps of sit to stands      neuromuscular re-education activities to improve: Balance, Coordination, Kinesthetic, Sense, Proprioception, and Posture for 0 minutes. The following activities were included:  NP    therapeutic activities to improve functional performance for 0  minutes, including:  Ambulation with rolling walker     gait training to improve functional mobility and safety for 0  minutes, including:  NP        Patient Education and Home Exercises       Education provided:   - HEP    Written Home Exercises Provided: Patient instructed to cont prior HEP. Exercises were reviewed and Anthony was able to demonstrate them prior to the end of the session.  Anthony demonstrated good  understanding of the education provided. See Electronic Medical Record under Patient Instructions for exercises provided during therapy sessions    Assessment     Derrick tolerated his first follow up fair. We reviewed his home exercise program and reintegrated the importance of home exercise program compliance for expected progress. He was instructed in a basic home exercise program strengthening and provided another printed handout. Derrick was easily fatigued and needed several seated rest breaks between exercises.       Anthony Is progressing well towards his goals.   Patient prognosis is Fair.     Patient will continue to benefit from skilled outpatient physical therapy to address the deficits listed in the problem list box on initial evaluation, provide pt/family education and to maximize pt's level of independence in the home and community environment.     Patient's spiritual, cultural and educational needs considered and pt agreeable to plan of care and goals.     Anticipated barriers to physical therapy: history of multiple falls; fear of falling, general anxiety and pt does not drive     Goals:   Short Term Goals: 4 weeks   Pt will be issued first installment of HEP and report at least partial  compliance.  Pt will improve his 30 second chair rise to 4-5 reps to demonstrate increased LE strength and muscular endurance  Pt will improve his TUG score to 43 seconds or < with or without appropriate AD to improve household ambulation and decrease fall risk  Pt will increase his SSWS to 0.29 m/sec with or without appropriate AD to improve community ambulation and decrease fall risk  Pt will complete mCTSIB balance testing and PT to set appropriate goals  Pt will improve any 3 LE muscle grades by 1/3 to help with functional mobility skills  Pt will perform all sit<> stand and stand pivot transfers with no more than SBA     Long Term Goals: 8 weeks   Pt will be partially compliant with finalized HEP to help maintain potential gains realized in PT  Pt will improve his 30 second chair rise to 5-6 reps to demonstrate increased LE strength and muscular endurance  Pt will improve his TUG score to 37 seconds or < with or without appropriate AD to improve household ambulation and decrease fall risk  Pt will increase his SSWS to 0.32 m/sec with or without appropriate AD to improve community ambulation and decrease fall risk  Pt will improve any 3 LE muscle grades by 2/3 to help with functional mobility skills   Pt will perform all sit<> stand and stand pivot transfers with no more than S     Plan     Continue patient's plan of care     Dat Savage, PTA

## 2023-12-06 ENCOUNTER — CLINICAL SUPPORT (OUTPATIENT)
Dept: REHABILITATION | Facility: HOSPITAL | Age: 73
End: 2023-12-06
Payer: MEDICARE

## 2023-12-06 DIAGNOSIS — Z74.09 IMPAIRED FUNCTIONAL MOBILITY, BALANCE, GAIT, AND ENDURANCE: Primary | ICD-10-CM

## 2023-12-06 PROCEDURE — 97110 THERAPEUTIC EXERCISES: CPT | Mod: KX,PO

## 2023-12-06 NOTE — PROGRESS NOTES
[facility]   Physical Therapy Treatment Note      Name: Anthony Ball  Clinic Number: [id number]    Therapy Diagnosis:  Encounter Diagnosis   Name Primary?    Impaired functional mobility, balance, gait, and endurance Yes     Physician: Isaias Myles MD    Visit Date: 12/6/2023      Physician Orders: PT Eval and Treat   Medical Diagnosis from Referral:   Diagnosis   R53.81 (ICD-10-CM) - Physical deconditioning   R26.89 (ICD-10-CM) - Impaired gait and mobility   R29.898 (ICD-10-CM) - Weakness of both lower extremities      Evaluation Date: 11/16/2023  Authorization Period Expiration: 10/25/24  Plan of Care Expiration: 1/11/24  Progress Note Due: 12/16/23  Visit # / Visits authorized: 2/ 20 (+eval)  FOTO: 1/3     Precautions: Standard, Diabetes, Fall, and anxiety     Time In: 1145  Time Out: 1230  Total Billable Time: 45 minutes~ KX modifier    PTA Visit #: 0/5       Subjective     Patient reports: Very tired today.   He was not compliant with home exercise program.  Response to previous treatment: Eval only  Functional change: Ongoing    Pain: 3/10  Location: bilateral back      Objective      Objective Measures updated at progress report unless specified.     Treatment     Antohny received the treatments listed below:      therapeutic exercises to develop strength, endurance, ROM, flexibility, posture, and core stabilization for 45  minutes including:    Sci Fit bilateral upper extremity and lower extremity reciprocation for activity tolerance and CV endurance.  All L1   X4 min, 1 min recovery  X4 min, 1 min recovery  X4 min, 1 min recovery  12 min total active work     Leg press 3x10 80#   Calf raises on leg press 3x10 80#     Squat pivots - Arianna initially, progressed to SBA         Patient Education and Home Exercises       Education provided:   - HEP    Written Home Exercises Provided: Patient instructed to cont prior HEP. Exercises were reviewed and Anthony was able to demonstrate them  prior to the end of the session.  Anthony demonstrated good  understanding of the education provided. See Electronic Medical Record under Patient Instructions for exercises provided during therapy sessions    Assessment     Derrick did well with today's session. Increased pacing with scitfit today allowed him to complete more total active time and he felt better. Leg press and calf raises on weight machines today to address lower extremity strengthening. He reports he has been very compliant with home exercise program on days not in therapy. He still required extended rest breaks for recovery and had light nausea related to ongoing recovery from stomach bug last week. Continue plan of care (POC).     Anthony Is progressing well towards his goals.   Patient prognosis is Fair.     Patient will continue to benefit from skilled outpatient physical therapy to address the deficits listed in the problem list box on initial evaluation, provide pt/family education and to maximize pt's level of independence in the home and community environment.     Patient's spiritual, cultural and educational needs considered and pt agreeable to plan of care and goals.     Anticipated barriers to physical therapy: history of multiple falls; fear of falling, general anxiety and pt does not drive     Goals:   Short Term Goals: 4 weeks   Pt will be issued first installment of HEP and report at least partial compliance.  Pt will improve his 30 second chair rise to 4-5 reps to demonstrate increased LE strength and muscular endurance  Pt will improve his TUG score to 43 seconds or < with or without appropriate AD to improve household ambulation and decrease fall risk  Pt will increase his SSWS to 0.29 m/sec with or without appropriate AD to improve community ambulation and decrease fall risk  Pt will complete mCTSIB balance testing and PT to set appropriate goals  Pt will improve any 3 LE muscle grades by 1/3 to help with functional mobility skills  Pt  will perform all sit<> stand and stand pivot transfers with no more than SBA     Long Term Goals: 8 weeks   Pt will be partially compliant with finalized HEP to help maintain potential gains realized in PT  Pt will improve his 30 second chair rise to 5-6 reps to demonstrate increased LE strength and muscular endurance  Pt will improve his TUG score to 37 seconds or < with or without appropriate AD to improve household ambulation and decrease fall risk  Pt will increase his SSWS to 0.32 m/sec with or without appropriate AD to improve community ambulation and decrease fall risk  Pt will improve any 3 LE muscle grades by 2/3 to help with functional mobility skills   Pt will perform all sit<> stand and stand pivot transfers with no more than S     Plan     Continue patient's plan of care     Mari Oreilly, PT

## 2023-12-20 ENCOUNTER — DOCUMENTATION ONLY (OUTPATIENT)
Dept: REHABILITATION | Facility: HOSPITAL | Age: 73
End: 2023-12-20
Payer: MEDICARE

## 2023-12-20 ENCOUNTER — PATIENT MESSAGE (OUTPATIENT)
Dept: INTERNAL MEDICINE | Facility: CLINIC | Age: 73
End: 2023-12-20
Payer: MEDICARE

## 2023-12-20 DIAGNOSIS — I74.9 THROMBOEMBOLISM: Primary | ICD-10-CM

## 2023-12-20 DIAGNOSIS — R11.0 NAUSEA: ICD-10-CM

## 2023-12-20 RX ORDER — ONDANSETRON HYDROCHLORIDE 8 MG/1
8 TABLET, FILM COATED ORAL EVERY 8 HOURS PRN
Qty: 30 TABLET | Refills: 1 | Status: SHIPPED | OUTPATIENT
Start: 2023-12-20 | End: 2023-12-29 | Stop reason: SDUPTHER

## 2023-12-20 NOTE — PROGRESS NOTES
Derrick Ball did not attend his scheduled patient's appointment this morning. No messages were received regarding cancellation nor rescheduling. No charges were posted this morning.

## 2023-12-29 ENCOUNTER — PATIENT MESSAGE (OUTPATIENT)
Dept: INTERNAL MEDICINE | Facility: CLINIC | Age: 73
End: 2023-12-29
Payer: MEDICARE

## 2023-12-29 DIAGNOSIS — R11.0 NAUSEA: ICD-10-CM

## 2023-12-29 RX ORDER — ONDANSETRON HYDROCHLORIDE 8 MG/1
8 TABLET, FILM COATED ORAL EVERY 8 HOURS PRN
Qty: 30 TABLET | Refills: 1 | Status: SHIPPED | OUTPATIENT
Start: 2023-12-29

## 2023-12-29 NOTE — TELEPHONE ENCOUNTER
No care due was identified.  [facility] Embedded Care Due Messages. Reference number: [id number].   12/29/2023 11:20:31 AM CST

## 2024-01-01 DIAGNOSIS — I74.9 THROMBOEMBOLISM: ICD-10-CM

## 2024-01-05 ENCOUNTER — CLINICAL SUPPORT (OUTPATIENT)
Dept: INTERNAL MEDICINE | Facility: CLINIC | Age: 74
End: 2024-01-05
Payer: MEDICARE

## 2024-01-05 ENCOUNTER — OFFICE VISIT (OUTPATIENT)
Dept: INTERNAL MEDICINE | Facility: CLINIC | Age: 74
End: 2024-01-05
Payer: MEDICARE

## 2024-01-05 DIAGNOSIS — E83.42 HYPOMAGNESEMIA: ICD-10-CM

## 2024-01-05 DIAGNOSIS — I26.99 PULMONARY EMBOLISM, UNSPECIFIED CHRONICITY, UNSPECIFIED PULMONARY EMBOLISM TYPE, UNSPECIFIED WHETHER ACUTE COR PULMONALE PRESENT: ICD-10-CM

## 2024-01-05 DIAGNOSIS — Z12.5 PROSTATE CANCER SCREENING: ICD-10-CM

## 2024-01-05 DIAGNOSIS — R26.89 IMPAIRED GAIT AND MOBILITY: ICD-10-CM

## 2024-01-05 DIAGNOSIS — Z86.711 HISTORY OF PULMONARY EMBOLUS (PE): Primary | ICD-10-CM

## 2024-01-05 DIAGNOSIS — Z09 HOSPITAL DISCHARGE FOLLOW-UP: ICD-10-CM

## 2024-01-05 DIAGNOSIS — D69.6 THROMBOCYTOPENIA: ICD-10-CM

## 2024-01-05 DIAGNOSIS — R29.898 LEG WEAKNESS, BILATERAL: ICD-10-CM

## 2024-01-05 DIAGNOSIS — R53.81 PHYSICAL DECONDITIONING: ICD-10-CM

## 2024-01-05 DIAGNOSIS — F41.9 ANXIETY: ICD-10-CM

## 2024-01-05 LAB
ALBUMIN SERPL BCP-MCNC: 2.3 G/DL (ref 3.5–5.2)
ALP SERPL-CCNC: 237 U/L (ref 55–135)
ALT SERPL W/O P-5'-P-CCNC: 19 U/L (ref 10–44)
ANION GAP SERPL CALC-SCNC: 13 MMOL/L (ref 8–16)
AST SERPL-CCNC: 29 U/L (ref 10–40)
BILIRUB SERPL-MCNC: 0.6 MG/DL (ref 0.1–1)
BNP SERPL-MCNC: 94 PG/ML (ref 0–99)
BUN SERPL-MCNC: 13 MG/DL (ref 8–23)
CALCIUM SERPL-MCNC: 8.8 MG/DL (ref 8.7–10.5)
CHLORIDE SERPL-SCNC: 102 MMOL/L (ref 95–110)
CO2 SERPL-SCNC: 21 MMOL/L (ref 23–29)
COMPLEXED PSA SERPL-MCNC: 0.68 NG/ML (ref 0–4)
CREAT SERPL-MCNC: 1.1 MG/DL (ref 0.5–1.4)
ERYTHROCYTE [DISTWIDTH] IN BLOOD BY AUTOMATED COUNT: 14.4 % (ref 11.5–14.5)
EST. GFR  (NO RACE VARIABLE): >60 ML/MIN/1.73 M^2
GLUCOSE SERPL-MCNC: 163 MG/DL (ref 70–110)
HCT VFR BLD AUTO: 44.2 % (ref 40–54)
HGB BLD-MCNC: 15.2 G/DL (ref 14–18)
MAGNESIUM SERPL-MCNC: 1.4 MG/DL (ref 1.6–2.6)
MCH RBC QN AUTO: 33.3 PG (ref 27–31)
MCHC RBC AUTO-ENTMCNC: 34.4 G/DL (ref 32–36)
MCV RBC AUTO: 97 FL (ref 82–98)
PLATELET # BLD AUTO: 223 K/UL (ref 150–450)
PMV BLD AUTO: 10 FL (ref 9.2–12.9)
POTASSIUM SERPL-SCNC: 4.4 MMOL/L (ref 3.5–5.1)
PROT SERPL-MCNC: 6.5 G/DL (ref 6–8.4)
RBC # BLD AUTO: 4.56 M/UL (ref 4.6–6.2)
SODIUM SERPL-SCNC: 136 MMOL/L (ref 136–145)
WBC # BLD AUTO: 8.35 K/UL (ref 3.9–12.7)

## 2024-01-05 PROCEDURE — 99213 OFFICE O/P EST LOW 20 MIN: CPT | Mod: PBBFAC,27 | Performed by: INTERNAL MEDICINE

## 2024-01-05 PROCEDURE — 99211 OFF/OP EST MAY X REQ PHY/QHP: CPT | Mod: PBBFAC

## 2024-01-05 PROCEDURE — 99214 OFFICE O/P EST MOD 30 MIN: CPT | Mod: S$PBB,,, | Performed by: INTERNAL MEDICINE

## 2024-01-05 PROCEDURE — 99999 PR PBB SHADOW E&M-EST. PATIENT-LVL I: CPT | Mod: PBBFAC,,,

## 2024-01-05 PROCEDURE — 83880 ASSAY OF NATRIURETIC PEPTIDE: CPT | Performed by: INTERNAL MEDICINE

## 2024-01-05 PROCEDURE — 80053 COMPREHEN METABOLIC PANEL: CPT | Performed by: INTERNAL MEDICINE

## 2024-01-05 PROCEDURE — 99999 PR PBB SHADOW E&M-EST. PATIENT-LVL III: CPT | Mod: PBBFAC,,, | Performed by: INTERNAL MEDICINE

## 2024-01-05 PROCEDURE — 85027 COMPLETE CBC AUTOMATED: CPT | Performed by: INTERNAL MEDICINE

## 2024-01-05 PROCEDURE — 83735 ASSAY OF MAGNESIUM: CPT | Performed by: INTERNAL MEDICINE

## 2024-01-05 PROCEDURE — 84153 ASSAY OF PSA TOTAL: CPT | Performed by: INTERNAL MEDICINE

## 2024-01-05 RX ORDER — BUSPIRONE HYDROCHLORIDE 10 MG/1
10 TABLET ORAL 2 TIMES DAILY PRN
Qty: 60 TABLET | Refills: 5 | Status: ON HOLD | OUTPATIENT
Start: 2024-01-05 | End: 2024-01-17 | Stop reason: HOSPADM

## 2024-01-08 ENCOUNTER — PATIENT MESSAGE (OUTPATIENT)
Dept: INTERNAL MEDICINE | Facility: CLINIC | Age: 74
End: 2024-01-08
Payer: MEDICARE

## 2024-01-09 ENCOUNTER — PATIENT MESSAGE (OUTPATIENT)
Dept: INTERNAL MEDICINE | Facility: CLINIC | Age: 74
End: 2024-01-09
Payer: MEDICARE

## 2024-01-09 VITALS
DIASTOLIC BLOOD PRESSURE: 70 MMHG | SYSTOLIC BLOOD PRESSURE: 100 MMHG | BODY MASS INDEX: 28.88 KG/M2 | HEIGHT: 69 IN | HEART RATE: 98 BPM | WEIGHT: 195 LBS

## 2024-01-09 NOTE — PROGRESS NOTES
Subjective:       Patient ID: Anthony Ball is a 73 y.o. male.    Chief Complaint: Follow-up      HPI:  Hospital f/u. Admitted to [facility] in [place] after syncopal episode in late December while he was at airport changing flights. Diagnosed with bilateral PE and a-fib. Now on Eliquis. Baseline has been weakness for some time now, possibly neuropathy related in setting of dm and previous heavy alcohol use. Has cut down on drinking per pt and partner. Feels okay since hospital but is very anxious at times. Partner notes he is depressed at times. Is on Cymbalta already for neuropathic pain.  [facility] records are available now in chart (though possibly limited). Pt notes possible LLE DVT diagnosed but I'm not able to see this on brief initial review of records. No LLE swelling.    Past Medical History:   Diagnosis Date    Anxiety     Cataract     Coronary artery disease     CAC score 1430    Depression     Diabetic neuropathy     Essential (primary) hypertension     GERD (gastroesophageal reflux disease)     Insomnia     Neuromyopathy     Possibly DM and/or alcohol related.    Pulmonary embolism 12/2023    Reactive airway disease     Type 2 diabetes mellitus          Current Outpatient Medications:     albuterol (VENTOLIN HFA) 90 mcg/actuation inhaler, Inhale 2 puffs into the lungs every 6 (six) hours as needed for Wheezing. Rescue, Disp: 8 g, Rfl: 2    apixaban (ELIQUIS) 5 mg Tab, Take 1 tablet (5 mg total) by mouth 2 (two) times daily., Disp: 180 tablet, Rfl: 1    busPIRone (BUSPAR) 10 MG tablet, Take 1 tablet (10 mg total) by mouth 2 (two) times daily as needed (Anxiety)., Disp: 60 tablet, Rfl: 5    DULoxetine (CYMBALTA) 60 MG capsule, Take 1 capsule (60 mg total) by mouth 2 (two) times daily., Disp: 180 capsule, Rfl: 3    empagliflozin (JARDIANCE) 10 mg tablet, Take 1 tablet (10 mg total) by mouth once daily., Disp: 90 tablet, Rfl: 3    fluticasone-salmeterol diskus inhaler 250-50 mcg, Inhale 1 puff into the lungs 2 (two)  times daily. Controller, Disp: 60 each, Rfl: 2    LIDOcaine (LIDODERM) 5 %, Place 3 patches onto the skin daily as needed (Rib pain). Okay to use 1 to 3 patches. Remove & Discard patches within 12 hours or as directed by MD, Disp: 30 patch, Rfl: 1    methocarbamoL (ROBAXIN) 750 MG Tab, Take 1 tablet (750 mg total) by mouth 3 (three) times daily as needed (Back pain)., Disp: 90 tablet, Rfl: 0    ondansetron (ZOFRAN) 8 MG tablet, Take 1 tablet (8 mg total) by mouth every 8 (eight) hours as needed for Nausea., Disp: 30 tablet, Rfl: 1    pantoprazole (PROTONIX) 40 MG tablet, TAKE 1 TABLET(40 MG) BY MOUTH EVERY DAY, Disp: 90 tablet, Rfl: 3    pregabalin (LYRICA) 150 MG capsule, TAKE 1 CAPSULE(150 MG) BY MOUTH TWICE DAILY AS NEEDED FOR PAIN, Disp: 180 capsule, Rfl: 1    traZODone (DESYREL) 100 MG tablet, Take 2 tablets (200 mg total) by mouth nightly as needed for Insomnia., Disp: 180 tablet, Rfl: 3    triamcinolone acetonide 0.1% (KENALOG) 0.1 % cream, Apply topically 2 (two) times daily. Use for 1 to 2 weeks as needed for rash., Disp: 453.6 g, Rfl: 0    Past Surgical History:   Procedure Laterality Date    COLONOSCOPY      Normal around 2020    TOTAL KNEE ARTHROPLASTY Right        Social History     Tobacco Use    Smoking status: Never    Smokeless tobacco: Never   Substance Use Topics    Alcohol use: Yes     Comment: Former heavy use    Drug use: Never         Objective:      Vitals:    01/05/24 1338   BP: 100/70   Pulse: (!) 130     Repeat pulse 90's    Physical Exam  Constitutional:       General: He is not in acute distress.     Appearance: He is not ill-appearing.   HENT:      Head: Normocephalic and atraumatic.   Eyes:      Conjunctiva/sclera: Conjunctivae normal.   Cardiovascular:      Rate and Rhythm: Regular rhythm. Tachycardia present.   Pulmonary:      Effort: Pulmonary effort is normal. No respiratory distress.      Breath sounds: Normal breath sounds.   Abdominal:      General: Abdomen is flat. There is no  distension.      Palpations: Abdomen is soft.      Tenderness: There is no abdominal tenderness.   Musculoskeletal:      Right lower leg: No edema.      Left lower leg: No edema.   Neurological:      Mental Status: He is alert and oriented to person, place, and time. Mental status is at baseline.   Psychiatric:         Mood and Affect: Mood normal.         Behavior: Behavior normal.           Recent Results (from the past 168 hour(s))   B-TYPE NATRIURETIC PEPTIDE    Collection Time: 01/05/24  2:27 PM   Result Value Ref Range    BNP 94 0 - 99 pg/mL   Magnesium    Collection Time: 01/05/24  2:27 PM   Result Value Ref Range    Magnesium 1.4 (L) 1.6 - 2.6 mg/dL   PSA, SCREENING    Collection Time: 01/05/24  2:27 PM   Result Value Ref Range    PSA, Screen 0.68 0.00 - 4.00 ng/mL   COMPREHENSIVE METABOLIC PANEL    Collection Time: 01/05/24  2:27 PM   Result Value Ref Range    Sodium 136 136 - 145 mmol/L    Potassium 4.4 3.5 - 5.1 mmol/L    Chloride 102 95 - 110 mmol/L    CO2 21 (L) 23 - 29 mmol/L    Glucose 163 (H) 70 - 110 mg/dL    BUN 13 8 - 23 mg/dL    Creatinine 1.1 0.5 - 1.4 mg/dL    Calcium 8.8 8.7 - 10.5 mg/dL    Total Protein 6.5 6.0 - 8.4 g/dL    Albumin 2.3 (L) 3.5 - 5.2 g/dL    Total Bilirubin 0.6 0.1 - 1.0 mg/dL    Alkaline Phosphatase 237 (H) 55 - 135 U/L    AST 29 10 - 40 U/L    ALT 19 10 - 44 U/L    eGFR >60.0 >60 mL/min/1.73 m^2    Anion Gap 13 8 - 16 mmol/L   CBC Without Differential    Collection Time: 01/05/24  2:27 PM   Result Value Ref Range    WBC 8.35 3.90 - 12.70 K/uL    RBC 4.56 (L) 4.60 - 6.20 M/uL    Hemoglobin 15.2 14.0 - 18.0 g/dL    Hematocrit 44.2 40.0 - 54.0 %    MCV 97 82 - 98 fL    MCH 33.3 (H) 27.0 - 31.0 pg    MCHC 34.4 32.0 - 36.0 g/dL    RDW 14.4 11.5 - 14.5 %    Platelets 223 150 - 450 K/uL    MPV 10.0 9.2 - 12.9 fL      Assessment/Plan:     1) Hospital f/u, Bilateral PE, Atrial fibrillation - New diagnoses. Now on Eliquis. Note that full records became available following our in  person exam. HR running high in clinic, though did seem to be regular rhythm at time. Will likely add rate control therapy in light of the a-fib diagnosis which he had not mentioned initially.  2) Hypomagnesemia - Start daily Magnesium 400 mg supplement. Repeat level within next 1 to 2 months.  3) Anxiety - Add Buspar bid. Avoiding additional sedative meds in light of fall risk.  4) Physical deconditioning, Neuromuscular weakness (suspect dm and etoh related) - Working with PT. Will plan to have him see Neurology soon if he is agreeable.    Plan for repeat labs within 1 to 2 mths. New alk phos elevation noted. Will plan to repeat CMP and add GGT with next labs. No concerning new GI issues at this time.

## 2024-01-11 ENCOUNTER — HOSPITAL ENCOUNTER (INPATIENT)
Facility: HOSPITAL | Age: 74
LOS: 7 days | Discharge: NURSING, REHAB OR LONG-TERM CARE | DRG: 690 | End: 2024-01-18
Attending: EMERGENCY MEDICINE | Admitting: FAMILY MEDICINE
Payer: MEDICARE

## 2024-01-11 DIAGNOSIS — R93.1 AGATSTON CAC SCORE, >400: ICD-10-CM

## 2024-01-11 DIAGNOSIS — F32.A DEPRESSION, UNSPECIFIED DEPRESSION TYPE: ICD-10-CM

## 2024-01-11 DIAGNOSIS — R79.89 ELEVATED BRAIN NATRIURETIC PEPTIDE (BNP) LEVEL: ICD-10-CM

## 2024-01-11 DIAGNOSIS — R00.0 TACHYCARDIA: ICD-10-CM

## 2024-01-11 DIAGNOSIS — R53.1 WEAKNESS: ICD-10-CM

## 2024-01-11 DIAGNOSIS — R07.9 CHEST PAIN: ICD-10-CM

## 2024-01-11 DIAGNOSIS — N30.00 ACUTE CYSTITIS WITHOUT HEMATURIA: ICD-10-CM

## 2024-01-11 DIAGNOSIS — E11.42 DIABETIC POLYNEUROPATHY ASSOCIATED WITH TYPE 2 DIABETES MELLITUS: ICD-10-CM

## 2024-01-11 DIAGNOSIS — R29.898 WEAKNESS OF BOTH LOWER EXTREMITIES: ICD-10-CM

## 2024-01-11 DIAGNOSIS — E11.69 DIABETES MELLITUS TYPE 2 IN OBESE: ICD-10-CM

## 2024-01-11 DIAGNOSIS — N39.0 URINARY TRACT INFECTION WITHOUT HEMATURIA, SITE UNSPECIFIED: Primary | ICD-10-CM

## 2024-01-11 DIAGNOSIS — E66.9 DIABETES MELLITUS TYPE 2 IN OBESE: ICD-10-CM

## 2024-01-11 LAB
ALBUMIN SERPL BCP-MCNC: 2.2 G/DL (ref 3.5–5.2)
ALP SERPL-CCNC: 239 U/L (ref 55–135)
ALT SERPL W/O P-5'-P-CCNC: 29 U/L (ref 10–44)
ANION GAP SERPL CALC-SCNC: 12 MMOL/L (ref 8–16)
AST SERPL-CCNC: 50 U/L (ref 10–40)
BACTERIA #/AREA URNS AUTO: ABNORMAL /HPF
BASOPHILS # BLD AUTO: 0.03 K/UL (ref 0–0.2)
BASOPHILS NFR BLD: 0.4 % (ref 0–1.9)
BILIRUB SERPL-MCNC: 1 MG/DL (ref 0.1–1)
BILIRUB UR QL STRIP: NEGATIVE
BNP SERPL-MCNC: 127 PG/ML (ref 0–99)
BUN SERPL-MCNC: 14 MG/DL (ref 8–23)
CALCIUM SERPL-MCNC: 8.4 MG/DL (ref 8.7–10.5)
CHLORIDE SERPL-SCNC: 106 MMOL/L (ref 95–110)
CLARITY UR REFRACT.AUTO: ABNORMAL
CO2 SERPL-SCNC: 18 MMOL/L (ref 23–29)
COLOR UR AUTO: ABNORMAL
CREAT SERPL-MCNC: 1.1 MG/DL (ref 0.5–1.4)
DIFFERENTIAL METHOD BLD: ABNORMAL
EOSINOPHIL # BLD AUTO: 0.1 K/UL (ref 0–0.5)
EOSINOPHIL NFR BLD: 1 % (ref 0–8)
ERYTHROCYTE [DISTWIDTH] IN BLOOD BY AUTOMATED COUNT: 14.1 % (ref 11.5–14.5)
EST. GFR  (NO RACE VARIABLE): >60 ML/MIN/1.73 M^2
GLUCOSE SERPL-MCNC: 145 MG/DL (ref 70–110)
GLUCOSE SERPL-MCNC: 156 MG/DL (ref 70–110)
GLUCOSE UR QL STRIP: ABNORMAL
HCT VFR BLD AUTO: 39 % (ref 40–54)
HGB BLD-MCNC: 13.5 G/DL (ref 14–18)
HGB UR QL STRIP: ABNORMAL
HYALINE CASTS UR QL AUTO: 0 /LPF
IMM GRANULOCYTES # BLD AUTO: 0.02 K/UL (ref 0–0.04)
IMM GRANULOCYTES NFR BLD AUTO: 0.3 % (ref 0–0.5)
INFLUENZA A, MOLECULAR: NEGATIVE
INFLUENZA B, MOLECULAR: NEGATIVE
KETONES UR QL STRIP: ABNORMAL
LEUKOCYTE ESTERASE UR QL STRIP: ABNORMAL
LYMPHOCYTES # BLD AUTO: 2.1 K/UL (ref 1–4.8)
LYMPHOCYTES NFR BLD: 28 % (ref 18–48)
MCH RBC QN AUTO: 33.8 PG (ref 27–31)
MCHC RBC AUTO-ENTMCNC: 34.6 G/DL (ref 32–36)
MCV RBC AUTO: 98 FL (ref 82–98)
MICROSCOPIC COMMENT: ABNORMAL
MONOCYTES # BLD AUTO: 0.6 K/UL (ref 0.3–1)
MONOCYTES NFR BLD: 7.7 % (ref 4–15)
NEUTROPHILS # BLD AUTO: 4.6 K/UL (ref 1.8–7.7)
NEUTROPHILS NFR BLD: 62.6 % (ref 38–73)
NITRITE UR QL STRIP: POSITIVE
NRBC BLD-RTO: 0 /100 WBC
PH UR STRIP: 5 [PH] (ref 5–8)
PLATELET # BLD AUTO: 182 K/UL (ref 150–450)
PMV BLD AUTO: 9.6 FL (ref 9.2–12.9)
POTASSIUM SERPL-SCNC: 3.9 MMOL/L (ref 3.5–5.1)
PROT SERPL-MCNC: 5.9 G/DL (ref 6–8.4)
PROT UR QL STRIP: ABNORMAL
RBC # BLD AUTO: 3.99 M/UL (ref 4.6–6.2)
RBC #/AREA URNS AUTO: 14 /HPF (ref 0–4)
SARS-COV-2 RDRP RESP QL NAA+PROBE: NEGATIVE
SODIUM SERPL-SCNC: 136 MMOL/L (ref 136–145)
SP GR UR STRIP: 1.02 (ref 1–1.03)
SPECIMEN SOURCE: NORMAL
TROPONIN I SERPL DL<=0.01 NG/ML-MCNC: 0.01 NG/ML (ref 0–0.03)
TSH SERPL DL<=0.005 MIU/L-ACNC: 3.09 UIU/ML (ref 0.4–4)
URN SPEC COLLECT METH UR: ABNORMAL
WBC # BLD AUTO: 7.32 K/UL (ref 3.9–12.7)
WBC #/AREA URNS AUTO: >100 /HPF (ref 0–5)
YEAST UR QL AUTO: ABNORMAL

## 2024-01-11 PROCEDURE — 85025 COMPLETE CBC W/AUTO DIFF WBC: CPT

## 2024-01-11 PROCEDURE — 94761 N-INVAS EAR/PLS OXIMETRY MLT: CPT

## 2024-01-11 PROCEDURE — 12000002 HC ACUTE/MED SURGE SEMI-PRIVATE ROOM

## 2024-01-11 PROCEDURE — U0002 COVID-19 LAB TEST NON-CDC: HCPCS

## 2024-01-11 PROCEDURE — 87186 SC STD MICRODIL/AGAR DIL: CPT

## 2024-01-11 PROCEDURE — 63600175 PHARM REV CODE 636 W HCPCS

## 2024-01-11 PROCEDURE — 93005 ELECTROCARDIOGRAM TRACING: CPT

## 2024-01-11 PROCEDURE — 25000003 PHARM REV CODE 250: Performed by: FAMILY MEDICINE

## 2024-01-11 PROCEDURE — 87502 INFLUENZA DNA AMP PROBE: CPT

## 2024-01-11 PROCEDURE — 87088 URINE BACTERIA CULTURE: CPT

## 2024-01-11 PROCEDURE — 84484 ASSAY OF TROPONIN QUANT: CPT

## 2024-01-11 PROCEDURE — 25000003 PHARM REV CODE 250

## 2024-01-11 PROCEDURE — 83880 ASSAY OF NATRIURETIC PEPTIDE: CPT

## 2024-01-11 PROCEDURE — 96361 HYDRATE IV INFUSION ADD-ON: CPT

## 2024-01-11 PROCEDURE — 87077 CULTURE AEROBIC IDENTIFY: CPT

## 2024-01-11 PROCEDURE — 82962 GLUCOSE BLOOD TEST: CPT

## 2024-01-11 PROCEDURE — 84443 ASSAY THYROID STIM HORMONE: CPT

## 2024-01-11 PROCEDURE — 87086 URINE CULTURE/COLONY COUNT: CPT

## 2024-01-11 PROCEDURE — 81001 URINALYSIS AUTO W/SCOPE: CPT

## 2024-01-11 PROCEDURE — 80053 COMPREHEN METABOLIC PANEL: CPT

## 2024-01-11 PROCEDURE — 93010 ELECTROCARDIOGRAM REPORT: CPT | Mod: ,,, | Performed by: INTERNAL MEDICINE

## 2024-01-11 PROCEDURE — 63600175 PHARM REV CODE 636 W HCPCS: Performed by: FAMILY MEDICINE

## 2024-01-11 PROCEDURE — 96374 THER/PROPH/DIAG INJ IV PUSH: CPT

## 2024-01-11 PROCEDURE — 99285 EMERGENCY DEPT VISIT HI MDM: CPT | Mod: 25

## 2024-01-11 RX ORDER — IBUPROFEN 200 MG
16 TABLET ORAL
Status: DISCONTINUED | OUTPATIENT
Start: 2024-01-11 | End: 2024-01-18 | Stop reason: HOSPADM

## 2024-01-11 RX ORDER — TRAZODONE HYDROCHLORIDE 100 MG/1
200 TABLET ORAL NIGHTLY PRN
Status: DISCONTINUED | OUTPATIENT
Start: 2024-01-11 | End: 2024-01-18 | Stop reason: HOSPADM

## 2024-01-11 RX ORDER — PANTOPRAZOLE SODIUM 40 MG/1
40 TABLET, DELAYED RELEASE ORAL DAILY
Status: DISCONTINUED | OUTPATIENT
Start: 2024-01-12 | End: 2024-01-18 | Stop reason: HOSPADM

## 2024-01-11 RX ORDER — METHOCARBAMOL 750 MG/1
750 TABLET, FILM COATED ORAL 3 TIMES DAILY PRN
Status: DISCONTINUED | OUTPATIENT
Start: 2024-01-11 | End: 2024-01-18 | Stop reason: HOSPADM

## 2024-01-11 RX ORDER — PREGABALIN 150 MG/1
150 CAPSULE ORAL 2 TIMES DAILY PRN
Status: DISCONTINUED | OUTPATIENT
Start: 2024-01-11 | End: 2024-01-18 | Stop reason: HOSPADM

## 2024-01-11 RX ORDER — GLUCAGON 1 MG
1 KIT INJECTION
Status: DISCONTINUED | OUTPATIENT
Start: 2024-01-11 | End: 2024-01-18 | Stop reason: HOSPADM

## 2024-01-11 RX ORDER — TALC
6 POWDER (GRAM) TOPICAL NIGHTLY PRN
Status: DISCONTINUED | OUTPATIENT
Start: 2024-01-11 | End: 2024-01-18 | Stop reason: HOSPADM

## 2024-01-11 RX ORDER — ONDANSETRON HYDROCHLORIDE 2 MG/ML
4 INJECTION, SOLUTION INTRAVENOUS
Status: COMPLETED | OUTPATIENT
Start: 2024-01-11 | End: 2024-01-11

## 2024-01-11 RX ORDER — OXYCODONE HYDROCHLORIDE 5 MG/1
5 TABLET ORAL EVERY 6 HOURS PRN
Status: DISCONTINUED | OUTPATIENT
Start: 2024-01-11 | End: 2024-01-18 | Stop reason: HOSPADM

## 2024-01-11 RX ORDER — ACETAMINOPHEN 500 MG
1000 TABLET ORAL EVERY 8 HOURS PRN
Status: DISCONTINUED | OUTPATIENT
Start: 2024-01-11 | End: 2024-01-18 | Stop reason: HOSPADM

## 2024-01-11 RX ORDER — DULOXETIN HYDROCHLORIDE 60 MG/1
60 CAPSULE, DELAYED RELEASE ORAL 2 TIMES DAILY
Status: DISCONTINUED | OUTPATIENT
Start: 2024-01-11 | End: 2024-01-18 | Stop reason: HOSPADM

## 2024-01-11 RX ORDER — ALBUTEROL SULFATE 90 UG/1
2 AEROSOL, METERED RESPIRATORY (INHALATION) EVERY 4 HOURS PRN
Status: DISCONTINUED | OUTPATIENT
Start: 2024-01-11 | End: 2024-01-18 | Stop reason: HOSPADM

## 2024-01-11 RX ORDER — IBUPROFEN 200 MG
24 TABLET ORAL
Status: DISCONTINUED | OUTPATIENT
Start: 2024-01-11 | End: 2024-01-18 | Stop reason: HOSPADM

## 2024-01-11 RX ORDER — ONDANSETRON 8 MG/1
8 TABLET, ORALLY DISINTEGRATING ORAL EVERY 8 HOURS PRN
Status: DISCONTINUED | OUTPATIENT
Start: 2024-01-11 | End: 2024-01-18 | Stop reason: HOSPADM

## 2024-01-11 RX ORDER — ONDANSETRON HYDROCHLORIDE 2 MG/ML
4 INJECTION, SOLUTION INTRAVENOUS EVERY 8 HOURS PRN
Status: DISCONTINUED | OUTPATIENT
Start: 2024-01-11 | End: 2024-01-18 | Stop reason: HOSPADM

## 2024-01-11 RX ORDER — SODIUM CHLORIDE 0.9 % (FLUSH) 0.9 %
10 SYRINGE (ML) INJECTION EVERY 12 HOURS PRN
Status: DISCONTINUED | OUTPATIENT
Start: 2024-01-11 | End: 2024-01-18 | Stop reason: HOSPADM

## 2024-01-11 RX ORDER — ALUMINUM HYDROXIDE, MAGNESIUM HYDROXIDE, AND SIMETHICONE 1200; 120; 1200 MG/30ML; MG/30ML; MG/30ML
30 SUSPENSION ORAL 4 TIMES DAILY PRN
Status: DISCONTINUED | OUTPATIENT
Start: 2024-01-11 | End: 2024-01-18 | Stop reason: HOSPADM

## 2024-01-11 RX ORDER — NALOXONE HCL 0.4 MG/ML
0.02 VIAL (ML) INJECTION
Status: DISCONTINUED | OUTPATIENT
Start: 2024-01-11 | End: 2024-01-18 | Stop reason: HOSPADM

## 2024-01-11 RX ORDER — INSULIN ASPART 100 [IU]/ML
0-5 INJECTION, SOLUTION INTRAVENOUS; SUBCUTANEOUS
Status: DISCONTINUED | OUTPATIENT
Start: 2024-01-11 | End: 2024-01-18 | Stop reason: HOSPADM

## 2024-01-11 RX ADMIN — OXYCODONE HYDROCHLORIDE 5 MG: 5 TABLET ORAL at 09:01

## 2024-01-11 RX ADMIN — ONDANSETRON 4 MG: 2 INJECTION INTRAMUSCULAR; INTRAVENOUS at 04:01

## 2024-01-11 RX ADMIN — DULOXETINE HYDROCHLORIDE 60 MG: 60 CAPSULE, DELAYED RELEASE ORAL at 09:01

## 2024-01-11 RX ADMIN — ONDANSETRON 4 MG: 2 INJECTION INTRAMUSCULAR; INTRAVENOUS at 09:01

## 2024-01-11 RX ADMIN — APIXABAN 5 MG: 5 TABLET, FILM COATED ORAL at 09:01

## 2024-01-11 RX ADMIN — ONDANSETRON 8 MG: 8 TABLET, ORALLY DISINTEGRATING ORAL at 07:01

## 2024-01-11 RX ADMIN — SODIUM CHLORIDE 500 ML: 9 INJECTION, SOLUTION INTRAVENOUS at 02:01

## 2024-01-11 RX ADMIN — CEFTRIAXONE SODIUM 1 G: 1 INJECTION, POWDER, FOR SOLUTION INTRAMUSCULAR; INTRAVENOUS at 05:01

## 2024-01-11 NOTE — ED TRIAGE NOTES
Pt presents with complaints of weakness to bilateral lower extremities, and generalized fatigue x few weeks. Hx of DM and neuropathy. Reports recently dx with PE and placed on blood thinners. Reports uses a walker to ambulate x 3-4 weeks, given to him per PCP.

## 2024-01-11 NOTE — Clinical Note
Diagnosis: Weakness [820678]   Future Attending Provider: OFE MULLINS [[id number]]   Reason for IP Medical Treatment  (Clinical interventions that can only be accomplished in the IP setting? ) :: UTI + decreased ADLs   I certify that Inpatient services for greater than or equal to 2 midnights are medically necessary:: No   Plans for Post-Acute care--if anticipated (pick the single best option):: A. No post acute care anticipated at this time

## 2024-01-11 NOTE — ED NOTES
Pt reports nausea has resolved. No other complaints voiced. Resting comfortably in bed. Call light within reach. Side rails up x2. Partner at bedside.

## 2024-01-11 NOTE — ED PROVIDER NOTES
Encounter Date: 1/11/2024       History     Chief Complaint   Patient presents with    Weakness     Bilateral leg weakness. Pt is in A-fib upon EMS arrival.      73-year-old male with a past medical history of type 2 diabetes with polyneuropathy, HTN, GERD, CAD, prior PE on b.i.d. Eliquis, atrial fibrillation presents to the ED complaining of bilateral leg weakness that onset 3 weeks ago.  Patient also reports of associated fatigue and nausea during the same time period.  He denies any dizziness, lightheadedness, room spinning sensation, chest pain, shortness of breath.  No other complaints at this time.    The history is provided by the patient and a significant other.     Review of patient's allergies indicates:  No Known Allergies  Past Medical History:   Diagnosis Date    Anxiety     Atrial fibrillation 12/2023    Cataract     Coronary artery disease     CAC score 1430    Depression     Diabetic neuropathy     Essential (primary) hypertension     GERD (gastroesophageal reflux disease)     Insomnia     Neuromyopathy     Possibly DM and/or alcohol related.    Pulmonary embolism 12/2023    Reactive airway disease     Type 2 diabetes mellitus      Past Surgical History:   Procedure Laterality Date    COLONOSCOPY      Normal around 2020    TOTAL KNEE ARTHROPLASTY Right      Family History   Problem Relation Age of Onset    Hypertension Mother      Social History     Tobacco Use    Smoking status: Never    Smokeless tobacco: Never   Substance Use Topics    Alcohol use: Yes     Comment: Former heavy use    Drug use: Never     Review of Systems    Physical Exam     Initial Vitals [01/11/24 1317]   BP Pulse Resp Temp SpO2   (!) 154/85 100 20 97.9 °F (36.6 °C) 100 %      MAP       --         Physical Exam    Nursing note and vitals reviewed.  Constitutional: Vital signs are normal. He appears well-developed and well-nourished. He is not diaphoretic. No distress.   HENT:   Head: Normocephalic and atraumatic.   Right Ear:  External ear normal.   Left Ear: External ear normal.   Eyes: EOM are normal. Right eye exhibits no discharge. Left eye exhibits no discharge.   Neck: Trachea normal. Neck supple. No thyroid mass present.   Normal range of motion.  Cardiovascular:  Normal heart sounds and intact distal pulses.     Exam reveals no gallop and no friction rub.       No murmur heard.  Tachycardic.  Irregular rhythm.   Pulmonary/Chest: Breath sounds normal. No respiratory distress. He has no wheezes. He has no rhonchi. He has no rales. He exhibits no tenderness.   Abdominal: Abdomen is soft. Bowel sounds are normal. He exhibits no distension. There is no abdominal tenderness. There is no rebound and no guarding.   Musculoskeletal:         General: No tenderness or edema.      Cervical back: Normal range of motion and neck supple.     Neurological: He is alert and oriented to person, place, and time. He has normal strength. No cranial nerve deficit or sensory deficit. GCS score is 15. GCS eye subscore is 4. GCS verbal subscore is 5. GCS motor subscore is 6.   No facial asymmetry.  No pronator drift.  5/5 strength in the bilateral upper extremities.  4/5 strength in the bilateral lower extremities.   Skin: Skin is warm and dry. Capillary refill takes less than 2 seconds. No rash noted.   Psychiatric: He has a normal mood and affect.         ED Course   Procedures  Labs Reviewed   B-TYPE NATRIURETIC PEPTIDE - Abnormal; Notable for the following components:       Result Value     (*)     All other components within normal limits   CBC W/ AUTO DIFFERENTIAL - Abnormal; Notable for the following components:    RBC 3.99 (*)     Hemoglobin 13.5 (*)     Hematocrit 39.0 (*)     MCH 33.8 (*)     All other components within normal limits   COMPREHENSIVE METABOLIC PANEL - Abnormal; Notable for the following components:    CO2 18 (*)     Glucose 145 (*)     Calcium 8.4 (*)     Total Protein 5.9 (*)     Albumin 2.2 (*)     Alkaline Phosphatase  239 (*)     AST 50 (*)     All other components within normal limits   URINALYSIS, REFLEX TO URINE CULTURE - Abnormal; Notable for the following components:    Appearance, UA Cloudy (*)     Protein, UA 1+ (*)     Glucose, UA 3+ (*)     Ketones, UA 1+ (*)     Occult Blood UA 1+ (*)     Nitrite, UA Positive (*)     Leukocytes, UA 3+ (*)     All other components within normal limits    Narrative:     Specimen Source->Urine   URINALYSIS MICROSCOPIC - Abnormal; Notable for the following components:    RBC, UA 14 (*)     WBC, UA >100 (*)     Bacteria Many (*)     All other components within normal limits    Narrative:     Specimen Source->Urine   POCT GLUCOSE MONITORING CONTINUOUS - Abnormal; Notable for the following components:    POC Glucose 156 (*)     All other components within normal limits   INFLUENZA A & B BY MOLECULAR   CULTURE, URINE   SARS-COV-2 RNA AMPLIFICATION, QUAL   TSH   TROPONIN I     EKG Readings: (Independently Interpreted)   110 beats per minute.  Irregular rhythm.  Atrial fibrillation.  Normal axis.  No STEMI.     ECG Results              EKG 12-lead (Final result)  Result time 01/11/24 16:15:46      Final result by Interface, Lab In [facility] (01/11/24 16:15:46)                   Narrative:    Test Reason : R53.1,    Vent. Rate : 110 BPM     Atrial Rate : 111 BPM     P-R Int : 000 ms          QRS Dur : 080 ms      QT Int : 320 ms       P-R-T Axes : 000 030 017 degrees     QTc Int : 433 ms    Atrial fibrillation with rapid ventricular response  Abnormal ECG  When compared with ECG of 18-MAR-2022 08:16,  No major change  Confirmed by Phil YUEN MD ([id number]) on 1/11/2024 4:15:36 PM    Referred By: ISSA   SELF           Confirmed By:Phil YUEN MD                                  Imaging Results              X-Ray Chest AP Portable (Final result)  Result time 01/11/24 15:05:01      Final result by Bulmaro Patrick MD (01/11/24 15:05:01)                   Impression:      See  above      Electronically signed by: Bulmaro Patrick MD  Date:    01/11/2024  Time:    15:05               Narrative:    EXAMINATION:  XR CHEST AP PORTABLE    CLINICAL HISTORY:  afib;    TECHNIQUE:  Single frontal view of the chest was performed.    COMPARISON:  10/26/2022    FINDINGS:  Cardiac size is normal.  Lungs are clear.  No vascular congestion is noted.                                       Medications   cefTRIAXone (ROCEPHIN) 1 g in dextrose 5 % in water (D5W) 100 mL IVPB (MB+) (has no administration in time range)   sodium chloride 0.9% bolus 500 mL 500 mL (0 mLs Intravenous Stopped 1/11/24 1540)   ondansetron injection 4 mg (4 mg Intravenous Given 1/11/24 1646)     Medical Decision Making  73-year-old male who appears to be in NAD presents to the ED complaining leg weakness fatigue and nausea.  ABC's intact.  Initial triage vital signs reveal tachycardia and otherwise unremarkable.    Differential diagnosis includes but is not limited to pneumonia, ACS, cardiac arrhythmia, UTI, thyroid pathology, anemia, electrolyte abnormality    Amount and/or Complexity of Data Reviewed  Labs: ordered. Decision-making details documented in ED Course.  Radiology: ordered. Decision-making details documented in ED Course.    Risk  Prescription drug management.  Decision regarding hospitalization.      Additional MDM:     Well's Criteria Score:  -Clinical symptoms of DVT (leg swelling, pain with palpation) = 0.0  -PE is #1 diagnosis OR equally likely =            0.0  -Heart Rate >100 =   1.5  -Immobilization (= or > than 3 days) or surgery in the previous 4 weeks = 0.0  -Previous DVT/PE = 1.5  -Hemoptysis =          0.0  -Malignancy =           0.0  Well's Probability Score =    3      Low likelihood of PE as patient is compliant with his b.i.d. Eliquis. No SOB or CP today.        ED Course as of 01/11/24 1659   Thu Jan 11, 2024   1455 RN notified me that the patient did not want labs performed.  I went to re-evaluate  the patient at bedside and discussed with him that if there is a possibility of admission I need labs from today.  Patient states he understands and RN will obtain labs.    Patient's partner is at bedside and I was able to get supplemental history from him.  He states that the aforementioned symptoms have been going on for longer than 3 weeks.  They were initially trying to come in yesterday but patient had global weakness to a point where he was unable to get off of the toilet so instead came in today. [BG]   1504 POC Glucose(!): 156  WNL. [BG]   1507 X-Ray Chest AP Portable  Independently interpreted by me.  Chest x-ray does not reveal any acute cardiopulmonary abnormality. [BG]   1523 CBC auto differential(!)  No leukocytosis.  Stable anemia. [BG]   1551 BNP(!): 127  No evidence of fluid overload on physical exam or chest x-ray. [BG]   1551 TSH: 3.094  Within normal limits. [BG]   1552 SARS-CoV-2 RNA, Amplification, Qual: Negative [BG]   1552 Troponin I: 0.006  Decreases likelihood of ACS setting of symptomology being present for 3 weeks or more as well as no active chest pain or shortness of breath. [BG]   1610 Influenza A, Molecular: Negative [BG]   1610 Influenza B, Molecular: Negative [BG]   1617 I attempted to ambulate the patient after fluids, however, it was unable to get out of the bed and still feels very weak. [BG]   1654 On re-evaluation patient denies any bowel/bladder incontinence, IV drug use. [BG]   1654 Urinalysis Microscopic(!)  Indicates a likely UTI.  I will order 1 g IV Rocephin. [BG]   1657 Patient will be admitted to hospital under inpatient designation for UTI, decreasing ADLs and further workup and evaluation of bilateral lower extremity weakness and fatigue.  Patient understands and agrees with the plan. [BG]      ED Course User Index  [BG] Anabelle Pelaez MD                           Clinical Impression:  Final diagnoses:  [R53.1] Weakness  [N39.0] Urinary tract infection without  hematuria, site unspecified (Primary)  [R79.89] Elevated brain natriuretic peptide (BNP) level  [R29.898] Weakness of both lower extremities          ED Disposition Condition    Admit Stable                Anabelle Pelaez MD  Resident  01/11/24 4128

## 2024-01-11 NOTE — ED NOTES
Patient identifiers have been checked and are correct.  Patient assisted to ED stretcher and placed in a hospital gown.     LOC: The patient is awake, alert, and aware of environment. The patient is oriented x 3 and speaking appropriately.   APPEARANCE: No acute distress noted.   PSYCHOSOCIAL: Patient is calm and cooperative.   SKIN: The skin is warm, dry  RESPIRATORY: Airway is open and patent. Bilateral chest rise and fall. Respirations are spontaneous, even and unlabored. Normal effort and rate noted. No accessory muscle use noted. Breath sounds clear bilaterally.   CARDIAC: Irregular rhythm noted on cardiac monitor- afib, denies hx.   ABDOMEN: Soft and non tender to palpation. No distention noted.   NEUROLOGIC: Eyes open spontaneously. Speech clear. Tolerating saliva secretions well. Able to follow commands, demonstrating ability to actively and appropriately communicate within context of current conversation. Symmetrical facial muscles. Moving all extremities. Movement is purposeful.   MUSCULOSKELETAL: No obvious deformities noted. Tremor noted to bilateral upper extremities.     Side rails up x2. Call light within reach. Updated on POC.

## 2024-01-12 LAB
ALBUMIN SERPL BCP-MCNC: 2 G/DL (ref 3.5–5.2)
ALP SERPL-CCNC: 205 U/L (ref 55–135)
ALT SERPL W/O P-5'-P-CCNC: 23 U/L (ref 10–44)
ANION GAP SERPL CALC-SCNC: 10 MMOL/L (ref 8–16)
AST SERPL-CCNC: 36 U/L (ref 10–40)
BASOPHILS # BLD AUTO: 0.03 K/UL (ref 0–0.2)
BASOPHILS NFR BLD: 0.4 % (ref 0–1.9)
BILIRUB SERPL-MCNC: 0.7 MG/DL (ref 0.1–1)
BUN SERPL-MCNC: 14 MG/DL (ref 8–23)
CALCIUM SERPL-MCNC: 8 MG/DL (ref 8.7–10.5)
CHLORIDE SERPL-SCNC: 106 MMOL/L (ref 95–110)
CO2 SERPL-SCNC: 21 MMOL/L (ref 23–29)
CREAT SERPL-MCNC: 1.1 MG/DL (ref 0.5–1.4)
DIFFERENTIAL METHOD BLD: ABNORMAL
EOSINOPHIL # BLD AUTO: 0.1 K/UL (ref 0–0.5)
EOSINOPHIL NFR BLD: 2 % (ref 0–8)
ERYTHROCYTE [DISTWIDTH] IN BLOOD BY AUTOMATED COUNT: 13.8 % (ref 11.5–14.5)
EST. GFR  (NO RACE VARIABLE): >60 ML/MIN/1.73 M^2
GLUCOSE SERPL-MCNC: 125 MG/DL (ref 70–110)
HCT VFR BLD AUTO: 37.6 % (ref 40–54)
HGB BLD-MCNC: 12.4 G/DL (ref 14–18)
IMM GRANULOCYTES # BLD AUTO: 0.02 K/UL (ref 0–0.04)
IMM GRANULOCYTES NFR BLD AUTO: 0.3 % (ref 0–0.5)
LYMPHOCYTES # BLD AUTO: 2 K/UL (ref 1–4.8)
LYMPHOCYTES NFR BLD: 28.7 % (ref 18–48)
MAGNESIUM SERPL-MCNC: 1.4 MG/DL (ref 1.6–2.6)
MCH RBC QN AUTO: 34.1 PG (ref 27–31)
MCHC RBC AUTO-ENTMCNC: 33 G/DL (ref 32–36)
MCV RBC AUTO: 103 FL (ref 82–98)
MONOCYTES # BLD AUTO: 0.5 K/UL (ref 0.3–1)
MONOCYTES NFR BLD: 7.2 % (ref 4–15)
NEUTROPHILS # BLD AUTO: 4.4 K/UL (ref 1.8–7.7)
NEUTROPHILS NFR BLD: 61.4 % (ref 38–73)
NRBC BLD-RTO: 0 /100 WBC
PHOSPHATE SERPL-MCNC: 2.1 MG/DL (ref 2.7–4.5)
PLATELET # BLD AUTO: 145 K/UL (ref 150–450)
PMV BLD AUTO: 9.3 FL (ref 9.2–12.9)
POCT GLUCOSE: 185 MG/DL (ref 70–110)
POTASSIUM SERPL-SCNC: 3.6 MMOL/L (ref 3.5–5.1)
PROT SERPL-MCNC: 5.5 G/DL (ref 6–8.4)
RBC # BLD AUTO: 3.64 M/UL (ref 4.6–6.2)
SODIUM SERPL-SCNC: 137 MMOL/L (ref 136–145)
WBC # BLD AUTO: 7.1 K/UL (ref 3.9–12.7)

## 2024-01-12 PROCEDURE — 25000003 PHARM REV CODE 250: Performed by: STUDENT IN AN ORGANIZED HEALTH CARE EDUCATION/TRAINING PROGRAM

## 2024-01-12 PROCEDURE — 21400001 HC TELEMETRY ROOM

## 2024-01-12 PROCEDURE — 63600175 PHARM REV CODE 636 W HCPCS: Performed by: FAMILY MEDICINE

## 2024-01-12 PROCEDURE — 84100 ASSAY OF PHOSPHORUS: CPT | Performed by: FAMILY MEDICINE

## 2024-01-12 PROCEDURE — 83735 ASSAY OF MAGNESIUM: CPT | Performed by: FAMILY MEDICINE

## 2024-01-12 PROCEDURE — 63600175 PHARM REV CODE 636 W HCPCS: Performed by: STUDENT IN AN ORGANIZED HEALTH CARE EDUCATION/TRAINING PROGRAM

## 2024-01-12 PROCEDURE — 85025 COMPLETE CBC W/AUTO DIFF WBC: CPT | Performed by: FAMILY MEDICINE

## 2024-01-12 PROCEDURE — 25000003 PHARM REV CODE 250: Performed by: FAMILY MEDICINE

## 2024-01-12 PROCEDURE — 80053 COMPREHEN METABOLIC PANEL: CPT | Performed by: FAMILY MEDICINE

## 2024-01-12 RX ORDER — MAGNESIUM SULFATE 1 G/100ML
1 INJECTION INTRAVENOUS ONCE
Status: COMPLETED | OUTPATIENT
Start: 2024-01-12 | End: 2024-01-12

## 2024-01-12 RX ADMIN — APIXABAN 5 MG: 5 TABLET, FILM COATED ORAL at 09:01

## 2024-01-12 RX ADMIN — CEFTRIAXONE SODIUM 1 G: 1 INJECTION, POWDER, FOR SOLUTION INTRAMUSCULAR; INTRAVENOUS at 04:01

## 2024-01-12 RX ADMIN — DULOXETINE HYDROCHLORIDE 60 MG: 60 CAPSULE, DELAYED RELEASE ORAL at 08:01

## 2024-01-12 RX ADMIN — OXYCODONE HYDROCHLORIDE 5 MG: 5 TABLET ORAL at 04:01

## 2024-01-12 RX ADMIN — PREGABALIN 150 MG: 75 CAPSULE ORAL at 09:01

## 2024-01-12 RX ADMIN — DULOXETINE HYDROCHLORIDE 60 MG: 60 CAPSULE, DELAYED RELEASE ORAL at 09:01

## 2024-01-12 RX ADMIN — OXYCODONE HYDROCHLORIDE 5 MG: 5 TABLET ORAL at 09:01

## 2024-01-12 RX ADMIN — PANTOPRAZOLE SODIUM 40 MG: 40 TABLET, DELAYED RELEASE ORAL at 09:01

## 2024-01-12 RX ADMIN — MAGNESIUM SULFATE HEPTAHYDRATE 1 G: 500 INJECTION, SOLUTION INTRAMUSCULAR; INTRAVENOUS at 10:01

## 2024-01-12 RX ADMIN — ONDANSETRON 4 MG: 2 INJECTION INTRAMUSCULAR; INTRAVENOUS at 04:01

## 2024-01-12 RX ADMIN — APIXABAN 5 MG: 5 TABLET, FILM COATED ORAL at 08:01

## 2024-01-12 RX ADMIN — POTASSIUM PHOSPHATE, MONOBASIC AND POTASSIUM PHOSPHATE, DIBASIC 15 MMOL: 224; 236 INJECTION, SOLUTION, CONCENTRATE INTRAVENOUS at 11:01

## 2024-01-12 NOTE — H&P
[facility] - Emergency Dept  [facility]  History & Physical    Patient Name: Anthony Ball  MRN: [id number]  Patient Class: IP- Inpatient  Admission Date: 1/11/2024  Attending Physician: Daryl Lassiter MD   Primary Care Provider: Isaias Myles MD         Patient information was obtained from patient, past medical records, and ER records.     Subjective:     Principal Problem:Acute cystitis    Chief Complaint:   Chief Complaint   Patient presents with    Weakness     Bilateral leg weakness. Pt is in A-fib upon EMS arrival.         HPI: 73-year-old male with a past medical history of type 2 diabetes with polyneuropathy, HTN, GERD, CAD, prior PE on b.i.d. Eliquis, atrial fibrillation presents to the ED complaining of bilateral leg weakness that onset 3 weeks ago and nausea.  Patient also reports of associated fatigue and nausea during the same time period.  He denies any dizziness, lightheadedness, room spinning sensation, chest pain, shortness of breath.  States that he had similar acute on chronic leg weakness in the past and was discharged to rehab facility where he improved significantly and was discharged able to ambulate with walker. Patient reports several falls since discharge but more acute worsening of weakness and with very significant nausea.     In the ED patient afebrile and hemodynamically stable saturating well on room air. Moves all extremities. 4/5 strength lower extremities bilaterally. Significant neuropathy. UA consistent with UTI. Patient started on abx and admitted to the care of [facility] medicine.     Past Medical History:   Diagnosis Date    Anxiety     Atrial fibrillation 12/2023    Cataract     Coronary artery disease     CAC score 1430    Depression     Diabetic neuropathy     Essential (primary) hypertension     GERD (gastroesophageal reflux disease)     Insomnia     Neuromyopathy     Possibly DM and/or alcohol related.    Pulmonary embolism 12/2023    Reactive airway disease      Type 2 diabetes mellitus        Past Surgical History:   Procedure Laterality Date    COLONOSCOPY      Normal around 2020    TOTAL KNEE ARTHROPLASTY Right        Review of patient's allergies indicates:  No Known Allergies    No current facility-administered medications on file prior to encounter.     Current Outpatient Medications on File Prior to Encounter   Medication Sig    albuterol (VENTOLIN HFA) 90 mcg/actuation inhaler Inhale 2 puffs into the lungs every 6 (six) hours as needed for Wheezing. Rescue    apixaban (ELIQUIS) 5 mg Tab Take 1 tablet (5 mg total) by mouth 2 (two) times daily.    busPIRone (BUSPAR) 10 MG tablet Take 1 tablet (10 mg total) by mouth 2 (two) times daily as needed (Anxiety).    DULoxetine (CYMBALTA) 60 MG capsule Take 1 capsule (60 mg total) by mouth 2 (two) times daily.    empagliflozin (JARDIANCE) 10 mg tablet Take 1 tablet (10 mg total) by mouth once daily.    fluticasone-salmeterol diskus inhaler 250-50 mcg Inhale 1 puff into the lungs 2 (two) times daily. Controller    LIDOcaine (LIDODERM) 5 % Place 3 patches onto the skin daily as needed (Rib pain). Okay to use 1 to 3 patches. Remove & Discard patches within 12 hours or as directed by MD    methocarbamoL (ROBAXIN) 750 MG Tab Take 1 tablet (750 mg total) by mouth 3 (three) times daily as needed (Back pain).    ondansetron (ZOFRAN) 8 MG tablet Take 1 tablet (8 mg total) by mouth every 8 (eight) hours as needed for Nausea.    pantoprazole (PROTONIX) 40 MG tablet TAKE 1 TABLET(40 MG) BY MOUTH EVERY DAY    pregabalin (LYRICA) 150 MG capsule TAKE 1 CAPSULE(150 MG) BY MOUTH TWICE DAILY AS NEEDED FOR PAIN    traZODone (DESYREL) 100 MG tablet Take 2 tablets (200 mg total) by mouth nightly as needed for Insomnia.    triamcinolone acetonide 0.1% (KENALOG) 0.1 % cream Apply topically 2 (two) times daily. Use for 1 to 2 weeks as needed for rash.     Family History       Problem Relation (Age of Onset)    Hypertension Mother          Tobacco  Use    Smoking status: Never    Smokeless tobacco: Never   Substance and Sexual Activity    Alcohol use: Yes     Comment: Former heavy use    Drug use: Never    Sexual activity: Yes     Comment: unknown     Review of Systems   Constitutional:  Positive for activity change, appetite change and fatigue. Negative for chills and fever.   HENT:  Negative for sore throat and trouble swallowing.    Eyes:  Negative for photophobia and visual disturbance.   Respiratory:  Negative for cough, shortness of breath and wheezing.    Cardiovascular:  Negative for chest pain, palpitations and leg swelling.   Gastrointestinal:  Positive for nausea. Negative for abdominal distention, abdominal pain, diarrhea and vomiting.   Genitourinary:  Positive for dysuria. Negative for hematuria.   Musculoskeletal:  Positive for arthralgias and gait problem. Negative for neck pain and neck stiffness.   Skin:  Negative for rash and wound.   Neurological:  Positive for weakness. Negative for seizures, syncope, light-headedness and headaches.   Psychiatric/Behavioral:  Negative for confusion and decreased concentration.      Objective:     Vital Signs (Most Recent):  Temp: 98.4 °F (36.9 °C) (01/11/24 2106)  Pulse: 104 (01/11/24 2106)  Resp: 16 (01/11/24 2120)  BP: 112/77 (01/11/24 2106)  SpO2: 98 % (01/11/24 2106) Vital Signs (24h Range):  Temp:  [97.9 °F (36.6 °C)-98.4 °F (36.9 °C)] 98.4 °F (36.9 °C)  Pulse:  [100-110] 104  Resp:  [14-20] 16  SpO2:  [98 %-100 %] 98 %  BP: (112-154)/(70-85) 112/77     Weight: 90.7 kg (200 lb)  Body mass index is 29.53 kg/m².     Physical Exam           Significant Labs: All pertinent labs within the past 24 hours have been reviewed.  CBC:   Recent Labs   Lab 01/11/24  1458   WBC 7.32   HGB 13.5*   HCT 39.0*        CMP:   Recent Labs   Lab 01/11/24  1458      K 3.9      CO2 18*   *   BUN 14   CREATININE 1.1   CALCIUM 8.4*   PROT 5.9*   ALBUMIN 2.2*   BILITOT 1.0   ALKPHOS 239*   AST 50*    ALT 29   ANIONGAP 12     Urine Studies:   Recent Labs   Lab 01/11/24  1444   COLORU Pratima   APPEARANCEUA Cloudy*   PHUR 5.0   SPECGRAV 1.020   PROTEINUA 1+*   GLUCUA 3+*   KETONESU 1+*   BILIRUBINUA Negative   OCCULTUA 1+*   NITRITE Positive*   LEUKOCYTESUR 3+*   RBCUA 14*   WBCUA >100*   BACTERIA Many*   HYALINECASTS 0       Significant Imaging: I have reviewed all pertinent imaging results/findings within the past 24 hours.  Assessment/Plan:     * Acute cystitis  - start ceftriaxone  - follow up urine culture  - further management pending clinical course and future study review      Alcohol use  - previous very heavy alcohol use/abuse currently greatly improved and controlled per patient and partner  - monitor for signs of withdrawal      Gastroesophageal reflux disease without esophagitis  - continue home meds      Recurrent falls  - suspect acutely worsened in setting of UTI  - fall precautions  - PT/OT consult in am      Type 2 diabetes mellitus with neurologic complication, without long-term current use of insulin  - SSI   - hypoglycemic protocol      VTE Risk Mitigation (From admission, onward)           Ordered     apixaban tablet 5 mg  2 times daily         01/11/24 5890                                    Daryl Lassiter MD  Department of Hospital Medicine  [facility] - Emergency Dept

## 2024-01-12 NOTE — ED NOTES
Sue comes to the office today for a follow-up visit regarding her multiple neurologic issues.    #1. Easy bruisability. Likely related to combination of Plavix and aspirin. Things have improved dramatically. Bruising his Anamika. She is not suffering from any bruisability. Sclera and conjunctiva are clear of any bruising. She is now only on Plavix. She is off aspirin. Tolerating it very well.    #2. The patient now does not have any nausea. She has been splitting the dose of atorvastatin. She takes the 2 doses half an hour apart. Has been doing well with that. No GI symptoms. No nausea. And has tolerated it very well. Encouraged to continue so.    #3. Left middle cerebral artery stenosis and stroke. Patient has been doing well. Denies any new symptoms. Denies any symptoms to suggest an acute neurologic event. Denies any diplopia or dysarthria or dysphagia or not or nausea or vomiting or vertigo or any focal weakness or sensory complaints.    The patient has multiple vascular risk factors including hypertension, dyslipidemia. She is on blood pressure medication and she is on the lipid as mentioned above.    Past Medical History:   Diagnosis Date   • Essential (primary) hypertension    • Malignant neoplasm (CMS/HCC)        ALLERGIES:   Allergen Reactions   • Amoxicillin HIVES       Current Medications    ATORVASTATIN (LIPITOR) 80 MG TABLET    Take 80 mg by mouth nighty (May split in half)    CLOPIDOGREL (PLAVIX) 75 MG TABLET    Take 1 tablet by mouth daily.    LISINOPRIL (ZESTRIL) 5 MG TABLET    Take 1 tablet by mouth daily.         Review of Systems:     As mentioned above.    General Exam:    Visit Vitals  /86 ([place] [facility] [place]itting, Cuff Size: Large Adult)   Pulse 72   Resp 14   Ht 5' 1\" (1.549 m)   Wt 70.7 kg   BMI 29.44 kg/m²         Neurology Exam:    Attention span and concentration: Normal.  The patient was awake, alert and oriented to person time and place.  Speech and  Assumed care of patient and received report from nurse.     Triage note:  Chief Complaint   Patient presents with    Weakness     Bilateral leg weakness. Pt is in A-fib upon EMS arrival.      Review of patient's allergies indicates:  No Known Allergies  Past Medical History:   Diagnosis Date    Anxiety     Atrial fibrillation 12/2023    Cataract     Coronary artery disease     CAC score 1430    Depression     Diabetic neuropathy     Essential (primary) hypertension     GERD (gastroesophageal reflux disease)     Insomnia     Neuromyopathy     Possibly DM and/or alcohol related.    Pulmonary embolism 12/2023    Reactive airway disease     Type 2 diabetes mellitus       language functions: Normal. Can repeat well. Can understand well.  Cranial nerves: Visual fields are full. Pupils are round equal and reactive to light. Extraocular movements were full. Face was symmetric and muscle and sensation. Tongue the midline.  Motor examination: Normal tone and bulk. No drift. Strength was 5 out of 5 bilaterally in the upper and lower extremities proximally and distally.  Sensory examination: normal to pinprick, vibration, temperature   Deep tendon reflexes in the upper extremities: Normal in the biceps and triceps  and brachial radialis bilaterally.  Deep tendon reflexes: Normal knee and ankle jerks bilaterally.  Plantar reflexes: Flexor bilaterally  Coordination: Normal finger-nose-finger, heel-to-shin, and rapid alternating movement.  Gait: Normal.        Impression and plan:    #1. Side effects from atorvastatin. With nausea. Resolved. She should continue splitting the dose within half an hour. Total dose of 80 mg.  #2. Easy bruising. Likely a combination of the antiplatelet. Now doing well off aspirin and on Plavix. Continue.  #3. History of stroke. Multiple vascular risk factors. As above. Continue treatment of blood pressure and statin. Continue aspirin. Lifestyle modification were discussed with the patient again.    As long as the patient is stable, I'll see her on an as-needed basis. She will follow-up follow-up with her primary care physician.

## 2024-01-12 NOTE — ASSESSMENT & PLAN NOTE
- previous very heavy alcohol use/abuse currently greatly improved and controlled per patient and partner  - monitor for signs of withdrawal

## 2024-01-12 NOTE — NURSING
Nurses Note -- 4 Eyes      1/12/2024   5:59 PM      Skin assessed during: Admit      [x] No Altered Skin Integrity Present    []Prevention Measures Documented      [] Yes- Altered Skin Integrity Present or Discovered   [] LDA Added if Not in Epic (Describe Wound)   [] New Altered Skin Integrity was Present on Admit and Documented in LDA   [] Wound Image Taken    Wound Care Consulted? No    Attending Nurse:  Wayne Santa RN/Staff Member:  REFUGIO Michelle

## 2024-01-12 NOTE — ED NOTES
Patient to 650 with transport and with the telebox. Patient belongings going upstairs with the patient.

## 2024-01-12 NOTE — ED NOTES
Assumed care of the patient. Report received from REFUGIO Miller. Pt in hospital gown, side rails up X2, bed low and locked, and call light is placed within reach. No family/visitors at bedside at this time. Pt denies any complaints or needs.       LOC: The patient is awake, alert and aware of environment with an appropriate affect, the patient is oriented x 3 and speaking appropriately.   APPEARANCE: Patient appears comfortable and in no acute distress, patient is clean and well groomed.  SKIN: The skin is warm and dry, color consistent with ethnicity.   MUSCULOSKELETAL: Patient moving all extremities spontaneously, no swelling noted.  RESPIRATORY: Airway is open and patent, respirations are spontaneous, patient has a normal effort and rate, no accessory muscle use noted.  CARDIAC: Patient has a normal rate and regular rhythm, no edema noted, capillary refill < 3 seconds.   GASTRO: Soft and non tender to palpation, no distention noted.   : Pt denies any pain or frequency with urination.  NEURO: Pt opens eyes spontaneously, behavior appropriate to situation, follows commands.

## 2024-01-12 NOTE — SUBJECTIVE & OBJECTIVE
Past Medical History:   Diagnosis Date    Anxiety     Atrial fibrillation 12/2023    Cataract     Coronary artery disease     CAC score 1430    Depression     Diabetic neuropathy     Essential (primary) hypertension     GERD (gastroesophageal reflux disease)     Insomnia     Neuromyopathy     Possibly DM and/or alcohol related.    Pulmonary embolism 12/2023    Reactive airway disease     Type 2 diabetes mellitus        Past Surgical History:   Procedure Laterality Date    COLONOSCOPY      Normal around 2020    TOTAL KNEE ARTHROPLASTY Right        Review of patient's allergies indicates:  No Known Allergies    No current facility-administered medications on file prior to encounter.     Current Outpatient Medications on File Prior to Encounter   Medication Sig    albuterol (VENTOLIN HFA) 90 mcg/actuation inhaler Inhale 2 puffs into the lungs every 6 (six) hours as needed for Wheezing. Rescue    apixaban (ELIQUIS) 5 mg Tab Take 1 tablet (5 mg total) by mouth 2 (two) times daily.    busPIRone (BUSPAR) 10 MG tablet Take 1 tablet (10 mg total) by mouth 2 (two) times daily as needed (Anxiety).    DULoxetine (CYMBALTA) 60 MG capsule Take 1 capsule (60 mg total) by mouth 2 (two) times daily.    empagliflozin (JARDIANCE) 10 mg tablet Take 1 tablet (10 mg total) by mouth once daily.    fluticasone-salmeterol diskus inhaler 250-50 mcg Inhale 1 puff into the lungs 2 (two) times daily. Controller    LIDOcaine (LIDODERM) 5 % Place 3 patches onto the skin daily as needed (Rib pain). Okay to use 1 to 3 patches. Remove & Discard patches within 12 hours or as directed by MD    methocarbamoL (ROBAXIN) 750 MG Tab Take 1 tablet (750 mg total) by mouth 3 (three) times daily as needed (Back pain).    ondansetron (ZOFRAN) 8 MG tablet Take 1 tablet (8 mg total) by mouth every 8 (eight) hours as needed for Nausea.    pantoprazole (PROTONIX) 40 MG tablet TAKE 1 TABLET(40 MG) BY MOUTH EVERY DAY    pregabalin (LYRICA) 150 MG capsule TAKE 1  CAPSULE(150 MG) BY MOUTH TWICE DAILY AS NEEDED FOR PAIN    traZODone (DESYREL) 100 MG tablet Take 2 tablets (200 mg total) by mouth nightly as needed for Insomnia.    triamcinolone acetonide 0.1% (KENALOG) 0.1 % cream Apply topically 2 (two) times daily. Use for 1 to 2 weeks as needed for rash.     Family History       Problem Relation (Age of Onset)    Hypertension Mother          Tobacco Use    Smoking status: Never    Smokeless tobacco: Never   Substance and Sexual Activity    Alcohol use: Yes     Comment: Former heavy use    Drug use: Never    Sexual activity: Yes     Comment: unknown     Review of Systems   Constitutional:  Positive for activity change, appetite change and fatigue. Negative for chills and fever.   HENT:  Negative for sore throat and trouble swallowing.    Eyes:  Negative for photophobia and visual disturbance.   Respiratory:  Negative for cough, shortness of breath and wheezing.    Cardiovascular:  Negative for chest pain, palpitations and leg swelling.   Gastrointestinal:  Positive for nausea. Negative for abdominal distention, abdominal pain, diarrhea and vomiting.   Genitourinary:  Positive for dysuria. Negative for hematuria.   Musculoskeletal:  Positive for arthralgias and gait problem. Negative for neck pain and neck stiffness.   Skin:  Negative for rash and wound.   Neurological:  Positive for weakness. Negative for seizures, syncope, light-headedness and headaches.   Psychiatric/Behavioral:  Negative for confusion and decreased concentration.      Objective:     Vital Signs (Most Recent):  Temp: 98.4 °F (36.9 °C) (01/11/24 2106)  Pulse: 104 (01/11/24 2106)  Resp: 16 (01/11/24 2120)  BP: 112/77 (01/11/24 2106)  SpO2: 98 % (01/11/24 2106) Vital Signs (24h Range):  Temp:  [97.9 °F (36.6 °C)-98.4 °F (36.9 °C)] 98.4 °F (36.9 °C)  Pulse:  [100-110] 104  Resp:  [14-20] 16  SpO2:  [98 %-100 %] 98 %  BP: (112-154)/(70-85) 112/77     Weight: 90.7 kg (200 lb)  Body mass index is 29.53 kg/m².      Physical Exam           Significant Labs: All pertinent labs within the past 24 hours have been reviewed.  CBC:   Recent Labs   Lab 01/11/24  1458   WBC 7.32   HGB 13.5*   HCT 39.0*        CMP:   Recent Labs   Lab 01/11/24  1458      K 3.9      CO2 18*   *   BUN 14   CREATININE 1.1   CALCIUM 8.4*   PROT 5.9*   ALBUMIN 2.2*   BILITOT 1.0   ALKPHOS 239*   AST 50*   ALT 29   ANIONGAP 12     Urine Studies:   Recent Labs   Lab 01/11/24  1444   COLORU Pratima   APPEARANCEUA Cloudy*   PHUR 5.0   SPECGRAV 1.020   PROTEINUA 1+*   GLUCUA 3+*   KETONESU 1+*   BILIRUBINUA Negative   OCCULTUA 1+*   NITRITE Positive*   LEUKOCYTESUR 3+*   RBCUA 14*   WBCUA >100*   BACTERIA Many*   HYALINECASTS 0       Significant Imaging: I have reviewed all pertinent imaging results/findings within the past 24 hours.

## 2024-01-12 NOTE — HPI
73-year-old male with a past medical history of type 2 diabetes with polyneuropathy, HTN, GERD, CAD, prior PE on b.i.d. Eliquis, atrial fibrillation presents to the ED complaining of bilateral leg weakness that onset 3 weeks ago and nausea.  Patient also reports of associated fatigue and nausea during the same time period.  He denies any dizziness, lightheadedness, room spinning sensation, chest pain, shortness of breath.  States that he had similar acute on chronic leg weakness in the past and was discharged to rehab facility where he improved significantly and was discharged able to ambulate with walker. Patient reports several falls since discharge but more acute worsening of weakness and with very significant nausea.     In the ED patient afebrile and hemodynamically stable saturating well on room air. Moves all extremities. 4/5 strength lower extremities bilaterally. Significant neuropathy. UA consistent with UTI. Patient started on abx and admitted to the care of \[facility]\"" medicine.

## 2024-01-12 NOTE — ED NOTES
Tele box 0700 applied to pt. Tech in war room states able to see pt on monitor, rhythm artifact with HR 58.

## 2024-01-12 NOTE — ASSESSMENT & PLAN NOTE
- start ceftriaxone  - follow up urine culture  - further management pending clinical course and future study review

## 2024-01-13 LAB — BACTERIA UR CULT: ABNORMAL

## 2024-01-13 PROCEDURE — 21400001 HC TELEMETRY ROOM

## 2024-01-13 PROCEDURE — 63600175 PHARM REV CODE 636 W HCPCS: Performed by: FAMILY MEDICINE

## 2024-01-13 PROCEDURE — 25000003 PHARM REV CODE 250: Performed by: FAMILY MEDICINE

## 2024-01-13 RX ADMIN — OXYCODONE HYDROCHLORIDE 5 MG: 5 TABLET ORAL at 06:01

## 2024-01-13 RX ADMIN — DULOXETINE HYDROCHLORIDE 60 MG: 60 CAPSULE, DELAYED RELEASE ORAL at 08:01

## 2024-01-13 RX ADMIN — OXYCODONE HYDROCHLORIDE 5 MG: 5 TABLET ORAL at 09:01

## 2024-01-13 RX ADMIN — OXYCODONE HYDROCHLORIDE 5 MG: 5 TABLET ORAL at 12:01

## 2024-01-13 RX ADMIN — ONDANSETRON 8 MG: 8 TABLET, ORALLY DISINTEGRATING ORAL at 09:01

## 2024-01-13 RX ADMIN — APIXABAN 5 MG: 5 TABLET, FILM COATED ORAL at 08:01

## 2024-01-13 RX ADMIN — ONDANSETRON 8 MG: 8 TABLET, ORALLY DISINTEGRATING ORAL at 05:01

## 2024-01-13 RX ADMIN — PANTOPRAZOLE SODIUM 40 MG: 40 TABLET, DELAYED RELEASE ORAL at 08:01

## 2024-01-13 RX ADMIN — CEFTRIAXONE SODIUM 1 G: 1 INJECTION, POWDER, FOR SOLUTION INTRAMUSCULAR; INTRAVENOUS at 05:01

## 2024-01-13 RX ADMIN — APIXABAN 5 MG: 5 TABLET, FILM COATED ORAL at 09:01

## 2024-01-13 RX ADMIN — DULOXETINE HYDROCHLORIDE 60 MG: 60 CAPSULE, DELAYED RELEASE ORAL at 09:01

## 2024-01-13 NOTE — HOSPITAL COURSE
Patient presented for bilateral leg weakness and nausea, found to have UTI which grew out Staph jesús.  Neg CXR. Covid and flu swabs neg. Tx w/ antibiotics . Pt tolerating po intake. Pt has met maximum benefit from hospitalization and is clinically stable for discharge to SNF.      Clear lungs bilaterally, unlabored breathing, on room air, no cyanosis  Heart sounds indicate a regular rate and rhythm  Awake alert, no acute distress   No facial droop, no slurred speech   No obvious lower extremity edema   Following motor commands  No focal motor deficits noted.  Alert, interactive, pleasant

## 2024-01-13 NOTE — PROGRESS NOTES
[facility] Medicine  Progress Note    Patient Name: Anthony Ball  MRN: [id number]  Patient Class: IP- Inpatient   Admission Date: 1/11/2024  Length of Stay: 2 days  Attending Physician: Ryan Silvestre DO  Primary Care Provider: Isaias Myles MD        Subjective:     Principal Problem:Acute cystitis        HPI:  73-year-old male with a past medical history of type 2 diabetes with polyneuropathy, HTN, GERD, CAD, prior PE on b.i.d. Eliquis, atrial fibrillation presents to the ED complaining of bilateral leg weakness that onset 3 weeks ago and nausea.  Patient also reports of associated fatigue and nausea during the same time period.  He denies any dizziness, lightheadedness, room spinning sensation, chest pain, shortness of breath.  States that he had similar acute on chronic leg weakness in the past and was discharged to rehab facility where he improved significantly and was discharged able to ambulate with walker. Patient reports several falls since discharge but more acute worsening of weakness and with very significant nausea.     In the ED patient afebrile and hemodynamically stable saturating well on room air. Moves all extremities. 4/5 strength lower extremities bilaterally. Significant neuropathy. UA consistent with UTI. Patient started on abx and admitted to the care of hospital medicine.     Overview/Hospital Course:  Patient presented for bilateral leg weakness and nausea, found to have UTI.  Started on antibiotics.  Physical and Occupational therapy consulted.  is pleasant, family at bedside.     01/12:  Pain medication adjusted, restarted some of his home meds as well.  Overall stable    01/13:  Patient currently on Rocephin.  Urine culture is returning staph aureus.  Plan to continue with current antibiotic regimen until further susceptibility is back.    Interval History:  Pleasant, lying in bed, denies any complaints      Objective:     Vital Signs (Most Recent):  Temp: 97.2 °F  (36.2 °C) (01/13/24 1102)  Pulse: 99 (01/13/24 1102)  Resp: 16 (01/13/24 1246)  BP: 126/76 (01/13/24 1102)  SpO2: 98 % (01/13/24 1102) Vital Signs (24h Range):  Temp:  [97.2 °F (36.2 °C)-98.1 °F (36.7 °C)] 97.2 °F (36.2 °C)  Pulse:  [] 99  Resp:  [16-18] 16  SpO2:  [97 %-99 %] 98 %  BP: (126-155)/(73-91) 126/76     Weight: 90.7 kg (200 lb)  Body mass index is 29.53 kg/m².    Intake/Output Summary (Last 24 hours) at 1/13/2024 1259  Last data filed at 1/13/2024 0835  Gross per 24 hour   Intake 120 ml   Output 500 ml   Net -380 ml      Physical Exam  Vitals and nursing note reviewed.   Constitutional:       Appearance: Normal appearance. He is well-developed.   HENT:      Head: Normocephalic and atraumatic.      Nose: Nose normal.      Mouth/Throat:      Mouth: Mucous membranes are moist.   Eyes:      General:         Right eye: No discharge.         Left eye: No discharge.      Pupils: Pupils are equal, round, and reactive to light.   Pulmonary:      Effort: Pulmonary effort is normal. No respiratory distress.   Abdominal:      General: There is no distension.      Tenderness: There is no abdominal tenderness.   Musculoskeletal:         General: No deformity. Normal range of motion.      Cervical back: Neck supple.      Comments: Deconditioned    Skin:     General: Skin is warm and dry.   Neurological:      Mental Status: He is alert.      Sensory: No sensory deficit.      Motor: Weakness present. No abnormal muscle tone.      Gait: Gait abnormal.   Psychiatric:         Mood and Affect: Mood normal.         Behavior: Behavior normal.         Computed MELD 3.0 unavailable. Necessary lab results were not found in the last year.  Computed MELD-Na unavailable. Necessary lab results were not found in the last year.      Significant Labs:  CBC:  Recent Labs   Lab 01/11/24  1458 01/12/24  0320   WBC 7.32 7.10   HGB 13.5* 12.4*   HCT 39.0* 37.6*    145*     CMP:  Recent Labs   Lab 01/11/24  1458 01/12/24  0323     137   K 3.9 3.6    106   CO2 18* 21*   * 125*   BUN 14 14   CREATININE 1.1 1.1   CALCIUM 8.4* 8.0*   PROT 5.9* 5.5*   ALBUMIN 2.2* 2.0*   BILITOT 1.0 0.7   ALKPHOS 239* 205*   AST 50* 36   ALT 29 23   ANIONGAP 12 10         Assessment/Plan:      * Acute cystitis  - start ceftriaxone  - follow up urine culture  - further management pending clinical course and future study review      Alcohol use  - previous very heavy alcohol use/abuse currently greatly improved and controlled per patient and partner  - monitor for signs of withdrawal      Gastroesophageal reflux disease without esophagitis  - continue home meds      Recurrent falls  - suspect acutely worsened in setting of UTI  - fall precautions  - PT/OT consult in am      Type 2 diabetes mellitus with neurologic complication, without long-term current use of insulin  - SSI   - hypoglycemic protocol      VTE Risk Mitigation (From admission, onward)           Ordered     apixaban tablet 5 mg  2 times daily         01/11/24 1831                    Discharge Planning   CHRISTA:      Code Status: Full Code   Is the patient medically ready for discharge?:     Reason for patient still in hospital (select all that apply): Patient trending condition and Treatment                     Ryan Silvestre DO  Department of Hospital Medicine   [facility]

## 2024-01-13 NOTE — SUBJECTIVE & OBJECTIVE
Interval History:  Pleasant, lying in bed, denies any complaints      Objective:     Vital Signs (Most Recent):  Temp: 97.2 °F (36.2 °C) (01/13/24 1102)  Pulse: 99 (01/13/24 1102)  Resp: 16 (01/13/24 1246)  BP: 126/76 (01/13/24 1102)  SpO2: 98 % (01/13/24 1102) Vital Signs (24h Range):  Temp:  [97.2 °F (36.2 °C)-98.1 °F (36.7 °C)] 97.2 °F (36.2 °C)  Pulse:  [] 99  Resp:  [16-18] 16  SpO2:  [97 %-99 %] 98 %  BP: (126-155)/(73-91) 126/76     Weight: 90.7 kg (200 lb)  Body mass index is 29.53 kg/m².    Intake/Output Summary (Last 24 hours) at 1/13/2024 1259  Last data filed at 1/13/2024 0835  Gross per 24 hour   Intake 120 ml   Output 500 ml   Net -380 ml      Physical Exam  Vitals and nursing note reviewed.   Constitutional:       Appearance: Normal appearance. He is well-developed.   HENT:      Head: Normocephalic and atraumatic.      Nose: Nose normal.      Mouth/Throat:      Mouth: Mucous membranes are moist.   Eyes:      General:         Right eye: No discharge.         Left eye: No discharge.      Pupils: Pupils are equal, round, and reactive to light.   Pulmonary:      Effort: Pulmonary effort is normal. No respiratory distress.   Abdominal:      General: There is no distension.      Tenderness: There is no abdominal tenderness.   Musculoskeletal:         General: No deformity. Normal range of motion.      Cervical back: Neck supple.      Comments: Deconditioned    Skin:     General: Skin is warm and dry.   Neurological:      Mental Status: He is alert.      Sensory: No sensory deficit.      Motor: Weakness present. No abnormal muscle tone.      Gait: Gait abnormal.   Psychiatric:         Mood and Affect: Mood normal.         Behavior: Behavior normal.         Computed MELD 3.0 unavailable. Necessary lab results were not found in the last year.  Computed MELD-Na unavailable. Necessary lab results were not found in the last year.      Significant Labs:  CBC:  Recent Labs   Lab 01/11/24  1458 01/12/24  0320    WBC 7.32 7.10   HGB 13.5* 12.4*   HCT 39.0* 37.6*    145*     CMP:  Recent Labs   Lab 01/11/24  1458 01/12/24  0320    137   K 3.9 3.6    106   CO2 18* 21*   * 125*   BUN 14 14   CREATININE 1.1 1.1   CALCIUM 8.4* 8.0*   PROT 5.9* 5.5*   ALBUMIN 2.2* 2.0*   BILITOT 1.0 0.7   ALKPHOS 239* 205*   AST 50* 36   ALT 29 23   ANIONGAP 12 10

## 2024-01-14 LAB
ALBUMIN SERPL BCP-MCNC: 2.2 G/DL (ref 3.5–5.2)
ALP SERPL-CCNC: 199 U/L (ref 55–135)
ALT SERPL W/O P-5'-P-CCNC: 21 U/L (ref 10–44)
ANION GAP SERPL CALC-SCNC: 8 MMOL/L (ref 8–16)
AST SERPL-CCNC: 23 U/L (ref 10–40)
BASOPHILS # BLD AUTO: 0.03 K/UL (ref 0–0.2)
BASOPHILS NFR BLD: 0.5 % (ref 0–1.9)
BILIRUB SERPL-MCNC: 0.5 MG/DL (ref 0.1–1)
BUN SERPL-MCNC: 9 MG/DL (ref 8–23)
CALCIUM SERPL-MCNC: 8.3 MG/DL (ref 8.7–10.5)
CHLORIDE SERPL-SCNC: 103 MMOL/L (ref 95–110)
CO2 SERPL-SCNC: 24 MMOL/L (ref 23–29)
CREAT SERPL-MCNC: 0.9 MG/DL (ref 0.5–1.4)
DIFFERENTIAL METHOD BLD: ABNORMAL
EOSINOPHIL # BLD AUTO: 0.3 K/UL (ref 0–0.5)
EOSINOPHIL NFR BLD: 3.9 % (ref 0–8)
ERYTHROCYTE [DISTWIDTH] IN BLOOD BY AUTOMATED COUNT: 13.6 % (ref 11.5–14.5)
EST. GFR  (NO RACE VARIABLE): >60 ML/MIN/1.73 M^2
GLUCOSE SERPL-MCNC: 121 MG/DL (ref 70–110)
HCT VFR BLD AUTO: 37 % (ref 40–54)
HGB BLD-MCNC: 12.6 G/DL (ref 14–18)
IMM GRANULOCYTES # BLD AUTO: 0.03 K/UL (ref 0–0.04)
IMM GRANULOCYTES NFR BLD AUTO: 0.5 % (ref 0–0.5)
LYMPHOCYTES # BLD AUTO: 1.7 K/UL (ref 1–4.8)
LYMPHOCYTES NFR BLD: 27.1 % (ref 18–48)
MAGNESIUM SERPL-MCNC: 1.5 MG/DL (ref 1.6–2.6)
MCH RBC QN AUTO: 34.1 PG (ref 27–31)
MCHC RBC AUTO-ENTMCNC: 34.1 G/DL (ref 32–36)
MCV RBC AUTO: 100 FL (ref 82–98)
MONOCYTES # BLD AUTO: 0.4 K/UL (ref 0.3–1)
MONOCYTES NFR BLD: 6.2 % (ref 4–15)
NEUTROPHILS # BLD AUTO: 3.9 K/UL (ref 1.8–7.7)
NEUTROPHILS NFR BLD: 61.8 % (ref 38–73)
NRBC BLD-RTO: 0 /100 WBC
PHOSPHATE SERPL-MCNC: 2.8 MG/DL (ref 2.7–4.5)
PLATELET # BLD AUTO: 143 K/UL (ref 150–450)
PMV BLD AUTO: 10.1 FL (ref 9.2–12.9)
POTASSIUM SERPL-SCNC: 3.4 MMOL/L (ref 3.5–5.1)
PROT SERPL-MCNC: 5.9 G/DL (ref 6–8.4)
RBC # BLD AUTO: 3.69 M/UL (ref 4.6–6.2)
SODIUM SERPL-SCNC: 135 MMOL/L (ref 136–145)
WBC # BLD AUTO: 6.34 K/UL (ref 3.9–12.7)

## 2024-01-14 PROCEDURE — 85025 COMPLETE CBC W/AUTO DIFF WBC: CPT | Performed by: FAMILY MEDICINE

## 2024-01-14 PROCEDURE — 83735 ASSAY OF MAGNESIUM: CPT | Performed by: FAMILY MEDICINE

## 2024-01-14 PROCEDURE — 80053 COMPREHEN METABOLIC PANEL: CPT | Performed by: FAMILY MEDICINE

## 2024-01-14 PROCEDURE — 25000003 PHARM REV CODE 250: Performed by: FAMILY MEDICINE

## 2024-01-14 PROCEDURE — 97530 THERAPEUTIC ACTIVITIES: CPT

## 2024-01-14 PROCEDURE — 93005 ELECTROCARDIOGRAM TRACING: CPT

## 2024-01-14 PROCEDURE — 94761 N-INVAS EAR/PLS OXIMETRY MLT: CPT

## 2024-01-14 PROCEDURE — 97165 OT EVAL LOW COMPLEX 30 MIN: CPT

## 2024-01-14 PROCEDURE — 25000003 PHARM REV CODE 250: Performed by: STUDENT IN AN ORGANIZED HEALTH CARE EDUCATION/TRAINING PROGRAM

## 2024-01-14 PROCEDURE — 97535 SELF CARE MNGMENT TRAINING: CPT

## 2024-01-14 PROCEDURE — 84100 ASSAY OF PHOSPHORUS: CPT | Performed by: FAMILY MEDICINE

## 2024-01-14 PROCEDURE — 63600175 PHARM REV CODE 636 W HCPCS: Performed by: STUDENT IN AN ORGANIZED HEALTH CARE EDUCATION/TRAINING PROGRAM

## 2024-01-14 PROCEDURE — 21400001 HC TELEMETRY ROOM

## 2024-01-14 PROCEDURE — 36415 COLL VENOUS BLD VENIPUNCTURE: CPT | Performed by: FAMILY MEDICINE

## 2024-01-14 PROCEDURE — 63600175 PHARM REV CODE 636 W HCPCS: Performed by: FAMILY MEDICINE

## 2024-01-14 PROCEDURE — 93010 ELECTROCARDIOGRAM REPORT: CPT | Mod: ,,, | Performed by: INTERNAL MEDICINE

## 2024-01-14 RX ORDER — SULFAMETHOXAZOLE AND TRIMETHOPRIM 800; 160 MG/1; MG/1
1 TABLET ORAL 2 TIMES DAILY
Status: DISCONTINUED | OUTPATIENT
Start: 2024-01-14 | End: 2024-01-18 | Stop reason: HOSPADM

## 2024-01-14 RX ORDER — MORPHINE SULFATE 2 MG/ML
2 INJECTION, SOLUTION INTRAMUSCULAR; INTRAVENOUS EVERY 6 HOURS PRN
Status: DISCONTINUED | OUTPATIENT
Start: 2024-01-14 | End: 2024-01-16

## 2024-01-14 RX ADMIN — APIXABAN 5 MG: 5 TABLET, FILM COATED ORAL at 09:01

## 2024-01-14 RX ADMIN — OXYCODONE HYDROCHLORIDE 5 MG: 5 TABLET ORAL at 10:01

## 2024-01-14 RX ADMIN — SULFAMETHOXAZOLE AND TRIMETHOPRIM 1 TABLET: 800; 160 TABLET ORAL at 09:01

## 2024-01-14 RX ADMIN — ONDANSETRON 8 MG: 8 TABLET, ORALLY DISINTEGRATING ORAL at 06:01

## 2024-01-14 RX ADMIN — MORPHINE SULFATE 2 MG: 2 INJECTION, SOLUTION INTRAMUSCULAR; INTRAVENOUS at 02:01

## 2024-01-14 RX ADMIN — OXYCODONE HYDROCHLORIDE 5 MG: 5 TABLET ORAL at 06:01

## 2024-01-14 RX ADMIN — ONDANSETRON 8 MG: 8 TABLET, ORALLY DISINTEGRATING ORAL at 09:01

## 2024-01-14 RX ADMIN — APIXABAN 5 MG: 5 TABLET, FILM COATED ORAL at 08:01

## 2024-01-14 RX ADMIN — ONDANSETRON 4 MG: 2 INJECTION INTRAMUSCULAR; INTRAVENOUS at 10:01

## 2024-01-14 RX ADMIN — METHOCARBAMOL 750 MG: 750 TABLET ORAL at 09:01

## 2024-01-14 RX ADMIN — DULOXETINE HYDROCHLORIDE 60 MG: 60 CAPSULE, DELAYED RELEASE ORAL at 08:01

## 2024-01-14 RX ADMIN — DULOXETINE HYDROCHLORIDE 60 MG: 60 CAPSULE, DELAYED RELEASE ORAL at 09:01

## 2024-01-14 RX ADMIN — PANTOPRAZOLE SODIUM 40 MG: 40 TABLET, DELAYED RELEASE ORAL at 08:01

## 2024-01-14 NOTE — PROGRESS NOTES
[facility] Medicine  Progress Note    Patient Name: Anthony Ball  MRN: [id number]  Patient Class: IP- Inpatient   Admission Date: 1/11/2024  Length of Stay: 3 days  Attending Physician: Ryan Silvestre DO  Primary Care Provider: Isaias Myles MD        Subjective:     Principal Problem:Acute cystitis        HPI:  73-year-old male with a past medical history of type 2 diabetes with polyneuropathy, HTN, GERD, CAD, prior PE on b.i.d. Eliquis, atrial fibrillation presents to the ED complaining of bilateral leg weakness that onset 3 weeks ago and nausea.  Patient also reports of associated fatigue and nausea during the same time period.  He denies any dizziness, lightheadedness, room spinning sensation, chest pain, shortness of breath.  States that he had similar acute on chronic leg weakness in the past and was discharged to rehab facility where he improved significantly and was discharged able to ambulate with walker. Patient reports several falls since discharge but more acute worsening of weakness and with very significant nausea.     In the ED patient afebrile and hemodynamically stable saturating well on room air. Moves all extremities. 4/5 strength lower extremities bilaterally. Significant neuropathy. UA consistent with UTI. Patient started on abx and admitted to the care of hospital medicine.     Overview/Hospital Course:  Patient presented for bilateral leg weakness and nausea, found to have UTI.  Started on antibiotics.  Physical and Occupational therapy consulted.  is pleasant, family at bedside.     01/12:  Pain medication adjusted, restarted some of his home meds as well.  Overall stable    01/13:  Patient currently on Rocephin.  Urine culture is returning staph aureus.  Plan to continue with current antibiotic regimen until further susceptibility is back.    01/14:  Susceptibility is back, sensitive to Bactrim.  Awaiting PT, OT recommendations.  Appreciate assistance.    Interval  History:  Pleasant, denies any complaints, discussed current progress, all questions answered      Objective:     Vital Signs (Most Recent):  Temp: 97.6 °F (36.4 °C) (01/14/24 1523)  Pulse: 89 (01/14/24 1523)  Resp: 18 (01/14/24 1523)  BP: 111/77 (01/14/24 1523)  SpO2: 99 % (01/14/24 1523) Vital Signs (24h Range):  Temp:  [97.2 °F (36.2 °C)-97.8 °F (36.6 °C)] 97.6 °F (36.4 °C)  Pulse:  [] 89  Resp:  [16-18] 18  SpO2:  [98 %-100 %] 99 %  BP: (106-136)/(67-96) 111/77     Weight: 90.7 kg (200 lb)  Body mass index is 29.53 kg/m².  No intake or output data in the 24 hours ending 01/14/24 1540   Physical Exam  Vitals and nursing note reviewed.   Constitutional:       Appearance: Normal appearance. He is well-developed.   HENT:      Head: Normocephalic and atraumatic.      Nose: Nose normal.      Mouth/Throat:      Mouth: Mucous membranes are moist.   Eyes:      General:         Right eye: No discharge.         Left eye: No discharge.      Pupils: Pupils are equal, round, and reactive to light.   Pulmonary:      Effort: Pulmonary effort is normal. No respiratory distress.   Abdominal:      General: There is no distension.      Tenderness: There is no abdominal tenderness.   Musculoskeletal:         General: No deformity. Normal range of motion.      Cervical back: Neck supple.      Comments: Deconditioned    Skin:     General: Skin is warm and dry.   Neurological:      Mental Status: He is alert.      Sensory: No sensory deficit.      Motor: Weakness present. No abnormal muscle tone.      Gait: Gait abnormal.   Psychiatric:         Mood and Affect: Mood normal.         Behavior: Behavior normal.         Computed MELD 3.0 unavailable. Necessary lab results were not found in the last year.  Computed MELD-Na unavailable. Necessary lab results were not found in the last year.      Significant Labs:  CBC:  Recent Labs   Lab 01/14/24  0401   WBC 6.34   HGB 12.6*   HCT 37.0*   *     CMP:  Recent Labs   Lab  01/14/24  0401   *   K 3.4*      CO2 24   *   BUN 9   CREATININE 0.9   CALCIUM 8.3*   PROT 5.9*   ALBUMIN 2.2*   BILITOT 0.5   ALKPHOS 199*   AST 23   ALT 21   ANIONGAP 8         Assessment/Plan:      * Acute cystitis  - start ceftriaxone  - follow up urine culture  - further management pending clinical course and future study review      Alcohol use  - previous very heavy alcohol use/abuse currently greatly improved and controlled per patient and partner  - monitor for signs of withdrawal      Gastroesophageal reflux disease without esophagitis  - continue home meds      Recurrent falls  - suspect acutely worsened in setting of UTI  - fall precautions  - PT/OT consult in am      Type 2 diabetes mellitus with neurologic complication, without long-term current use of insulin  - SSI   - hypoglycemic protocol      VTE Risk Mitigation (From admission, onward)           Ordered     apixaban tablet 5 mg  2 times daily         01/11/24 1831                    Discharge Planning   CHRISTA: 1/15/2024     Code Status: Full Code   Is the patient medically ready for discharge?:     Reason for patient still in hospital (select all that apply): PT / OT recommendations                     Ryan Silvestre DO  Department of Hospital Medicine   [facility] - Med Surg

## 2024-01-14 NOTE — PT/OT/SLP EVAL
Occupational Therapy   Evaluation/Treatment    Name: Anthony Ball  MRN: [id number]  Admitting Diagnosis: Acute cystitis  Recent Surgery: * No surgery found *      Recommendations:     Discharge Recommendations: Moderate Intensity Therapy  Discharge Equipment Recommendations:  none  Barriers to discharge:  Inaccessible home environment (Increased level of assistance needed at this time.)    Assessment:     Anthony Ball is a 73 y.o. male with a medical diagnosis of Acute cystitis. Performance deficits affecting function: weakness, impaired endurance, impaired self care skills, impaired functional mobility, gait instability, impaired balance, decreased lower extremity function, pain, impaired sensation. Patient agreed to therapy, but limited due to pain. Patient sat EOB, but was unable to perform a sit<>stand on this date during time of attempt. Nurse providing pain medication after therapy due to type of medication. Patient would benefit from continued skilled acute OT 3x/wk to improve functional mobility, increase independence with ADLs, and address established goals. Recommending moderate intensity therapy once medically appropriate for discharge to increase maximal independence, reduce burden of care, and ensure safety.     Rehab Prognosis: Good; patient would benefit from acute skilled OT services to address these deficits and reach maximum level of function.       Plan:     Patient to be seen 3 x/week to address the above listed problems via self-care/home management, therapeutic activities, therapeutic exercises  Plan of Care Expires: 02/14/24  Plan of Care Reviewed with: patient    Subjective     Chief Complaint: Pain from neuropathy pain in Legs.   Patient/Family Comments/goals: patient agreed to therapy    Occupational Profile:  Living Environment: Patient lives with partner in a [facility] with 3 HEIKE to enter and no handrails. Patient has a walk in shower with shower chair and no grab bars. Patient has a riser over the  toilet with grab bars. Patient was independent with ADLs and modified independent with functional mobility PTA utilizing a RW. Patient reports ambulating short distances at home.   Equipment owns at Home: walker, rolling, rollator, shower chair, cane, straight, wheelchair (riser over toilet). Bed with controls to adjust head and feet.     Pain/Comfort:  Pain Rating 1: 7/10  Location - Side 1: Bilateral  Location - Orientation 1: generalized  Location 1: leg  Pain Addressed 1: Reposition, Distraction  Pain Rating Post-Intervention 1: 7/10    Patients cultural, spiritual, Christian conflicts given the current situation: no    Objective:     Communicated with: NSCIRILO prior to session.  Patient found HOB elevated with peripheral IV upon OT entry to room.    General Precautions: Standard, fall  Orthopedic Precautions: N/A  Braces: N/A  Respiratory Status: Room air    Occupational Performance:    Bed Mobility:    Patient completed Rolling/Turning to Left with  stand by assistance  Patient completed Rolling/Turning to Right with stand by assistance  Patient completed Scooting with stand by assistance to HOB lying supine   Patient completed Supine to Sit with minimum assistance  Patient completed Sit to Supine with minimum assistance    Functional Mobility/Transfers:  Patient attempted to perform a sit<>stand with RW and without a RW with this therapist, but patient was unable to due to pain    Activities of Daily Living:  Lower Body Dressing: supervision [place] and donning socks EOB    Cognitive/Visual Perceptual:  Cognitive/Psychosocial Skills:     -       Oriented to: Person, Place, Time, and Situation   -       Follows Commands/attention:Follows multistep  commands  -       Communication: clear/fluent  -       Memory: No Deficits noted  -       Safety awareness/insight to disability: intact   -       Mood/Affect/Coping skills/emotional control: Appropriate to situation  Visual/Perceptual:      -Intact      Physical  Exam:  Upper Extremity Range of Motion:     -       Right Upper Extremity: WFL  -       Left Upper Extremity: WFL  Upper Extremity Strength:    -       Right Upper Extremity: WFL  -       Left Upper Extremity: WFL   Strength:    -       Right Upper Extremity: WFL  -       Left Upper Extremity: WFL  Fine Motor Coordination:    -       Intact    AMPAC 6 Click ADL:  AMPAC Total Score: 19    Treatment & Education:  Role of OT and POC  ADL retraining  Functional mobility training  Safety  Discharge planning  Importance EOB/OOB activity    Patient left HOB elevated with all lines intact, call button in reach, bed alarm on, nurse notified, and visitors present as they arrived when therapist was exiting room.     GOALS:   Multidisciplinary Problems       Occupational Therapy Goals          Problem: Occupational Therapy    Goal Priority Disciplines Outcome Interventions   Occupational Therapy Goal     OT, PT/OT Ongoing, Progressing    Description: Goals to be met by: 2/4/2024    Patient will increase functional independence with ADLs by performing:    UE Dressing with Set-up Assistance.  Grooming while standing at sink with CGA.  Toileting from toilet with Contact Guard Assistance for hygiene and clothing management.   Supine to sit with Supervision.  Stand pivot transfers with Contact Guard Assistance.  Toilet transfer to toilet with Contact Guard Assistance.                         History:     Past Medical History:   Diagnosis Date    Anxiety     Atrial fibrillation 12/2023    Cataract     Coronary artery disease     CAC score 1430    Depression     Diabetic neuropathy     Essential (primary) hypertension     GERD (gastroesophageal reflux disease)     Insomnia     Neuromyopathy     Possibly DM and/or alcohol related.    Pulmonary embolism 12/2023    Reactive airway disease     Type 2 diabetes mellitus          Past Surgical History:   Procedure Laterality Date    COLONOSCOPY      Normal around 2020    TOTAL KNEE  ARTHROPLASTY Right        Time Tracking:     OT Date of Treatment: 01/14/24  OT Start Time: 1348  OT Stop Time: 1417  OT Total Time (min): 29 min    Billable Minutes:Evaluation 10  Therapeutic Activity 19    1/14/2024

## 2024-01-14 NOTE — PLAN OF CARE
Problem: Occupational Therapy  Goal: Occupational Therapy Goal  Description: Goals to be met by: 2/4/2024    Patient will increase functional independence with ADLs by performing:    UE Dressing with Set-up Assistance.  Grooming while standing at sink with CGA.  Toileting from toilet with Contact Guard Assistance for hygiene and clothing management.   Supine to sit with Supervision.  Stand pivot transfers with Contact Guard Assistance.  Toilet transfer to toilet with Contact Guard Assistance.    Outcome: Ongoing, Progressing   Patient's goals are set.

## 2024-01-14 NOTE — SUBJECTIVE & OBJECTIVE
Interval History:  Pleasant, denies any complaints, discussed current progress, all questions answered      Objective:     Vital Signs (Most Recent):  Temp: 97.6 °F (36.4 °C) (01/14/24 1523)  Pulse: 89 (01/14/24 1523)  Resp: 18 (01/14/24 1523)  BP: 111/77 (01/14/24 1523)  SpO2: 99 % (01/14/24 1523) Vital Signs (24h Range):  Temp:  [97.2 °F (36.2 °C)-97.8 °F (36.6 °C)] 97.6 °F (36.4 °C)  Pulse:  [] 89  Resp:  [16-18] 18  SpO2:  [98 %-100 %] 99 %  BP: (106-136)/(67-96) 111/77     Weight: 90.7 kg (200 lb)  Body mass index is 29.53 kg/m².  No intake or output data in the 24 hours ending 01/14/24 1540   Physical Exam  Vitals and nursing note reviewed.   Constitutional:       Appearance: Normal appearance. He is well-developed.   HENT:      Head: Normocephalic and atraumatic.      Nose: Nose normal.      Mouth/Throat:      Mouth: Mucous membranes are moist.   Eyes:      General:         Right eye: No discharge.         Left eye: No discharge.      Pupils: Pupils are equal, round, and reactive to light.   Pulmonary:      Effort: Pulmonary effort is normal. No respiratory distress.   Abdominal:      General: There is no distension.      Tenderness: There is no abdominal tenderness.   Musculoskeletal:         General: No deformity. Normal range of motion.      Cervical back: Neck supple.      Comments: Deconditioned    Skin:     General: Skin is warm and dry.   Neurological:      Mental Status: He is alert.      Sensory: No sensory deficit.      Motor: Weakness present. No abnormal muscle tone.      Gait: Gait abnormal.   Psychiatric:         Mood and Affect: Mood normal.         Behavior: Behavior normal.         Computed MELD 3.0 unavailable. Necessary lab results were not found in the last year.  Computed MELD-Na unavailable. Necessary lab results were not found in the last year.      Significant Labs:  CBC:  Recent Labs   Lab 01/14/24  0401   WBC 6.34   HGB 12.6*   HCT 37.0*   *     CMP:  Recent Labs   Lab  01/14/24  0401   *   K 3.4*      CO2 24   *   BUN 9   CREATININE 0.9   CALCIUM 8.3*   PROT 5.9*   ALBUMIN 2.2*   BILITOT 0.5   ALKPHOS 199*   AST 23   ALT 21   ANIONGAP 8

## 2024-01-15 LAB
ALBUMIN SERPL BCP-MCNC: 2.1 G/DL (ref 3.5–5.2)
ALP SERPL-CCNC: 168 U/L (ref 55–135)
ALT SERPL W/O P-5'-P-CCNC: 16 U/L (ref 10–44)
ANION GAP SERPL CALC-SCNC: 10 MMOL/L (ref 8–16)
AST SERPL-CCNC: 19 U/L (ref 10–40)
BILIRUB SERPL-MCNC: 0.5 MG/DL (ref 0.1–1)
BUN SERPL-MCNC: 6 MG/DL (ref 8–23)
CALCIUM SERPL-MCNC: 8.2 MG/DL (ref 8.7–10.5)
CHLORIDE SERPL-SCNC: 104 MMOL/L (ref 95–110)
CO2 SERPL-SCNC: 22 MMOL/L (ref 23–29)
CREAT SERPL-MCNC: 0.8 MG/DL (ref 0.5–1.4)
ERYTHROCYTE [DISTWIDTH] IN BLOOD BY AUTOMATED COUNT: 13 % (ref 11.5–14.5)
EST. GFR  (NO RACE VARIABLE): >60 ML/MIN/1.73 M^2
GLUCOSE SERPL-MCNC: 157 MG/DL (ref 70–110)
HCT VFR BLD AUTO: 36.2 % (ref 40–54)
HGB BLD-MCNC: 12.5 G/DL (ref 14–18)
MAGNESIUM SERPL-MCNC: 1.3 MG/DL (ref 1.6–2.6)
MCH RBC QN AUTO: 33.9 PG (ref 27–31)
MCHC RBC AUTO-ENTMCNC: 34.5 G/DL (ref 32–36)
MCV RBC AUTO: 98 FL (ref 82–98)
PLATELET # BLD AUTO: 134 K/UL (ref 150–450)
PMV BLD AUTO: 10.4 FL (ref 9.2–12.9)
POTASSIUM SERPL-SCNC: 3.7 MMOL/L (ref 3.5–5.1)
PROT SERPL-MCNC: 5.6 G/DL (ref 6–8.4)
RBC # BLD AUTO: 3.69 M/UL (ref 4.6–6.2)
SODIUM SERPL-SCNC: 136 MMOL/L (ref 136–145)
WBC # BLD AUTO: 6.71 K/UL (ref 3.9–12.7)

## 2024-01-15 PROCEDURE — 25000003 PHARM REV CODE 250: Performed by: FAMILY MEDICINE

## 2024-01-15 PROCEDURE — 97162 PT EVAL MOD COMPLEX 30 MIN: CPT

## 2024-01-15 PROCEDURE — 97530 THERAPEUTIC ACTIVITIES: CPT

## 2024-01-15 PROCEDURE — 63600175 PHARM REV CODE 636 W HCPCS: Performed by: STUDENT IN AN ORGANIZED HEALTH CARE EDUCATION/TRAINING PROGRAM

## 2024-01-15 PROCEDURE — 36415 COLL VENOUS BLD VENIPUNCTURE: CPT | Performed by: STUDENT IN AN ORGANIZED HEALTH CARE EDUCATION/TRAINING PROGRAM

## 2024-01-15 PROCEDURE — 80053 COMPREHEN METABOLIC PANEL: CPT | Performed by: STUDENT IN AN ORGANIZED HEALTH CARE EDUCATION/TRAINING PROGRAM

## 2024-01-15 PROCEDURE — 85027 COMPLETE CBC AUTOMATED: CPT | Performed by: STUDENT IN AN ORGANIZED HEALTH CARE EDUCATION/TRAINING PROGRAM

## 2024-01-15 PROCEDURE — 83735 ASSAY OF MAGNESIUM: CPT | Performed by: STUDENT IN AN ORGANIZED HEALTH CARE EDUCATION/TRAINING PROGRAM

## 2024-01-15 PROCEDURE — 21400001 HC TELEMETRY ROOM

## 2024-01-15 PROCEDURE — 25000003 PHARM REV CODE 250: Performed by: STUDENT IN AN ORGANIZED HEALTH CARE EDUCATION/TRAINING PROGRAM

## 2024-01-15 RX ADMIN — MORPHINE SULFATE 2 MG: 2 INJECTION, SOLUTION INTRAMUSCULAR; INTRAVENOUS at 12:01

## 2024-01-15 RX ADMIN — ONDANSETRON 8 MG: 8 TABLET, ORALLY DISINTEGRATING ORAL at 09:01

## 2024-01-15 RX ADMIN — SULFAMETHOXAZOLE AND TRIMETHOPRIM 1 TABLET: 800; 160 TABLET ORAL at 10:01

## 2024-01-15 RX ADMIN — PANTOPRAZOLE SODIUM 40 MG: 40 TABLET, DELAYED RELEASE ORAL at 09:01

## 2024-01-15 RX ADMIN — OXYCODONE HYDROCHLORIDE 5 MG: 5 TABLET ORAL at 09:01

## 2024-01-15 RX ADMIN — ONDANSETRON 8 MG: 8 TABLET, ORALLY DISINTEGRATING ORAL at 03:01

## 2024-01-15 RX ADMIN — SULFAMETHOXAZOLE AND TRIMETHOPRIM 1 TABLET: 800; 160 TABLET ORAL at 09:01

## 2024-01-15 RX ADMIN — APIXABAN 5 MG: 5 TABLET, FILM COATED ORAL at 10:01

## 2024-01-15 RX ADMIN — APIXABAN 5 MG: 5 TABLET, FILM COATED ORAL at 09:01

## 2024-01-15 RX ADMIN — OXYCODONE HYDROCHLORIDE 5 MG: 5 TABLET ORAL at 07:01

## 2024-01-15 RX ADMIN — DULOXETINE HYDROCHLORIDE 60 MG: 60 CAPSULE, DELAYED RELEASE ORAL at 09:01

## 2024-01-15 RX ADMIN — DULOXETINE HYDROCHLORIDE 60 MG: 60 CAPSULE, DELAYED RELEASE ORAL at 10:01

## 2024-01-15 NOTE — PLAN OF CARE
[facility] S[facility]  Initial Discharge Assessment       Primary Care Provider: Isaias Myles MD    Admission Diagnosis: Weakness [R53.1]  Elevated brain natriuretic peptide (BNP) level [R79.89]  Chest pain [R07.9]  Weakness of both lower extremities [R29.898]  Urinary tract infection without hematuria, site unspecified [N39.0]    Admission Date: 1/11/2024  Expected Discharge Date: 1/15/2024    Transition of Care Barriers: None    Payor: MEDICARE / Plan: MEDICARE PART A & B / Product Type: Government /     Extended Emergency Contact Information  Primary Emergency Contact: Bulmaro Melton   [place]  Mobile Phone: [phone]  Relation: Spouse    Discharge Plan A: Skilled Nursing Facility  Discharge Plan B: [facility]      [id number] - [address] - [address] AT [facility] & [address] [address]  Phone: [phone] Fax: [phone]      Initial Assessment (most recent)       Adult Discharge Assessment - 01/15/24 1059          Discharge Assessment    Assessment Type Discharge Planning Assessment     Confirmed/corrected address, phone number and insurance Yes     Confirmed Demographics Correct on Facesheet     Source of Information health care advocate     Does patient/caregiver understand observation status Yes     Communicated CHRISTA with patient/caregiver Yes     Reason For Admission Weakness     People in Home spouse     Facility Arrived From: Home     Do you expect to return to your current living situation? --   TBD    Do you have help at home or someone to help you manage your care at home? Yes     Who are your caregiver(s) and their phone number(s)? spouse     Prior to hospitilization cognitive status: Alert/Oriented     Current cognitive status: Alert/Oriented     Walking or Climbing Stairs Difficulty yes     Walking or Climbing Stairs ambulation difficulty, requires equipment     Dressing/Bathing Difficulty no      Home Accessibility wheelchair accessible     Home Layout Able to live on 1st floor     Equipment Currently Used at Home power chair     Readmission within 30 days? No     Patient currently being followed by outpatient case management? No     Do you currently have service(s) that help you manage your care at home? No     Do you take prescription medications? Yes     Do you have prescription coverage? Yes     Do you have any problems affording any of your prescribed medications? No     Is the patient taking medications as prescribed? yes     Who is going to help you get home at discharge? Spouse     How do you get to doctors appointments? family or friend will provide     Are you on dialysis? No     Do you take coumadin? No     Discharge Plan A Skilled Nursing Facility     Discharge Plan B Home Health     DME Needed Upon Discharge  none     Discharge Plan discussed with: Patient     Transition of Care Barriers None        Physical Activity    On average, how many days per week do you engage in moderate to strenuous exercise (like a brisk walk)? 0 days     On average, how many minutes do you engage in exercise at this level? 0 min        Financial Resource Strain    How hard is it for you to pay for the very basics like food, housing, medical care, and heating? Not very hard        Housing Stability    In the last 12 months, was there a time when you were not able to pay the mortgage or rent on time? No     In the last 12 months, how many places have you lived? 1     In the last 12 months, was there a time when you did not have a steady place to sleep or slept in a shelter (including now)? No        Transportation Needs    In the past 12 months, has lack of transportation kept you from medical appointments or from getting medications? No     In the past 12 months, has lack of transportation kept you from meetings, work, or from getting things needed for daily living? No        Food Insecurity    Within the past 12  months, you worried that your food would run out before you got the money to buy more. Never true     Within the past 12 months, the food you bought just didn't last and you didn't have money to get more. Never true        Stress    Do you feel stress - tense, restless, nervous, or anxious, or unable to sleep at night because your mind is troubled all the time - these days? Only a little        Social Connections    In a typical week, how many times do you talk on the phone with family, friends, or neighbors? More than three times a week     How often do you get together with friends or relatives? More than three times a week     How often do you attend Anabaptist or Presybeterian services? Never     Do you belong to any clubs or organizations such as Anabaptist groups, unions, fraternal or athletic groups, or school groups? No     How often do you attend meetings of the clubs or organizations you belong to? Never     Are you , , , , never , or living with a partner?         Alcohol Use    Q1: How often do you have a drink containing alcohol? Never     Q2: How many drinks containing alcohol do you have on a typical day when you are drinking? Patient does not drink     Q3: How often do you have six or more drinks on one occasion? Never                   Discharge Plan A and Plan B have been determined by review of patient's clinical status, future medical and therapeutic needs, and coverage/benefits for post-acute care in coordination with multidisciplinary team members.

## 2024-01-15 NOTE — ASSESSMENT & PLAN NOTE
- suspect acutely worsened in setting of UTI  - fall precautions  - PT/OT consult.  Recommending moderate intensity.  Case management updated, appreciate assistance.

## 2024-01-15 NOTE — PROGRESS NOTES
[facility] Medicine  Progress Note    Patient Name: Anthony Ball  MRN: [id number]  Patient Class: IP- Inpatient   Admission Date: 1/11/2024  Length of Stay: 4 days  Attending Physician: Ryan Silvestre DO  Primary Care Provider: Isaias Myles MD        Subjective:     Principal Problem:Acute cystitis        HPI:  73-year-old male with a past medical history of type 2 diabetes with polyneuropathy, HTN, GERD, CAD, prior PE on b.i.d. Eliquis, atrial fibrillation presents to the ED complaining of bilateral leg weakness that onset 3 weeks ago and nausea.  Patient also reports of associated fatigue and nausea during the same time period.  He denies any dizziness, lightheadedness, room spinning sensation, chest pain, shortness of breath.  States that he had similar acute on chronic leg weakness in the past and was discharged to rehab facility where he improved significantly and was discharged able to ambulate with walker. Patient reports several falls since discharge but more acute worsening of weakness and with very significant nausea.     In the ED patient afebrile and hemodynamically stable saturating well on room air. Moves all extremities. 4/5 strength lower extremities bilaterally. Significant neuropathy. UA consistent with UTI. Patient started on abx and admitted to the care of [facility] medicine.     Overview/Hospital Course:  Patient presented for bilateral leg weakness and nausea, found to have UTI.  Started on antibiotics.  Physical and Occupational therapy consulted.  is pleasant, family at bedside.     01/12:  Pain medication adjusted, restarted some of his home meds as well.  Overall stable    01/13:  Patient currently on Rocephin.  Urine culture is returning staph aureus.  Plan to continue with current antibiotic regimen until further susceptibility is back.    01/14:  Susceptibility is back, sensitive to Bactrim.  Awaiting PT, OT recommendations. Appreciate assistance.    01/15:  PT and  OT recommending moderate intensity.  Appreciate case management start working on placement.    Interval History:  Pleasant, updated on the plan, all questions answered, denies any complaints today.      Objective:     Vital Signs (Most Recent):  Temp: 97.9 °F (36.6 °C) (01/15/24 1051)  Pulse: (!) 111 (01/15/24 1054)  Resp: 17 (01/15/24 1224)  BP: 125/79 (01/15/24 1051)  SpO2: 99 % (01/15/24 1051) Vital Signs (24h Range):  Temp:  [97 °F (36.1 °C)-98.1 °F (36.7 °C)] 97.9 °F (36.6 °C)  Pulse:  [] 111  Resp:  [16-18] 17  SpO2:  [97 %-99 %] 99 %  BP: (111-153)/(66-84) 125/79     Weight: 90.7 kg (200 lb)  Body mass index is 29.53 kg/m².  No intake or output data in the 24 hours ending 01/15/24 1352   Physical Exam  Vitals and nursing note reviewed.   Constitutional:       Appearance: Normal appearance. He is well-developed.   HENT:      Head: Normocephalic and atraumatic.      Nose: Nose normal.      Mouth/Throat:      Mouth: Mucous membranes are moist.   Eyes:      General:         Right eye: No discharge.         Left eye: No discharge.      Pupils: Pupils are equal, round, and reactive to light.   Pulmonary:      Effort: Pulmonary effort is normal. No respiratory distress.   Abdominal:      General: There is no distension.      Tenderness: There is no abdominal tenderness.   Musculoskeletal:         General: No deformity. Normal range of motion.      Cervical back: Neck supple.      Comments: Deconditioned    Skin:     General: Skin is warm and dry.   Neurological:      Mental Status: He is alert.      Sensory: No sensory deficit.      Motor: Weakness present. No abnormal muscle tone.      Gait: Gait abnormal.   Psychiatric:         Mood and Affect: Mood normal.         Behavior: Behavior normal.         Computed MELD 3.0 unavailable. Necessary lab results were not found in the last year.  Computed MELD-Na unavailable. Necessary lab results were not found in the last year.      Significant Labs:  CBC:  Recent  Labs   Lab 01/14/24  0401 01/15/24  1151   WBC 6.34 6.71   HGB 12.6* 12.5*   HCT 37.0* 36.2*   * 134*     CMP:  Recent Labs   Lab 01/14/24  0401 01/15/24  1151   * 136   K 3.4* 3.7    104   CO2 24 22*   * 157*   BUN 9 6*   CREATININE 0.9 0.8   CALCIUM 8.3* 8.2*   PROT 5.9* 5.6*   ALBUMIN 2.2* 2.1*   BILITOT 0.5 0.5   ALKPHOS 199* 168*   AST 23 19   ALT 21 16   ANIONGAP 8 10         Assessment/Plan:      * Acute cystitis  - start ceftriaxone  - follow up urine culture  - further management pending clinical course and future study review      Alcohol use  - previous very heavy alcohol use/abuse currently greatly improved and controlled per patient and partner  - monitor for signs of withdrawal      Gastroesophageal reflux disease without esophagitis  - continue home meds      Recurrent falls  - suspect acutely worsened in setting of UTI  - fall precautions  - PT/OT consult.  Recommending moderate intensity.  Case management updated, appreciate assistance.      Type 2 diabetes mellitus with neurologic complication, without long-term current use of insulin  - SSI   - hypoglycemic protocol      VTE Risk Mitigation (From admission, onward)           Ordered     apixaban tablet 5 mg  2 times daily         01/11/24 1831                    Discharge Planning   CHRISTA: 1/15/2024     Code Status: Full Code   Is the patient medically ready for discharge?: Yes    Reason for patient still in hospital (select all that apply): Patient trending condition and Treatment  Discharge Plan A: Skilled Nursing Facility                  Ryan Silvestre DO  Department of Hospital Medicine   [facility]

## 2024-01-15 NOTE — PLAN OF CARE
Problem: Physical Therapy  Goal: Physical Therapy Goal  Description: Goals to met by 1/29/2024    1. Supine to sit with Stand-by Assistance  2. Sit to supine with Stand-by Assistance  3. Rolling to Left and Right with Stand-by Assistance.  4. Sit to stand transfer with Contact Guard Assistance  5. Bed to chair transfer with Contact Guard Assistance using Rolling Walker  6. Gait  x 50 feet with Contact Guard Assistance using Rolling Walker   7. Ascend/descend 5 stair(s) with no Handrails Minimal Assistance using LRAD.   8. Stand for 8 minutes with Stand-by Assistance using Rolling Walker  9. Lower extremity exercise program x15 reps per Instruction, with assistance as needed in order to facilitate improved strength, improved postural control, and improvement in functional independence    PT Eval: 1/15/2024

## 2024-01-15 NOTE — PT/OT/SLP EVAL
Physical Therapy Evaluation and Treatment    Patient Name:  Anthony Ball   MRN:  [id number]  Admit Date: 1/11/2024  Admitting Diagnosis:  Acute cystitis   Length of Stay: 4 days  Recent Surgery: * No surgery found *      Recommendations:     Discharge Recommendations:  Moderate Intensity Therapy  Discharge Equipment Recommendations: to be determined by next level of care   Justification for Equipment: N/A  Barriers to discharge: Inaccessible home environment and Evolving Clinical Presentation    Assessment:     Anthony Ball is a 73 y.o. male admitted with a medical diagnosis of Acute cystitis.  He presents with the following impairments/functional limitations: weakness, impaired functional mobility, gait instability, impaired endurance, impaired balance, impaired self care skills, decreased lower extremity function, decreased safety awareness, impaired cognition.     Pt very anxious but agreeable to therapy, has a fear of falling due to 5 previous falls, overly cautious when working with therapy. Pt needs to build strength as well as confidence to promote safe discharge home and decrease caregiver burden. Pt reluctant to ambulate farther than a few feet despite appearing to be able to do so. Emphasize need for maximal mobility to facilitate return to PLOF.     Rehab Prognosis: Fair; patient would benefit from acute skilled PT services to address these deficits and reach maximum level of function.      Treatment Tolerated: Fair    Highest level of mobility achieved this visit: ambulates 2ft + 5ft w/ RW and min A    Activity with RN/PCT: transfer with 1 person assist    Plan:     During this hospitalization, patient to be seen 4 x/week to address the identified rehab impairments via gait training, therapeutic activities, therapeutic exercises, neuromuscular re-education and progress towards the established goals.    Plan of Care Expires:  02/15/24    Subjective     REFUGIO Mccoy notified prior to session. Pt's partner present  "upon PT entrance into room.    Chief Complaint: anxiety about falling  Patient/Family Comments/goals: "I've fallen 5 times"  Pain/Comfort:  Pain Rating 1: 0/10    Social History:  Residence: lives with their spouse 1-story house with 5 HEIKE and 0 HR.  Support available: Spouse  Equipment Used: power chair, walker, rolling, raised toilet, wheelchair, shower chair, grab bar, bedside commode  Equipment Owned (not using): Walkers, Type: Rollator  Prior level of function: assist required for mobility and ADLs (bedridden for ~ 4 weeks)  Work: Retired.   Drive: no.       Objective:     Additional staff present: none    Patient found HOB elevated with: peripheral IV     General Precautions: Standard, fall   Orthopedic Precautions:N/A   Braces: N/A   Body mass index is 29.53 kg/m².  Oxygen Device: Room Air    Vitals: /86 (BP Location: Right arm, Patient Position: Lying)   Pulse 99   Temp 97.1 °F (36.2 °C)   Resp 16   Ht 5' 9" (1.753 m)   Wt 90.7 kg (200 lb)   SpO2 99%   BMI 29.53 kg/m²     Exams:  Cognition:   Alert and highly anxious  Command following: Follows one-step verbal commands  Fluency: clear/fluent  Hearing: Intact  Vision:  Intact  Skin Integrity: Visible skin intact    Physical Exam:   Edema - None noted  ROM - HILDA LEs WFL  Strength - R hip and knee 4/5, L hip and knee 4-/5, HILDA ankle 5/5   Sensation - Intact to light touch  Coordination - No deficits noted    Outcome Measures:    AM-PAC 6 CLICK MOBILITY  Turning over in bed (including adjusting bedclothes, sheets and blankets)?: 3  Sitting down on and standing up from a chair with arms (e.g., wheelchair, bedside commode, etc.): 2  Moving from lying on back to sitting on the side of the bed?: 3  Moving to and from a bed to a chair (including a wheelchair)?: 3  Need to walk in hospital room?: 3  Climbing 3-5 steps with a railing?: 2  Basic Mobility Total Score: 16     Functional Mobility:    Bed Mobility:   Supine to Sit: minimum assistance; from L " side of bed  Scooting anteriorly to EOB to have both feet planted on floor: minimum assistance    Sitting Balance at Edge of Bed:  Assistance Level Required: Contact Guard Assistance  Time: 8 min  Postural deviations noted: no deviations noted    Transfers:   Sit <> Stand Transfer: maximal assistance with rolling walker  Stand <> Sit Transfer: moderate assistance with rolling walker  x1 trials from EOB and x1 trials from bedside chair  Bed <> Chair Transfer: Step Transfer technique with moderate assistance with rolling walker  Chair on patient's L    Standing Balance:  Assistance Level Required: Contact Guard Assistance  Patient used: rolling walker  Time: 5 min + 3 min  Postural deviations noted: mild posterior lean  Encouraged: upright stance with hip extension  Comments: Severe fear of falling      Gait:   Patient ambulated: 3ft + 5ft   Patient required: minimal assist  Patient used:  rolling walker  Gait Pattern observed: swing to  Gait Deviation(s): occasional unsteady gait, decreased step length, decreased weight shift, and decreased anjel  Impairments due to: impaired balance, decreased endurance, and impaired postural control  all lines remained intact throughout ambulation trial  Gait belt utilized    Education:  Time provided for education, counseling and discussion of health disposition in regards to patient's current status  All questions answered within PT scope of practice and to patient's satisfaction  PT role in POC to address current functional deficits  Pt educated on proper body mechanics, safety techniques, and energy conservation with PT facilitation and cueing throughout session  Call nursing/pct to transfer to chair/use bathroom. Pt stated understanding.    Patient left up in chair with all lines intact, call button in reach, and REFUGIO Mccoy notified.    GOALS:   Multidisciplinary Problems       Physical Therapy Goals          Problem: Physical Therapy    Goal Priority Disciplines Outcome Goal  Variances Interventions   Physical Therapy Goal     PT, PT/OT Ongoing, Progressing     Description: Goals to met by 1/29/2024    1. Supine to sit with Stand-by Assistance  2. Sit to supine with Stand-by Assistance  3. Rolling to Left and Right with Stand-by Assistance.  4. Sit to stand transfer with Contact Guard Assistance  5. Bed to chair transfer with Contact Guard Assistance using Rolling Walker  6. Gait  x 50 feet with Contact Guard Assistance using Rolling Walker   7. Ascend/descend 5 stair(s) with no Handrails Minimal Assistance using LRAD.   8. Stand for 8 minutes with Stand-by Assistance using Rolling Walker  9. Lower extremity exercise program x15 reps per Instruction, with assistance as needed in order to facilitate improved strength, improved postural control, and improvement in functional independence                       History:     Past Medical History:   Diagnosis Date    Anxiety     Atrial fibrillation 12/2023    Cataract     Coronary artery disease     CAC score 1430    Depression     Diabetic neuropathy     Essential (primary) hypertension     GERD (gastroesophageal reflux disease)     Insomnia     Neuromyopathy     Possibly DM and/or alcohol related.    Pulmonary embolism 12/2023    Reactive airway disease     Type 2 diabetes mellitus        Past Surgical History:   Procedure Laterality Date    COLONOSCOPY      Normal around 2020    TOTAL KNEE ARTHROPLASTY Right        Time Tracking:     PT Received On: 01/15/24  PT Start Time: 1309     PT Stop Time: 1330  PT Total Time (min): 21 min     Billable Minutes: Evaluation 1 procedure and Therapeutic Activity 10 min    John Luo, PT, DPT  1/15/2024

## 2024-01-15 NOTE — SUBJECTIVE & OBJECTIVE
Interval History:  Pleasant, updated on the plan, all questions answered, denies any complaints today.      Objective:     Vital Signs (Most Recent):  Temp: 97.9 °F (36.6 °C) (01/15/24 1051)  Pulse: (!) 111 (01/15/24 1054)  Resp: 17 (01/15/24 1224)  BP: 125/79 (01/15/24 1051)  SpO2: 99 % (01/15/24 1051) Vital Signs (24h Range):  Temp:  [97 °F (36.1 °C)-98.1 °F (36.7 °C)] 97.9 °F (36.6 °C)  Pulse:  [] 111  Resp:  [16-18] 17  SpO2:  [97 %-99 %] 99 %  BP: (111-153)/(66-84) 125/79     Weight: 90.7 kg (200 lb)  Body mass index is 29.53 kg/m².  No intake or output data in the 24 hours ending 01/15/24 1352   Physical Exam  Vitals and nursing note reviewed.   Constitutional:       Appearance: Normal appearance. He is well-developed.   HENT:      Head: Normocephalic and atraumatic.      Nose: Nose normal.      Mouth/Throat:      Mouth: Mucous membranes are moist.   Eyes:      General:         Right eye: No discharge.         Left eye: No discharge.      Pupils: Pupils are equal, round, and reactive to light.   Pulmonary:      Effort: Pulmonary effort is normal. No respiratory distress.   Abdominal:      General: There is no distension.      Tenderness: There is no abdominal tenderness.   Musculoskeletal:         General: No deformity. Normal range of motion.      Cervical back: Neck supple.      Comments: Deconditioned    Skin:     General: Skin is warm and dry.   Neurological:      Mental Status: He is alert.      Sensory: No sensory deficit.      Motor: Weakness present. No abnormal muscle tone.      Gait: Gait abnormal.   Psychiatric:         Mood and Affect: Mood normal.         Behavior: Behavior normal.         Computed MELD 3.0 unavailable. Necessary lab results were not found in the last year.  Computed MELD-Na unavailable. Necessary lab results were not found in the last year.      Significant Labs:  CBC:  Recent Labs   Lab 01/14/24  0401 01/15/24  1151   WBC 6.34 6.71   HGB 12.6* 12.5*   HCT 37.0* 36.2*   PLT  143* 134*     CMP:  Recent Labs   Lab 01/14/24  0401 01/15/24  1151   * 136   K 3.4* 3.7    104   CO2 24 22*   * 157*   BUN 9 6*   CREATININE 0.9 0.8   CALCIUM 8.3* 8.2*   PROT 5.9* 5.6*   ALBUMIN 2.2* 2.1*   BILITOT 0.5 0.5   ALKPHOS 199* 168*   AST 23 19   ALT 21 16   ANIONGAP 8 10

## 2024-01-16 LAB
ALBUMIN SERPL BCP-MCNC: 2.1 G/DL (ref 3.5–5.2)
ALP SERPL-CCNC: 166 U/L (ref 55–135)
ALT SERPL W/O P-5'-P-CCNC: 17 U/L (ref 10–44)
ANION GAP SERPL CALC-SCNC: 9 MMOL/L (ref 8–16)
AST SERPL-CCNC: 19 U/L (ref 10–40)
BILIRUB SERPL-MCNC: 0.5 MG/DL (ref 0.1–1)
BUN SERPL-MCNC: 6 MG/DL (ref 8–23)
CALCIUM SERPL-MCNC: 8.4 MG/DL (ref 8.7–10.5)
CHLORIDE SERPL-SCNC: 107 MMOL/L (ref 95–110)
CO2 SERPL-SCNC: 20 MMOL/L (ref 23–29)
CREAT SERPL-MCNC: 0.8 MG/DL (ref 0.5–1.4)
ERYTHROCYTE [DISTWIDTH] IN BLOOD BY AUTOMATED COUNT: 13.6 % (ref 11.5–14.5)
EST. GFR  (NO RACE VARIABLE): >60 ML/MIN/1.73 M^2
GLUCOSE SERPL-MCNC: 128 MG/DL (ref 70–110)
HCT VFR BLD AUTO: 38.6 % (ref 40–54)
HGB BLD-MCNC: 12.8 G/DL (ref 14–18)
MAGNESIUM SERPL-MCNC: 1.4 MG/DL (ref 1.6–2.6)
MCH RBC QN AUTO: 33.6 PG (ref 27–31)
MCHC RBC AUTO-ENTMCNC: 33.2 G/DL (ref 32–36)
MCV RBC AUTO: 101 FL (ref 82–98)
PLATELET # BLD AUTO: 155 K/UL (ref 150–450)
PMV BLD AUTO: 9.2 FL (ref 9.2–12.9)
POTASSIUM SERPL-SCNC: 4.1 MMOL/L (ref 3.5–5.1)
PROT SERPL-MCNC: 5.6 G/DL (ref 6–8.4)
RBC # BLD AUTO: 3.81 M/UL (ref 4.6–6.2)
SARS-COV-2 RNA RESP QL NAA+PROBE: NOT DETECTED
SODIUM SERPL-SCNC: 136 MMOL/L (ref 136–145)
WBC # BLD AUTO: 6.15 K/UL (ref 3.9–12.7)

## 2024-01-16 PROCEDURE — 63600175 PHARM REV CODE 636 W HCPCS: Performed by: FAMILY MEDICINE

## 2024-01-16 PROCEDURE — 80053 COMPREHEN METABOLIC PANEL: CPT | Performed by: STUDENT IN AN ORGANIZED HEALTH CARE EDUCATION/TRAINING PROGRAM

## 2024-01-16 PROCEDURE — 87635 SARS-COV-2 COVID-19 AMP PRB: CPT | Performed by: STUDENT IN AN ORGANIZED HEALTH CARE EDUCATION/TRAINING PROGRAM

## 2024-01-16 PROCEDURE — 25000003 PHARM REV CODE 250: Performed by: STUDENT IN AN ORGANIZED HEALTH CARE EDUCATION/TRAINING PROGRAM

## 2024-01-16 PROCEDURE — 63600175 PHARM REV CODE 636 W HCPCS: Performed by: STUDENT IN AN ORGANIZED HEALTH CARE EDUCATION/TRAINING PROGRAM

## 2024-01-16 PROCEDURE — 86580 TB INTRADERMAL TEST: CPT | Performed by: STUDENT IN AN ORGANIZED HEALTH CARE EDUCATION/TRAINING PROGRAM

## 2024-01-16 PROCEDURE — 25000003 PHARM REV CODE 250: Performed by: FAMILY MEDICINE

## 2024-01-16 PROCEDURE — 94761 N-INVAS EAR/PLS OXIMETRY MLT: CPT

## 2024-01-16 PROCEDURE — 85027 COMPLETE CBC AUTOMATED: CPT | Performed by: STUDENT IN AN ORGANIZED HEALTH CARE EDUCATION/TRAINING PROGRAM

## 2024-01-16 PROCEDURE — 30200315 PPD INTRADERMAL TEST REV CODE 302: Performed by: STUDENT IN AN ORGANIZED HEALTH CARE EDUCATION/TRAINING PROGRAM

## 2024-01-16 PROCEDURE — 21400001 HC TELEMETRY ROOM

## 2024-01-16 PROCEDURE — 36415 COLL VENOUS BLD VENIPUNCTURE: CPT | Performed by: STUDENT IN AN ORGANIZED HEALTH CARE EDUCATION/TRAINING PROGRAM

## 2024-01-16 PROCEDURE — 83735 ASSAY OF MAGNESIUM: CPT | Performed by: STUDENT IN AN ORGANIZED HEALTH CARE EDUCATION/TRAINING PROGRAM

## 2024-01-16 RX ORDER — MORPHINE SULFATE 2 MG/ML
2 INJECTION, SOLUTION INTRAMUSCULAR; INTRAVENOUS EVERY 4 HOURS PRN
Status: DISCONTINUED | OUTPATIENT
Start: 2024-01-16 | End: 2024-01-18 | Stop reason: HOSPADM

## 2024-01-16 RX ORDER — MAGNESIUM SULFATE HEPTAHYDRATE 40 MG/ML
2 INJECTION, SOLUTION INTRAVENOUS ONCE
Status: COMPLETED | OUTPATIENT
Start: 2024-01-16 | End: 2024-01-16

## 2024-01-16 RX ORDER — SULFAMETHOXAZOLE AND TRIMETHOPRIM 800; 160 MG/1; MG/1
1 TABLET ORAL 2 TIMES DAILY
Qty: 20 TABLET | Refills: 0 | Status: SHIPPED | OUTPATIENT
Start: 2024-01-16 | End: 2024-01-17

## 2024-01-16 RX ADMIN — MORPHINE SULFATE 2 MG: 2 INJECTION, SOLUTION INTRAMUSCULAR; INTRAVENOUS at 11:01

## 2024-01-16 RX ADMIN — OXYCODONE HYDROCHLORIDE 5 MG: 5 TABLET ORAL at 12:01

## 2024-01-16 RX ADMIN — APIXABAN 5 MG: 5 TABLET, FILM COATED ORAL at 07:01

## 2024-01-16 RX ADMIN — OXYCODONE HYDROCHLORIDE 5 MG: 5 TABLET ORAL at 05:01

## 2024-01-16 RX ADMIN — METHOCARBAMOL 750 MG: 750 TABLET ORAL at 07:01

## 2024-01-16 RX ADMIN — MORPHINE SULFATE 2 MG: 2 INJECTION, SOLUTION INTRAMUSCULAR; INTRAVENOUS at 06:01

## 2024-01-16 RX ADMIN — APIXABAN 5 MG: 5 TABLET, FILM COATED ORAL at 08:01

## 2024-01-16 RX ADMIN — DULOXETINE HYDROCHLORIDE 60 MG: 60 CAPSULE, DELAYED RELEASE ORAL at 08:01

## 2024-01-16 RX ADMIN — TRAZODONE HYDROCHLORIDE 200 MG: 100 TABLET ORAL at 08:01

## 2024-01-16 RX ADMIN — OXYCODONE HYDROCHLORIDE 5 MG: 5 TABLET ORAL at 07:01

## 2024-01-16 RX ADMIN — SULFAMETHOXAZOLE AND TRIMETHOPRIM 1 TABLET: 800; 160 TABLET ORAL at 08:01

## 2024-01-16 RX ADMIN — PANTOPRAZOLE SODIUM 40 MG: 40 TABLET, DELAYED RELEASE ORAL at 07:01

## 2024-01-16 RX ADMIN — SULFAMETHOXAZOLE AND TRIMETHOPRIM 1 TABLET: 800; 160 TABLET ORAL at 07:01

## 2024-01-16 RX ADMIN — MAGNESIUM SULFATE HEPTAHYDRATE 2 G: 40 INJECTION, SOLUTION INTRAVENOUS at 10:01

## 2024-01-16 RX ADMIN — TUBERCULIN PURIFIED PROTEIN DERIVATIVE 5 UNITS: 5 INJECTION, SOLUTION INTRADERMAL at 10:01

## 2024-01-16 RX ADMIN — ONDANSETRON 4 MG: 2 INJECTION INTRAMUSCULAR; INTRAVENOUS at 05:01

## 2024-01-16 RX ADMIN — DULOXETINE HYDROCHLORIDE 60 MG: 60 CAPSULE, DELAYED RELEASE ORAL at 07:01

## 2024-01-16 RX ADMIN — ONDANSETRON 8 MG: 8 TABLET, ORALLY DISINTEGRATING ORAL at 07:01

## 2024-01-16 NOTE — PLAN OF CARE
Problem: Adult Inpatient Plan of Care  Goal: Plan of Care Review  Outcome: Met  Goal: Patient-Specific Goal (Individualized)  Outcome: Met  Goal: Absence of Hospital-Acquired Illness or Injury  Outcome: Met  Goal: Optimal Comfort and Wellbeing  Outcome: Met  Goal: Readiness for Transition of Care  Outcome: Met     Problem: Diabetes Comorbidity  Goal: Blood Glucose Level Within Targeted Range  Outcome: Met     Problem: Skin Injury Risk Increased  Goal: Skin Health and Integrity  Outcome: Met     Problem: Occupational Therapy  Goal: Occupational Therapy Goal  Description: Goals to be met by: 2/4/2024    Patient will increase functional independence with ADLs by performing:    UE Dressing with Set-up Assistance.  Grooming while standing at sink with CGA.  Toileting from toilet with Contact Guard Assistance for hygiene and clothing management.   Supine to sit with Supervision.  Stand pivot transfers with Contact Guard Assistance.  Toilet transfer to toilet with Contact Guard Assistance.    Outcome: Met     Problem: Physical Therapy  Goal: Physical Therapy Goal  Description: Goals to met by 1/29/2024    1. Supine to sit with Stand-by Assistance  2. Sit to supine with Stand-by Assistance  3. Rolling to Left and Right with Stand-by Assistance.  4. Sit to stand transfer with Contact Guard Assistance  5. Bed to chair transfer with Contact Guard Assistance using Rolling Walker  6. Gait  x 50 feet with Contact Guard Assistance using Rolling Walker   7. Ascend/descend 5 stair(s) with no Handrails Minimal Assistance using LRAD.   8. Stand for 8 minutes with Stand-by Assistance using Rolling Walker  9. Lower extremity exercise program x15 reps per Instruction, with assistance as needed in order to facilitate improved strength, improved postural control, and improvement in functional independence  Outcome: Met     Problem: Fall Injury Risk  Goal: Absence of Fall and Fall-Related Injury  Outcome: Met

## 2024-01-16 NOTE — PLAN OF CARE
Sw called in locet and faxed pasrr, will follow with spouse on preferred SNF unit for dc, following.

## 2024-01-16 NOTE — PLAN OF CARE
"Yves spoke with Pt in the room, Pt refusing SNF as Sw informed he has a few facilities that can accept Pt. "Pt states I go to out patient therapy [facility]." Sw offered Pt home health services to help his balance and endurance at home and than Pt can dc to O/P therapy, "Pt states I want to go [facility]," staff and nursing updated, will follow with dc and needs.   "

## 2024-01-17 LAB
ALBUMIN SERPL BCP-MCNC: 2 G/DL (ref 3.5–5.2)
ALP SERPL-CCNC: 149 U/L (ref 55–135)
ALT SERPL W/O P-5'-P-CCNC: 14 U/L (ref 10–44)
ANION GAP SERPL CALC-SCNC: 11 MMOL/L (ref 8–16)
AST SERPL-CCNC: 19 U/L (ref 10–40)
BILIRUB SERPL-MCNC: 0.5 MG/DL (ref 0.1–1)
BUN SERPL-MCNC: 6 MG/DL (ref 8–23)
CALCIUM SERPL-MCNC: 8.4 MG/DL (ref 8.7–10.5)
CHLORIDE SERPL-SCNC: 106 MMOL/L (ref 95–110)
CO2 SERPL-SCNC: 17 MMOL/L (ref 23–29)
CREAT SERPL-MCNC: 1 MG/DL (ref 0.5–1.4)
ERYTHROCYTE [DISTWIDTH] IN BLOOD BY AUTOMATED COUNT: 13.2 % (ref 11.5–14.5)
EST. GFR  (NO RACE VARIABLE): >60 ML/MIN/1.73 M^2
GLUCOSE SERPL-MCNC: 124 MG/DL (ref 70–110)
HCT VFR BLD AUTO: 35.3 % (ref 40–54)
HGB BLD-MCNC: 12.2 G/DL (ref 14–18)
MAGNESIUM SERPL-MCNC: 1.6 MG/DL (ref 1.6–2.6)
MCH RBC QN AUTO: 34.3 PG (ref 27–31)
MCHC RBC AUTO-ENTMCNC: 34.6 G/DL (ref 32–36)
MCV RBC AUTO: 99 FL (ref 82–98)
PLATELET # BLD AUTO: 173 K/UL (ref 150–450)
PMV BLD AUTO: 10.1 FL (ref 9.2–12.9)
POTASSIUM SERPL-SCNC: 3.7 MMOL/L (ref 3.5–5.1)
PROT SERPL-MCNC: 5.4 G/DL (ref 6–8.4)
RBC # BLD AUTO: 3.56 M/UL (ref 4.6–6.2)
SODIUM SERPL-SCNC: 134 MMOL/L (ref 136–145)
WBC # BLD AUTO: 5.53 K/UL (ref 3.9–12.7)

## 2024-01-17 PROCEDURE — 25000003 PHARM REV CODE 250: Performed by: STUDENT IN AN ORGANIZED HEALTH CARE EDUCATION/TRAINING PROGRAM

## 2024-01-17 PROCEDURE — 25000003 PHARM REV CODE 250: Performed by: FAMILY MEDICINE

## 2024-01-17 PROCEDURE — 36415 COLL VENOUS BLD VENIPUNCTURE: CPT | Performed by: STUDENT IN AN ORGANIZED HEALTH CARE EDUCATION/TRAINING PROGRAM

## 2024-01-17 PROCEDURE — 80053 COMPREHEN METABOLIC PANEL: CPT | Performed by: STUDENT IN AN ORGANIZED HEALTH CARE EDUCATION/TRAINING PROGRAM

## 2024-01-17 PROCEDURE — 63600175 PHARM REV CODE 636 W HCPCS: Performed by: STUDENT IN AN ORGANIZED HEALTH CARE EDUCATION/TRAINING PROGRAM

## 2024-01-17 PROCEDURE — 21400001 HC TELEMETRY ROOM

## 2024-01-17 PROCEDURE — 85027 COMPLETE CBC AUTOMATED: CPT | Performed by: STUDENT IN AN ORGANIZED HEALTH CARE EDUCATION/TRAINING PROGRAM

## 2024-01-17 PROCEDURE — 83735 ASSAY OF MAGNESIUM: CPT | Performed by: STUDENT IN AN ORGANIZED HEALTH CARE EDUCATION/TRAINING PROGRAM

## 2024-01-17 RX ORDER — PREGABALIN 150 MG/1
150 CAPSULE ORAL DAILY PRN
Qty: 14 CAPSULE | Refills: 0 | Status: SHIPPED | OUTPATIENT
Start: 2024-01-17 | End: 2024-01-18

## 2024-01-17 RX ORDER — PREGABALIN 150 MG/1
150 CAPSULE ORAL DAILY PRN
Qty: 14 CAPSULE | Refills: 0 | Status: SHIPPED | OUTPATIENT
Start: 2024-01-17 | End: 2024-01-18 | Stop reason: HOSPADM

## 2024-01-17 RX ORDER — TRAZODONE HYDROCHLORIDE 100 MG/1
200 TABLET ORAL NIGHTLY PRN
Qty: 28 TABLET | Refills: 0 | Status: SHIPPED | OUTPATIENT
Start: 2024-01-17 | End: 2024-01-17

## 2024-01-17 RX ORDER — SULFAMETHOXAZOLE AND TRIMETHOPRIM 800; 160 MG/1; MG/1
1 TABLET ORAL 2 TIMES DAILY
Qty: 16 TABLET | Refills: 0 | Status: SHIPPED | OUTPATIENT
Start: 2024-01-17 | End: 2024-01-25

## 2024-01-17 RX ORDER — SULFAMETHOXAZOLE AND TRIMETHOPRIM 800; 160 MG/1; MG/1
1 TABLET ORAL 2 TIMES DAILY
Qty: 16 TABLET | Refills: 0 | Status: SHIPPED | OUTPATIENT
Start: 2024-01-17 | End: 2024-01-17

## 2024-01-17 RX ORDER — PREGABALIN 150 MG/1
150 CAPSULE ORAL DAILY PRN
Qty: 14 CAPSULE | Refills: 0 | Status: SHIPPED | OUTPATIENT
Start: 2024-01-17 | End: 2024-01-17

## 2024-01-17 RX ORDER — TRAZODONE HYDROCHLORIDE 100 MG/1
200 TABLET ORAL NIGHTLY PRN
Qty: 14 TABLET | Refills: 0 | Status: SHIPPED | OUTPATIENT
Start: 2024-01-17 | End: 2024-06-07

## 2024-01-17 RX ORDER — TRAZODONE HYDROCHLORIDE 100 MG/1
200 TABLET ORAL NIGHTLY PRN
Qty: 28 TABLET | Refills: 0 | Status: SHIPPED | OUTPATIENT
Start: 2024-01-17 | End: 2024-01-18 | Stop reason: HOSPADM

## 2024-01-17 RX ADMIN — APIXABAN 5 MG: 5 TABLET, FILM COATED ORAL at 08:01

## 2024-01-17 RX ADMIN — SULFAMETHOXAZOLE AND TRIMETHOPRIM 1 TABLET: 800; 160 TABLET ORAL at 08:01

## 2024-01-17 RX ADMIN — DULOXETINE HYDROCHLORIDE 60 MG: 60 CAPSULE, DELAYED RELEASE ORAL at 08:01

## 2024-01-17 RX ADMIN — TRAZODONE HYDROCHLORIDE 200 MG: 100 TABLET ORAL at 08:01

## 2024-01-17 RX ADMIN — MORPHINE SULFATE 2 MG: 2 INJECTION, SOLUTION INTRAMUSCULAR; INTRAVENOUS at 08:01

## 2024-01-17 RX ADMIN — MORPHINE SULFATE 2 MG: 2 INJECTION, SOLUTION INTRAMUSCULAR; INTRAVENOUS at 01:01

## 2024-01-17 RX ADMIN — PANTOPRAZOLE SODIUM 40 MG: 40 TABLET, DELAYED RELEASE ORAL at 08:01

## 2024-01-17 RX ADMIN — MORPHINE SULFATE 2 MG: 2 INJECTION, SOLUTION INTRAMUSCULAR; INTRAVENOUS at 05:01

## 2024-01-17 NOTE — PLAN OF CARE
APPOINTMENT:    Patient Appointment(s) scheduled with Tsering Fairbanks MD  Wednesday Jan 24, 2024 3:00 PM

## 2024-01-17 NOTE — SUBJECTIVE & OBJECTIVE
Interval History: pleasant, denies any complaints      Objective:     Vital Signs (Most Recent):  Temp: 98.2 °F (36.8 °C) (01/16/24 1900)  Pulse: 101 (01/16/24 1900)  Resp: 18 (01/16/24 1900)  BP: (!) 141/80 (01/16/24 1900)  SpO2: 98 % (01/16/24 1900) Vital Signs (24h Range):  Temp:  [97.4 °F (36.3 °C)-98.2 °F (36.8 °C)] 98.2 °F (36.8 °C)  Pulse:  [] 101  Resp:  [16-18] 18  SpO2:  [96 %-99 %] 98 %  BP: (120-146)/(68-87) 141/80     Weight: 90.7 kg (200 lb)  Body mass index is 29.53 kg/m².  No intake or output data in the 24 hours ending 01/16/24 2208   Physical Exam  Vitals and nursing note reviewed.   Constitutional:       Appearance: Normal appearance. He is well-developed.   HENT:      Head: Normocephalic and atraumatic.      Nose: Nose normal.      Mouth/Throat:      Mouth: Mucous membranes are moist.   Eyes:      General:         Right eye: No discharge.         Left eye: No discharge.      Pupils: Pupils are equal, round, and reactive to light.   Pulmonary:      Effort: Pulmonary effort is normal. No respiratory distress.   Abdominal:      General: There is no distension.      Tenderness: There is no abdominal tenderness.   Musculoskeletal:         General: No deformity. Normal range of motion.      Cervical back: Neck supple.      Comments: Deconditioned    Skin:     General: Skin is warm and dry.   Neurological:      Mental Status: He is alert.      Sensory: No sensory deficit.      Motor: Weakness present. No abnormal muscle tone.      Gait: Gait abnormal.   Psychiatric:         Mood and Affect: Mood normal.         Behavior: Behavior normal.         Computed MELD 3.0 unavailable. Necessary lab results were not found in the last year.  Computed MELD-Na unavailable. Necessary lab results were not found in the last year.      Significant Labs:  CBC:  Recent Labs   Lab 01/15/24  1151 01/16/24  0245   WBC 6.71 6.15   HGB 12.5* 12.8*   HCT 36.2* 38.6*   * 155     CMP:  Recent Labs   Lab  01/15/24  1151 01/16/24  0245    136   K 3.7 4.1    107   CO2 22* 20*   * 128*   BUN 6* 6*   CREATININE 0.8 0.8   CALCIUM 8.2* 8.4*   PROT 5.6* 5.6*   ALBUMIN 2.1* 2.1*   BILITOT 0.5 0.5   ALKPHOS 168* 166*   AST 19 19   ALT 16 17   ANIONGAP 10 9

## 2024-01-17 NOTE — PLAN OF CARE
Pt and spouse in the room, agreeable to docusign, Sw informed Deyvi to contact spouse/Pt to obtain the docusign papers and will follow with dc in the pm.

## 2024-01-17 NOTE — PLAN OF CARE
Yves sent scripts and updated orders to Deyvi's email with [facility], following. Left CM with Deyvi and Jesus with [facility], order and scripts also uploaded to [facility], following.

## 2024-01-17 NOTE — PT/OT/SLP PROGRESS
Occupational Therapy      Patient Name:  Anthony Ball   MRN:  [id number]    Patient not seen today secondary to pt anticipated d/c this PM. Will follow-up if pt does not d/c     1/17/2024

## 2024-01-17 NOTE — PROGRESS NOTES
[facility] Medicine  Progress Note    Patient Name: Anthony Ball  MRN: [id number]  Patient Class: IP- Inpatient   Admission Date: 1/11/2024  Length of Stay: 5 days  Attending Physician: Ryan Silvestre DO  Primary Care Provider: Isaias Mlyes MD        Subjective:     Principal Problem:Acute cystitis        HPI:  73-year-old male with a past medical history of type 2 diabetes with polyneuropathy, HTN, GERD, CAD, prior PE on b.i.d. Eliquis, atrial fibrillation presents to the ED complaining of bilateral leg weakness that onset 3 weeks ago and nausea.  Patient also reports of associated fatigue and nausea during the same time period.  He denies any dizziness, lightheadedness, room spinning sensation, chest pain, shortness of breath.  States that he had similar acute on chronic leg weakness in the past and was discharged to rehab facility where he improved significantly and was discharged able to ambulate with walker. Patient reports several falls since discharge but more acute worsening of weakness and with very significant nausea.     In the ED patient afebrile and hemodynamically stable saturating well on room air. Moves all extremities. 4/5 strength lower extremities bilaterally. Significant neuropathy. UA consistent with UTI. Patient started on abx and admitted to the care of [facility] medicine.     Overview/Hospital Course:  Patient presented for bilateral leg weakness and nausea, found to have UTI.  Started on antibiotics.  Physical and Occupational therapy consulted.  is pleasant, family at bedside.     01/12:  Pain medication adjusted, restarted some of his home meds as well.  Overall stable    01/13:  Patient currently on Rocephin.  Urine culture is returning staph aureus.  Plan to continue with current antibiotic regimen until further susceptibility is back.    01/14:  Susceptibility is back, sensitive to Bactrim.  Awaiting PT, OT recommendations. Appreciate assistance.    01/15:  PT and  OT recommending moderate intensity.  Appreciate case management start working on placement.    01/16: pt initially asked to go home with outpatient PT. Referrals canceled. Patient then asked to go to SNF again. CM informed.     Interval History: pleasant, denies any complaints      Objective:     Vital Signs (Most Recent):  Temp: 98.2 °F (36.8 °C) (01/16/24 1900)  Pulse: 101 (01/16/24 1900)  Resp: 18 (01/16/24 1900)  BP: (!) 141/80 (01/16/24 1900)  SpO2: 98 % (01/16/24 1900) Vital Signs (24h Range):  Temp:  [97.4 °F (36.3 °C)-98.2 °F (36.8 °C)] 98.2 °F (36.8 °C)  Pulse:  [] 101  Resp:  [16-18] 18  SpO2:  [96 %-99 %] 98 %  BP: (120-146)/(68-87) 141/80     Weight: 90.7 kg (200 lb)  Body mass index is 29.53 kg/m².  No intake or output data in the 24 hours ending 01/16/24 2208   Physical Exam  Vitals and nursing note reviewed.   Constitutional:       Appearance: Normal appearance. He is well-developed.   HENT:      Head: Normocephalic and atraumatic.      Nose: Nose normal.      Mouth/Throat:      Mouth: Mucous membranes are moist.   Eyes:      General:         Right eye: No discharge.         Left eye: No discharge.      Pupils: Pupils are equal, round, and reactive to light.   Pulmonary:      Effort: Pulmonary effort is normal. No respiratory distress.   Abdominal:      General: There is no distension.      Tenderness: There is no abdominal tenderness.   Musculoskeletal:         General: No deformity. Normal range of motion.      Cervical back: Neck supple.      Comments: Deconditioned    Skin:     General: Skin is warm and dry.   Neurological:      Mental Status: He is alert.      Sensory: No sensory deficit.      Motor: Weakness present. No abnormal muscle tone.      Gait: Gait abnormal.   Psychiatric:         Mood and Affect: Mood normal.         Behavior: Behavior normal.         Computed MELD 3.0 unavailable. Necessary lab results were not found in the last year.  Computed MELD-Na unavailable. Necessary lab  results were not found in the last year.      Significant Labs:  CBC:  Recent Labs   Lab 01/15/24  1151 01/16/24  0245   WBC 6.71 6.15   HGB 12.5* 12.8*   HCT 36.2* 38.6*   * 155     CMP:  Recent Labs   Lab 01/15/24  1151 01/16/24  0245    136   K 3.7 4.1    107   CO2 22* 20*   * 128*   BUN 6* 6*   CREATININE 0.8 0.8   CALCIUM 8.2* 8.4*   PROT 5.6* 5.6*   ALBUMIN 2.1* 2.1*   BILITOT 0.5 0.5   ALKPHOS 168* 166*   AST 19 19   ALT 16 17   ANIONGAP 10 9         Assessment/Plan:      * Acute cystitis  - start ceftriaxone  - follow up urine culture  - further management pending clinical course and future study review      Alcohol use  - previous very heavy alcohol use/abuse currently greatly improved and controlled per patient and partner  - monitor for signs of withdrawal      Gastroesophageal reflux disease without esophagitis  - continue home meds      Recurrent falls  - suspect acutely worsened in setting of UTI  - fall precautions  - PT/OT consult.  Recommending moderate intensity.  Case management updated, appreciate assistance.      Type 2 diabetes mellitus with neurologic complication, without long-term current use of insulin  - SSI   - hypoglycemic protocol      VTE Risk Mitigation (From admission, onward)           Ordered     apixaban tablet 5 mg  2 times daily         01/11/24 1831                    Discharge Planning   CHRISTA: 1/16/2024     Code Status: Full Code   Is the patient medically ready for discharge?: Yes    Reason for patient still in hospital (select all that apply): Pending disposition  Discharge Plan A: Skilled Nursing Facility                  Ryan Silvestre DO  Department of Hospital Medicine   [facility] - Med Surg

## 2024-01-17 NOTE — PLAN OF CARE
Sw placed call to spouse at , left VM with him on preferences on SNF unit, will follow with Pt. Pt agreeable to [facility], will follow with referral. SNF orders provided, need paperwork, will follow. No answer from spouse again, Pt will have to complete his paperwork, updated NH liaison.

## 2024-01-17 NOTE — PT/OT/SLP PROGRESS
Physical Therapy      Patient Name:  Anthony Ball   MRN:  [id number]    Patient not seen today secondary to: pt anticipated d/c this PM. Will follow-up if pt does not d/c.

## 2024-01-17 NOTE — PLAN OF CARE
NURSING HOME ORDERS    01/18/2024  [facility] - MED SURG  [address] [address]  Dept: [phone]  Loc: [phone]     Admit to Nursing Home:  Skilled Nursing Facility    Diagnoses:  Active Hospital Problems    Diagnosis  POA    *Acute cystitis [N30.00]  Yes    Alcohol use [Z78.9]  Yes    Type 2 diabetes mellitus with neurologic complication, without long-term current use of insulin [E11.49]  Yes    Recurrent falls [R29.6]  Not Applicable    Gastroesophageal reflux disease without esophagitis [K21.9]  Yes      Resolved Hospital Problems   No resolved problems to display.       Patient is homebound due to:  Acute cystitis    Allergies:Review of patient's allergies indicates:  No Known Allergies    Vitals:  Routine    Diet: cardiac diet    Activities:   Activity as tolerated    Goals of Care Treatment Preferences:  Code Status: Full Code      Labs:  CMP in one week, report to PCP    Nursing Precautions:  Fall    Consults:   PT to evaluate and treat- 3 times a week, OT to evaluate and treat- 3 times a week, and Nutrition to evaluate and recommend diet     Miscellaneous Care: Diabetes Care: Diabetes: Check blood sugar. Fingerstick blood sugar AC and HS                   Diabetes Care:  SN to perform and educate Diabetic management with blood glucose monitoring: and Report CBG < 60 or > 350 to physician.      Medications: Discontinue all previous medication orders, if any. See new list below.     Medication List        START taking these medications      sulfamethoxazole-trimethoprim 800-160mg 800-160 mg Tab  Commonly known as: BACTRIM DS  Take 1 tablet by mouth 2 (two) times daily. for 8 days            CHANGE how you take these medications      JARDIANCE 10 mg tablet  Generic drug: empagliflozin  Take 1 tablet (10 mg total) by mouth once daily. Discuss with PCP prior to restarting.  What changed: additional instructions     pregabalin 150 MG capsule  Commonly  known as: LYRICA  Take 1 capsule (150 mg total) by mouth 2 (two) times daily. for 14 days  What changed: See the new instructions.            CONTINUE taking these medications      albuterol 90 mcg/actuation inhaler  Commonly known as: VENTOLIN HFA  Inhale 2 puffs into the lungs every 6 (six) hours as needed for Wheezing. Rescue     apixaban 5 mg Tab  Commonly known as: ELIQUIS  Take 1 tablet (5 mg total) by mouth 2 (two) times daily.     DULoxetine 60 MG capsule  Commonly known as: CYMBALTA  Take 1 capsule (60 mg total) by mouth 2 (two) times daily.     fluticasone-salmeterol 250-50 mcg/dose 250-50 mcg/dose diskus inhaler  Commonly known as: ADVAIR  Inhale 1 puff into the lungs 2 (two) times daily. Controller     LIDOcaine 5 %  Commonly known as: LIDODERM  Place 3 patches onto the skin daily as needed (Rib pain). Okay to use 1 to 3 patches. Remove & Discard patches within 12 hours or as directed by MD     methocarbamoL 750 MG Tab  Commonly known as: ROBAXIN  Take 1 tablet (750 mg total) by mouth 3 (three) times daily as needed (Back pain).     ondansetron 8 MG tablet  Commonly known as: ZOFRAN  Take 1 tablet (8 mg total) by mouth every 8 (eight) hours as needed for Nausea.     pantoprazole 40 MG tablet  Commonly known as: PROTONIX  TAKE 1 TABLET(40 MG) BY MOUTH EVERY DAY     traZODone 100 MG tablet  Commonly known as: DESYREL  Take 2 tablets (200 mg total) by mouth nightly as needed for Insomnia.     triamcinolone acetonide 0.1% 0.1 % cream  Commonly known as: KENALOG  Apply topically 2 (two) times daily. Use for 1 to 2 weeks as needed for rash.            STOP taking these medications      busPIRone 10 MG tablet  Commonly known as: BUSPAR                Immunizations Administered as of 1/18/2024       Name Date Dose VIS Date Route Exp Date    COVID-19, MRNA, LN-S, PF (Pfizer) (Purple Cap) 12/13/2022 0.3 mL -- -- --    Lot: [id number]     External: Auto Reconciled From Outside Source     COVID-19, MRNA, LN-S, PF  (Pfizer) (Purple Cap) 3/24/2022 0.3 mL -- -- --    Lot: [id number]     External: Auto Reconciled From Outside Source     COVID-19, MRNA, LN-S, PF (Pfizer) (Purple Cap) 8/17/2021 10:38 AM 0.3 mL 12/12/2020 Intramuscular 10/31/2021    Site: Left deltoid     Given By: Saira Saunders RN     : [employer]     Lot: [id number]     COVID-19, MRNA, LN-S, PF (Pfizer) (Purple Cap) 1/8/2021 -- -- -- --    COVID-19, MRNA, LN-S, PF (Pfizer) (Purple Cap) 1/8/2021 -- -- -- --    COVID-19, MRNA, LN-S, PF (Pfizer) (Purple Cap) 12/18/2020 -- -- -- --    COVID-19, MRNA, LN-S, PF (Pfizer) (Purple Cap) 12/18/2020 -- -- -- --                Some patients may experience side effects after vaccination.  These may include fever, headache, muscle or joint aches.  Most symptoms resolve with 24-48 hours and do not require urgent medical evaluation unless they persist for more than 72 hours or symptoms are concerning for an unrelated medical condition.          _________________________________  Nestor Clark MD  01/18/2024

## 2024-01-17 NOTE — PLAN OF CARE
Pt and spouse in the room, agreeable to Deyvi morris with [facility] will follow with paperwork to spouse/Pt and follow with dc.

## 2024-01-18 VITALS
TEMPERATURE: 98 F | BODY MASS INDEX: 29.62 KG/M2 | HEART RATE: 92 BPM | WEIGHT: 200 LBS | HEIGHT: 69 IN | SYSTOLIC BLOOD PRESSURE: 121 MMHG | OXYGEN SATURATION: 99 % | RESPIRATION RATE: 16 BRPM | DIASTOLIC BLOOD PRESSURE: 79 MMHG

## 2024-01-18 PROCEDURE — 25000003 PHARM REV CODE 250: Performed by: STUDENT IN AN ORGANIZED HEALTH CARE EDUCATION/TRAINING PROGRAM

## 2024-01-18 PROCEDURE — 63600175 PHARM REV CODE 636 W HCPCS: Performed by: STUDENT IN AN ORGANIZED HEALTH CARE EDUCATION/TRAINING PROGRAM

## 2024-01-18 PROCEDURE — 25000003 PHARM REV CODE 250: Performed by: FAMILY MEDICINE

## 2024-01-18 RX ORDER — PREGABALIN 150 MG/1
150 CAPSULE ORAL 2 TIMES DAILY
Qty: 14 CAPSULE | Refills: 0
Start: 2024-01-18 | End: 2024-04-19 | Stop reason: DRUGHIGH

## 2024-01-18 RX ADMIN — DULOXETINE HYDROCHLORIDE 60 MG: 60 CAPSULE, DELAYED RELEASE ORAL at 08:01

## 2024-01-18 RX ADMIN — MORPHINE SULFATE 2 MG: 2 INJECTION, SOLUTION INTRAMUSCULAR; INTRAVENOUS at 11:01

## 2024-01-18 RX ADMIN — APIXABAN 5 MG: 5 TABLET, FILM COATED ORAL at 08:01

## 2024-01-18 RX ADMIN — PANTOPRAZOLE SODIUM 40 MG: 40 TABLET, DELAYED RELEASE ORAL at 08:01

## 2024-01-18 RX ADMIN — OXYCODONE HYDROCHLORIDE 5 MG: 5 TABLET ORAL at 05:01

## 2024-01-18 RX ADMIN — SULFAMETHOXAZOLE AND TRIMETHOPRIM 1 TABLET: 800; 160 TABLET ORAL at 08:01

## 2024-01-18 NOTE — PLAN OF CARE
Pt had trouble signing docusign, spouse helping, Deyvi updated, should have room number soon. Sw will setup transport for 330pm via stretcher.

## 2024-01-18 NOTE — PLAN OF CARE
Yves sent updated SNF orders to [facility], spoke with [facility]yvi with [facility], no paperwork was completed by Pt/spouse, Yves billed the Pt/family for post acute delay, follow with dc once all paperwork is completed.

## 2024-01-18 NOTE — PLAN OF CARE
[facility]  Discharge Final Note    Primary Care Provider: Isaias Myles MD    Expected Discharge Date: 1/17/2024    Final Discharge Note (most recent)       Final Note - 01/18/24 1014          Final Note    Assessment Type Final Discharge Note     Anticipated Discharge Disposition Skilled Nursing Facility     Hospital Resources/Appts/Education Provided Appointments scheduled and added to AVS;Post-Acute resouces added to AVS        Post-Acute Status    Post-Acute Authorization Placement     Discharge Delays None known at this time                     Important Message from Medicare  Important Message from Medicare regarding Discharge Appeal Rights: Given to patient/caregiver, Explained to patient/caregiver, Signed/date by patient/caregiver     Date IMM was signed: 01/17/24  Time IMM was signed: 1311    Contact Info       Isaias Myles MD   Specialty: Internal Medicine   Relationship: PCP - General    [facility]  [facility] [address]   Phone: [phone]       Next Steps: Follow up in 3 day(s)

## 2024-01-18 NOTE — PLAN OF CARE
Pt ok to dc, room is P111, nurse to call report to  and stretcher for 4pm. Nurse updated. Pt and spouse updated.

## 2024-01-18 NOTE — PROGRESS NOTES
1940: Pt denied SI/HI/AH/VH. Pt denied pain. Pt denied anxiety. PT denied depression. Pt stated \"I had a really good day\" Pt declined needing anything at this time. 2109: Pt appears asleep, breathing visible. No issues observed. 2245: Pt appears asleep, breathing visible. No issues observed at this time. 2323: Pt appears asleep, breathing visible. No issues observed at this time. 0016: Pt appears asleep, breathing visible. No issues observed at this time. 0113: Pt awake in room sitting quietly. Pt declined needing anything at this time. 0230: Gave off shift report to charge nurse.     Pt slept for 4 hours          Problem: Altered Thought Process (Adult/Pediatric)  Goal: *STG: Participates in treatment plan  Outcome: Progressing Towards Goal  Goal: *STG: Remains safe in hospital  Outcome: Progressing Towards Goal  Goal: *STG: Seeks staff when feelings of anxiety and fear arise  Outcome: Progressing Towards Goal  Goal: *STG: Complies with medication therapy  Outcome: Progressing Towards Goal  Goal: *STG: Decreased delusional thinking  Outcome: Progressing Towards Goal  Goal: *STG: Decreased hallucinations  Outcome: Progressing Towards Goal  Goal: *STG: Absence of lethality  Outcome: Progressing Towards Goal     Problem: Patient Education: Go to Patient Education Activity  Goal: Patient/Family Education  Outcome: Progressing Towards Goal     Problem: Discharge Planning  Goal: *Discharge to safe environment  Outcome: Progressing Towards Goal Patient refused Accu check last night.

## 2024-01-20 NOTE — DISCHARGE SUMMARY
[facility]  Discharge Summary      Patient Name: Anthony Ball  MRN: [id number]  DA: [id number]  Patient Class: IP- Inpatient  Admission Date: 1/11/2024  Hospital Length of Stay: 7 days  Discharge Date and Time: 1/18/2024  6:26 PM  Attending Physician: Tabitha att. providers found   Discharging Provider: Nestor Clark MD  Primary Care Provider: Isaias Myles MD  [facility] Medicine Team: [facility] Nestor Clark MD  Primary Care Team: [facility]    HPI:   73-year-old male with a past medical history of type 2 diabetes with polyneuropathy, HTN, GERD, CAD, prior PE on b.i.d. Eliquis, atrial fibrillation presents to the ED complaining of bilateral leg weakness that onset 3 weeks ago and nausea.  Patient also reports of associated fatigue and nausea during the same time period.  He denies any dizziness, lightheadedness, room spinning sensation, chest pain, shortness of breath.  States that he had similar acute on chronic leg weakness in the past and was discharged to rehab facility where he improved significantly and was discharged able to ambulate with walker. Patient reports several falls since discharge but more acute worsening of weakness and with very significant nausea.     In the ED patient afebrile and hemodynamically stable saturating well on room air. Moves all extremities. 4/5 strength lower extremities bilaterally. Significant neuropathy. UA consistent with UTI. Patient started on abx and admitted to the care of [facility] medicine.     * No surgery found *      Hospital Course:   Patient presented for bilateral leg weakness and nausea, found to have UTI which grew out Staph jesús.  Neg CXR. Covid and flu swabs neg. Tx w/ antibiotics . Pt tolerating po intake. Pt has met maximum benefit from hospitalization and is clinically stable for discharge to SNF.      Clear lungs bilaterally, unlabored breathing, on room air, no cyanosis  Heart sounds indicate a regular rate and  rhythm  Awake alert, no acute distress   No facial droop, no slurred speech   No obvious lower extremity edema   Following motor commands  No focal motor deficits noted.  Alert, interactive, pleasant     Goals of Care Treatment Preferences:  Code Status: Full Code      Consults:     No new Assessment & Plan notes have been filed under this hospital service since the last note was generated.  Service: Hospital Medicine    Final Active Diagnoses:    Diagnosis Date Noted POA    PRINCIPAL PROBLEM:  Acute cystitis [N30.00] 01/11/2024 Yes    Alcohol use [Z78.9] 03/19/2023 Yes    Type 2 diabetes mellitus with neurologic complication, without long-term current use of insulin [E11.49] 07/08/2021 Yes    Recurrent falls [R29.6] 07/08/2021 Not Applicable    Gastroesophageal reflux disease without esophagitis [K21.9] 07/08/2021 Yes      Problems Resolved During this Admission:       Discharged Condition: good    Disposition: Skilled Nursing Facility    Follow Up:   Follow-up Information       Isaias Myles MD Follow up in 3 day(s).    Specialty: Internal Medicine  Contact information:  [address] [facility] [address]  [phone]               Amish Dickinson Follow up today.    Specialties: Nursing Home Agency, SNF Agency  Contact information:  [address] [facility] [address] [phone]                           Patient Instructions:      Notify your health care provider if you experience any of the following:     Notify your health care provider if you experience any of the following:  increased confusion or weakness     Notify your health care provider if you experience any of the following:  persistent dizziness, light-headedness, or visual disturbances     Notify your health care provider if you experience any of the following:  worsening rash     Notify your health care provider if you experience any of the following:  severe persistent headache     Notify your health care provider if  you experience any of the following:  difficulty breathing or increased cough     Notify your health care provider if you experience any of the following:  redness, tenderness, or signs of infection (pain, swelling, redness, odor or green/yellow discharge around incision site)     Notify your health care provider if you experience any of the following:  severe uncontrolled pain     Notify your health care provider if you experience any of the following:  persistent nausea and vomiting or diarrhea     Notify your health care provider if you experience any of the following:  temperature >100.4     Notify your health care provider if you experience any of the following:     Notify your health care provider if you experience any of the following:  increased confusion or weakness     Notify your health care provider if you experience any of the following:  persistent dizziness, light-headedness, or visual disturbances     Notify your health care provider if you experience any of the following:  worsening rash     Notify your health care provider if you experience any of the following:  severe persistent headache     Notify your health care provider if you experience any of the following:  difficulty breathing or increased cough     Notify your health care provider if you experience any of the following:  redness, tenderness, or signs of infection (pain, swelling, redness, odor or green/yellow discharge around incision site)     Notify your health care provider if you experience any of the following:  severe uncontrolled pain     Notify your health care provider if you experience any of the following:  persistent nausea and vomiting or diarrhea     Notify your health care provider if you experience any of the following:  temperature >100.4     Activity as tolerated     Activity as tolerated       Significant Diagnostic Studies: N/A    Pending Diagnostic Studies:       None           Medications:  Reconciled Home  Medications:      Medication List        START taking these medications      sulfamethoxazole-trimethoprim 800-160mg 800-160 mg Tab  Commonly known as: BACTRIM DS  Take 1 tablet by mouth 2 (two) times daily. for 8 days            CHANGE how you take these medications      JARDIANCE 10 mg tablet  Generic drug: empagliflozin  Take 1 tablet (10 mg total) by mouth once daily. Discuss with PCP prior to restarting.  What changed: additional instructions     pregabalin 150 MG capsule  Commonly known as: LYRICA  Take 1 capsule (150 mg total) by mouth 2 (two) times daily. for 14 days  What changed: See the new instructions.            CONTINUE taking these medications      albuterol 90 mcg/actuation inhaler  Commonly known as: VENTOLIN HFA  Inhale 2 puffs into the lungs every 6 (six) hours as needed for Wheezing. Rescue     apixaban 5 mg Tab  Commonly known as: ELIQUIS  Take 1 tablet (5 mg total) by mouth 2 (two) times daily.     DULoxetine 60 MG capsule  Commonly known as: CYMBALTA  Take 1 capsule (60 mg total) by mouth 2 (two) times daily.     fluticasone-salmeterol 250-50 mcg/dose 250-50 mcg/dose diskus inhaler  Commonly known as: ADVAIR  Inhale 1 puff into the lungs 2 (two) times daily. Controller     LIDOcaine 5 %  Commonly known as: LIDODERM  Place 3 patches onto the skin daily as needed (Rib pain). Okay to use 1 to 3 patches. Remove & Discard patches within 12 hours or as directed by MD     methocarbamoL 750 MG Tab  Commonly known as: ROBAXIN  Take 1 tablet (750 mg total) by mouth 3 (three) times daily as needed (Back pain).     ondansetron 8 MG tablet  Commonly known as: ZOFRAN  Take 1 tablet (8 mg total) by mouth every 8 (eight) hours as needed for Nausea.     pantoprazole 40 MG tablet  Commonly known as: PROTONIX  TAKE 1 TABLET(40 MG) BY MOUTH EVERY DAY     traZODone 100 MG tablet  Commonly known as: DESYREL  Take 2 tablets (200 mg total) by mouth nightly as needed for Insomnia.     triamcinolone acetonide 0.1% 0.1  % cream  Commonly known as: KENALOG  Apply topically 2 (two) times daily. Use for 1 to 2 weeks as needed for rash.            STOP taking these medications      busPIRone 10 MG tablet  Commonly known as: BUSPAR              Indwelling Lines/Drains at time of discharge:   Lines/Drains/Airways       None                           Nestor Clark MD  Department of Hospital Medicine  [facility]

## 2024-01-22 DIAGNOSIS — L29.9 PRURITUS: ICD-10-CM

## 2024-01-22 RX ORDER — HYDROXYZINE HYDROCHLORIDE 25 MG/1
TABLET, FILM COATED ORAL
Qty: 90 TABLET | Refills: 1 | OUTPATIENT
Start: 2024-01-22

## 2024-01-22 NOTE — TELEPHONE ENCOUNTER
Refill Routing Note   Medication(s) are not appropriate for processing by [facility] for the following reason(s):        Outside of protocol    [facility] action(s):  Route        Medication Therapy Plan: discontinued on 1/5/2024 by Isaias Myles MD    Pharmacist review requested: Yes     Appointments  past 12m or future 3m with PCP    Date Provider   Last Visit   1/5/2024 Isaias Myles MD   Next Visit   1/24/2024 Isaias Myles MD   ED visits in past 90 days: 0        Note composed:12:44 PM 01/22/2024

## 2024-01-22 NOTE — TELEPHONE ENCOUNTER
Refill Decision Note   Anthony Ball  is requesting a refill authorization.  Brief Assessment and Rationale for Refill:  Quick Discontinue     Medication Therapy Plan:  discontinued on 1/5/2024 by Isaias Myles MD    Medication Reconciliation Completed: No   Comments:     No Care Gaps recommended.     Note composed:1:27 PM 01/22/2024

## 2024-01-25 ENCOUNTER — DOCUMENTATION ONLY (OUTPATIENT)
Dept: REHABILITATION | Facility: HOSPITAL | Age: 74
End: 2024-01-25
Payer: MEDICARE

## 2024-01-25 NOTE — PROGRESS NOTES
OUTPATIENT PHYSICAL THERAPY DISCHARGE SUMMARY     Name: Anthony Ball  Clinic Number: [id number]    Diagnosis:   R53.81 (ICD-10-CM) - Physical deconditioning   R26.89 (ICD-10-CM) - Impaired gait and mobility   R29.898 (ICD-10-CM) - Weakness of both lower extremities     Physician: Isaias Myles MD   Treatment Orders: PT Eval and Treat  Past Medical History:   Diagnosis Date    Anxiety     Atrial fibrillation 12/2023    Cataract     Coronary artery disease     CAC score 1430    Depression     Diabetic neuropathy     Essential (primary) hypertension     GERD (gastroesophageal reflux disease)     Insomnia     Neuromyopathy     Possibly DM and/or alcohol related.    Pulmonary embolism 12/2023    Reactive airway disease     Type 2 diabetes mellitus        Initial visit: 11/16/23  Date of Last visit: 12/6/23  Date of Discharge Note:  1/25/24  Total Visits Received: 3  Missed Visits: none  ASSESSMENT   Status Towards Goals Met:      PT unable to assess goal achievement as pt only attended 2 follow up sessions post evaluation.       Goals:   Short Term Goals: 4 weeks   Pt will be issued first installment of HEP and report at least partial compliance.  Pt will improve his 30 second chair rise to 4-5 reps to demonstrate increased LE strength and muscular endurance  Pt will improve his TUG score to 43 seconds or < with or without appropriate AD to improve household ambulation and decrease fall risk  Pt will increase his SSWS to 0.29 m/sec with or without appropriate AD to improve community ambulation and decrease fall risk  Pt will complete mCTSIB balance testing and PT to set appropriate goals  Pt will improve any 3 LE muscle grades by 1/3 to help with functional mobility skills  Pt will perform all sit<> stand and stand pivot transfers with no more than SBA     Long Term Goals: 8 weeks   Pt will be partially compliant with finalized HEP to help maintain potential gains realized in PT  Pt will improve his 30 second chair  rise to 5-6 reps to demonstrate increased LE strength and muscular endurance  Pt will improve his TUG score to 37 seconds or < with or without appropriate AD to improve household ambulation and decrease fall risk  Pt will increase his SSWS to 0.32 m/sec with or without appropriate AD to improve community ambulation and decrease fall risk  Pt will improve any 3 LE muscle grades by 2/3 to help with functional mobility skills   Pt will perform all sit<> stand and stand pivot transfers with no more than S        Goals Not achieved and why:     No goals achieved due to pt attended only 2 follow-up visits and was hospitalized on 1/11/24 due to B leg weakness and nausea. Hospital discharge indicates pt was sent to skilled nursing.         Discharge reason : Hospital admission    PLAN   This patient is discharged from Outpatient Physical Therapy Services.     Ba Diaz, PT  01/25/2024

## 2024-02-06 DIAGNOSIS — Z12.11 COLON CANCER SCREENING: ICD-10-CM

## 2024-02-16 ENCOUNTER — PATIENT MESSAGE (OUTPATIENT)
Dept: INTERNAL MEDICINE | Facility: CLINIC | Age: 74
End: 2024-02-16
Payer: MEDICARE

## 2024-02-16 DIAGNOSIS — N30.00 ACUTE CYSTITIS WITHOUT HEMATURIA: Primary | ICD-10-CM

## 2024-02-16 DIAGNOSIS — R53.83 FATIGUE, UNSPECIFIED TYPE: ICD-10-CM

## 2024-02-16 RX ORDER — SULFAMETHOXAZOLE AND TRIMETHOPRIM 800; 160 MG/1; MG/1
1 TABLET ORAL 2 TIMES DAILY
Qty: 14 TABLET | Refills: 0 | Status: SHIPPED | OUTPATIENT
Start: 2024-02-16 | End: 2024-02-23

## 2024-02-19 NOTE — PROGRESS NOTES
[facility]  Progress Note    Patient Name: Anthony Ball  MRN: [id number]  Patient Class: IP- Inpatient   Admission Date: 1/11/2024  Length of Stay: 7 days  Attending Physician: No att. providers found  Primary Care Provider: Isaias Myles MD        Subjective:     Principal Problem:Acute cystitis        HPI:  73-year-old male with a past medical history of type 2 diabetes with polyneuropathy, HTN, GERD, CAD, prior PE on b.i.d. Eliquis, atrial fibrillation presents to the ED complaining of bilateral leg weakness that onset 3 weeks ago and nausea.  Patient also reports of associated fatigue and nausea during the same time period.  He denies any dizziness, lightheadedness, room spinning sensation, chest pain, shortness of breath.  States that he had similar acute on chronic leg weakness in the past and was discharged to rehab facility where he improved significantly and was discharged able to ambulate with walker. Patient reports several falls since discharge but more acute worsening of weakness and with very significant nausea.     In the ED patient afebrile and hemodynamically stable saturating well on room air. Moves all extremities. 4/5 strength lower extremities bilaterally. Significant neuropathy. UA consistent with UTI. Patient started on abx and admitted to the care of [facility] medicine.     Overview/Hospital Course:  Patient presented for bilateral leg weakness and nausea, found to have UTI which grew out Staph jesús.  Neg CXR. Covid and flu swabs neg. Tx w/ antibiotics . Pt tolerating po intake. Pt has met maximum benefit from hospitalization and is clinically stable for discharge to SNF. Patient refused going to SNF and asked to go home. Placement canceled and plan to dc home. The afternoon of dc, the SO came in and said that patient needs to go to snf. The patient then said that he is good with SNF placement. Patient is kept overnight until placement.     Physical Exam  Vitals  and nursing note reviewed.   Constitutional:       Appearance: Normal appearance. He is well-developed.   HENT:      Head: Normocephalic and atraumatic.      Nose: Nose normal.      Mouth/Throat:      Mouth: Mucous membranes are moist.   Eyes:      General:         Right eye: No discharge.         Left eye: No discharge.      Pupils: Pupils are equal, round, and reactive to light.   Pulmonary:      Effort: Pulmonary effort is normal. No respiratory distress.   Abdominal:      General: There is no distension.      Tenderness: There is no abdominal tenderness.   Musculoskeletal:         General: No deformity. Normal range of motion.      Cervical back: Neck supple.      Comments: Deconditioned    Skin:     General: Skin is warm and dry.   Neurological:      Mental Status: He is alert.      Sensory: No sensory deficit.      Motor: Weakness present. No abnormal muscle tone.      Gait: Gait abnormal.   Psychiatric:         Mood and Affect: Mood normal.         Behavior: Behavior normal.        Assessment/Plan:      * Acute cystitis  - start ceftriaxone  - follow up urine culture  - further management pending clinical course and future study review      Alcohol use  - previous very heavy alcohol use/abuse currently greatly improved and controlled per patient and partner  - monitor for signs of withdrawal      Gastroesophageal reflux disease without esophagitis  - continue home meds      Recurrent falls  - suspect acutely worsened in setting of UTI  - fall precautions  - PT/OT consult.  Recommending moderate intensity.  Case management updated, appreciate assistance.      Type 2 diabetes mellitus with neurologic complication, without long-term current use of insulin  - SSI   - hypoglycemic protocol      VTE Risk Mitigation (From admission, onward)      None            Discharge Planning   CHRISTA: 1/17/2024     Code Status: Prior   Is the patient medically ready for discharge?: Yes    Reason for patient still in hospital  (select all that apply): Pending disposition  Discharge Plan A: Skilled Nursing Facility   Discharge Delays: None known at this time              Ryan Silvestre DO  Department of Hospital Medicine   [facility]

## 2024-02-23 ENCOUNTER — LAB VISIT (OUTPATIENT)
Dept: LAB | Facility: OTHER | Age: 74
End: 2024-02-23
Payer: MEDICARE

## 2024-02-23 DIAGNOSIS — R53.83 FATIGUE, UNSPECIFIED TYPE: ICD-10-CM

## 2024-02-23 LAB
ALBUMIN SERPL BCP-MCNC: 2 G/DL (ref 3.5–5.2)
ALP SERPL-CCNC: 83 U/L (ref 55–135)
ALT SERPL W/O P-5'-P-CCNC: 8 U/L (ref 10–44)
ANION GAP SERPL CALC-SCNC: 7 MMOL/L (ref 8–16)
AST SERPL-CCNC: 16 U/L (ref 10–40)
BASOPHILS # BLD AUTO: 0.03 K/UL (ref 0–0.2)
BASOPHILS NFR BLD: 0.6 % (ref 0–1.9)
BILIRUB SERPL-MCNC: 0.2 MG/DL (ref 0.1–1)
BUN SERPL-MCNC: 15 MG/DL (ref 8–23)
CALCIUM SERPL-MCNC: 7.8 MG/DL (ref 8.7–10.5)
CHLORIDE SERPL-SCNC: 107 MMOL/L (ref 95–110)
CO2 SERPL-SCNC: 20 MMOL/L (ref 23–29)
CREAT SERPL-MCNC: 1 MG/DL (ref 0.5–1.4)
DIFFERENTIAL METHOD BLD: ABNORMAL
EOSINOPHIL # BLD AUTO: 0.3 K/UL (ref 0–0.5)
EOSINOPHIL NFR BLD: 4.9 % (ref 0–8)
ERYTHROCYTE [DISTWIDTH] IN BLOOD BY AUTOMATED COUNT: 13.8 % (ref 11.5–14.5)
EST. GFR  (NO RACE VARIABLE): >60 ML/MIN/1.73 M^2
GLUCOSE SERPL-MCNC: 164 MG/DL (ref 70–110)
HCT VFR BLD AUTO: 34.2 % (ref 40–54)
HGB BLD-MCNC: 11.7 G/DL (ref 14–18)
IMM GRANULOCYTES # BLD AUTO: 0.04 K/UL (ref 0–0.04)
IMM GRANULOCYTES NFR BLD AUTO: 0.8 % (ref 0–0.5)
LYMPHOCYTES # BLD AUTO: 1.4 K/UL (ref 1–4.8)
LYMPHOCYTES NFR BLD: 27.8 % (ref 18–48)
MCH RBC QN AUTO: 33 PG (ref 27–31)
MCHC RBC AUTO-ENTMCNC: 34.2 G/DL (ref 32–36)
MCV RBC AUTO: 96 FL (ref 82–98)
MONOCYTES # BLD AUTO: 0.3 K/UL (ref 0.3–1)
MONOCYTES NFR BLD: 5.5 % (ref 4–15)
NEUTROPHILS # BLD AUTO: 3.1 K/UL (ref 1.8–7.7)
NEUTROPHILS NFR BLD: 60.4 % (ref 38–73)
NRBC BLD-RTO: 0 /100 WBC
PLATELET # BLD AUTO: 135 K/UL (ref 150–450)
PMV BLD AUTO: 10.9 FL (ref 9.2–12.9)
POTASSIUM SERPL-SCNC: 3.9 MMOL/L (ref 3.5–5.1)
PROT SERPL-MCNC: 5.2 G/DL (ref 6–8.4)
RBC # BLD AUTO: 3.55 M/UL (ref 4.6–6.2)
SODIUM SERPL-SCNC: 134 MMOL/L (ref 136–145)
WBC # BLD AUTO: 5.08 K/UL (ref 3.9–12.7)

## 2024-02-23 PROCEDURE — 85025 COMPLETE CBC W/AUTO DIFF WBC: CPT | Performed by: INTERNAL MEDICINE

## 2024-02-23 PROCEDURE — 80053 COMPREHEN METABOLIC PANEL: CPT | Performed by: INTERNAL MEDICINE

## 2024-02-27 ENCOUNTER — LAB VISIT (OUTPATIENT)
Dept: LAB | Facility: HOSPITAL | Age: 74
End: 2024-02-27
Attending: INTERNAL MEDICINE
Payer: MEDICARE

## 2024-02-27 DIAGNOSIS — Z00.00 ENCOUNTER FOR MEDICARE ANNUAL WELLNESS EXAM: ICD-10-CM

## 2024-02-27 DIAGNOSIS — N30.00 ACUTE CYSTITIS: Primary | ICD-10-CM

## 2024-02-27 LAB
BACTERIA #/AREA URNS AUTO: ABNORMAL /HPF
BILIRUB UR QL STRIP: NEGATIVE
CLARITY UR REFRACT.AUTO: ABNORMAL
COLOR UR AUTO: YELLOW
GLUCOSE UR QL STRIP: ABNORMAL
HGB UR QL STRIP: ABNORMAL
KETONES UR QL STRIP: NEGATIVE
LEUKOCYTE ESTERASE UR QL STRIP: NEGATIVE
MICROSCOPIC COMMENT: ABNORMAL
NITRITE UR QL STRIP: NEGATIVE
PH UR STRIP: 5 [PH] (ref 5–8)
PROT UR QL STRIP: NEGATIVE
RBC #/AREA URNS AUTO: >100 /HPF (ref 0–4)
SP GR UR STRIP: 1.02 (ref 1–1.03)
SQUAMOUS #/AREA URNS AUTO: 4 /HPF
URN SPEC COLLECT METH UR: ABNORMAL
WBC #/AREA URNS AUTO: 4 /HPF (ref 0–5)
YEAST UR QL AUTO: ABNORMAL

## 2024-02-27 PROCEDURE — 87086 URINE CULTURE/COLONY COUNT: CPT | Performed by: INTERNAL MEDICINE

## 2024-02-27 PROCEDURE — 81001 URINALYSIS AUTO W/SCOPE: CPT | Performed by: INTERNAL MEDICINE

## 2024-02-28 LAB — BACTERIA UR CULT: NO GROWTH

## 2024-03-15 ENCOUNTER — OFFICE VISIT (OUTPATIENT)
Dept: INTERNAL MEDICINE | Facility: CLINIC | Age: 74
End: 2024-03-15
Payer: MEDICARE

## 2024-03-15 VITALS
HEART RATE: 110 BPM | SYSTOLIC BLOOD PRESSURE: 90 MMHG | DIASTOLIC BLOOD PRESSURE: 67 MMHG | HEIGHT: 69 IN | WEIGHT: 199.94 LBS | BODY MASS INDEX: 29.61 KG/M2

## 2024-03-15 DIAGNOSIS — R31.9 HEMATURIA, UNSPECIFIED TYPE: ICD-10-CM

## 2024-03-15 DIAGNOSIS — Z74.09 IMPAIRED FUNCTIONAL MOBILITY, BALANCE, GAIT, AND ENDURANCE: Primary | ICD-10-CM

## 2024-03-15 DIAGNOSIS — E11.42 TYPE 2 DIABETES MELLITUS WITH DIABETIC POLYNEUROPATHY, WITHOUT LONG-TERM CURRENT USE OF INSULIN: ICD-10-CM

## 2024-03-15 DIAGNOSIS — I74.9 THROMBOEMBOLISM: ICD-10-CM

## 2024-03-15 LAB
BACTERIA #/AREA URNS AUTO: ABNORMAL /HPF
BILIRUB UR QL STRIP: NEGATIVE
CLARITY UR REFRACT.AUTO: ABNORMAL
COLOR UR AUTO: YELLOW
GLUCOSE UR QL STRIP: ABNORMAL
HGB UR QL STRIP: ABNORMAL
KETONES UR QL STRIP: NEGATIVE
LEUKOCYTE ESTERASE UR QL STRIP: ABNORMAL
MICROSCOPIC COMMENT: ABNORMAL
NITRITE UR QL STRIP: NEGATIVE
PH UR STRIP: 6 [PH] (ref 5–8)
PROT UR QL STRIP: ABNORMAL
RBC #/AREA URNS AUTO: 57 /HPF (ref 0–4)
SP GR UR STRIP: 1.02 (ref 1–1.03)
SQUAMOUS #/AREA URNS AUTO: 1 /HPF
URN SPEC COLLECT METH UR: ABNORMAL
WBC #/AREA URNS AUTO: >100 /HPF (ref 0–5)
WBC CLUMPS UR QL AUTO: ABNORMAL
YEAST UR QL AUTO: ABNORMAL

## 2024-03-15 PROCEDURE — 99213 OFFICE O/P EST LOW 20 MIN: CPT | Mod: PBBFAC | Performed by: INTERNAL MEDICINE

## 2024-03-15 PROCEDURE — 99213 OFFICE O/P EST LOW 20 MIN: CPT | Mod: S$PBB,,, | Performed by: INTERNAL MEDICINE

## 2024-03-15 PROCEDURE — 81001 URINALYSIS AUTO W/SCOPE: CPT | Performed by: INTERNAL MEDICINE

## 2024-03-15 PROCEDURE — 87086 URINE CULTURE/COLONY COUNT: CPT | Performed by: INTERNAL MEDICINE

## 2024-03-15 PROCEDURE — 87088 URINE BACTERIA CULTURE: CPT | Performed by: INTERNAL MEDICINE

## 2024-03-15 PROCEDURE — 87186 SC STD MICRODIL/AGAR DIL: CPT | Performed by: INTERNAL MEDICINE

## 2024-03-15 PROCEDURE — 99999 PR PBB SHADOW E&M-EST. PATIENT-LVL III: CPT | Mod: PBBFAC,,, | Performed by: INTERNAL MEDICINE

## 2024-03-15 PROCEDURE — 87077 CULTURE AEROBIC IDENTIFY: CPT | Performed by: INTERNAL MEDICINE

## 2024-03-15 NOTE — PROGRESS NOTES
Subjective:       Patient ID: Anthony Ball is a 74 y.o. male.    Chief Complaint: F/U    HPI  Recurrent UTI and severe generalized weakness have been ongoing issues this past yr. Since recent hospitalization and inpatient rehab stay, he is doing overall relatively well. Working with HHPT once weekly and likely will be d/c'd to outpt PT soon. HH nurse noted some possible increased swelling in left lower leg compared to right recently. Not overt that partner of pt could tell. LLE was source of DVT when PE found in 12/23 following flight. Taking his Eliquis (5 mg bid) still. Some occasional mild sob but generally not an issue. No cp.  Urination feels fine at the moment. Hematuria was noted on last UA check recently.      Past Medical History:   Diagnosis Date    Anxiety     Atrial fibrillation 12/2023    Cataract     Coronary artery disease     CAC score 1430    Depression     Diabetic neuropathy     Essential (primary) hypertension     GERD (gastroesophageal reflux disease)     Insomnia     Neuromyopathy     Possibly DM and/or alcohol related.    Pulmonary embolism 12/2023    Reactive airway disease     Type 2 diabetes mellitus          Current Outpatient Medications:     albuterol (VENTOLIN HFA) 90 mcg/actuation inhaler, Inhale 2 puffs into the lungs every 6 (six) hours as needed for Wheezing. Rescue, Disp: 8 g, Rfl: 2    apixaban (ELIQUIS) 5 mg Tab, Take 1 tablet (5 mg total) by mouth 2 (two) times daily., Disp: 180 tablet, Rfl: 1    DULoxetine (CYMBALTA) 60 MG capsule, Take 1 capsule (60 mg total) by mouth 2 (two) times daily., Disp: 180 capsule, Rfl: 3    empagliflozin (JARDIANCE) 10 mg tablet, Take 1 tablet (10 mg total) by mouth once daily. Discuss with PCP prior to restarting., Disp: 90 tablet, Rfl: 3    fluticasone-salmeterol diskus inhaler 250-50 mcg, Inhale 1 puff into the lungs 2 (two) times daily. Controller, Disp: 60 each, Rfl: 2    LIDOcaine (LIDODERM) 5 %, Place 3 patches onto the skin daily as needed  (Rib pain). Okay to use 1 to 3 patches. Remove & Discard patches within 12 hours or as directed by MD, Disp: 30 patch, Rfl: 1    methocarbamoL (ROBAXIN) 750 MG Tab, Take 1 tablet (750 mg total) by mouth 3 (three) times daily as needed (Back pain)., Disp: 90 tablet, Rfl: 0    ondansetron (ZOFRAN) 8 MG tablet, Take 1 tablet (8 mg total) by mouth every 8 (eight) hours as needed for Nausea., Disp: 30 tablet, Rfl: 1    pantoprazole (PROTONIX) 40 MG tablet, TAKE 1 TABLET(40 MG) BY MOUTH EVERY DAY, Disp: 90 tablet, Rfl: 3    pregabalin (LYRICA) 150 MG capsule, Take 1 capsule (150 mg total) by mouth 2 (two) times daily. for 14 days, Disp: 14 capsule, Rfl: 0    traZODone (DESYREL) 100 MG tablet, Take 2 tablets (200 mg total) by mouth nightly as needed for Insomnia., Disp: 14 tablet, Rfl: 0    triamcinolone acetonide 0.1% (KENALOG) 0.1 % cream, Apply topically 2 (two) times daily. Use for 1 to 2 weeks as needed for rash., Disp: 453.6 g, Rfl: 0    Past Surgical History:   Procedure Laterality Date    COLONOSCOPY      Normal around 2020    TOTAL KNEE ARTHROPLASTY Right        Social History     Tobacco Use    Smoking status: Never    Smokeless tobacco: Never   Substance Use Topics    Alcohol use: Yes     Comment: Former heavy use    Drug use: Never         Objective:      Vitals:    03/15/24 1112   BP: 90/67   Pulse: 110       Physical Exam  Constitutional:       General: He is not in acute distress.     Appearance: Normal appearance. He is not ill-appearing.   Cardiovascular:      Rate and Rhythm: Normal rate and regular rhythm.      Heart sounds: Normal heart sounds.   Pulmonary:      Effort: Pulmonary effort is normal. No respiratory distress.      Breath sounds: Normal breath sounds.   Abdominal:      General: Abdomen is flat. There is no distension.      Palpations: Abdomen is soft.      Tenderness: There is no abdominal tenderness.   Musculoskeletal:      Right lower leg: Edema (Trace b/l) present.      Left lower leg:  Edema present.   Neurological:      Mental Status: He is alert and oriented to person, place, and time. Mental status is at baseline.   Psychiatric:         Mood and Affect: Mood normal.         Behavior: Behavior normal.              Assessment/Plan:     1) Recurrent UTI history, Hematuria - Checking UA for hematuria f/u. PSA was normal recently. Pt does not note overt UTI symptoms currently. At this point, not enough of a pressing need for Jardiance to justify continuing. Agreed on stopping.  2) T2DM - Has been very well controlled with A1c < 6% recently. Given UTI hx, will stop Jardiance. Repeat A1c q3-6m.  3) Generalized weakness, Peripheral neuropathy - Continuing PT. Likely switching from HHPT to outpt soon.  4) LLE DVT, PE (setting of air travel 12/23) - Continue Eliquis 5 mg bid. At 6 month brittany, will plan to get updated LLE US and echocardiogram then consider reducing to 2.5 mg bid long-term.

## 2024-03-18 ENCOUNTER — PATIENT MESSAGE (OUTPATIENT)
Dept: INTERNAL MEDICINE | Facility: CLINIC | Age: 74
End: 2024-03-18
Payer: MEDICARE

## 2024-03-18 DIAGNOSIS — R31.9 HEMATURIA, UNSPECIFIED TYPE: Primary | ICD-10-CM

## 2024-03-18 LAB — BACTERIA UR CULT: ABNORMAL

## 2024-03-18 NOTE — TELEPHONE ENCOUNTER
Noted abnormal UA last week which has grown Staph. Pt and family contacted today. Still note no obvious urinary changes. Does not feel like UTI present. Will plan to recheck UA soon with next labs and pt and family will monitor for any potential symptoms. Even mild potential symptoms I would probably opt to treat.

## 2024-03-25 ENCOUNTER — TELEPHONE (OUTPATIENT)
Dept: INTERNAL MEDICINE | Facility: CLINIC | Age: 74
End: 2024-03-25
Payer: MEDICARE

## 2024-03-25 DIAGNOSIS — M54.12 CERVICAL RADICULOPATHY: Primary | ICD-10-CM

## 2024-03-25 RX ORDER — HYDROCODONE BITARTRATE AND ACETAMINOPHEN 5; 325 MG/1; MG/1
1 TABLET ORAL EVERY 6 HOURS PRN
Qty: 28 TABLET | Refills: 0 | Status: SHIPPED | OUTPATIENT
Start: 2024-03-25 | End: 2024-06-14

## 2024-03-25 RX ORDER — METHYLPREDNISOLONE 4 MG/1
TABLET ORAL
Qty: 21 EACH | Refills: 0 | Status: SHIPPED | OUTPATIENT
Start: 2024-03-25 | End: 2024-04-15

## 2024-03-25 NOTE — TELEPHONE ENCOUNTER
Called and left message for patient letting him know that the medications have been sent to the pharmacy. Patient was asked to call back with any questions or concerns.

## 2024-03-25 NOTE — TELEPHONE ENCOUNTER
----- Message from Chelsea Christina LPN sent at 3/25/2024 11:38 AM CDT -----  Regarding: pain  Patient is requesting pain medication and medrol pack sent in for his cervical neuropathy pain he is having .

## 2024-03-25 NOTE — TELEPHONE ENCOUNTER
----- Message from Isaias Myles MD sent at 3/25/2024  1:15 PM CDT -----  Regarding: RE: pain  Sent both to his [facility].    Dmitri Phillips  ----- Message -----  From: Chelsea Christina LPN  Sent: 3/25/2024  11:40 AM CDT  To: Isaias Myles MD  Subject: pain                                             Patient is requesting pain medication and medrol pack sent in for his cervical neuropathy pain he is having .

## 2024-03-28 ENCOUNTER — LAB VISIT (OUTPATIENT)
Dept: LAB | Facility: HOSPITAL | Age: 74
End: 2024-03-28
Attending: INTERNAL MEDICINE
Payer: MEDICARE

## 2024-03-28 DIAGNOSIS — N30.00 ACUTE CYSTITIS: Primary | ICD-10-CM

## 2024-03-28 LAB
ANION GAP SERPL CALC-SCNC: 9 MMOL/L (ref 8–16)
BUN SERPL-MCNC: 9 MG/DL (ref 8–23)
CALCIUM SERPL-MCNC: 8.2 MG/DL (ref 8.7–10.5)
CHLORIDE SERPL-SCNC: 109 MMOL/L (ref 95–110)
CO2 SERPL-SCNC: 23 MMOL/L (ref 23–29)
CREAT SERPL-MCNC: 0.9 MG/DL (ref 0.5–1.4)
ERYTHROCYTE [DISTWIDTH] IN BLOOD BY AUTOMATED COUNT: 14.6 % (ref 11.5–14.5)
EST. GFR  (NO RACE VARIABLE): >60 ML/MIN/1.73 M^2
GLUCOSE SERPL-MCNC: 139 MG/DL (ref 70–110)
HCT VFR BLD AUTO: 36.3 % (ref 40–54)
HGB BLD-MCNC: 12 G/DL (ref 14–18)
MCH RBC QN AUTO: 33.1 PG (ref 27–31)
MCHC RBC AUTO-ENTMCNC: 33.1 G/DL (ref 32–36)
MCV RBC AUTO: 100 FL (ref 82–98)
PLATELET # BLD AUTO: 159 K/UL (ref 150–450)
PMV BLD AUTO: 10.3 FL (ref 9.2–12.9)
POTASSIUM SERPL-SCNC: 3.8 MMOL/L (ref 3.5–5.1)
RBC # BLD AUTO: 3.63 M/UL (ref 4.6–6.2)
SODIUM SERPL-SCNC: 141 MMOL/L (ref 136–145)
WBC # BLD AUTO: 5.15 K/UL (ref 3.9–12.7)

## 2024-03-28 PROCEDURE — 80048 BASIC METABOLIC PNL TOTAL CA: CPT | Performed by: INTERNAL MEDICINE

## 2024-03-28 PROCEDURE — 85027 COMPLETE CBC AUTOMATED: CPT | Performed by: INTERNAL MEDICINE

## 2024-03-30 ENCOUNTER — HOSPITAL ENCOUNTER (EMERGENCY)
Facility: HOSPITAL | Age: 74
Discharge: HOME | End: 2024-03-31
Attending: EMERGENCY MEDICINE
Payer: MEDICARE

## 2024-03-30 DIAGNOSIS — R20.2 PARESTHESIAS: Primary | ICD-10-CM

## 2024-03-30 DIAGNOSIS — E83.42 HYPOMAGNESEMIA: ICD-10-CM

## 2024-03-30 DIAGNOSIS — I48.91 A-FIB: ICD-10-CM

## 2024-03-30 DIAGNOSIS — N30.00 ACUTE CYSTITIS WITHOUT HEMATURIA: ICD-10-CM

## 2024-03-30 PROCEDURE — 93005 ELECTROCARDIOGRAM TRACING: CPT

## 2024-03-30 PROCEDURE — 93010 ELECTROCARDIOGRAM REPORT: CPT | Mod: ,,, | Performed by: INTERNAL MEDICINE

## 2024-03-30 PROCEDURE — 99285 EMERGENCY DEPT VISIT HI MDM: CPT | Mod: 25

## 2024-03-31 VITALS
WEIGHT: 250 LBS | SYSTOLIC BLOOD PRESSURE: 134 MMHG | RESPIRATION RATE: 18 BRPM | DIASTOLIC BLOOD PRESSURE: 83 MMHG | TEMPERATURE: 98 F | HEART RATE: 99 BPM | BODY MASS INDEX: 36.92 KG/M2 | OXYGEN SATURATION: 96 %

## 2024-03-31 LAB
ALBUMIN SERPL BCP-MCNC: 2.3 G/DL (ref 3.5–5.2)
ALP SERPL-CCNC: 137 U/L (ref 55–135)
ALT SERPL W/O P-5'-P-CCNC: 13 U/L (ref 10–44)
ANION GAP SERPL CALC-SCNC: 12 MMOL/L (ref 8–16)
AST SERPL-CCNC: 18 U/L (ref 10–40)
BACTERIA #/AREA URNS AUTO: ABNORMAL /HPF
BASOPHILS # BLD AUTO: 0.04 K/UL (ref 0–0.2)
BASOPHILS NFR BLD: 0.4 % (ref 0–1.9)
BILIRUB SERPL-MCNC: 1.2 MG/DL (ref 0.1–1)
BILIRUB UR QL STRIP: NEGATIVE
BUN SERPL-MCNC: 10 MG/DL (ref 8–23)
CALCIUM SERPL-MCNC: 8.5 MG/DL (ref 8.7–10.5)
CHLORIDE SERPL-SCNC: 106 MMOL/L (ref 95–110)
CLARITY UR REFRACT.AUTO: ABNORMAL
CO2 SERPL-SCNC: 21 MMOL/L (ref 23–29)
COLOR UR AUTO: YELLOW
CREAT SERPL-MCNC: 0.9 MG/DL (ref 0.5–1.4)
DIFFERENTIAL METHOD BLD: ABNORMAL
EOSINOPHIL # BLD AUTO: 0 K/UL (ref 0–0.5)
EOSINOPHIL NFR BLD: 0.2 % (ref 0–8)
ERYTHROCYTE [DISTWIDTH] IN BLOOD BY AUTOMATED COUNT: 13.4 % (ref 11.5–14.5)
EST. GFR  (NO RACE VARIABLE): >60 ML/MIN/1.73 M^2
ETHANOL SERPL-MCNC: <10 MG/DL
FOLATE SERPL-MCNC: 4.7 NG/ML (ref 4–24)
GLUCOSE SERPL-MCNC: 163 MG/DL (ref 70–110)
GLUCOSE UR QL STRIP: NEGATIVE
HCT VFR BLD AUTO: 40 % (ref 40–54)
HCV AB SERPL QL IA: NORMAL
HGB BLD-MCNC: 13.9 G/DL (ref 14–18)
HGB UR QL STRIP: ABNORMAL
IMM GRANULOCYTES # BLD AUTO: 0.03 K/UL (ref 0–0.04)
IMM GRANULOCYTES NFR BLD AUTO: 0.3 % (ref 0–0.5)
KETONES UR QL STRIP: ABNORMAL
LEUKOCYTE ESTERASE UR QL STRIP: ABNORMAL
LIPASE SERPL-CCNC: <4 U/L (ref 4–60)
LYMPHOCYTES # BLD AUTO: 1.1 K/UL (ref 1–4.8)
LYMPHOCYTES NFR BLD: 10.8 % (ref 18–48)
MAGNESIUM SERPL-MCNC: 1.4 MG/DL (ref 1.6–2.6)
MCH RBC QN AUTO: 32.6 PG (ref 27–31)
MCHC RBC AUTO-ENTMCNC: 34.8 G/DL (ref 32–36)
MCV RBC AUTO: 94 FL (ref 82–98)
MICROSCOPIC COMMENT: ABNORMAL
MONOCYTES # BLD AUTO: 0.6 K/UL (ref 0.3–1)
MONOCYTES NFR BLD: 5.8 % (ref 4–15)
NEUTROPHILS # BLD AUTO: 8.5 K/UL (ref 1.8–7.7)
NEUTROPHILS NFR BLD: 82.5 % (ref 38–73)
NITRITE UR QL STRIP: POSITIVE
NRBC BLD-RTO: 0 /100 WBC
PH UR STRIP: 6 [PH] (ref 5–8)
PLATELET # BLD AUTO: 237 K/UL (ref 150–450)
PMV BLD AUTO: 9.7 FL (ref 9.2–12.9)
POTASSIUM SERPL-SCNC: 4.2 MMOL/L (ref 3.5–5.1)
PROT SERPL-MCNC: 5.9 G/DL (ref 6–8.4)
PROT UR QL STRIP: ABNORMAL
RBC # BLD AUTO: 4.27 M/UL (ref 4.6–6.2)
RBC #/AREA URNS AUTO: 1 /HPF (ref 0–4)
SODIUM SERPL-SCNC: 139 MMOL/L (ref 136–145)
SP GR UR STRIP: 1.01 (ref 1–1.03)
SQUAMOUS #/AREA URNS AUTO: 0 /HPF
T4 FREE SERPL-MCNC: 1 NG/DL (ref 0.71–1.51)
TSH SERPL DL<=0.005 MIU/L-ACNC: 5.4 UIU/ML (ref 0.4–4)
URN SPEC COLLECT METH UR: ABNORMAL
VIT B12 SERPL-MCNC: 347 PG/ML (ref 210–950)
WBC # BLD AUTO: 10.35 K/UL (ref 3.9–12.7)
WBC #/AREA URNS AUTO: >100 /HPF (ref 0–5)
WBC CLUMPS UR QL AUTO: ABNORMAL

## 2024-03-31 PROCEDURE — 83690 ASSAY OF LIPASE: CPT

## 2024-03-31 PROCEDURE — 80053 COMPREHEN METABOLIC PANEL: CPT

## 2024-03-31 PROCEDURE — 81001 URINALYSIS AUTO W/SCOPE: CPT

## 2024-03-31 PROCEDURE — 25000003 PHARM REV CODE 250: Performed by: EMERGENCY MEDICINE

## 2024-03-31 PROCEDURE — 82746 ASSAY OF FOLIC ACID SERUM: CPT

## 2024-03-31 PROCEDURE — 86803 HEPATITIS C AB TEST: CPT | Performed by: PHYSICIAN ASSISTANT

## 2024-03-31 PROCEDURE — 84439 ASSAY OF FREE THYROXINE: CPT

## 2024-03-31 PROCEDURE — 96365 THER/PROPH/DIAG IV INF INIT: CPT

## 2024-03-31 PROCEDURE — 82607 VITAMIN B-12: CPT

## 2024-03-31 PROCEDURE — 85025 COMPLETE CBC W/AUTO DIFF WBC: CPT

## 2024-03-31 PROCEDURE — 87086 URINE CULTURE/COLONY COUNT: CPT

## 2024-03-31 PROCEDURE — 96367 TX/PROPH/DG ADDL SEQ IV INF: CPT

## 2024-03-31 PROCEDURE — 84443 ASSAY THYROID STIM HORMONE: CPT

## 2024-03-31 PROCEDURE — 83735 ASSAY OF MAGNESIUM: CPT

## 2024-03-31 PROCEDURE — 87186 SC STD MICRODIL/AGAR DIL: CPT

## 2024-03-31 PROCEDURE — 87088 URINE BACTERIA CULTURE: CPT

## 2024-03-31 PROCEDURE — 82077 ASSAY SPEC XCP UR&BREATH IA: CPT

## 2024-03-31 PROCEDURE — 87077 CULTURE AEROBIC IDENTIFY: CPT

## 2024-03-31 PROCEDURE — 63600175 PHARM REV CODE 636 W HCPCS: Performed by: EMERGENCY MEDICINE

## 2024-03-31 PROCEDURE — 25000003 PHARM REV CODE 250

## 2024-03-31 RX ORDER — SULFAMETHOXAZOLE AND TRIMETHOPRIM 800; 160 MG/1; MG/1
1 TABLET ORAL 2 TIMES DAILY
Qty: 14 TABLET | Refills: 0 | Status: SHIPPED | OUTPATIENT
Start: 2024-03-31 | End: 2024-04-07

## 2024-03-31 RX ORDER — SULFAMETHOXAZOLE AND TRIMETHOPRIM 800; 160 MG/1; MG/1
1 TABLET ORAL
Status: COMPLETED | OUTPATIENT
Start: 2024-03-31 | End: 2024-03-31

## 2024-03-31 RX ORDER — MAGNESIUM SULFATE HEPTAHYDRATE 40 MG/ML
2 INJECTION, SOLUTION INTRAVENOUS
Status: COMPLETED | OUTPATIENT
Start: 2024-03-31 | End: 2024-03-31

## 2024-03-31 RX ORDER — PROMETHAZINE HYDROCHLORIDE 25 MG/1
25 TABLET ORAL
Status: COMPLETED | OUTPATIENT
Start: 2024-03-31 | End: 2024-03-31

## 2024-03-31 RX ORDER — METOCLOPRAMIDE HYDROCHLORIDE 5 MG/ML
5 INJECTION INTRAMUSCULAR; INTRAVENOUS
Status: DISCONTINUED | OUTPATIENT
Start: 2024-03-31 | End: 2024-03-31

## 2024-03-31 RX ORDER — HYDROCODONE BITARTRATE AND ACETAMINOPHEN 5; 325 MG/1; MG/1
1 TABLET ORAL
Status: COMPLETED | OUTPATIENT
Start: 2024-03-31 | End: 2024-03-31

## 2024-03-31 RX ADMIN — HYDROCODONE BITARTRATE AND ACETAMINOPHEN 1 TABLET: 5; 325 TABLET ORAL at 03:03

## 2024-03-31 RX ADMIN — PROMETHAZINE HYDROCHLORIDE 25 MG: 25 TABLET ORAL at 07:03

## 2024-03-31 RX ADMIN — PROMETHAZINE HYDROCHLORIDE 25 MG: 25 INJECTION INTRAMUSCULAR; INTRAVENOUS at 01:03

## 2024-03-31 RX ADMIN — SULFAMETHOXAZOLE AND TRIMETHOPRIM 1 TABLET: 800; 160 TABLET ORAL at 03:03

## 2024-03-31 RX ADMIN — MAGNESIUM SULFATE HEPTAHYDRATE 2 G: 40 INJECTION, SOLUTION INTRAVENOUS at 03:03

## 2024-03-31 NOTE — DISCHARGE INSTRUCTIONS
Diagnosis: Urinary tract infection    Tests today showed:   Labs Reviewed   CBC W/ AUTO DIFFERENTIAL - Abnormal; Notable for the following components:       Result Value    RBC 4.27 (*)     Hemoglobin 13.9 (*)     MCH 32.6 (*)     Gran # (ANC) 8.5 (*)     Gran % 82.5 (*)     Lymph % 10.8 (*)     All other components within normal limits    Narrative:     AD ON LIPASE PER DR ASHLIE ARMSTRONG/ORDER# [id number] @ 1:21AM   COMPREHENSIVE METABOLIC PANEL - Abnormal; Notable for the following components:    CO2 21 (*)     Glucose 163 (*)     Calcium 8.5 (*)     Total Protein 5.9 (*)     Albumin 2.3 (*)     Total Bilirubin 1.2 (*)     Alkaline Phosphatase 137 (*)     All other components within normal limits    Narrative:     AD ON LIPASE PER DR ASHLIE ARMSTRONG/ORDER# [id number] @ 1:21AM   MAGNESIUM - Abnormal; Notable for the following components:    Magnesium 1.4 (*)     All other components within normal limits    Narrative:     AD ON LIPASE PER DR ASHLIE ARMSTRONG/ORDER# [id number] @ 1:21AM   URINALYSIS, REFLEX TO URINE CULTURE - Abnormal; Notable for the following components:    Appearance, UA Hazy (*)     Protein, UA Trace (*)     Ketones, UA Trace (*)     Occult Blood UA Trace (*)     Nitrite, UA Positive (*)     Leukocytes, UA 3+ (*)     All other components within normal limits    Narrative:     Specimen Source->Urine   TSH - Abnormal; Notable for the following components:    TSH 5.402 (*)     All other components within normal limits    Narrative:     AD ON LIPASE PER DR ASHLIE ARMSTRONG/ORDER# [id number] @ 1:21AM   URINALYSIS MICROSCOPIC - Abnormal; Notable for the following components:    WBC, UA >100 (*)     WBC Clumps, UA Occasional (*)     Bacteria Few (*)     All other components within normal limits    Narrative:     Specimen Source->Urine   CULTURE, URINE   HEPATITIS C ANTIBODY    Narrative:     Release to patient->Immediate   FOLATE    Narrative:     AD ON LIPASE PER DR ASHLIE ARMSTRONG/ORDER# [id number] @ 1:21AM    VITAMIN B12    Narrative:     AD ON LIPASE PER DR ASHLIE ARMSTRONG/ORDER# [id number] @ 1:21AM   ALCOHOL,MEDICAL (ETHANOL)    Narrative:     AD ON LIPASE PER DR ASHLIE ARMSTRONG/ORDER# [id number] @ 1:21AM   LIPASE    Narrative:     AD ON LIPASE PER DR ASHLIE ARMSTRONG/ORDER# [id number] @ 1:21AM   T4, FREE    Narrative:     AD ON LIPASE PER DR ASHLIE ARMSTRONG/ORDER# [id number] @ 1:21AM     Imaging Results              MRI Brain Without Contrast (Final result)  Result time 03/31/24 02:57:47      Final result by Robert Gardiner MD (03/31/24 02:57:47)                   Impression:      No evidence of acute intracranial pathology.    Generalized cerebral volume loss and chronic microvascular ischemic changes.      Electronically signed by: Robert Gardiner MD  Date:    03/31/2024  Time:    02:57               Narrative:    EXAMINATION:  MRI BRAIN WITHOUT CONTRAST    CLINICAL HISTORY:  Neuro deficit, acute, stroke suspected;    TECHNIQUE:  Multiplanar multisequence MR imaging of the brain was performed without contrast.    COMPARISON:  CT, 08/10/2023.  MRI brain, 03/19/2023.    FINDINGS:  There is no evidence of restricted diffusion to suggest acute infarction.    FLAIR imaging demonstrates minimal patchy and confluent periventricular white matter signal hyperintensity, suggestive of chronic microvascular ischemic change.    Generalized cerebral volume loss with ex vacuo dilation of the ventricles and sulci.  Ventricles are prominent though similar to the prior study without evidence of acute hydrocephalus.  Mild cerebellar atrophy as seen previously.    No evidence of mass lesion, hemorrhage, edema or recent or remote major vascular distribution infarction.    No extra-axial blood or fluid collections.    T2 skull base flow voids are preserved. Bone marrow signal intensity is unremarkable.  Minimal paranasal sinus mucosal thickening.  No air-fluid levels.                                      Treatments you had today:    Medications   magnesium sulfate 2g in water 50mL IVPB (premix) (has no administration in time range)   sulfamethoxazole-trimethoprim 800-160mg per tablet 1 tablet (has no administration in time range)   promethazine (PHENERGAN) 25 mg in dextrose 5 % (D5W) 50 mL IVPB (0 mg Intravenous Stopped 3/31/24 0146)       Home Care Instructions:  - Take the prescribed antibiotic as directed  - Stay well hydrated  - For relief of burning and the feeling of urgency, take Azo over-the-counter according to packaging directions for up to two days. This medication may turn your urine yellow-orange, and may stain clothing.  - Continue taking your home medications as prescribed    Take ibuprofen (also called Advil, Motrin) for your pain. This medicine is available over-the-counter in 200 mg tablets.  - You may take 600 mg every 6 hours, or 800 mg every 8 hours as needed   - Do not take more than this amount, as it can cause kidney problems, bleeding in your stomach, and other serious problems.   - Do not also take naproxen (Aleve) at the same time or on the same day  - If you have heart problems or uncontrolled high blood pressure, you should not take ibuprofen for more than 3 days without discussing with your doctor    Follow-up plan:  - Follow-up with: Primary care doctor within 3 - 5 days  - Additional testing and/or evaluation as directed by your primary doctor    Return to the Emergency Department for symptoms including but not limited to: worsening symptoms, severe abdominal or back pain, shortness of breath or chest pain, vomiting with inability to hold down fluids, fevers greater than 100.4°F, dizziness, passing out/fainting/unconsciousness, or other concerning symptoms.

## 2024-03-31 NOTE — ED NOTES
I received report from -REFUGIO Harrison, and assumed care of pt at this time. Pt resting comfortably and independently repositioned in stretcher with bed locked in lowest position for safety. NAD noted at this time. Respirations even and unlabored and visible chest rise noted.  Patient offered bathroom assistance and denies need at this time. Pt instructed to call if assistance is needed. Pt on continuous cardiac, BP, and O2 monitoring. Call light within reach. No needs at this time. Will continue to monitor.

## 2024-03-31 NOTE — ED TRIAGE NOTES
"Anthony Ball, a 74 y.o. male presents to the ED w/ complaint of numbness to bilateral hands, and "fullness, heaviness" in bilateral feet. Also reports nausea with movement and dizziness. Reports blurred vision x1 month. Hx of neuropathy, afib, LE weakness. +ETOH, pt reports 4 glasses of wine tonight. Denies CP, SOB, Fever. Received 900ml of NS en route and 4mg Zofran.    Triage note:  Chief Complaint   Patient presents with    Numbness     Numbness/coldness in hands and feet, hx of neuropathy, blurred vision for a month, nausea and vomiting, dizziness. +ETOH     Review of patient's allergies indicates:  No Known Allergies  Past Medical History:   Diagnosis Date    Acute deep vein thrombosis (DVT) of left lower extremity 12/2023    Anxiety     Atrial fibrillation 12/2023    Cataract     Coronary artery disease     CAC score 1430    Depression     Diabetic neuropathy     Essential (primary) hypertension     GERD (gastroesophageal reflux disease)     Insomnia     Neuromyopathy     Possibly DM and/or alcohol related.    Pulmonary embolism 12/2023    Reactive airway disease     Type 2 diabetes mellitus        "

## 2024-03-31 NOTE — ED PROVIDER NOTES
Encounter Date: 3/30/2024       History     Chief Complaint   Patient presents with    Numbness     Numbness/coldness in hands and feet, hx of neuropathy, blurred vision for a month, nausea and vomiting, dizziness. +ETOH     HPI    Patient is a 75yo male with hx of a fib on eliquis, CAD, neuropathy, HTN, hx of DVT and PE, presenting due to onset of BL foot coolness and heaviness. Patient's also felt cold at that time. Symptom onset approximately 1930 this evening. Confirms no asymmetry of symptoms.     Endorsing significant nausea at this time.    For approximately 1-2 months, has had worsening blurry vision and lightheadedness.  Patient reports having approximately 4-5 drinks today, noting that alcohol helps with his neuropathy,    He did not take his Eliquis yesterday, but took both doses today.    Endorses headache, coldness of extremities, nausea. Chronic weakness.  Denies CP, SOB, abd pain, extremity pain.      Review of patient's allergies indicates:  No Known Allergies  Past Medical History:   Diagnosis Date    Acute deep vein thrombosis (DVT) of left lower extremity 12/2023    Anxiety     Atrial fibrillation 12/2023    Cataract     Coronary artery disease     CAC score 1430    Depression     Diabetic neuropathy     Essential (primary) hypertension     GERD (gastroesophageal reflux disease)     Insomnia     Neuromyopathy     Possibly DM and/or alcohol related.    Pulmonary embolism 12/2023    Reactive airway disease     Type 2 diabetes mellitus      Past Surgical History:   Procedure Laterality Date    COLONOSCOPY      Normal around 2020    TOTAL KNEE ARTHROPLASTY Right      Family History   Problem Relation Age of Onset    Hypertension Mother      Social History     Tobacco Use    Smoking status: Never    Smokeless tobacco: Never   Substance Use Topics    Alcohol use: Yes     Comment: Former heavy use    Drug use: Never     Physical Exam     Initial Vitals [03/30/24 2234]   BP Pulse Resp Temp SpO2   (!)  123/90 101 16 98.1 °F (36.7 °C) 98 %      MAP       --         Physical Exam    Constitutional: He appears well-developed and well-nourished. He is not diaphoretic. No distress.   HENT:   Head: Normocephalic and atraumatic.   Eyes: EOM are normal.   Cardiovascular:  Normal rate and regular rhythm.           Pulmonary/Chest: Breath sounds normal. No respiratory distress.   Abdominal: Abdomen is soft. He exhibits no distension. There is no abdominal tenderness.   Musculoskeletal:         General: No tenderness or edema.     Neurological: He is alert. No cranial nerve deficit or sensory deficit.   LLE weaker than R objectively, chronic   Skin: Skin is warm and dry.         ED Course   Procedures  Labs Reviewed   CBC W/ AUTO DIFFERENTIAL - Abnormal; Notable for the following components:       Result Value    RBC 4.27 (*)     Hemoglobin 13.9 (*)     MCH 32.6 (*)     Gran # (ANC) 8.5 (*)     Gran % 82.5 (*)     Lymph % 10.8 (*)     All other components within normal limits    Narrative:     AD ON LIPASE PER DR ASHLIE ARMSTRONG/ORDER# [id number] @ 1:21AM   COMPREHENSIVE METABOLIC PANEL - Abnormal; Notable for the following components:    CO2 21 (*)     Glucose 163 (*)     Calcium 8.5 (*)     Total Protein 5.9 (*)     Albumin 2.3 (*)     Total Bilirubin 1.2 (*)     Alkaline Phosphatase 137 (*)     All other components within normal limits    Narrative:     AD ON LIPASE PER DR ASHLIE ARMSTRONG/ORDER# [id number] @ 1:21AM   MAGNESIUM - Abnormal; Notable for the following components:    Magnesium 1.4 (*)     All other components within normal limits    Narrative:     AD ON LIPASE PER DR ASHLIE ARMSTRONG/ORDER# [id number] @ 1:21AM   URINALYSIS, REFLEX TO URINE CULTURE - Abnormal; Notable for the following components:    Appearance, UA Hazy (*)     Protein, UA Trace (*)     Ketones, UA Trace (*)     Occult Blood UA Trace (*)     Nitrite, UA Positive (*)     Leukocytes, UA 3+ (*)     All other components within normal limits     Narrative:     Specimen Source->Urine   TSH - Abnormal; Notable for the following components:    TSH 5.402 (*)     All other components within normal limits    Narrative:     AD ON LIPASE PER DR ASHLIE ARMSTRONG/ORDER# [id number] @ 1:21AM   URINALYSIS MICROSCOPIC - Abnormal; Notable for the following components:    WBC, UA >100 (*)     WBC Clumps, UA Occasional (*)     Bacteria Few (*)     All other components within normal limits    Narrative:     Specimen Source->Urine   CULTURE, URINE   HEPATITIS C ANTIBODY    Narrative:     Release to patient->Immediate   FOLATE    Narrative:     AD ON LIPASE PER DR ASHLIE ARMSTRONG/ORDER# [id number] @ 1:21AM   VITAMIN B12    Narrative:     AD ON LIPASE PER DR ASHLIE ARMSTRONG/ORDER# [id number] @ 1:21AM   ALCOHOL,MEDICAL (ETHANOL)    Narrative:     AD ON LIPASE PER DR ASHLIE ARMSTRONG/ORDER# [id number] @ 1:21AM   LIPASE    Narrative:     AD ON LIPASE PER DR ASHLIE ARMSTRONG/ORDER# [id number] @ 1:21AM   T4, FREE    Narrative:     AD ON LIPASE PER DR ASHLIE ARMSTRONG/ORDER# [id number] @ 1:21AM          Imaging Results              MRI Brain Without Contrast (Final result)  Result time 03/31/24 02:57:47      Final result by Robert Gardiner MD (03/31/24 02:57:47)                   Impression:      No evidence of acute intracranial pathology.    Generalized cerebral volume loss and chronic microvascular ischemic changes.      Electronically signed by: Robert Gardiner MD  Date:    03/31/2024  Time:    02:57               Narrative:    EXAMINATION:  MRI BRAIN WITHOUT CONTRAST    CLINICAL HISTORY:  Neuro deficit, acute, stroke suspected;    TECHNIQUE:  Multiplanar multisequence MR imaging of the brain was performed without contrast.    COMPARISON:  CT, 08/10/2023.  MRI brain, 03/19/2023.    FINDINGS:  There is no evidence of restricted diffusion to suggest acute infarction.    FLAIR imaging demonstrates minimal patchy and confluent periventricular white matter signal hyperintensity, suggestive  of chronic microvascular ischemic change.    Generalized cerebral volume loss with ex vacuo dilation of the ventricles and sulci.  Ventricles are prominent though similar to the prior study without evidence of acute hydrocephalus.  Mild cerebellar atrophy as seen previously.    No evidence of mass lesion, hemorrhage, edema or recent or remote major vascular distribution infarction.    No extra-axial blood or fluid collections.    T2 skull base flow voids are preserved. Bone marrow signal intensity is unremarkable.  Minimal paranasal sinus mucosal thickening.  No air-fluid levels.                                       Medications   promethazine (PHENERGAN) 25 mg in dextrose 5 % (D5W) 50 mL IVPB (0 mg Intravenous Stopped 3/31/24 0146)   magnesium sulfate 2g in water 50mL IVPB (premix) (0 g Intravenous Stopped 3/31/24 0416)   sulfamethoxazole-trimethoprim 800-160mg per tablet 1 tablet (1 tablet Oral Given 3/31/24 0344)   HYDROcodone-acetaminophen 5-325 mg per tablet 1 tablet (1 tablet Oral Given 3/31/24 0344)     Medical Decision Making  Amount and/or Complexity of Data Reviewed  Labs: ordered. Decision-making details documented in ED Course.  Radiology: ordered.    Risk  Prescription drug management.    Patient is a 73yo male with hx of a fib on eliquis, CAD, neuropathy, HTN, hx of DVT and PE, presenting due to onset of BL foot coolness and heaviness beginning approximately 1930. He is mostly concerned about a stroke or additional blood clots. Labs ordered to assess for electrolyte abnormalities, vitamin deficiency, or UTI as patient has previously become confused with UTIs. Due to some symptoms of vertigo/nausea, MRI brain ordered to rule out posterior stroke. Patient's labs relatively stable, noting for decreased Mag (repleted). MRI without acute change. Patient's UA was infected, with 3+ leukocytes. He and his partner were informed of the results. It is reassuring that symptoms are due to the UTI. Patient was able  to be safely discharged home with strict return precautions and prescription for bactrim.          Attending Attestation:   Physician Attestation Statement for Resident:  As the supervising MD   Physician Attestation Statement: I have personally seen and examined this patient.   I agree with the above history.  -:   As the supervising MD I agree with the above PE.     As the supervising MD I agree with the above treatment, course, plan, and disposition.    I have reviewed and agree with the residents interpretation of the following: lab data and EKG.  I have reviewed the following: old records at this facility.                ED Course as of 03/31/24 0712   Sun Mar 31, 2024   0506 Creatinine: 0.9 [DR]      ED Course User Index  [DR] Cary Cohen DO                           Clinical Impression:  Final diagnoses:  [R20.2] Paresthesias (Primary)  [I48.91] A-fib  [E83.42] Hypomagnesemia  [N30.00] Acute cystitis without hematuria          ED Disposition Condition    Discharge Stable          ED Prescriptions       Medication Sig Dispense Start Date End Date Auth. Provider    sulfamethoxazole-trimethoprim 800-160mg (BACTRIM DS) 800-160 mg Tab Take 1 tablet by mouth 2 (two) times daily. for 7 days 14 tablet 3/31/2024 4/7/2024 Cathy Giraldo MD          Follow-up Information       Follow up With Specialties Details Why Contact Info    Isaias Myles MD Internal Medicine Schedule an appointment as soon as possible for a visit   [address] [facility] [address]  [phone]               Cary Cohen DO  Resident  03/31/24 0712       Cathy Giraldo MD  03/31/24 8830

## 2024-03-31 NOTE — ED NOTES
Patient was asking for some nausea medication prior to being discharged, Resident made aware and awaiting orders for nausea medication at this time.

## 2024-03-31 NOTE — ED NOTES
Assumed care of patient.  Rounding completed on patient. Plan of discharge to home discussed via ambulance transfer. Respirations are even and unlabored. Pt denies chest pain or SOB. Pt has no complaints at this time.     The bed is in low, locked position with side rails upx2. Call light is in reach, and patient is oriented to call light use. Pt states they will call nurse if they need assistance.

## 2024-04-01 LAB
OHS QRS DURATION: 78 MS
OHS QTC CALCULATION: 487 MS

## 2024-04-03 ENCOUNTER — PATIENT MESSAGE (OUTPATIENT)
Dept: INTERNAL MEDICINE | Facility: CLINIC | Age: 74
End: 2024-04-03
Payer: MEDICARE

## 2024-04-03 LAB — BACTERIA UR CULT: ABNORMAL

## 2024-04-10 ENCOUNTER — PATIENT MESSAGE (OUTPATIENT)
Dept: INTERNAL MEDICINE | Facility: CLINIC | Age: 74
End: 2024-04-10
Payer: MEDICARE

## 2024-04-10 DIAGNOSIS — M79.672 PAIN IN BOTH FEET: ICD-10-CM

## 2024-04-10 DIAGNOSIS — F41.9 ANXIETY: ICD-10-CM

## 2024-04-10 DIAGNOSIS — R53.1 GENERALIZED WEAKNESS: Primary | ICD-10-CM

## 2024-04-10 DIAGNOSIS — M79.605 LEG PAIN, BILATERAL: ICD-10-CM

## 2024-04-10 DIAGNOSIS — G62.9 PERIPHERAL POLYNEUROPATHY: ICD-10-CM

## 2024-04-10 DIAGNOSIS — M79.604 LEG PAIN, BILATERAL: ICD-10-CM

## 2024-04-10 DIAGNOSIS — M79.671 PAIN IN BOTH FEET: ICD-10-CM

## 2024-04-10 DIAGNOSIS — R26.89 IMPAIRED GAIT AND MOBILITY: ICD-10-CM

## 2024-04-10 DIAGNOSIS — M79.2 NEUROPATHIC PAIN: ICD-10-CM

## 2024-04-10 RX ORDER — CAPSAICIN 0.75 MG/G
CREAM TOPICAL 3 TIMES DAILY PRN
Qty: 120 G | Refills: 5 | Status: SHIPPED | OUTPATIENT
Start: 2024-04-10

## 2024-04-10 RX ORDER — BUSPIRONE HYDROCHLORIDE 10 MG/1
10 TABLET ORAL 2 TIMES DAILY PRN
Qty: 180 TABLET | Refills: 3 | Status: SHIPPED | OUTPATIENT
Start: 2024-04-10 | End: 2024-05-17 | Stop reason: SDUPTHER

## 2024-04-17 ENCOUNTER — DOCUMENT SCAN (OUTPATIENT)
Dept: HOME HEALTH SERVICES | Facility: HOSPITAL | Age: 74
End: 2024-04-17
Payer: MEDICARE

## 2024-04-18 ENCOUNTER — PATIENT MESSAGE (OUTPATIENT)
Dept: INTERNAL MEDICINE | Facility: CLINIC | Age: 74
End: 2024-04-18
Payer: MEDICARE

## 2024-04-19 ENCOUNTER — OFFICE VISIT (OUTPATIENT)
Dept: PAIN MEDICINE | Facility: CLINIC | Age: 74
End: 2024-04-19
Payer: MEDICARE

## 2024-04-19 ENCOUNTER — TELEPHONE (OUTPATIENT)
Dept: PAIN MEDICINE | Facility: CLINIC | Age: 74
End: 2024-04-19
Payer: MEDICARE

## 2024-04-19 VITALS
OXYGEN SATURATION: 98 % | DIASTOLIC BLOOD PRESSURE: 89 MMHG | RESPIRATION RATE: 18 BRPM | SYSTOLIC BLOOD PRESSURE: 123 MMHG | TEMPERATURE: 98 F | HEART RATE: 105 BPM

## 2024-04-19 DIAGNOSIS — M79.2 NEUROPATHIC PAIN: ICD-10-CM

## 2024-04-19 DIAGNOSIS — E11.40 PAINFUL DIABETIC NEUROPATHY: Primary | ICD-10-CM

## 2024-04-19 DIAGNOSIS — Z78.9 IMPAIRED MOBILITY AND ACTIVITIES OF DAILY LIVING: ICD-10-CM

## 2024-04-19 DIAGNOSIS — Z74.09 IMPAIRED MOBILITY AND ACTIVITIES OF DAILY LIVING: ICD-10-CM

## 2024-04-19 PROCEDURE — 99999 PR PBB SHADOW E&M-EST. PATIENT-LVL IV: CPT | Mod: PBBFAC,,, | Performed by: ANESTHESIOLOGY

## 2024-04-19 PROCEDURE — 99214 OFFICE O/P EST MOD 30 MIN: CPT | Mod: PBBFAC | Performed by: ANESTHESIOLOGY

## 2024-04-19 PROCEDURE — 99204 OFFICE O/P NEW MOD 45 MIN: CPT | Mod: S$PBB,GC,, | Performed by: ANESTHESIOLOGY

## 2024-04-19 RX ORDER — NORTRIPTYLINE HYDROCHLORIDE 10 MG/1
10 CAPSULE ORAL 3 TIMES DAILY
Qty: 90 CAPSULE | Refills: 3 | Status: SHIPPED | OUTPATIENT
Start: 2024-04-19 | End: 2025-04-19

## 2024-04-19 RX ORDER — PREGABALIN 150 MG/1
150 CAPSULE ORAL 3 TIMES DAILY
Qty: 90 CAPSULE | Refills: 3 | Status: SHIPPED | OUTPATIENT
Start: 2024-04-19

## 2024-04-19 RX ORDER — PREGABALIN 150 MG/1
150 CAPSULE ORAL 3 TIMES DAILY
Qty: 90 CAPSULE | Refills: 3 | Status: SHIPPED | OUTPATIENT
Start: 2024-04-19 | End: 2024-04-19

## 2024-04-19 NOTE — PROGRESS NOTES
PCP: Isaias Myles MD    REFERRING PHYSICIAN: Isaias Myles MD    CHIEF COMPLAINT: Bilateral foot pain    Original HISTORY OF PRESENT ILLNESS: Anthony Ball presents to the clinic for the evaluation of painful diabetic neuropathy that he has had for many years. He has tried multiple medications without much relief. He has tried some capsicin over the past 2-3 days that did provide some mild improvement. His pain has been quite severe and makes it difficult for him to even stand. He has been working with home health therapy to help him with mobility around the house, but he says that it is too painful. He has had 7 falls this year due to the pain. He is interested in discussing any further treatment options that may be available.      Original Pain Description:  The pain is located in the bilateral feet. The pain is described as burning, sharp, shooting, and stabbing. Exacerbating factors: Standing, Touching, and Walking. Mitigating factors: none. Symptoms interfere with daily activity and sleeping. The patient feels like symptoms have been unchanged. Patient denies night fever/night sweats, urinary incontinence, and bowel incontinence.    Original PAIN SCORES:  Best: Pain is 2  Worst: Pain is 8  Current: Pain is 8        4/19/2024     1:19 PM   Last 3 PDI Scores   Pain Disability Index (PDI) 59       6 weeks of Conservative therapy:  PT: Yes, currently enrolled in PT  Chiro: No  HEP: Yes, daily HEP      Treatments / Medications: (Ice/Heat/NSAIDS/APAP/etc):  Cymbalta  Lyrica  Norco  Robaxin  Capsicum (topical)  Trazodone    Interventional Pain Procedures: (Previous injections)  None    Past Medical History:   Diagnosis Date    Acute deep vein thrombosis (DVT) of left lower extremity 12/2023    Anxiety     Atrial fibrillation 12/2023    Cataract     Coronary artery disease     CAC score 1430    Depression     Diabetic neuropathy     Essential (primary) hypertension     GERD (gastroesophageal reflux disease)      Insomnia     Neuromyopathy     Possibly DM and/or alcohol related.    Pulmonary embolism 12/2023    Reactive airway disease     Type 2 diabetes mellitus      Past Surgical History:   Procedure Laterality Date    COLONOSCOPY      Normal around 2020    TOTAL KNEE ARTHROPLASTY Right      Social History     Socioeconomic History    Marital status:    Tobacco Use    Smoking status: Never    Smokeless tobacco: Never   Substance and Sexual Activity    Alcohol use: Yes     Comment: Former heavy use    Drug use: Never    Sexual activity: Yes     Comment: unknown     Social Determinants of Health     Financial Resource Strain: Low Risk  (1/15/2024)    Overall Financial Resource Strain (CARDIA)     Difficulty of Paying Living Expenses: Not very hard   Food Insecurity: No Food Insecurity (1/15/2024)    Hunger Vital Sign     Worried About Running Out of Food in the Last Year: Never true     Ran Out of Food in the Last Year: Never true   Transportation Needs: No Transportation Needs (1/15/2024)    PRAPARE - Transportation     Lack of Transportation (Medical): No     Lack of Transportation (Non-Medical): No   Physical Activity: Inactive (1/15/2024)    Exercise Vital Sign     Days of Exercise per Week: 0 days     Minutes of Exercise per Session: 0 min   Stress: No Stress Concern Present (1/15/2024)    [employer] - Occupational Stress Questionnaire     Feeling of Stress : Only a little   Social Connections: Moderately Isolated (1/15/2024)    Social Connection and Isolation Panel [NHANES]     Frequency of Communication with Friends and Family: More than three times a week     Frequency of Social Gatherings with Friends and Family: More than three times a week     Attends Restoration Services: Never     Active Member of Clubs or Organizations: No     Attends Club or Organization Meetings: Never     Marital Status:    Housing Stability: Low Risk  (1/15/2024)    Housing Stability Vital Sign      Unable to Pay for Housing in the Last Year: No     Number of Places Lived in the Last Year: 1     Unstable Housing in the Last Year: No     Family History   Problem Relation Name Age of Onset    Hypertension Mother         Review of patient's allergies indicates:  No Known Allergies    Current Outpatient Medications   Medication Sig Dispense Refill    albuterol (VENTOLIN HFA) 90 mcg/actuation inhaler Inhale 2 puffs into the lungs every 6 (six) hours as needed for Wheezing. Rescue 8 g 2    apixaban (ELIQUIS) 5 mg Tab Take 1 tablet (5 mg total) by mouth 2 (two) times daily. 180 tablet 1    busPIRone (BUSPAR) 10 MG tablet Take 1 tablet (10 mg total) by mouth 2 (two) times daily as needed (Anxiety). 180 tablet 3    capsicum 0.075% (ZOSTRIX) 0.075 % topical cream Apply topically 3 (three) times daily as needed (Foot pain). 120 g 5    DULoxetine (CYMBALTA) 60 MG capsule Take 1 capsule (60 mg total) by mouth 2 (two) times daily. 180 capsule 3    HYDROcodone-acetaminophen (NORCO) 5-325 mg per tablet Take 1 tablet by mouth every 6 (six) hours as needed for Pain. 28 tablet 0    LIDOcaine (LIDODERM) 5 % Place 3 patches onto the skin daily as needed (Rib pain). Okay to use 1 to 3 patches. Remove & Discard patches within 12 hours or as directed by MD 30 patch 1    methocarbamoL (ROBAXIN) 750 MG Tab Take 1 tablet (750 mg total) by mouth 3 (three) times daily as needed (Back pain). 90 tablet 0    ondansetron (ZOFRAN) 8 MG tablet Take 1 tablet (8 mg total) by mouth every 8 (eight) hours as needed for Nausea. 30 tablet 1    pantoprazole (PROTONIX) 40 MG tablet TAKE 1 TABLET(40 MG) BY MOUTH EVERY DAY 90 tablet 3    traZODone (DESYREL) 100 MG tablet Take 2 tablets (200 mg total) by mouth nightly as needed for Insomnia. 14 tablet 0    triamcinolone acetonide 0.1% (KENALOG) 0.1 % cream Apply topically 2 (two) times daily. Use for 1 to 2 weeks as needed for rash. 453.6 g 0    empagliflozin (JARDIANCE) 10 mg tablet Take 1 tablet (10  mg total) by mouth once daily. Discuss with PCP prior to restarting. (Patient not taking: Reported on 4/19/2024) 90 tablet 3    fluticasone-salmeterol diskus inhaler 250-50 mcg Inhale 1 puff into the lungs 2 (two) times daily. Controller 60 each 2    nortriptyline (PAMELOR) 10 MG capsule Take 1 capsule (10 mg total) by mouth 3 (three) times daily. 90 capsule 3    pregabalin (LYRICA) 150 MG capsule Take 1 capsule (150 mg total) by mouth 3 (three) times daily. 90 capsule 3     No current facility-administered medications for this visit.       ROS:  GENERAL: No fever. No chills. No fatigue. Denies weight loss. Denies weight gain.  HEENT: Denies headaches. Denies vision change. Denies eye pain. Denies double vision. Denies ear pain.   CV: Denies chest pain.   PULM: Denies of shortness of breath.  GI: Denies constipation. No diarrhea. No abdominal pain. Denies nausea. Denies vomiting. No blood in stool.  HEME: Denies bleeding problems.  : Denies urgency. No painful urination. No blood in urine.  MS: Denies joint stiffness. Denies joint swelling.  Denies back pain.  SKIN: Denies rash.   NEURO: Denies seizures. No weakness.  PSYCH:  Denies difficulty sleeping. No anxiety. Denies depression. No suicidal thoughts.       VITALS:   Vitals:    04/19/24 1318   BP: 123/89   Pulse: 105   Resp: 18   Temp: 98.2 °F (36.8 °C)   SpO2: 98%   PainSc:   8   PainLoc: Foot     PHYSICAL EXAM:     GENERAL: Alert and oriented x 3, no acute distress  PSYCH:  Mood and affect appropriate.  SKIN: Skin color, texture, turgor normal, no rashes or lesions.  HEENT:  Normocephalic, atraumatic. Cranial nerves grossly intact.  PULM: Non-labored breathing, symmetric chest rise.  EXTREMITIES: No deformities, edema, or skin discoloration.   MUSCULOSKELETAL: Bilateral upper and lower extremity strength is symmetric and within functional limits.  NEURO: Allodynia noted to bilateral feet. Absent Achilles reflex bilaterally. No clonus.  GAIT: Antalgic. Uses  [facility] for mobility.    LABS:  HgbA1c (7/27/23): 6.3    IMAGING:    EXAMINATION:  MRI LUMBAR SPINE WITHOUT CONTRAST     CLINICAL HISTORY:  Ataxia or coordination problem (Ped 0-18y);     TECHNIQUE:  Multiplanar, multisequence MR imaging of the lumbar spine without the use of intravenous contrast.     COMPARISON:  None     FINDINGS:  Alignment: Grade 1 anterolisthesis of L4 on L5. The remainder of the lumbar alignment is within normal limits.     Vertebrae: 5 lumbar-type vertebral bodies. No aggressive marrow replacement process or fracture.  There is probable intraosseous hemangioma in the S1 vertebral body.     Discs: Diffuse disc height loss worst at L4-L5.     Cord: Normal.  Conus terminates T11-T12.  The conus is normal in morphology.     Degenerative findings:     T12-L1: No significant spinal canal stenosis or neural foraminal narrowing.     L1-L2:  Small diffuse posterior disc bulge and bilateral facet arthropathy.  No significant spinal canal stenosis or neural foraminal narrowing.     L2-L3: Diffuse asymmetric disc bulge to the left, bilateral facet arthropathy with mild left neural foraminal narrowing.  No spinal canal stenosis.     L3-L4: Diffuse asymmetric disc bulge to the left, bilateral facet arthropathy with mild left neural foraminal narrowing.  No spinal canal stenosis.     L4-L5: There is uncovering of the disc.  There is superimposed central disc protrusion.  The spinal canal is within normal limits.  There is mild bilateral neural foraminal narrowing.  There is fluid within the facet joints.     L5-S1: No significant spinal canal stenosis or neural foraminal narrowing.     Paraspinous soft tissues: Bilateral renal cysts.  Paraspinal muscle atrophy.  There is distention of the urinary bladder.     Impression:     1.  Minimal anterolisthesis of L4 on L5 with uncovering of the disc and superimposed central disc protrusion.  No significant spinal canal narrowing.  Mild bilateral neural foramina at this  level.     2.  Additional mild multilevel degenerative changes in the lumbar spine.     3.  Distended urinary bladder.    ASSESSMENT: 74 y.o. year old male with pain, consistent with:    Encounter Diagnoses   Name Primary?    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 He comes to us with painful diabetic neuropathy for several years that has failed to respond to multiple oral and topical agents. His last HgbA1c was 6.3. He does enjoy a bottle of wine daily, but his foot pain began before he started drinking. We discussed multiple treatment options including, medical management, Qutenza, and neuromodulation, and agreed on some medication changes and a trial of Qutenza.       PLAN:  Increase Lyrica to 150mg TID, #90, 3 refills  Start Nortriptyline 10mg TID, #90, 3 refills if Lyrica insufficient  Schedule for Qutenza 2 patches for each foot  Will follow-up 1 month after Qutenza application      I would like to thank Isaias Myles MD for the opportunity to assist in the care of this patient. We had a very nice visit and I look forward to continuing their care. Please let me know if I can be of further assistance.     Alessandro Haro MD  [facility]

## 2024-04-23 ENCOUNTER — CLINICAL SUPPORT (OUTPATIENT)
Dept: REHABILITATION | Facility: HOSPITAL | Age: 74
End: 2024-04-23
Attending: INTERNAL MEDICINE
Payer: MEDICARE

## 2024-04-23 DIAGNOSIS — R26.89 BALANCE PROBLEM: ICD-10-CM

## 2024-04-23 DIAGNOSIS — G62.9 PERIPHERAL POLYNEUROPATHY: ICD-10-CM

## 2024-04-23 DIAGNOSIS — R53.1 GENERALIZED WEAKNESS: ICD-10-CM

## 2024-04-23 DIAGNOSIS — Z74.09 IMPAIRED FUNCTIONAL MOBILITY, BALANCE, GAIT, AND ENDURANCE: Primary | ICD-10-CM

## 2024-04-23 DIAGNOSIS — R26.89 IMPAIRED GAIT AND MOBILITY: ICD-10-CM

## 2024-04-23 PROCEDURE — 97162 PT EVAL MOD COMPLEX 30 MIN: CPT | Mod: PO

## 2024-04-23 NOTE — PLAN OF CARE
[facility]  Physical Therapy Neurological Rehabilitation Initial Evaluation     Name: Anthony Ball  Clinic Number: [id number]    Therapy Diagnosis:   Encounter Diagnoses   Name Primary?    Generalized weakness     Peripheral polyneuropathy     Impaired gait and mobility     Impaired functional mobility, balance, gait, and endurance Yes    Balance problem      Physician: Isaias Myles MD    Physician Orders: PT Eval and Treat   Medical Diagnosis from Referral:   R53.1 (ICD-10-CM) - Generalized weakness   G62.9 (ICD-10-CM) - Peripheral polyneuropathy   R26.89 (ICD-10-CM) - Impaired gait and mobility     Evaluation Date: 4/23/2024  Authorization Period Expiration: 4/10/25  Plan of Care Expiration: 6/18/24  Progress Note Due: 5/23/24  Visit # / Visits authorized: 1/ 1  FOTO: 1/3    Precautions: Standard, Fall, and vision impaired    Time In: 0755  Time Out: 0852  Total Billable Time: 57 minutes    Subjective      Date of onset: ongoing strength, balance and gait issues with multiple falls.  Current mobility limitations date back to around 2019, following R TKA     History of current condition - Anthony reports: he has had about 4 falls since he was last here in January. He ended up in the hospital and was transferred to SNF for about 2 weeks. He spent about 3 weeks in [facility] with limited therapy. He then had HHPT for about 5 weeks and this ended last Wednesday. Pt states he has had repeated UTIs which leave him very confused. He also mentions he is no longer diabetic due to recent weight loss.     Imaging, MRI studies: brain, 3/31/24:      FINDINGS:  There is no evidence of restricted diffusion to suggest acute infarction.     FLAIR imaging demonstrates minimal patchy and confluent periventricular white matter signal hyperintensity, suggestive of chronic microvascular ischemic change.     Generalized cerebral volume loss with ex vacuo dilation of the ventricles and sulci.   "Ventricles are prominent though similar to the prior study without evidence of acute hydrocephalus.  Mild cerebellar atrophy as seen previously.     No evidence of mass lesion, hemorrhage, edema or recent or remote major vascular distribution infarction.     No extra-axial blood or fluid collections.     T2 skull base flow voids are preserved. Bone marrow signal intensity is unremarkable.  Minimal paranasal sinus mucosal thickening.  No air-fluid levels.     Impression:     No evidence of acute intracranial pathology.     Generalized cerebral volume loss and chronic microvascular ischemic changes.        Prior Therapy: yes, OPPT at [facility](most recently from 11/23-12/23) and inpatient rehab from 3/21/23-4/4/23; most recent therapy= HHPT  Social History:  lives with an adult   Falls: 4 falls since he left OPPT in 12/23  DME: Straight cane, 2 walkers( one with big wheels and one with small wheels), bench in shower, transport chair  Home Environment: home with 4-5 steps to enter, no railing~ pt enters and exits through the back~ 6 steps with rail on one side  Exercise Routine / History: performs some exercises provided by HHPT; still practices sit<> stand  Family Present at time of Eval: partner Jacob escorts pt up to 2nd floor via transport chair   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  Current Level of Function: having trouble with balance, walking, etc. Falling frequently. Walks some in the house with RW, but limited distances. Partner occasionally helps with some ADL due to instability       Pain:  Current 0/10, worst 8-9/10, best 0/10   Location: bilateral feet   Description: Sharp, stabbing  Aggravating Factors: none  Easing Factors: lyrica, capsaicin cream    Patient's goals: " I'd like to walk without assistance of another person or assistive device."    Medical History:   Past Medical History: "   Diagnosis Date    Acute deep vein thrombosis (DVT) of left lower extremity 12/2023    Anxiety     Atrial fibrillation 12/2023    Cataract     Coronary artery disease     CAC score 1430    Depression     Diabetic neuropathy     Essential (primary) hypertension     GERD (gastroesophageal reflux disease)     Insomnia     Neuromyopathy     Possibly DM and/or alcohol related.    Pulmonary embolism 12/2023    Reactive airway disease     Type 2 diabetes mellitus        Surgical History:   Anthony Ball  has a past surgical history that includes Colonoscopy and Total knee arthroplasty (Right).    Medications:   Anthony has a current medication list which includes the following prescription(s): albuterol, apixaban, buspirone, capsicum 0.075%, duloxetine, empagliflozin, fluticasone-salmeterol 250-50 mcg/dose, hydrocodone-acetaminophen, lidocaine, methocarbamol, nortriptyline, ondansetron, pantoprazole, pregabalin, trazodone, and triamcinolone acetonide 0.1%.    Allergies:   Review of patient's allergies indicates:  No Known Allergies     Objective     Pt presents in transport chair and is wearing glasses. General atrophy noted to B hand intrinsics.    Observation: pleasant and cooperative   Speech: clear    Mental status: alert, oriented to person, place, and time  Appearance: Casually dressed and Well groomed  Behavior:  calm, cooperative, and adequate rapport can be established  Attention Span and Concentration:  Normal    Posture Alignment :slouched posture in sitting with forward head, rounded shoulders, increased thoracic kyphosis and PPT. Pt stands with forward flexion of trunk, flexed head.    Dominant hand:  right     Skin integrity:  Intact with dry skin noted to R lateral leg    Visual/Auditory: endorses changes ~ has cataracts and is now wearing glasses( bifocals)    ROM:   GROSS AROM/PROM  UPPER EXTREMITY  (R) UE: limited as follows: active shoulder flexion to about 136 degrees  (L) UE: limited as follows:  active  shoulder flexion to about 123 degrees  LOWER EXTREMITY  (R) LE: limited as follows: hip abduction and ankle dorsiflexion~ not formally measured  (L) LE: limited as follows: hip abduction, hip internal rotation and ankle dorsiflexion~ not formally measured       Lower Extremity Strength~ tested seated in chair  Right LE  Left LE    Hip flexion:  4-/5 Hip flexion: 3+/5   Knee extension: 4+/5 Knee extension: 4+/5   Knee flexion: 4-/5 Knee flexion: 4-/5   Ankle dorsiflexion:  3+/5* Ankle dorsiflexion: 3+/5*   Ankle plantarflexion:  4/5 Ankle plantarflexion: 4+/5   Hip abduction: 4+/5 Hip abduction: 4+/5   Hip adduction: 5/5 Hip adduction 5/5   Hip extension: 5/5 Hip extension: 4+/5   Within ROM  Upper extremity strength: grossly 4/5 B with exception of L shoulder flexion and abduction, which is roughly 4-/5    Coordination:   Rapid Alternating Movements: considerably limited B UE  Point to Point:    -Finger to Nose: WFL for B UE   -Heel to Shin: limited by hip flexor weakness, but appears functional within ROM    Sensation: WFL for LT for B UE; LEs are limited at level of foot( decreased acuity to LT and pt reports tingling to feet)  Proprioception: NT    Tone/Spasticity: no abnormal tone or spasticity noted to B UE or LE    Functional Mobility (Bed mobility, transfers)  Mod A stand pivot transfer from transport chair to gold chair, no AD~ heavy reliance on UEs  Sit<> stand gold chair or transport chair with RW, CGA/min A  Sit<> supine on mat with mod I for increased time        Evaluation   Single Limb Stance R LE NT  (<10 sec = HIGH FALL RISK)   Single Limb Stance L LE NT  (<10 sec = HIGH FALL RISK)   30 second Chair Rise 2 completed with arms and RW placed in front, CGA   5 times sit to stand NT   TUG 1 minute, 27 seconds with RW, CGA   Self selected walking speed 0.12 m/sec (6m/50s) with RW, CGA   Fast walking speed NT     30 second chair rise below average score:   Age  Men  Women    70-74  <15  <14  A below  average score indicates a risk for fall.    5x sit to stand normative information:   >12 sec= fall risk for general elderly  >16 sec= fall risk for Parkinson's disease  >10 sec= balance/vestibular dysfunction (<59 y/o)  >14.2 sec= balance/vestibular dysfunction (>59 y/o)  >12 sec= fall risk for CVA        Gait Assessment:   - AD used: RW  - Assistance: CGA  - Stairs: NT    GAIT DEVIATIONS:   Anthony displays the following deviations with ambulation: very slow anjel, decreased step length B, flexed trunk with downward gaze   Impairments contributing to deviations: decreased strength, decreased balance, decreased endurance, decreased sensation to B feet          Endurance Deficit: severe     PT Evaluation Completed? Yes      Intake Outcome Measure for FOTO NOC- Neuromuscular Disorder Survey    Therapist reviewed FOTO scores for Anthony Ball on 4/23/2024.   FOTO documents entered into [employer] - see Media section.    Intake Score: 34 %               Treatment     Total Treatment time separate from Evaluation: 0 minutes    No treatment provided; evaluation only.    Patient Education and Home Exercises     Education provided:   - Pt educated regarding evaluation findings, potential plan of care and scheduling process.      Written Home Exercises Provided: to be issued as needed in future sessions.      Assessment     Anthony is a 74 y.o. male referred to outpatient Physical Therapy with a medical diagnosis of Generalized weakness, Peripheral polyneuropathy; Impaired gait and mobility. Patient presents with decreased mobility skills and endurance as he has during all of his previous evaluations here. However, he continues to demonstrate a slow decline in functional skills with each evaluation. He managed only 2 repetitions on the 30 second chair rise and even this score was a struggle to achieve. His TUG score fell from 49 seconds at last evaluation to 1 minute and 27 seconds. His SSWS( self-selected walking speed) decreased from  0.26 m/sec at last evaluation to 0.12 m/sec. While Anthony continues to have a fear of falling, he did seem a but more relaxed during today's evaluation, and was willing to perform the above tests and measures. Unfortunately, he remains very deconditioned. PT expects pt's progress to be slow, as it was before. A frequency of 2 x weekly visits is recommended to address pt's current strength, mobility and balance limitations.     Patient prognosis is Fair.   Patient will benefit from skilled outpatient Physical Therapy to address the deficits stated above and in the chart below, provide patient/family education, and to maximize patient's level of independence.     Plan of care discussed with patient: Yes  Patient's spiritual, cultural and educational needs considered and patient is agreeable to the plan of care and goals as stated below:     Anticipated Barriers for therapy: history of multiple falls; fear of falling, general anxiety and pt does not drive       Medical Necessity is demonstrated by the following  History  Co-morbidities and personal factors that may impact the plan of care [] LOW: no personal factors / co-morbidities  [] MODERATE: 1-2 personal factors / co-morbidities  [x] HIGH: 3+ personal factors / co-morbidities    Moderate / High Support Documentation:     difficulty sleeping, HTN, prior abdominal surgery, transportation assistance required, R knee replacement, GERD, syncope, diabetes, polyneuropathy, asthma      Examination  Body Structures and Functions, activity limitations and participation restrictions that may impact the plan of care [] LOW: addressing 1-2 elements  [] MODERATE: 3+ elements  [x] HIGH: 4+ elements (please support below)    Moderate / High Support Documentation: decreased strength, decreased transfers, decreased ambulation, decreased balance, decreased endurance     Clinical Presentation [] LOW: stable  [x] MODERATE: Evolving  [] HIGH: Unstable     Decision Making/ Complexity  Score: moderate       Goals:    Short Term Goals: 4 weeks   Pt will be issued first installment of HEP and report at least partial compliance.  Pt will improve his 30 second chair rise to 3-4 reps to demonstrate increased LE strength and muscular endurance  Pt will improve his TUG score to 1 minute, 10 seconds or < with appropriate AD to improve household ambulation and decrease fall risk  Pt will increase his SSWS to 0.13 m/sec with appropriate AD to improve community ambulation and decrease fall risk  Pt will complete mCTSIB balance testing and PT to set appropriate goals  Pt will improve any 2 LE muscle grades by 1/3 to help with functional mobility skills  Pt will perform all sit<> stand and stand pivot transfers with no more than SBA using RW; min A without device     Long Term Goals: 8 weeks   Pt will be partially compliant with finalized HEP to help maintain potential gains realized in PT  Pt will improve his 30 second chair rise to 4-5 reps to demonstrate increased LE strength and muscular endurance  Pt will improve his TUG score to 60 seconds or < with appropriate AD to improve household ambulation and decrease fall risk  Pt will increase his SSWS to 0.14 m/sec with or without appropriate AD to improve community ambulation and decrease fall risk  Pt will improve any 4 LE muscle grades by 1/3 to help with functional mobility skills   Pt will perform all sit<> stand and stand pivot transfers with no more than S using RW; CGA without device  Plan     Plan of care Certification: 4/23/2024 to 6/18/24.    Outpatient Physical Therapy 2 times weekly for 8 weeks to include the following interventions: Gait Training, Neuromuscular Re-ed, Patient Education, Therapeutic Activities, and Therapeutic Exercise.     Ba Diaz, PT  4/23/2024

## 2024-04-26 ENCOUNTER — CLINICAL SUPPORT (OUTPATIENT)
Dept: REHABILITATION | Facility: HOSPITAL | Age: 74
End: 2024-04-26
Payer: MEDICARE

## 2024-04-26 DIAGNOSIS — Z74.09 IMPAIRED FUNCTIONAL MOBILITY, BALANCE, GAIT, AND ENDURANCE: ICD-10-CM

## 2024-04-26 DIAGNOSIS — R26.89 BALANCE PROBLEM: Primary | ICD-10-CM

## 2024-04-26 PROCEDURE — 97530 THERAPEUTIC ACTIVITIES: CPT | Mod: PO,CQ

## 2024-04-26 PROCEDURE — 97110 THERAPEUTIC EXERCISES: CPT | Mod: PO,CQ

## 2024-04-26 NOTE — PATIENT INSTRUCTIONS
Hip Abduction: Standing Side Leg Lift (Eccentric)        Lift leg out to side quickly. Slowly lower for 3-5 seconds. Perform reps per set, _2x10__ sets per day       https://[employer]ce.[employer].[email or web address]     Copyright © I. All rights reserved.   Heel Raises        Stand with support. Tighten pelvic floor and hold. With knees straight, raise heels off ground. Hold ___ seconds. Relax for ___ seconds.  Repeat ___ times. Do ___ times a day.    Copyright © I. All rights reserved.   FUNCTIONAL MOBILITY: Marching - Standing        March in place by lifting left leg up, then right. Alternate.  __10_ reps per set, _2__ sets per day, ___ days per week Hold onto a support.    Copyright © I. All rights reserved.   Leg Flexion        Inhale. While exhaling, lift one ankle toward buttocks, keeping knees together. Slowly return to starting position.  Repeat __10__ times each leg. Do _2___ sets per session. Do ____ sessions per day.      Copyright © [employer]. All rights reserved.   Hip Extension (Standing)        Stand with support. Squeeze pelvic floor and hold. Move right leg backward with straight knee. Hold for _2__ seconds. Relax for __2_ seconds.  Repeat _2x10__ times. Do ___ times a day.  Repeat with other leg.      Copyright © I. All rights reserved.   Mini-Squats (Standing)        Stand with support. Bend knees slightly. Tighten pelvic floor. Hold for 5___ seconds. Return to straight standing.   Repeat _2x10__ times.     Copyright © [employer]. All rights reserved.

## 2024-04-26 NOTE — PROGRESS NOTES
"[facility]   Physical Therapy Treatment Note      Name: Anthony Ball  Clinic Number: [id number]    Therapy Diagnosis:   Encounter Diagnoses   Name Primary?    Balance problem Yes    Impaired functional mobility, balance, gait, and endurance      Physician: Isaias Myles MD    Visit Date: 4/26/2024    Physician Orders: PT Eval and Treat   Medical Diagnosis from Referral:   R53.1 (ICD-10-CM) - Generalized weakness   G62.9 (ICD-10-CM) - Peripheral polyneuropathy   R26.89 (ICD-10-CM) - Impaired gait and mobility      Evaluation Date: 4/23/2024  Authorization Period Expiration: 4/10/25  Plan of Care Expiration: 6/18/24  Progress Note Due: 5/23/24  Visit # / Visits authorized: 1/ 20 ( plus eval)  FOTO: 1/3     Precautions: Standard, Fall, and vision impaired     Time In: 9:35  Time Out: 10:15  Total Billable Time: 40 minutes    PTA Visit #: 1/5       Subjective     Patient reports: " I had a hard time getting out of my house today."  He  was given an HEP today  Response to previous treatment: sore  Functional change: ongoing    Pain: 8/10  Location: bilateral back      Objective      Objective Measures updated at progress report unless specified.     Treatment     Anthony received the treatments listed below:      therapeutic exercises to develop strength, endurance, ROM, flexibility, posture, and core stabilization for 37 minutes including:  In // bars:CGA  2 x 10 reps of B LE heel raises with 2 UE support  2 x 10 reps of B LE hip flexion with 2 UE support  2 x 10 reps of B LE hip abd/add with 2 UE support  2 x 10 reps of B LE hip extension with 2 UE support  2 x 10 reps of B LE HS curls with 2 UE support      neuromuscular re-education activities to improve: Balance, Coordination, Kinesthetic, Sense, Proprioception, and Posture for 0 minutes. The following activities were included:      therapeutic activities to improve functional performance for 8  minutes, including:  Sit to stand x  6 " trials from W/C ---> // bars with Mod A to elevate B hips     gait training to improve functional mobility and safety for 0  minutes, including:        Patient Education and Home Exercises       Education provided:   - HEP    Written Home Exercises Provided: yes. Exercises were reviewed and Anthony was able to demonstrate them prior to the end of the session.  Anthony demonstrated good  understanding of the education provided. See Electronic Medical Record under Patient Instructions for exercises provided during therapy sessions    Assessment     Anthony tolerated his first tx session following initial evaluation fairly well.  Anthony had increased lower back pain, preventing more activities today in therapy.  Anthony was educated on standing HEP and was able to demonstrate understanding.  Start walking trials next week.  Cont with plan of care.     Anthony Is progressing well towards his goals.   Patient prognosis is Fair.     Patient will continue to benefit from skilled outpatient physical therapy to address the deficits listed in the problem list box on initial evaluation, provide pt/family education and to maximize pt's level of independence in the home and community environment.     Patient's spiritual, cultural and educational needs considered and pt agreeable to plan of care and goals.     Anticipated barriers to physical therapy: history of multiple falls; fear of falling, general anxiety and pt does not drive     Goals:     Short Term Goals: 4 weeks   Pt will be issued first installment of HEP and report at least partial compliance.  Pt will improve his 30 second chair rise to 3-4 reps to demonstrate increased LE strength and muscular endurance  Pt will improve his TUG score to 1 minute, 10 seconds or < with appropriate AD to improve household ambulation and decrease fall risk  Pt will increase his SSWS to 0.13 m/sec with appropriate AD to improve community ambulation and decrease fall risk  Pt will complete mCTSIB balance  testing and PT to set appropriate goals  Pt will improve any 2 LE muscle grades by 1/3 to help with functional mobility skills  Pt will perform all sit<> stand and stand pivot transfers with no more than SBA using RW; min A without device     Long Term Goals: 8 weeks   Pt will be partially compliant with finalized HEP to help maintain potential gains realized in PT  Pt will improve his 30 second chair rise to 4-5 reps to demonstrate increased LE strength and muscular endurance  Pt will improve his TUG score to 60 seconds or < with appropriate AD to improve household ambulation and decrease fall risk  Pt will increase his SSWS to 0.14 m/sec with or without appropriate AD to improve community ambulation and decrease fall risk  Pt will improve any 4 LE muscle grades by 1/3 to help with functional mobility skills   Pt will perform all sit<> stand and stand pivot transfers with no more than S using RW; CGA without device    Plan     Cont with strengthening, endurance, balance and functional mobility     Ciara Pro, PTA

## 2024-04-29 ENCOUNTER — CLINICAL SUPPORT (OUTPATIENT)
Dept: REHABILITATION | Facility: HOSPITAL | Age: 74
End: 2024-04-29
Payer: MEDICARE

## 2024-04-29 DIAGNOSIS — Z74.09 IMPAIRED FUNCTIONAL MOBILITY, BALANCE, GAIT, AND ENDURANCE: ICD-10-CM

## 2024-04-29 DIAGNOSIS — R26.89 BALANCE PROBLEM: Primary | ICD-10-CM

## 2024-04-29 PROCEDURE — 97116 GAIT TRAINING THERAPY: CPT | Mod: PO,CQ

## 2024-04-29 PROCEDURE — 97530 THERAPEUTIC ACTIVITIES: CPT | Mod: PO,CQ

## 2024-04-29 PROCEDURE — 97110 THERAPEUTIC EXERCISES: CPT | Mod: PO,CQ

## 2024-04-29 NOTE — PROGRESS NOTES
"[facility]   Physical Therapy Treatment Note      Name: Anthony Ball  Clinic Number: [id number]    Therapy Diagnosis:   Encounter Diagnoses   Name Primary?    Balance problem Yes    Impaired functional mobility, balance, gait, and endurance          Physician: Isaias Myles MD    Visit Date: 4/29/2024    Physician Orders: PT Eval and Treat   Medical Diagnosis from Referral:   R53.1 (ICD-10-CM) - Generalized weakness   G62.9 (ICD-10-CM) - Peripheral polyneuropathy   R26.89 (ICD-10-CM) - Impaired gait and mobility      Evaluation Date: 4/23/2024  Authorization Period Expiration: 4/10/25  Plan of Care Expiration: 6/18/24  Progress Note Due: 5/23/24  Visit # / Visits authorized: 1/ 20 ( plus eval)  FOTO: 1/3     Precautions: Standard, Fall, and vision impaired     Time In: 9:30  Time Out: 10:15  Total Billable Time: 40 minutes    PTA Visit #: 2/5       Subjective     Patient reports:"I'm doing okay."  He  was given an HEP today  Response to previous treatment: sore  Functional change: ongoing    Pain: 8/10  Location: bilateral back      Objective      Objective Measures updated at progress report unless specified.     Treatment     Anthony received the treatments listed below:      therapeutic exercises to develop strength, endurance, ROM, flexibility, posture, and core stabilization for 25 minutes including:  In // bars:  Sci Fit bilateral upper extremity and lower extremity reciprocation 10 minutes Level 1.0  for CV endurance and activity tolerance  CGA  2 x 10 reps of B LE heel raises with 2 UE support  2 x 10 reps of B LE hip flexion with 2 UE support  2 x 10 reps of B LE hip abd/add with 2 UE support  2 x 10 reps of B LE hip extension with 2 UE support  2 x 10 reps of B LE HS curls with 2 UE support      neuromuscular re-education activities to improve: Balance, Coordination, Kinesthetic, Sense, Proprioception, and Posture for 0 minutes. The following activities were " included:      therapeutic activities to improve functional performance for 5  minutes, including:  Sit to stand x  10 trials from W/C ---> // bars with Mod A to elevate B hips     gait training to improve functional mobility and safety for 15  minutes, including:  Trial 1: 142 ft with rolling walker  Trial 2: 136 ft with rolling walker       Patient Education and Home Exercises       Education provided:   - HEP    Written Home Exercises Provided: yes. Exercises were reviewed and Anthony was able to demonstrate them prior to the end of the session.  Anthony demonstrated good  understanding of the education provided. See Electronic Medical Record under Patient Instructions for exercises provided during therapy sessions    Assessment     Anthony tolerated his first tx session following initial evaluation fairly well. His home exercise program was reviewed and performed. He was able to complete entire home exercise program before needing a rest break.  Anthony's chronic  low back pain continues. He was nonetheless able to progress to ambulation trials. He needed a 5 minute seated rest break between gait trials.   Cont with plan of care.     Anthony Is progressing well towards his goals.   Patient prognosis is Fair.     Patient will continue to benefit from skilled outpatient physical therapy to address the deficits listed in the problem list box on initial evaluation, provide pt/family education and to maximize pt's level of independence in the home and community environment.     Patient's spiritual, cultural and educational needs considered and pt agreeable to plan of care and goals.     Anticipated barriers to physical therapy: history of multiple falls; fear of falling, general anxiety and pt does not drive     Goals:     Short Term Goals: 4 weeks   Pt will be issued first installment of HEP and report at least partial compliance.  Pt will improve his 30 second chair rise to 3-4 reps to demonstrate increased LE strength and  muscular endurance  Pt will improve his TUG score to 1 minute, 10 seconds or < with appropriate AD to improve household ambulation and decrease fall risk  Pt will increase his SSWS to 0.13 m/sec with appropriate AD to improve community ambulation and decrease fall risk  Pt will complete mCTSIB balance testing and PT to set appropriate goals  Pt will improve any 2 LE muscle grades by 1/3 to help with functional mobility skills  Pt will perform all sit<> stand and stand pivot transfers with no more than SBA using RW; min A without device     Long Term Goals: 8 weeks   Pt will be partially compliant with finalized HEP to help maintain potential gains realized in PT  Pt will improve his 30 second chair rise to 4-5 reps to demonstrate increased LE strength and muscular endurance  Pt will improve his TUG score to 60 seconds or < with appropriate AD to improve household ambulation and decrease fall risk  Pt will increase his SSWS to 0.14 m/sec with or without appropriate AD to improve community ambulation and decrease fall risk  Pt will improve any 4 LE muscle grades by 1/3 to help with functional mobility skills   Pt will perform all sit<> stand and stand pivot transfers with no more than S using RW; CGA without device    Plan     Cont with strengthening, endurance, balance and functional mobility     Dat Savage, HERACLIO

## 2024-04-30 ENCOUNTER — OFFICE VISIT (OUTPATIENT)
Dept: PAIN MEDICINE | Facility: CLINIC | Age: 74
End: 2024-04-30
Payer: MEDICARE

## 2024-04-30 VITALS
SYSTOLIC BLOOD PRESSURE: 105 MMHG | DIASTOLIC BLOOD PRESSURE: 68 MMHG | BODY MASS INDEX: 36.92 KG/M2 | WEIGHT: 250 LBS | HEART RATE: 112 BPM

## 2024-04-30 DIAGNOSIS — E11.40 PAINFUL DIABETIC NEUROPATHY: Primary | ICD-10-CM

## 2024-04-30 PROCEDURE — 99999PBSHW PR PBB SHADOW TECHNICAL ONLY FILED TO HB: Mod: JZ,PBBFAC,,

## 2024-04-30 PROCEDURE — 64999 UNLISTED PX NERVOUS SYSTEM: CPT | Mod: S$PBB,,, | Performed by: NURSE PRACTITIONER

## 2024-04-30 PROCEDURE — 99999 PR PBB SHADOW E&M-EST. PATIENT-LVL III: CPT | Mod: PBBFAC,,, | Performed by: NURSE PRACTITIONER

## 2024-04-30 PROCEDURE — 99212 OFFICE O/P EST SF 10 MIN: CPT | Mod: 25,S$PBB,, | Performed by: NURSE PRACTITIONER

## 2024-04-30 PROCEDURE — 99213 OFFICE O/P EST LOW 20 MIN: CPT | Mod: PBBFAC,25 | Performed by: NURSE PRACTITIONER

## 2024-04-30 PROCEDURE — 64999 UNLISTED PX NERVOUS SYSTEM: CPT | Mod: PBBFAC | Performed by: NURSE PRACTITIONER

## 2024-04-30 RX ADMIN — CAPSAICIN 4 PATCH: KIT at 10:04

## 2024-04-30 NOTE — PROGRESS NOTES
PCP: Isaias Myles MD    REFERRING PHYSICIAN: Jacqueline Armstrong,*    CHIEF COMPLAINT: Bilateral foot pain    Original HISTORY OF PRESENT ILLNESS: Anthony Ball presents to the clinic for the evaluation of painful diabetic neuropathy that he has had for many years. He has tried multiple medications without much relief. He has tried some capsicin over the past 2-3 days that did provide some mild improvement. His pain has been quite severe and makes it difficult for him to even stand. He has been working with home health therapy to help him with mobility around the house, but he says that it is too painful. He has had 7 falls this year due to the pain. He is interested in discussing any further treatment options that may be available.      Original Pain Description:  The pain is located in the bilateral feet. The pain is described as burning, sharp, shooting, and stabbing. Exacerbating factors: Standing, Touching, and Walking. Mitigating factors: none. Symptoms interfere with daily activity and sleeping. The patient feels like symptoms have been unchanged. Patient denies night fever/night sweats, urinary incontinence, and bowel incontinence.    Original PAIN SCORES:  Best: Pain is 2  Worst: Pain is 8  Current: Pain is 8    Interval History 4/30/2024:  The patient is here today for follow up of bilateral foot pain secondary to PDN. He is here today for application of Qutenza patches as ordered by Dr. Campbell. He has no concerns and would like to proceed. He says that the increase in Lyrica has been helpful. He also has been using the OTC strength Capsaicin patch with benefit. No side effects reported.         4/30/2024    10:03 AM   Last 3 PDI Scores   Pain Disability Index (PDI) 56       6 weeks of Conservative therapy:  PT: Yes, currently enrolled in PT  Chiro: No  HEP: Yes, daily HEP      Treatments / Medications: (Ice/Heat/NSAIDS/APAP/etc):  Cymbalta  Lyrica  Norco  Robaxin  Capsicum  (topical)  Trazodone    Interventional Pain Procedures: (Previous injections)  None    Past Medical History:   Diagnosis Date    Acute deep vein thrombosis (DVT) of left lower extremity 12/2023    Anxiety     Atrial fibrillation 12/2023    Cataract     Coronary artery disease     CAC score 1430    Depression     Diabetic neuropathy     Essential (primary) hypertension     GERD (gastroesophageal reflux disease)     Insomnia     Neuromyopathy     Possibly DM and/or alcohol related.    Pulmonary embolism 12/2023    Reactive airway disease     Type 2 diabetes mellitus      Past Surgical History:   Procedure Laterality Date    COLONOSCOPY      Normal around 2020    TOTAL KNEE ARTHROPLASTY Right      Social History     Socioeconomic History    Marital status:    Tobacco Use    Smoking status: Never    Smokeless tobacco: Never   Substance and Sexual Activity    Alcohol use: Yes     Comment: Former heavy use    Drug use: Never    Sexual activity: Yes     Comment: unknown     Social Determinants of Health     Financial Resource Strain: Low Risk  (1/15/2024)    Overall Financial Resource Strain (CARDIA)     Difficulty of Paying Living Expenses: Not very hard   Food Insecurity: No Food Insecurity (1/15/2024)    Hunger Vital Sign     Worried About Running Out of Food in the Last Year: Never true     Ran Out of Food in the Last Year: Never true   Transportation Needs: No Transportation Needs (1/15/2024)    PRAPARE - Transportation     Lack of Transportation (Medical): No     Lack of Transportation (Non-Medical): No   Physical Activity: Inactive (1/15/2024)    Exercise Vital Sign     Days of Exercise per Week: 0 days     Minutes of Exercise per Session: 0 min   Stress: No Stress Concern Present (1/15/2024)    [employer] [place] [employer] - Occupational Stress Questionnaire     Feeling of Stress : Only a little   Social Connections: Moderately Isolated (1/15/2024)    Social Connection and Isolation Panel  [NHANES]     Frequency of Communication with Friends and Family: More than three times a week     Frequency of Social Gatherings with Friends and Family: More than three times a week     Attends Sabianism Services: Never     Active Member of Clubs or Organizations: No     Attends Club or Organization Meetings: Never     Marital Status:    Housing Stability: Low Risk  (1/15/2024)    Housing Stability Vital Sign     Unable to Pay for Housing in the Last Year: No     Number of Places Lived in the Last Year: 1     Unstable Housing in the Last Year: No     Family History   Problem Relation Name Age of Onset    Hypertension Mother         Review of patient's allergies indicates:  No Known Allergies    Current Outpatient Medications   Medication Sig Dispense Refill    albuterol (VENTOLIN HFA) 90 mcg/actuation inhaler Inhale 2 puffs into the lungs every 6 (six) hours as needed for Wheezing. Rescue 8 g 2    apixaban (ELIQUIS) 5 mg Tab Take 1 tablet (5 mg total) by mouth 2 (two) times daily. 180 tablet 1    busPIRone (BUSPAR) 10 MG tablet Take 1 tablet (10 mg total) by mouth 2 (two) times daily as needed (Anxiety). 180 tablet 3    capsicum 0.075% (ZOSTRIX) 0.075 % topical cream Apply topically 3 (three) times daily as needed (Foot pain). 120 g 5    DULoxetine (CYMBALTA) 60 MG capsule Take 1 capsule (60 mg total) by mouth 2 (two) times daily. 180 capsule 3    empagliflozin (JARDIANCE) 10 mg tablet Take 1 tablet (10 mg total) by mouth once daily. Discuss with PCP prior to restarting. 90 tablet 3    HYDROcodone-acetaminophen (NORCO) 5-325 mg per tablet Take 1 tablet by mouth every 6 (six) hours as needed for Pain. 28 tablet 0    LIDOcaine (LIDODERM) 5 % Place 3 patches onto the skin daily as needed (Rib pain). Okay to use 1 to 3 patches. Remove & Discard patches within 12 hours or as directed by MD 30 patch 1    methocarbamoL (ROBAXIN) 750 MG Tab Take 1 tablet (750 mg total) by mouth 3 (three) times daily as needed (Back  pain). 90 tablet 0    nortriptyline (PAMELOR) 10 MG capsule Take 1 capsule (10 mg total) by mouth 3 (three) times daily. 90 capsule 3    ondansetron (ZOFRAN) 8 MG tablet Take 1 tablet (8 mg total) by mouth every 8 (eight) hours as needed for Nausea. 30 tablet 1    pantoprazole (PROTONIX) 40 MG tablet TAKE 1 TABLET(40 MG) BY MOUTH EVERY DAY 90 tablet 3    pregabalin (LYRICA) 150 MG capsule Take 1 capsule (150 mg total) by mouth 3 (three) times daily. 90 capsule 3    triamcinolone acetonide 0.1% (KENALOG) 0.1 % cream Apply topically 2 (two) times daily. Use for 1 to 2 weeks as needed for rash. 453.6 g 0    fluticasone-salmeterol diskus inhaler 250-50 mcg Inhale 1 puff into the lungs 2 (two) times daily. Controller 60 each 2    traZODone (DESYREL) 100 MG tablet Take 2 tablets (200 mg total) by mouth nightly as needed for Insomnia. 14 tablet 0     No current facility-administered medications for this visit.       ROS:  GENERAL: No fever. No chills. No fatigue. Denies weight loss. Denies weight gain.  HEENT: Denies headaches. Denies vision change. Denies eye pain. Denies double vision. Denies ear pain.   CV: Denies chest pain.   PULM: Denies of shortness of breath.  GI: Denies constipation. No diarrhea. No abdominal pain. Denies nausea. Denies vomiting. No blood in stool.  HEME: Denies bleeding problems.  : Denies urgency. No painful urination. No blood in urine.  MS: Denies joint stiffness. Denies joint swelling.  Denies back pain.  SKIN: Denies rash.   NEURO: Denies seizures. No weakness.  PSYCH:  Denies difficulty sleeping. No anxiety. Denies depression. No suicidal thoughts.       VITALS:   Vitals:    04/30/24 1003   BP: 105/68   Pulse: (!) 112   Weight: 113.4 kg (250 lb)     PHYSICAL EXAM:     GENERAL: Alert and oriented x 3, no acute distress  PSYCH:  Mood and affect appropriate.  SKIN: Skin color, texture, turgor normal, no rashes or lesions.  HEENT:  Normocephalic, atraumatic. Cranial nerves grossly  intact.  PULM: Non-labored breathing, symmetric chest rise.  EXTREMITIES: No deformities, edema, or skin discoloration.   MUSCULOSKELETAL: Bilateral upper and lower extremity strength is symmetric and within functional limits.  NEURO: Allodynia noted to bilateral feet.   GAIT: Antalgic. Uses WC for mobility.    LABS:  Lab Results   Component Value Date    HGBA1C 6.3 (H) 07/27/2023         IMAGING:    EXAMINATION:  MRI LUMBAR SPINE WITHOUT CONTRAST     CLINICAL HISTORY:  Ataxia or coordination problem (Ped 0-18y);     TECHNIQUE:  Multiplanar, multisequence MR imaging of the lumbar spine without the use of intravenous contrast.     COMPARISON:  None     FINDINGS:  Alignment: Grade 1 anterolisthesis of L4 on L5. The remainder of the lumbar alignment is within normal limits.     Vertebrae: 5 lumbar-type vertebral bodies. No aggressive marrow replacement process or fracture.  There is probable intraosseous hemangioma in the S1 vertebral body.     Discs: Diffuse disc height loss worst at L4-L5.     Cord: Normal.  Conus terminates T11-T12.  The conus is normal in morphology.     Degenerative findings:     T12-L1: No significant spinal canal stenosis or neural foraminal narrowing.     L1-L2:  Small diffuse posterior disc bulge and bilateral facet arthropathy.  No significant spinal canal stenosis or neural foraminal narrowing.     L2-L3: Diffuse asymmetric disc bulge to the left, bilateral facet arthropathy with mild left neural foraminal narrowing.  No spinal canal stenosis.     L3-L4: Diffuse asymmetric disc bulge to the left, bilateral facet arthropathy with mild left neural foraminal narrowing.  No spinal canal stenosis.     L4-L5: There is uncovering of the disc.  There is superimposed central disc protrusion.  The spinal canal is within normal limits.  There is mild bilateral neural foraminal narrowing.  There is fluid within the facet joints.     L5-S1: No significant spinal canal stenosis or neural foraminal  narrowing.     Paraspinous soft tissues: Bilateral renal cysts.  Paraspinal muscle atrophy.  There is distention of the urinary bladder.     Impression:     1.  Minimal anterolisthesis of L4 on L5 with uncovering of the disc and superimposed central disc protrusion.  No significant spinal canal narrowing.  Mild bilateral neural foramina at this level.     2.  Additional mild multilevel degenerative changes in the lumbar spine.     3.  Distended urinary bladder.    ASSESSMENT: 74 y.o. year old male with pain, consistent with:    No diagnosis found.      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 He comes to us with painful diabetic neuropathy for several years that has failed to respond to multiple oral and topical agents. His last HgbA1c was 6.3. He is here today for application of Qutenza.      PLAN:  Continue Lyrica 150mg TID.  Continue Nortriptyline 10mg TID.  Qutenza 2 patches for each foot were applied today- see note below.  Will follow-up 1 month after Qutenza application      Hands washed, dried and gloved donned prior to patient care. Examined pt's feet for any open wounds. Skin intact with no open areas noted. Patches applied to plantar and dorsal areas of both feet. Wrapped to help with adherence. Examined after application, no signs of redness or irritation. Patient denies pain. Patches removed after 30 minutes and disposed of in biohazard bag. Cleaning gel used to clean feet. Patient was informed to avoid heat to feet for 48 hours. If pain increases, apply ice in 20 minute intervals.    The above plan and management options were discussed at length with patient. Patient is in agreement with the above and verbalized understanding.     ESTEFANI Owusu  .liana

## 2024-05-01 ENCOUNTER — DOCUMENTATION ONLY (OUTPATIENT)
Dept: REHABILITATION | Facility: HOSPITAL | Age: 74
End: 2024-05-01
Payer: MEDICARE

## 2024-05-01 ENCOUNTER — TELEPHONE (OUTPATIENT)
Dept: PAIN MEDICINE | Facility: CLINIC | Age: 74
End: 2024-05-01
Payer: MEDICARE

## 2024-05-01 NOTE — PROGRESS NOTES
Patient called to cancel is appointment today. Stated per PAR message that he is ill and that his feet hurt too much. No charges were posted today.   Dat Savage, PTA

## 2024-05-02 ENCOUNTER — PATIENT MESSAGE (OUTPATIENT)
Dept: OPHTHALMOLOGY | Facility: CLINIC | Age: 74
End: 2024-05-02
Payer: MEDICARE

## 2024-05-02 ENCOUNTER — PATIENT MESSAGE (OUTPATIENT)
Dept: INTERNAL MEDICINE | Facility: CLINIC | Age: 74
End: 2024-05-02
Payer: MEDICARE

## 2024-05-02 ENCOUNTER — PATIENT MESSAGE (OUTPATIENT)
Dept: PAIN MEDICINE | Facility: CLINIC | Age: 74
End: 2024-05-02
Payer: MEDICARE

## 2024-05-02 DIAGNOSIS — G62.9 PERIPHERAL POLYNEUROPATHY: Primary | ICD-10-CM

## 2024-05-02 DIAGNOSIS — E83.42 HYPOMAGNESEMIA: ICD-10-CM

## 2024-05-02 DIAGNOSIS — D53.9 NUTRITIONAL ANEMIA, UNSPECIFIED: ICD-10-CM

## 2024-05-02 DIAGNOSIS — R94.6 ABNORMAL RESULTS OF THYROID FUNCTION STUDIES: ICD-10-CM

## 2024-05-02 DIAGNOSIS — E11.42 TYPE 2 DIABETES MELLITUS WITH DIABETIC POLYNEUROPATHY, WITHOUT LONG-TERM CURRENT USE OF INSULIN: ICD-10-CM

## 2024-05-03 ENCOUNTER — DOCUMENTATION ONLY (OUTPATIENT)
Dept: REHABILITATION | Facility: HOSPITAL | Age: 74
End: 2024-05-03
Payer: MEDICARE

## 2024-05-03 NOTE — PROGRESS NOTES
PT/PTA met face to face to discuss pt's treatment plan and progress towards established goals.  Continue with current PT POC with focus on strength, endurance, and balance.  Patient will be seen by physical therapist at least every sixth treatment or 30 days, whichever occurs first.    Ciara Pro, PTA  05/03/2024

## 2024-05-06 ENCOUNTER — DOCUMENTATION ONLY (OUTPATIENT)
Dept: REHABILITATION | Facility: HOSPITAL | Age: 74
End: 2024-05-06
Payer: MEDICARE

## 2024-05-07 ENCOUNTER — CLINICAL SUPPORT (OUTPATIENT)
Dept: REHABILITATION | Facility: HOSPITAL | Age: 74
End: 2024-05-07
Payer: MEDICARE

## 2024-05-07 DIAGNOSIS — Z74.09 IMPAIRED FUNCTIONAL MOBILITY, BALANCE, GAIT, AND ENDURANCE: ICD-10-CM

## 2024-05-07 DIAGNOSIS — R26.89 BALANCE PROBLEM: Primary | ICD-10-CM

## 2024-05-07 PROCEDURE — 97110 THERAPEUTIC EXERCISES: CPT | Mod: PO,CQ

## 2024-05-07 PROCEDURE — 97530 THERAPEUTIC ACTIVITIES: CPT | Mod: PO,CQ

## 2024-05-07 NOTE — PROGRESS NOTES
"[facility]   Physical Therapy Treatment Note      Name: Anthony Ball  Clinic Number: [id number]    Therapy Diagnosis:   Encounter Diagnoses   Name Primary?    Balance problem Yes    Impaired functional mobility, balance, gait, and endurance          Physician: Isaias Myles MD    Visit Date: 5/7/2024    Physician Orders: PT Eval and Treat   Medical Diagnosis from Referral:   R53.1 (ICD-10-CM) - Generalized weakness   G62.9 (ICD-10-CM) - Peripheral polyneuropathy   R26.89 (ICD-10-CM) - Impaired gait and mobility      Evaluation Date: 4/23/2024  Authorization Period Expiration: 4/10/25  Plan of Care Expiration: 6/18/24  Progress Note Due: 5/23/24  Visit # / Visits authorized: 3/ 20 ( plus eval)  FOTO: 1/3     Precautions: Standard, Fall, and vision impaired     Time In: 1615  Time Out: 1700  Total Billable Time: 45 minutes    PTA Visit #: 3/5       Subjective     Patient reports:"I'm doing a little better today."   He was compliant with HEP  Response to previous treatment: sore  Functional change: ongoing    Pain: 0/10  Location: bilateral back      Objective      Objective Measures updated at progress report unless specified.     Treatment     Anthony received the treatments listed below:      therapeutic exercises to develop strength, endurance, ROM, flexibility, posture, and core stabilization for 10 minutes including:    Sci Fit bilateral upper extremity and lower extremity reciprocation 10 minutes Level 1.0  for CV endurance and activity tolerance      neuromuscular re-education activities to improve: Balance, Coordination, Kinesthetic, Sense, Proprioception, and Posture for 0 minutes. The following activities were included:      therapeutic activities to improve functional performance for 35  minutes, including:  Sit to stand x  5 trials from W/C ---> RW with Mod/Min A to elevate B hips    Trial 1: 175 ft,125ft with rolling walker and CGA    gait training to improve functional mobility " and safety for 0 minutes, including:        Patient Education and Home Exercises       Education provided:   - HEP    Written Home Exercises Provided: yes. Exercises were reviewed and Anthony was able to demonstrate them prior to the end of the session.  Anthony demonstrated good  understanding of the education provided. See Electronic Medical Record under Patient Instructions for exercises provided during therapy sessions    Assessment     Anthony tolerated his tx session well and did not have any problems noted.  Anthony required slightly less assistance for sit to stand transfers today and was able to increase his gait distance.  Anthony does require verbal cues to lean forward and scoot forward for his transfers.   Cont with plan of care.     Anthony Is progressing well towards his goals.   Patient prognosis is Fair.     Patient will continue to benefit from skilled outpatient physical therapy to address the deficits listed in the problem list box on initial evaluation, provide pt/family education and to maximize pt's level of independence in the home and community environment.     Patient's spiritual, cultural and educational needs considered and pt agreeable to plan of care and goals.     Anticipated barriers to physical therapy: history of multiple falls; fear of falling, general anxiety and pt does not drive     Goals:     Short Term Goals: 4 weeks   Pt will be issued first installment of HEP and report at least partial compliance.  Pt will improve his 30 second chair rise to 3-4 reps to demonstrate increased LE strength and muscular endurance  Pt will improve his TUG score to 1 minute, 10 seconds or < with appropriate AD to improve household ambulation and decrease fall risk  Pt will increase his SSWS to 0.13 m/sec with appropriate AD to improve community ambulation and decrease fall risk  Pt will complete mCTSIB balance testing and PT to set appropriate goals  Pt will improve any 2 LE muscle grades by 1/3 to help with  functional mobility skills  Pt will perform all sit<> stand and stand pivot transfers with no more than SBA using RW; min A without device     Long Term Goals: 8 weeks   Pt will be partially compliant with finalized HEP to help maintain potential gains realized in PT  Pt will improve his 30 second chair rise to 4-5 reps to demonstrate increased LE strength and muscular endurance  Pt will improve his TUG score to 60 seconds or < with appropriate AD to improve household ambulation and decrease fall risk  Pt will increase his SSWS to 0.14 m/sec with or without appropriate AD to improve community ambulation and decrease fall risk  Pt will improve any 4 LE muscle grades by 1/3 to help with functional mobility skills   Pt will perform all sit<> stand and stand pivot transfers with no more than S using RW; CGA without device    Plan     Cont with strengthening, endurance, balance and functional mobility     Ciara Pro, PTA

## 2024-05-09 ENCOUNTER — TELEPHONE (OUTPATIENT)
Dept: OPHTHALMOLOGY | Facility: CLINIC | Age: 74
End: 2024-05-09
Payer: MEDICARE

## 2024-05-09 DIAGNOSIS — H25.11 NUCLEAR SCLEROTIC CATARACT OF RIGHT EYE: Primary | ICD-10-CM

## 2024-05-10 ENCOUNTER — CLINICAL SUPPORT (OUTPATIENT)
Dept: REHABILITATION | Facility: HOSPITAL | Age: 74
End: 2024-05-10
Payer: MEDICARE

## 2024-05-10 DIAGNOSIS — Z74.09 IMPAIRED FUNCTIONAL MOBILITY, BALANCE, GAIT, AND ENDURANCE: ICD-10-CM

## 2024-05-10 DIAGNOSIS — R26.89 BALANCE PROBLEM: Primary | ICD-10-CM

## 2024-05-10 PROCEDURE — 97530 THERAPEUTIC ACTIVITIES: CPT | Mod: PO

## 2024-05-10 PROCEDURE — 97110 THERAPEUTIC EXERCISES: CPT | Mod: PO

## 2024-05-10 NOTE — PROGRESS NOTES
[facility]   Physical Therapy Treatment Note      Name: Anthony Ball  Clinic Number: [id number]    Therapy Diagnosis:   Encounter Diagnoses   Name Primary?    Balance problem Yes    Impaired functional mobility, balance, gait, and endurance            Physician: Isaias Myles MD    Visit Date: 5/10/2024    Physician Orders: PT Eval and Treat   Medical Diagnosis from Referral:   R53.1 (ICD-10-CM) - Generalized weakness   G62.9 (ICD-10-CM) - Peripheral polyneuropathy   R26.89 (ICD-10-CM) - Impaired gait and mobility      Evaluation Date: 4/23/2024  Authorization Period Expiration: 12/31/24  Plan of Care Expiration: 6/18/24  Progress Note Due: 5/23/24  Visit # / Visits authorized: 4/ 20 ( plus eval)  FOTO: 1/3     Precautions: Standard, Fall, and vision impaired     Time In: 0934   Time Out: 1019   Total Billable Time: 45  minutes    PTA Visit #: 0/5       Subjective     Patient reports: he has had 2 good days. He took Robaxin and is not in pain this morning.  He was compliant with HEP  Response to previous treatment: sore  Functional change: ongoing~ managing stairs at home is getting easier; pt also walking more    Pain: 0/10  Location: N/A     Objective      Objective Measures updated at progress report unless specified. See below:    MCTSIB:  1. Eyes Open/feet together/Firm: 30 seconds~ slightly widened SIERRA  2. Eyes Closed/feet together/Firm: 13 seconds  3. Eyes Open/feet together/Foam: 30 seconds~ wider SIERRA and min sway  4. Eyes Closed/feet together/Foam: 5 seconds      Treatment     Anthony received the treatments listed below:      therapeutic exercises to develop strength, endurance, ROM, flexibility, posture, and core stabilization for 8 minutes including:      X 8 minutes on SCI-FIT recumbent stepper, level 1.0, for B UE/ LE muscular and CV endurance ~ pt able to keep screen engaged but demonstrates limited motor output      neuromuscular re-education activities to improve: Balance,  Coordination, Kinesthetic, Sense, Proprioception, and Posture for 0 minutes. The following activities were included:      therapeutic activities to improve functional performance for 37  minutes, including:    (Includes time to perform mCTSIB)    Stand pivot transfers transport chair<> gold chair, CGA    2 x 5 sit<> stand trials from gold chair with ergotron table placed anteriorly, SBA    Sit> stand inside parallel bars x 2 trials, min A for hip elevation trial 1, CGA trial 2    Sit> stand from transport chair to RW x 2 trials, CGA    Stand<> sit to stepper seat, CGA    Stand> sit from RW to transport chair, CGA      Ambulation trials:  Trial 1: 112 ft with rolling walker, CGA  Trial 2: 92 ft with rolling walker, CGA      gait training to improve functional mobility and safety for 0 minutes, including:        Patient Education and Home Exercises       Education provided:   - HEP    Written Home Exercises Provided: yes. Exercises were reviewed and Anthony was able to demonstrate them prior to the end of the session.  Anthony demonstrated good  understanding of the education provided. See Electronic Medical Record under Patient Instructions for exercises provided during therapy sessions    Assessment     Anthony tolerated his treatment session well. He performed many sit<> stand transfers during today's session and we were able to complete mCTSIB testing. He performed 2 trials of ambulation with the RW, though his distances were not quite as far as the previous session. PT was pleased to hear that pt had no back pain as he entered the clinic. He did mention some back pain though, during mCTSIB testing. Appropriate goals for this test to be established today. Anthony remains appropriate for 2 x weekly therapy sessions to address current mobility and balance deficits.    Anthony Is progressing well towards his goals.   Patient prognosis is Fair.     Patient will continue to benefit from skilled outpatient physical therapy to address  the deficits listed in the problem list box on initial evaluation, provide pt/family education and to maximize pt's level of independence in the home and community environment.     Patient's spiritual, cultural and educational needs considered and pt agreeable to plan of care and goals.     Anticipated barriers to physical therapy: history of multiple falls; fear of falling, general anxiety and pt does not drive     Goals:     Short Term Goals: 4 weeks   Pt will be issued first installment of HEP and report at least partial compliance~in progress  Pt will improve his 30 second chair rise to 3-4 reps to demonstrate increased LE strength and muscular endurance~in progress  Pt will improve his TUG score to 1 minute, 10 seconds or < with appropriate AD to improve household ambulation and decrease fall risk~in progress  Pt will increase his SSWS to 0.13 m/sec with appropriate AD to improve community ambulation and decrease fall risk~in progress  Pt will complete mCTSIB balance testing and PT to set appropriate goals~ MET, 5/10/24  Pt will improve any 2 LE muscle grades by 1/3 to help with functional mobility skills~in progress  Pt will perform all sit<> stand and stand pivot transfers with no more than SBA using RW; min A without device~in progress  (NEW Goal, 5/10/24): pt to increase his score on condition # 2 of the mCTSIB to 16 seconds for improved ability to balance in low/eliminated vision environments~in progress     Long Term Goals: 8 weeks   Pt will be partially compliant with finalized HEP to help maintain potential gains realized in PT~ongoing  Pt will improve his 30 second chair rise to 4-5 reps to demonstrate increased LE strength and muscular endurance~ongoing  Pt will improve his TUG score to 60 seconds or < with appropriate AD to improve household ambulation and decrease fall risk  Pt will increase his SSWS to 0.14 m/sec with or without appropriate AD to improve community ambulation and decrease fall  risk~ongoing  Pt will improve any 4 LE muscle grades by 1/3 to help with functional mobility skills~ ongoing  Pt will perform all sit<> stand and stand pivot transfers with no more than S using RW; CGA without device~ongoing  7.   ( NEW Goal, 5/10/24): pt to increase his score on condition # 2 of the mCTSIB to 19 seconds for improved ability to balance in low/eliminated vision environments~ongoing  Plan     Cont with strengthening, endurance, balance and functional mobility.     Ba Diaz, PT   5/10/2024

## 2024-05-14 ENCOUNTER — CLINICAL SUPPORT (OUTPATIENT)
Dept: REHABILITATION | Facility: HOSPITAL | Age: 74
End: 2024-05-14
Payer: MEDICARE

## 2024-05-14 DIAGNOSIS — Z74.09 IMPAIRED FUNCTIONAL MOBILITY, BALANCE, GAIT, AND ENDURANCE: ICD-10-CM

## 2024-05-14 DIAGNOSIS — R26.89 BALANCE PROBLEM: Primary | ICD-10-CM

## 2024-05-14 PROCEDURE — 97530 THERAPEUTIC ACTIVITIES: CPT | Mod: PO,CQ

## 2024-05-14 PROCEDURE — 97110 THERAPEUTIC EXERCISES: CPT | Mod: PO,CQ

## 2024-05-14 NOTE — PROGRESS NOTES
"[facility]   Physical Therapy Treatment Note      Name: Anthony Ball  Clinic Number: [id number]    Therapy Diagnosis:   Encounter Diagnoses   Name Primary?    Balance problem Yes    Impaired functional mobility, balance, gait, and endurance            Physician: Isaias Myles MD    Visit Date: 5/14/2024    Physician Orders: PT Eval and Treat   Medical Diagnosis from Referral:   R53.1 (ICD-10-CM) - Generalized weakness   G62.9 (ICD-10-CM) - Peripheral polyneuropathy   R26.89 (ICD-10-CM) - Impaired gait and mobility      Evaluation Date: 4/23/2024  Authorization Period Expiration: 12/31/24  Plan of Care Expiration: 6/18/24  Progress Note Due: 5/23/24  Visit # / Visits authorized: 5/ 20 ( plus eval)  FOTO: 1/3     Precautions: Standard, Fall, and vision impaired     Time In: 1345   Time Out: 1430  Total Billable Time: 45  minutes    PTA Visit #: 1/5       Subjective     Patient reports: " I'm feeling frisky."   He was compliant with HEP  Response to previous treatment: sore  Functional change: ongoing~ managing stairs at home is getting easier; pt also walking more    Pain: 0/10  Location: N/A     Objective      Objective Measures updated at progress report unless specified. See below:      Treatment     Anthony received the treatments listed below:      therapeutic exercises to develop strength, endurance, ROM, flexibility, posture, and core stabilization for 10 minutes including:      X 10 minutes on SCI-FIT recumbent stepper, level 1.0, for B UE/ LE muscular and CV endurance ~ pt able to keep screen engaged but demonstrates limited motor output      neuromuscular re-education activities to improve: Balance, Coordination, Kinesthetic, Sense, Proprioception, and Posture for 0 minutes. The following activities were included:      therapeutic activities to improve functional performance for 35  minutes, including:      Stand pivot transfers transport chair<> stepper chair, CGA, no A.D    Sit " to stand from W.C <> RW with Min A ---> CGA to elevate B hips      Ambulation trials:  Trial 1: 115 ft with rolling walker, CGA  Trial 2: 115 ft with rolling walker, CGA      gait training to improve functional mobility and safety for 0 minutes, including:        Patient Education and Home Exercises       Education provided:   - HEP    Written Home Exercises Provided: yes. Exercises were reviewed and Anthony was able to demonstrate them prior to the end of the session.  Anthony demonstrated good  understanding of the education provided. See Electronic Medical Record under Patient Instructions for exercises provided during therapy sessions    Assessment     Anthony tolerated his tx session well and did not have any problems noted.  Anthony was able to increase his gait distance today, but cont to require Min A for sit to stands to elevate his hips.  Cont with plan of care.     Anthony Is progressing well towards his goals.   Patient prognosis is Fair.     Patient will continue to benefit from skilled outpatient physical therapy to address the deficits listed in the problem list box on initial evaluation, provide pt/family education and to maximize pt's level of independence in the home and community environment.     Patient's spiritual, cultural and educational needs considered and pt agreeable to plan of care and goals.     Anticipated barriers to physical therapy: history of multiple falls; fear of falling, general anxiety and pt does not drive     Goals:     Short Term Goals: 4 weeks   Pt will be issued first installment of HEP and report at least partial compliance~in progress  Pt will improve his 30 second chair rise to 3-4 reps to demonstrate increased LE strength and muscular endurance~in progress  Pt will improve his TUG score to 1 minute, 10 seconds or < with appropriate AD to improve household ambulation and decrease fall risk~in progress  Pt will increase his SSWS to 0.13 m/sec with appropriate AD to improve community  ambulation and decrease fall risk~in progress  Pt will complete mCTSIB balance testing and PT to set appropriate goals~ MET, 5/10/24  Pt will improve any 2 LE muscle grades by 1/3 to help with functional mobility skills~in progress  Pt will perform all sit<> stand and stand pivot transfers with no more than SBA using RW; min A without device~in progress  (NEW Goal, 5/10/24): pt to increase his score on condition # 2 of the mCTSIB to 16 seconds for improved ability to balance in low/eliminated vision environments~in progress     Long Term Goals: 8 weeks   Pt will be partially compliant with finalized HEP to help maintain potential gains realized in PT~ongoing  Pt will improve his 30 second chair rise to 4-5 reps to demonstrate increased LE strength and muscular endurance~ongoing  Pt will improve his TUG score to 60 seconds or < with appropriate AD to improve household ambulation and decrease fall risk  Pt will increase his SSWS to 0.14 m/sec with or without appropriate AD to improve community ambulation and decrease fall risk~ongoing  Pt will improve any 4 LE muscle grades by 1/3 to help with functional mobility skills~ ongoing  Pt will perform all sit<> stand and stand pivot transfers with no more than S using RW; CGA without device~ongoing  7.   ( NEW Goal, 5/10/24): pt to increase his score on condition # 2 of the mCTSIB to 19 seconds for improved ability to balance in low/eliminated vision environments~ongoing  Plan     Cont with strengthening, endurance, balance and functional mobility.     Ciara Pro, PTA   5/14/2024

## 2024-05-16 ENCOUNTER — PATIENT MESSAGE (OUTPATIENT)
Dept: INTERNAL MEDICINE | Facility: CLINIC | Age: 74
End: 2024-05-16
Payer: MEDICARE

## 2024-05-16 ENCOUNTER — PATIENT MESSAGE (OUTPATIENT)
Dept: OPHTHALMOLOGY | Facility: CLINIC | Age: 74
End: 2024-05-16
Payer: MEDICARE

## 2024-05-16 DIAGNOSIS — E83.42 HYPOMAGNESEMIA: Primary | ICD-10-CM

## 2024-05-16 DIAGNOSIS — E53.8 B12 DEFICIENCY: ICD-10-CM

## 2024-05-16 DIAGNOSIS — F41.9 ANXIETY: ICD-10-CM

## 2024-05-17 ENCOUNTER — CLINICAL SUPPORT (OUTPATIENT)
Dept: REHABILITATION | Facility: HOSPITAL | Age: 74
End: 2024-05-17
Payer: MEDICARE

## 2024-05-17 ENCOUNTER — LAB VISIT (OUTPATIENT)
Dept: LAB | Facility: HOSPITAL | Age: 74
End: 2024-05-17
Attending: INTERNAL MEDICINE
Payer: MEDICARE

## 2024-05-17 DIAGNOSIS — R46.89 COGNITIVE AND BEHAVIORAL CHANGES: ICD-10-CM

## 2024-05-17 DIAGNOSIS — G62.9 PERIPHERAL POLYNEUROPATHY: ICD-10-CM

## 2024-05-17 DIAGNOSIS — Z74.09 IMPAIRED FUNCTIONAL MOBILITY, BALANCE, GAIT, AND ENDURANCE: ICD-10-CM

## 2024-05-17 DIAGNOSIS — R26.89 BALANCE PROBLEM: Primary | ICD-10-CM

## 2024-05-17 DIAGNOSIS — E11.42 TYPE 2 DIABETES MELLITUS WITH DIABETIC POLYNEUROPATHY, WITHOUT LONG-TERM CURRENT USE OF INSULIN: ICD-10-CM

## 2024-05-17 DIAGNOSIS — R41.89 COGNITIVE AND BEHAVIORAL CHANGES: ICD-10-CM

## 2024-05-17 DIAGNOSIS — D53.9 NUTRITIONAL ANEMIA, UNSPECIFIED: ICD-10-CM

## 2024-05-17 DIAGNOSIS — R94.6 ABNORMAL RESULTS OF THYROID FUNCTION STUDIES: ICD-10-CM

## 2024-05-17 DIAGNOSIS — E83.42 HYPOMAGNESEMIA: ICD-10-CM

## 2024-05-17 LAB
ALBUMIN SERPL BCP-MCNC: 3 G/DL (ref 3.5–5.2)
ALP SERPL-CCNC: 134 U/L (ref 55–135)
ALT SERPL W/O P-5'-P-CCNC: 14 U/L (ref 10–44)
ANION GAP SERPL CALC-SCNC: 11 MMOL/L (ref 8–16)
AST SERPL-CCNC: 21 U/L (ref 10–40)
BILIRUB SERPL-MCNC: 0.8 MG/DL (ref 0.1–1)
BUN SERPL-MCNC: 11 MG/DL (ref 8–23)
CALCIUM SERPL-MCNC: 9.1 MG/DL (ref 8.7–10.5)
CHLORIDE SERPL-SCNC: 103 MMOL/L (ref 95–110)
CO2 SERPL-SCNC: 23 MMOL/L (ref 23–29)
CREAT SERPL-MCNC: 0.9 MG/DL (ref 0.5–1.4)
ERYTHROCYTE [DISTWIDTH] IN BLOOD BY AUTOMATED COUNT: 15.4 % (ref 11.5–14.5)
EST. GFR  (NO RACE VARIABLE): >60 ML/MIN/1.73 M^2
ESTIMATED AVG GLUCOSE: 114 MG/DL (ref 68–131)
FOLATE SERPL-MCNC: 5.6 NG/ML (ref 4–24)
GLUCOSE SERPL-MCNC: 145 MG/DL (ref 70–110)
HBA1C MFR BLD: 5.6 % (ref 4–5.6)
HCT VFR BLD AUTO: 42.8 % (ref 40–54)
HGB BLD-MCNC: 14.1 G/DL (ref 14–18)
HIV 1+2 AB+HIV1 P24 AG SERPL QL IA: NORMAL
MAGNESIUM SERPL-MCNC: 1.4 MG/DL (ref 1.6–2.6)
MCH RBC QN AUTO: 33.6 PG (ref 27–31)
MCHC RBC AUTO-ENTMCNC: 32.9 G/DL (ref 32–36)
MCV RBC AUTO: 102 FL (ref 82–98)
PLATELET # BLD AUTO: 183 K/UL (ref 150–450)
PMV BLD AUTO: 11.1 FL (ref 9.2–12.9)
POTASSIUM SERPL-SCNC: 4.2 MMOL/L (ref 3.5–5.1)
PROT SERPL-MCNC: 6.9 G/DL (ref 6–8.4)
RBC # BLD AUTO: 4.2 M/UL (ref 4.6–6.2)
SODIUM SERPL-SCNC: 137 MMOL/L (ref 136–145)
T4 FREE SERPL-MCNC: 0.79 NG/DL (ref 0.71–1.51)
TSH SERPL DL<=0.005 MIU/L-ACNC: 4.2 UIU/ML (ref 0.4–4)
VIT B12 SERPL-MCNC: 187 PG/ML (ref 210–950)
WBC # BLD AUTO: 7.55 K/UL (ref 3.9–12.7)

## 2024-05-17 PROCEDURE — 86592 SYPHILIS TEST NON-TREP QUAL: CPT | Performed by: INTERNAL MEDICINE

## 2024-05-17 PROCEDURE — 83735 ASSAY OF MAGNESIUM: CPT | Performed by: INTERNAL MEDICINE

## 2024-05-17 PROCEDURE — 36415 COLL VENOUS BLD VENIPUNCTURE: CPT | Performed by: INTERNAL MEDICINE

## 2024-05-17 PROCEDURE — 80053 COMPREHEN METABOLIC PANEL: CPT | Performed by: INTERNAL MEDICINE

## 2024-05-17 PROCEDURE — 82607 VITAMIN B-12: CPT | Performed by: INTERNAL MEDICINE

## 2024-05-17 PROCEDURE — 83921 ORGANIC ACID SINGLE QUANT: CPT | Performed by: INTERNAL MEDICINE

## 2024-05-17 PROCEDURE — 84443 ASSAY THYROID STIM HORMONE: CPT | Performed by: INTERNAL MEDICINE

## 2024-05-17 PROCEDURE — 97110 THERAPEUTIC EXERCISES: CPT | Mod: PO,CQ

## 2024-05-17 PROCEDURE — 97530 THERAPEUTIC ACTIVITIES: CPT | Mod: PO,CQ

## 2024-05-17 PROCEDURE — 84439 ASSAY OF FREE THYROXINE: CPT | Performed by: INTERNAL MEDICINE

## 2024-05-17 PROCEDURE — 83036 HEMOGLOBIN GLYCOSYLATED A1C: CPT | Performed by: INTERNAL MEDICINE

## 2024-05-17 PROCEDURE — 87389 HIV-1 AG W/HIV-1&-2 AB AG IA: CPT | Performed by: INTERNAL MEDICINE

## 2024-05-17 PROCEDURE — 82746 ASSAY OF FOLIC ACID SERUM: CPT | Performed by: INTERNAL MEDICINE

## 2024-05-17 PROCEDURE — 85027 COMPLETE CBC AUTOMATED: CPT | Performed by: INTERNAL MEDICINE

## 2024-05-17 RX ORDER — BUSPIRONE HYDROCHLORIDE 15 MG/1
15 TABLET ORAL 2 TIMES DAILY PRN
Qty: 180 TABLET | Refills: 3 | Status: SHIPPED | OUTPATIENT
Start: 2024-05-17

## 2024-05-17 NOTE — PROGRESS NOTES
"[facility]   Physical Therapy Treatment Note      Name: Anthony Ball  Clinic Number: [id number]    Therapy Diagnosis:   Encounter Diagnoses   Name Primary?    Balance problem Yes    Impaired functional mobility, balance, gait, and endurance            Physician: Isaias Myles MD    Visit Date: 5/17/2024    Physician Orders: PT Eval and Treat   Medical Diagnosis from Referral:   R53.1 (ICD-10-CM) - Generalized weakness   G62.9 (ICD-10-CM) - Peripheral polyneuropathy   R26.89 (ICD-10-CM) - Impaired gait and mobility      Evaluation Date: 4/23/2024  Authorization Period Expiration: 12/31/24  Plan of Care Expiration: 6/18/24  Progress Note Due: 5/23/24  Visit # / Visits authorized: 6/ 20 ( plus eval)  FOTO: 1/3     Precautions: Standard, Fall, and vision impaired     Time In: 0925  Time Out: 1010  Total Billable Time: 45  minutes    PTA Visit #: 2/5       Subjective     Patient reports: " I feel a little off today."   He was compliant with HEP  Response to previous treatment: sore  Functional change: ongoing~ managing stairs at home is getting easier; pt also walking more    Pain: 0/10  Location: N/A     Objective      Objective Measures updated at progress report unless specified. See below:      Treatment     Anthony received the treatments listed below:      therapeutic exercises to develop strength, endurance, ROM, flexibility, posture, and core stabilization for 37 minutes including:      X 10 minutes on SCI-FIT recumbent stepper, level 1.0, for B UE/ LE muscular and CV endurance ~ pt able to keep screen engaged but demonstrates limited motor output    BP: 134/81,  following the stepper    Sitting EOM:   2 x 10 reps B LE LAQ with #1lb cuff weights  2 x 10 reps of B LE marching in place with #1lb cuff weights  2 x 10 reps of B LE hip adduction squeezes with 3 sec hold  2 x 10 reps of B LE abduction with RTB  2 x 10 reps of B LE DF/PF with RTB    neuromuscular re-education activities " to improve: Balance, Coordination, Kinesthetic, Sense, Proprioception, and Posture for 0 minutes. The following activities were included:      therapeutic activities to improve functional performance for 8  minutes, including:      Stand pivot transfers transport chair<> stepper chair, CGA, no A.  Sit to stand from stepper seat to RW with CGA  Pt ambulated ~ 5 steps from the stepper seat to the EOM with RW and CGA  SPT from EOM --> W/C with RW and CGA.  Therapist holding the W/C.        gait training to improve functional mobility and safety for 0 minutes, including:        Patient Education and Home Exercises       Education provided:   - HEP    Written Home Exercises Provided: yes. Exercises were reviewed and Anthony was able to demonstrate them prior to the end of the session.  Anthony demonstrated good  understanding of the education provided. See Electronic Medical Record under Patient Instructions for exercises provided during therapy sessions    Assessment     Anthony tolerated his tx session fairly well today.  Anthony reported having to fast all night and not feeling the best and requested to just perform sitting therex 2* feeling weak.  Anthony's blood pressure was WFL and he only required CGA For safety for transfers.       Anthony Is progressing well towards his goals.   Patient prognosis is Fair.     Patient will continue to benefit from skilled outpatient physical therapy to address the deficits listed in the problem list box on initial evaluation, provide pt/family education and to maximize pt's level of independence in the home and community environment.     Patient's spiritual, cultural and educational needs considered and pt agreeable to plan of care and goals.     Anticipated barriers to physical therapy: history of multiple falls; fear of falling, general anxiety and pt does not drive     Goals:     Short Term Goals: 4 weeks   Pt will be issued first installment of HEP and report at least partial compliance~in  progress  Pt will improve his 30 second chair rise to 3-4 reps to demonstrate increased LE strength and muscular endurance~in progress  Pt will improve his TUG score to 1 minute, 10 seconds or < with appropriate AD to improve household ambulation and decrease fall risk~in progress  Pt will increase his SSWS to 0.13 m/sec with appropriate AD to improve community ambulation and decrease fall risk~in progress  Pt will complete mCTSIB balance testing and PT to set appropriate goals~ MET, 5/10/24  Pt will improve any 2 LE muscle grades by 1/3 to help with functional mobility skills~in progress  Pt will perform all sit<> stand and stand pivot transfers with no more than SBA using RW; min A without device~in progress  (NEW Goal, 5/10/24): pt to increase his score on condition # 2 of the mCTSIB to 16 seconds for improved ability to balance in low/eliminated vision environments~in progress     Long Term Goals: 8 weeks   Pt will be partially compliant with finalized HEP to help maintain potential gains realized in PT~ongoing  Pt will improve his 30 second chair rise to 4-5 reps to demonstrate increased LE strength and muscular endurance~ongoing  Pt will improve his TUG score to 60 seconds or < with appropriate AD to improve household ambulation and decrease fall risk  Pt will increase his SSWS to 0.14 m/sec with or without appropriate AD to improve community ambulation and decrease fall risk~ongoing  Pt will improve any 4 LE muscle grades by 1/3 to help with functional mobility skills~ ongoing  Pt will perform all sit<> stand and stand pivot transfers with no more than S using RW; CGA without device~ongoing  7.   ( NEW Goal, 5/10/24): pt to increase his score on condition # 2 of the mCTSIB to 19 seconds for improved ability to balance in low/eliminated vision environments~ongoing  Plan     Cont with strengthening, endurance, balance and functional mobility.     Ciaar Pro, PTA   5/17/2024

## 2024-05-18 LAB — RPR SER QL: NORMAL

## 2024-05-18 RX ORDER — CALCIUM CARBONATE 300MG(750)
1 TABLET,CHEWABLE ORAL NIGHTLY
Qty: 30 TABLET | Refills: 2 | Status: SHIPPED | OUTPATIENT
Start: 2024-05-18

## 2024-05-18 RX ORDER — LANOLIN ALCOHOL/MO/W.PET/CERES
1000 CREAM (GRAM) TOPICAL DAILY
Qty: 90 TABLET | Refills: 3 | Status: SHIPPED | OUTPATIENT
Start: 2024-05-18

## 2024-05-20 ENCOUNTER — CLINICAL SUPPORT (OUTPATIENT)
Dept: REHABILITATION | Facility: HOSPITAL | Age: 74
End: 2024-05-20
Payer: MEDICARE

## 2024-05-20 DIAGNOSIS — Z74.09 IMPAIRED FUNCTIONAL MOBILITY, BALANCE, GAIT, AND ENDURANCE: ICD-10-CM

## 2024-05-20 DIAGNOSIS — R26.89 BALANCE PROBLEM: Primary | ICD-10-CM

## 2024-05-20 PROCEDURE — 97530 THERAPEUTIC ACTIVITIES: CPT | Mod: PO,CQ

## 2024-05-20 PROCEDURE — 97110 THERAPEUTIC EXERCISES: CPT | Mod: PO,CQ

## 2024-05-20 NOTE — PROGRESS NOTES
[facility]   Physical Therapy Treatment Note      Name: Anthony Ball  Clinic Number: [id number]    Therapy Diagnosis:   Encounter Diagnoses   Name Primary?    Balance problem Yes    Impaired functional mobility, balance, gait, and endurance            Physician: Isaias Myles MD    Visit Date: 5/20/2024    Physician Orders: PT Eval and Treat   Medical Diagnosis from Referral:   R53.1 (ICD-10-CM) - Generalized weakness   G62.9 (ICD-10-CM) - Peripheral polyneuropathy   R26.89 (ICD-10-CM) - Impaired gait and mobility      Evaluation Date: 4/23/2024  Authorization Period Expiration: 12/31/24  Plan of Care Expiration: 6/18/24  Progress Note Due: 5/23/24  Visit # / Visits authorized: 6/ 20 ( plus eval)  FOTO: 1/3     Precautions: Standard, Fall, and vision impaired     Time In: 0845  Time Out: 0930  Total Billable Time: 45  minutes    PTA Visit #: 3/5       Subjective     Patient reports: no new falls.  He was compliant with HEP  Response to previous treatment: sore  Functional change: ongoing~ managing stairs at home is getting easier; pt also walking more    Pain: 0/10  Location: N/A     Objective      Objective Measures updated at progress report unless specified. See below:      Treatment     Anthony received the treatments listed below:      therapeutic exercises to develop strength, endurance, ROM, flexibility, posture, and core stabilization for 30 minutes including:      X 10 minutes on SCI-FIT recumbent stepper, level 1.0, for B UE/ LE muscular and CV endurance ~ pt unable to keep screen engaged but demonstrates limited motor output    BP: 124/81,  following the stepper    Sitting EOM:   2 x 10 reps B LE LAQ with #1lb cuff weights  2 x 10 reps of B LE marching in place with #1lb cuff weights  2 x 10 reps of B LE hip adduction squeezes with 3 sec hold  2 x 10 reps of B LE abduction with RTB  2 x 10 reps of B LE DF/PF with RTB    neuromuscular re-education activities to improve:  Balance, Coordination, Kinesthetic, Sense, Proprioception, and Posture for 0 minutes. The following activities were included:      therapeutic activities to improve functional performance for 15  minutes, including:      Stand step transfer transport chair<> stepper chair, CGA/SBA  Sit to stand from stepper seat to RW with CGA/SBA  Pt ambulated ~ 5 steps from the stepper seat to the EOM with RW and CGA  Stand step transfer from EOM --> W/C with RW and CGA.          Ambulation trials:  Trial 1: 130 ft with rolling walker, CGA  Trial 2: 86 ft with rolling walker, CGA       gait training to improve functional mobility and safety for 0 minutes, including:        Patient Education and Home Exercises       Education provided:   - HEP    Written Home Exercises Provided: yes. Exercises were reviewed and Anthony was able to demonstrate them prior to the end of the session.  Anthony demonstrated good  understanding of the education provided. See Electronic Medical Record under Patient Instructions for exercises provided during therapy sessions    Assessment     Anthony tolerated his tx session fairly well today.  Anthony reported feeling fine this morning and reports no new falls.  Anthony's blood pressure was WFL and he only required CGA For safety for transfers.       Anthony Is progressing well towards his goals.   Patient prognosis is Fair.     Patient will continue to benefit from skilled outpatient physical therapy to address the deficits listed in the problem list box on initial evaluation, provide pt/family education and to maximize pt's level of independence in the home and community environment.     Patient's spiritual, cultural and educational needs considered and pt agreeable to plan of care and goals.     Anticipated barriers to physical therapy: history of multiple falls; fear of falling, general anxiety and pt does not drive     Goals:     Short Term Goals: 4 weeks   Pt will be issued first installment of HEP and report at least  partial compliance~in progress  Pt will improve his 30 second chair rise to 3-4 reps to demonstrate increased LE strength and muscular endurance~in progress  Pt will improve his TUG score to 1 minute, 10 seconds or < with appropriate AD to improve household ambulation and decrease fall risk~in progress  Pt will increase his SSWS to 0.13 m/sec with appropriate AD to improve community ambulation and decrease fall risk~in progress  Pt will complete mCTSIB balance testing and PT to set appropriate goals~ MET, 5/10/24  Pt will improve any 2 LE muscle grades by 1/3 to help with functional mobility skills~in progress  Pt will perform all sit<> stand and stand pivot transfers with no more than SBA using RW; min A without device~in progress  (NEW Goal, 5/10/24): pt to increase his score on condition # 2 of the mCTSIB to 16 seconds for improved ability to balance in low/eliminated vision environments~in progress     Long Term Goals: 8 weeks   Pt will be partially compliant with finalized HEP to help maintain potential gains realized in PT~ongoing  Pt will improve his 30 second chair rise to 4-5 reps to demonstrate increased LE strength and muscular endurance~ongoing  Pt will improve his TUG score to 60 seconds or < with appropriate AD to improve household ambulation and decrease fall risk  Pt will increase his SSWS to 0.14 m/sec with or without appropriate AD to improve community ambulation and decrease fall risk~ongoing  Pt will improve any 4 LE muscle grades by 1/3 to help with functional mobility skills~ ongoing  Pt will perform all sit<> stand and stand pivot transfers with no more than S using RW; CGA without device~ongoing  7.   ( NEW Goal, 5/10/24): pt to increase his score on condition # 2 of the mCTSIB to 19 seconds for improved ability to balance in low/eliminated vision environments~ongoing  Plan     Cont with strengthening, endurance, balance and functional mobility.     Dat Savage, PTA    5/20/2024

## 2024-05-22 ENCOUNTER — PATIENT MESSAGE (OUTPATIENT)
Dept: ADMINISTRATIVE | Facility: HOSPITAL | Age: 74
End: 2024-05-22
Payer: MEDICARE

## 2024-05-22 ENCOUNTER — PATIENT MESSAGE (OUTPATIENT)
Dept: INTERNAL MEDICINE | Facility: CLINIC | Age: 74
End: 2024-05-22
Payer: MEDICARE

## 2024-05-24 ENCOUNTER — PATIENT MESSAGE (OUTPATIENT)
Dept: INTERNAL MEDICINE | Facility: CLINIC | Age: 74
End: 2024-05-24
Payer: MEDICARE

## 2024-05-24 ENCOUNTER — CLINICAL SUPPORT (OUTPATIENT)
Dept: REHABILITATION | Facility: HOSPITAL | Age: 74
End: 2024-05-24
Payer: MEDICARE

## 2024-05-24 DIAGNOSIS — R26.89 BALANCE PROBLEM: Primary | ICD-10-CM

## 2024-05-24 DIAGNOSIS — R30.0 DYSURIA: Primary | ICD-10-CM

## 2024-05-24 DIAGNOSIS — Z74.09 IMPAIRED FUNCTIONAL MOBILITY, BALANCE, GAIT, AND ENDURANCE: ICD-10-CM

## 2024-05-24 LAB — METHYLMALONATE SERPL-SCNC: 1.05 UMOL/L

## 2024-05-24 PROCEDURE — 97530 THERAPEUTIC ACTIVITIES: CPT | Mod: KX,PO

## 2024-05-24 PROCEDURE — 97110 THERAPEUTIC EXERCISES: CPT | Mod: KX,PO

## 2024-05-24 RX ORDER — SULFAMETHOXAZOLE AND TRIMETHOPRIM 800; 160 MG/1; MG/1
1 TABLET ORAL 2 TIMES DAILY
Qty: 14 TABLET | Refills: 0 | Status: SHIPPED | OUTPATIENT
Start: 2024-05-24 | End: 2024-05-31

## 2024-05-24 NOTE — PROGRESS NOTES
"[facility]   Physical Therapy Treatment and Progress Note      Name: Anthony Ball  Clinic Number: [id number]    Therapy Diagnosis:   Encounter Diagnoses   Name Primary?    Balance problem Yes    Impaired functional mobility, balance, gait, and endurance              Physician: Isaias Myles MD    Visit Date: 5/24/2024    Physician Orders: PT Eval and Treat   Medical Diagnosis from Referral:   R53.1 (ICD-10-CM) - Generalized weakness   G62.9 (ICD-10-CM) - Peripheral polyneuropathy   R26.89 (ICD-10-CM) - Impaired gait and mobility      Evaluation Date: 4/23/2024  Authorization Period Expiration: 12/31/24  Plan of Care Expiration: 6/18/24  Progress Note Due: 5/23/24  Visit # / Visits authorized: 8/ 20 ( plus eval)~ KX modifier  FOTO: 2/3     Precautions: Standard, Fall, and vision impaired     Time In: 0932    Time Out: 1016    Total Billable Time: 44   minutes    PTA Visit #: 0/5       Subjective     Patient reports: " I think I'm doing really well."  He was compliant with HEP  Response to previous treatment: sore  Functional change: ongoing~ managing stairs at home is getting easier; pt also walking more    Pain: 0/10  Location: N/A     Objective      Objective Measures updated 5/24/24. See below:      Lower Extremity Strength~ tested seated in chair  Right LE eval  5/24/24 Left LE eval  5/24/24   Hip flexion:  4-/5 4-/5 Hip flexion: 3+/5 4-/5   Knee extension: 4+/5 5/5 Knee extension: 4+/5 4/5   Knee flexion: 4-/5 4-/5 Knee flexion: 4-/5 4/5   Ankle dorsiflexion:  3+/5* 4-/5* Ankle dorsiflexion: 3+/5* 3+/5*   Ankle plantarflexion:  4/5 4/5 Ankle plantarflexion: 4+/5 4+/5   Hip abduction: 4+/5 5/5 Hip abduction: 4+/5 5/5   Hip adduction: 5/5 5/5 Hip adduction 5/5 5/5   Hip extension: 5/5 5/5 Hip extension: 4+/5 5/5   *within ROM    5/10/24  MCTSIB:  1. Eyes Open/feet together/Firm: 30 seconds~ slightly widened SIERRA  2. Eyes Closed/feet together/Firm: 13 seconds  3. Eyes Open/feet " together/Foam: 30 seconds~ wider SIERRA and min sway  4. Eyes Closed/feet together/Foam: 5 seconds    5/24/24  MCTSIB:  1. Eyes Open/feet together/Firm: 30 seconds~ slightly widened SIERRA  2. Eyes Closed/feet together/Firm: 30 seconds~ min sway with slightly widened SIERRA  3. Eyes Open/feet together/Foam: 30 seconds~ wider SIERRA and min sway  4. Eyes Closed/feet together/Foam: 5 seconds        Functional Mobility (Bed mobility, transfers)  SBA stand pivot transfer from transport chair to gold chair, no AD~ heavy reliance on UEs  Sit<> stand gold chair or transport chair with RW, S  Sit<> supine on mat with mod I for increased time           Evaluation 5/24/24   Single Limb Stance R LE NT  (<10 sec = HIGH FALL RISK) NT  (<10 sec = HIGH FALL RISK)   Single Limb Stance L LE NT  (<10 sec = HIGH FALL RISK) NT  (<10 sec = HIGH FALL RISK)   30 second Chair Rise 2 completed with arms and RW placed in front, CGA 3 completed with arms and no AD placed in front, S   5 times sit to stand NT NT   TUG 1 minute, 27 seconds with RW, CGA 32 seconds with RW, S   Self selected walking speed 0.12 m/sec (6m/50s) with RW, CGA 0.50 m/sec (6m/12s) with RW, S   Fast walking speed NT NT      30 second chair rise below average score:   Age                 Men                 Women     70-74               <15                  <14  A below average score indicates a risk for fall.     5x sit to stand normative information:   >12 sec= fall risk for general elderly  >16 sec= fall risk for Parkinson's disease  >10 sec= balance/vestibular dysfunction (<61 y/o)  >14.2 sec= balance/vestibular dysfunction (>61 y/o)  >12 sec= fall risk for CVA           Gait Assessment:   - AD used: RW  - Assistance: S  - Stairs: NT     GAIT DEVIATIONS:              Anthony displays the following deviations with ambulation: decreased anjel, decreased step length B, flexed trunk with downward gaze              Impairments contributing to deviations: decreased strength, decreased  balance, decreased sensation to B feet        Treatment     Anthony received the treatments listed below:      therapeutic exercises to develop strength, endurance, ROM, flexibility, posture, and core stabilization for 20 minutes including:    (Includes time to complete MMT and FOTO survey)    X 8 minutes on SCI-FIT recumbent stepper, level 3.0, for B UE/ LE muscular and CV endurance     neuromuscular re-education activities to improve: Balance, Coordination, Kinesthetic, Sense, Proprioception, and Posture for 0 minutes. The following activities were included:      therapeutic activities to improve functional performance for 24  minutes, including:    (Includes time to perform mCTSIB, 30 second chair rise, TUG, SSWS)    Stand pivot transfer transport chair> gold chair, SBA    Multiple sit<> stand trials from gold chair during testing, RW placed anteriorly, S    RW to stepper seat, SBA    Stepper seat to transport chair, CGA/SBA        gait training to improve functional mobility and safety for 0 minutes, including:        Patient Education and Home Exercises       Education provided:   - HEP  -5/24/24: PT provided pt with a schedule slip for the remainder of June.    Written Home Exercises Provided: yes. Exercises were reviewed and Anthony was able to demonstrate them prior to the end of the session.  Anthony demonstrated good  understanding of the education provided. See Electronic Medical Record under Patient Instructions for exercises provided during therapy sessions    Assessment     Anthony tolerated his treatment session very well for his monthly reassessment. He improved in all areas tested, including LE strength, balance and walking speed. His improvements with TUG and self-selected walking speed were very impressive and PT revised these long term goals to reflect his progress. He passed all but condition # 4 of the mCTSIB and his FOTO intake score moved from 34 to 51! Perhaps just as impressive is the fact that pt has  had no falls since the time of his evaluation! If this trend continues, PT feels pt has good potential to improve further. He also seems much less fearful about falling or performance balance interventions. Anthony remains appropriate for 2 x weekly therapy sessions to address current mobility and balance deficits.    Anthony Is progressing well towards his goals.   Patient prognosis is Fair.     Patient will continue to benefit from skilled outpatient physical therapy to address the deficits listed in the problem list box on initial evaluation, provide pt/family education and to maximize pt's level of independence in the home and community environment.     Patient's spiritual, cultural and educational needs considered and pt agreeable to plan of care and goals.     Anticipated barriers to physical therapy: history of multiple falls; fear of falling, general anxiety and pt does not drive     Goals:     Short Term Goals: 4 weeks   Pt will be issued first installment of HEP and report at least partial compliance~MET, 5/24/24   Pt will improve his 30 second chair rise to 3-4 reps to demonstrate increased LE strength and muscular endurance~MET, 5/24/24   Pt will improve his TUG score to 1 minute, 10 seconds or < with appropriate AD to improve household ambulation and decrease fall risk~MET, 5/24/24   Pt will increase his SSWS to 0.13 m/sec with appropriate AD to improve community ambulation and decrease fall risk~MET, 5/24/24   Pt will complete mCTSIB balance testing and PT to set appropriate goals~ MET, 5/10/24  Pt will improve any 2 LE muscle grades by 1/3 to help with functional mobility skills~MET, 5/24/24   Pt will perform all sit<> stand and stand pivot transfers with no more than SBA using RW; min A without device~MET, 5/24/24   (NEW Goal, 5/10/24): pt to increase his score on condition # 2 of the mCTSIB to 16 seconds for improved ability to balance in low/eliminated vision environments~MET, 5/24/24      Long Term  Goals: 8 weeks   Pt will be partially compliant with finalized HEP to help maintain potential gains realized in PT~ongoing  Pt will improve his 30 second chair rise to 4-5 reps to demonstrate increased LE strength and muscular endurance~ongoing and progressing  Pt will improve his TUG score to 60 seconds or < with appropriate AD to improve household ambulation and decrease fall risk~MET, 5/24/24 ~ revise this goal to 28 seconds  Pt will increase his SSWS to 0.14 m/sec with or without appropriate AD to improve community ambulation and decrease fall risk~MET, 5/24/24 ~ revise this goal to 0.55 m/sec  Pt will improve any 4 LE muscle grades by 1/3 to help with functional mobility skills~ MET, 5/24/24   Pt will perform all sit<> stand and stand pivot transfers with no more than S using RW; CGA without device~ongoing  7.   ( NEW Goal, 5/10/24): pt to increase his score on condition # 2 of the mCTSIB to 19 seconds for improved ability to balance in low/eliminated vision environments~MET, 5/24/24   Plan     Cont with strengthening, endurance, balance and functional mobility.     Ba Daiz, PT   5/24/2024

## 2024-05-28 ENCOUNTER — CLINICAL SUPPORT (OUTPATIENT)
Dept: REHABILITATION | Facility: HOSPITAL | Age: 74
End: 2024-05-28
Payer: MEDICARE

## 2024-05-28 DIAGNOSIS — R26.89 BALANCE PROBLEM: Primary | ICD-10-CM

## 2024-05-28 DIAGNOSIS — Z74.09 IMPAIRED FUNCTIONAL MOBILITY, BALANCE, GAIT, AND ENDURANCE: ICD-10-CM

## 2024-05-28 PROCEDURE — 97530 THERAPEUTIC ACTIVITIES: CPT | Mod: PO,CQ

## 2024-05-28 PROCEDURE — 97110 THERAPEUTIC EXERCISES: CPT | Mod: PO,CQ

## 2024-05-28 NOTE — PROGRESS NOTES
"[facility]   Physical Therapy Treatment      Name: Anthony Ball  Clinic Number: [id number]    Therapy Diagnosis:   Encounter Diagnoses   Name Primary?    Balance problem Yes    Impaired functional mobility, balance, gait, and endurance              Physician: Isaias Myles MD    Visit Date: 5/28/2024    Physician Orders: PT Eval and Treat   Medical Diagnosis from Referral:   R53.1 (ICD-10-CM) - Generalized weakness   G62.9 (ICD-10-CM) - Peripheral polyneuropathy   R26.89 (ICD-10-CM) - Impaired gait and mobility      Evaluation Date: 4/23/2024  Authorization Period Expiration: 12/31/24  Plan of Care Expiration: 6/18/24  Progress Note Due: 5/23/24  Visit # / Visits authorized: 9/ 20 ( plus eval)~ KX modifier  FOTO: 2/3     Precautions: Standard, Fall, and vision impaired     Time In: 1030   Time Out: 1115    Total Billable Time: 45  minutes    PTA Visit #: 1/5       Subjective     Patient reports: " Ba is impressed with my scores."  He was compliant with HEP  Response to previous treatment: sore  Functional change: ongoing~ managing stairs at home is getting easier; pt also walking more    Pain: 0/10  Location: N/A     Objective      Objective Measures updated 5/24/24. See below:        Treatment     Anthony received the treatments listed below:      therapeutic exercises to develop strength, endurance, ROM, flexibility, posture, and core stabilization for 10 minutes including:    X 10 minutes on SCI-FIT recumbent stepper, level 3.0, for B UE/ LE muscular and CV endurance     neuromuscular re-education activities to improve: Balance, Coordination, Kinesthetic, Sense, Proprioception, and Posture for 0 minutes. The following activities were included:      therapeutic activities to improve functional performance for 35  minutes, including:      Stand pivot transfer transport chair <> stepper seat with no A.D and SBA    Sit to stand multiple times from transport chair ---> // bars, Quad cane " and RW with CGA.      In // bars: CGA  X 4 laps of side stepping with 1-2 UE support  X 4 laps of forward ambulation with 1 UE support, practicing pre gait with the cane  X 4 laps of backward ambulation with 1-2 UE support    Pt ambulated ~110ft with LBQC and CGA with HHA.  W/C follow up for safety        gait training to improve functional mobility and safety for 0 minutes, including:        Patient Education and Home Exercises       Education provided:   - HEP  -5/24/24: PT provided pt with a schedule slip for the remainder of June.    Written Home Exercises Provided: yes. Exercises were reviewed and Anthony was able to demonstrate them prior to the end of the session.  Anthony demonstrated good  understanding of the education provided. See Electronic Medical Record under Patient Instructions for exercises provided during therapy sessions    Assessment     Anthony tolerated his treatment session well and did not have any problems noted.  Anthony progressed to ambulation with a LBQC today and was able to ambulate ~ 110ft with out a seated rest break.  Anthony required less assistance for sit to stand transfers today.  Cotn with plan of care.     Anthony Is progressing well towards his goals.   Patient prognosis is Fair.     Patient will continue to benefit from skilled outpatient physical therapy to address the deficits listed in the problem list box on initial evaluation, provide pt/family education and to maximize pt's level of independence in the home and community environment.     Patient's spiritual, cultural and educational needs considered and pt agreeable to plan of care and goals.     Anticipated barriers to physical therapy: history of multiple falls; fear of falling, general anxiety and pt does not drive     Goals:     Short Term Goals: 4 weeks   Pt will be issued first installment of HEP and report at least partial compliance~MET, 5/24/24   Pt will improve his 30 second chair rise to 3-4 reps to demonstrate increased LE  strength and muscular endurance~MET, 5/24/24   Pt will improve his TUG score to 1 minute, 10 seconds or < with appropriate AD to improve household ambulation and decrease fall risk~MET, 5/24/24   Pt will increase his SSWS to 0.13 m/sec with appropriate AD to improve community ambulation and decrease fall risk~MET, 5/24/24   Pt will complete mCTSIB balance testing and PT to set appropriate goals~ MET, 5/10/24  Pt will improve any 2 LE muscle grades by 1/3 to help with functional mobility skills~MET, 5/24/24   Pt will perform all sit<> stand and stand pivot transfers with no more than SBA using RW; min A without device~MET, 5/24/24   (NEW Goal, 5/10/24): pt to increase his score on condition # 2 of the mCTSIB to 16 seconds for improved ability to balance in low/eliminated vision environments~MET, 5/24/24      Long Term Goals: 8 weeks   Pt will be partially compliant with finalized HEP to help maintain potential gains realized in PT~ongoing  Pt will improve his 30 second chair rise to 4-5 reps to demonstrate increased LE strength and muscular endurance~ongoing and progressing  Pt will improve his TUG score to 60 seconds or < with appropriate AD to improve household ambulation and decrease fall risk~MET, 5/24/24 ~ revise this goal to 28 seconds  Pt will increase his SSWS to 0.14 m/sec with or without appropriate AD to improve community ambulation and decrease fall risk~MET, 5/24/24 ~ revise this goal to 0.55 m/sec  Pt will improve any 4 LE muscle grades by 1/3 to help with functional mobility skills~ MET, 5/24/24   Pt will perform all sit<> stand and stand pivot transfers with no more than S using RW; CGA without device~ongoing  7.   ( NEW Goal, 5/10/24): pt to increase his score on condition # 2 of the mCTSIB to 19 seconds for improved ability to balance in low/eliminated vision environments~MET, 5/24/24   Plan     Cont with strengthening, endurance, balance and functional mobility.     Ciara Pro, PTA    5/28/2024

## 2024-05-31 ENCOUNTER — CLINICAL SUPPORT (OUTPATIENT)
Dept: REHABILITATION | Facility: HOSPITAL | Age: 74
End: 2024-05-31
Payer: MEDICARE

## 2024-05-31 DIAGNOSIS — R26.89 BALANCE PROBLEM: Primary | ICD-10-CM

## 2024-05-31 DIAGNOSIS — Z74.09 IMPAIRED FUNCTIONAL MOBILITY, BALANCE, GAIT, AND ENDURANCE: ICD-10-CM

## 2024-05-31 PROCEDURE — 97530 THERAPEUTIC ACTIVITIES: CPT | Mod: KX,PO

## 2024-05-31 PROCEDURE — 97112 NEUROMUSCULAR REEDUCATION: CPT | Mod: KX,PO

## 2024-05-31 PROCEDURE — 97110 THERAPEUTIC EXERCISES: CPT | Mod: KX,PO

## 2024-05-31 NOTE — PROGRESS NOTES
"[facility]   Physical Therapy Treatment Note      Name: Anthony Ball  Clinic Number: [id number]    Therapy Diagnosis:   Encounter Diagnoses   Name Primary?    Balance problem Yes    Impaired functional mobility, balance, gait, and endurance                Physician: Isaias Myles MD    Visit Date: 5/31/2024    Physician Orders: PT Eval and Treat   Medical Diagnosis from Referral:   R53.1 (ICD-10-CM) - Generalized weakness   G62.9 (ICD-10-CM) - Peripheral polyneuropathy   R26.89 (ICD-10-CM) - Impaired gait and mobility      Evaluation Date: 4/23/2024  Authorization Period Expiration: 12/31/24  Plan of Care Expiration: 6/18/24  Progress Note Due: 5/23/24  Visit # / Visits authorized: 10/ 20 ( plus eval)~ KX modifier  FOTO: 2/3     Precautions: Standard, Fall, and vision impaired     Time In: 0931     Time Out: 1018     Total Billable Time: 47    minutes    PTA Visit #: 0/5       Subjective     Patient reports: " I tried to walk with Jacob some today."  He was compliant with HEP  Response to previous treatment: no adverse effects  Functional change: ongoing~ managing stairs at home is getting easier; pt also walking more    Pain: 0/10  Location: N/A     Objective      Objective Measures updated 5/24/24.          Treatment     Anthony received the treatments listed below:      therapeutic exercises to develop strength, endurance, ROM, flexibility, posture, and core stabilization for 12 minutes including:      X 8 minutes on SCI-FIT recumbent stepper, level 3.0, for B UE/ LE muscular and CV endurance    Horizontal Leg Press:    2 x 10 reps with 80 # applied    neuromuscular re-education activities to improve: Balance, Coordination, Kinesthetic, Sense, Proprioception, and Posture for 13 minutes. The following activities were included:    Inside parallel bars:    X 4 laps tandem walking with 1 UE support, CGA    1 x 10 B LE forward/backward step ups/downs on foam fitter, 1 UE support, CGA~ pt " "occasionally uses 2 hands    2 x 30 " standing on foam fitter with eyes open, no UE support, CGA to very slight min A    2 x 30" standing on firm ground with eyes closed, no UE support, CGA/min A    Occasional seated rest breaks taken to complete above tasks.      therapeutic activities to improve functional performance for 15  minutes, including:    Sit<> stand x 3 trials inside parallel bars with SBA    Sit<> stand x 2 trials from transport chair to SC, CGA    Stand pivot transfer from transport chair to stepper seat, min A    Stepper seat to transport chair, min A    Stand pivot transfers transport chair<> leg press, min A        gait training to improve functional mobility and safety for 7 minutes, including:    Pt ambulates 3 trials with SC: 101 feet, 58 feet and 58 feet. PT provides min A, HHA to pt's L UE and pt holds cane in R hand. PT instructs pt in proper sequence( timing SC with initial contact of L foot)        Patient Education and Home Exercises       Education provided:   - HEP  -5/24/24: PT provided pt with a schedule slip for the remainder of June.    Written Home Exercises Provided: yes. Exercises were reviewed and Anthony was able to demonstrate them prior to the end of the session.  Anthony demonstrated good  understanding of the education provided. See Electronic Medical Record under Patient Instructions for exercises provided during therapy sessions    Assessment     Anthony tolerated his treatment session very well. He was willing to begin working on some balance tasks inside the parallel bars and his performance was good for the most part. He still struggles with performing some tasks with only 1 UE support, and eyes closed balance continues to challenge him. We practiced gait with SC and pt did very well here. He should continue to improve with additional practice. The session concluded with work on the SCI-FIT recumbent stepper and leg press. PT feels pt has good potential to improve further. Anthony " remains appropriate for 2 x weekly therapy sessions to address current mobility and balance deficits.    Anthony Is progressing well towards his goals.   Patient prognosis is Fair.     Patient will continue to benefit from skilled outpatient physical therapy to address the deficits listed in the problem list box on initial evaluation, provide pt/family education and to maximize pt's level of independence in the home and community environment.     Patient's spiritual, cultural and educational needs considered and pt agreeable to plan of care and goals.     Anticipated barriers to physical therapy: history of multiple falls; fear of falling, general anxiety and pt does not drive     Goals:     Short Term Goals: 4 weeks   Pt will be issued first installment of HEP and report at least partial compliance~MET, 5/24/24   Pt will improve his 30 second chair rise to 3-4 reps to demonstrate increased LE strength and muscular endurance~MET, 5/24/24   Pt will improve his TUG score to 1 minute, 10 seconds or < with appropriate AD to improve household ambulation and decrease fall risk~MET, 5/24/24   Pt will increase his SSWS to 0.13 m/sec with appropriate AD to improve community ambulation and decrease fall risk~MET, 5/24/24   Pt will complete mCTSIB balance testing and PT to set appropriate goals~ MET, 5/10/24  Pt will improve any 2 LE muscle grades by 1/3 to help with functional mobility skills~MET, 5/24/24   Pt will perform all sit<> stand and stand pivot transfers with no more than SBA using RW; min A without device~MET, 5/24/24   (NEW Goal, 5/10/24): pt to increase his score on condition # 2 of the mCTSIB to 16 seconds for improved ability to balance in low/eliminated vision environments~MET, 5/24/24      Long Term Goals: 8 weeks   Pt will be partially compliant with finalized HEP to help maintain potential gains realized in PT~ongoing  Pt will improve his 30 second chair rise to 4-5 reps to demonstrate increased LE strength  and muscular endurance~ongoing and progressing  Pt will improve his TUG score to 60 seconds or < with appropriate AD to improve household ambulation and decrease fall risk~MET, 5/24/24 ~ revise this goal to 28 seconds  Pt will increase his SSWS to 0.14 m/sec with or without appropriate AD to improve community ambulation and decrease fall risk~MET, 5/24/24 ~ revise this goal to 0.55 m/sec  Pt will improve any 4 LE muscle grades by 1/3 to help with functional mobility skills~ MET, 5/24/24   Pt will perform all sit<> stand and stand pivot transfers with no more than S using RW; CGA without device~ongoing  7.   ( NEW Goal, 5/10/24): pt to increase his score on condition # 2 of the mCTSIB to 19 seconds for improved ability to balance in low/eliminated vision environments~MET, 5/24/24   Plan     Cont with strengthening, endurance, balance and functional mobility.     Ba Diaz, PT   5/31/2024

## 2024-06-03 NOTE — PROGRESS NOTES
"[facility]   Physical Therapy Treatment Note      Name: Anthony Ball  Clinic Number: [id number]    Therapy Diagnosis:   Encounter Diagnoses   Name Primary?    Balance problem Yes    Impaired functional mobility, balance, gait, and endurance                  Physician: Isaias Myles MD    Visit Date: 6/4/2024    Physician Orders: PT Eval and Treat   Medical Diagnosis from Referral:   R53.1 (ICD-10-CM) - Generalized weakness   G62.9 (ICD-10-CM) - Peripheral polyneuropathy   R26.89 (ICD-10-CM) - Impaired gait and mobility      Evaluation Date: 4/23/2024  Authorization Period Expiration: 12/31/24  Plan of Care Expiration: 6/18/24  Progress Note Due: 5/23/24  Visit # / Visits authorized: 11/ 20 ( plus eval)~ KX modifier  FOTO: 2/3     Precautions: Standard, Fall, and vision impaired     Time In: 0932      Time Out: 1017      Total Billable Time: 45     minutes    PTA Visit #: 0/5       Subjective     Patient reports: " It was empty in here on Friday."  He was compliant with HEP  Response to previous treatment: no adverse effects  Functional change: ongoing~ managing stairs at home is getting easier; pt also walking more    Pain: 0/10  Location: N/A     Objective      Objective Measures updated 5/24/24.          Treatment     Anthony received the treatments listed below:      therapeutic exercises to develop strength, endurance, ROM, flexibility, posture, and core stabilization for 16 minutes including:      X 8 minutes on SCI-FIT recumbent stepper, level 4.0, for B UE/ LE muscular and CV endurance    Horizontal Leg Press:    4 x 10 reps with 80 # applied    Seated in gold chair:  2 x 10 reps B hip flexion with 2 # ankle weights added    neuromuscular re-education activities to improve: Balance, Coordination, Kinesthetic, Sense, Proprioception, and Posture for 0 minutes. The following activities were included:        therapeutic activities to improve functional performance for 20  minutes, " including:        Stand pivot transfer from transport chair to stepper seat, SBA    Sit> stand from stepper seat to SC, CGA    Multiple sit<> stand trials from gold chair while holding SC, CGA    Sit<> stand transfers at leg press, CGA with SC    2 x 5 reps of sit<> stand from gold chair with blue foam pad in seat, CGA~ cues for full standing posture    Stand pivot transfer from gold chair to transport chair, SBA    X 2 minutes seated basketball bounce from gold chair        gait training to improve functional mobility and safety for 9 minutes, including:    Pt ambulates 4 total trials with SC: 30 feet, 42 feet and 68 feet x 2 trials. PT provides min A, HHA to pt's L UE and pt holds cane in R hand. PT instructs pt in proper sequence( timing SC with initial contact of L foot)        Patient Education and Home Exercises       Education provided:   - HEP  -5/24/24: PT provided pt with a schedule slip for the remainder of June.    Written Home Exercises Provided: yes. Exercises were reviewed and Anthony was able to demonstrate them prior to the end of the session.  Anthony demonstrated good  understanding of the education provided. See Electronic Medical Record under Patient Instructions for exercises provided during therapy sessions    Assessment     Anthony tolerated his treatment session very well. He tolerated an increase in resistance on the SCI-FIT stepper to level 4 and performed 4 sets of leg press with 80 #. Anthony performed several transfers throughout today's session with no more than CGA. We practiced gait again with SC and pt did very well here. However, it often takes him a few steps to find the proper timing and anjel with his cane. Pt remains fearful of falling, though he is willing to try whatever therapist asks of him. PT feels pt has good potential to improve further. Anthony remains appropriate for 2 x weekly therapy sessions to address current mobility and balance deficits.    Anthony Is progressing well towards  his goals.   Patient prognosis is Fair.     Patient will continue to benefit from skilled outpatient physical therapy to address the deficits listed in the problem list box on initial evaluation, provide pt/family education and to maximize pt's level of independence in the home and community environment.     Patient's spiritual, cultural and educational needs considered and pt agreeable to plan of care and goals.     Anticipated barriers to physical therapy: history of multiple falls; fear of falling, general anxiety and pt does not drive     Goals:     Short Term Goals: 4 weeks   Pt will be issued first installment of HEP and report at least partial compliance~MET, 5/24/24   Pt will improve his 30 second chair rise to 3-4 reps to demonstrate increased LE strength and muscular endurance~MET, 5/24/24   Pt will improve his TUG score to 1 minute, 10 seconds or < with appropriate AD to improve household ambulation and decrease fall risk~MET, 5/24/24   Pt will increase his SSWS to 0.13 m/sec with appropriate AD to improve community ambulation and decrease fall risk~MET, 5/24/24   Pt will complete mCTSIB balance testing and PT to set appropriate goals~ MET, 5/10/24  Pt will improve any 2 LE muscle grades by 1/3 to help with functional mobility skills~MET, 5/24/24   Pt will perform all sit<> stand and stand pivot transfers with no more than SBA using RW; min A without device~MET, 5/24/24   (NEW Goal, 5/10/24): pt to increase his score on condition # 2 of the mCTSIB to 16 seconds for improved ability to balance in low/eliminated vision environments~MET, 5/24/24      Long Term Goals: 8 weeks   Pt will be partially compliant with finalized HEP to help maintain potential gains realized in PT~ongoing  Pt will improve his 30 second chair rise to 4-5 reps to demonstrate increased LE strength and muscular endurance~ongoing and progressing  Pt will improve his TUG score to 60 seconds or < with appropriate AD to improve household  ambulation and decrease fall risk~MET, 5/24/24 ~ revise this goal to 28 seconds  Pt will increase his SSWS to 0.14 m/sec with or without appropriate AD to improve community ambulation and decrease fall risk~MET, 5/24/24 ~ revise this goal to 0.55 m/sec  Pt will improve any 4 LE muscle grades by 1/3 to help with functional mobility skills~ MET, 5/24/24   Pt will perform all sit<> stand and stand pivot transfers with no more than S using RW; CGA without device~ongoing  7.   ( NEW Goal, 5/10/24): pt to increase his score on condition # 2 of the mCTSIB to 19 seconds for improved ability to balance in low/eliminated vision environments~MET, 5/24/24   Plan     Cont with strengthening, endurance, balance and functional mobility.     Ba Diaz, PT   6/4/2024

## 2024-06-04 ENCOUNTER — DOCUMENTATION ONLY (OUTPATIENT)
Dept: REHABILITATION | Facility: HOSPITAL | Age: 74
End: 2024-06-04

## 2024-06-04 ENCOUNTER — CLINICAL SUPPORT (OUTPATIENT)
Dept: REHABILITATION | Facility: HOSPITAL | Age: 74
End: 2024-06-04
Payer: MEDICARE

## 2024-06-04 DIAGNOSIS — R26.89 BALANCE PROBLEM: Primary | ICD-10-CM

## 2024-06-04 DIAGNOSIS — Z74.09 IMPAIRED FUNCTIONAL MOBILITY, BALANCE, GAIT, AND ENDURANCE: ICD-10-CM

## 2024-06-04 PROCEDURE — 97110 THERAPEUTIC EXERCISES: CPT | Mod: KX,PO

## 2024-06-04 PROCEDURE — 97530 THERAPEUTIC ACTIVITIES: CPT | Mod: KX,PO

## 2024-06-04 PROCEDURE — 97116 GAIT TRAINING THERAPY: CPT | Mod: KX,PO

## 2024-06-04 NOTE — PROGRESS NOTES
PT/PTA met face to face to discuss pt's treatment plan and progress towards established goals.  Continue with current PT POC with focus on gait with the SC, leg press, sit to stands, functional mobility and endurance.  Patient will be seen by physical therapist at least every sixth treatment or 30 days, whichever occurs first.    Ciara Pro, PTA  06/04/2024

## 2024-06-07 ENCOUNTER — OFFICE VISIT (OUTPATIENT)
Dept: SPINE | Facility: CLINIC | Age: 74
End: 2024-06-07
Payer: MEDICARE

## 2024-06-07 VITALS
RESPIRATION RATE: 18 BRPM | DIASTOLIC BLOOD PRESSURE: 70 MMHG | OXYGEN SATURATION: 100 % | SYSTOLIC BLOOD PRESSURE: 137 MMHG | WEIGHT: 249.81 LBS | HEART RATE: 106 BPM | BODY MASS INDEX: 37 KG/M2 | HEIGHT: 69 IN

## 2024-06-07 DIAGNOSIS — E11.40 PAINFUL DIABETIC NEUROPATHY: ICD-10-CM

## 2024-06-07 DIAGNOSIS — G89.4 CHRONIC PAIN SYNDROME: Primary | ICD-10-CM

## 2024-06-07 PROCEDURE — 99999 PR PBB SHADOW E&M-EST. PATIENT-LVL IV: CPT | Mod: PBBFAC,,, | Performed by: NURSE PRACTITIONER

## 2024-06-07 PROCEDURE — 99214 OFFICE O/P EST MOD 30 MIN: CPT | Mod: PBBFAC | Performed by: NURSE PRACTITIONER

## 2024-06-07 PROCEDURE — 99213 OFFICE O/P EST LOW 20 MIN: CPT | Mod: S$PBB,,, | Performed by: NURSE PRACTITIONER

## 2024-06-07 NOTE — PROGRESS NOTES
PCP: Isaias Myles MD    REFERRING PHYSICIAN: Allison Morel    CHIEF COMPLAINT: Bilateral foot pain    Original HISTORY OF PRESENT ILLNESS: Anthony Ball presents to the clinic for the evaluation of painful diabetic neuropathy that he has had for many years. He has tried multiple medications without much relief. He has tried some capsicin over the past 2-3 days that did provide some mild improvement. His pain has been quite severe and makes it difficult for him to even stand. He has been working with home health therapy to help him with mobility around the house, but he says that it is too painful. He has had 7 falls this year due to the pain. He is interested in discussing any further treatment options that may be available.      Original Pain Description:  The pain is located in the bilateral feet. The pain is described as burning, sharp, shooting, and stabbing. Exacerbating factors: Standing, Touching, and Walking. Mitigating factors: none. Symptoms interfere with daily activity and sleeping. The patient feels like symptoms have been unchanged. Patient denies night fever/night sweats, urinary incontinence, and bowel incontinence.    Original PAIN SCORES:  Best: Pain is 2  Worst: Pain is 8  Current: Pain is 8    Interval History 6/7/2024:  The patient returns for follow up of bilateral foot pain secondary to PRN. He underwent applications of Qutenza patches to bilateral feet (2/foot) on 4/30/24. He did have some increased pain for about 3 days. After that time, he reports substantial benefit. He is now reporting about 90% improvement in his symptoms. He is very happy with the results. No new complaints at this time. His pain today is 2/10.    Interval History 4/30/2024:  The patient is here today for follow up of bilateral foot pain secondary to PDN. He is here today for application of Qutenza patches as ordered by Dr. Campbell. He has no concerns and would like to proceed. He says that the increase in  Lyrica has been helpful. He also has been using the OTC strength Capsaicin patch with benefit. No side effects reported.         4/30/2024    10:03 AM   Last 3 PDI Scores   Pain Disability Index (PDI) 56       6 weeks of Conservative therapy:  PT: Yes, currently enrolled in PT  Chiro: No  HEP: Yes, daily HEP      Treatments / Medications: (Ice/Heat/NSAIDS/APAP/etc):  Cymbalta  Lyrica  Norco  Robaxin  Capsicum (topical)  Trazodone    Interventional Pain Procedures: (Previous injections)  None    Past Medical History:   Diagnosis Date    Acute deep vein thrombosis (DVT) of left lower extremity 12/2023    Anxiety     Atrial fibrillation 12/2023    Cataract     Coronary artery disease     CAC score 1430    Depression     Diabetic neuropathy     Essential (primary) hypertension     GERD (gastroesophageal reflux disease)     Insomnia     Neuromyopathy     Possibly DM and/or alcohol related.    Pulmonary embolism 12/2023    Reactive airway disease     Type 2 diabetes mellitus      Past Surgical History:   Procedure Laterality Date    COLONOSCOPY      Normal around 2020    TOTAL KNEE ARTHROPLASTY Right      Social History     Socioeconomic History    Marital status:    Tobacco Use    Smoking status: Never    Smokeless tobacco: Never   Substance and Sexual Activity    Alcohol use: Yes     Comment: Former heavy use    Drug use: Never    Sexual activity: Yes     Comment: unknown     Social Determinants of Health     Financial Resource Strain: Low Risk  (1/15/2024)    Overall Financial Resource Strain (CARDIA)     Difficulty of Paying Living Expenses: Not very hard   Food Insecurity: No Food Insecurity (1/15/2024)    Hunger Vital Sign     Worried About Running Out of Food in the Last Year: Never true     Ran Out of Food in the Last Year: Never true   Transportation Needs: No Transportation Needs (1/15/2024)    PRAPARE - Transportation     Lack of Transportation (Medical): No     Lack of Transportation (Non-Medical): No    Physical Activity: Inactive (1/15/2024)    Exercise Vital Sign     Days of Exercise per Week: 0 days     Minutes of Exercise per Session: 0 min   Stress: No Stress Concern Present (1/15/2024)    [employer] - Occupational Stress Questionnaire     Feeling of Stress : Only a little   Housing Stability: Low Risk  (1/15/2024)    Housing Stability Vital Sign     Unable to Pay for Housing in the Last Year: No     Number of Places Lived in the Last Year: 1     Unstable Housing in the Last Year: No     Family History   Problem Relation Name Age of Onset    Hypertension Mother         Review of patient's allergies indicates:  No Known Allergies    Current Outpatient Medications   Medication Sig    albuterol (VENTOLIN HFA) 90 mcg/actuation inhaler Inhale 2 puffs into the lungs every 6 (six) hours as needed for Wheezing. Rescue    apixaban (ELIQUIS) 5 mg Tab Take 1 tablet (5 mg total) by mouth 2 (two) times daily.    busPIRone (BUSPAR) 15 MG tablet Take 1 tablet (15 mg total) by mouth 2 (two) times daily as needed (Anxiety).    capsicum 0.075% (ZOSTRIX) 0.075 % topical cream Apply topically 3 (three) times daily as needed (Foot pain).    cyanocobalamin (VITAMIN B-12) 1000 MCG tablet Take 1 tablet (1,000 mcg total) by mouth once daily.    DULoxetine (CYMBALTA) 60 MG capsule Take 1 capsule (60 mg total) by mouth 2 (two) times daily.    empagliflozin (JARDIANCE) 10 mg tablet Take 1 tablet (10 mg total) by mouth once daily. Discuss with PCP prior to restarting.    fluticasone-salmeterol diskus inhaler 250-50 mcg Inhale 1 puff into the lungs 2 (two) times daily. Controller    HYDROcodone-acetaminophen (NORCO) 5-325 mg per tablet Take 1 tablet by mouth every 6 (six) hours as needed for Pain.    LIDOcaine (LIDODERM) 5 % Place 3 patches onto the skin daily as needed (Rib pain). Okay to use 1 to 3 patches. Remove & Discard patches within 12 hours or as directed by MD    magnesium oxide 400 mg magnesium  "Tab Take 1 tablet by mouth every evening.    methocarbamoL (ROBAXIN) 750 MG Tab Take 1 tablet (750 mg total) by mouth 3 (three) times daily as needed (Back pain).    nortriptyline (PAMELOR) 10 MG capsule Take 1 capsule (10 mg total) by mouth 3 (three) times daily.    ondansetron (ZOFRAN) 8 MG tablet Take 1 tablet (8 mg total) by mouth every 8 (eight) hours as needed for Nausea.    pantoprazole (PROTONIX) 40 MG tablet TAKE 1 TABLET(40 MG) BY MOUTH EVERY DAY    pregabalin (LYRICA) 150 MG capsule Take 1 capsule (150 mg total) by mouth 3 (three) times daily.    traZODone (DESYREL) 100 MG tablet Take 2 tablets (200 mg total) by mouth nightly as needed for Insomnia.    triamcinolone acetonide 0.1% (KENALOG) 0.1 % cream Apply topically 2 (two) times daily. Use for 1 to 2 weeks as needed for rash.     No current facility-administered medications for this visit.       ROS:  GENERAL: No fever. No chills. No fatigue. Denies weight loss. Denies weight gain.  HEENT: Denies headaches. Denies vision change. Denies eye pain. Denies double vision. Denies ear pain.   CV: Denies chest pain.   PULM: Denies of shortness of breath.  GI: Denies constipation. No diarrhea. No abdominal pain. Denies nausea. Denies vomiting. No blood in stool.  HEME: Denies bleeding problems.  : Denies urgency. No painful urination. No blood in urine.  MS: Denies joint stiffness. Denies joint swelling.  Denies back pain.  SKIN: Denies rash.   NEURO: Denies seizures. No weakness.  PSYCH:  Denies difficulty sleeping. No anxiety. Denies depression. No suicidal thoughts.       VITALS:   Vitals:    06/07/24 1026   BP: 137/70   Pulse: 106   Resp: 18   SpO2: 100%   Weight: 113.3 kg (249 lb 12.5 oz)   Height: 5' 9" (1.753 m)   PainSc:   2     PHYSICAL EXAM:     GENERAL: Alert and oriented x 3, no acute distress  PSYCH:  Mood and affect appropriate.  SKIN: Skin color, texture, turgor normal, no rashes or lesions.  HEENT:  Normocephalic, atraumatic. Cranial nerves " grossly intact.  PULM: Non-labored breathing, symmetric chest rise.  EXTREMITIES: No deformities, edema, or skin discoloration.   MUSCULOSKELETAL: Bilateral upper and lower extremity strength is symmetric and within functional limits.  NEURO: Mildly decreased sensation to right anterior foot. No swelling.  GAIT: Antalgic. Uses WC for mobility.    LABS:  Lab Results   Component Value Date    HGBA1C 5.6 05/17/2024         IMAGING:    EXAMINATION:  MRI LUMBAR SPINE WITHOUT CONTRAST     CLINICAL HISTORY:  Ataxia or coordination problem (Ped 0-18y);     TECHNIQUE:  Multiplanar, multisequence MR imaging of the lumbar spine without the use of intravenous contrast.     COMPARISON:  None     FINDINGS:  Alignment: Grade 1 anterolisthesis of L4 on L5. The remainder of the lumbar alignment is within normal limits.     Vertebrae: 5 lumbar-type vertebral bodies. No aggressive marrow replacement process or fracture.  There is probable intraosseous hemangioma in the S1 vertebral body.     Discs: Diffuse disc height loss worst at L4-L5.     Cord: Normal.  Conus terminates T11-T12.  The conus is normal in morphology.     Degenerative findings:     T12-L1: No significant spinal canal stenosis or neural foraminal narrowing.     L1-L2:  Small diffuse posterior disc bulge and bilateral facet arthropathy.  No significant spinal canal stenosis or neural foraminal narrowing.     L2-L3: Diffuse asymmetric disc bulge to the left, bilateral facet arthropathy with mild left neural foraminal narrowing.  No spinal canal stenosis.     L3-L4: Diffuse asymmetric disc bulge to the left, bilateral facet arthropathy with mild left neural foraminal narrowing.  No spinal canal stenosis.     L4-L5: There is uncovering of the disc.  There is superimposed central disc protrusion.  The spinal canal is within normal limits.  There is mild bilateral neural foraminal narrowing.  There is fluid within the facet joints.     L5-S1: No significant spinal canal  stenosis or neural foraminal narrowing.     Paraspinous soft tissues: Bilateral renal cysts.  Paraspinal muscle atrophy.  There is distention of the urinary bladder.     Impression:     1.  Minimal anterolisthesis of L4 on L5 with uncovering of the disc and superimposed central disc protrusion.  No significant spinal canal narrowing.  Mild bilateral neural foramina at this level.     2.  Additional mild multilevel degenerative changes in the lumbar spine.     3.  Distended urinary bladder.    ASSESSMENT: 74 y.o. year old male with pain, consistent with:    No diagnosis found.      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 He comes to us with painful diabetic neuropathy for several years that has failed to respond to multiple oral and topical agents. His last HgbA1c was 6.3. He is here today for application of Qutenza.      PLAN:  Continue Lyrica 150mg TID.  Continue Nortriptyline 10mg TID.  Qutenza 2 patches for each foot at last OV provided 90% relief of symptoms. Will continue to monitor and can repeat after 91 days if needed. He can call or send portal message when/if this is needed. Would likely decrease application time.  F/U PRN.      The above plan and management options were discussed at length with patient. Patient is in agreement with the above and verbalized understanding.     Jacqueline Armstrong, ESTEFANI  06/07/2024

## 2024-06-11 ENCOUNTER — CLINICAL SUPPORT (OUTPATIENT)
Dept: REHABILITATION | Facility: HOSPITAL | Age: 74
End: 2024-06-11
Payer: MEDICARE

## 2024-06-11 DIAGNOSIS — Z74.09 IMPAIRED FUNCTIONAL MOBILITY, BALANCE, GAIT, AND ENDURANCE: ICD-10-CM

## 2024-06-11 DIAGNOSIS — R26.89 BALANCE PROBLEM: Primary | ICD-10-CM

## 2024-06-11 PROCEDURE — 97110 THERAPEUTIC EXERCISES: CPT | Mod: PO,CQ

## 2024-06-11 PROCEDURE — 97530 THERAPEUTIC ACTIVITIES: CPT | Mod: PO,CQ

## 2024-06-11 NOTE — PROGRESS NOTES
"[facility]   Physical Therapy Treatment Note      Name: Anthony Ball  Clinic Number: [id number]    Therapy Diagnosis:   Encounter Diagnoses   Name Primary?    Balance problem Yes    Impaired functional mobility, balance, gait, and endurance            Physician: Isaias Myles MD    Visit Date: 6/11/2024    Physician Orders: PT Eval and Treat   Medical Diagnosis from Referral:   R53.1 (ICD-10-CM) - Generalized weakness   G62.9 (ICD-10-CM) - Peripheral polyneuropathy   R26.89 (ICD-10-CM) - Impaired gait and mobility      Evaluation Date: 4/23/2024  Authorization Period Expiration: 12/31/24  Plan of Care Expiration: 6/18/24  Progress Note Due: 6/18/24  Visit # / Visits authorized: 13/ 20 ( plus eval)~ KX modifier  FOTO: 2/3     Precautions: Standard, Fall, and vision impaired     Time In: 930      Time Out: 1015      Total Billable Time: 45     minutes    PTA Visit #: 1/5       Subjective     Patient reports: "   He was compliant with HEP  Response to previous treatment: no adverse effects  Functional change: ongoing~ managing stairs at home is getting easier; pt also walking more    Pain: 0/10  Location: N/A     Objective      Objective Measures updated 5/24/24.      Treatment     Anthony received the treatments listed below:      therapeutic exercises to develop strength, endurance, ROM, flexibility, posture, and core stabilization for 20 minutes including:      X 8 minutes on SCI-FIT recumbent stepper, level 4.0, for B UE/ LE muscular and CV endurance    Horizontal Leg Press:    4 x 10 reps with 80 # applied    neuromuscular re-education activities to improve: Balance, Coordination, Kinesthetic, Sense, Proprioception, and Posture for 0 minutes. The following activities were included:      therapeutic activities to improve functional performance for 25 minutes, including:      Sit<> stand x 4 trials from transport chair to SC, CGA    Stand pivot transfer from transport chair to stepper " seat, min A    Stepper seat to transport chair, min A    Stand pivot transfers transport chair<> leg press, min A    Pt ambulates 4 total trials with SC: 25 feet, 140 feet and 90. PT provides min A, HHA to pt's L UE and pt holds cane in R hand. PT instructs pt in proper sequence( timing SC with initial contact of L foot).    gait training to improve functional mobility and safety for 0 minutes, including:        Patient Education and Home Exercises       Education provided:   - HEP  -5/24/24: PT provided pt with a schedule slip for the remainder of June.    Written Home Exercises Provided: yes. Exercises were reviewed and Anthony was able to demonstrate them prior to the end of the session.  Anthony demonstrated good  understanding of the education provided. See Electronic Medical Record under Patient Instructions for exercises provided during therapy sessions    Assessment     Anthony tolerated his tx session well and did not have any problems noted.  Anthony was able to increase his gait distance today, but does cont to require MIn A for sit to stand and gait at times for balance.  Cont with plan of care.     Anthony Is progressing well towards his goals.   Patient prognosis is Fair.     Patient will continue to benefit from skilled outpatient physical therapy to address the deficits listed in the problem list box on initial evaluation, provide pt/family education and to maximize pt's level of independence in the home and community environment.     Patient's spiritual, cultural and educational needs considered and pt agreeable to plan of care and goals.     Anticipated barriers to physical therapy: history of multiple falls; fear of falling, general anxiety and pt does not drive     Goals:     Short Term Goals: 4 weeks   Pt will be issued first installment of HEP and report at least partial compliance~MET, 5/24/24   Pt will improve his 30 second chair rise to 3-4 reps to demonstrate increased LE strength and muscular  endurance~MET, 5/24/24   Pt will improve his TUG score to 1 minute, 10 seconds or < with appropriate AD to improve household ambulation and decrease fall risk~MET, 5/24/24   Pt will increase his SSWS to 0.13 m/sec with appropriate AD to improve community ambulation and decrease fall risk~MET, 5/24/24   Pt will complete mCTSIB balance testing and PT to set appropriate goals~ MET, 5/10/24  Pt will improve any 2 LE muscle grades by 1/3 to help with functional mobility skills~MET, 5/24/24   Pt will perform all sit<> stand and stand pivot transfers with no more than SBA using RW; min A without device~MET, 5/24/24   (NEW Goal, 5/10/24): pt to increase his score on condition # 2 of the mCTSIB to 16 seconds for improved ability to balance in low/eliminated vision environments~MET, 5/24/24      Long Term Goals: 8 weeks   Pt will be partially compliant with finalized HEP to help maintain potential gains realized in PT~ongoing  Pt will improve his 30 second chair rise to 4-5 reps to demonstrate increased LE strength and muscular endurance~ongoing and progressing  Pt will improve his TUG score to 60 seconds or < with appropriate AD to improve household ambulation and decrease fall risk~MET, 5/24/24 ~ revise this goal to 28 seconds  Pt will increase his SSWS to 0.14 m/sec with or without appropriate AD to improve community ambulation and decrease fall risk~MET, 5/24/24 ~ revise this goal to 0.55 m/sec  Pt will improve any 4 LE muscle grades by 1/3 to help with functional mobility skills~ MET, 5/24/24   Pt will perform all sit<> stand and stand pivot transfers with no more than S using RW; CGA without device~ongoing  7.   ( NEW Goal, 5/10/24): pt to increase his score on condition # 2 of the mCTSIB to 19 seconds for improved ability to balance in low/eliminated vision environments~MET, 5/24/24   Plan     Cont with strengthening, endurance, balance and functional mobility.     Ciara Pro, PTA    6/11/2024

## 2024-06-12 NOTE — PROGRESS NOTES
[facility]   Physical Therapy Treatment Note      Name: Anthony Ball  Clinic Number: [id number]    Therapy Diagnosis:   Encounter Diagnoses   Name Primary?    Balance problem Yes    Impaired functional mobility, balance, gait, and endurance                  Physician: Isaias Myles MD    Visit Date: 6/13/2024    Physician Orders: PT Eval and Treat   Medical Diagnosis from Referral:   R53.1 (ICD-10-CM) - Generalized weakness   G62.9 (ICD-10-CM) - Peripheral polyneuropathy   R26.89 (ICD-10-CM) - Impaired gait and mobility      Evaluation Date: 4/23/2024  Authorization Period Expiration: 12/31/24  Plan of Care Expiration: 6/18/24  Progress Note Due: 6/18/24  Visit # / Visits authorized: 13/ 20 ( plus eval)~ KX modifier  FOTO: 2/3     Precautions: Standard, Fall, and vision impaired     Time In: 0843      Time Out: 0925      Total Billable Time: 42     minutes    PTA Visit #: 0/5       Subjective     Patient reports: his asthma has been more of a problem for the past couple of weeks.  He was compliant with HEP  Response to previous treatment: no adverse effects  Functional change: ongoing~ managing stairs at home is getting easier; pt also walking more    Pain: 0/10  Location: N/A     Objective      Objective Measures updated 5/24/24.      Treatment     Anthony received the treatments listed below:      therapeutic exercises to develop strength, endurance, ROM, flexibility, posture, and core stabilization for 20 minutes including:      X 8 minutes on SCI-FIT recumbent stepper, level 5.0, for B UE/ LE muscular and CV endurance    Horizontal Leg Press:    3 x 10 reps with 85 # applied    X 3 minutes walking on treadmill at .3 miles/hr, CGA    neuromuscular re-education activities to improve: Balance, Coordination, Kinesthetic, Sense, Proprioception, and Posture for 8 minutes. The following activities were included:    Inside parallel bars:        2 x 5 trials B LE forward/backward step ups/downs  "on foam fitter, 1 UE support, CGA~ pt often uses 2 hands        One seated rest breaks taken to complete above task.      therapeutic activities to improve functional performance for 14  minutes, including:    Sit<> stand x 3 trials inside parallel bars with SBA    Stand pivot transfer from transport chair to stepper seat, SBA    Stepper seat to transport chair, SBA    Stand pivot transfers transport chair<> leg press, SBA( pt needs min A to achieve sitting position on leg press after working)    Stand pivot transfer transport chair<> low treadmill, min A( PT places 4" block to aid pt when stepping down)        gait training to improve functional mobility and safety for 0 minutes, including:          Patient Education and Home Exercises       Education provided:   - HEP  -5/24/24: PT provided pt with a schedule slip for the remainder of June.  -6/13/24: PT provided pt with a schedule slip for the 1st 3 weeks of July.    Written Home Exercises Provided: yes. Exercises were reviewed and Anthony was able to demonstrate them prior to the end of the session.  Anthony demonstrated good  understanding of the education provided. See Electronic Medical Record under Patient Instructions for exercises provided during therapy sessions    Assessment     Anthony tolerated his treatment session fairly due to difficulty with chest tightness(secondary to asthma). He required use of his inhaler x 1 during attempted balance training. This aspect of the session was very limited by fear and pt was mostly unable to perform step ups/downs on foam fitter with use of only 1 hand. On a more positive note, we did increase pt's resistance on the SCI-FIT recumbent stepper to level 5 and his weight on the leg press to 85 #. Additionally, most transfers were performed with only SBA( though these often require increased time to complete). PT introduced use of treadmill for additional exercise. Unfortunately, pt was only able to work here for 3 minutes " before needing a break. PT would like to resume gait training with SC at pt's next session. Anthony remains appropriate for 2 x weekly therapy sessions to address current mobility and balance deficits.    Anthony Is progressing well towards his goals.   Patient prognosis is Fair.     Patient will continue to benefit from skilled outpatient physical therapy to address the deficits listed in the problem list box on initial evaluation, provide pt/family education and to maximize pt's level of independence in the home and community environment.     Patient's spiritual, cultural and educational needs considered and pt agreeable to plan of care and goals.     Anticipated barriers to physical therapy: history of multiple falls; fear of falling, general anxiety and pt does not drive     Goals:     Short Term Goals: 4 weeks   Pt will be issued first installment of HEP and report at least partial compliance~MET, 5/24/24   Pt will improve his 30 second chair rise to 3-4 reps to demonstrate increased LE strength and muscular endurance~MET, 5/24/24   Pt will improve his TUG score to 1 minute, 10 seconds or < with appropriate AD to improve household ambulation and decrease fall risk~MET, 5/24/24   Pt will increase his SSWS to 0.13 m/sec with appropriate AD to improve community ambulation and decrease fall risk~MET, 5/24/24   Pt will complete mCTSIB balance testing and PT to set appropriate goals~ MET, 5/10/24  Pt will improve any 2 LE muscle grades by 1/3 to help with functional mobility skills~MET, 5/24/24   Pt will perform all sit<> stand and stand pivot transfers with no more than SBA using RW; min A without device~MET, 5/24/24   (NEW Goal, 5/10/24): pt to increase his score on condition # 2 of the mCTSIB to 16 seconds for improved ability to balance in low/eliminated vision environments~MET, 5/24/24      Long Term Goals: 8 weeks   Pt will be partially compliant with finalized HEP to help maintain potential gains realized in  PT~ongoing  Pt will improve his 30 second chair rise to 4-5 reps to demonstrate increased LE strength and muscular endurance~ongoing and progressing  Pt will improve his TUG score to 60 seconds or < with appropriate AD to improve household ambulation and decrease fall risk~MET, 5/24/24 ~ revise this goal to 28 seconds  Pt will increase his SSWS to 0.14 m/sec with or without appropriate AD to improve community ambulation and decrease fall risk~MET, 5/24/24 ~ revise this goal to 0.55 m/sec  Pt will improve any 4 LE muscle grades by 1/3 to help with functional mobility skills~ MET, 5/24/24   Pt will perform all sit<> stand and stand pivot transfers with no more than S using RW; CGA without device~ongoing  7.   ( NEW Goal, 5/10/24): pt to increase his score on condition # 2 of the mCTSIB to 19 seconds for improved ability to balance in low/eliminated vision environments~MET, 5/24/24   Plan     Next session: perform plan of care reassessment.    Cont with strengthening, endurance, balance and functional mobility.     Ba Diaz, PT   6/13/2024

## 2024-06-13 ENCOUNTER — PATIENT MESSAGE (OUTPATIENT)
Dept: INTERNAL MEDICINE | Facility: CLINIC | Age: 74
End: 2024-06-13
Payer: MEDICARE

## 2024-06-13 ENCOUNTER — PATIENT MESSAGE (OUTPATIENT)
Dept: OPHTHALMOLOGY | Facility: CLINIC | Age: 74
End: 2024-06-13
Payer: MEDICARE

## 2024-06-13 ENCOUNTER — CLINICAL SUPPORT (OUTPATIENT)
Dept: REHABILITATION | Facility: HOSPITAL | Age: 74
End: 2024-06-13
Payer: MEDICARE

## 2024-06-13 DIAGNOSIS — R26.89 BALANCE PROBLEM: Primary | ICD-10-CM

## 2024-06-13 DIAGNOSIS — Z74.09 IMPAIRED FUNCTIONAL MOBILITY, BALANCE, GAIT, AND ENDURANCE: ICD-10-CM

## 2024-06-13 PROCEDURE — 97530 THERAPEUTIC ACTIVITIES: CPT | Mod: KX,PO

## 2024-06-13 PROCEDURE — 97110 THERAPEUTIC EXERCISES: CPT | Mod: KX,PO

## 2024-06-13 PROCEDURE — 97112 NEUROMUSCULAR REEDUCATION: CPT | Mod: KX,PO

## 2024-06-14 ENCOUNTER — PATIENT MESSAGE (OUTPATIENT)
Dept: INTERNAL MEDICINE | Facility: CLINIC | Age: 74
End: 2024-06-14

## 2024-06-14 ENCOUNTER — OFFICE VISIT (OUTPATIENT)
Dept: INTERNAL MEDICINE | Facility: CLINIC | Age: 74
End: 2024-06-14
Payer: MEDICARE

## 2024-06-14 VITALS
HEIGHT: 69 IN | BODY MASS INDEX: 37 KG/M2 | HEART RATE: 102 BPM | WEIGHT: 249.81 LBS | SYSTOLIC BLOOD PRESSURE: 97 MMHG | DIASTOLIC BLOOD PRESSURE: 63 MMHG

## 2024-06-14 DIAGNOSIS — E53.8 B12 DEFICIENCY: ICD-10-CM

## 2024-06-14 DIAGNOSIS — Z12.11 COLON CANCER SCREENING: ICD-10-CM

## 2024-06-14 DIAGNOSIS — K21.9 GASTROESOPHAGEAL REFLUX DISEASE WITHOUT ESOPHAGITIS: ICD-10-CM

## 2024-06-14 DIAGNOSIS — R53.81 PHYSICAL DECONDITIONING: ICD-10-CM

## 2024-06-14 DIAGNOSIS — I74.9 THROMBOEMBOLISM: ICD-10-CM

## 2024-06-14 DIAGNOSIS — R30.0 DYSURIA: ICD-10-CM

## 2024-06-14 DIAGNOSIS — N30.00 ACUTE CYSTITIS WITHOUT HEMATURIA: Primary | ICD-10-CM

## 2024-06-14 DIAGNOSIS — M79.2 NEUROPATHIC PAIN: ICD-10-CM

## 2024-06-14 DIAGNOSIS — E11.42 TYPE 2 DIABETES MELLITUS WITH DIABETIC POLYNEUROPATHY, WITHOUT LONG-TERM CURRENT USE OF INSULIN: ICD-10-CM

## 2024-06-14 DIAGNOSIS — J45.909 ASTHMA, UNSPECIFIED ASTHMA SEVERITY, UNSPECIFIED WHETHER COMPLICATED, UNSPECIFIED WHETHER PERSISTENT: ICD-10-CM

## 2024-06-14 DIAGNOSIS — Z00.00 ENCOUNTER FOR ANNUAL HEALTH EXAMINATION: Primary | ICD-10-CM

## 2024-06-14 DIAGNOSIS — E83.42 HYPOMAGNESEMIA: ICD-10-CM

## 2024-06-14 LAB
BACTERIA #/AREA URNS AUTO: ABNORMAL /HPF
BILIRUB UR QL STRIP: NEGATIVE
CLARITY UR REFRACT.AUTO: ABNORMAL
COLOR UR AUTO: YELLOW
GLUCOSE UR QL STRIP: NEGATIVE
HGB UR QL STRIP: ABNORMAL
KETONES UR QL STRIP: NEGATIVE
LEUKOCYTE ESTERASE UR QL STRIP: ABNORMAL
MICROSCOPIC COMMENT: ABNORMAL
NITRITE UR QL STRIP: POSITIVE
PH UR STRIP: 6 [PH] (ref 5–8)
PROT UR QL STRIP: NEGATIVE
RBC #/AREA URNS AUTO: 0 /HPF (ref 0–4)
SP GR UR STRIP: 1.01 (ref 1–1.03)
SQUAMOUS #/AREA URNS AUTO: 7 /HPF
URN SPEC COLLECT METH UR: ABNORMAL
WBC #/AREA URNS AUTO: >100 /HPF (ref 0–5)
WBC CLUMPS UR QL AUTO: ABNORMAL

## 2024-06-14 PROCEDURE — 87077 CULTURE AEROBIC IDENTIFY: CPT | Performed by: INTERNAL MEDICINE

## 2024-06-14 PROCEDURE — 87086 URINE CULTURE/COLONY COUNT: CPT | Performed by: INTERNAL MEDICINE

## 2024-06-14 PROCEDURE — 87088 URINE BACTERIA CULTURE: CPT | Performed by: INTERNAL MEDICINE

## 2024-06-14 PROCEDURE — 99999 PR PBB SHADOW E&M-EST. PATIENT-LVL II: CPT | Mod: PBBFAC,,, | Performed by: INTERNAL MEDICINE

## 2024-06-14 PROCEDURE — 87186 SC STD MICRODIL/AGAR DIL: CPT | Performed by: INTERNAL MEDICINE

## 2024-06-14 PROCEDURE — 81001 URINALYSIS AUTO W/SCOPE: CPT | Performed by: INTERNAL MEDICINE

## 2024-06-14 PROCEDURE — 99212 OFFICE O/P EST SF 10 MIN: CPT | Mod: PBBFAC | Performed by: INTERNAL MEDICINE

## 2024-06-14 RX ORDER — GRANULES FOR ORAL 3 G/1
3 POWDER ORAL ONCE
Qty: 3 G | Refills: 0 | Status: SHIPPED | OUTPATIENT
Start: 2024-06-14 | End: 2024-06-14

## 2024-06-14 NOTE — PROGRESS NOTES
Subjective:       Patient ID: Anthony Ball is a 74 y.o. male.    Chief Complaint: Annual Exam      HPI  Annual health exam. Reviewed medical, surgical, social and family history, medications, appropriate preventive health screenings, as well as vaccination history. Updates as noted below or in assessment and plan.    Still physically limited muscularly and energy wise but overall stable. Is getting over a recent asthma flair past couple of weeks. No persistent cough and wheezing resolved now. Felt allergy related. Using just albuterol prn.   Still with tremor, fairly persistent but mild/moderate and not limiting. Does feel it is present at rest and with intention. Partner notes albuterol makes worse.  Joints overall stable with some back flairing. Neuropathy pains controlled with current meds. Capcaisin cream very helpful for feet.  Admits doesn't drink much water. Couple diet cokes, bottle or 2 of wine most days still.  UTI hx. Is having some dysuria with mild burning at times.      Past Medical History:   Diagnosis Date    Acute deep vein thrombosis (DVT) of left lower extremity 12/2023    Anxiety     Atrial fibrillation 12/2023    Cataract     Coronary artery disease     CAC score 1430    Depression     Diabetic neuropathy     Essential (primary) hypertension     GERD (gastroesophageal reflux disease)     Insomnia     Neuromyopathy     Possibly DM and/or alcohol related.    Pulmonary embolism 12/2023    Reactive airway disease     Type 2 diabetes mellitus          Current Outpatient Medications:     albuterol (VENTOLIN HFA) 90 mcg/actuation inhaler, Inhale 2 puffs into the lungs every 6 (six) hours as needed for Wheezing. Rescue, Disp: 8 g, Rfl: 2    apixaban (ELIQUIS) 5 mg Tab, Take 1 tablet (5 mg total) by mouth 2 (two) times daily., Disp: 180 tablet, Rfl: 1    busPIRone (BUSPAR) 15 MG tablet, Take 1 tablet (15 mg total) by mouth 2 (two) times daily as needed (Anxiety)., Disp: 180 tablet, Rfl: 3    capsicum  0.075% (ZOSTRIX) 0.075 % topical cream, Apply topically 3 (three) times daily as needed (Foot pain)., Disp: 120 g, Rfl: 5    cyanocobalamin (VITAMIN B-12) 1000 MCG tablet, Take 1 tablet (1,000 mcg total) by mouth once daily., Disp: 90 tablet, Rfl: 3    DULoxetine (CYMBALTA) 60 MG capsule, Take 1 capsule (60 mg total) by mouth 2 (two) times daily., Disp: 180 capsule, Rfl: 3    LIDOcaine (LIDODERM) 5 %, Place 3 patches onto the skin daily as needed (Rib pain). Okay to use 1 to 3 patches. Remove & Discard patches within 12 hours or as directed by MD, Disp: 30 patch, Rfl: 1    magnesium oxide 400 mg magnesium Tab, Take 1 tablet by mouth every evening., Disp: 30 tablet, Rfl: 2    methocarbamoL (ROBAXIN) 750 MG Tab, Take 1 tablet (750 mg total) by mouth 3 (three) times daily as needed (Back pain)., Disp: 90 tablet, Rfl: 0    nortriptyline (PAMELOR) 10 MG capsule, Take 1 capsule (10 mg total) by mouth 3 (three) times daily., Disp: 90 capsule, Rfl: 3    ondansetron (ZOFRAN) 8 MG tablet, Take 1 tablet (8 mg total) by mouth every 8 (eight) hours as needed for Nausea., Disp: 30 tablet, Rfl: 1    pantoprazole (PROTONIX) 40 MG tablet, TAKE 1 TABLET(40 MG) BY MOUTH EVERY DAY, Disp: 90 tablet, Rfl: 3    pregabalin (LYRICA) 150 MG capsule, Take 1 capsule (150 mg total) by mouth 3 (three) times daily., Disp: 90 capsule, Rfl: 3    traZODone (DESYREL) 100 MG tablet, Take 2 tablets (200 mg total) by mouth nightly as needed for Insomnia., Disp: 14 tablet, Rfl: 0    triamcinolone acetonide 0.1% (KENALOG) 0.1 % cream, Apply topically 2 (two) times daily. Use for 1 to 2 weeks as needed for rash., Disp: 453.6 g, Rfl: 0    Past Surgical History:   Procedure Laterality Date    COLONOSCOPY      Normal around 2020    TOTAL KNEE ARTHROPLASTY Right        Family History   Problem Relation Name Age of Onset    Hypertension Mother         Social History     Tobacco Use    Smoking status: Never    Smokeless tobacco: Never   Substance Use Topics     Alcohol use: Yes     Comment: Former heavy use    Drug use: Never       Immunization History   Administered Date(s) Administered    COVID-19, MRNA, LN-S, PF (Pfizer) (Gray Cap) 03/24/2022    COVID-19, MRNA, LN-S, PF (Pfizer) (Purple Cap) 12/18/2020, 12/18/2020, 01/08/2021, 01/08/2021, 08/17/2021, 03/24/2022, 12/13/2022    COVID-19, mRNA, LNP-S, bivalent booster, PF (PFIZER OMICRON) 12/13/2022    Influenza (FLUAD) - Quadrivalent - Adjuvanted - PF *Preferred* (65+) 09/28/2021, 09/15/2022    Influenza - High Dose - PF (65 years and older) 06/30/2021    Influenza - Quadrivalent - PF *Preferred* (6 months and older) 09/28/2007    PPD Test 01/16/2024    Pneumococcal Conjugate - 20 Valent 07/21/2022    Tdap 12/02/2014    Vaccinia, smallpox monkeypox vaccine live, PF 09/15/2022    Zoster Recombinant 07/21/2021, 09/28/2021         Objective:      Vitals:    06/14/24 0954   BP: 97/63   Pulse: 102       Physical Exam  Constitutional:       General: He is not in acute distress.     Appearance: Normal appearance. He is well-developed. He is not ill-appearing.   HENT:      Head: Normocephalic and atraumatic.      Right Ear: Hearing and tympanic membrane normal. There is no impacted cerumen.      Left Ear: Hearing and tympanic membrane normal. There is no impacted cerumen.      Nose: Nose normal.      Mouth/Throat:      Mouth: Mucous membranes are moist.      Pharynx: Oropharynx is clear.   Eyes:      Extraocular Movements: Extraocular movements intact.      Conjunctiva/sclera: Conjunctivae normal.      Pupils: Pupils are equal, round, and reactive to light.   Neck:      Vascular: No carotid bruit.   Cardiovascular:      Rate and Rhythm: Normal rate and regular rhythm.      Heart sounds: Normal heart sounds. No murmur heard.  Pulmonary:      Effort: Pulmonary effort is normal. No respiratory distress.      Breath sounds: Normal breath sounds. No wheezing, rhonchi or rales.   Abdominal:      General: Abdomen is flat. There is no  distension.      Palpations: Abdomen is soft. There is no mass.      Tenderness: There is no abdominal tenderness.      Hernia: No hernia is present.   Musculoskeletal:         General: No swelling or deformity. Normal range of motion.      Cervical back: No tenderness.      Right lower leg: No edema.      Left lower leg: No edema.   Lymphadenopathy:      Cervical: No cervical adenopathy.   Skin:     General: Skin is warm and dry.      Findings: No lesion or rash.   Neurological:      General: No focal deficit present.      Mental Status: He is alert and oriented to person, place, and time.      Cranial Nerves: No cranial nerve deficit.      Comments: Bilateral hand tremor (rest and intention). Generalized weakness stable. Uses wheelchair for long distances.   Psychiatric:         Mood and Affect: Mood normal.         Behavior: Behavior normal.         Thought Content: Thought content normal.         Judgment: Judgment normal.         Recent Results (from the past 2016 hour(s))   Basic Metabolic Panel    Collection Time: 03/28/24  1:30 PM   Result Value Ref Range    Sodium 141 136 - 145 mmol/L    Potassium 3.8 3.5 - 5.1 mmol/L    Chloride 109 95 - 110 mmol/L    CO2 23 23 - 29 mmol/L    Glucose 139 (H) 70 - 110 mg/dL    BUN 9 8 - 23 mg/dL    Creatinine 0.9 0.5 - 1.4 mg/dL    Calcium 8.2 (L) 8.7 - 10.5 mg/dL    Anion Gap 9 8 - 16 mmol/L    eGFR >60.0 >60 mL/min/1.73 m^2   CBC Without Differential    Collection Time: 03/28/24  1:30 PM   Result Value Ref Range    WBC 5.15 3.90 - 12.70 K/uL    RBC 3.63 (L) 4.60 - 6.20 M/uL    Hemoglobin 12.0 (L) 14.0 - 18.0 g/dL    Hematocrit 36.3 (L) 40.0 - 54.0 %     (H) 82 - 98 fL    MCH 33.1 (H) 27.0 - 31.0 pg    MCHC 33.1 32.0 - 36.0 g/dL    RDW 14.6 (H) 11.5 - 14.5 %    Platelets 159 150 - 450 K/uL    MPV 10.3 9.2 - 12.9 fL   EKG 12-lead    Collection Time: 03/30/24 11:14 PM   Result Value Ref Range    QRS Duration 78 ms    [facility] QTC Calculation 487 ms   Hepatitis C Antibody     Collection Time: 03/31/24 12:45 AM   Result Value Ref Range    Hepatitis C Ab Non-reactive Non-reactive   CBC auto differential    Collection Time: 03/31/24 12:45 AM   Result Value Ref Range    WBC 10.35 3.90 - 12.70 K/uL    RBC 4.27 (L) 4.60 - 6.20 M/uL    Hemoglobin 13.9 (L) 14.0 - 18.0 g/dL    Hematocrit 40.0 40.0 - 54.0 %    MCV 94 82 - 98 fL    MCH 32.6 (H) 27.0 - 31.0 pg    MCHC 34.8 32.0 - 36.0 g/dL    RDW 13.4 11.5 - 14.5 %    Platelets 237 150 - 450 K/uL    MPV 9.7 9.2 - 12.9 fL    Immature Granulocytes 0.3 0.0 - 0.5 %    Gran # (ANC) 8.5 (H) 1.8 - 7.7 K/uL    Immature Grans (Abs) 0.03 0.00 - 0.04 K/uL    Lymph # 1.1 1.0 - 4.8 K/uL    Mono # 0.6 0.3 - 1.0 K/uL    Eos # 0.0 0.0 - 0.5 K/uL    Baso # 0.04 0.00 - 0.20 K/uL    nRBC 0 0 /100 WBC    Gran % 82.5 (H) 38.0 - 73.0 %    Lymph % 10.8 (L) 18.0 - 48.0 %    Mono % 5.8 4.0 - 15.0 %    Eosinophil % 0.2 0.0 - 8.0 %    Basophil % 0.4 0.0 - 1.9 %    Differential Method Automated    Comprehensive metabolic panel    Collection Time: 03/31/24 12:45 AM   Result Value Ref Range    Sodium 139 136 - 145 mmol/L    Potassium 4.2 3.5 - 5.1 mmol/L    Chloride 106 95 - 110 mmol/L    CO2 21 (L) 23 - 29 mmol/L    Glucose 163 (H) 70 - 110 mg/dL    BUN 10 8 - 23 mg/dL    Creatinine 0.9 0.5 - 1.4 mg/dL    Calcium 8.5 (L) 8.7 - 10.5 mg/dL    Total Protein 5.9 (L) 6.0 - 8.4 g/dL    Albumin 2.3 (L) 3.5 - 5.2 g/dL    Total Bilirubin 1.2 (H) 0.1 - 1.0 mg/dL    Alkaline Phosphatase 137 (H) 55 - 135 U/L    AST 18 10 - 40 U/L    ALT 13 10 - 44 U/L    eGFR >60.0 >60 mL/min/1.73 m^2    Anion Gap 12 8 - 16 mmol/L   Magnesium    Collection Time: 03/31/24 12:45 AM   Result Value Ref Range    Magnesium 1.4 (L) 1.6 - 2.6 mg/dL   TSH    Collection Time: 03/31/24 12:45 AM   Result Value Ref Range    TSH 5.402 (H) 0.400 - 4.000 uIU/mL   Folate    Collection Time: 03/31/24 12:45 AM   Result Value Ref Range    Folate 4.7 4.0 - 24.0 ng/mL   Vitamin B12    Collection Time: 03/31/24 12:45 AM    Result Value Ref Range    Vitamin B-12 347 210 - 950 pg/mL   Ethanol    Collection Time: 03/31/24 12:45 AM   Result Value Ref Range    Alcohol, Serum <10 <10 mg/dL   Lipase    Collection Time: 03/31/24 12:45 AM   Result Value Ref Range    Lipase <4 4 - 60 U/L   T4, Free    Collection Time: 03/31/24 12:45 AM   Result Value Ref Range    Free T4 1.00 0.71 - 1.51 ng/dL   Urinalysis, Reflex to Urine Culture Urine, Clean Catch    Collection Time: 03/31/24  1:46 AM    Specimen: Urine, Clean Catch   Result Value Ref Range    Specimen UA Urine, Clean Catch     Color, UA Yellow Yellow, Straw, Pratima    Appearance, UA Hazy (A) Clear    pH, UA 6.0 5.0 - 8.0    Specific Gravity, UA 1.015 1.005 - 1.030    Protein, UA Trace (A) Negative    Glucose, UA Negative Negative    Ketones, UA Trace (A) Negative    Bilirubin (UA) Negative Negative    Occult Blood UA Trace (A) Negative    Nitrite, UA Positive (A) Negative    Leukocytes, UA 3+ (A) Negative   Urinalysis Microscopic    Collection Time: 03/31/24  1:46 AM   Result Value Ref Range    RBC, UA 1 0 - 4 /hpf    WBC, UA >100 (H) 0 - 5 /hpf    WBC Clumps, UA Occasional (A) None-Rare    Bacteria Few (A) None-Occ /hpf    Squam Epithel, UA 0 /hpf    Microscopic Comment SEE COMMENT    Urine culture    Collection Time: 03/31/24  1:46 AM    Specimen: Urine   Result Value Ref Range    Urine Culture, Routine STAPHYLOCOCCUS AUREUS  50,000 - 99,999 cfu/ml   (A)        Susceptibility    Staphylococcus aureus - CULTURE, URINE     Oxacillin <=0.25 Sensitive mcg/mL     Penicillin 2 Resistant mcg/mL     Trimeth/Sulfa <=0.5/9.5 Sensitive mcg/mL   Microalbumin/Creatinine Ratio, Urine    Collection Time: 05/17/24 10:47 AM   Result Value Ref Range    Microalbumin, Urine 10.0 ug/mL    Creatinine, Urine 59.0 23.0 - 375.0 mg/dL    Microalb/Creat Ratio 16.9 0.0 - 30.0 ug/mg   HIV 1/2 Ag/Ab (4th Gen)    Collection Time: 05/17/24 10:54 AM   Result Value Ref Range    HIV 1/2 Ag/Ab Non-reactive Non-reactive    RPR    Collection Time: 05/17/24 10:54 AM   Result Value Ref Range    RPR Non-reactive Non-reactive   COMPREHENSIVE METABOLIC PANEL    Collection Time: 05/17/24 10:54 AM   Result Value Ref Range    Sodium 137 136 - 145 mmol/L    Potassium 4.2 3.5 - 5.1 mmol/L    Chloride 103 95 - 110 mmol/L    CO2 23 23 - 29 mmol/L    Glucose 145 (H) 70 - 110 mg/dL    BUN 11 8 - 23 mg/dL    Creatinine 0.9 0.5 - 1.4 mg/dL    Calcium 9.1 8.7 - 10.5 mg/dL    Total Protein 6.9 6.0 - 8.4 g/dL    Albumin 3.0 (L) 3.5 - 5.2 g/dL    Total Bilirubin 0.8 0.1 - 1.0 mg/dL    Alkaline Phosphatase 134 55 - 135 U/L    AST 21 10 - 40 U/L    ALT 14 10 - 44 U/L    eGFR >60.0 >60 mL/min/1.73 m^2    Anion Gap 11 8 - 16 mmol/L   Hemoglobin A1C    Collection Time: 05/17/24 10:54 AM   Result Value Ref Range    Hemoglobin A1C 5.6 4.0 - 5.6 %    Estimated Avg Glucose 114 68 - 131 mg/dL   TSH    Collection Time: 05/17/24 10:54 AM   Result Value Ref Range    TSH 4.197 (H) 0.400 - 4.000 uIU/mL   METHYLMALONIC ACID, SERUM    Collection Time: 05/17/24 10:54 AM   Result Value Ref Range    Methlymalonic Acid 1.05 (H) <0.40 umol/L   CBC Without Differential    Collection Time: 05/17/24 10:54 AM   Result Value Ref Range    WBC 7.55 3.90 - 12.70 K/uL    RBC 4.20 (L) 4.60 - 6.20 M/uL    Hemoglobin 14.1 14.0 - 18.0 g/dL    Hematocrit 42.8 40.0 - 54.0 %     (H) 82 - 98 fL    MCH 33.6 (H) 27.0 - 31.0 pg    MCHC 32.9 32.0 - 36.0 g/dL    RDW 15.4 (H) 11.5 - 14.5 %    Platelets 183 150 - 450 K/uL    MPV 11.1 9.2 - 12.9 fL   FOLATE    Collection Time: 05/17/24 10:54 AM   Result Value Ref Range    Folate 5.6 4.0 - 24.0 ng/mL   VITAMIN B12    Collection Time: 05/17/24 10:54 AM   Result Value Ref Range    Vitamin B-12 187 (L) 210 - 950 pg/mL   Magnesium    Collection Time: 05/17/24 10:54 AM   Result Value Ref Range    Magnesium 1.4 (L) 1.6 - 2.6 mg/dL   T4, Free    Collection Time: 05/17/24 10:54 AM   Result Value Ref Range    Free T4 0.79 0.71 - 1.51 ng/dL           Assessment/Plan:     1) Annual wellness exam  2) Dysuria - Checking UA.  3) T2DM - Controlled off meds. Repeat A1c 3 to 6 mths. Yearly diabetic eye exam. Foot intact today.   4) Tremor - Discussed the possibility of Parkinsonism. Declines Neurology referral for now.  5) Lumbar DJD - Stable with occasional use of Robaxin prn.  6) A-fib hx (initially in setting of previous PE) - On long-term Eliquis now. RRR today.  7) Neuropathy (t2dm, alcohol and b12 deficiency); Generalized weakness - Pains controlled on current regimen. Continue focus on glucose control and alcohol cessation. Notes he is taking his supplements. Working with Physical Therapy.   8) GERD - Controlled on daily Protonix.  9) Asthma - Recovering from recent flair. Consider adding daily inhaler if becomes more recurrent issue.    - Discussed colon screening. Agreed on Hipolito.    Had planned for labs for f/u today. Pt declines for now but he assures me he will plan to get in 3 months. As long as taking all prescribed meds and supplements, I think that would be fine.

## 2024-06-14 NOTE — TELEPHONE ENCOUNTER
Doesn't tolerate antibiotics well. Agreed on trying Fosfomycin and following up culture results.    Would like to have maintenance inhaler for asthma just in case. Sending Trelegy which appears to be a once daily that his insurance will cover.

## 2024-06-17 LAB — BACTERIA UR CULT: ABNORMAL

## 2024-06-17 NOTE — PROGRESS NOTES
"[facility]   Physical Therapy Treatment and Updated Plan of Care     Name: Anthony Ball  Clinic Number: [id number]    Therapy Diagnosis:   Encounter Diagnoses   Name Primary?    Balance problem Yes    Impaired functional mobility, balance, gait, and endurance                Physician: Isaias Myles MD    Visit Date: 6/18/2024    Physician Orders: PT Eval and Treat   Medical Diagnosis from Referral:   R53.1 (ICD-10-CM) - Generalized weakness   G62.9 (ICD-10-CM) - Peripheral polyneuropathy   R26.89 (ICD-10-CM) - Impaired gait and mobility      Evaluation Date: 4/23/2024  Authorization Period Expiration: 12/31/24  Plan of Care Expiration: 6/18/24  Updated Plan of Care Expiration: 8/13/24  Progress Note Due: 7/18/24  Visit # / Visits authorized: 14/ 20 ( plus eval)~ KX modifier  FOTO: 3/3     Precautions: Standard, Fall, and vision impaired      Time In: 0849      Time Out: 0935    Total Billable Time: 46    minutes    PTA Visit #: 0/5       Subjective     Patient reports: " I might be able to come in here next week with my walker."  He was compliant with HEP.  Response to previous treatment: sore  Functional change: ongoing~ managing stairs at home is getting easier; pt also walking more    Pain: 0/10  Location: N/A     Objective      Objective Measures updated 6/18/24. See below:      Lower Extremity Strength~ tested seated in chair  Right LE eval  5/24/24 6/18/24 Left LE eval  5/24/24 6/18/24   Hip flexion:  4-/5 4-/5 4-/5 Hip flexion: 3+/5 4-/5 4-/5   Knee extension: 4+/5 5/5 5/5 Knee extension: 4+/5 4/5 5/5   Knee flexion: 4-/5 4-/5 4-/5 Knee flexion: 4-/5 4/5 4/5   Ankle dorsiflexion:  3+/5* 4-/5* 4+/5* Ankle dorsiflexion: 3+/5* 3+/5* 4-/5*   Ankle plantarflexion:  4/5 4/5 4 to 4+/5 Ankle plantarflexion: 4+/5 4+/5 4+/5   Hip abduction: 4+/5 5/5 5/5 Hip abduction: 4+/5 5/5 5/5   Hip adduction: 5/5 5/5 5/5 Hip adduction 5/5 5/5 5/5   Hip extension: 5/5 5/5 5/5 Hip extension: 4+/5 5/5 " 5/5   *within ROM    5/10/24  MCTSIB:  1. Eyes Open/feet together/Firm: 30 seconds~ slightly widened SIERRA  2. Eyes Closed/feet together/Firm: 13 seconds  3. Eyes Open/feet together/Foam: 30 seconds~ wider SIERRA and min sway  4. Eyes Closed/feet together/Foam: 5 seconds    5/24/24  MCTSIB:  1. Eyes Open/feet together/Firm: 30 seconds~ slightly widened SIERRA  2. Eyes Closed/feet together/Firm: 30 seconds~ min sway with slightly widened SIERRA  3. Eyes Open/feet together/Foam: 30 seconds~ wider SIERRA and min sway  4. Eyes Closed/feet together/Foam: 5 seconds    6/18/24  MCTSIB:  1. Eyes Open/feet together/Firm: 30 seconds  2. Eyes Closed/feet together/Firm: 30 seconds~ min sway   3. Eyes Open/feet together/Foam: 30 seconds~  min sway  4. Eyes Closed/feet together/Foam: 8.5 seconds~ average of 2 trials        Functional Mobility (Bed mobility, transfers)  SBA stand pivot transfer from transport chair to gold chair, no AD~ decreased reliance on UEs  Sit<> stand gold chair or transport chair with RW, S  Sit<> supine on mat with mod I for increased time           Evaluation 5/24/24 6/18/24   Single Limb Stance R LE NT  (<10 sec = HIGH FALL RISK) NT  (<10 sec = HIGH FALL RISK) NT  (<10 sec = HIGH FALL RISK)   Single Limb Stance L LE NT  (<10 sec = HIGH FALL RISK) NT  (<10 sec = HIGH FALL RISK) NT  (<10 sec = HIGH FALL RISK)   30 second Chair Rise 2 completed with arms and RW placed in front, CGA 3 completed with arms and no AD placed in front, S 4 completed with arms and RW placed in front, S   5 times sit to stand NT NT NT   TUG 1 minute, 27 seconds with RW, CGA 32 seconds with RW, S 34 seconds with RW, S   Self selected walking speed 0.12 m/sec (6m/50s) with RW, CGA 0.50 m/sec (6m/12s) with RW, S 0.50 m/sec (6m/12s) with RW, S   Fast walking speed NT NT NT      30 second chair rise below average score:   Age                 Men                 Women     70-74               <15                  <14  A below average score indicates a  risk for fall.     5x sit to stand normative information:   >12 sec= fall risk for general elderly  >16 sec= fall risk for Parkinson's disease  >10 sec= balance/vestibular dysfunction (<61 y/o)  >14.2 sec= balance/vestibular dysfunction (>61 y/o)  >12 sec= fall risk for CVA           Gait Assessment:   - AD used: RW  - Assistance: S to mod I  - Stairs: NT     GAIT DEVIATIONS:              Anthony displays the following deviations with ambulation: decreased anjel, decreased step length B, ER of both feet, forward flexed trunk              Impairments contributing to deviations: decreased strength, decreased balance, decreased sensation to B feet, decreased endurance        Treatment     Anthony received the treatments listed below:      therapeutic exercises to develop strength, endurance, ROM, flexibility, posture, and core stabilization for 20 minutes including:    (Includes time to complete MMT and FOTO survey)    X 8 minutes on SCI-FIT recumbent stepper, level 5.0, for B UE/ LE muscular and CV endurance       Horizontal Leg Press:     3 x 10 reps with 90 # applied    neuromuscular re-education activities to improve: Balance, Coordination, Kinesthetic, Sense, Proprioception, and Posture for 0 minutes. The following activities were included:      therapeutic activities to improve functional performance for 26  minutes, including:    (Includes time to perform mCTSIB, 30 second chair rise, TUG, SSWS)    Stand pivot transfer transport chair> gold chair, SBA    Multiple sit<> stand trials from gold chair during testing, RW placed anteriorly, S    RW to stepper seat, SBA    RW to leg press seat, SBA    Leg press seat to transport chair, SBA        gait training to improve functional mobility and safety for 0 minutes, including:        Patient Education and Home Exercises       Education provided:   - HEP  -5/24/24: PT provided pt with a schedule slip for the remainder of June.  -6/13/24: PT provided pt with a schedule slip  for the 1st 3 weeks of July.     Written Home Exercises Provided: yes. Exercises were reviewed and Anthony was able to demonstrate them prior to the end of the session.  Anthony demonstrated good  understanding of the education provided. See Electronic Medical Record under Patient Instructions for exercises provided during therapy sessions    Assessment     Anthony tolerated his treatment session very well for his updated plan of care. He improved in a few areas, including LE strength, 30 second chair rise and FOTO survey score. He passed all but condition # 4 of the mCTSIB, though his score on this condition improved slightly from the previous reassessment. Perhaps just as impressive is the fact that pt has had no falls since the time of his evaluation! If this trend continues, PT feels pt has good potential to improve further. He remains fearful of falling, but can often be coached to perform balance tasks with limited UE support. Due to pt's continued progress, PT would like to extend the current plan of care for another 8 weeks( until 8/13/24). Anthony remains appropriate for 2 x weekly therapy sessions to address current mobility and balance deficits. See below for most recent comments regarding goal achievement and any revisions.    Antohny Is progressing well towards his goals.   Patient prognosis is Fair.     Patient will continue to benefit from skilled outpatient physical therapy to address the deficits listed in the problem list box on initial evaluation, provide pt/family education and to maximize pt's level of independence in the home and community environment.     Patient's spiritual, cultural and educational needs considered and pt agreeable to plan of care and goals.     Anticipated barriers to physical therapy: history of multiple falls; fear of falling, general anxiety and pt does not drive     Goals:     Short Term Goals: 4 weeks   Pt will be issued first installment of HEP and report at least partial  compliance~MET, 5/24/24   Pt will improve his 30 second chair rise to 3-4 reps to demonstrate increased LE strength and muscular endurance~MET, 5/24/24   Pt will improve his TUG score to 1 minute, 10 seconds or < with appropriate AD to improve household ambulation and decrease fall risk~MET, 5/24/24   Pt will increase his SSWS to 0.13 m/sec with appropriate AD to improve community ambulation and decrease fall risk~MET, 5/24/24   Pt will complete mCTSIB balance testing and PT to set appropriate goals~ MET, 5/10/24  Pt will improve any 2 LE muscle grades by 1/3 to help with functional mobility skills~MET, 5/24/24   Pt will perform all sit<> stand and stand pivot transfers with no more than SBA using RW; min A without device~MET, 5/24/24   (NEW Goal, 5/10/24): pt to increase his score on condition # 2 of the mCTSIB to 16 seconds for improved ability to balance in low/eliminated vision environments~MET, 5/24/24      Long Term Goals: 8 weeks   Pt will be partially compliant with finalized HEP to help maintain potential gains realized in PT~ongoing  Pt will improve his 30 second chair rise to 4-5 reps to demonstrate increased LE strength and muscular endurance~ongoing and progressing, MET, 6/18/24~ revise slightly to 5 reps  Pt will improve his TUG score to 60 seconds or < with appropriate AD to improve household ambulation and decrease fall risk~MET, 5/24/24 ~ revise this goal to 28 seconds, remains current, 6/18/24  Pt will increase his SSWS to 0.14 m/sec with or without appropriate AD to improve community ambulation and decrease fall risk~MET, 5/24/24 ~ revise this goal to 0.55 m/sec, remains current, 6/18/24  Pt will improve any 4 LE muscle grades by 1/3 to help with functional mobility skills~ MET, 5/24/24   Pt will perform all sit<> stand and stand pivot transfers with no more than S using RW; CGA without device~MET, 6/18/24  7.   ( NEW Goal, 5/10/24): pt to increase his score on condition # 2 of the mCTSIB to 19  seconds for improved ability to balance in low/eliminated vision environments~MET, 5/24/24     Plan     Continue outpatient physical therapy 2 x weekly under updated Plan of Care, 6/18/24 to 8/13/24, with treatment to include: pt education, HEP, therapeutic exercises, neuromuscular re-education/balance exercises, therapeutic activities, joint mobilizations, and modalities PRN, to work towards established goals. Pt may be seen by PTA to carry out plan of care.           Cont with strengthening, endurance, balance and functional mobility.     Ba Diaz, PT   6/18/2024

## 2024-06-18 ENCOUNTER — CLINICAL SUPPORT (OUTPATIENT)
Dept: REHABILITATION | Facility: HOSPITAL | Age: 74
End: 2024-06-18
Payer: MEDICARE

## 2024-06-18 DIAGNOSIS — R26.89 BALANCE PROBLEM: Primary | ICD-10-CM

## 2024-06-18 DIAGNOSIS — Z74.09 IMPAIRED FUNCTIONAL MOBILITY, BALANCE, GAIT, AND ENDURANCE: ICD-10-CM

## 2024-06-18 PROCEDURE — 97110 THERAPEUTIC EXERCISES: CPT | Mod: KX,PO

## 2024-06-18 PROCEDURE — 97530 THERAPEUTIC ACTIVITIES: CPT | Mod: KX,PO

## 2024-06-18 NOTE — PLAN OF CARE
"[facility]   Physical Therapy Treatment and Updated Plan of Care     Name: Anthony Ball  Clinic Number: [id number]    Therapy Diagnosis:   Encounter Diagnoses   Name Primary?    Balance problem Yes    Impaired functional mobility, balance, gait, and endurance                Physician: Isaias Myles MD    Visit Date: 6/18/2024    Physician Orders: PT Eval and Treat   Medical Diagnosis from Referral:   R53.1 (ICD-10-CM) - Generalized weakness   G62.9 (ICD-10-CM) - Peripheral polyneuropathy   R26.89 (ICD-10-CM) - Impaired gait and mobility      Evaluation Date: 4/23/2024  Authorization Period Expiration: 12/31/24  Plan of Care Expiration: 6/18/24  Updated Plan of Care Expiration: 8/13/24  Progress Note Due: 7/18/24  Visit # / Visits authorized: 14/ 20 ( plus eval)~ KX modifier  FOTO: 3/3     Precautions: Standard, Fall, and vision impaired      Time In: 0849      Time Out: 0935    Total Billable Time: 46    minutes    PTA Visit #: 0/5       Subjective     Patient reports: " I might be able to come in here next week with my walker."  He was compliant with HEP.  Response to previous treatment: sore  Functional change: ongoing~ managing stairs at home is getting easier; pt also walking more    Pain: 0/10  Location: N/A     Objective      Objective Measures updated 6/18/24. See below:      Lower Extremity Strength~ tested seated in chair  Right LE eval  5/24/24 6/18/24 Left LE eval  5/24/24 6/18/24   Hip flexion:  4-/5 4-/5 4-/5 Hip flexion: 3+/5 4-/5 4-/5   Knee extension: 4+/5 5/5 5/5 Knee extension: 4+/5 4/5 5/5   Knee flexion: 4-/5 4-/5 4-/5 Knee flexion: 4-/5 4/5 4/5   Ankle dorsiflexion:  3+/5* 4-/5* 4+/5* Ankle dorsiflexion: 3+/5* 3+/5* 4-/5*   Ankle plantarflexion:  4/5 4/5 4 to 4+/5 Ankle plantarflexion: 4+/5 4+/5 4+/5   Hip abduction: 4+/5 5/5 5/5 Hip abduction: 4+/5 5/5 5/5   Hip adduction: 5/5 5/5 5/5 Hip adduction 5/5 5/5 5/5   Hip extension: 5/5 5/5 5/5 Hip extension: 4+/5 5/5 " 5/5   *within ROM    5/10/24  MCTSIB:  1. Eyes Open/feet together/Firm: 30 seconds~ slightly widened SIERRA  2. Eyes Closed/feet together/Firm: 13 seconds  3. Eyes Open/feet together/Foam: 30 seconds~ wider SIERRA and min sway  4. Eyes Closed/feet together/Foam: 5 seconds    5/24/24  MCTSIB:  1. Eyes Open/feet together/Firm: 30 seconds~ slightly widened SIERRA  2. Eyes Closed/feet together/Firm: 30 seconds~ min sway with slightly widened SIERRA  3. Eyes Open/feet together/Foam: 30 seconds~ wider SIERRA and min sway  4. Eyes Closed/feet together/Foam: 5 seconds    6/18/24  MCTSIB:  1. Eyes Open/feet together/Firm: 30 seconds  2. Eyes Closed/feet together/Firm: 30 seconds~ min sway   3. Eyes Open/feet together/Foam: 30 seconds~  min sway  4. Eyes Closed/feet together/Foam: 8.5 seconds~ average of 2 trials        Functional Mobility (Bed mobility, transfers)  SBA stand pivot transfer from transport chair to gold chair, no AD~ decreased reliance on UEs  Sit<> stand gold chair or transport chair with RW, S  Sit<> supine on mat with mod I for increased time           Evaluation 5/24/24 6/18/24   Single Limb Stance R LE NT  (<10 sec = HIGH FALL RISK) NT  (<10 sec = HIGH FALL RISK) NT  (<10 sec = HIGH FALL RISK)   Single Limb Stance L LE NT  (<10 sec = HIGH FALL RISK) NT  (<10 sec = HIGH FALL RISK) NT  (<10 sec = HIGH FALL RISK)   30 second Chair Rise 2 completed with arms and RW placed in front, CGA 3 completed with arms and no AD placed in front, S 4 completed with arms and RW placed in front, S   5 times sit to stand NT NT NT   TUG 1 minute, 27 seconds with RW, CGA 32 seconds with RW, S 34 seconds with RW, S   Self selected walking speed 0.12 m/sec (6m/50s) with RW, CGA 0.50 m/sec (6m/12s) with RW, S 0.50 m/sec (6m/12s) with RW, S   Fast walking speed NT NT NT      30 second chair rise below average score:   Age                 Men                 Women     70-74               <15                  <14  A below average score indicates a  risk for fall.     5x sit to stand normative information:   >12 sec= fall risk for general elderly  >16 sec= fall risk for Parkinson's disease  >10 sec= balance/vestibular dysfunction (<61 y/o)  >14.2 sec= balance/vestibular dysfunction (>61 y/o)  >12 sec= fall risk for CVA           Gait Assessment:   - AD used: RW  - Assistance: S to mod I  - Stairs: NT     GAIT DEVIATIONS:              Anthony displays the following deviations with ambulation: decreased anjel, decreased step length B, ER of both feet, forward flexed trunk              Impairments contributing to deviations: decreased strength, decreased balance, decreased sensation to B feet, decreased endurance        Treatment     Anthony received the treatments listed below:      therapeutic exercises to develop strength, endurance, ROM, flexibility, posture, and core stabilization for 20 minutes including:    (Includes time to complete MMT and FOTO survey)    X 8 minutes on SCI-FIT recumbent stepper, level 5.0, for B UE/ LE muscular and CV endurance       Horizontal Leg Press:     3 x 10 reps with 90 # applied    neuromuscular re-education activities to improve: Balance, Coordination, Kinesthetic, Sense, Proprioception, and Posture for 0 minutes. The following activities were included:      therapeutic activities to improve functional performance for 26  minutes, including:    (Includes time to perform mCTSIB, 30 second chair rise, TUG, SSWS)    Stand pivot transfer transport chair> gold chair, SBA    Multiple sit<> stand trials from gold chair during testing, RW placed anteriorly, S    RW to stepper seat, SBA    RW to leg press seat, SBA    Leg press seat to transport chair, SBA        gait training to improve functional mobility and safety for 0 minutes, including:        Patient Education and Home Exercises       Education provided:   - HEP  -5/24/24: PT provided pt with a schedule slip for the remainder of June.  -6/13/24: PT provided pt with a schedule slip  for the 1st 3 weeks of July.     Written Home Exercises Provided: yes. Exercises were reviewed and Anthony was able to demonstrate them prior to the end of the session.  Anthony demonstrated good  understanding of the education provided. See Electronic Medical Record under Patient Instructions for exercises provided during therapy sessions    Assessment     Anthony tolerated his treatment session very well for his updated plan of care. He improved in a few areas, including LE strength, 30 second chair rise and FOTO survey score. He passed all but condition # 4 of the mCTSIB, though his score on this condition improved slightly from the previous reassessment. Perhaps just as impressive is the fact that pt has had no falls since the time of his evaluation! If this trend continues, PT feels pt has good potential to improve further. He remains fearful of falling, but can often be coached to perform balance tasks with limited UE support. Due to pt's continued progress, PT would like to extend the current plan of care for another 8 weeks( until 8/13/24). Anthony remains appropriate for 2 x weekly therapy sessions to address current mobility and balance deficits. See below for most recent comments regarding goal achievement and any revisions.    Anthony Is progressing well towards his goals.   Patient prognosis is Fair.     Patient will continue to benefit from skilled outpatient physical therapy to address the deficits listed in the problem list box on initial evaluation, provide pt/family education and to maximize pt's level of independence in the home and community environment.     Patient's spiritual, cultural and educational needs considered and pt agreeable to plan of care and goals.     Anticipated barriers to physical therapy: history of multiple falls; fear of falling, general anxiety and pt does not drive     Goals:     Short Term Goals: 4 weeks   Pt will be issued first installment of HEP and report at least partial  compliance~MET, 5/24/24   Pt will improve his 30 second chair rise to 3-4 reps to demonstrate increased LE strength and muscular endurance~MET, 5/24/24   Pt will improve his TUG score to 1 minute, 10 seconds or < with appropriate AD to improve household ambulation and decrease fall risk~MET, 5/24/24   Pt will increase his SSWS to 0.13 m/sec with appropriate AD to improve community ambulation and decrease fall risk~MET, 5/24/24   Pt will complete mCTSIB balance testing and PT to set appropriate goals~ MET, 5/10/24  Pt will improve any 2 LE muscle grades by 1/3 to help with functional mobility skills~MET, 5/24/24   Pt will perform all sit<> stand and stand pivot transfers with no more than SBA using RW; min A without device~MET, 5/24/24   (NEW Goal, 5/10/24): pt to increase his score on condition # 2 of the mCTSIB to 16 seconds for improved ability to balance in low/eliminated vision environments~MET, 5/24/24      Long Term Goals: 8 weeks   Pt will be partially compliant with finalized HEP to help maintain potential gains realized in PT~ongoing  Pt will improve his 30 second chair rise to 4-5 reps to demonstrate increased LE strength and muscular endurance~ongoing and progressing, MET, 6/18/24~ revise slightly to 5 reps  Pt will improve his TUG score to 60 seconds or < with appropriate AD to improve household ambulation and decrease fall risk~MET, 5/24/24 ~ revise this goal to 28 seconds, remains current, 6/18/24  Pt will increase his SSWS to 0.14 m/sec with or without appropriate AD to improve community ambulation and decrease fall risk~MET, 5/24/24 ~ revise this goal to 0.55 m/sec, remains current, 6/18/24  Pt will improve any 4 LE muscle grades by 1/3 to help with functional mobility skills~ MET, 5/24/24   Pt will perform all sit<> stand and stand pivot transfers with no more than S using RW; CGA without device~MET, 6/18/24  7.   ( NEW Goal, 5/10/24): pt to increase his score on condition # 2 of the mCTSIB to 19  seconds for improved ability to balance in low/eliminated vision environments~MET, 5/24/24     Plan     Continue outpatient physical therapy 2 x weekly under updated Plan of Care, 6/18/24 to 8/13/24, with treatment to include: pt education, HEP, therapeutic exercises, neuromuscular re-education/balance exercises, therapeutic activities, joint mobilizations, and modalities PRN, to work towards established goals. Pt may be seen by PTA to carry out plan of care.           Cont with strengthening, endurance, balance and functional mobility.     Ba Diaz, PT   6/18/2024

## 2024-06-20 ENCOUNTER — PATIENT MESSAGE (OUTPATIENT)
Dept: OPHTHALMOLOGY | Facility: CLINIC | Age: 74
End: 2024-06-20
Payer: MEDICARE

## 2024-07-02 ENCOUNTER — DOCUMENT SCAN (OUTPATIENT)
Dept: HOME HEALTH SERVICES | Facility: HOSPITAL | Age: 74
End: 2024-07-02
Payer: MEDICARE

## 2024-07-02 ENCOUNTER — PATIENT MESSAGE (OUTPATIENT)
Dept: SPINE | Facility: CLINIC | Age: 74
End: 2024-07-02
Payer: MEDICARE

## 2024-07-05 ENCOUNTER — DOCUMENTATION ONLY (OUTPATIENT)
Dept: REHABILITATION | Facility: HOSPITAL | Age: 74
End: 2024-07-05
Payer: MEDICARE

## 2024-07-05 NOTE — PROGRESS NOTES
PT/PTA met face to face to discuss pt's treatment plan and progress towards established goals.  Continue with current PT POC with focus on gait with the SC, functional mobility and balance.  Patient will be seen by physical therapist at least every sixth treatment or 30 days, whichever occurs first.    Ciara Pro, PTA  07/05/2024

## 2024-07-08 NOTE — PROGRESS NOTES
"[facility]   Physical Therapy Treatment Note     Name: Anthony Ball  Clinic Number: [id number]    Therapy Diagnosis:   Encounter Diagnoses   Name Primary?    Balance problem Yes    Impaired functional mobility, balance, gait, and endurance                  Physician: Isaias Myles MD    Visit Date: 7/9/2024    Physician Orders: PT Eval and Treat   Medical Diagnosis from Referral:   R53.1 (ICD-10-CM) - Generalized weakness   G62.9 (ICD-10-CM) - Peripheral polyneuropathy   R26.89 (ICD-10-CM) - Impaired gait and mobility      Evaluation Date: 4/23/2024  Authorization Period Expiration: 12/31/24  Plan of Care Expiration: 6/18/24  Updated Plan of Care Expiration: 8/13/24  Progress Note Due: 7/18/24  Visit # / Visits authorized: 15/ 20 ( plus eval)~ KX modifier  FOTO: 3/3     Precautions: Standard, Fall, and vision impaired      Time In: 0845       Time Out: 0940     Total Billable Time: 55  minutes    PTA Visit #: 0/5       Subjective     Patient reports: " I need rehab."  Pt reports he recently went to [place] to visit his cousin and he also will have cataract surgery this coming Monday.  He was compliant with HEP.  Response to previous treatment: no adverse effects  Functional change: ongoing~ managing stairs at home is getting easier; pt also walking more    Pain: 0/10  Location: N/A     Objective      Objective Measures updated 6/18/24.        Treatment     Anthony received the treatments listed below:      therapeutic exercises to develop strength, endurance, ROM, flexibility, posture, and core stabilization for 24 minutes including:      X 8 minutes on SCI-FIT recumbent stepper, level 5.0, for B UE/ LE muscular and CV endurance~ frequent cues to prevent screen from timing out      Horizontal Leg Press:     3 x 10 reps with 90 # applied      Seated on edge of high/low mat:  2 x 10 B LE LAQ with 5 # ankle weight added     neuromuscular re-education activities to improve: Balance, " Coordination, Kinesthetic, Sense, Proprioception, and Posture for 0 minutes. The following activities were included:      therapeutic activities to improve functional performance for 31  minutes, including:    Stand pivot transfer transport chair> stepper seat with no AD, SBA    Sit> stand from stepper seat to RW, SBA    Pt ambulates from stepper to leg press with RW x 28 feet, SBA    Stand> sit from RW to leg press seat, SBA    Sit> stand from leg press seat to RW, SBA    Pt ambulates to mat with RW x 37 feet, SBA    Pt ambulates to and from stairs with RW x 23 feet each way, SBA    Ascend and descend 4 six inch steps with B rails, SBA~ non-reciprocal pattern demonstrated    Pt ambulates x 1 trial around high plinth and upright bicycle x 40 feet with RW, SBA     X 2 minutes of seated overhead orange physioball bouncing to PT    2 x 5 sit<> stand trials from high/low mat with RW placed anteriorly~ cues for proper hand placement and to prevent use of legs against mat for leverage    Stand pivot transfer mat to transport chair with no AD, SBA      gait training to improve functional mobility and safety for 0 minutes, including:        Patient Education and Home Exercises       Education provided:   - HEP  -5/24/24: PT provided pt with a schedule slip for the remainder of June.  -6/13/24: PT provided pt with a schedule slip for the 1st 3 weeks of July.   -7/9/24: PT provided pt with a schedule slip for the first 2 weeks of August    Written Home Exercises Provided: yes. Exercises were reviewed and Anthony was able to demonstrate them prior to the end of the session.  Anthony demonstrated good  understanding of the education provided. See Electronic Medical Record under Patient Instructions for exercises provided during therapy sessions    Assessment     Anthony tolerated his treatment session fairly due to not attending any treatments since 6/18/24. He moved about very slowly today, even with use of RW. Therefore, PT did not  attempt any ambulation with SC. Pt also had difficulty preventing the stepper screen from flickering throughout his 8 minutes of work there( due to limited motor output). He did do well with resumption of leg press at 90 #. PT also added in seated LAQ for additional LE strengthening. Anthony performed several short ambulation trials with RW as he moved from one treatment station to another. He also managed to ascend and descend 4 stairs. It is clear that when pt does not attend visits more regularly, his functional mobility suffers. Anthony remains appropriate for 2 x weekly therapy sessions to address current mobility and balance deficits.     Anthony Is progressing well towards his goals.   Patient prognosis is Fair.     Patient will continue to benefit from skilled outpatient physical therapy to address the deficits listed in the problem list box on initial evaluation, provide pt/family education and to maximize pt's level of independence in the home and community environment.     Patient's spiritual, cultural and educational needs considered and pt agreeable to plan of care and goals.     Anticipated barriers to physical therapy: history of multiple falls; fear of falling, general anxiety and pt does not drive     Goals:     Short Term Goals: 4 weeks   Pt will be issued first installment of HEP and report at least partial compliance~MET, 5/24/24   Pt will improve his 30 second chair rise to 3-4 reps to demonstrate increased LE strength and muscular endurance~MET, 5/24/24   Pt will improve his TUG score to 1 minute, 10 seconds or < with appropriate AD to improve household ambulation and decrease fall risk~MET, 5/24/24   Pt will increase his SSWS to 0.13 m/sec with appropriate AD to improve community ambulation and decrease fall risk~MET, 5/24/24   Pt will complete mCTSIB balance testing and PT to set appropriate goals~ MET, 5/10/24  Pt will improve any 2 LE muscle grades by 1/3 to help with functional mobility skills~MET,  5/24/24   Pt will perform all sit<> stand and stand pivot transfers with no more than SBA using RW; min A without device~MET, 5/24/24   (NEW Goal, 5/10/24): pt to increase his score on condition # 2 of the mCTSIB to 16 seconds for improved ability to balance in low/eliminated vision environments~MET, 5/24/24      Long Term Goals: 8 weeks   Pt will be partially compliant with finalized HEP to help maintain potential gains realized in PT~ongoing  Pt will improve his 30 second chair rise to 4-5 reps to demonstrate increased LE strength and muscular endurance~ongoing and progressing, MET, 6/18/24~ revise slightly to 5 reps  Pt will improve his TUG score to 60 seconds or < with appropriate AD to improve household ambulation and decrease fall risk~MET, 5/24/24 ~ revise this goal to 28 seconds, remains current, 6/18/24  Pt will increase his SSWS to 0.14 m/sec with or without appropriate AD to improve community ambulation and decrease fall risk~MET, 5/24/24 ~ revise this goal to 0.55 m/sec, remains current, 6/18/24  Pt will improve any 4 LE muscle grades by 1/3 to help with functional mobility skills~ MET, 5/24/24   Pt will perform all sit<> stand and stand pivot transfers with no more than S using RW; CGA without device~MET, 6/18/24  7.   ( NEW Goal, 5/10/24): pt to increase his score on condition # 2 of the mCTSIB to 19 seconds for improved ability to balance in low/eliminated vision environments~MET, 5/24/24     Plan       Cont with strengthening, endurance, balance and functional mobility.     Ba Diaz, PT   7/9/2024

## 2024-07-09 ENCOUNTER — CLINICAL SUPPORT (OUTPATIENT)
Dept: REHABILITATION | Facility: HOSPITAL | Age: 74
End: 2024-07-09
Payer: MEDICARE

## 2024-07-09 DIAGNOSIS — Z74.09 IMPAIRED FUNCTIONAL MOBILITY, BALANCE, GAIT, AND ENDURANCE: ICD-10-CM

## 2024-07-09 DIAGNOSIS — R26.89 BALANCE PROBLEM: Primary | ICD-10-CM

## 2024-07-09 PROCEDURE — 97530 THERAPEUTIC ACTIVITIES: CPT | Mod: KX,PO

## 2024-07-09 PROCEDURE — 97110 THERAPEUTIC EXERCISES: CPT | Mod: KX,PO

## 2024-07-10 ENCOUNTER — TELEPHONE (OUTPATIENT)
Dept: OPHTHALMOLOGY | Facility: CLINIC | Age: 74
End: 2024-07-10
Payer: MEDICARE

## 2024-07-10 NOTE — TELEPHONE ENCOUNTER
Patient given arrival time of  8:00 am on Monday July  15 . Nothing to eat or drink after 11:59 pm. Start drops into the operative eye today. [facility]

## 2024-07-10 NOTE — PROGRESS NOTES
"[facility]   Physical Therapy Treatment Note     Name: Anthony Ball  Clinic Number: [id number]    Therapy Diagnosis:   Encounter Diagnoses   Name Primary?    Balance problem Yes    Impaired functional mobility, balance, gait, and endurance            Physician: Isaias Myles MD    Visit Date: 7/11/2024    Physician Orders: PT Eval and Treat   Medical Diagnosis from Referral:   R53.1 (ICD-10-CM) - Generalized weakness   G62.9 (ICD-10-CM) - Peripheral polyneuropathy   R26.89 (ICD-10-CM) - Impaired gait and mobility      Evaluation Date: 4/23/2024  Authorization Period Expiration: 12/31/24  Plan of Care Expiration: 6/18/24  Updated Plan of Care Expiration: 8/13/24  Progress Note Due: 7/18/24  Visit # / Visits authorized: 16/ 20 ( plus eval)~ KX modifier  FOTO: 3/3     Precautions: Standard, Fall, and vision impaired      Time In: 0840        Time Out: 0925      Total Billable Time: 45   minutes    PTA Visit #: 0/5       Subjective     Patient reports: he is a little sore today. " That was quite a workout last time."  He was compliant with HEP.  Response to previous treatment: sore  Functional change: ongoing~ managing stairs at home is getting easier; pt also walking more    Pain: 0/10  Location: N/A     Objective      Objective Measures updated 6/18/24.        Treatment     Anthony received the treatments listed below:      therapeutic exercises to develop strength, endurance, ROM, flexibility, posture, and core stabilization for 20 minutes including:      X 8 minutes on SCI-FIT recumbent stepper, level 4.0, for B UE/ LE muscular and CV endurance~ frequent cues to prevent screen from timing out      Seated on edge of high/low mat:  2 x 10 B LE LAQ with 5 # ankle weight added   1 x 10 B UE rows against resistance of green theraband~ cues to increase speed of movement    neuromuscular re-education activities to improve: Balance, Coordination, Kinesthetic, Sense, Proprioception, and Posture for " 0 minutes. The following activities were included:      therapeutic activities to improve functional performance for 25  minutes, including:    Stand pivot transfer transport chair> stepper seat with no AD, SBA    Sit> stand from stepper seat to RW, SBA    Sit<> stand from mat to RW, S, multiple trials    Pt ambulates around gym x 2 trials, 108 feet each, SBA    Pt ambulates to and from stairs with RW x 23 feet each way, SBA    Ascend and descend 8 six inch steps with B rails, SBA~ reciprocal pattern when ascending and non-reciprocal pattern when descending     X 2 minutes of seated overhead orange physioball bouncing to PT    1 x 5 sit<> stand trials from high/low mat with RW placed anteriorly~ cues for proper hand placement and to prevent use of legs against mat for leverage    Stand pivot transfer mat to transport chair with no AD, SBA      gait training to improve functional mobility and safety for 0 minutes, including:        Patient Education and Home Exercises       Education provided:   - HEP  -5/24/24: PT provided pt with a schedule slip for the remainder of June.  -6/13/24: PT provided pt with a schedule slip for the 1st 3 weeks of July.   -7/9/24: PT provided pt with a schedule slip for the first 2 weeks of August    Written Home Exercises Provided: yes. Exercises were reviewed and Anthony was able to demonstrate them prior to the end of the session.  Anthony demonstrated good  understanding of the education provided. See Electronic Medical Record under Patient Instructions for exercises provided during therapy sessions    Assessment     Anthony tolerated his treatment session well today and seemed to move with a bit more ease during performance of sit<>stand and stand pivot transfers. PT reduced his resistance on the SCI-FIT recumbent stepper to level 4, though his motor output was still relatively poor. He was able to ascend/descend 8 stairs and demonstrated a reciprocal pattern during ascent. Pt was able to  perform 2 full laps of ambulation with RW around the gym( with seated rest break between each trial) and he completed 2 sets of seated rows with green theraband. Anthony remains appropriate for 2 x weekly therapy sessions to address current mobility and balance deficits.     Anthony Is progressing well towards his goals.   Patient prognosis is Fair.     Patient will continue to benefit from skilled outpatient physical therapy to address the deficits listed in the problem list box on initial evaluation, provide pt/family education and to maximize pt's level of independence in the home and community environment.     Patient's spiritual, cultural and educational needs considered and pt agreeable to plan of care and goals.     Anticipated barriers to physical therapy: history of multiple falls; fear of falling, general anxiety and pt does not drive     Goals:     Short Term Goals: 4 weeks   Pt will be issued first installment of HEP and report at least partial compliance~MET, 5/24/24   Pt will improve his 30 second chair rise to 3-4 reps to demonstrate increased LE strength and muscular endurance~MET, 5/24/24   Pt will improve his TUG score to 1 minute, 10 seconds or < with appropriate AD to improve household ambulation and decrease fall risk~MET, 5/24/24   Pt will increase his SSWS to 0.13 m/sec with appropriate AD to improve community ambulation and decrease fall risk~MET, 5/24/24   Pt will complete mCTSIB balance testing and PT to set appropriate goals~ MET, 5/10/24  Pt will improve any 2 LE muscle grades by 1/3 to help with functional mobility skills~MET, 5/24/24   Pt will perform all sit<> stand and stand pivot transfers with no more than SBA using RW; min A without device~MET, 5/24/24   (NEW Goal, 5/10/24): pt to increase his score on condition # 2 of the mCTSIB to 16 seconds for improved ability to balance in low/eliminated vision environments~MET, 5/24/24      Long Term Goals: 8 weeks   Pt will be partially compliant  with finalized HEP to help maintain potential gains realized in PT~ongoing  Pt will improve his 30 second chair rise to 4-5 reps to demonstrate increased LE strength and muscular endurance~ongoing and progressing, MET, 6/18/24~ revise slightly to 5 reps  Pt will improve his TUG score to 60 seconds or < with appropriate AD to improve household ambulation and decrease fall risk~MET, 5/24/24 ~ revise this goal to 28 seconds, remains current, 6/18/24  Pt will increase his SSWS to 0.14 m/sec with or without appropriate AD to improve community ambulation and decrease fall risk~MET, 5/24/24 ~ revise this goal to 0.55 m/sec, remains current, 6/18/24  Pt will improve any 4 LE muscle grades by 1/3 to help with functional mobility skills~ MET, 5/24/24   Pt will perform all sit<> stand and stand pivot transfers with no more than S using RW; CGA without device~MET, 6/18/24  7.   ( NEW Goal, 5/10/24): pt to increase his score on condition # 2 of the mCTSIB to 19 seconds for improved ability to balance in low/eliminated vision environments~MET, 5/24/24     Plan       Cont with strengthening, endurance, balance and functional mobility.     Ba iDaz, PT   7/11/2024

## 2024-07-11 ENCOUNTER — CLINICAL SUPPORT (OUTPATIENT)
Dept: REHABILITATION | Facility: HOSPITAL | Age: 74
End: 2024-07-11
Payer: MEDICARE

## 2024-07-11 DIAGNOSIS — R26.89 BALANCE PROBLEM: Primary | ICD-10-CM

## 2024-07-11 DIAGNOSIS — Z74.09 IMPAIRED FUNCTIONAL MOBILITY, BALANCE, GAIT, AND ENDURANCE: ICD-10-CM

## 2024-07-11 PROCEDURE — 97530 THERAPEUTIC ACTIVITIES: CPT | Mod: KX,PO

## 2024-07-11 PROCEDURE — 97110 THERAPEUTIC EXERCISES: CPT | Mod: KX,PO

## 2024-07-12 NOTE — PRE-PROCEDURE INSTRUCTIONS
Patient reviewed on 7/11/2024.  Okay to proceed at [place]. The following pre-procedure instructions and arrival time have been reviewed with patient via phone and sent to patient portal for review.  Patient verbalized an understanding.  Pt to be accompanied by a friend day of procedure as responsible .    Dear Anthony     Below you will find basic pre-procedure instructions in preparation for your procedure on 7/15/24 with Dr. Jacobson     You should have received your arrival time already from Dr's office.     - Nothing to eat or drink after midnight the night before your procedure until after your procedure, except AM meds with small sips of water.     - HOLD all oral Diabetic medications night before and morning of procedure  - HOLD all Insulin morning of procedure  - HOLD all Fluid pills morning of procedure  - HOLD all non-insulin shots until after surgery (Ozempic, Mounjaro, Trulicity, Victoza, Byetta, Wegovy and Adlyxin) (7 days prior)  - HOLD all vitamins, minerals and herbal supplements morning of procedure   - TAKE all B/P meds, EXCEPT those that contain a fluid pill (ex. Lasix, Hydroclorothiazide/HCTZ, Spirnolactone)  - USE inhalers as needed and bring AM of surgery  - USE EYE DROPS as directed  -TAKE blood thinner meds AM of surgery unless otherwise instructed by your provider to not take     - Shower and wash face with antibacterial soap (ex. Dial) for 3 mins PM prior and AM of surgery  - No powder, lotions, creams, oils, gels, ointments, makeup,  or jewelry    - Wear comfortable clothing (button up shirt)     (Patient is required to have a responsible ride to transport home, ride may not leave while patient is in surgery)     -- [facility], 2nd floor Surgery Center, located   @ [address] [place] [address]  2nd Floor Registration        If you have any questions or concerns please feel free to contact your surgeon's office.           Please reply to this message as  receipt of delivery.     [place], LPN [facility]  Pre-Admit - Anesthesia Dept

## 2024-07-15 ENCOUNTER — HOSPITAL ENCOUNTER (OUTPATIENT)
Facility: HOSPITAL | Age: 74
Discharge: HOME | End: 2024-07-15
Attending: OPHTHALMOLOGY | Admitting: OPHTHALMOLOGY
Payer: MEDICARE

## 2024-07-15 VITALS
TEMPERATURE: 98 F | RESPIRATION RATE: 16 BRPM | HEART RATE: 97 BPM | OXYGEN SATURATION: 95 % | DIASTOLIC BLOOD PRESSURE: 58 MMHG | SYSTOLIC BLOOD PRESSURE: 100 MMHG

## 2024-07-15 DIAGNOSIS — H25.11 NUCLEAR AGE-RELATED CATARACT, RIGHT EYE: Primary | ICD-10-CM

## 2024-07-15 DIAGNOSIS — H25.11 NUCLEAR SCLEROTIC CATARACT OF RIGHT EYE: ICD-10-CM

## 2024-07-15 PROCEDURE — 99152 MOD SED SAME PHYS/QHP 5/>YRS: CPT | Mod: ,,, | Performed by: OPHTHALMOLOGY

## 2024-07-15 PROCEDURE — 25000003 PHARM REV CODE 250: Performed by: OPHTHALMOLOGY

## 2024-07-15 PROCEDURE — 36000706: Performed by: OPHTHALMOLOGY

## 2024-07-15 PROCEDURE — V2632 POST CHMBR INTRAOCULAR LENS: HCPCS | Performed by: OPHTHALMOLOGY

## 2024-07-15 PROCEDURE — 71000015 HC POSTOP RECOV 1ST HR: Performed by: OPHTHALMOLOGY

## 2024-07-15 PROCEDURE — 66984 XCAPSL CTRC RMVL W/O ECP: CPT | Mod: RT,,, | Performed by: OPHTHALMOLOGY

## 2024-07-15 PROCEDURE — 94761 N-INVAS EAR/PLS OXIMETRY MLT: CPT

## 2024-07-15 PROCEDURE — 36000707: Performed by: OPHTHALMOLOGY

## 2024-07-15 PROCEDURE — 63600175 PHARM REV CODE 636 W HCPCS: Performed by: OPHTHALMOLOGY

## 2024-07-15 PROCEDURE — 27000221 HC OXYGEN, UP TO 24 HOURS

## 2024-07-15 DEVICE — LENS CLAREON AUTONOME 17.0D: Type: IMPLANTABLE DEVICE | Site: EYE | Status: FUNCTIONAL

## 2024-07-15 RX ORDER — MOXIFLOXACIN 5 MG/ML
1 SOLUTION/ DROPS OPHTHALMIC
Status: COMPLETED | OUTPATIENT
Start: 2024-07-15 | End: 2024-07-15

## 2024-07-15 RX ORDER — ACETAMINOPHEN 325 MG/1
650 TABLET ORAL EVERY 4 HOURS PRN
Status: DISCONTINUED | OUTPATIENT
Start: 2024-07-15 | End: 2024-07-15 | Stop reason: HOSPADM

## 2024-07-15 RX ORDER — MOXIFLOXACIN 5 MG/ML
SOLUTION/ DROPS OPHTHALMIC
Status: DISCONTINUED | OUTPATIENT
Start: 2024-07-15 | End: 2024-07-15 | Stop reason: HOSPADM

## 2024-07-15 RX ORDER — MIDAZOLAM HYDROCHLORIDE 1 MG/ML
1 INJECTION, SOLUTION INTRAMUSCULAR; INTRAVENOUS
Status: DISCONTINUED | OUTPATIENT
Start: 2024-07-15 | End: 2024-07-15 | Stop reason: HOSPADM

## 2024-07-15 RX ORDER — PHENYLEPHRINE HYDROCHLORIDE 25 MG/ML
1 SOLUTION/ DROPS OPHTHALMIC
Status: COMPLETED | OUTPATIENT
Start: 2024-07-15 | End: 2024-07-15

## 2024-07-15 RX ORDER — KETOROLAC TROMETHAMINE 5 MG/ML
1 SOLUTION OPHTHALMIC
Status: DISCONTINUED | OUTPATIENT
Start: 2024-07-15 | End: 2024-07-15

## 2024-07-15 RX ORDER — SODIUM CHLORIDE 0.9 % (FLUSH) 0.9 %
10 SYRINGE (ML) INJECTION
Status: DISCONTINUED | OUTPATIENT
Start: 2024-07-15 | End: 2024-07-15 | Stop reason: HOSPADM

## 2024-07-15 RX ORDER — PROPARACAINE HYDROCHLORIDE 5 MG/ML
SOLUTION/ DROPS OPHTHALMIC
Status: DISCONTINUED | OUTPATIENT
Start: 2024-07-15 | End: 2024-07-15 | Stop reason: HOSPADM

## 2024-07-15 RX ORDER — PROPARACAINE HYDROCHLORIDE 5 MG/ML
1 SOLUTION/ DROPS OPHTHALMIC
Status: DISCONTINUED | OUTPATIENT
Start: 2024-07-15 | End: 2024-07-15 | Stop reason: HOSPADM

## 2024-07-15 RX ORDER — HYDROXYZINE PAMOATE 25 MG/1
25 CAPSULE ORAL EVERY 8 HOURS PRN
COMMUNITY

## 2024-07-15 RX ORDER — PHENYLEPHRINE HYDROCHLORIDE 100 MG/ML
1 SOLUTION/ DROPS OPHTHALMIC
Status: DISCONTINUED | OUTPATIENT
Start: 2024-07-15 | End: 2024-07-15 | Stop reason: HOSPADM

## 2024-07-15 RX ORDER — TETRACAINE HYDROCHLORIDE 5 MG/ML
1 SOLUTION OPHTHALMIC
Status: COMPLETED | OUTPATIENT
Start: 2024-07-15 | End: 2024-07-15

## 2024-07-15 RX ORDER — LIDOCAINE HYDROCHLORIDE 40 MG/ML
INJECTION, SOLUTION RETROBULBAR
Status: DISCONTINUED | OUTPATIENT
Start: 2024-07-15 | End: 2024-07-15 | Stop reason: HOSPADM

## 2024-07-15 RX ORDER — TROPICAMIDE 10 MG/ML
1 SOLUTION/ DROPS OPHTHALMIC
Status: COMPLETED | OUTPATIENT
Start: 2024-07-15 | End: 2024-07-15

## 2024-07-15 RX ADMIN — TETRACAINE HYDROCHLORIDE 1 DROP: 5 SOLUTION OPHTHALMIC at 08:07

## 2024-07-15 RX ADMIN — PHENYLEPHRINE HYDROCHLORIDE 1 DROP: 25 SOLUTION/ DROPS OPHTHALMIC at 08:07

## 2024-07-15 RX ADMIN — MOXIFLOXACIN OPHTHALMIC 1 DROP: 5 SOLUTION/ DROPS OPHTHALMIC at 08:07

## 2024-07-15 RX ADMIN — MOXIFLOXACIN 1 DROP: 5 SOLUTION/ DROPS OPHTHALMIC at 09:07

## 2024-07-15 RX ADMIN — TROPICAMIDE 1 DROP: 10 SOLUTION/ DROPS OPHTHALMIC at 08:07

## 2024-07-15 RX ADMIN — MIDAZOLAM HYDROCHLORIDE 1 MG: 1 INJECTION, SOLUTION INTRAMUSCULAR; INTRAVENOUS at 09:07

## 2024-07-15 RX ADMIN — MIDAZOLAM HYDROCHLORIDE 2 MG: 1 INJECTION, SOLUTION INTRAMUSCULAR; INTRAVENOUS at 08:07

## 2024-07-15 NOTE — DISCHARGE INSTRUCTIONS
CATARACT SURGERY    POST-OPERATIVE INSTRUCTIONS    · Apply drops THREE times a day into operative eye for 30 days.    · DO NOT rub your eye    · Wear protective sunglasses during the day    · Resume moderate activity    · Bathe/shower/wash face normally    · DO NOT apply makeup around the operative eye for 1 week.         You should expect    - Blurry vision and halos for 24-48 hours    - Dilated pupil for 24-48 hours    - Scratchy feeling in the eye for 1-2 days    - Curved shadow in your peripheral vision for 2-3 weeks    - Occasional flickering of lights for up to 1 week    -If you experience severe pain or nausea, please call Dr Jacobson or the on-call doctor at [phone]    - Plan to see Dr Jacobson tomorrow .      [facility]    [address] [facility] [id number]    ** Most patients can drive the next day, but if you do not feel comfortable driving, please arrange for transportation.

## 2024-07-15 NOTE — DISCHARGE SUMMARY
Outcome: Successful outpatient ophthalmic surgical procedure  Preprinted Instructions given to patient.  Regular diet.  Activity: No restrictions  Meds: see Med Rec  Condition: stable  Follow up: 1 day with Dr Jacobson  Disposition: Home  Diagnosis: s/p eye surgery  Date of discharge: 07/15/2024

## 2024-07-15 NOTE — PLAN OF CARE
Pt in preop bay 36, VSS, meds given and IV inserted. Pt denies any open wounds on body or the use of any weight loss injections. Pt  ready to roll.

## 2024-07-16 ENCOUNTER — OFFICE VISIT (OUTPATIENT)
Dept: OPHTHALMOLOGY | Facility: CLINIC | Age: 74
End: 2024-07-16
Payer: MEDICARE

## 2024-07-16 DIAGNOSIS — H25.11 NUCLEAR SCLEROTIC CATARACT OF RIGHT EYE: ICD-10-CM

## 2024-07-16 DIAGNOSIS — Z98.890 POST-OPERATIVE STATE: Primary | ICD-10-CM

## 2024-07-16 PROCEDURE — 99024 POSTOP FOLLOW-UP VISIT: CPT | Mod: POP,,, | Performed by: OPHTHALMOLOGY

## 2024-07-16 PROCEDURE — 99999 PR PBB SHADOW E&M-EST. PATIENT-LVL III: CPT | Mod: PBBFAC,,, | Performed by: OPHTHALMOLOGY

## 2024-07-16 PROCEDURE — 99213 OFFICE O/P EST LOW 20 MIN: CPT | Mod: PBBFAC | Performed by: OPHTHALMOLOGY

## 2024-07-16 NOTE — PROGRESS NOTES
HPI    07/15/2024 IMPLANT: [email or web address] 17.0 OD    POD1 patient states his vision is doing well after sx.     PGB TID OD    Last edited by Brandee Kimball on 7/16/2024  8:42 AM.            Assessment /Plan     For exam results, see Encounter Report.    Post-operative state    Nuclear sclerotic cataract of right eye      Slit lamp exam:  L/L: nl  K: clear, wound sealed  AC: 1+ cell  Lens: IOL centered and stable    POD1 s/p Phaco/IOL  Appropriate precautions and post op medications reviewed.  Patient instructed to call or come in if symptoms of redness, decreased vision, or pain are experienced.

## 2024-07-17 ENCOUNTER — CLINICAL SUPPORT (OUTPATIENT)
Dept: REHABILITATION | Facility: HOSPITAL | Age: 74
End: 2024-07-17
Payer: MEDICARE

## 2024-07-17 DIAGNOSIS — Z74.09 IMPAIRED FUNCTIONAL MOBILITY, BALANCE, GAIT, AND ENDURANCE: ICD-10-CM

## 2024-07-17 DIAGNOSIS — R26.89 BALANCE PROBLEM: Primary | ICD-10-CM

## 2024-07-17 PROCEDURE — 97530 THERAPEUTIC ACTIVITIES: CPT | Mod: PO,CQ

## 2024-07-17 PROCEDURE — 97110 THERAPEUTIC EXERCISES: CPT | Mod: PO,CQ

## 2024-07-17 NOTE — PROGRESS NOTES
"[facility]   Physical Therapy Treatment Note     Name: Anthony Ball  Clinic Number: [id number]    Therapy Diagnosis:   Encounter Diagnoses   Name Primary?    Balance problem Yes    Impaired functional mobility, balance, gait, and endurance            Physician: Isaias Myles MD    Visit Date: 7/17/2024    Physician Orders: PT Eval and Treat   Medical Diagnosis from Referral:   R53.1 (ICD-10-CM) - Generalized weakness   G62.9 (ICD-10-CM) - Peripheral polyneuropathy   R26.89 (ICD-10-CM) - Impaired gait and mobility      Evaluation Date: 4/23/2024  Authorization Period Expiration: 12/31/24  Plan of Care Expiration: 6/18/24  Updated Plan of Care Expiration: 8/13/24  Progress Note Due: 7/18/24  Visit # / Visits authorized: 17/ 20 ( plus eval)~ KX modifier  FOTO: 3/3     Precautions: Standard, Fall, and vision impaired      Time In: 10:30      Time Out: 11:15     Total Billable Time: 45   minutes    PTA Visit #: 1/5       Subjective     Patient reports: " He had cataract surgery on Monday, it was fine and it went fast."   He was compliant with HEP.  Response to previous treatment: sore  Functional change: ongoing~ managing stairs at home is getting easier; pt also walking more    Pain: 0/10  Location: N/A     Objective      Objective Measures updated 6/18/24.        Treatment     Anthony received the treatments listed below:      therapeutic exercises to develop strength, endurance, ROM, flexibility, posture, and core stabilization for 20 minutes including:      X 8 minutes on SCI-FIT recumbent stepper, level 4.0, for B UE/ LE muscular and CV endurance~ frequent cues to prevent screen from timing out      Seated on edge of high/low mat:  2 x 10 B LE LAQ with 5 # ankle weight added   2 x 10 reps of B LE marching in place with #5lb weight added    neuromuscular re-education activities to improve: Balance, Coordination, Kinesthetic, Sense, Proprioception, and Posture for 0 minutes. The following " activities were included:      therapeutic activities to improve functional performance for 25  minutes, including:    SPT from W/C ---> stepper seat with no A.D and CGA  Sit to stand from stepper seat to RW with CGA  Pt ambulated ~5 ft with RW and CGA from stepper seat to EOM with RW  Sit<> stand from mat to RW, S, multiple trials    Pt ambulates around gym x 2 trials, 125 feet each, with HHA and SC.        gait training to improve functional mobility and safety for 0 minutes, including:        Patient Education and Home Exercises       Education provided:   - HEP  -5/24/24: PT provided pt with a schedule slip for the remainder of June.  -6/13/24: PT provided pt with a schedule slip for the 1st 3 weeks of July.   -7/9/24: PT provided pt with a schedule slip for the first 2 weeks of August    Written Home Exercises Provided: yes. Exercises were reviewed and Anthony was able to demonstrate them prior to the end of the session.  Anthony demonstrated good  understanding of the education provided. See Electronic Medical Record under Patient Instructions for exercises provided during therapy sessions    Assessment     Anthony tolerated his treatment session well today and did not have any problems or complaints noted.  Anthony was able to increase his gait distance today slightly and required less assistance for transfers.  Anthony remains appropriate for 2 x weekly therapy sessions to address current mobility and balance deficits.     Anthony Is progressing well towards his goals.   Patient prognosis is Fair.     Patient will continue to benefit from skilled outpatient physical therapy to address the deficits listed in the problem list box on initial evaluation, provide pt/family education and to maximize pt's level of independence in the home and community environment.     Patient's spiritual, cultural and educational needs considered and pt agreeable to plan of care and goals.     Anticipated barriers to physical therapy: history of  multiple falls; fear of falling, general anxiety and pt does not drive     Goals:     Short Term Goals: 4 weeks   Pt will be issued first installment of HEP and report at least partial compliance~MET, 5/24/24   Pt will improve his 30 second chair rise to 3-4 reps to demonstrate increased LE strength and muscular endurance~MET, 5/24/24   Pt will improve his TUG score to 1 minute, 10 seconds or < with appropriate AD to improve household ambulation and decrease fall risk~MET, 5/24/24   Pt will increase his SSWS to 0.13 m/sec with appropriate AD to improve community ambulation and decrease fall risk~MET, 5/24/24   Pt will complete mCTSIB balance testing and PT to set appropriate goals~ MET, 5/10/24  Pt will improve any 2 LE muscle grades by 1/3 to help with functional mobility skills~MET, 5/24/24   Pt will perform all sit<> stand and stand pivot transfers with no more than SBA using RW; min A without device~MET, 5/24/24   (NEW Goal, 5/10/24): pt to increase his score on condition # 2 of the mCTSIB to 16 seconds for improved ability to balance in low/eliminated vision environments~MET, 5/24/24      Long Term Goals: 8 weeks   Pt will be partially compliant with finalized HEP to help maintain potential gains realized in PT~ongoing  Pt will improve his 30 second chair rise to 4-5 reps to demonstrate increased LE strength and muscular endurance~ongoing and progressing, MET, 6/18/24~ revise slightly to 5 reps  Pt will improve his TUG score to 60 seconds or < with appropriate AD to improve household ambulation and decrease fall risk~MET, 5/24/24 ~ revise this goal to 28 seconds, remains current, 6/18/24  Pt will increase his SSWS to 0.14 m/sec with or without appropriate AD to improve community ambulation and decrease fall risk~MET, 5/24/24 ~ revise this goal to 0.55 m/sec, remains current, 6/18/24  Pt will improve any 4 LE muscle grades by 1/3 to help with functional mobility skills~ MET, 5/24/24   Pt will perform all sit<>  stand and stand pivot transfers with no more than S using RW; CGA without device~MET, 6/18/24  7.   ( NEW Goal, 5/10/24): pt to increase his score on condition # 2 of the mCTSIB to 19 seconds for improved ability to balance in low/eliminated vision environments~MET, 5/24/24     Plan       Cont with strengthening, endurance, balance and functional mobility.     Ciara Pro, PTA   7/17/2024

## 2024-07-18 ENCOUNTER — PATIENT MESSAGE (OUTPATIENT)
Dept: INTERNAL MEDICINE | Facility: CLINIC | Age: 74
End: 2024-07-18
Payer: MEDICARE

## 2024-07-18 DIAGNOSIS — R19.5 POSITIVE COLORECTAL CANCER SCREENING USING COLOGUARD TEST: Primary | ICD-10-CM

## 2024-07-19 ENCOUNTER — TELEPHONE (OUTPATIENT)
Dept: REHABILITATION | Facility: HOSPITAL | Age: 74
End: 2024-07-19
Payer: MEDICARE

## 2024-07-19 NOTE — TELEPHONE ENCOUNTER
PT called pt to check on him after he did not show for his 11:15 appointment today. Pt reports he thought the appointment was for 1:15. PT reminded pt of his next session on 7/23/24, also scheduled for 11:15. No charges posted today.    Ba Diaz, PT  7/19/2024

## 2024-07-22 ENCOUNTER — TELEPHONE (OUTPATIENT)
Dept: PAIN MEDICINE | Facility: CLINIC | Age: 74
End: 2024-07-22
Payer: MEDICARE

## 2024-07-22 DIAGNOSIS — E11.40 PAINFUL DIABETIC NEUROPATHY: Primary | ICD-10-CM

## 2024-07-22 DIAGNOSIS — R07.81 RIB PAIN ON LEFT SIDE: ICD-10-CM

## 2024-07-22 RX ORDER — LIDOCAINE 50 MG/G
3 PATCH TOPICAL DAILY PRN
Qty: 30 PATCH | Refills: 1 | Status: SHIPPED | OUTPATIENT
Start: 2024-07-22

## 2024-07-22 NOTE — PROGRESS NOTES
[facility]   Physical Therapy Treatment and Progress Note     Name: Anthony Ball  Clinic Number: [id number]    Therapy Diagnosis:   Encounter Diagnoses   Name Primary?    Balance problem Yes    Impaired functional mobility, balance, gait, and endurance              Physician: Isaias Myles MD    Visit Date: 7/23/2024    Physician Orders: PT Eval and Treat   Medical Diagnosis from Referral:   R53.1 (ICD-10-CM) - Generalized weakness   G62.9 (ICD-10-CM) - Peripheral polyneuropathy   R26.89 (ICD-10-CM) - Impaired gait and mobility      Evaluation Date: 4/23/2024  Authorization Period Expiration: 12/31/24  Plan of Care Expiration: 6/18/24  Updated Plan of Care Expiration: 8/13/24  Progress Note Due: 7/18/24  Visit # / Visits authorized: 18/ 32 ( plus eval)~ KX modifier  FOTO: 3/3     Precautions: Standard, Fall, and vision impaired      Time In: 1118        Time Out: 1200       Total Billable Time: 42    minutes    PTA Visit #: 0/5       Subjective     Patient reports: he went to the eye doctor today and everything looks good. He will have the other cataract surgery on August 29th.  He was compliant with HEP.  Response to previous treatment: no adverse effects  Functional change: ongoing~ managing stairs at home is getting easier; pt also walking more    Pain: 0/10  Location: N/A     Objective      Objective Measures updated 7/23/24. See below:         Lower Extremity Strength~ tested seated in chair  Right LE eval  5/24/24 6/18/24 7/23/24 Left LE eval  5/24/24 6/18/24 7/23/24   Hip flexion:  4-/5 4-/5 4-/5 4/5* Hip flexion: 3+/5 4-/5 4-/5 4-/5*   Knee extension: 4+/5 5/5 5/5 5/5 Knee extension: 4+/5 4/5 5/5 4+/5   Knee flexion: 4-/5 4-/5 4-/5 4/5 Knee flexion: 4-/5 4/5 4/5 4+/5   Ankle dorsiflexion:  3+/5* 4-/5* 4+/5* 4+/5* Ankle dorsiflexion: 3+/5* 3+/5* 4-/5* 4-/5 to 4/5*   Ankle plantarflexion:  4/5 4/5 4 to 4+/5 4+/5 Ankle plantarflexion: 4+/5 4+/5 4+/5 4+/5   Hip abduction: 4+/5 5/5  5/5 5/5 Hip abduction: 4+/5 5/5 5/5 5/5   Hip adduction: 5/5 5/5 5/5 5/5 Hip adduction 5/5 5/5 5/5 5/5   Hip extension: 5/5 5/5 5/5 5/5 Hip extension: 4+/5 5/5 5/5 5/5   *within ROM     5/10/24  MCTSIB:  1. Eyes Open/feet together/Firm: 30 seconds~ slightly widened SIERRA  2. Eyes Closed/feet together/Firm: 13 seconds  3. Eyes Open/feet together/Foam: 30 seconds~ wider SIERRA and min sway  4. Eyes Closed/feet together/Foam: 5 seconds     5/24/24  MCTSIB:  1. Eyes Open/feet together/Firm: 30 seconds~ slightly widened SIERRA  2. Eyes Closed/feet together/Firm: 30 seconds~ min sway with slightly widened SIERRA  3. Eyes Open/feet together/Foam: 30 seconds~ wider SIERRA and min sway  4. Eyes Closed/feet together/Foam: 5 seconds     6/18/24  MCTSIB:  1. Eyes Open/feet together/Firm: 30 seconds  2. Eyes Closed/feet together/Firm: 30 seconds~ min sway   3. Eyes Open/feet together/Foam: 30 seconds~  min sway  4. Eyes Closed/feet together/Foam: 8.5 seconds~ average of 2 trials    7/23/24  MCTSIB:  1. Eyes Open/feet together/Firm: 30 seconds  2. Eyes Closed/feet together/Firm: 30 seconds  3. Eyes Open/feet together/Foam: 30 seconds  4. Eyes Closed/feet together/Foam: 5.5 seconds~ average of 2 trials           Functional Mobility (Bed mobility, transfers)  SBA stand pivot transfer from transport chair to gold chair, no AD~ increased reliance on UEs  Sit<> stand gold chair or transport chair with/without RW, S  Stand> sit from RW to stepper seat, S  Stand pivot transfer stepper seat> transport chair, S            Evaluation 5/24/24 6/18/24 7/23/24   Single Limb Stance R LE NT  (<10 sec = HIGH FALL RISK) NT  (<10 sec = HIGH FALL RISK) NT  (<10 sec = HIGH FALL RISK) NT  (<10 sec = HIGH FALL RISK)   Single Limb Stance L LE NT  (<10 sec = HIGH FALL RISK) NT  (<10 sec = HIGH FALL RISK) NT  (<10 sec = HIGH FALL RISK) NT  (<10 sec = HIGH FALL RISK)   30 second Chair Rise 2 completed with arms and RW placed in front, CGA 3 completed with arms and no  AD placed in front, S 4 completed with arms and RW placed in front, S 4 completed with arms and RW placed in front, S   5 times sit to stand NT NT NT NT   TUG 1 minute, 27 seconds with RW, CGA 32 seconds with RW, S 34 seconds with RW, S 45 seconds with RW, S   Self selected walking speed 0.12 m/sec (6m/50s) with RW, CGA 0.50 m/sec (6m/12s) with RW, S 0.50 m/sec (6m/12s) with RW, S 0.375 m/sec (6m/16s) with RW, S   Fast walking speed NT NT NT NT      30 second chair rise below average score:   Age                 Men                 Women     70-74               <15                  <14  A below average score indicates a risk for fall.     5x sit to stand normative information:   >12 sec= fall risk for general elderly  >16 sec= fall risk for Parkinson's disease  >10 sec= balance/vestibular dysfunction (<61 y/o)  >14.2 sec= balance/vestibular dysfunction (>61 y/o)  >12 sec= fall risk for CVA           Gait Assessment:   - AD used: RW  - Assistance: S to mod I  - Stairs: NT     GAIT DEVIATIONS:              Anthony displays the following deviations with ambulation: decreased anjel, decreased step length B, ER of both feet, forward flexed trunk              Impairments contributing to deviations: decreased strength, decreased balance, decreased sensation to B feet, decreased endurance             Treatment     Anthony received the treatments listed below:      therapeutic exercises to develop strength, endurance, ROM, flexibility, posture, and core stabilization for 18  minutes including:  ( Includes time for LE MMT)    X 8 minutes on SCI-FIT recumbent stepper, level 3.0, for B UE/ LE muscular and CV endurance          neuromuscular re-education activities to improve: Balance, Coordination, Kinesthetic, Sense, Proprioception, and Posture for 0 minutes. The following activities were included:        therapeutic activities to improve functional performance for 24  minutes, including:  (Includes time to complete objective  testing + FOTO survey)            gait training to improve functional mobility and safety for 0 minutes, including:        Patient Education and Home Exercises       Education provided:   - HEP  -5/24/24: PT provided pt with a schedule slip for the remainder of June.  -6/13/24: PT provided pt with a schedule slip for the 1st 3 weeks of July.   -7/9/24: PT provided pt with a schedule slip for the first 2 weeks of August.  -7/23/24: PT provided pt with a schedule slip for the remainder of August.    Written Home Exercises Provided: yes. Exercises were reviewed and Anthony was able to demonstrate them prior to the end of the session.  Anthony demonstrated good  understanding of the education provided. See Electronic Medical Record under Patient Instructions for exercises provided during therapy sessions    Assessment     Anthony tolerated his treatment session well for his monthly reassessment. He demonstrated improvement only in some LE muscle grades, as indicated in the above chart. Functionally, his performance declined during most walking tests. His TUG time was 11 seconds slower than the previous assessment and his SSWS( self-selected walking speed) moved from 0.50 m/sec to 0.375 m/sec. His 30 second chair rise remained at 4 reps and his FOTO survey demonstrated a 17 point decline. He passed all but condition # 4 of the mCTSIB, though his score on this condition decreased slightly from the previous reassessment. Pt has undergone one cataract removal and will have surgery on the other eye at the end of August. PT is hopeful that this will help decrease his fear of falling and help him to move with more speed during ambulation and all transitions. Anthony remains appropriate for 2 x weekly therapy sessions to address current mobility and balance deficits. See below for most recent comments regarding goal achievement and any revisions.       Anthony Is progressing well towards his goals.   Patient prognosis is Fair.     Patient  will continue to benefit from skilled outpatient physical therapy to address the deficits listed in the problem list box on initial evaluation, provide pt/family education and to maximize pt's level of independence in the home and community environment.     Patient's spiritual, cultural and educational needs considered and pt agreeable to plan of care and goals.     Anticipated barriers to physical therapy: history of multiple falls; fear of falling, general anxiety and pt does not drive     Goals:     Short Term Goals: 4 weeks   Pt will be issued first installment of HEP and report at least partial compliance~MET, 5/24/24   Pt will improve his 30 second chair rise to 3-4 reps to demonstrate increased LE strength and muscular endurance~MET, 5/24/24   Pt will improve his TUG score to 1 minute, 10 seconds or < with appropriate AD to improve household ambulation and decrease fall risk~MET, 5/24/24   Pt will increase his SSWS to 0.13 m/sec with appropriate AD to improve community ambulation and decrease fall risk~MET, 5/24/24   Pt will complete mCTSIB balance testing and PT to set appropriate goals~ MET, 5/10/24  Pt will improve any 2 LE muscle grades by 1/3 to help with functional mobility skills~MET, 5/24/24   Pt will perform all sit<> stand and stand pivot transfers with no more than SBA using RW; min A without device~MET, 5/24/24   (NEW Goal, 5/10/24): pt to increase his score on condition # 2 of the mCTSIB to 16 seconds for improved ability to balance in low/eliminated vision environments~MET, 5/24/24      Long Term Goals: 8 weeks   Pt will be partially compliant with finalized HEP to help maintain potential gains realized in PT~ongoing  Pt will improve his 30 second chair rise to 4-5 reps to demonstrate increased LE strength and muscular endurance~ongoing and progressing, MET, 6/18/24~ revise slightly to 5 reps, remains current, 7/23/24  Pt will improve his TUG score to 60 seconds or < with appropriate AD to  improve household ambulation and decrease fall risk~MET, 5/24/24 ~ revise this goal to 28 seconds, remains current, 6/18/24, revise to 30 seconds, 7/23/24  Pt will increase his SSWS to 0.14 m/sec with or without appropriate AD to improve community ambulation and decrease fall risk~MET, 5/24/24 ~ revise this goal to 0.55 m/sec, remains current, 6/18/24, 7/23/24  Pt will improve any 4 LE muscle grades by 1/3 to help with functional mobility skills~ MET, 5/24/24   Pt will perform all sit<> stand and stand pivot transfers with no more than S using RW; CGA without device~MET, 6/18/24  7.   ( NEW Goal, 5/10/24): pt to increase his score on condition # 2 of the mCTSIB to 19 seconds for improved ability to balance in low/eliminated vision environments~MET, 5/24/24     Plan       Cont with strengthening, endurance, balance and functional mobility.     Ba Diaz, PT   7/23/2024

## 2024-07-23 ENCOUNTER — CLINICAL SUPPORT (OUTPATIENT)
Dept: REHABILITATION | Facility: HOSPITAL | Age: 74
End: 2024-07-23
Payer: MEDICARE

## 2024-07-23 ENCOUNTER — OFFICE VISIT (OUTPATIENT)
Dept: OPHTHALMOLOGY | Facility: CLINIC | Age: 74
End: 2024-07-23
Payer: MEDICARE

## 2024-07-23 DIAGNOSIS — H25.12 NUCLEAR SCLEROTIC CATARACT OF LEFT EYE: ICD-10-CM

## 2024-07-23 DIAGNOSIS — H25.11 NUCLEAR SCLEROSIS OF RIGHT EYE: ICD-10-CM

## 2024-07-23 DIAGNOSIS — Z74.09 IMPAIRED FUNCTIONAL MOBILITY, BALANCE, GAIT, AND ENDURANCE: ICD-10-CM

## 2024-07-23 DIAGNOSIS — H25.12 NUCLEAR SCLEROSIS OF LEFT EYE: ICD-10-CM

## 2024-07-23 DIAGNOSIS — Z98.890 POST-OPERATIVE STATE: Primary | ICD-10-CM

## 2024-07-23 DIAGNOSIS — R26.89 BALANCE PROBLEM: Primary | ICD-10-CM

## 2024-07-23 PROCEDURE — 99214 OFFICE O/P EST MOD 30 MIN: CPT | Mod: PBBFAC | Performed by: OPHTHALMOLOGY

## 2024-07-23 PROCEDURE — 99024 POSTOP FOLLOW-UP VISIT: CPT | Mod: POP,,, | Performed by: OPHTHALMOLOGY

## 2024-07-23 PROCEDURE — 97530 THERAPEUTIC ACTIVITIES: CPT | Mod: KX,PO

## 2024-07-23 PROCEDURE — 99999 PR PBB SHADOW E&M-EST. PATIENT-LVL IV: CPT | Mod: PBBFAC,,, | Performed by: OPHTHALMOLOGY

## 2024-07-23 PROCEDURE — 97110 THERAPEUTIC EXERCISES: CPT | Mod: KX,PO

## 2024-07-23 RX ORDER — CYCLOP/TROP/PROPA/PHEN/KET/WAT 1-1-0.1%
1 DROPS (EA) OPHTHALMIC (EYE)
OUTPATIENT
Start: 2024-07-23

## 2024-07-23 RX ORDER — PHENYLEPHRINE HYDROCHLORIDE 100 MG/ML
1 SOLUTION/ DROPS OPHTHALMIC
OUTPATIENT
Start: 2024-07-23

## 2024-07-23 RX ORDER — MOXIFLOXACIN 5 MG/ML
1 SOLUTION/ DROPS OPHTHALMIC
OUTPATIENT
Start: 2024-07-23

## 2024-07-23 RX ORDER — SODIUM CHLORIDE 0.9 % (FLUSH) 0.9 %
10 SYRINGE (ML) INJECTION
OUTPATIENT
Start: 2024-07-23

## 2024-07-23 NOTE — PROGRESS NOTES
HPI    07/15/2024 IMPLANT: [id number] 17.0 OD    Patient is here toady for 1 week phaco OD. No pain or discomfort.    PMB TID OD  Last edited by Cathy Fisher on 7/23/2024  8:56 AM.            Assessment /Plan     For exam results, see Encounter Report.    Post-operative state    Nuclear sclerosis of right eye      Slit lamp exam:  L/L: nl  K: clear, wound sealed  AC: trace cell  Iris/Lens: IOL centered and stable    POW1 s/p phaco: Surgery healing well with no signs of infection or abnormal inflammation.    Patient wishes to proceed with surgery in the second eye. Risks, benefits, alternatives reviewed. IOL selection reviewed.     Left eye  IOL: [id number] 16.5

## 2024-07-24 NOTE — PROGRESS NOTES
"[facility]   Physical Therapy Treatment Note     Name: Anthony Ball  Clinic Number: [id number]    Therapy Diagnosis:   Encounter Diagnoses   Name Primary?    Balance problem Yes    Impaired functional mobility, balance, gait, and endurance                Physician: Isaias Myles MD    Visit Date: 7/25/2024    Physician Orders: PT Eval and Treat   Medical Diagnosis from Referral:   R53.1 (ICD-10-CM) - Generalized weakness   G62.9 (ICD-10-CM) - Peripheral polyneuropathy   R26.89 (ICD-10-CM) - Impaired gait and mobility      Evaluation Date: 4/23/2024  Authorization Period Expiration: 12/31/24  Plan of Care Expiration: 6/18/24  Updated Plan of Care Expiration: 8/13/24  Progress Note Due: 8/13/24  Visit # / Visits authorized: 19/ 32 ( plus eval)~ KX modifier  FOTO: 3/3     Precautions: Standard, Fall, and vision impaired      Time In: 1115         Time Out: 1159        Total Billable Time: 44 minutes    PTA Visit #: 0/5       Subjective     Patient reports: " can we walk with a cane today?"  He was compliant with HEP.  Response to previous treatment: no adverse effects  Functional change: ongoing~ managing stairs at home is getting easier; pt also walking more    Pain: 0/10  Location: N/A     Objective      Objective Measures updated 7/23/24.         Treatment     Anthony received the treatments listed below:      therapeutic exercises to develop strength, endurance, ROM, flexibility, posture, and core stabilization for 20  minutes including:      X 8 minutes on SCI-FIT recumbent stepper, level 4.0, for B UE/ LE muscular and CV endurance    Seated on edge of high/low mat:  2 x 10 B LE LAQ with 5 # ankle weight added   2 x 10 B UE rows against resistance of green theraband~ cues to increase speed of movement      neuromuscular re-education activities to improve: Balance, Coordination, Kinesthetic, Sense, Proprioception, and Posture for 0 minutes. The following activities were " included:        therapeutic activities to improve functional performance for 24  minutes, including:    Stand pivot transfer transport chair> stepper seat with no AD, SBA     Sit> stand from stepper seat to SC, CGA    Stand pivot transfer from stepper to mat, min A with SC    Sit> stand from mat to SC with CGA    Pt ambulates 34 feet to stairs with SC, min A( HHA to L UE)    X 4 laps of forward/backward walking inside parallel bars, cues for posture and increased speed of movement, B UE support    X 6 laps of forward walking inside parallel bars with cues for increased speed of movement, B UE support    X 4 laps of forward stepping over 4 hurdles inside parallel bars, B UE support        Ascend and descend 8 six inch steps with B rails, SBA~ reciprocal pattern when ascending and non-reciprocal pattern when descending        Above time includes occasional seated rest breaks.             gait training to improve functional mobility and safety for 0 minutes, including:        Patient Education and Home Exercises       Education provided:   - HEP  -5/24/24: PT provided pt with a schedule slip for the remainder of June.  -6/13/24: PT provided pt with a schedule slip for the 1st 3 weeks of July.   -7/9/24: PT provided pt with a schedule slip for the first 2 weeks of August.  -7/23/24: PT provided pt with a schedule slip for the remainder of August.    Written Home Exercises Provided: yes. Exercises were reviewed and Anthony was able to demonstrate them prior to the end of the session.  Anthony demonstrated good  understanding of the education provided. See Electronic Medical Record under Patient Instructions for exercises provided during therapy sessions    Assessment     Anthony tolerated his treatment session well today and requested to try ambulation with SC. He only performed a short trial of about 34 feet, mainly due to instability and fear of falling. PT then had pt practice improving his speed of movement while walking  inside the parallel bars. Pt performed relatively well here, though he needed frequent cues to improve his trunk posture when ambulating backward. PT also spent some time coaching pt through performance of stand pivot transfers without flexing trunk and holding onto armrests of chair. Pt needs continued work on this and many other functional skills, including sit<> stand. Anthony remains appropriate for 2 x weekly therapy sessions to address current mobility and balance deficits.       Anthony Is progressing well towards his goals.   Patient prognosis is Fair.     Patient will continue to benefit from skilled outpatient physical therapy to address the deficits listed in the problem list box on initial evaluation, provide pt/family education and to maximize pt's level of independence in the home and community environment.     Patient's spiritual, cultural and educational needs considered and pt agreeable to plan of care and goals.     Anticipated barriers to physical therapy: history of multiple falls; fear of falling, general anxiety and pt does not drive     Goals:     Short Term Goals: 4 weeks   Pt will be issued first installment of HEP and report at least partial compliance~MET, 5/24/24   Pt will improve his 30 second chair rise to 3-4 reps to demonstrate increased LE strength and muscular endurance~MET, 5/24/24   Pt will improve his TUG score to 1 minute, 10 seconds or < with appropriate AD to improve household ambulation and decrease fall risk~MET, 5/24/24   Pt will increase his SSWS to 0.13 m/sec with appropriate AD to improve community ambulation and decrease fall risk~MET, 5/24/24   Pt will complete mCTSIB balance testing and PT to set appropriate goals~ MET, 5/10/24  Pt will improve any 2 LE muscle grades by 1/3 to help with functional mobility skills~MET, 5/24/24   Pt will perform all sit<> stand and stand pivot transfers with no more than SBA using RW; min A without device~MET, 5/24/24   (NEW Goal, 5/10/24):  pt to increase his score on condition # 2 of the mCTSIB to 16 seconds for improved ability to balance in low/eliminated vision environments~MET, 5/24/24      Long Term Goals: 8 weeks   Pt will be partially compliant with finalized HEP to help maintain potential gains realized in PT~ongoing  Pt will improve his 30 second chair rise to 4-5 reps to demonstrate increased LE strength and muscular endurance~ongoing and progressing, MET, 6/18/24~ revise slightly to 5 reps, remains current, 7/23/24  Pt will improve his TUG score to 60 seconds or < with appropriate AD to improve household ambulation and decrease fall risk~MET, 5/24/24 ~ revise this goal to 28 seconds, remains current, 6/18/24, revise to 30 seconds, 7/23/24  Pt will increase his SSWS to 0.14 m/sec with or without appropriate AD to improve community ambulation and decrease fall risk~MET, 5/24/24 ~ revise this goal to 0.55 m/sec, remains current, 6/18/24, 7/23/24  Pt will improve any 4 LE muscle grades by 1/3 to help with functional mobility skills~ MET, 5/24/24   Pt will perform all sit<> stand and stand pivot transfers with no more than S using RW; CGA without device~MET, 6/18/24  7.   ( NEW Goal, 5/10/24): pt to increase his score on condition # 2 of the mCTSIB to 19 seconds for improved ability to balance in low/eliminated vision environments~MET, 5/24/24     Plan       Cont with strengthening, endurance, balance and functional mobility.     Ba Diaz, PT   7/25/2024

## 2024-07-25 ENCOUNTER — CLINICAL SUPPORT (OUTPATIENT)
Dept: REHABILITATION | Facility: HOSPITAL | Age: 74
End: 2024-07-25
Payer: MEDICARE

## 2024-07-25 DIAGNOSIS — R26.89 BALANCE PROBLEM: Primary | ICD-10-CM

## 2024-07-25 DIAGNOSIS — Z74.09 IMPAIRED FUNCTIONAL MOBILITY, BALANCE, GAIT, AND ENDURANCE: ICD-10-CM

## 2024-07-25 PROCEDURE — 97530 THERAPEUTIC ACTIVITIES: CPT | Mod: KX,PO

## 2024-07-25 PROCEDURE — 97110 THERAPEUTIC EXERCISES: CPT | Mod: KX,PO

## 2024-07-29 ENCOUNTER — PATIENT MESSAGE (OUTPATIENT)
Dept: SPINE | Facility: CLINIC | Age: 74
End: 2024-07-29
Payer: MEDICARE

## 2024-07-31 ENCOUNTER — CLINICAL SUPPORT (OUTPATIENT)
Dept: REHABILITATION | Facility: HOSPITAL | Age: 74
End: 2024-07-31
Payer: MEDICARE

## 2024-07-31 DIAGNOSIS — R26.89 BALANCE PROBLEM: Primary | ICD-10-CM

## 2024-07-31 DIAGNOSIS — Z74.09 IMPAIRED FUNCTIONAL MOBILITY, BALANCE, GAIT, AND ENDURANCE: ICD-10-CM

## 2024-07-31 PROCEDURE — 97110 THERAPEUTIC EXERCISES: CPT | Mod: PO,CQ

## 2024-07-31 PROCEDURE — 97530 THERAPEUTIC ACTIVITIES: CPT | Mod: PO,CQ

## 2024-07-31 NOTE — PROGRESS NOTES
"[facility]   Physical Therapy Treatment Note     Name: Anthony Ball  Clinic Number: [id number]    Therapy Diagnosis:   Encounter Diagnoses   Name Primary?    Balance problem Yes    Impaired functional mobility, balance, gait, and endurance                Physician: Isaias Myles MD    Visit Date: 7/31/2024    Physician Orders: PT Eval and Treat   Medical Diagnosis from Referral:   R53.1 (ICD-10-CM) - Generalized weakness   G62.9 (ICD-10-CM) - Peripheral polyneuropathy   R26.89 (ICD-10-CM) - Impaired gait and mobility      Evaluation Date: 4/23/2024  Authorization Period Expiration: 12/31/24  Plan of Care Expiration: 6/18/24  Updated Plan of Care Expiration: 8/13/24  Progress Note Due: 8/13/24  Visit # / Visits authorized: 20/ 32 ( plus eval)~ KX modifier  FOTO: 3/3     Precautions: Standard, Fall, and vision impaired      Time In: 0930      Time Out: 1015      Total Billable Time: 45 minutes    PTA Visit #: 1/5       Subjective     Patient reports: "I'm doing ok today."   He was compliant with HEP.  Response to previous treatment: no adverse effects  Functional change: ongoing~ managing stairs at home is getting easier; pt also walking more    Pain: 0/10  Location: N/A     Objective      Objective Measures updated 7/23/24.         Treatment     Anthony received the treatments listed below:      therapeutic exercises to develop strength, endurance, ROM, flexibility, posture, and core stabilization for 25  minutes including:      X 8 minutes on SCI-FIT recumbent stepper, level 5.0, for B UE/ LE muscular and CV endurance    Seated on edge of high/low mat:  2 x 10 B LE LAQ with 6 # ankle weight added   2 x 10 reps of B LE marching in place with #6lb weight  2 x 10 B UE rows against resistance of green theraband~ cues to increase speed of movement      neuromuscular re-education activities to improve: Balance, Coordination, Kinesthetic, Sense, Proprioception, and Posture for 0 minutes. The " following activities were included:        therapeutic activities to improve functional performance for 20  minutes, including:    Stand pivot transfer transport chair> stepper seat with no AD, SBA     Sit> stand from stepper seat to SC, CGA    Stand pivot transfer from stepper to mat, min A with SC    Sit> stand from mat to SC with CGA    Pt ambulates 135 feet and 100ft with SC, min A( HHA to L UE)     Above time includes occasional seated rest breaks.             gait training to improve functional mobility and safety for 0 minutes, including:        Patient Education and Home Exercises       Education provided:   - HEP  -5/24/24: PT provided pt with a schedule slip for the remainder of June.  -6/13/24: PT provided pt with a schedule slip for the 1st 3 weeks of July.   -7/9/24: PT provided pt with a schedule slip for the first 2 weeks of August.  -7/23/24: PT provided pt with a schedule slip for the remainder of August.    Written Home Exercises Provided: yes. Exercises were reviewed and Anthony was able to demonstrate them prior to the end of the session.  Anthony demonstrated good  understanding of the education provided. See Electronic Medical Record under Patient Instructions for exercises provided during therapy sessions    Assessment     Anthony tolerated his tx session well and did not have any problems noted.  Anthony was able to increase his resistance on the stepper and increase his weights for sitting exercises.  Anthony was not as fatigued today, and was able to increase his gait distance, but cont to require HHA.   Anthony remains appropriate for 2 x weekly therapy sessions to address current mobility and balance deficits.       Anthony Is progressing well towards his goals.   Patient prognosis is Fair.     Patient will continue to benefit from skilled outpatient physical therapy to address the deficits listed in the problem list box on initial evaluation, provide pt/family education and to maximize pt's level of  independence in the home and community environment.     Patient's spiritual, cultural and educational needs considered and pt agreeable to plan of care and goals.     Anticipated barriers to physical therapy: history of multiple falls; fear of falling, general anxiety and pt does not drive     Goals:     Short Term Goals: 4 weeks   Pt will be issued first installment of HEP and report at least partial compliance~MET, 5/24/24   Pt will improve his 30 second chair rise to 3-4 reps to demonstrate increased LE strength and muscular endurance~MET, 5/24/24   Pt will improve his TUG score to 1 minute, 10 seconds or < with appropriate AD to improve household ambulation and decrease fall risk~MET, 5/24/24   Pt will increase his SSWS to 0.13 m/sec with appropriate AD to improve community ambulation and decrease fall risk~MET, 5/24/24   Pt will complete mCTSIB balance testing and PT to set appropriate goals~ MET, 5/10/24  Pt will improve any 2 LE muscle grades by 1/3 to help with functional mobility skills~MET, 5/24/24   Pt will perform all sit<> stand and stand pivot transfers with no more than SBA using RW; min A without device~MET, 5/24/24   (NEW Goal, 5/10/24): pt to increase his score on condition # 2 of the mCTSIB to 16 seconds for improved ability to balance in low/eliminated vision environments~MET, 5/24/24      Long Term Goals: 8 weeks   Pt will be partially compliant with finalized HEP to help maintain potential gains realized in PT~ongoing  Pt will improve his 30 second chair rise to 4-5 reps to demonstrate increased LE strength and muscular endurance~ongoing and progressing, MET, 6/18/24~ revise slightly to 5 reps, remains current, 7/23/24  Pt will improve his TUG score to 60 seconds or < with appropriate AD to improve household ambulation and decrease fall risk~MET, 5/24/24 ~ revise this goal to 28 seconds, remains current, 6/18/24, revise to 30 seconds, 7/23/24  Pt will increase his SSWS to 0.14 m/sec with or  without appropriate AD to improve community ambulation and decrease fall risk~MET, 5/24/24 ~ revise this goal to 0.55 m/sec, remains current, 6/18/24, 7/23/24  Pt will improve any 4 LE muscle grades by 1/3 to help with functional mobility skills~ MET, 5/24/24   Pt will perform all sit<> stand and stand pivot transfers with no more than S using RW; CGA without device~MET, 6/18/24  7.   ( NEW Goal, 5/10/24): pt to increase his score on condition # 2 of the mCTSIB to 19 seconds for improved ability to balance in low/eliminated vision environments~MET, 5/24/24     Plan       Cont with strengthening, endurance, balance and functional mobility.     Ciara Pro, PTA   7/31/2024

## 2024-08-01 ENCOUNTER — OFFICE VISIT (OUTPATIENT)
Dept: PAIN MEDICINE | Facility: CLINIC | Age: 74
End: 2024-08-01
Payer: MEDICARE

## 2024-08-01 VITALS
DIASTOLIC BLOOD PRESSURE: 74 MMHG | WEIGHT: 249.81 LBS | HEART RATE: 105 BPM | OXYGEN SATURATION: 99 % | RESPIRATION RATE: 18 BRPM | BODY MASS INDEX: 36.89 KG/M2 | SYSTOLIC BLOOD PRESSURE: 117 MMHG | TEMPERATURE: 98 F

## 2024-08-01 DIAGNOSIS — E11.40 PAINFUL DIABETIC NEUROPATHY: Primary | ICD-10-CM

## 2024-08-01 DIAGNOSIS — E11.42 DIABETIC POLYNEUROPATHY ASSOCIATED WITH TYPE 2 DIABETES MELLITUS: ICD-10-CM

## 2024-08-01 DIAGNOSIS — M79.2 NEUROPATHIC PAIN: ICD-10-CM

## 2024-08-01 DIAGNOSIS — E11.40 PAINFUL DIABETIC NEUROPATHY: ICD-10-CM

## 2024-08-01 PROCEDURE — 99999PBSHW PR PBB SHADOW TECHNICAL ONLY FILED TO HB: Mod: JZ,PBBFAC,,

## 2024-08-01 PROCEDURE — 99999 PR PBB SHADOW E&M-EST. PATIENT-LVL IV: CPT | Mod: PBBFAC,,, | Performed by: NURSE PRACTITIONER

## 2024-08-01 PROCEDURE — 99214 OFFICE O/P EST MOD 30 MIN: CPT | Mod: PBBFAC | Performed by: NURSE PRACTITIONER

## 2024-08-01 RX ORDER — DULOXETIN HYDROCHLORIDE 60 MG/1
60 CAPSULE, DELAYED RELEASE ORAL 2 TIMES DAILY
Qty: 180 CAPSULE | Refills: 3 | Status: SHIPPED | OUTPATIENT
Start: 2024-08-01 | End: 2025-08-01

## 2024-08-01 RX ORDER — PREGABALIN 150 MG/1
150 CAPSULE ORAL 3 TIMES DAILY
Qty: 90 CAPSULE | Refills: 5 | Status: SHIPPED | OUTPATIENT
Start: 2024-08-01

## 2024-08-01 NOTE — PROGRESS NOTES
PCP: Isaias Myles MD    REFERRING PHYSICIAN: Jacqueline Armstrong,*    CHIEF COMPLAINT: Bilateral foot pain    Original HISTORY OF PRESENT ILLNESS: Anthony Ball presents to the clinic for the evaluation of painful diabetic neuropathy that he has had for many years. He has tried multiple medications without much relief. He has tried some capsicin over the past 2-3 days that did provide some mild improvement. His pain has been quite severe and makes it difficult for him to even stand. He has been working with home health therapy to help him with mobility around the house, but he says that it is too painful. He has had 7 falls this year due to the pain. He is interested in discussing any further treatment options that may be available.      Original Pain Description:  The pain is located in the bilateral feet. The pain is described as burning, sharp, shooting, and stabbing. Exacerbating factors: Standing, Touching, and Walking. Mitigating factors: none. Symptoms interfere with daily activity and sleeping. The patient feels like symptoms have been unchanged. Patient denies night fever/night sweats, urinary incontinence, and bowel incontinence.    Original PAIN SCORES:  Best: Pain is 2  Worst: Pain is 8  Current: Pain is 8    Interval History 4/30/2024:  The patient is here today for follow up of bilateral foot pain secondary to PDN. He is here today for application of Qutenza patches as ordered by Dr. Campbell. He has no concerns and would like to proceed. He says that the increase in Lyrica has been helpful. He also has been using the OTC strength Capsaicin patch with benefit. No side effects reported.     Interval History 6/7/2024:  The patient returns for follow up of bilateral foot pain secondary to PRN. He underwent applications of Qutenza patches to bilateral feet (2/foot) on 4/30/24. He did have some increased pain for about 3 days. After that time, he reports substantial benefit. He is now reporting about  90% improvement in his symptoms. He is very happy with the results. No new complaints at this time. His pain today is 2/10.    Interval History 8/1/2024:  The patient is here for second application of Qutenza patches to bilateral feet. He has a known history of painful diabetic neuropathy. He had the patches applied by me on 4/30/24. He reported 90% improvement for about 2 months. The pain has been worsening since that time and he is here to repeat for additional pain relief. He continues taking Lyrica 150 mg daily. His pain today is 9/10.        4/30/2024    10:03 AM   Last 3 PDI Scores   Pain Disability Index (PDI) 56       6 weeks of Conservative therapy:  PT: Yes, currently enrolled in PT  Chiro: No  HEP: Yes, daily HEP      Previous Treatments / Medications: (Ice/Heat/NSAIDS/APAP/etc):  Cymbalta  Lyrica  Norco  Robaxin  Capsicum (topical)  Trazodone    Interventional Pain Procedures: (Previous injections)  None    Past Medical History:   Diagnosis Date    Acute deep vein thrombosis (DVT) of left lower extremity 12/2023    Anxiety     Atrial fibrillation 12/2023    Cataract     Coronary artery disease     CAC score 1430    Depression     Diabetic neuropathy     Essential (primary) hypertension     GERD (gastroesophageal reflux disease)     Insomnia     Neuromyopathy     Possibly DM and/or alcohol related.    Pulmonary embolism 12/2023    Reactive airway disease     Type 2 diabetes mellitus      Past Surgical History:   Procedure Laterality Date    CATARACT EXTRACTION W/  INTRAOCULAR LENS IMPLANT Right 7/15/2024    Procedure: EXTRACTION, CATARACT, WITH IOL INSERTION;  Surgeon: Margot Jacobson MD;  Location: [facility];  Service: Ophthalmology;  Laterality: Right;    COLONOSCOPY      Normal around 2020    TOTAL KNEE ARTHROPLASTY Right      Social History     Socioeconomic History    Marital status:    Tobacco Use    Smoking status: Never    Smokeless tobacco: Never   Substance and Sexual Activity    Alcohol  use: Yes     Comment: Former heavy use    Drug use: Never    Sexual activity: Yes     Comment: unknown     Social Determinants of Health     Financial Resource Strain: Low Risk  (1/15/2024)    Overall Financial Resource Strain (CARDIA)     Difficulty of Paying Living Expenses: Not very hard   Food Insecurity: No Food Insecurity (1/15/2024)    Hunger Vital Sign     Worried About Running Out of Food in the Last Year: Never true     Ran Out of Food in the Last Year: Never true   Transportation Needs: No Transportation Needs (1/15/2024)    PRAPARE - Transportation     Lack of Transportation (Medical): No     Lack of Transportation (Non-Medical): No   Physical Activity: Inactive (1/15/2024)    Exercise Vital Sign     Days of Exercise per Week: 0 days     Minutes of Exercise per Session: 0 min   Stress: No Stress Concern Present (1/15/2024)    Citizen of [place] [employer] - Occupational Stress Questionnaire     Feeling of Stress : Only a little   Housing Stability: Low Risk  (1/15/2024)    Housing Stability Vital Sign     Unable to Pay for Housing in the Last Year: No     Number of Places Lived in the Last Year: 1     Unstable Housing in the Last Year: No     Family History   Problem Relation Name Age of Onset    Hypertension Mother         Review of patient's allergies indicates:  No Known Allergies    Current Outpatient Medications   Medication Sig    albuterol (VENTOLIN HFA) 90 mcg/actuation inhaler Inhale 2 puffs into the lungs every 6 (six) hours as needed for Wheezing. Rescue    apixaban (ELIQUIS) 5 mg Tab Take 1 tablet (5 mg total) by mouth 2 (two) times daily.    busPIRone (BUSPAR) 15 MG tablet Take 1 tablet (15 mg total) by mouth 2 (two) times daily as needed (Anxiety).    capsicum 0.075% (ZOSTRIX) 0.075 % topical cream Apply topically 3 (three) times daily as needed (Foot pain).    cyanocobalamin (VITAMIN B-12) 1000 MCG tablet Take 1 tablet (1,000 mcg total) by mouth once daily.    DULoxetine  (CYMBALTA) 60 MG capsule Take 1 capsule (60 mg total) by mouth 2 (two) times daily.    fluticasone-umeclidin-vilanter (TRELEGY ELLIPTA) 100-62.5-25 mcg DsDv Inhale 1 puff into the lungs once daily.    hydrOXYzine pamoate (VISTARIL) 25 MG Cap Take 25 mg by mouth every 8 (eight) hours as needed.    LIDOcaine (LIDODERM) 5 % Place 3 patches onto the skin daily as needed (Rib pain). Okay to use 1 to 3 patches. Remove & Discard patches within 12 hours or as directed by MD    magnesium oxide 400 mg magnesium Tab Take 1 tablet by mouth every evening.    methocarbamoL (ROBAXIN) 750 MG Tab Take 1 tablet (750 mg total) by mouth 3 (three) times daily as needed (Back pain).    nortriptyline (PAMELOR) 10 MG capsule Take 1 capsule (10 mg total) by mouth 3 (three) times daily.    ondansetron (ZOFRAN) 8 MG tablet Take 1 tablet (8 mg total) by mouth every 8 (eight) hours as needed for Nausea.    pantoprazole (PROTONIX) 40 MG tablet TAKE 1 TABLET(40 MG) BY MOUTH EVERY DAY    pregabalin (LYRICA) 150 MG capsule Take 1 capsule (150 mg total) by mouth 3 (three) times daily.    traZODone (DESYREL) 100 MG tablet Take 2 tablets (200 mg total) by mouth nightly as needed for Insomnia.    triamcinolone acetonide 0.1% (KENALOG) 0.1 % cream Apply topically 2 (two) times daily. Use for 1 to 2 weeks as needed for rash.     No current facility-administered medications for this visit.       ROS:  GENERAL: No fever. No chills. No fatigue. Denies weight loss. Denies weight gain.  HEENT: Denies headaches. Denies vision change. Denies eye pain. Denies double vision. Denies ear pain.   CV: Denies chest pain.   PULM: Denies of shortness of breath.  GI: Denies constipation. No diarrhea. No abdominal pain. Denies nausea. Denies vomiting. No blood in stool.  HEME: Denies bleeding problems.  : Denies urgency. No painful urination. No blood in urine.  MS: Denies joint stiffness. Denies joint swelling.  Denies back pain.  SKIN: Denies rash.   NEURO: Denies  seizures. No weakness.  PSYCH:  Denies difficulty sleeping. No anxiety. Denies depression. No suicidal thoughts.       VITALS:   Vitals:    08/01/24 1032   BP: 117/74   Pulse: 105   Resp: 18   Temp: 98.2 °F (36.8 °C)   SpO2: 99%   Weight: 113.3 kg (249 lb 12.5 oz)   PainSc:   9   PainLoc: Foot     PHYSICAL EXAM:     GENERAL: Alert and oriented x 3, no acute distress  PSYCH:  Mood and affect appropriate.  SKIN: Skin color, texture, turgor normal, no rashes or lesions.  HEENT:  Normocephalic, atraumatic. Cranial nerves grossly intact.  PULM: Non-labored breathing, symmetric chest rise.  EXTREMITIES: No deformities, edema, or skin discoloration.   MUSCULOSKELETAL: Bilateral upper and lower extremity strength is symmetric and within functional limits.  NEURO: Allodynia noted to bilateral feet.   GAIT: Antalgic. Uses WC for mobility.    LABS:  Lab Results   Component Value Date    HGBA1C 5.6 05/17/2024         IMAGING:    EXAMINATION:  MRI LUMBAR SPINE WITHOUT CONTRAST     CLINICAL HISTORY:  Ataxia or coordination problem (Ped 0-18y);     TECHNIQUE:  Multiplanar, multisequence MR imaging of the lumbar spine without the use of intravenous contrast.     COMPARISON:  None     FINDINGS:  Alignment: Grade 1 anterolisthesis of L4 on L5. The remainder of the lumbar alignment is within normal limits.     Vertebrae: 5 lumbar-type vertebral bodies. No aggressive marrow replacement process or fracture.  There is probable intraosseous hemangioma in the S1 vertebral body.     Discs: Diffuse disc height loss worst at L4-L5.     Cord: Normal.  Conus terminates T11-T12.  The conus is normal in morphology.     Degenerative findings:     T12-L1: No significant spinal canal stenosis or neural foraminal narrowing.     L1-L2:  Small diffuse posterior disc bulge and bilateral facet arthropathy.  No significant spinal canal stenosis or neural foraminal narrowing.     L2-L3: Diffuse asymmetric disc bulge to the left, bilateral facet arthropathy  with mild left neural foraminal narrowing.  No spinal canal stenosis.     L3-L4: Diffuse asymmetric disc bulge to the left, bilateral facet arthropathy with mild left neural foraminal narrowing.  No spinal canal stenosis.     L4-L5: There is uncovering of the disc.  There is superimposed central disc protrusion.  The spinal canal is within normal limits.  There is mild bilateral neural foraminal narrowing.  There is fluid within the facet joints.     L5-S1: No significant spinal canal stenosis or neural foraminal narrowing.     Paraspinous soft tissues: Bilateral renal cysts.  Paraspinal muscle atrophy.  There is distention of the urinary bladder.     Impression:     1.  Minimal anterolisthesis of L4 on L5 with uncovering of the disc and superimposed central disc protrusion.  No significant spinal canal narrowing.  Mild bilateral neural foramina at this level.     2.  Additional mild multilevel degenerative changes in the lumbar spine.     3.  Distended urinary bladder.    ASSESSMENT: 74 y.o. year old male with pain, consistent with:    Encounter Diagnosis   Name Primary?    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 He comes to us with painful diabetic neuropathy for several years that has failed to respond to multiple oral and topical agents.       PLAN:  Continue Lyrica 150mg TID.  Continue Nortriptyline 10mg TID.  Qutenza 2 patches for each foot were applied today- see note below. If we repeat, will likely only apply to posterior feet.  Will follow-up as needed.      Hands washed, dried and gloved donned prior to patient care. Examined pt's feet for any open wounds. Skin intact with no open areas noted. Patches applied to plantar and dorsal areas of both feet. Wrapped to help with adherence. Examined after application, no signs of redness or irritation. Patient denies pain. Anterior patches removed at 25 minutes due to burning sensation.  Posterior patches removed after 30 minutes and disposed of in biohazard bag. Cleaning gel used to clean feet. Patient was informed to avoid heat to feet for 48 hours. If pain increases, apply ice in 20 minute intervals.    The above plan and management options were discussed at length with patient. Patient is in agreement with the above and verbalized understanding.     Jacqueline Armstrong, ESTEFANI  08/01/2024

## 2024-08-02 ENCOUNTER — DOCUMENTATION ONLY (OUTPATIENT)
Dept: REHABILITATION | Facility: HOSPITAL | Age: 74
End: 2024-08-02
Payer: MEDICARE

## 2024-08-02 NOTE — PROGRESS NOTES
PT/PTA met face to face to discuss pt's treatment plan and progress towards established goals.  Continue with current PT POC with focus on balance, endurance, gait and activity tolerance.  Patient will be seen by physical therapist at least every sixth treatment or 30 days, whichever occurs first.    Ciara Pro, PTA  08/02/2024

## 2024-08-07 ENCOUNTER — CLINICAL SUPPORT (OUTPATIENT)
Dept: REHABILITATION | Facility: HOSPITAL | Age: 74
End: 2024-08-07
Payer: MEDICARE

## 2024-08-07 DIAGNOSIS — Z74.09 IMPAIRED FUNCTIONAL MOBILITY, BALANCE, GAIT, AND ENDURANCE: ICD-10-CM

## 2024-08-07 DIAGNOSIS — R26.89 BALANCE PROBLEM: Primary | ICD-10-CM

## 2024-08-07 PROCEDURE — 97110 THERAPEUTIC EXERCISES: CPT | Mod: PO,CQ

## 2024-08-07 PROCEDURE — 97530 THERAPEUTIC ACTIVITIES: CPT | Mod: PO,CQ

## 2024-08-08 ENCOUNTER — TELEPHONE (OUTPATIENT)
Dept: ENDOSCOPY | Facility: HOSPITAL | Age: 74
End: 2024-08-08
Payer: MEDICARE

## 2024-08-08 ENCOUNTER — PATIENT MESSAGE (OUTPATIENT)
Dept: INTERNAL MEDICINE | Facility: CLINIC | Age: 74
End: 2024-08-08
Payer: MEDICARE

## 2024-08-08 ENCOUNTER — DOCUMENT SCAN (OUTPATIENT)
Dept: HOME HEALTH SERVICES | Facility: HOSPITAL | Age: 74
End: 2024-08-08
Payer: MEDICARE

## 2024-08-08 VITALS — BODY MASS INDEX: 36.88 KG/M2 | WEIGHT: 249 LBS | HEIGHT: 69 IN

## 2024-08-08 DIAGNOSIS — Z12.11 SPECIAL SCREENING FOR MALIGNANT NEOPLASMS, COLON: ICD-10-CM

## 2024-08-08 DIAGNOSIS — R19.5 POSITIVE COLORECTAL CANCER SCREENING USING COLOGUARD TEST: Primary | ICD-10-CM

## 2024-08-09 ENCOUNTER — CLINICAL SUPPORT (OUTPATIENT)
Dept: REHABILITATION | Facility: HOSPITAL | Age: 74
End: 2024-08-09
Payer: MEDICARE

## 2024-08-09 ENCOUNTER — TELEPHONE (OUTPATIENT)
Dept: ENDOSCOPY | Facility: HOSPITAL | Age: 74
End: 2024-08-09
Payer: MEDICARE

## 2024-08-09 DIAGNOSIS — Z74.09 IMPAIRED FUNCTIONAL MOBILITY, BALANCE, GAIT, AND ENDURANCE: ICD-10-CM

## 2024-08-09 DIAGNOSIS — R26.89 BALANCE PROBLEM: Primary | ICD-10-CM

## 2024-08-09 PROCEDURE — 97110 THERAPEUTIC EXERCISES: CPT | Mod: KX,PO

## 2024-08-09 PROCEDURE — 97530 THERAPEUTIC ACTIVITIES: CPT | Mod: KX,PO

## 2024-08-12 NOTE — PROGRESS NOTES
[facility]   Physical Therapy Treatment and Updated Plan of Care     Name: Anthony Ball  Clinic Number: [id number]    Therapy Diagnosis:   Encounter Diagnoses   Name Primary?    Balance problem Yes    Impaired functional mobility, balance, gait, and endurance          Physician: Isaias Myles MD    Visit Date: 8/13/2024    Physician Orders: PT Eval and Treat   Medical Diagnosis from Referral:   R53.1 (ICD-10-CM) - Generalized weakness   G62.9 (ICD-10-CM) - Peripheral polyneuropathy   R26.89 (ICD-10-CM) - Impaired gait and mobility      Evaluation Date: 4/23/2024  Authorization Period Expiration: 12/31/24  Updated Plan of Care Expiration: 10/8/24  Progress Note Due: 9/13/24  Visit # / Visits authorized: 23/ 32 ( plus eval)~ KX modifier  FOTO: 4/3     Precautions: Standard, Fall, and vision impaired      Time In:  1255        Time Out: 1340        Total Billable Time: 45 minutes    PTA Visit #: 0/5       Subjective     Patient reports: he walked with a cane this weekend and did well.  He was compliant with HEP.  Response to previous treatment: no adverse effects  Functional change: ongoing~ managing stairs at home is getting easier; pt also walking more    Pain: 0/10  Location: N/A     Objective      Objective Measures updated 8/13/24. See below:         Lower Extremity Strength~ tested seated in chair  Right LE eval  5/24/24 6/18/24 7/23/24 8/13/24 Left LE eval  5/24/24 6/18/24 7/23/24 8/13/24   Hip flexion:  4-/5 4-/5 4-/5 4/5* 4/5* Hip flexion: 3+/5 4-/5 4-/5 4-/5* 4-/5*   Knee extension: 4+/5 5/5 5/5 5/5 5/5 Knee extension: 4+/5 4/5 5/5 4+/5 5/5   Knee flexion: 4-/5 4-/5 4-/5 4/5 4/5 Knee flexion: 4-/5 4/5 4/5 4+/5 4+/5   Ankle dorsiflexion:  3+/5* 4-/5* 4+/5* 4+/5* 4+/5* Ankle dorsiflexion: 3+/5* 3+/5* 4-/5* 4-/5 to 4/5* 4-/5* ~ attempts to substitute with quad   Ankle plantarflexion:  4/5 4/5 4 to 4+/5 4+/5 4+/5 Ankle plantarflexion: 4+/5 4+/5 4+/5 4+/5 4+/5   Hip abduction: 4+/5  5/5 5/5 5/5 5/5 Hip abduction: 4+/5 5/5 5/5 5/5 5/5   Hip adduction: 5/5 5/5 5/5 5/5 5/5 Hip adduction 5/5 5/5 5/5 5/5 5/5   Hip extension: 5/5 5/5 5/5 5/5 5/5 Hip extension: 4+/5 5/5 5/5 5/5 5/5   *within ROM     5/10/24  MCTSIB:  1. Eyes Open/feet together/Firm: 30 seconds~ slightly widened SIERRA  2. Eyes Closed/feet together/Firm: 13 seconds  3. Eyes Open/feet together/Foam: 30 seconds~ wider SIERRA and min sway  4. Eyes Closed/feet together/Foam: 5 seconds     5/24/24  MCTSIB:  1. Eyes Open/feet together/Firm: 30 seconds~ slightly widened SIERRA  2. Eyes Closed/feet together/Firm: 30 seconds~ min sway with slightly widened SIERRA  3. Eyes Open/feet together/Foam: 30 seconds~ wider SIERRA and min sway  4. Eyes Closed/feet together/Foam: 5 seconds     6/18/24  MCTSIB:  1. Eyes Open/feet together/Firm: 30 seconds  2. Eyes Closed/feet together/Firm: 30 seconds~ min sway   3. Eyes Open/feet together/Foam: 30 seconds~  min sway  4. Eyes Closed/feet together/Foam: 8.5 seconds~ average of 2 trials    7/23/24  MCTSIB:  1. Eyes Open/feet together/Firm: 30 seconds  2. Eyes Closed/feet together/Firm: 30 seconds  3. Eyes Open/feet together/Foam: 30 seconds  4. Eyes Closed/feet together/Foam: 5.5 seconds~ average of 2 trials    8/13/24  MCTSIB:  1. Eyes Open/feet together/Firm: 30 seconds  2. Eyes Closed/feet together/Firm: 30 seconds~ min sway  3. Eyes Open/feet together/Foam: 30 seconds  4. Eyes Closed/feet together/Foam: 3 seconds~ best of 2 trials           Functional Mobility (Bed mobility, transfers)  SBA stand pivot transfer from transport chair to gold chair, no AD~ decreased reliance on UEs  Sit<> stand gold chair or transport chair with/without RW, S/SBA  Stand> sit from RW to stepper seat, S  Sit> stand from stepper seat to RW, SBA            Evaluation 5/24/24 6/18/24 7/23/24 8/13/24   Single Limb Stance R LE NT  (<10 sec = HIGH FALL RISK) NT  (<10 sec = HIGH FALL RISK) NT  (<10 sec = HIGH FALL RISK) NT  (<10 sec = HIGH FALL  RISK) NT  (<10 sec = HIGH FALL RISK)   Single Limb Stance L LE NT  (<10 sec = HIGH FALL RISK) NT  (<10 sec = HIGH FALL RISK) NT  (<10 sec = HIGH FALL RISK) NT  (<10 sec = HIGH FALL RISK) NT  (<10 sec = HIGH FALL RISK)   30 second Chair Rise 2 completed with arms and RW placed in front, CGA 3 completed with arms and no AD placed in front, S 4 completed with arms and RW placed in front, S 4 completed with arms and RW placed in front, S 4 completed with arms and RW placed in front, S~ cues for full standing posture   5 times sit to stand NT NT NT NT NT   TUG 1 minute, 27 seconds with RW, CGA 32 seconds with RW, S 34 seconds with RW, S 45 seconds with RW, S 31 seconds with RW, S   Self selected walking speed 0.12 m/sec (6m/50s) with RW, CGA 0.50 m/sec (6m/12s) with RW, S 0.50 m/sec (6m/12s) with RW, S 0.375 m/sec (6m/16s) with RW, S 0.50 m/sec (6m/12s) with RW, S   Fast walking speed NT NT NT NT NT      30 second chair rise below average score:   Age                 Men                 Women     70-74               <15                  <14  A below average score indicates a risk for fall.     5x sit to stand normative information:   >12 sec= fall risk for general elderly  >16 sec= fall risk for Parkinson's disease  >10 sec= balance/vestibular dysfunction (<61 y/o)  >14.2 sec= balance/vestibular dysfunction (>61 y/o)  >12 sec= fall risk for CVA           Gait Assessment:   - AD used: RW  - Assistance: S to mod I  - Stairs: NT     GAIT DEVIATIONS:              Anthony displays the following deviations with ambulation: decreased anjel, decreased step length B, ER of both feet, forward flexed trunk              Impairments contributing to deviations: decreased strength, decreased balance, decreased sensation to B feet, decreased endurance             Treatment     Anthony received the treatments listed below:      therapeutic exercises to develop strength, endurance, ROM, flexibility, posture, and core stabilization for 15   minutes including:  ( Includes time for LE MMT)    X 8 minutes on SCI-FIT recumbent stepper, level 3.0, for B UE/ LE muscular and CV endurance          neuromuscular re-education activities to improve: Balance, Coordination, Kinesthetic, Sense, Proprioception, and Posture for 0 minutes. The following activities were included:        therapeutic activities to improve functional performance for 30  minutes, including:  (Includes time to complete objective testing + FOTO survey)      Pt ambulates with RW x 112 feet around gym, S to mod I  2 sit<> stand trials from transport chair to RW, SBA  1 sit<> stand trial from transport chair to RW, mod A for hip elevation due to fatigue      gait training to improve functional mobility and safety for 0 minutes, including:        Patient Education and Home Exercises       Education provided:   - HEP  -5/24/24: PT provided pt with a schedule slip for the remainder of June.  -6/13/24: PT provided pt with a schedule slip for the 1st 3 weeks of July.   -7/9/24: PT provided pt with a schedule slip for the first 2 weeks of August.  -7/23/24: PT provided pt with a schedule slip for the remainder of August.    Written Home Exercises Provided: yes. Exercises were reviewed and Anthony was able to demonstrate them prior to the end of the session.  Anthony demonstrated good  understanding of the education provided. See Electronic Medical Record under Patient Instructions for exercises provided during therapy sessions    Assessment     Anthony tolerated his treatment session well for his plan of care reassessment. He demonstrated improvement in only 1 LE muscle grade( L knee extension), as indicated in the above chart. He also improved his TUG time considerably from the previous attempt. His 31 seconds today is his best effort to date! His self-selected walking speed returned to 0.50 m/sec, a number he has produced twice before. His 30 second chair rise remained at 4 reps, but his FOTO survey  demonstrated a 9 point increase from the prior reassessment. He passed all but condition # 4 of the mCTSIB, though his score on this condition decreased slightly from the previous reassessment. Pt has undergone one cataract removal and will have surgery on the other eye at the end of August. PT is hopeful that this will help decrease his fear of falling and help him to move with more speed during ambulation and all transitions. Due to continued, gradual progress, PT would like to extend the current plan of care for another 8 weeks( until 10/8/24). Anthony remains appropriate for 2 x weekly therapy sessions to address current mobility and balance deficits. See below for most recent comments regarding goal achievement and any revisions.       Anthony Is progressing well towards his goals.   Patient prognosis is Fair.     Patient will continue to benefit from skilled outpatient physical therapy to address the deficits listed in the problem list box on initial evaluation, provide pt/family education and to maximize pt's level of independence in the home and community environment.     Patient's spiritual, cultural and educational needs considered and pt agreeable to plan of care and goals.     Anticipated barriers to physical therapy: history of multiple falls; fear of falling, general anxiety and pt does not drive     Goals:     Short Term Goals: 4 weeks   Pt will be issued first installment of HEP and report at least partial compliance~MET, 5/24/24   Pt will improve his 30 second chair rise to 3-4 reps to demonstrate increased LE strength and muscular endurance~MET, 5/24/24   Pt will improve his TUG score to 1 minute, 10 seconds or < with appropriate AD to improve household ambulation and decrease fall risk~MET, 5/24/24   Pt will increase his SSWS to 0.13 m/sec with appropriate AD to improve community ambulation and decrease fall risk~MET, 5/24/24   Pt will complete mCTSIB balance testing and PT to set appropriate goals~  MET, 5/10/24  Pt will improve any 2 LE muscle grades by 1/3 to help with functional mobility skills~MET, 5/24/24   Pt will perform all sit<> stand and stand pivot transfers with no more than SBA using RW; min A without device~MET, 5/24/24   (NEW Goal, 5/10/24): pt to increase his score on condition # 2 of the mCTSIB to 16 seconds for improved ability to balance in low/eliminated vision environments~MET, 5/24/24      Long Term Goals: 8 weeks   Pt will be partially compliant with finalized HEP to help maintain potential gains realized in PT~ongoing  Pt will improve his 30 second chair rise to 4-5 reps to demonstrate increased LE strength and muscular endurance~ongoing and progressing, MET, 6/18/24~ revise slightly to 5 reps, remains current, 7/23/24, 8/13/24  Pt will improve his TUG score to 60 seconds or < with appropriate AD to improve household ambulation and decrease fall risk~MET, 5/24/24 ~ revise this goal to 28 seconds, remains current, 6/18/24, revise to 30 seconds, 7/23/24, progressing, 8/13/24  Pt will increase his SSWS to 0.14 m/sec with or without appropriate AD to improve community ambulation and decrease fall risk~MET, 5/24/24 ~ revise this goal to 0.55 m/sec, remains current, 6/18/24, 7/23/24, 8/13/24  Pt will improve any 4 LE muscle grades by 1/3 to help with functional mobility skills~ MET, 5/24/24   Pt will perform all sit<> stand and stand pivot transfers with no more than S using RW; CGA without device~MET, 6/18/24, NOT MET, 8/13/24  7.   ( NEW Goal, 5/10/24): pt to increase his score on condition # 2 of the mCTSIB to 19 seconds for improved ability to balance in low/eliminated vision environments~MET, 5/24/24     Plan     Continue outpatient physical therapy 2 x weekly under updated Plan of Care, 8/13/24 to 10/8/24, with treatment to include: pt education, HEP, therapeutic exercises, neuromuscular re-education/balance exercises, therapeutic activities, joint mobilizations, and modalities PRN, to  work towards established goals. Pt may be seen by PTA to carry out plan of care.         Cont with strengthening, endurance, balance and functional mobility.     Ba Diaz, PT   8/13/2024

## 2024-08-13 ENCOUNTER — CLINICAL SUPPORT (OUTPATIENT)
Dept: REHABILITATION | Facility: HOSPITAL | Age: 74
End: 2024-08-13
Payer: MEDICARE

## 2024-08-13 DIAGNOSIS — Z74.09 IMPAIRED FUNCTIONAL MOBILITY, BALANCE, GAIT, AND ENDURANCE: ICD-10-CM

## 2024-08-13 DIAGNOSIS — R26.89 BALANCE PROBLEM: Primary | ICD-10-CM

## 2024-08-13 PROCEDURE — 97530 THERAPEUTIC ACTIVITIES: CPT | Mod: KX,PO

## 2024-08-13 PROCEDURE — 97110 THERAPEUTIC EXERCISES: CPT | Mod: KX,PO

## 2024-08-13 NOTE — PLAN OF CARE
[facility]   Physical Therapy Treatment and Updated Plan of Care     Name: Anthony Ball  Clinic Number: [id number]    Therapy Diagnosis:   Encounter Diagnoses   Name Primary?    Balance problem Yes    Impaired functional mobility, balance, gait, and endurance          Physician: Isaias Myles MD    Visit Date: 8/13/2024    Physician Orders: PT Eval and Treat   Medical Diagnosis from Referral:   R53.1 (ICD-10-CM) - Generalized weakness   G62.9 (ICD-10-CM) - Peripheral polyneuropathy   R26.89 (ICD-10-CM) - Impaired gait and mobility      Evaluation Date: 4/23/2024  Authorization Period Expiration: 12/31/24  Updated Plan of Care Expiration: 10/8/24  Progress Note Due: 9/13/24  Visit # / Visits authorized: 23/ 32 ( plus eval)~ KX modifier  FOTO: 4/3     Precautions: Standard, Fall, and vision impaired      Time In:  1255        Time Out: 1340        Total Billable Time: 45 minutes    PTA Visit #: 0/5       Subjective     Patient reports: he walked with a cane this weekend and did well.  He was compliant with HEP.  Response to previous treatment: no adverse effects  Functional change: ongoing~ managing stairs at home is getting easier; pt also walking more    Pain: 0/10  Location: N/A     Objective      Objective Measures updated 8/13/24. See below:         Lower Extremity Strength~ tested seated in chair  Right LE eval  5/24/24 6/18/24 7/23/24 8/13/24 Left LE eval  5/24/24 6/18/24 7/23/24 8/13/24   Hip flexion:  4-/5 4-/5 4-/5 4/5* 4/5* Hip flexion: 3+/5 4-/5 4-/5 4-/5* 4-/5*   Knee extension: 4+/5 5/5 5/5 5/5 5/5 Knee extension: 4+/5 4/5 5/5 4+/5 5/5   Knee flexion: 4-/5 4-/5 4-/5 4/5 4/5 Knee flexion: 4-/5 4/5 4/5 4+/5 4+/5   Ankle dorsiflexion:  3+/5* 4-/5* 4+/5* 4+/5* 4+/5* Ankle dorsiflexion: 3+/5* 3+/5* 4-/5* 4-/5 to 4/5* 4-/5* ~ attempts to substitute with quad   Ankle plantarflexion:  4/5 4/5 4 to 4+/5 4+/5 4+/5 Ankle plantarflexion: 4+/5 4+/5 4+/5 4+/5 4+/5   Hip abduction: 4+/5  5/5 5/5 5/5 5/5 Hip abduction: 4+/5 5/5 5/5 5/5 5/5   Hip adduction: 5/5 5/5 5/5 5/5 5/5 Hip adduction 5/5 5/5 5/5 5/5 5/5   Hip extension: 5/5 5/5 5/5 5/5 5/5 Hip extension: 4+/5 5/5 5/5 5/5 5/5   *within ROM     5/10/24  MCTSIB:  1. Eyes Open/feet together/Firm: 30 seconds~ slightly widened SIERRA  2. Eyes Closed/feet together/Firm: 13 seconds  3. Eyes Open/feet together/Foam: 30 seconds~ wider SIERRA and min sway  4. Eyes Closed/feet together/Foam: 5 seconds     5/24/24  MCTSIB:  1. Eyes Open/feet together/Firm: 30 seconds~ slightly widened SIERRA  2. Eyes Closed/feet together/Firm: 30 seconds~ min sway with slightly widened SIERRA  3. Eyes Open/feet together/Foam: 30 seconds~ wider SIERRA and min sway  4. Eyes Closed/feet together/Foam: 5 seconds     6/18/24  MCTSIB:  1. Eyes Open/feet together/Firm: 30 seconds  2. Eyes Closed/feet together/Firm: 30 seconds~ min sway   3. Eyes Open/feet together/Foam: 30 seconds~  min sway  4. Eyes Closed/feet together/Foam: 8.5 seconds~ average of 2 trials    7/23/24  MCTSIB:  1. Eyes Open/feet together/Firm: 30 seconds  2. Eyes Closed/feet together/Firm: 30 seconds  3. Eyes Open/feet together/Foam: 30 seconds  4. Eyes Closed/feet together/Foam: 5.5 seconds~ average of 2 trials    8/13/24  MCTSIB:  1. Eyes Open/feet together/Firm: 30 seconds  2. Eyes Closed/feet together/Firm: 30 seconds~ min sway  3. Eyes Open/feet together/Foam: 30 seconds  4. Eyes Closed/feet together/Foam: 3 seconds~ best of 2 trials           Functional Mobility (Bed mobility, transfers)  SBA stand pivot transfer from transport chair to gold chair, no AD~ decreased reliance on UEs  Sit<> stand gold chair or transport chair with/without RW, S/SBA  Stand> sit from RW to stepper seat, S  Sit> stand from stepper seat to RW, SBA            Evaluation 5/24/24 6/18/24 7/23/24 8/13/24   Single Limb Stance R LE NT  (<10 sec = HIGH FALL RISK) NT  (<10 sec = HIGH FALL RISK) NT  (<10 sec = HIGH FALL RISK) NT  (<10 sec = HIGH FALL  RISK) NT  (<10 sec = HIGH FALL RISK)   Single Limb Stance L LE NT  (<10 sec = HIGH FALL RISK) NT  (<10 sec = HIGH FALL RISK) NT  (<10 sec = HIGH FALL RISK) NT  (<10 sec = HIGH FALL RISK) NT  (<10 sec = HIGH FALL RISK)   30 second Chair Rise 2 completed with arms and RW placed in front, CGA 3 completed with arms and no AD placed in front, S 4 completed with arms and RW placed in front, S 4 completed with arms and RW placed in front, S 4 completed with arms and RW placed in front, S~ cues for full standing posture   5 times sit to stand NT NT NT NT NT   TUG 1 minute, 27 seconds with RW, CGA 32 seconds with RW, S 34 seconds with RW, S 45 seconds with RW, S 31 seconds with RW, S   Self selected walking speed 0.12 m/sec (6m/50s) with RW, CGA 0.50 m/sec (6m/12s) with RW, S 0.50 m/sec (6m/12s) with RW, S 0.375 m/sec (6m/16s) with RW, S 0.50 m/sec (6m/12s) with RW, S   Fast walking speed NT NT NT NT NT      30 second chair rise below average score:   Age                 Men                 Women     70-74               <15                  <14  A below average score indicates a risk for fall.     5x sit to stand normative information:   >12 sec= fall risk for general elderly  >16 sec= fall risk for Parkinson's disease  >10 sec= balance/vestibular dysfunction (<61 y/o)  >14.2 sec= balance/vestibular dysfunction (>61 y/o)  >12 sec= fall risk for CVA           Gait Assessment:   - AD used: RW  - Assistance: S to mod I  - Stairs: NT     GAIT DEVIATIONS:              Anthony displays the following deviations with ambulation: decreased anjel, decreased step length B, ER of both feet, forward flexed trunk              Impairments contributing to deviations: decreased strength, decreased balance, decreased sensation to B feet, decreased endurance             Treatment     Anthony received the treatments listed below:      therapeutic exercises to develop strength, endurance, ROM, flexibility, posture, and core stabilization for 15   minutes including:  ( Includes time for LE MMT)    X 8 minutes on SCI-FIT recumbent stepper, level 3.0, for B UE/ LE muscular and CV endurance          neuromuscular re-education activities to improve: Balance, Coordination, Kinesthetic, Sense, Proprioception, and Posture for 0 minutes. The following activities were included:        therapeutic activities to improve functional performance for 30  minutes, including:  (Includes time to complete objective testing + FOTO survey)      Pt ambulates with RW x 112 feet around gym, S to mod I  2 sit<> stand trials from transport chair to RW, SBA  1 sit<> stand trial from transport chair to RW, mod A for hip elevation due to fatigue      gait training to improve functional mobility and safety for 0 minutes, including:        Patient Education and Home Exercises       Education provided:   - HEP  -5/24/24: PT provided pt with a schedule slip for the remainder of June.  -6/13/24: PT provided pt with a schedule slip for the 1st 3 weeks of July.   -7/9/24: PT provided pt with a schedule slip for the first 2 weeks of August.  -7/23/24: PT provided pt with a schedule slip for the remainder of August.    Written Home Exercises Provided: yes. Exercises were reviewed and Anthony was able to demonstrate them prior to the end of the session.  Anthony demonstrated good  understanding of the education provided. See Electronic Medical Record under Patient Instructions for exercises provided during therapy sessions    Assessment     Anthony tolerated his treatment session well for his plan of care reassessment. He demonstrated improvement in only 1 LE muscle grade( L knee extension), as indicated in the above chart. He also improved his TUG time considerably from the previous attempt. His 31 seconds today is his best effort to date! His self-selected walking speed returned to 0.50 m/sec, a number he has produced twice before. His 30 second chair rise remained at 4 reps, but his FOTO survey  demonstrated a 9 point increase from the prior reassessment. He passed all but condition # 4 of the mCTSIB, though his score on this condition decreased slightly from the previous reassessment. Pt has undergone one cataract removal and will have surgery on the other eye at the end of August. PT is hopeful that this will help decrease his fear of falling and help him to move with more speed during ambulation and all transitions. Due to continued, gradual progress, PT would like to extend the current plan of care for another 8 weeks( until 10/8/24). Anthony remains appropriate for 2 x weekly therapy sessions to address current mobility and balance deficits. See below for most recent comments regarding goal achievement and any revisions.       Anthony Is progressing well towards his goals.   Patient prognosis is Fair.     Patient will continue to benefit from skilled outpatient physical therapy to address the deficits listed in the problem list box on initial evaluation, provide pt/family education and to maximize pt's level of independence in the home and community environment.     Patient's spiritual, cultural and educational needs considered and pt agreeable to plan of care and goals.     Anticipated barriers to physical therapy: history of multiple falls; fear of falling, general anxiety and pt does not drive     Goals:     Short Term Goals: 4 weeks   Pt will be issued first installment of HEP and report at least partial compliance~MET, 5/24/24   Pt will improve his 30 second chair rise to 3-4 reps to demonstrate increased LE strength and muscular endurance~MET, 5/24/24   Pt will improve his TUG score to 1 minute, 10 seconds or < with appropriate AD to improve household ambulation and decrease fall risk~MET, 5/24/24   Pt will increase his SSWS to 0.13 m/sec with appropriate AD to improve community ambulation and decrease fall risk~MET, 5/24/24   Pt will complete mCTSIB balance testing and PT to set appropriate goals~  MET, 5/10/24  Pt will improve any 2 LE muscle grades by 1/3 to help with functional mobility skills~MET, 5/24/24   Pt will perform all sit<> stand and stand pivot transfers with no more than SBA using RW; min A without device~MET, 5/24/24   (NEW Goal, 5/10/24): pt to increase his score on condition # 2 of the mCTSIB to 16 seconds for improved ability to balance in low/eliminated vision environments~MET, 5/24/24      Long Term Goals: 8 weeks   Pt will be partially compliant with finalized HEP to help maintain potential gains realized in PT~ongoing  Pt will improve his 30 second chair rise to 4-5 reps to demonstrate increased LE strength and muscular endurance~ongoing and progressing, MET, 6/18/24~ revise slightly to 5 reps, remains current, 7/23/24, 8/13/24  Pt will improve his TUG score to 60 seconds or < with appropriate AD to improve household ambulation and decrease fall risk~MET, 5/24/24 ~ revise this goal to 28 seconds, remains current, 6/18/24, revise to 30 seconds, 7/23/24, progressing, 8/13/24  Pt will increase his SSWS to 0.14 m/sec with or without appropriate AD to improve community ambulation and decrease fall risk~MET, 5/24/24 ~ revise this goal to 0.55 m/sec, remains current, 6/18/24, 7/23/24, 8/13/24  Pt will improve any 4 LE muscle grades by 1/3 to help with functional mobility skills~ MET, 5/24/24   Pt will perform all sit<> stand and stand pivot transfers with no more than S using RW; CGA without device~MET, 6/18/24, NOT MET, 8/13/24  7.   ( NEW Goal, 5/10/24): pt to increase his score on condition # 2 of the mCTSIB to 19 seconds for improved ability to balance in low/eliminated vision environments~MET, 5/24/24     Plan     Continue outpatient physical therapy 2 x weekly under updated Plan of Care, 8/13/24 to 10/8/24, with treatment to include: pt education, HEP, therapeutic exercises, neuromuscular re-education/balance exercises, therapeutic activities, joint mobilizations, and modalities PRN, to  work towards established goals. Pt may be seen by PTA to carry out plan of care.         Cont with strengthening, endurance, balance and functional mobility.     Ba Diaz, PT   8/13/2024

## 2024-08-14 NOTE — PROGRESS NOTES
[facility]   Physical Therapy Treatment Note     Name: Anthony Ball  Clinic Number: [id number]    Therapy Diagnosis:   Encounter Diagnoses   Name Primary?    Balance problem Yes    Impaired functional mobility, balance, gait, and endurance            Physician: Isaias Myles MD    Visit Date: 8/15/2024    Physician Orders: PT Eval and Treat   Medical Diagnosis from Referral:   R53.1 (ICD-10-CM) - Generalized weakness   G62.9 (ICD-10-CM) - Peripheral polyneuropathy   R26.89 (ICD-10-CM) - Impaired gait and mobility      Evaluation Date: 4/23/2024  Authorization Period Expiration: 12/31/24  Updated Plan of Care Expiration: 10/8/24  Progress Note Due: 9/13/24  Visit # / Visits authorized: 24/ 32 ( plus eval)~ KX modifier  FOTO: 4/3     Precautions: Standard, Fall, and vision impaired      Time In:  1427        Time Out: 1512         Total Billable Time: 45  minutes    PTA Visit #: 0/5       Subjective     Patient reports: he continues to practice walking with his cane and help from his partner.  He was compliant with HEP.  Response to previous treatment: no adverse effects  Functional change: ongoing~ managing stairs at home is getting easier; pt also walking more    Pain: 0/10  Location: N/A     Objective      Objective Measures updated 8/13/24.                 Treatment     Anthony received the treatments listed below:      therapeutic exercises to develop strength, endurance, ROM, flexibility, posture, and core stabilization for 16  minutes including:      X 10 minutes on SCI-FIT recumbent stepper, level 3.0, for B UE/ LE muscular and CV endurance         Seated on edge of high/low mat:  2 x 10 B LE LAQ with 6 # ankle weight added   2 x 10 B UE rows with use of pink Cook's band      neuromuscular re-education activities to improve: Balance, Coordination, Kinesthetic, Sense, Proprioception, and Posture for 0 minutes. The following activities were included:        therapeutic activities to  improve functional performance for 29  minutes, including:    X 3 Sit<> stand transfers inside parallel bars with S, pt holds onto bars    Sit<> stand transfers at base of stairs, SBA    Pt ascends and descends 8 six inch steps with B rails, CGA    Stand pivot transfer from transport chair to mat, no AD, SBA    Sit> stand from mat to SC with CGA     Stand to sit with SC> stepper seat, CGA     Stand pivot transfer from stepper > transport chair with no AD, SBA     Pt ambulates 67 feet x 1 trial with SC,  min A( HHA to L UE) with consistent forward lean and decreased ability to sequence cane with L foot     X 10 laps of forward walking inside parallel bars with B UE progressing to R UE only support    X 4 laps of side stepping inside parallel bars with B UE support     X 4 laps of forward stepping over 5 hurdles inside parallel bars, B UE support~ pt occasionally touches hurdles         Above time includes occasional seated rest breaks.        gait training to improve functional mobility and safety for 0 minutes, including:        Patient Education and Home Exercises       Education provided:   - HEP  -5/24/24: PT provided pt with a schedule slip for the remainder of June.  -6/13/24: PT provided pt with a schedule slip for the 1st 3 weeks of July.   -7/9/24: PT provided pt with a schedule slip for the first 2 weeks of August.  -7/23/24: PT provided pt with a schedule slip for the remainder of August.    Written Home Exercises Provided: yes. Exercises were reviewed and Anthony was able to demonstrate them prior to the end of the session.  Anthony demonstrated good  understanding of the education provided. See Electronic Medical Record under Patient Instructions for exercises provided during therapy sessions    Assessment     Anthony tolerated his treatment session well today but struggled to ambulate with the SC. He demonstrated a consistent forward trunk lean and poor ability to properly sequence the cane. He did resume stair  climbing, with mostly good results. We also practiced more ambulation inside the parallel bars to get him moving at the outset of the session. He did better with his sit<> stand and stand pivot transfers today, although he continues to rely heavily on his arms most of the time. As has been the case in the past, pt's performance continues to fluctuate from one session to the next. Anthony remains appropriate for 2 x weekly therapy sessions to address current mobility and balance deficits.       Anthony Is progressing well towards his goals.   Patient prognosis is Fair.     Patient will continue to benefit from skilled outpatient physical therapy to address the deficits listed in the problem list box on initial evaluation, provide pt/family education and to maximize pt's level of independence in the home and community environment.     Patient's spiritual, cultural and educational needs considered and pt agreeable to plan of care and goals.     Anticipated barriers to physical therapy: history of multiple falls; fear of falling, general anxiety and pt does not drive     Goals:     Short Term Goals: 4 weeks   Pt will be issued first installment of HEP and report at least partial compliance~MET, 5/24/24   Pt will improve his 30 second chair rise to 3-4 reps to demonstrate increased LE strength and muscular endurance~MET, 5/24/24   Pt will improve his TUG score to 1 minute, 10 seconds or < with appropriate AD to improve household ambulation and decrease fall risk~MET, 5/24/24   Pt will increase his SSWS to 0.13 m/sec with appropriate AD to improve community ambulation and decrease fall risk~MET, 5/24/24   Pt will complete mCTSIB balance testing and PT to set appropriate goals~ MET, 5/10/24  Pt will improve any 2 LE muscle grades by 1/3 to help with functional mobility skills~MET, 5/24/24   Pt will perform all sit<> stand and stand pivot transfers with no more than SBA using RW; min A without device~MET, 5/24/24   (NEW Goal,  5/10/24): pt to increase his score on condition # 2 of the mCTSIB to 16 seconds for improved ability to balance in low/eliminated vision environments~MET, 5/24/24      Long Term Goals: 8 weeks   Pt will be partially compliant with finalized HEP to help maintain potential gains realized in PT~ongoing  Pt will improve his 30 second chair rise to 4-5 reps to demonstrate increased LE strength and muscular endurance~ongoing and progressing, MET, 6/18/24~ revise slightly to 5 reps, remains current, 7/23/24, 8/13/24  Pt will improve his TUG score to 60 seconds or < with appropriate AD to improve household ambulation and decrease fall risk~MET, 5/24/24 ~ revise this goal to 28 seconds, remains current, 6/18/24, revise to 30 seconds, 7/23/24, progressing, 8/13/24  Pt will increase his SSWS to 0.14 m/sec with or without appropriate AD to improve community ambulation and decrease fall risk~MET, 5/24/24 ~ revise this goal to 0.55 m/sec, remains current, 6/18/24, 7/23/24, 8/13/24  Pt will improve any 4 LE muscle grades by 1/3 to help with functional mobility skills~ MET, 5/24/24   Pt will perform all sit<> stand and stand pivot transfers with no more than S using RW; CGA without device~MET, 6/18/24, NOT MET, 8/13/24  7.   ( NEW Goal, 5/10/24): pt to increase his score on condition # 2 of the mCTSIB to 19 seconds for improved ability to balance in low/eliminated vision environments~MET, 5/24/24     Plan       Cont with strengthening, endurance, balance and functional mobility. Continue to progress ambulation with CORRINA Diaz, PT   8/15/2024

## 2024-08-15 ENCOUNTER — CLINICAL SUPPORT (OUTPATIENT)
Dept: REHABILITATION | Facility: HOSPITAL | Age: 74
End: 2024-08-15
Payer: MEDICARE

## 2024-08-15 DIAGNOSIS — R26.89 BALANCE PROBLEM: Primary | ICD-10-CM

## 2024-08-15 DIAGNOSIS — Z74.09 IMPAIRED FUNCTIONAL MOBILITY, BALANCE, GAIT, AND ENDURANCE: ICD-10-CM

## 2024-08-15 PROCEDURE — 97110 THERAPEUTIC EXERCISES: CPT | Mod: KX,PO

## 2024-08-15 PROCEDURE — 97530 THERAPEUTIC ACTIVITIES: CPT | Mod: KX,PO

## 2024-08-22 NOTE — PROGRESS NOTES
"[facility]   Physical Therapy Treatment Note     Name: Anthony Ball  Clinic Number: [id number]    Therapy Diagnosis:   Encounter Diagnoses   Name Primary?    Balance problem Yes    Impaired functional mobility, balance, gait, and endurance              Physician: Isaias Myles MD    Visit Date: 8/23/2024    Physician Orders: PT Eval and Treat   Medical Diagnosis from Referral:   R53.1 (ICD-10-CM) - Generalized weakness   G62.9 (ICD-10-CM) - Peripheral polyneuropathy   R26.89 (ICD-10-CM) - Impaired gait and mobility      Evaluation Date: 4/23/2024  Authorization Period Expiration: 12/31/24  Updated Plan of Care Expiration: 10/8/24  Progress Note Due: 9/13/24  Visit # / Visits authorized: 25/ 32 ( plus eval)~ KX modifier  FOTO: 4/3     Precautions: Standard, Fall, and vision impaired      Time In:  1114         Time Out: 1159          Total Billable Time: 45   minutes    PTA Visit #: 0/5       Subjective     Patient reports: he missed therapy on Monday. " I just couldn't get out of the house."  He was compliant with HEP.  Response to previous treatment: no adverse effects  Functional change: ongoing~ managing stairs at home is getting easier; pt also walking more    Pain: 0/10  Location: N/A     Objective      Objective Measures updated 8/13/24.             Treatment     Anthony received the treatments listed below:      therapeutic exercises to develop strength, endurance, ROM, flexibility, posture, and core stabilization for 13  minutes including:      X 8 minutes on SCI-FIT recumbent stepper, level 3.0, for B UE/ LE muscular and CV endurance         Seated on edge of high/low mat:  2 x 10 B LE LAQ with 6 # ankle weight added         neuromuscular re-education activities to improve: Balance, Coordination, Kinesthetic, Sense, Proprioception, and Posture for 0 minutes. The following activities were included:        therapeutic activities to improve functional performance for 32  minutes, " including:    Stand pivot transfer transport chair to stepper, SBA    Stand pivot transfer stepper seat to mat with RW, SBA    X 2 minutes of seated overhead orange physioball bounce with PT    Sit<> stand trials from mat to RW, S    X 3 laps ambulation around gym with RW( ~ 110 feet per trial), S~ seated rest break taken between trials    Sit<> stand from mat to SC with CGA     Pt ambulates 37 feet x 1 trial with SC, min A( HHA to L UE) with consistent forward lean and decreased ability to sequence cane with L foot~ pt requests to end trial due to B thigh pain    X 3 trials of stand pivot transfers mat<> standard chair, CGA~ PT allows pt to place his hands on therapist's hands         Above time includes occasional seated rest breaks.        gait training to improve functional mobility and safety for 0 minutes, including:        Patient Education and Home Exercises       Education provided:   - HEP  -5/24/24: PT provided pt with a schedule slip for the remainder of June.  -6/13/24: PT provided pt with a schedule slip for the 1st 3 weeks of July.   -7/9/24: PT provided pt with a schedule slip for the first 2 weeks of August.  -7/23/24: PT provided pt with a schedule slip for the remainder of August.    Written Home Exercises Provided: yes. Exercises were reviewed and Anthony was able to demonstrate them prior to the end of the session.  Anthony demonstrated good  understanding of the education provided. See Electronic Medical Record under Patient Instructions for exercises provided during therapy sessions    Assessment     Anthony tolerated his treatment session well today but continues to have difficulty ambulating with SC. He simply does not have the balance or the confidence to ambulate with this device yet. PT did have him perform 3 laps of ambulation around the gym with the RW, which he did at a slightly quicker pace than the previous attempt. He did better with his sit<> stand and stand pivot transfers today, although  he continues to rely on his arms most of the time. PT had pt practice several pivot transfers with his hands lightly supported by PT's hands, and this seemed to help. As has been the case in the past, pt's performance continues to fluctuate from one session to the next. Anthony remains appropriate for 2 x weekly therapy sessions to address current mobility and balance deficits.       Anthony Is progressing well towards his goals.   Patient prognosis is Fair.     Patient will continue to benefit from skilled outpatient physical therapy to address the deficits listed in the problem list box on initial evaluation, provide pt/family education and to maximize pt's level of independence in the home and community environment.     Patient's spiritual, cultural and educational needs considered and pt agreeable to plan of care and goals.     Anticipated barriers to physical therapy: history of multiple falls; fear of falling, general anxiety and pt does not drive     Goals:     Short Term Goals: 4 weeks   Pt will be issued first installment of HEP and report at least partial compliance~MET, 5/24/24   Pt will improve his 30 second chair rise to 3-4 reps to demonstrate increased LE strength and muscular endurance~MET, 5/24/24   Pt will improve his TUG score to 1 minute, 10 seconds or < with appropriate AD to improve household ambulation and decrease fall risk~MET, 5/24/24   Pt will increase his SSWS to 0.13 m/sec with appropriate AD to improve community ambulation and decrease fall risk~MET, 5/24/24   Pt will complete mCTSIB balance testing and PT to set appropriate goals~ MET, 5/10/24  Pt will improve any 2 LE muscle grades by 1/3 to help with functional mobility skills~MET, 5/24/24   Pt will perform all sit<> stand and stand pivot transfers with no more than SBA using RW; min A without device~MET, 5/24/24   (NEW Goal, 5/10/24): pt to increase his score on condition # 2 of the mCTSIB to 16 seconds for improved ability to balance in  low/eliminated vision environments~MET, 5/24/24      Long Term Goals: 8 weeks   Pt will be partially compliant with finalized HEP to help maintain potential gains realized in PT~ongoing  Pt will improve his 30 second chair rise to 4-5 reps to demonstrate increased LE strength and muscular endurance~ongoing and progressing, MET, 6/18/24~ revise slightly to 5 reps, remains current, 7/23/24, 8/13/24  Pt will improve his TUG score to 60 seconds or < with appropriate AD to improve household ambulation and decrease fall risk~MET, 5/24/24 ~ revise this goal to 28 seconds, remains current, 6/18/24, revise to 30 seconds, 7/23/24, progressing, 8/13/24  Pt will increase his SSWS to 0.14 m/sec with or without appropriate AD to improve community ambulation and decrease fall risk~MET, 5/24/24 ~ revise this goal to 0.55 m/sec, remains current, 6/18/24, 7/23/24, 8/13/24  Pt will improve any 4 LE muscle grades by 1/3 to help with functional mobility skills~ MET, 5/24/24   Pt will perform all sit<> stand and stand pivot transfers with no more than S using RW; CGA without device~MET, 6/18/24, NOT MET, 8/13/24  7.   ( NEW Goal, 5/10/24): pt to increase his score on condition # 2 of the mCTSIB to 19 seconds for improved ability to balance in low/eliminated vision environments~MET, 5/24/24     Plan       Cont with strengthening, endurance, balance and functional mobility. Continue to progress ambulation with CORRINA Diaz, PT   8/23/2024

## 2024-08-23 ENCOUNTER — CLINICAL SUPPORT (OUTPATIENT)
Dept: REHABILITATION | Facility: HOSPITAL | Age: 74
End: 2024-08-23
Payer: MEDICARE

## 2024-08-23 DIAGNOSIS — R26.89 BALANCE PROBLEM: Primary | ICD-10-CM

## 2024-08-23 DIAGNOSIS — Z74.09 IMPAIRED FUNCTIONAL MOBILITY, BALANCE, GAIT, AND ENDURANCE: ICD-10-CM

## 2024-08-23 PROCEDURE — 97110 THERAPEUTIC EXERCISES: CPT | Mod: KX,PO

## 2024-08-23 PROCEDURE — 97530 THERAPEUTIC ACTIVITIES: CPT | Mod: KX,PO

## 2024-08-26 ENCOUNTER — TELEPHONE (OUTPATIENT)
Dept: OPHTHALMOLOGY | Facility: CLINIC | Age: 74
End: 2024-08-26
Payer: MEDICARE

## 2024-08-26 NOTE — TELEPHONE ENCOUNTER
Patient given arrival time of 8:30 am on Thursday August 29 . Nothing to eat or drink after 11:59 pm.  Start drops into the operative eye Tuesday [address] [facility]

## 2024-08-28 ENCOUNTER — PATIENT MESSAGE (OUTPATIENT)
Dept: ENDOSCOPY | Facility: HOSPITAL | Age: 74
End: 2024-08-28
Payer: MEDICARE

## 2024-08-28 ENCOUNTER — TELEPHONE (OUTPATIENT)
Dept: ENDOSCOPY | Facility: HOSPITAL | Age: 74
End: 2024-08-28
Payer: MEDICARE

## 2024-08-28 ENCOUNTER — CLINICAL SUPPORT (OUTPATIENT)
Dept: REHABILITATION | Facility: HOSPITAL | Age: 74
End: 2024-08-28
Payer: MEDICARE

## 2024-08-28 DIAGNOSIS — R19.5 POSITIVE COLORECTAL CANCER SCREENING USING COLOGUARD TEST: Primary | ICD-10-CM

## 2024-08-28 DIAGNOSIS — Z74.09 IMPAIRED FUNCTIONAL MOBILITY, BALANCE, GAIT, AND ENDURANCE: ICD-10-CM

## 2024-08-28 DIAGNOSIS — R26.89 BALANCE PROBLEM: Primary | ICD-10-CM

## 2024-08-28 PROCEDURE — 97530 THERAPEUTIC ACTIVITIES: CPT | Mod: PO,CQ

## 2024-08-28 PROCEDURE — 97110 THERAPEUTIC EXERCISES: CPT | Mod: PO,CQ

## 2024-08-28 NOTE — TELEPHONE ENCOUNTER
New case created due to previous case unusable-grayed out    Contacted Pt for Endoscopy precall to confirm scheduled procedure(s) Colonoscopy on 9/6/24. Pt did not answer. Left voicemail for pt to call Endoscopy Scheduling to confirm.

## 2024-08-28 NOTE — PRE-PROCEDURE INSTRUCTIONS
Unable to reach pt via phone.  Left voicemail with arrival time also informing pt of need for responsible  accompaniment and instructing pt to follow pre-procedure instructions provided via [facility] portal.  The following message was sent to pt's portal.        Dear Anthony     Below you will find basic pre-procedure instructions in preparation for your procedure on 8/29/24 with Dr. Jacobson        You should have received your arrival time already from Dr's office.     - Nothing to eat or drink after midnight the night before your procedure until after your procedure, except AM meds with small sips of water.     - HOLD all oral Diabetic medications night before and morning of procedure  - HOLD all Insulin morning of procedure  - HOLD all Fluid pills morning of procedure  - HOLD all non-insulin shots until after surgery (Ozempic, Mounjaro, Trulicity, Victoza, Byetta, Wegovy and Adlyxin) (7 days prior)  - HOLD all vitamins, minerals and herbal supplements morning of procedure   - TAKE all B/P meds, EXCEPT those that contain a fluid pill (ex. Lasix, Hydroclorothiazide/HCTZ, Spirnolactone)  - USE inhalers as needed and bring AM of surgery  - USE EYE DROPS as directed  -TAKE blood thinner meds AM of surgery unless otherwise instructed by your provider to not take     - Shower and wash face with antibacterial soap (ex. Dial) for 3 mins PM prior and AM of surgery  - No powder, lotions, creams, oils, gels, ointments, makeup,  or jewelry    - Wear comfortable clothing (button up shirt)     (Patient is required to have a responsible ride to transport home, ride may not leave while patient is in surgery)     -- [facility], 2nd floor Surgery Center, located   @ [address] [place] [address]  2nd Floor Registration        If you have any questions or concerns please feel free to contact your surgeon's office.           Please reply to this message as receipt of delivery.     [place], LPN [facility]  Pre-Admit - Anesthesia Dept

## 2024-08-28 NOTE — PROGRESS NOTES
"[facility]   Physical Therapy Treatment Note     Name: Anthony Ball  Clinic Number: [id number]    Therapy Diagnosis:   Encounter Diagnoses   Name Primary?    Balance problem Yes    Impaired functional mobility, balance, gait, and endurance              Physician: Isaias Myles MD    Visit Date: 8/28/2024    Physician Orders: PT Eval and Treat   Medical Diagnosis from Referral:   R53.1 (ICD-10-CM) - Generalized weakness   G62.9 (ICD-10-CM) - Peripheral polyneuropathy   R26.89 (ICD-10-CM) - Impaired gait and mobility      Evaluation Date: 4/23/2024  Authorization Period Expiration: 12/31/24  Updated Plan of Care Expiration: 10/8/24  Progress Note Due: 9/13/24  Visit # / Visits authorized: 26/ 32 ( plus eval)~ KX modifier  FOTO: 4/3     Precautions: Standard, Fall, and vision impaired      Time In:  1300        Time Out: 1345         Total Billable Time: 45   minutes    PTA Visit #: 1/5       Subjective     Patient reports: " I think he pretty much doesn't want me walking with the cane right now."   He was compliant with HEP.  Response to previous treatment: no adverse effects  Functional change: ongoing~ managing stairs at home is getting easier; pt also walking more    Pain: 0/10  Location: N/A     Objective      Objective Measures updated 8/13/24.             Treatment     Anthony received the treatments listed below:      therapeutic exercises to develop strength, endurance, ROM, flexibility, posture, and core stabilization for 30  minutes including:      X 8 minutes on SCI-FIT recumbent stepper, level 3.0, for B UE/ LE muscular and CV endurance         Seated on edge of high/low mat:  2 x 10 B LE LAQ with 6 # ankle weight added   2 x 10 reps of B LE Marching in place with #6lb weight      neuromuscular re-education activities to improve: Balance, Coordination, Kinesthetic, Sense, Proprioception, and Posture for 0 minutes. The following activities were included:        therapeutic " activities to improve functional performance for 15  minutes, including:    Stand pivot transfer transport chair to stepper, SBA    Stand pivot transfer stepper seat to chair with RW, SBA    Sit<> stand trials from mat to RW, S     Pt ambulated ~ 350ft with RW and CGA        gait training to improve functional mobility and safety for 0 minutes, including:        Patient Education and Home Exercises       Education provided:   - HEP  -5/24/24: PT provided pt with a schedule slip for the remainder of June.  -6/13/24: PT provided pt with a schedule slip for the 1st 3 weeks of July.   -7/9/24: PT provided pt with a schedule slip for the first 2 weeks of August.  -7/23/24: PT provided pt with a schedule slip for the remainder of August.    Written Home Exercises Provided: yes. Exercises were reviewed and Anthony was able to demonstrate them prior to the end of the session.  Anthony demonstrated good  understanding of the education provided. See Electronic Medical Record under Patient Instructions for exercises provided during therapy sessions    Assessment     Anthony tolerated his tx session better today and does much better ambulating with the rolling walker than the cane.  Anthony does not require any assistance for gait with the RW.  Anthony remains appropriate for 2 x weekly therapy sessions to address current mobility and balance deficits.       Anthony Is progressing well towards his goals.   Patient prognosis is Fair.     Patient will continue to benefit from skilled outpatient physical therapy to address the deficits listed in the problem list box on initial evaluation, provide pt/family education and to maximize pt's level of independence in the home and community environment.     Patient's spiritual, cultural and educational needs considered and pt agreeable to plan of care and goals.     Anticipated barriers to physical therapy: history of multiple falls; fear of falling, general anxiety and pt does not drive     Goals:      Short Term Goals: 4 weeks   Pt will be issued first installment of HEP and report at least partial compliance~MET, 5/24/24   Pt will improve his 30 second chair rise to 3-4 reps to demonstrate increased LE strength and muscular endurance~MET, 5/24/24   Pt will improve his TUG score to 1 minute, 10 seconds or < with appropriate AD to improve household ambulation and decrease fall risk~MET, 5/24/24   Pt will increase his SSWS to 0.13 m/sec with appropriate AD to improve community ambulation and decrease fall risk~MET, 5/24/24   Pt will complete mCTSIB balance testing and PT to set appropriate goals~ MET, 5/10/24  Pt will improve any 2 LE muscle grades by 1/3 to help with functional mobility skills~MET, 5/24/24   Pt will perform all sit<> stand and stand pivot transfers with no more than SBA using RW; min A without device~MET, 5/24/24   (NEW Goal, 5/10/24): pt to increase his score on condition # 2 of the mCTSIB to 16 seconds for improved ability to balance in low/eliminated vision environments~MET, 5/24/24      Long Term Goals: 8 weeks   Pt will be partially compliant with finalized HEP to help maintain potential gains realized in PT~ongoing  Pt will improve his 30 second chair rise to 4-5 reps to demonstrate increased LE strength and muscular endurance~ongoing and progressing, MET, 6/18/24~ revise slightly to 5 reps, remains current, 7/23/24, 8/13/24  Pt will improve his TUG score to 60 seconds or < with appropriate AD to improve household ambulation and decrease fall risk~MET, 5/24/24 ~ revise this goal to 28 seconds, remains current, 6/18/24, revise to 30 seconds, 7/23/24, progressing, 8/13/24  Pt will increase his SSWS to 0.14 m/sec with or without appropriate AD to improve community ambulation and decrease fall risk~MET, 5/24/24 ~ revise this goal to 0.55 m/sec, remains current, 6/18/24, 7/23/24, 8/13/24  Pt will improve any 4 LE muscle grades by 1/3 to help with functional mobility skills~ MET, 5/24/24   Pt  will perform all sit<> stand and stand pivot transfers with no more than S using RW; CGA without device~MET, 6/18/24, NOT MET, 8/13/24  7.   ( NEW Goal, 5/10/24): pt to increase his score on condition # 2 of the mCTSIB to 19 seconds for improved ability to balance in low/eliminated vision environments~MET, 5/24/24     Plan       Cont with strengthening, endurance, balance and functional mobility. Continue to progress ambulation with SC.    Ciara Pro, PTA   8/28/2024

## 2024-08-29 ENCOUNTER — HOSPITAL ENCOUNTER (OUTPATIENT)
Facility: HOSPITAL | Age: 74
Discharge: HOME | End: 2024-08-29
Attending: OPHTHALMOLOGY | Admitting: OPHTHALMOLOGY
Payer: MEDICARE

## 2024-08-29 VITALS
HEIGHT: 69 IN | WEIGHT: 249 LBS | OXYGEN SATURATION: 96 % | TEMPERATURE: 98 F | SYSTOLIC BLOOD PRESSURE: 111 MMHG | DIASTOLIC BLOOD PRESSURE: 58 MMHG | BODY MASS INDEX: 36.88 KG/M2 | RESPIRATION RATE: 18 BRPM | HEART RATE: 97 BPM

## 2024-08-29 DIAGNOSIS — H25.12 NUCLEAR SCLEROSIS OF LEFT EYE: ICD-10-CM

## 2024-08-29 DIAGNOSIS — Z98.890 POST-OPERATIVE STATE: ICD-10-CM

## 2024-08-29 DIAGNOSIS — H25.12 NUCLEAR SCLEROTIC CATARACT OF LEFT EYE: Primary | ICD-10-CM

## 2024-08-29 PROCEDURE — 71000015 HC POSTOP RECOV 1ST HR: Performed by: OPHTHALMOLOGY

## 2024-08-29 PROCEDURE — 36000707: Performed by: OPHTHALMOLOGY

## 2024-08-29 PROCEDURE — 36000706: Performed by: OPHTHALMOLOGY

## 2024-08-29 PROCEDURE — V2632 POST CHMBR INTRAOCULAR LENS: HCPCS | Performed by: OPHTHALMOLOGY

## 2024-08-29 PROCEDURE — 99152 MOD SED SAME PHYS/QHP 5/>YRS: CPT | Performed by: OPHTHALMOLOGY

## 2024-08-29 PROCEDURE — 99152 MOD SED SAME PHYS/QHP 5/>YRS: CPT | Mod: ,,, | Performed by: OPHTHALMOLOGY

## 2024-08-29 PROCEDURE — 66984 XCAPSL CTRC RMVL W/O ECP: CPT | Mod: 79,LT,, | Performed by: OPHTHALMOLOGY

## 2024-08-29 PROCEDURE — 25000003 PHARM REV CODE 250: Performed by: OPHTHALMOLOGY

## 2024-08-29 PROCEDURE — 99900035 HC TECH TIME PER 15 MIN (STAT)

## 2024-08-29 PROCEDURE — 94761 N-INVAS EAR/PLS OXIMETRY MLT: CPT

## 2024-08-29 DEVICE — CLAREON ASPHERIC HYDROPHOBIC ACRYLIC IOL WITH THE AUTONOMEAUTOMATED PRE-LOADED DELIVERY SYSTEM
Type: IMPLANTABLE DEVICE | Site: EYE | Status: FUNCTIONAL
Brand: CLAREON™

## 2024-08-29 RX ORDER — LIDOCAINE HYDROCHLORIDE 40 MG/ML
INJECTION, SOLUTION RETROBULBAR
Status: DISCONTINUED | OUTPATIENT
Start: 2024-08-29 | End: 2024-08-29 | Stop reason: HOSPADM

## 2024-08-29 RX ORDER — ACETAMINOPHEN 325 MG/1
650 TABLET ORAL EVERY 4 HOURS PRN
Status: DISCONTINUED | OUTPATIENT
Start: 2024-08-29 | End: 2024-08-29 | Stop reason: HOSPADM

## 2024-08-29 RX ORDER — SODIUM CHLORIDE 0.9 % (FLUSH) 0.9 %
10 SYRINGE (ML) INJECTION
Status: DISCONTINUED | OUTPATIENT
Start: 2024-08-29 | End: 2024-08-29 | Stop reason: HOSPADM

## 2024-08-29 RX ORDER — PROPARACAINE HYDROCHLORIDE 5 MG/ML
SOLUTION/ DROPS OPHTHALMIC
Status: DISCONTINUED | OUTPATIENT
Start: 2024-08-29 | End: 2024-08-29 | Stop reason: HOSPADM

## 2024-08-29 RX ORDER — PROPARACAINE HYDROCHLORIDE 5 MG/ML
1 SOLUTION/ DROPS OPHTHALMIC
Status: DISCONTINUED | OUTPATIENT
Start: 2024-08-29 | End: 2024-08-29 | Stop reason: HOSPADM

## 2024-08-29 RX ORDER — MOXIFLOXACIN 5 MG/ML
1 SOLUTION/ DROPS OPHTHALMIC
Status: COMPLETED | OUTPATIENT
Start: 2024-08-29 | End: 2024-08-29

## 2024-08-29 RX ORDER — CYCLOP/TROP/PROPA/PHEN/KET/WAT 1-1-0.1%
1 DROPS (EA) OPHTHALMIC (EYE)
Status: COMPLETED | OUTPATIENT
Start: 2024-08-29 | End: 2024-08-29

## 2024-08-29 RX ORDER — MOXIFLOXACIN 5 MG/ML
SOLUTION/ DROPS OPHTHALMIC
Status: DISCONTINUED | OUTPATIENT
Start: 2024-08-29 | End: 2024-08-29 | Stop reason: HOSPADM

## 2024-08-29 RX ORDER — TRIAZOLAM 0.12 MG/1
0.25 TABLET ORAL ONCE
Status: COMPLETED | OUTPATIENT
Start: 2024-08-29 | End: 2024-08-29

## 2024-08-29 RX ORDER — MIDAZOLAM HYDROCHLORIDE 1 MG/ML
1 INJECTION, SOLUTION INTRAMUSCULAR; INTRAVENOUS
Status: DISCONTINUED | OUTPATIENT
Start: 2024-08-29 | End: 2024-08-29 | Stop reason: HOSPADM

## 2024-08-29 RX ORDER — PHENYLEPHRINE HYDROCHLORIDE 100 MG/ML
1 SOLUTION/ DROPS OPHTHALMIC
Status: DISCONTINUED | OUTPATIENT
Start: 2024-08-29 | End: 2024-08-29 | Stop reason: HOSPADM

## 2024-08-29 RX ADMIN — MOXIFLOXACIN OPHTHALMIC 1 DROP: 5 SOLUTION/ DROPS OPHTHALMIC at 08:08

## 2024-08-29 RX ADMIN — Medication 1 DROP: at 08:08

## 2024-08-29 RX ADMIN — MOXIFLOXACIN 1 DROP: 5 SOLUTION/ DROPS OPHTHALMIC at 10:08

## 2024-08-29 RX ADMIN — TRIAZOLAM 0.25 MG: 0.12 TABLET ORAL at 09:08

## 2024-08-29 NOTE — DISCHARGE SUMMARY
Outcome: Successful outpatient ophthalmic surgical procedure  Preprinted Instructions given to patient.  Regular diet.  Activity: No restrictions  Meds: see Med Rec  Condition: stable  Follow up: 1 day with Dr Jacobson  Disposition: Home  Diagnosis: s/p eye surgery  Date of discharge: 08/29/2024

## 2024-08-29 NOTE — DISCHARGE INSTRUCTIONS
CATARACT SURGERY    POST-OPERATIVE INSTRUCTIONS    · Apply drops THREE times a day into operative eye for 30 days.    · DO NOT rub your eye    · Wear protective sunglasses during the day    · Resume moderate activity    · Bathe/shower/wash face normally    · DO NOT apply makeup around the operative eye for 1 week.         You should expect    - Blurry vision and halos for 24-48 hours    - Dilated pupil for 24-48 hours    - Scratchy feeling in the eye for 1-2 days    - Curved shadow in your peripheral vision for 2-3 weeks    - Occasional flickering of lights for up to 1 week    -If you experience severe pain or nausea, please call Dr Jacobson or the on-call doctor at [phone]    - Plan to see Dr Jacobson tomorrow .      [facility]    [address] [facility] [facility] [address]    ** Most patients can drive the next day, but if you do not feel comfortable driving, please arrange for transportation.

## 2024-08-29 NOTE — PLAN OF CARE
Discharge instructions provided to the patient. Pt verbalized understanding of the instructions. IV removed. Cath tip intact. VSS. No concerns voiced. Escorted patient via wheelchair to a family member awaiting in private vehicle.

## 2024-08-30 ENCOUNTER — OFFICE VISIT (OUTPATIENT)
Dept: OPHTHALMOLOGY | Facility: CLINIC | Age: 74
End: 2024-08-30
Payer: MEDICARE

## 2024-08-30 DIAGNOSIS — H25.12 NUCLEAR SCLEROSIS OF LEFT EYE: ICD-10-CM

## 2024-08-30 DIAGNOSIS — Z98.890 POST-OPERATIVE STATE: Primary | ICD-10-CM

## 2024-08-30 PROCEDURE — 99213 OFFICE O/P EST LOW 20 MIN: CPT | Mod: PBBFAC | Performed by: OPHTHALMOLOGY

## 2024-08-30 PROCEDURE — 99999 PR PBB SHADOW E&M-EST. PATIENT-LVL III: CPT | Mod: PBBFAC,,, | Performed by: OPHTHALMOLOGY

## 2024-08-30 NOTE — PROGRESS NOTES
HPI    Complex Phacoemulsification with  intraocular lens, left eye (51636)  DATE OF SURGERY: 08/29/2024  IMPLANT: [id number] 16.5    PGB TID OS    Pt here for 1 day post op OS. Pt states doing well. Pt denies eye pain OS.     Last edited by Tahira Prajapati MA on 8/30/2024 10:28 AM.            Assessment /Plan     For exam results, see Encounter Report.    Post-operative state    Nuclear sclerosis of left eye      Slit lamp exam:  L/L: nl  K: clear, wound sealed  AC: 1+ cell  Lens: IOL centered and stable    POD1 s/p Phaco/IOL  Appropriate precautions and post op medications reviewed.  Patient instructed to call or come in if symptoms of redness, decreased vision, or pain are experienced.    Excellent result. Plan DFE and MRx in 1 month with optometry.  RTC PRN for any acute changes in vision or pain in the eye.

## 2024-09-05 ENCOUNTER — TELEPHONE (OUTPATIENT)
Dept: ENDOSCOPY | Facility: HOSPITAL | Age: 74
End: 2024-09-05
Payer: MEDICARE

## 2024-09-05 NOTE — TELEPHONE ENCOUNTER
Contacted Pt for Endoscopy precall to confirm scheduled procedure(s) Colonoscopy on 9/6/24. Pt did not answer. Left voicemail for pt to call Endoscopy Scheduling to confirm.

## 2024-09-06 ENCOUNTER — ANESTHESIA EVENT (OUTPATIENT)
Dept: ENDOSCOPY | Facility: HOSPITAL | Age: 74
End: 2024-09-06
Payer: MEDICARE

## 2024-09-06 ENCOUNTER — HOSPITAL ENCOUNTER (OUTPATIENT)
Facility: HOSPITAL | Age: 74
Discharge: HOME | End: 2024-09-06
Attending: INTERNAL MEDICINE | Admitting: INTERNAL MEDICINE
Payer: MEDICARE

## 2024-09-06 ENCOUNTER — ANESTHESIA (OUTPATIENT)
Dept: ENDOSCOPY | Facility: HOSPITAL | Age: 74
End: 2024-09-06
Payer: MEDICARE

## 2024-09-06 VITALS
DIASTOLIC BLOOD PRESSURE: 69 MMHG | RESPIRATION RATE: 12 BRPM | SYSTOLIC BLOOD PRESSURE: 139 MMHG | BODY MASS INDEX: 26.51 KG/M2 | TEMPERATURE: 98 F | WEIGHT: 179 LBS | OXYGEN SATURATION: 92 % | HEIGHT: 69 IN | HEART RATE: 91 BPM

## 2024-09-06 DIAGNOSIS — R19.5 POSITIVE COLORECTAL CANCER SCREENING USING COLOGUARD TEST: Primary | ICD-10-CM

## 2024-09-06 DIAGNOSIS — I74.9 THROMBOEMBOLISM: ICD-10-CM

## 2024-09-06 PROCEDURE — 45385 COLONOSCOPY W/LESION REMOVAL: CPT | Mod: PT,KX | Performed by: INTERNAL MEDICINE

## 2024-09-06 PROCEDURE — 63600175 PHARM REV CODE 636 W HCPCS

## 2024-09-06 PROCEDURE — 25000003 PHARM REV CODE 250: Performed by: NURSE ANESTHETIST, CERTIFIED REGISTERED

## 2024-09-06 PROCEDURE — 37000009 HC ANESTHESIA EA ADD 15 MINS: Performed by: INTERNAL MEDICINE

## 2024-09-06 PROCEDURE — E9220 PRA ENDO ANESTHESIA: HCPCS | Mod: PT,ANES,, | Performed by: ANESTHESIOLOGY

## 2024-09-06 PROCEDURE — 27201089 HC SNARE, DISP (ANY): Performed by: INTERNAL MEDICINE

## 2024-09-06 PROCEDURE — 27201012 HC FORCEPS, HOT/COLD, DISP: Performed by: INTERNAL MEDICINE

## 2024-09-06 PROCEDURE — 63600175 PHARM REV CODE 636 W HCPCS: Performed by: NURSE ANESTHETIST, CERTIFIED REGISTERED

## 2024-09-06 PROCEDURE — 37000008 HC ANESTHESIA 1ST 15 MINUTES: Performed by: INTERNAL MEDICINE

## 2024-09-06 PROCEDURE — 88305 TISSUE EXAM BY PATHOLOGIST: CPT | Mod: 26,,, | Performed by: PATHOLOGY

## 2024-09-06 PROCEDURE — 45380 COLONOSCOPY AND BIOPSY: CPT | Mod: 59,PT,KX, | Performed by: INTERNAL MEDICINE

## 2024-09-06 PROCEDURE — 45385 COLONOSCOPY W/LESION REMOVAL: CPT | Mod: PT,KX,, | Performed by: INTERNAL MEDICINE

## 2024-09-06 PROCEDURE — 25000242 PHARM REV CODE 250 ALT 637 W/ HCPCS: Performed by: ANESTHESIOLOGY

## 2024-09-06 PROCEDURE — 88305 TISSUE EXAM BY PATHOLOGIST: CPT | Mod: 59 | Performed by: PATHOLOGY

## 2024-09-06 PROCEDURE — E9220 PRA ENDO ANESTHESIA: HCPCS | Mod: PT,CRNA,,

## 2024-09-06 PROCEDURE — 45380 COLONOSCOPY AND BIOPSY: CPT | Mod: 59,PT,KX | Performed by: INTERNAL MEDICINE

## 2024-09-06 RX ORDER — IPRATROPIUM BROMIDE 0.5 MG/2.5ML
0.5 SOLUTION RESPIRATORY (INHALATION) ONCE
Status: DISCONTINUED | OUTPATIENT
Start: 2024-09-06 | End: 2024-09-06 | Stop reason: HOSPADM

## 2024-09-06 RX ORDER — LIDOCAINE HYDROCHLORIDE 20 MG/ML
INJECTION INTRAVENOUS
Status: DISCONTINUED | OUTPATIENT
Start: 2024-09-06 | End: 2024-09-06

## 2024-09-06 RX ORDER — IPRATROPIUM BROMIDE AND ALBUTEROL SULFATE 2.5; .5 MG/3ML; MG/3ML
3 SOLUTION RESPIRATORY (INHALATION) ONCE
Status: COMPLETED | OUTPATIENT
Start: 2024-09-06 | End: 2024-09-06

## 2024-09-06 RX ORDER — PROPOFOL 10 MG/ML
VIAL (ML) INTRAVENOUS
Status: DISCONTINUED | OUTPATIENT
Start: 2024-09-06 | End: 2024-09-06

## 2024-09-06 RX ORDER — APIXABAN 5 MG/1
5 TABLET, FILM COATED ORAL 2 TIMES DAILY
Qty: 180 TABLET | Refills: 3 | Status: SHIPPED | OUTPATIENT
Start: 2024-09-06

## 2024-09-06 RX ORDER — PHENYLEPHRINE HYDROCHLORIDE 10 MG/ML
INJECTION INTRAVENOUS
Status: DISCONTINUED | OUTPATIENT
Start: 2024-09-06 | End: 2024-09-06

## 2024-09-06 RX ORDER — ONDANSETRON HYDROCHLORIDE 2 MG/ML
INJECTION, SOLUTION INTRAVENOUS
Status: DISCONTINUED | OUTPATIENT
Start: 2024-09-06 | End: 2024-09-06

## 2024-09-06 RX ORDER — PROPOFOL 10 MG/ML
VIAL (ML) INTRAVENOUS CONTINUOUS PRN
Status: DISCONTINUED | OUTPATIENT
Start: 2024-09-06 | End: 2024-09-06

## 2024-09-06 RX ORDER — SODIUM CHLORIDE 9 MG/ML
INJECTION, SOLUTION INTRAVENOUS CONTINUOUS
Status: DISCONTINUED | OUTPATIENT
Start: 2024-09-06 | End: 2024-09-06 | Stop reason: HOSPADM

## 2024-09-06 RX ADMIN — PHENYLEPHRINE HYDROCHLORIDE 200 MCG: 10 INJECTION INTRAVENOUS at 02:09

## 2024-09-06 RX ADMIN — IPRATROPIUM BROMIDE AND ALBUTEROL SULFATE 3 ML: 2.5; .5 SOLUTION RESPIRATORY (INHALATION) at 04:09

## 2024-09-06 RX ADMIN — PROPOFOL 20 MG: 10 INJECTION, EMULSION INTRAVENOUS at 02:09

## 2024-09-06 RX ADMIN — PROPOFOL 50 MG: 10 INJECTION, EMULSION INTRAVENOUS at 02:09

## 2024-09-06 RX ADMIN — PROPOFOL 30 MG: 10 INJECTION, EMULSION INTRAVENOUS at 02:09

## 2024-09-06 RX ADMIN — PROPOFOL 150 MCG/KG/MIN: 10 INJECTION, EMULSION INTRAVENOUS at 02:09

## 2024-09-06 RX ADMIN — PHENYLEPHRINE HYDROCHLORIDE 100 MCG: 10 INJECTION INTRAVENOUS at 02:09

## 2024-09-06 RX ADMIN — SODIUM CHLORIDE: 0.9 INJECTION, SOLUTION INTRAVENOUS at 02:09

## 2024-09-06 RX ADMIN — ONDANSETRON 4 MG: 2 INJECTION INTRAMUSCULAR; INTRAVENOUS at 03:09

## 2024-09-06 RX ADMIN — LIDOCAINE HYDROCHLORIDE 30 MG: 20 INJECTION INTRAVENOUS at 02:09

## 2024-09-06 NOTE — ANESTHESIA PREPROCEDURE EVALUATION
09/06/2024  Anthony Ball is a 74 y.o., male.  Pre-operative evaluation for Procedure(s) (LRB):  COLONOSCOPY (N/A)    Anthony Ball is a 74 y.o. male     Patient Active Problem List   Diagnosis    Type 2 diabetes mellitus with neurologic complication, without long-term current use of insulin    Diabetic polyneuropathy associated with type 2 diabetes mellitus    Essential hypertension    Syncope    Recurrent falls    Mobility impaired    Primary insomnia    History of total right knee replacement    History of bariatric surgery    Gastroesophageal reflux disease without esophagitis    Weakness of both lower extremities    Balance problem    Abnormal gait    Agatston CAC score, >400    Class 1 obesity due to excess calories with body mass index (BMI) of 32.0 to 32.9 in adult    Pruritus    Balance disorder    Back spasm    Alcohol use    Impaired mobility and ADLs    Coordination impairment    Decreased  strength    Impaired functional mobility, balance, gait, and endurance    Acute cystitis    Nuclear sclerotic cataract of left eye       Review of patient's allergies indicates:  No Known Allergies    No current facility-administered medications on file prior to encounter.     Current Outpatient Medications on File Prior to Encounter   Medication Sig Dispense Refill    albuterol (VENTOLIN HFA) 90 mcg/actuation inhaler Inhale 2 puffs into the lungs every 6 (six) hours as needed for Wheezing. Rescue 8 g 2    busPIRone (BUSPAR) 15 MG tablet Take 1 tablet (15 mg total) by mouth 2 (two) times daily as needed (Anxiety). 180 tablet 3    capsicum 0.075% (ZOSTRIX) 0.075 % topical cream Apply topically 3 (three) times daily as needed (Foot pain). 120 g 5    cyanocobalamin (VITAMIN B-12) 1000 MCG tablet Take 1 tablet (1,000 mcg total) by mouth once daily. 90 tablet 3    DULoxetine (CYMBALTA) 60 MG capsule Take 1 capsule  (60 mg total) by mouth 2 (two) times daily. 180 capsule 3    fluticasone-umeclidin-vilanter (TRELEGY ELLIPTA) 100-62.5-25 mcg DsDv Inhale 1 puff into the lungs once daily. 60 each 5    hydrOXYzine pamoate (VISTARIL) 25 MG Cap Take 25 mg by mouth every 8 (eight) hours as needed.      LIDOcaine (LIDODERM) 5 % Place 3 patches onto the skin daily as needed (Rib pain). Okay to use 1 to 3 patches. Remove & Discard patches within 12 hours or as directed by MD 30 patch 1    magnesium oxide 400 mg magnesium Tab Take 1 tablet by mouth every evening. 30 tablet 2    methocarbamoL (ROBAXIN) 750 MG Tab Take 1 tablet (750 mg total) by mouth 3 (three) times daily as needed (Back pain). 90 tablet 0    nortriptyline (PAMELOR) 10 MG capsule Take 1 capsule (10 mg total) by mouth 3 (three) times daily. 90 capsule 3    ondansetron (ZOFRAN) 8 MG tablet Take 1 tablet (8 mg total) by mouth every 8 (eight) hours as needed for Nausea. 30 tablet 1    pantoprazole (PROTONIX) 40 MG tablet TAKE 1 TABLET(40 MG) BY MOUTH EVERY DAY 90 tablet 3    pregabalin (LYRICA) 150 MG capsule Take 1 capsule (150 mg total) by mouth 3 (three) times daily. 90 capsule 5    traZODone (DESYREL) 100 MG tablet Take 2 tablets (200 mg total) by mouth nightly as needed for Insomnia. 14 tablet 0    triamcinolone acetonide 0.1% (KENALOG) 0.1 % cream Apply topically 2 (two) times daily. Use for 1 to 2 weeks as needed for rash. 453.6 g 0       Past Surgical History:   Procedure Laterality Date    CATARACT EXTRACTION W/  INTRAOCULAR LENS IMPLANT Right 7/15/2024    Procedure: EXTRACTION, CATARACT, WITH IOL INSERTION;  Surgeon: Margot Jacobson MD;  Location: [facility] OR;  Service: Ophthalmology;  Laterality: Right;    CATARACT EXTRACTION W/  INTRAOCULAR LENS IMPLANT Left 8/29/2024    Procedure: EXTRACTION, CATARACT, WITH IOL INSERTION;  Surgeon: Margot Jacobson MD;  Location: [facility] OR;  Service: Ophthalmology;  Laterality: Left;    COLONOSCOPY      Normal around 2020    TOTAL KNEE  ARTHROPLASTY Right        Social History     Socioeconomic History    Marital status:    Tobacco Use    Smoking status: Never    Smokeless tobacco: Never   Substance and Sexual Activity    Alcohol use: Yes     Comment: Former heavy use    Drug use: Never    Sexual activity: Yes     Comment: unknown     Social Determinants of Health     Financial Resource Strain: Low Risk  (1/15/2024)    Overall Financial Resource Strain (CARDIA)     Difficulty of Paying Living Expenses: Not very hard   Food Insecurity: No Food Insecurity (1/15/2024)    Hunger Vital Sign     Worried About Running Out of Food in the Last Year: Never true     Ran Out of Food in the Last Year: Never true   Transportation Needs: No Transportation Needs (1/15/2024)    PRAPARE - Transportation     Lack of Transportation (Medical): No     Lack of Transportation (Non-Medical): No   Physical Activity: Inactive (1/15/2024)    Exercise Vital Sign     Days of Exercise per Week: 0 days     Minutes of Exercise per Session: 0 min   Stress: No Stress Concern Present (1/15/2024)    [employer] [place] - Occupational Stress Questionnaire     Feeling of Stress : Only a little   Housing Stability: Low Risk  (1/15/2024)    Housing Stability Vital Sign     Unable to Pay for Housing in the Last Year: No     Number of Places Lived in the Last Year: 1     Unstable Housing in the Last Year: No       Diagnostic Studies:      EKD Echo:stress        Normal myocardial perfusion scan. There is no evidence of myocardial ischemia or infarction.    The visually estimated ejection fraction is normal at rest and normal during stress.    There is normal wall motion at rest and post stress.    LV cavity size is normal at rest and normal at stress.    The EKG portion of this study is negative for ischemia.    The patient reported no chest pain during the stress test.    During stress, occasional PACs are noted.    There are no prior studies for  comparison.       Pre-op Assessment    I have reviewed the Patient Summary Reports.    I have reviewed the NPO Status.   I have reviewed the Medications.     Review of Systems  Anesthesia Hx:  No problems with previous Anesthesia   History of prior surgery of interest to airway management or planning:            Denies Personal Hx of Anesthesia complications.                    Cardiovascular:     Hypertension   CAD    Dysrhythmias atrial fibrillation                                   Pulmonary:    Asthma                    Hepatic/GI:     GERD             Neurological:    Neuromuscular Disease,                                   Endocrine:  Diabetes               Physical Exam  General: Well nourished, Cooperative, Alert and Oriented    Airway:  Mallampati: / II  Mouth Opening: Normal  TM Distance: Normal  Tongue: Normal    Dental:  Intact        Anesthesia Plan  Type of Anesthesia, risks & benefits discussed:    Anesthesia Type: Gen Natural Airway  Intra-op Monitoring Plan: Standard ASA Monitors  Post Op Pain Control Plan: multimodal analgesia  Induction:  IV  Airway Plan: Direct, Post-Induction  Informed Consent: Informed consent signed with the Patient and all parties understand the risks and agree with anesthesia plan.  All questions answered.   ASA Score: 3    Ready For Surgery From Anesthesia Perspective.     .

## 2024-09-06 NOTE — PROVATION PATIENT INSTRUCTIONS
Discharge Summary/Instructions after an Endoscopic Procedure  Patient Name: Anthony Ball  Patient MRN: [id number]  Patient YOB: 1950  Friday, September 6, 2024  Alessandro Serna MD  Dear patient,  As a result of recent federal legislation (The Federal Cures Act), you may   receive lab or pathology results from your procedure in your [facility]   account before your physician is able to contact you. Your physician or   their representative will relay the results to you with their   recommendations at their soonest availability.  Thank you,  RESTRICTIONS:  During your procedure today, you received medications for sedation.  These   medications may affect your judgment, balance and coordination.  Therefore,   for 24 hours, you have the following restrictions:   - DO NOT drive a car, operate machinery, make legal/financial decisions,   sign important papers or drink alcohol.    ACTIVITY:  Today: no heavy lifting, straining or running due to procedural   sedation/anesthesia.  The following day: return to full activity including work.  DIET:  Eat and drink normally unless instructed otherwise.     TREATMENT FOR COMMON SIDE EFFECTS:  - Mild abdominal pain, nausea, belching, bloating or excessive gas:  rest,   eat lightly and use a heating pad.  - Sore Throat: treat with throat lozenges and/or gargle with warm salt   water.  - Because air was used during the procedure, expelling large amounts of air   from your rectum or belching is normal.  - If a bowel prep was taken, you may not have a bowel movement for 1-3 days.    This is normal.  SYMPTOMS TO WATCH FOR AND REPORT TO YOUR PHYSICIAN:  1. Abdominal pain or bloating, other than gas cramps.  2. Chest pain.  3. Back pain.  4. Signs of infection such as: chills or fever occurring within 24 hours   after the procedure.  5. Rectal bleeding, which would show as bright red, maroon, or black stools.   (A tablespoon of blood from the rectum is not serious, especially if    hemorrhoids are present.)  6. Vomiting.  7. Weakness or dizziness.  GO DIRECTLY TO THE NEAREST EMERGENCY ROOM IF YOU HAVE ANY OF THE FOLLOWING:      Difficulty breathing              Chills and/or fever over 101 F   Persistent vomiting and/or vomiting blood   Severe abdominal pain   Severe chest pain   Black, tarry stools   Bleeding- more than one tablespoon   Any other symptom or condition that you feel may need urgent attention  Your doctor recommends these additional instructions:  If any biopsies were taken, your doctors clinic will contact you in 1 to 2   weeks with any results.  - Discharge patient to home.   - Patient has a contact number available for emergencies.  The signs and   symptoms of potential delayed complications were discussed with the   patient.  Return to normal activities tomorrow.  Written discharge   instructions were provided to the patient.   - Resume previous diet.   - Continue present medications.   - Await pathology results.   - Treatment for diverticulitis not recommended at this time since he is   asymptomatic.   - Repeat colonoscopy in 5 years for surveillance.   - Resume Eliquis (apixaban) at prior dose tomorrow.   For questions, problems or results please call your physician - Alessandro Serna MD at Work:  [phone].  [facility], EMERGENCY ROOM PHONE NUMBER: [phone]  IF A COMPLICATION OR EMERGENCY SITUATION ARISES AND YOU ARE UNABLE TO REACH   YOUR PHYSICIAN - GO DIRECTLY TO THE EMERGENCY ROOM.  Alessandro Serna MD  9/6/2024 3:08:18 PM  This report has been verified and signed electronically.  Dear patient,  As a result of recent federal legislation (The Federal Cures Act), you may   receive lab or pathology results from your procedure in your [facility]   account before your physician is able to contact you. Your physician or   their representative will relay the results to you with their   recommendations at their soonest availability.  Thank  you,  PROVATION

## 2024-09-06 NOTE — PLAN OF CARE
Post procedure information given. Discharge instruction and procedure handouts given. Pt and partner verbalized understanding.

## 2024-09-06 NOTE — TRANSFER OF CARE
"Anesthesia Transfer of Care Note    Patient: Anthony Ball    Procedure(s) Performed: Procedure(s) (LRB):  COLONOSCOPY (N/A)    Patient location: GI    Anesthesia Type: general    Transport from OR: Transported from OR on 2-3 L/min O2 by NC with adequate spontaneous ventilation    Post pain: adequate analgesia    Post assessment: no apparent anesthetic complications and tolerated procedure well    Post vital signs: stable    Level of consciousness: awake and alert    Nausea/Vomiting: vomiting and no nausea    Complications: none    Transfer of care protocol was followed    Dr. Cruz at bedside upon arrival      Last vitals: Visit Vitals  /68 (BP Location: Left arm, Patient Position: Lying)   Pulse 97   Temp 37 °C (98.6 °F) (Temporal)   Resp 18   Ht 5' 9" (1.753 m)   Wt 81.2 kg (179 lb)   SpO2 97%   BMI 26.43 kg/m²     "

## 2024-09-06 NOTE — TELEPHONE ENCOUNTER
Refill Routing Note   Medication(s) are not appropriate for processing by [facility] for the following reason(s):        Outside of protocol    [facility] action(s):  Route               Appointments  past 12m or future 3m with PCP    Date Provider   Last Visit   6/14/2024 Isaias Myles MD   Next Visit   Visit date not found Isaias Myles MD   ED visits in past 90 days: 0        Note composed:8:40 AM 09/06/2024

## 2024-09-06 NOTE — OR NURSING
Pt states his breathing is much better. Able to talk without coughing. RA Sat 93%. Anesthesia good with discharging pt to home.

## 2024-09-06 NOTE — OR NURSING
Pt states his breathing is better  since doing Albuterol 2 puffs. Using incentive spirometer. O2 3L - O2 Sat 92%. Occasional cough non productive triggered by talking. No nausea.

## 2024-09-06 NOTE — ANESTHESIA POSTPROCEDURE EVALUATION
Anesthesia Post Evaluation    Patient: Anthony Ball    Procedure(s) Performed: Procedure(s) (LRB):  COLONOSCOPY (N/A)    Final Anesthesia Type: general      Patient location during evaluation: PACU  Patient participation: Yes- Able to Participate  Level of consciousness: awake and alert  Post-procedure vital signs: reviewed and stable  Pain management: adequate  Airway patency: patent    PONV status at discharge: No PONV  Anesthetic complications: no      Cardiovascular status: stable  Respiratory status: unassisted and spontaneous ventilation  Hydration status: euvolemic  Follow-up not needed.              Vitals Value Taken Time   /67 09/06/24 1624   Temp 36.6 °C (97.9 °F) 09/06/24 1518   Pulse 90 09/06/24 1624   Resp 21 09/06/24 1624   SpO2 90 % 09/06/24 1624     Duo neb given in recovery. Pt reports feeling better. Still coughing some. Discussed returning  to hospital/pcp if increased sob, fever, etc    No case tracking events are documented in the log.      Pain/Ze Score: Ze Score: 9 (9/6/2024  3:32 PM)

## 2024-09-06 NOTE — H&P
CC: Painless, progressive loss of vision  Present Illness: Nuclear sclerotic cataract  Allergies/Current Meds: see meds  Mental Status: A&O x3  Pertinent Medical History: n/a     Physical Exam  General: NAD  HEENT: Eye white/quiet  Lungs: Adequate respirations  Heart: + pulses  Abdomen: soft  Rectal/GI/: deferred     Impression: Cataract  Plan: Phacoemulsification with lens implant    ASA Score: II    Mallampati Score: II    Plan for Sedation: Moderate Sedation    Patient or Family History of Anesthesia problems : No    Any changes affecting the anesthesia assessment which may have changed since the initial assessment and H and P: No      Follow up at your next scheduled appointment with your Primary OB    Ok to take tylenol as needed for pain.  You can take 1000 mg, every 6 hours, as needed.    Your due date is 9/2/23 based on your stated last menstrual period of 11/26/22 that is consistent with your ultrasound done on 1/7/23 measuring 6w1d.        Physical Therapy Evaluation and Discharge Note    Patient Name:  Jose Marquez   MRN:  [id number]    Recommendations:     Discharge Recommendations:  home (family assist)   Discharge Equipment Recommendations: hospital bed   Barriers to discharge: None    Assessment:     Jose Marquez is a 55 y.o. female admitted with a medical diagnosis of Hypertensive urgency. .  At this time, patient is functioning at their prior level of function and does not require further acute PT services.     Pt is functioning at her baseline level of function. Pt is able to perform bed mobility including supine <> sit with Supervision. Pt with TIMOTHY BKA and reports her prosthesis do no fit and she has not use them in many months. Pt would benefit from use of a hospital bed due to increased time spent in bed / pt has been non-ambulatory. Pt reports she transfers in/out wheelchair with slide board and son's assistance. Recommending d/c home with family assist. Gait belt provided for transfers and educated on use. Recommending follow-up with prosthetist for re-fitting if pt is interested.     Recent Surgery: * No surgery found *      Plan:     During this hospitalization, patient does not require further acute PT services.  Please re-consult if situation changes.      Subjective     Chief Complaint: pain in B buttocks  Patient/Family Comments/goals: Pt agreeable to participate in therapy and is pleasant   Pain/Comfort:  Pain Rating 1: 8/10  Location - Side 1: Bilateral  Location 1:  (buttocks)  Pain Addressed 1: Reposition, Distraction, Nurse notified  Pain Rating Post-Intervention 1:  (not rated)    Patients cultural, spiritual, Jain conflicts given the current situation: no    Living Environment:  Pt lives with her son in a 1st floor apt with a THE  Prior to admission, patients level of function was mod I for bed mobility and dressing, toileting (uses briefs), and sponge bathing all bed level. Pt requires son's assist for transfers  in/out wheelchair with slide board. Pt reports she mostly stays in bed and only transfers on days she has HD with son assisting. Pt has B prosthesis but they do not fit and she has not used them in many months. Son assists with propelling pt in wheelchair. Equipment used at home: wheelchair, slide board, prosthesis, walker, rolling.  DME owned (not currently used): rolling walker and prosthesis .  Upon discharge, patient will have assistance from son.    Objective:     Communicated with nsg prior to session.  Patient found left sidelying with telemetry (HD fistula) upon PT entry to room.    General Precautions: Standard, vision impaired, fall   Orthopedic Precautions:N/A   Braces: N/A   Respiratory Status: Room air    Exams:  Cognitive Exam:  Patient is oriented to Person, Place, Time, and Situation  Postural Exam:  Patient presented with the following abnormalities:    -       Rounded shoulders  -       Forward head  Sensation: pt denies any numbness/tingling or decreased sensation in B LE; decreased sensation in UE  RLE ROM: WFL  RLE Strength: WFL  LLE ROM: WFL  LLE Strength: WFL  B BKA, B LE skin intact     Functional Mobility:  Bed Mobility:     Rolling Left:  supervision  Rolling Right: supervision  Scooting: supervision  Supine to Sit: supervision  Sit to Supine: supervision    AM-PAC 6 CLICK MOBILITY  Total Score:12       Therapeutic Activities and Exercises:   Pt educated on role pt PT/POC, continued turning and pressure relief, continued exercise, use of gait belt, follow-up with prosthetist, and overall safety  Pt is able to perform bed mobility including supine <> sit with Supervision.   Pt sat EOB ~20 mins with Supervision  Pt able to scoot laterally, forward, and backward on bed without assist  Reviewed safety with bed <> w/c t/f with slide board and assist   Gait belt provided for transfers and educated on use  Nsg notified pt is able to sit EOB with Supervision   Wedge and pillows in place for comfort  and pressure relief      AM-PAC 6 CLICK MOBILITY  Total Score:12     Patient left right sidelying with all lines intact, call button in reach, bed alarm on, and nsg notified.    GOALS:   Multidisciplinary Problems       Physical Therapy Goals       Not on file              Multidisciplinary Problems (Resolved)          Problem: Physical Therapy    Goal Priority Disciplines Outcome Goal Variances Interventions   Physical Therapy Goal   (Resolved)     PT, PT/OT Met                         History:     Past Medical History:   Diagnosis Date    Anemia in ESRD (end-stage renal disease) 04/10/2013    Cellulitis of foot 02/21/2019    CHF (congestive heart failure)     Critical lower limb ischemia     Cysts of both ovaries 04/30/2018    Debility 03/06/2022    Diabetic ulcer of right heel associated with type 2 diabetes mellitus 06/25/2019    Diastolic dysfunction without heart failure     Encounter for blood transfusion     Gangrene of left foot 02/21/2019    Hyperlipidemia     Hypertension     Malignant hypertension with ESRD (end stage renal disease)     Morbid obesity with BMI of 45.0-49.9, adult 03/16/2017    Multiple thyroid nodules 04/05/2022    AIMEE (obstructive sleep apnea)     Osteomyelitis of left foot 02/21/2019    Pseudoaneurysm of arteriovenous dialysis fistula     Left arm    Pseudoaneurysm of arteriovenous dialysis fistula     Steal syndrome of dialysis vascular access 04/12/2018    Stroke     Thrombosis of arteriovenous graft 06/26/2019    Type 2 diabetes mellitus, uncontrolled, with renal complications        Past Surgical History:   Procedure Laterality Date    AMPUTATION      ANGIOGRAPHY OF LOWER EXTREMITY N/A 1/31/2019    Procedure: Angiogram Extremity bilateral;  Surgeon: Edward Quintana MD PhD;  Location: [facility] CATH LAB;  Service: Cardiology;  Laterality: N/A;    ANGIOGRAPHY OF LOWER EXTREMITY Right 7/1/2019    Procedure: Angiogram Extremity Unilateral, right;  Surgeon: Judd Galarza MD;   Location: [facility] CATH LAB;  Service: Peripheral Vascular;  Laterality: Right;    BELOW KNEE AMPUTATION OF LOWER EXTREMITY Right 2020    Procedure: AMPUTATION, BELOW KNEE;  Surgeon: Alena Solorio MD;  Location: [facility] OR;  Service: General;  Laterality: Right;     SECTION, CLASSIC      x2    CHOLECYSTECTOMY      DEBRIDEMENT OF LOWER EXTREMITY Right 10/10/2019    Procedure: DEBRIDEMENT, LOWER EXTREMITY;  Surgeon: Alena Solorio MD;  Location: [facility] OR;  Service: General;  Laterality: Right;    DEBRIDEMENT OF LOWER EXTREMITY Right 11/15/2019    Procedure: DEBRIDEMENT, LOWER EXTREMITY;  Surgeon: Alena Solorio MD;  Location: [facility] OR;  Service: General;  Laterality: Right;    DECLOTTING OF VASCULAR GRAFT Left 2019    Procedure: DECLOT-GRAFT;  Surgeon: Judd Galarza MD;  Location: [facility] CATH LAB;  Service: Peripheral Vascular;  Laterality: Left;    ESOPHAGOGASTRODUODENOSCOPY N/A 2022    Procedure: EGD (ESOPHAGOGASTRODUODENOSCOPY);  Surgeon: Emmanuel Valenzuela MD;  Location: [facility] ENDO;  Service: Endoscopy;  Laterality: N/A;    FISTULOGRAM N/A 7/10/2019    Procedure: Fistulogram;  Surgeon: Sohan Alvarado MD;  Location: [facility] CATH LAB/EP;  Service: Cardiology;  Laterality: N/A;    FOOT AMPUTATION THROUGH METATARSAL Left 2019    Procedure: AMPUTATION, FOOT, TRANSMETATARSAL;  Surgeon: Liliane Hyatt DPM;  Location: [facility] OR;  Service: Podiatry;  Laterality: Left;  4th and 5th partial ray amputatuion      FOOT AMPUTATION THROUGH METATARSAL Left 4/10/2019    Procedure: AMPUTATION, FOOT, TRANSMETATARSAL with wound vac application;  Surgeon: Liliane Hyatt DPM;  Location: [facility] OR;  Service: Podiatry;  Laterality: Left;  I am availiable at 11:30.   Thank you      FOOT AMPUTATION THROUGH METATARSAL Left 2019    Procedure: AMPUTATION, FOOT, TRANSMETATARSAL;  Surgeon: Liliane Hyatt DPM;  Location: [facility] OR;  Service: Podiatry;  Laterality: Left;    GASTRECTOMY      gastric sleeve       INCISION AND DRAINAGE OF WOUND      MECHANICAL THROMBOLYSIS Left 7/10/2019    Procedure: Thrombolysis - bypass graft;  Surgeon: Sohan Alvarado MD;  Location: [facility] CATH LAB/EP;  Service: Cardiology;  Laterality: Left;    PERCUTANEOUS TRANSLUMINAL ANGIOPLASTY (PTA) OF PERIPHERAL VESSEL Left 3/14/2019    Procedure: PTA, PERIPHERAL VESSEL;  Surgeon: Edward Quintana MD PhD;  Location: [facility] CATH LAB;  Service: Cardiology;  Laterality: Left;    PERCUTANEOUS TRANSLUMINAL ANGIOPLASTY (PTA) OF PERIPHERAL VESSEL Left 4/4/2019    Procedure: PTA, PERIPHERAL VESSEL;  Surgeon: Parish Renteria MD;  Location: [facility] CATH LAB/EP;  Service: Cardiology;  Laterality: Left;    PERCUTANEOUS TRANSLUMINAL ANGIOPLASTY OF ARTERIOVENOUS FISTULA N/A 7/10/2019    Procedure: PTA, AV FISTULA;  Surgeon: Sohan Alvarado MD;  Location: [facility] CATH LAB/EP;  Service: Cardiology;  Laterality: N/A;    THROMBECTOMY Left 8/19/2019    Procedure: THROMBECTOMY;  Surgeon: Alena Solorio MD;  Location: [facility] OR;  Service: General;  Laterality: Left;    TUBAL LIGATION  2010    VASCULAR SURGERY      fistula construction L upper arm       Time Tracking:     PT Received On: 09/30/22  PT Start Time: 1434     PT Stop Time: 1505  PT Total Time (min): 31 min     Billable Minutes: Evaluation 8 and Therapeutic Activity 13  (cotx with OT)      09/30/2022

## 2024-09-06 NOTE — H&P
Short Stay Endoscopy History and Physical    PCP - Isaias Myles MD  Referring Physician - Isaias Myles MD  [id number] [place] [address]    Procedure - colonoscopy  ASA - per anesthesia  Mallampati - per anesthesia  History of Anesthesia problems - per anesthesia  Family history Anesthesia problems -  per anesthesia   Plan of anesthesia - per anesthesia    HPI:  This is a 74 y.o. male here for evaluation of: colon cancer screening, had positive cologuard in 07/2024.    Reflux - no  Dysphagia - no  Abdominal pain - no  Diarrhea - no    ROS:  Constitutional: No fevers, chills, No weight loss  CV: No chest pain  Pulm: No cough, No shortness of breath  GI: see HPI      Medical History:  has a past medical history of Acute deep vein thrombosis (DVT) of left lower extremity (12/2023), Anxiety, Atrial fibrillation (12/2023), Cataract, Coronary artery disease, Depression, Diabetic neuropathy, Essential (primary) hypertension, GERD (gastroesophageal reflux disease), Insomnia, Neuromyopathy, Pulmonary embolism (12/2023), Reactive airway disease, and Type 2 diabetes mellitus.    Surgical History:  has a past surgical history that includes Colonoscopy; Total knee arthroplasty (Right); Cataract extraction w/  intraocular lens implant (Right, 7/15/2024); and Cataract extraction w/  intraocular lens implant (Left, 8/29/2024).    Family History: family history includes Colon cancer (age of onset: 89) in his paternal grandfather; Hypertension in his mother..    Social History:  reports that he has never smoked. He has never used smokeless tobacco. He reports current alcohol use. He reports that he does not use drugs.    Review of patient's allergies indicates:  No Known Allergies    Medications:   Medications Prior to Admission   Medication Sig Dispense Refill Last Dose    busPIRone (BUSPAR) 15 MG tablet Take 1 tablet (15 mg total) by mouth 2 (two) times daily as needed (Anxiety). 180 tablet 3 9/6/2024     DULoxetine (CYMBALTA) 60 MG capsule Take 1 capsule (60 mg total) by mouth 2 (two) times daily. 180 capsule 3 9/5/2024    fluticasone-umeclidin-vilanter (TRELEGY ELLIPTA) 100-62.5-25 mcg DsDv Inhale 1 puff into the lungs once daily. 60 each 5 9/5/2024    magnesium oxide 400 mg magnesium Tab Take 1 tablet by mouth every evening. 30 tablet 2 Past Week    methocarbamoL (ROBAXIN) 750 MG Tab Take 1 tablet (750 mg total) by mouth 3 (three) times daily as needed (Back pain). 90 tablet 0 9/6/2024    nortriptyline (PAMELOR) 10 MG capsule Take 1 capsule (10 mg total) by mouth 3 (three) times daily. 90 capsule 3 9/5/2024    pantoprazole (PROTONIX) 40 MG tablet TAKE 1 TABLET(40 MG) BY MOUTH EVERY DAY 90 tablet 3 9/6/2024    pregabalin (LYRICA) 150 MG capsule Take 1 capsule (150 mg total) by mouth 3 (three) times daily. 90 capsule 5 9/5/2024    albuterol (VENTOLIN HFA) 90 mcg/actuation inhaler Inhale 2 puffs into the lungs every 6 (six) hours as needed for Wheezing. Rescue 8 g 2     apixaban (ELIQUIS) 5 mg Tab TAKE 1 TABLET(5 MG) BY MOUTH TWICE DAILY 180 tablet 3 9/4/2024    capsicum 0.075% (ZOSTRIX) 0.075 % topical cream Apply topically 3 (three) times daily as needed (Foot pain). 120 g 5     cyanocobalamin (VITAMIN B-12) 1000 MCG tablet Take 1 tablet (1,000 mcg total) by mouth once daily. 90 tablet 3     hydrOXYzine pamoate (VISTARIL) 25 MG Cap Take 25 mg by mouth every 8 (eight) hours as needed.       LIDOcaine (LIDODERM) 5 % Place 3 patches onto the skin daily as needed (Rib pain). Okay to use 1 to 3 patches. Remove & Discard patches within 12 hours or as directed by MD 30 patch 1     ondansetron (ZOFRAN) 8 MG tablet Take 1 tablet (8 mg total) by mouth every 8 (eight) hours as needed for Nausea. 30 tablet 1     traZODone (DESYREL) 100 MG tablet Take 2 tablets (200 mg total) by mouth nightly as needed for Insomnia. 14 tablet 0     triamcinolone acetonide 0.1% (KENALOG) 0.1 % cream Apply topically 2 (two) times daily. Use for 1  to 2 weeks as needed for rash. 453.6 g 0        Physical Exam:    Vital Signs:   Vitals:    09/06/24 1329   BP: 134/68   Pulse: 97   Resp: 18   Temp: 98.6 °F (37 °C)       General Appearance: Well appearing in no acute distress    Labs:  Lab Results   Component Value Date    WBC 7.55 05/17/2024    HGB 14.1 05/17/2024    HCT 42.8 05/17/2024     05/17/2024    CHOL 156 03/18/2022    TRIG 69 03/18/2022    HDL 93 (H) 03/18/2022    ALT 14 05/17/2024    AST 21 05/17/2024     05/17/2024    K 4.2 05/17/2024     05/17/2024    CREATININE 0.9 05/17/2024    BUN 11 05/17/2024    CO2 23 05/17/2024    TSH 4.197 (H) 05/17/2024    PSA 0.68 01/05/2024    GLUF 193 (H) 02/11/2022    HGBA1C 5.6 05/17/2024       I have explained the risks and benefits of this endoscopic procedure to the patient including but not limited to bleeding, inflammation, infection, perforation, missing a lesion and death.      Candy Henderson MD

## 2024-09-10 ENCOUNTER — DOCUMENTATION ONLY (OUTPATIENT)
Dept: REHABILITATION | Facility: HOSPITAL | Age: 74
End: 2024-09-10
Payer: MEDICARE

## 2024-09-10 LAB
FINAL PATHOLOGIC DIAGNOSIS: NORMAL
GROSS: NORMAL
Lab: NORMAL

## 2024-09-10 NOTE — PROGRESS NOTES
Pt did not attend his scheduled 11:15 AM therapy session today. His next appointment is on 9/13/24. No charges posted today.    Ba Diaz, PT  9/10/2024

## 2024-09-17 ENCOUNTER — TELEPHONE (OUTPATIENT)
Dept: REHABILITATION | Facility: HOSPITAL | Age: 74
End: 2024-09-17
Payer: MEDICARE

## 2024-09-17 NOTE — TELEPHONE ENCOUNTER
PT phoned pt to check on him as he has not attended therapy since 8/28/24. PT noted pt did have some recent medical procedures and wonders if pt is having any problems which are preventing him from attending his therapy appointments. PT left message and requested he call the clinic to inform us of his intentions to continue.    Ba Diaz, PT  9/17/2024

## 2024-09-18 ENCOUNTER — PATIENT MESSAGE (OUTPATIENT)
Dept: INTERNAL MEDICINE | Facility: CLINIC | Age: 74
End: 2024-09-18
Payer: MEDICARE

## 2024-09-18 ENCOUNTER — PATIENT MESSAGE (OUTPATIENT)
Dept: REHABILITATION | Facility: HOSPITAL | Age: 74
End: 2024-09-18
Payer: MEDICARE

## 2024-09-18 DIAGNOSIS — N39.0 URINARY TRACT INFECTION WITHOUT HEMATURIA, SITE UNSPECIFIED: Primary | ICD-10-CM

## 2024-09-19 RX ORDER — GRANULES FOR ORAL 3 G/1
3 POWDER ORAL ONCE
Qty: 3 G | Refills: 0 | Status: SHIPPED | OUTPATIENT
Start: 2024-09-19 | End: 2024-09-19

## 2024-09-26 ENCOUNTER — HOSPITAL ENCOUNTER (INPATIENT)
Facility: HOSPITAL | Age: 74
LOS: 5 days | Discharge: HOME HEALTH | DRG: 853 | End: 2024-10-01
Attending: STUDENT IN AN ORGANIZED HEALTH CARE EDUCATION/TRAINING PROGRAM | Admitting: HOSPITALIST
Payer: MEDICARE

## 2024-09-26 ENCOUNTER — DOCUMENTATION ONLY (OUTPATIENT)
Dept: REHABILITATION | Facility: HOSPITAL | Age: 74
End: 2024-09-26
Payer: MEDICARE

## 2024-09-26 DIAGNOSIS — N17.9 AKI (ACUTE KIDNEY INJURY): ICD-10-CM

## 2024-09-26 DIAGNOSIS — R65.20 SEVERE SEPSIS: ICD-10-CM

## 2024-09-26 DIAGNOSIS — N20.1 RIGHT URETERAL STONE: ICD-10-CM

## 2024-09-26 DIAGNOSIS — R41.82 AMS (ALTERED MENTAL STATUS): ICD-10-CM

## 2024-09-26 DIAGNOSIS — N17.9 ACUTE KIDNEY INJURY: ICD-10-CM

## 2024-09-26 DIAGNOSIS — E83.42 HYPOMAGNESEMIA: ICD-10-CM

## 2024-09-26 DIAGNOSIS — E11.42 TYPE 2 DIABETES MELLITUS WITH DIABETIC POLYNEUROPATHY, WITHOUT LONG-TERM CURRENT USE OF INSULIN: Chronic | ICD-10-CM

## 2024-09-26 DIAGNOSIS — R93.89 ABNORMAL CHEST X-RAY: ICD-10-CM

## 2024-09-26 DIAGNOSIS — R07.9 CHEST PAIN: ICD-10-CM

## 2024-09-26 DIAGNOSIS — R29.6 RECURRENT FALLS: ICD-10-CM

## 2024-09-26 DIAGNOSIS — A41.9 SEVERE SEPSIS: ICD-10-CM

## 2024-09-26 DIAGNOSIS — Z74.09 IMPAIRED FUNCTIONAL MOBILITY, BALANCE, GAIT, AND ENDURANCE: ICD-10-CM

## 2024-09-26 DIAGNOSIS — Z79.52 ENCOUNTER FOR MONITORING OF SYSTEMIC STEROID THERAPY: ICD-10-CM

## 2024-09-26 DIAGNOSIS — I95.9 HYPOTENSION, UNSPECIFIED HYPOTENSION TYPE: Primary | ICD-10-CM

## 2024-09-26 DIAGNOSIS — N13.30 HYDRONEPHROSIS, UNSPECIFIED HYDRONEPHROSIS TYPE: ICD-10-CM

## 2024-09-26 DIAGNOSIS — D69.6 THROMBOCYTOPENIA: ICD-10-CM

## 2024-09-26 DIAGNOSIS — N13.2 HYDRONEPHROSIS WITH URINARY OBSTRUCTION DUE TO URETERAL CALCULUS: ICD-10-CM

## 2024-09-26 DIAGNOSIS — Z51.81 ENCOUNTER FOR MONITORING OF SYSTEMIC STEROID THERAPY: ICD-10-CM

## 2024-09-26 DIAGNOSIS — R73.9 HYPERGLYCEMIA: ICD-10-CM

## 2024-09-26 DIAGNOSIS — R91.1 PULMONARY NODULE: ICD-10-CM

## 2024-09-26 DIAGNOSIS — R53.1 WEAKNESS: ICD-10-CM

## 2024-09-26 PROBLEM — F51.01 PRIMARY INSOMNIA: Chronic | Status: ACTIVE | Noted: 2021-07-08

## 2024-09-26 PROBLEM — R55 SYNCOPE: Status: RESOLVED | Noted: 2021-07-08 | Resolved: 2024-09-26

## 2024-09-26 PROBLEM — R26.89 BALANCE PROBLEM: Status: RESOLVED | Noted: 2021-08-05 | Resolved: 2024-09-26

## 2024-09-26 PROBLEM — E83.39 HYPOPHOSPHATEMIA: Status: ACTIVE | Noted: 2024-09-26

## 2024-09-26 PROBLEM — D64.9 NORMOCYTIC ANEMIA: Status: ACTIVE | Noted: 2024-09-26

## 2024-09-26 PROBLEM — D64.9 NORMOCYTIC ANEMIA: Chronic | Status: ACTIVE | Noted: 2024-09-26

## 2024-09-26 PROBLEM — K21.9 GASTROESOPHAGEAL REFLUX DISEASE WITHOUT ESOPHAGITIS: Chronic | Status: ACTIVE | Noted: 2021-07-08

## 2024-09-26 PROBLEM — Z96.651 HISTORY OF TOTAL RIGHT KNEE REPLACEMENT: Status: RESOLVED | Noted: 2021-07-08 | Resolved: 2024-09-26

## 2024-09-26 PROBLEM — Z98.84 HISTORY OF BARIATRIC SURGERY: Status: RESOLVED | Noted: 2021-07-08 | Resolved: 2024-09-26

## 2024-09-26 PROBLEM — L29.9 PRURITUS: Status: RESOLVED | Noted: 2022-07-21 | Resolved: 2024-09-26

## 2024-09-26 PROBLEM — E11.49 TYPE 2 DIABETES MELLITUS WITH NEUROLOGIC COMPLICATION, WITHOUT LONG-TERM CURRENT USE OF INSULIN: Chronic | Status: ACTIVE | Noted: 2021-07-08

## 2024-09-26 PROBLEM — R79.89 PSEUDOHYPONATREMIA: Status: ACTIVE | Noted: 2024-09-26

## 2024-09-26 PROBLEM — J18.9 PNEUMONIA: Status: ACTIVE | Noted: 2024-09-26

## 2024-09-26 PROBLEM — J45.909 ASTHMA: Status: ACTIVE | Noted: 2024-09-26

## 2024-09-26 LAB
ALBUMIN SERPL BCP-MCNC: 2.4 G/DL (ref 3.5–5.2)
ALLENS TEST: ABNORMAL
ALP SERPL-CCNC: 148 U/L (ref 55–135)
ALT SERPL W/O P-5'-P-CCNC: 9 U/L (ref 10–44)
ANION GAP SERPL CALC-SCNC: 10 MMOL/L (ref 8–16)
ANION GAP SERPL CALC-SCNC: 8 MMOL/L (ref 8–16)
ANION GAP SERPL CALC-SCNC: 9 MMOL/L (ref 8–16)
AST SERPL-CCNC: 14 U/L (ref 10–40)
B-OH-BUTYR BLD STRIP-SCNC: 0.8 MMOL/L (ref 0–0.5)
BACTERIA #/AREA URNS AUTO: ABNORMAL /HPF
BASOPHILS # BLD AUTO: 0.02 K/UL (ref 0–0.2)
BASOPHILS NFR BLD: 0.1 % (ref 0–1.9)
BILIRUB SERPL-MCNC: 1.1 MG/DL (ref 0.1–1)
BILIRUB UR QL STRIP: NEGATIVE
BNP SERPL-MCNC: 127 PG/ML (ref 0–99)
BUN SERPL-MCNC: 24 MG/DL (ref 8–23)
BUN SERPL-MCNC: 26 MG/DL (ref 8–23)
BUN SERPL-MCNC: 27 MG/DL (ref 8–23)
CALCIUM SERPL-MCNC: 8.1 MG/DL (ref 8.7–10.5)
CALCIUM SERPL-MCNC: 8.4 MG/DL (ref 8.7–10.5)
CALCIUM SERPL-MCNC: 8.9 MG/DL (ref 8.7–10.5)
CHLORIDE SERPL-SCNC: 98 MMOL/L (ref 95–110)
CHLORIDE SERPL-SCNC: 99 MMOL/L (ref 95–110)
CHLORIDE SERPL-SCNC: 99 MMOL/L (ref 95–110)
CLARITY UR REFRACT.AUTO: ABNORMAL
CO2 SERPL-SCNC: 17 MMOL/L (ref 23–29)
CO2 SERPL-SCNC: 17 MMOL/L (ref 23–29)
CO2 SERPL-SCNC: 19 MMOL/L (ref 23–29)
COLOR UR AUTO: YELLOW
CREAT SERPL-MCNC: 1.8 MG/DL (ref 0.5–1.4)
CREAT SERPL-MCNC: 1.9 MG/DL (ref 0.5–1.4)
CREAT SERPL-MCNC: 1.9 MG/DL (ref 0.5–1.4)
DELSYS: ABNORMAL
DIFFERENTIAL METHOD BLD: ABNORMAL
EOSINOPHIL # BLD AUTO: 0 K/UL (ref 0–0.5)
EOSINOPHIL NFR BLD: 0.1 % (ref 0–8)
ERYTHROCYTE [DISTWIDTH] IN BLOOD BY AUTOMATED COUNT: 12.3 % (ref 11.5–14.5)
EST. GFR  (NO RACE VARIABLE): 37 ML/MIN/1.73 M^2
EST. GFR  (NO RACE VARIABLE): 37 ML/MIN/1.73 M^2
EST. GFR  (NO RACE VARIABLE): 39.5 ML/MIN/1.73 M^2
ESTIMATED AVG GLUCOSE: 235 MG/DL (ref 68–131)
GLUCOSE SERPL-MCNC: 395 MG/DL (ref 70–110)
GLUCOSE SERPL-MCNC: 439 MG/DL (ref 70–110)
GLUCOSE SERPL-MCNC: 510 MG/DL (ref 70–110)
GLUCOSE UR QL STRIP: ABNORMAL
HBA1C MFR BLD: 9.8 % (ref 4–5.6)
HCT VFR BLD AUTO: 32.6 % (ref 40–54)
HGB BLD-MCNC: 10.8 G/DL (ref 14–18)
HGB UR QL STRIP: ABNORMAL
HYALINE CASTS UR QL AUTO: 3 /LPF
IMM GRANULOCYTES # BLD AUTO: 0.19 K/UL (ref 0–0.04)
IMM GRANULOCYTES NFR BLD AUTO: 1.3 % (ref 0–0.5)
INR PPP: 1.1 (ref 0.8–1.2)
KETONES UR QL STRIP: ABNORMAL
LACTATE SERPL-SCNC: 3.6 MMOL/L (ref 0.5–2.2)
LACTATE SERPL-SCNC: 5.6 MMOL/L (ref 0.5–2.2)
LDH SERPL L TO P-CCNC: 2.74 MMOL/L (ref 0.5–2.2)
LEUKOCYTE ESTERASE UR QL STRIP: ABNORMAL
LIPASE SERPL-CCNC: 4 U/L (ref 4–60)
LYMPHOCYTES # BLD AUTO: 0.4 K/UL (ref 1–4.8)
LYMPHOCYTES NFR BLD: 2.8 % (ref 18–48)
MAGNESIUM SERPL-MCNC: 1.5 MG/DL (ref 1.6–2.6)
MCH RBC QN AUTO: 30.2 PG (ref 27–31)
MCHC RBC AUTO-ENTMCNC: 33.1 G/DL (ref 32–36)
MCV RBC AUTO: 91 FL (ref 82–98)
MICROSCOPIC COMMENT: ABNORMAL
MODE: ABNORMAL
MONOCYTES # BLD AUTO: 1.2 K/UL (ref 0.3–1)
MONOCYTES NFR BLD: 8.1 % (ref 4–15)
NEUTROPHILS # BLD AUTO: 12.7 K/UL (ref 1.8–7.7)
NEUTROPHILS NFR BLD: 87.6 % (ref 38–73)
NITRITE UR QL STRIP: NEGATIVE
NRBC BLD-RTO: 0 /100 WBC
OHS QRS DURATION: 78 MS
OHS QTC CALCULATION: 437 MS
PH UR STRIP: 8 [PH] (ref 5–8)
PHOSPHATE SERPL-MCNC: 2 MG/DL (ref 2.7–4.5)
PLATELET # BLD AUTO: 108 K/UL (ref 150–450)
PMV BLD AUTO: 10 FL (ref 9.2–12.9)
POCT GLUCOSE: 413 MG/DL (ref 70–110)
POCT GLUCOSE: 494 MG/DL (ref 70–110)
POTASSIUM SERPL-SCNC: 4.2 MMOL/L (ref 3.5–5.1)
POTASSIUM SERPL-SCNC: 4.6 MMOL/L (ref 3.5–5.1)
POTASSIUM SERPL-SCNC: 5.1 MMOL/L (ref 3.5–5.1)
PROCALCITONIN SERPL IA-MCNC: 23.72 NG/ML
PROT SERPL-MCNC: 5.7 G/DL (ref 6–8.4)
PROT UR QL STRIP: ABNORMAL
PROTHROMBIN TIME: 11.5 SEC (ref 9–12.5)
RBC # BLD AUTO: 3.58 M/UL (ref 4.6–6.2)
RBC #/AREA URNS AUTO: 0 /HPF (ref 0–4)
SAMPLE: ABNORMAL
SITE: ABNORMAL
SODIUM SERPL-SCNC: 123 MMOL/L (ref 136–145)
SODIUM SERPL-SCNC: 125 MMOL/L (ref 136–145)
SODIUM SERPL-SCNC: 128 MMOL/L (ref 136–145)
SP GR UR STRIP: 1.02 (ref 1–1.03)
SQUAMOUS #/AREA URNS AUTO: 5 /HPF
TROPONIN I SERPL DL<=0.01 NG/ML-MCNC: 0.01 NG/ML (ref 0–0.03)
TSH SERPL DL<=0.005 MIU/L-ACNC: 2.36 UIU/ML (ref 0.4–4)
URN SPEC COLLECT METH UR: ABNORMAL
WBC # BLD AUTO: 14.52 K/UL (ref 3.9–12.7)
WBC #/AREA URNS AUTO: >100 /HPF (ref 0–5)

## 2024-09-26 PROCEDURE — 84443 ASSAY THYROID STIM HORMONE: CPT | Performed by: STUDENT IN AN ORGANIZED HEALTH CARE EDUCATION/TRAINING PROGRAM

## 2024-09-26 PROCEDURE — 96367 TX/PROPH/DG ADDL SEQ IV INF: CPT

## 2024-09-26 PROCEDURE — 63600175 PHARM REV CODE 636 W HCPCS

## 2024-09-26 PROCEDURE — 82803 BLOOD GASES ANY COMBINATION: CPT

## 2024-09-26 PROCEDURE — 25000003 PHARM REV CODE 250

## 2024-09-26 PROCEDURE — 87040 BLOOD CULTURE FOR BACTERIA: CPT | Performed by: STUDENT IN AN ORGANIZED HEALTH CARE EDUCATION/TRAINING PROGRAM

## 2024-09-26 PROCEDURE — 96365 THER/PROPH/DIAG IV INF INIT: CPT

## 2024-09-26 PROCEDURE — 80048 BASIC METABOLIC PNL TOTAL CA: CPT | Mod: 91,XB

## 2024-09-26 PROCEDURE — 84484 ASSAY OF TROPONIN QUANT: CPT | Performed by: STUDENT IN AN ORGANIZED HEALTH CARE EDUCATION/TRAINING PROGRAM

## 2024-09-26 PROCEDURE — 94640 AIRWAY INHALATION TREATMENT: CPT

## 2024-09-26 PROCEDURE — 25000242 PHARM REV CODE 250 ALT 637 W/ HCPCS

## 2024-09-26 PROCEDURE — 83036 HEMOGLOBIN GLYCOSYLATED A1C: CPT | Performed by: STUDENT IN AN ORGANIZED HEALTH CARE EDUCATION/TRAINING PROGRAM

## 2024-09-26 PROCEDURE — 84145 PROCALCITONIN (PCT): CPT | Performed by: STUDENT IN AN ORGANIZED HEALTH CARE EDUCATION/TRAINING PROGRAM

## 2024-09-26 PROCEDURE — 99900035 HC TECH TIME PER 15 MIN (STAT)

## 2024-09-26 PROCEDURE — 96361 HYDRATE IV INFUSION ADD-ON: CPT

## 2024-09-26 PROCEDURE — 93010 ELECTROCARDIOGRAM REPORT: CPT | Mod: ,,, | Performed by: INTERNAL MEDICINE

## 2024-09-26 PROCEDURE — 87086 URINE CULTURE/COLONY COUNT: CPT | Performed by: STUDENT IN AN ORGANIZED HEALTH CARE EDUCATION/TRAINING PROGRAM

## 2024-09-26 PROCEDURE — 63600175 PHARM REV CODE 636 W HCPCS: Performed by: STUDENT IN AN ORGANIZED HEALTH CARE EDUCATION/TRAINING PROGRAM

## 2024-09-26 PROCEDURE — 93005 ELECTROCARDIOGRAM TRACING: CPT

## 2024-09-26 PROCEDURE — 12000002 HC ACUTE/MED SURGE SEMI-PRIVATE ROOM

## 2024-09-26 PROCEDURE — 87154 CUL TYP ID BLD PTHGN 6+ TRGT: CPT | Performed by: STUDENT IN AN ORGANIZED HEALTH CARE EDUCATION/TRAINING PROGRAM

## 2024-09-26 PROCEDURE — 83880 ASSAY OF NATRIURETIC PEPTIDE: CPT | Performed by: STUDENT IN AN ORGANIZED HEALTH CARE EDUCATION/TRAINING PROGRAM

## 2024-09-26 PROCEDURE — 82962 GLUCOSE BLOOD TEST: CPT

## 2024-09-26 PROCEDURE — 25000003 PHARM REV CODE 250: Performed by: STUDENT IN AN ORGANIZED HEALTH CARE EDUCATION/TRAINING PROGRAM

## 2024-09-26 PROCEDURE — 83605 ASSAY OF LACTIC ACID: CPT

## 2024-09-26 PROCEDURE — 85025 COMPLETE CBC W/AUTO DIFF WBC: CPT | Performed by: STUDENT IN AN ORGANIZED HEALTH CARE EDUCATION/TRAINING PROGRAM

## 2024-09-26 PROCEDURE — 87186 SC STD MICRODIL/AGAR DIL: CPT | Performed by: STUDENT IN AN ORGANIZED HEALTH CARE EDUCATION/TRAINING PROGRAM

## 2024-09-26 PROCEDURE — 0241U SARS-COV2 (COVID) WITH FLU/RSV BY PCR: CPT

## 2024-09-26 PROCEDURE — 99285 EMERGENCY DEPT VISIT HI MDM: CPT | Mod: 25

## 2024-09-26 PROCEDURE — 80053 COMPREHEN METABOLIC PANEL: CPT | Performed by: STUDENT IN AN ORGANIZED HEALTH CARE EDUCATION/TRAINING PROGRAM

## 2024-09-26 PROCEDURE — 84100 ASSAY OF PHOSPHORUS: CPT | Performed by: STUDENT IN AN ORGANIZED HEALTH CARE EDUCATION/TRAINING PROGRAM

## 2024-09-26 PROCEDURE — 82010 KETONE BODYS QUAN: CPT

## 2024-09-26 PROCEDURE — 80048 BASIC METABOLIC PNL TOTAL CA: CPT | Mod: XB

## 2024-09-26 PROCEDURE — 83605 ASSAY OF LACTIC ACID: CPT | Mod: 91 | Performed by: INTERNAL MEDICINE

## 2024-09-26 PROCEDURE — 81001 URINALYSIS AUTO W/SCOPE: CPT | Performed by: STUDENT IN AN ORGANIZED HEALTH CARE EDUCATION/TRAINING PROGRAM

## 2024-09-26 PROCEDURE — 87088 URINE BACTERIA CULTURE: CPT | Performed by: STUDENT IN AN ORGANIZED HEALTH CARE EDUCATION/TRAINING PROGRAM

## 2024-09-26 PROCEDURE — 85610 PROTHROMBIN TIME: CPT | Performed by: STUDENT IN AN ORGANIZED HEALTH CARE EDUCATION/TRAINING PROGRAM

## 2024-09-26 PROCEDURE — 83735 ASSAY OF MAGNESIUM: CPT | Performed by: STUDENT IN AN ORGANIZED HEALTH CARE EDUCATION/TRAINING PROGRAM

## 2024-09-26 PROCEDURE — 83605 ASSAY OF LACTIC ACID: CPT | Mod: 91

## 2024-09-26 PROCEDURE — 83690 ASSAY OF LIPASE: CPT | Performed by: STUDENT IN AN ORGANIZED HEALTH CARE EDUCATION/TRAINING PROGRAM

## 2024-09-26 PROCEDURE — 87077 CULTURE AEROBIC IDENTIFY: CPT | Performed by: STUDENT IN AN ORGANIZED HEALTH CARE EDUCATION/TRAINING PROGRAM

## 2024-09-26 RX ORDER — IBUPROFEN 200 MG
16 TABLET ORAL
Status: DISCONTINUED | OUTPATIENT
Start: 2024-09-26 | End: 2024-09-26

## 2024-09-26 RX ORDER — TRAZODONE HYDROCHLORIDE 50 MG/1
100 TABLET ORAL NIGHTLY PRN
Status: DISCONTINUED | OUTPATIENT
Start: 2024-09-26 | End: 2024-09-26

## 2024-09-26 RX ORDER — MAGNESIUM SULFATE HEPTAHYDRATE 40 MG/ML
2 INJECTION, SOLUTION INTRAVENOUS
Status: COMPLETED | OUTPATIENT
Start: 2024-09-26 | End: 2024-09-26

## 2024-09-26 RX ORDER — PANTOPRAZOLE SODIUM 40 MG/1
40 TABLET, DELAYED RELEASE ORAL DAILY
Status: DISCONTINUED | OUTPATIENT
Start: 2024-09-27 | End: 2024-10-01 | Stop reason: HOSPADM

## 2024-09-26 RX ORDER — FLUTICASONE FUROATE AND VILANTEROL 100; 25 UG/1; UG/1
1 POWDER RESPIRATORY (INHALATION) DAILY
Status: DISCONTINUED | OUTPATIENT
Start: 2024-09-27 | End: 2024-10-01 | Stop reason: HOSPADM

## 2024-09-26 RX ORDER — INSULIN ASPART 100 [IU]/ML
0-5 INJECTION, SOLUTION INTRAVENOUS; SUBCUTANEOUS
Status: DISCONTINUED | OUTPATIENT
Start: 2024-09-26 | End: 2024-09-26

## 2024-09-26 RX ORDER — SODIUM CHLORIDE 9 MG/ML
1000 INJECTION, SOLUTION INTRAVENOUS CONTINUOUS
Status: DISCONTINUED | OUTPATIENT
Start: 2024-09-26 | End: 2024-09-26

## 2024-09-26 RX ORDER — NALOXONE HCL 0.4 MG/ML
0.02 VIAL (ML) INJECTION
Status: DISCONTINUED | OUTPATIENT
Start: 2024-09-26 | End: 2024-10-01 | Stop reason: HOSPADM

## 2024-09-26 RX ORDER — GLUCAGON 1 MG
1 KIT INJECTION
Status: DISCONTINUED | OUTPATIENT
Start: 2024-09-26 | End: 2024-09-26

## 2024-09-26 RX ORDER — AMOXICILLIN 250 MG
1 CAPSULE ORAL 2 TIMES DAILY PRN
Status: DISCONTINUED | OUTPATIENT
Start: 2024-09-26 | End: 2024-09-29

## 2024-09-26 RX ORDER — IBUPROFEN 200 MG
24 TABLET ORAL
Status: DISCONTINUED | OUTPATIENT
Start: 2024-09-26 | End: 2024-09-26

## 2024-09-26 RX ORDER — HYDROXYZINE PAMOATE 25 MG/1
25 CAPSULE ORAL EVERY 8 HOURS PRN
Status: DISCONTINUED | OUTPATIENT
Start: 2024-09-26 | End: 2024-10-01 | Stop reason: HOSPADM

## 2024-09-26 RX ORDER — NOREPINEPHRINE BITARTRATE/D5W 4MG/250ML
PLASTIC BAG, INJECTION (ML) INTRAVENOUS
Status: COMPLETED
Start: 2024-09-26 | End: 2024-09-26

## 2024-09-26 RX ORDER — POLYETHYLENE GLYCOL 3350 17 G/17G
17 POWDER, FOR SOLUTION ORAL 2 TIMES DAILY PRN
Status: DISCONTINUED | OUTPATIENT
Start: 2024-09-26 | End: 2024-09-29

## 2024-09-26 RX ORDER — IPRATROPIUM BROMIDE AND ALBUTEROL SULFATE 2.5; .5 MG/3ML; MG/3ML
SOLUTION RESPIRATORY (INHALATION)
Status: DISPENSED
Start: 2024-09-26 | End: 2024-09-27

## 2024-09-26 RX ORDER — INSULIN GLARGINE 100 [IU]/ML
10 INJECTION, SOLUTION SUBCUTANEOUS NIGHTLY
Status: DISCONTINUED | OUTPATIENT
Start: 2024-09-26 | End: 2024-09-26

## 2024-09-26 RX ORDER — SODIUM CHLORIDE 0.9 % (FLUSH) 0.9 %
10 SYRINGE (ML) INJECTION EVERY 12 HOURS PRN
Status: DISCONTINUED | OUTPATIENT
Start: 2024-09-26 | End: 2024-10-01 | Stop reason: HOSPADM

## 2024-09-26 RX ORDER — TALC
6 POWDER (GRAM) TOPICAL NIGHTLY PRN
Status: DISCONTINUED | OUTPATIENT
Start: 2024-09-26 | End: 2024-09-26

## 2024-09-26 RX ORDER — HEPARIN SODIUM 5000 [USP'U]/ML
5000 INJECTION, SOLUTION INTRAVENOUS; SUBCUTANEOUS EVERY 8 HOURS
Status: DISCONTINUED | OUTPATIENT
Start: 2024-09-26 | End: 2024-09-26

## 2024-09-26 RX ORDER — SODIUM CHLORIDE 0.9 % (FLUSH) 0.9 %
10 SYRINGE (ML) INJECTION
Status: DISCONTINUED | OUTPATIENT
Start: 2024-09-26 | End: 2024-10-01 | Stop reason: HOSPADM

## 2024-09-26 RX ORDER — DULOXETIN HYDROCHLORIDE 30 MG/1
60 CAPSULE, DELAYED RELEASE ORAL 2 TIMES DAILY
Status: DISCONTINUED | OUTPATIENT
Start: 2024-09-26 | End: 2024-09-27

## 2024-09-26 RX ORDER — PREGABALIN 75 MG/1
75 CAPSULE ORAL 3 TIMES DAILY
Status: DISCONTINUED | OUTPATIENT
Start: 2024-09-26 | End: 2024-09-27

## 2024-09-26 RX ORDER — INSULIN ASPART 100 [IU]/ML
2 INJECTION, SOLUTION INTRAVENOUS; SUBCUTANEOUS
Status: DISCONTINUED | OUTPATIENT
Start: 2024-09-27 | End: 2024-09-26

## 2024-09-26 RX ORDER — ACETAMINOPHEN 325 MG/1
650 TABLET ORAL EVERY 4 HOURS PRN
Status: DISCONTINUED | OUTPATIENT
Start: 2024-09-26 | End: 2024-10-01 | Stop reason: HOSPADM

## 2024-09-26 RX ORDER — BUSPIRONE HYDROCHLORIDE 5 MG/1
15 TABLET ORAL 2 TIMES DAILY PRN
Status: DISCONTINUED | OUTPATIENT
Start: 2024-09-26 | End: 2024-10-01 | Stop reason: HOSPADM

## 2024-09-26 RX ORDER — NOREPINEPHRINE BITARTRATE/D5W 4MG/250ML
0-.2 PLASTIC BAG, INJECTION (ML) INTRAVENOUS CONTINUOUS
Status: DISPENSED | OUTPATIENT
Start: 2024-09-26 | End: 2024-09-27

## 2024-09-26 RX ORDER — PREGABALIN 75 MG/1
150 CAPSULE ORAL 3 TIMES DAILY
Status: DISCONTINUED | OUTPATIENT
Start: 2024-09-26 | End: 2024-09-26

## 2024-09-26 RX ORDER — TALC
6 POWDER (GRAM) TOPICAL NIGHTLY PRN
Status: DISCONTINUED | OUTPATIENT
Start: 2024-09-26 | End: 2024-09-26 | Stop reason: SDUPTHER

## 2024-09-26 RX ORDER — IPRATROPIUM BROMIDE AND ALBUTEROL SULFATE 2.5; .5 MG/3ML; MG/3ML
3 SOLUTION RESPIRATORY (INHALATION) EVERY 6 HOURS PRN
Status: DISCONTINUED | OUTPATIENT
Start: 2024-09-26 | End: 2024-09-27

## 2024-09-26 RX ORDER — ACETAMINOPHEN 325 MG/1
650 TABLET ORAL EVERY 4 HOURS PRN
Status: DISCONTINUED | OUTPATIENT
Start: 2024-09-26 | End: 2024-09-26

## 2024-09-26 RX ORDER — AZITHROMYCIN 250 MG/1
500 TABLET, FILM COATED ORAL DAILY
Status: DISCONTINUED | OUTPATIENT
Start: 2024-09-27 | End: 2024-09-28

## 2024-09-26 RX ORDER — SODIUM CHLORIDE AND POTASSIUM CHLORIDE 150; 900 MG/100ML; MG/100ML
INJECTION, SOLUTION INTRAVENOUS CONTINUOUS
Status: DISCONTINUED | OUTPATIENT
Start: 2024-09-27 | End: 2024-09-27

## 2024-09-26 RX ORDER — DEXTROSE MONOHYDRATE AND SODIUM CHLORIDE 5; .45 G/100ML; G/100ML
INJECTION, SOLUTION INTRAVENOUS CONTINUOUS PRN
Status: DISCONTINUED | OUTPATIENT
Start: 2024-09-26 | End: 2024-09-27

## 2024-09-26 RX ORDER — INSULIN ASPART 100 [IU]/ML
0-10 INJECTION, SOLUTION INTRAVENOUS; SUBCUTANEOUS
Status: DISCONTINUED | OUTPATIENT
Start: 2024-09-26 | End: 2024-09-26

## 2024-09-26 RX ADMIN — CEFEPIME 2 G: 2 INJECTION, POWDER, FOR SOLUTION INTRAVENOUS at 07:09

## 2024-09-26 RX ADMIN — SODIUM CHLORIDE, POTASSIUM CHLORIDE, SODIUM LACTATE AND CALCIUM CHLORIDE 500 ML: 600; 310; 30; 20 INJECTION, SOLUTION INTRAVENOUS at 05:09

## 2024-09-26 RX ADMIN — DULOXETINE HYDROCHLORIDE 60 MG: 60 CAPSULE, DELAYED RELEASE ORAL at 08:09

## 2024-09-26 RX ADMIN — SODIUM CHLORIDE, POTASSIUM CHLORIDE, SODIUM LACTATE AND CALCIUM CHLORIDE 500 ML: 600; 310; 30; 20 INJECTION, SOLUTION INTRAVENOUS at 04:09

## 2024-09-26 RX ADMIN — CEFTRIAXONE 1 G: 1 INJECTION, POWDER, FOR SOLUTION INTRAMUSCULAR; INTRAVENOUS at 01:09

## 2024-09-26 RX ADMIN — IPRATROPIUM BROMIDE AND ALBUTEROL SULFATE 3 ML: 2.5; .5 SOLUTION RESPIRATORY (INHALATION) at 02:09

## 2024-09-26 RX ADMIN — VANCOMYCIN HYDROCHLORIDE 1500 MG: 1.5 INJECTION, POWDER, LYOPHILIZED, FOR SOLUTION INTRAVENOUS at 06:09

## 2024-09-26 RX ADMIN — INSULIN HUMAN 0.1 UNITS/KG/HR: 1 INJECTION, SOLUTION INTRAVENOUS at 09:09

## 2024-09-26 RX ADMIN — SODIUM CHLORIDE, POTASSIUM CHLORIDE, SODIUM LACTATE AND CALCIUM CHLORIDE 1000 ML: 600; 310; 30; 20 INJECTION, SOLUTION INTRAVENOUS at 11:09

## 2024-09-26 RX ADMIN — AZITHROMYCIN MONOHYDRATE 500 MG: 500 INJECTION, POWDER, LYOPHILIZED, FOR SOLUTION INTRAVENOUS at 04:09

## 2024-09-26 RX ADMIN — SODIUM CHLORIDE, POTASSIUM CHLORIDE, SODIUM LACTATE AND CALCIUM CHLORIDE 1000 ML: 600; 310; 30; 20 INJECTION, SOLUTION INTRAVENOUS at 12:09

## 2024-09-26 RX ADMIN — SODIUM CHLORIDE 1000 ML: 9 INJECTION, SOLUTION INTRAVENOUS at 08:09

## 2024-09-26 RX ADMIN — INSULIN ASPART 10 UNITS: 100 INJECTION, SOLUTION INTRAVENOUS; SUBCUTANEOUS at 06:09

## 2024-09-26 RX ADMIN — NOREPINEPHRINE BITARTRATE 0.02 MCG/KG/MIN: 4 INJECTION, SOLUTION INTRAVENOUS at 05:09

## 2024-09-26 RX ADMIN — APIXABAN 5 MG: 5 TABLET, FILM COATED ORAL at 08:09

## 2024-09-26 RX ADMIN — MAGNESIUM SULFATE HEPTAHYDRATE 2 G: 40 INJECTION, SOLUTION INTRAVENOUS at 01:09

## 2024-09-26 RX ADMIN — ACETAMINOPHEN 650 MG: 325 TABLET ORAL at 05:09

## 2024-09-26 RX ADMIN — PREGABALIN 75 MG: 75 CAPSULE ORAL at 08:09

## 2024-09-26 NOTE — ASSESSMENT & PLAN NOTE
"This patient does have evidence of infective focus  My overall impression is septic shock due to SBP < 90.  Source: Urinary Tract - UA cloudy with +3 leukocytes and blood, WBC 14.5 in ED, past urine cultures w/ proteus and staph aureus   - Chest XR in ED with left upper lobe focal opacity concerning for infectious, inflammatory, or neoplastic etiologies   Antibiotics given-   Antibiotics (72h ago, onward)      Start     Stop Route Frequency Ordered    09/26/24 1830  ceFEPIme (MAXIPIME) 2 g in D5W 100 mL IVPB (MB+)         -- IV Every 12 hours (non-standard times) 09/26/24 1722    09/26/24 1817  vancomycin - pharmacy to dose  (vancomycin IVPB (PEDS and ADULTS))        Placed in "And" Linked Group    -- IV pharmacy to manage frequency 09/26/24 1718    09/26/24 1807  vancomycin - pharmacy to dose  (vancomycin IVPB (PEDS and ADULTS))        Placed in "And" Linked Group    -- IV pharmacy to manage frequency 09/26/24 1708    09/26/24 1800  vancomycin 1,500 mg in D5W 250 mL IVPB (admixture device)         09/27/24 0559 IV ED 1 Time 09/26/24 1713          Latest lactate reviewed-  Recent Labs   Lab 09/26/24  1200 09/26/24  1453   LACTATE  --  3.6*   POCLAC 2.74*  --      Organ dysfunction indicated by Acute kidney injury    Post- resuscitation assessment Yes Perfusion exam was performed within 6 hours of septic shock presentation after bolus shows Adequate tissue perfusion assessed by non-invasive monitoring   2.5L LR given in ED prior to bedside US. IVC collapsible at bedside. Ordered additional 500mL       Will Start Pressors- Levophed for MAP of 65  Source control achieved by: cefepime and vancomycin    - will start cefepime and vancomycin   - obtained blood cultures x2  - follow up urine culture  - follow up renal ultrasound  - will consider CT abdomen pelvis when cr improves  - trend lactate   "

## 2024-09-26 NOTE — ASSESSMENT & PLAN NOTE
Pt with Na 127 on admission. Glucose elevated to 333. Corrected Na 134. Suspect hyponatremia 2/2 hyperglycemia. Hx T2DM previously on insulin however transitioned to metformin per chart review    -CTM BMP for Na  -LDSSI for hyperglycemia in the setting of T2DM without long term use of insulin

## 2024-09-26 NOTE — H&P
[facility] - Emergency Dept  [facility] Medicine  History & Physical    Patient Name: Anthony Ball  MRN: [id number]  Patient Class: IP- Inpatient  Admission Date: 9/26/2024  Attending Physician: Mateo Carrillo MD   Primary Care Provider: Isaias Myles MD         Patient information was obtained from patient, spouse/SO, and ER records.     Subjective:     Principal Problem:Severe sepsis    Chief Complaint:   Chief Complaint   Patient presents with    Fatigue     Generalized weakness per family x1 week worsening this morning.Pt unable to use walker. Recent dx of UTI. Initial pressure 80/40. Pt endorses increased confusion.          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 Eliquis, afib, recurrent UTI presenting to the ED with generalized weakness and confusion.    History obtained via patient and his significant other. The patient noted that he has been feeling off and on malaise for the past week and decreased oral intake for the past few days. On 9/25, he and his partner noted that he was doing very well and able to ambulate with a walker but on 9/26 he had become weak to the point he was not able to ambulate on his own. His partner also noted increased confusion on 9/26, not being able to remember dates, locations, or where he was. The patient also noted chills for the past day without fevers. Patient also mentions dysuria at the end of urination and a darkened, malodorous urine from baseline.  Of note, patient has a history of recurrent UTIs last growing P. Mirabilis and S. Aureus both sensitive to cefepime.     In the ED, patient with soft blood pressures (SBP 90s) and mildly tachycardic. Other vitals stable. Labs significant for Na 128, Cr 1.9 (Baseline 0.8-1.1), Lactate 2.74, , Albumin 2.4, procal 23.72. UA with presence of moderate bacteria and WBCs.  CXR with focal opacity in left upper lung zone.  Patient started on ceftriaxone, given 2  L LR and admitted to hospital medicine for further workup and management.     Past Medical History:   Diagnosis Date    Acute deep vein thrombosis (DVT) of left lower extremity 12/2023    Anxiety     Atrial fibrillation 12/2023    Cataract     Coronary artery disease     CAC score 1430    Depression     Diabetic neuropathy     Essential (primary) hypertension     GERD (gastroesophageal reflux disease)     History of bariatric surgery 07/08/2021    History of total right knee replacement 07/08/2021    Insomnia     Neuromyopathy     Possibly DM and/or alcohol related.    Pulmonary embolism 12/2023    Reactive airway disease     Type 2 diabetes mellitus        Past Surgical History:   Procedure Laterality Date    CATARACT EXTRACTION W/  INTRAOCULAR LENS IMPLANT Right 7/15/2024    Procedure: EXTRACTION, CATARACT, WITH IOL INSERTION;  Surgeon: Margot Jacobson MD;  Location: [facility] OR;  Service: Ophthalmology;  Laterality: Right;    CATARACT EXTRACTION W/  INTRAOCULAR LENS IMPLANT Left 8/29/2024    Procedure: EXTRACTION, CATARACT, WITH IOL INSERTION;  Surgeon: Margot Jacobson MD;  Location: [facility] OR;  Service: Ophthalmology;  Laterality: Left;    COLONOSCOPY      Normal around 2020    COLONOSCOPY N/A 9/6/2024    Procedure: COLONOSCOPY;  Surgeon: Alessandro Serna MD;  Location: [facility] (4TH FLR);  Service: Endoscopy;  Laterality: N/A;  8/28-new case created-lvm for pre call-pt has cataract surgery 8/29/24-tb  ref-dr rico goldstein-peg-[place]  ok to hold taran Perdomo  9/5-LVM for precall-Kpvt    COLONOSCOPY N/A 9/6/2024    Procedure: COLONOSCOPY;  Surgeon: Alessandro Serna MD;  Location: [facility] (4TH FLR);  Service: Endoscopy;  Laterality: N/A;  + cologuard  8/8 ref by Isaias Goldstein MD, [facility], instr. to portal Eliquis hold approval pending-  ok to hold Eliquis 2 days per Dr Goldstein-GT    TOTAL KNEE ARTHROPLASTY Right        Review of patient's allergies indicates:  No Known Allergies    No current  facility-administered medications on file prior to encounter.     Current Outpatient Medications on File Prior to Encounter   Medication Sig    apixaban (ELIQUIS) 5 mg Tab TAKE 1 TABLET(5 MG) BY MOUTH TWICE DAILY    busPIRone (BUSPAR) 15 MG tablet Take 1 tablet (15 mg total) by mouth 2 (two) times daily as needed (Anxiety).    cyanocobalamin (VITAMIN B-12) 1000 MCG tablet Take 1 tablet (1,000 mcg total) by mouth once daily.    DULoxetine (CYMBALTA) 60 MG capsule Take 1 capsule (60 mg total) by mouth 2 (two) times daily.    fluticasone-umeclidin-vilanter (TRELEGY ELLIPTA) 100-62.5-25 mcg DsDv Inhale 1 puff into the lungs once daily.    hydrOXYzine pamoate (VISTARIL) 25 MG Cap Take 25 mg by mouth every 8 (eight) hours as needed.    methocarbamoL (ROBAXIN) 750 MG Tab Take 1 tablet (750 mg total) by mouth 3 (three) times daily as needed (Back pain).    pantoprazole (PROTONIX) 40 MG tablet TAKE 1 TABLET(40 MG) BY MOUTH EVERY DAY    pregabalin (LYRICA) 150 MG capsule Take 1 capsule (150 mg total) by mouth 3 (three) times daily.    traZODone (DESYREL) 100 MG tablet Take 2 tablets (200 mg total) by mouth nightly as needed for Insomnia.    albuterol (VENTOLIN HFA) 90 mcg/actuation inhaler Inhale 2 puffs into the lungs every 6 (six) hours as needed for Wheezing. Rescue    magnesium oxide 400 mg magnesium Tab Take 1 tablet by mouth every evening.    nortriptyline (PAMELOR) 10 MG capsule Take 1 capsule (10 mg total) by mouth 3 (three) times daily.    ondansetron (ZOFRAN) 8 MG tablet Take 1 tablet (8 mg total) by mouth every 8 (eight) hours as needed for Nausea.    [DISCONTINUED] capsicum 0.075% (ZOSTRIX) 0.075 % topical cream Apply topically 3 (three) times daily as needed (Foot pain).    [DISCONTINUED] LIDOcaine (LIDODERM) 5 % Place 3 patches onto the skin daily as needed (Rib pain). Okay to use 1 to 3 patches. Remove & Discard patches within 12 hours or as directed by MD    [DISCONTINUED] triamcinolone acetonide 0.1%  (KENALOG) 0.1 % cream Apply topically 2 (two) times daily. Use for 1 to 2 weeks as needed for rash.     Family History       Problem Relation (Age of Onset)    Colon cancer Paternal Grandfather (89)    Hypertension Mother          Tobacco Use    Smoking status: Never    Smokeless tobacco: Never   Substance and Sexual Activity    Alcohol use: Yes     Comment: Former heavy use    Drug use: Never    Sexual activity: Yes     Comment: unknown     Review of Systems   Constitutional:  Positive for appetite change (Decreased appetite) and chills. Negative for fever.   Respiratory:  Positive for wheezing. Negative for cough, choking and shortness of breath.    Cardiovascular:  Negative for chest pain, palpitations and leg swelling.   Gastrointestinal:  Positive for constipation. Negative for abdominal distention, abdominal pain, diarrhea, nausea and vomiting.     Objective:     Vital Signs (Most Recent):  Temp: (!) 102.1 °F (38.9 °C) (09/26/24 1645)  Pulse: 110 (09/26/24 1742)  Resp: 20 (09/26/24 1742)  BP: (!) 104/51 (09/26/24 1742)  SpO2: 96 % (09/26/24 1742) Vital Signs (24h Range):  Temp:  [98.7 °F (37.1 °C)-102.1 °F (38.9 °C)] 102.1 °F (38.9 °C)  Pulse:  [] 110  Resp:  [16-40] 20  SpO2:  [95 %-99 %] 96 %  BP: ()/(46-63) 104/51     Weight: 81.2 kg (179 lb)  Body mass index is 26.43 kg/m².     Physical Exam  Constitutional:       General: He is not in acute distress.     Appearance: Normal appearance. He is ill-appearing.   HENT:      Head: Normocephalic and atraumatic.      Nose: Nose normal. No congestion.      Mouth/Throat:      Pharynx: Oropharynx is clear. No oropharyngeal exudate.   Eyes:      General: No scleral icterus.  Cardiovascular:      Rate and Rhythm: Tachycardia present.      Pulses: Normal pulses.      Heart sounds: Normal heart sounds. No murmur heard.     No friction rub. No gallop.   Pulmonary:      Effort: Pulmonary effort is normal. No respiratory distress.      Breath sounds: No stridor.  Wheezing present. No rhonchi or rales.   Chest:      Chest wall: No tenderness.   Abdominal:      General: Abdomen is flat. Bowel sounds are normal. There is distension (mild distension).      Palpations: Abdomen is soft. There is no mass.      Tenderness: There is no abdominal tenderness. There is no guarding or rebound.      Hernia: No hernia is present.   Musculoskeletal:         General: No swelling.      Right lower leg: No edema.      Left lower leg: No edema.   Neurological:      Mental Status: He is alert. He is disoriented.      Gait: Gait abnormal.      Comments: Alert to person, place. Not at baseline per partner                Significant Labs: All pertinent labs within the past 24 hours have been reviewed.  Recent Lab Results  (Last 5 results in the past 24 hours)        09/26/24  1546   09/26/24  1453   09/26/24  1249   09/26/24  1200   09/26/24  1151        Procalcitonin         23.72  Comment: A concentration < 0.25 ng/mL represents a low risk of bacterial   infection.  Procalcitonin may not be accurate among patients with localized   infection, recent trauma or major surgery, immunosuppressed state,   invasive fungal infection, renal dysfunction. Decisions regarding   initiation or continuation of antibiotic therapy should not be based   solely on procalcitonin levels.         Albumin         2.4       ALP         148       Allens Test       N/A         ALT         9       Anion Gap 9         10       Appearance, UA     Cloudy           AST         14       Bacteria, UA     Moderate           Baso #         0.02       Basophil %         0.1       Bilirubin (UA)     Negative           BILIRUBIN TOTAL         1.1  Comment: For infants and newborns, interpretation of results should be based  on gestational age, weight and in agreement with clinical  observations.    Premature Infant recommended reference ranges:  Up to 24 hours.............<8.0 mg/dL  Up to 48 hours............<12.0 mg/dL  3-5  days..................<15.0 mg/dL  6-29 days.................<15.0 mg/dL         BNP         127  Comment: Values of less than 100 pg/ml are consistent with non-CHF populations.       Site       Other         BUN 24         26       Calcium 8.9         8.4       Chloride 99         99       CO2 17         19       Color, UA     Yellow           Creatinine 1.8         1.9       [facility]       Room Air         Differential Method         Automated       eGFR 39.5         37.0       Eos #         0.0       Eos %         0.1       Estimated Avg Glucose         235       Glucose 439         395       Glucose, UA     2+           Gran # (ANC)         12.7       Gran %         87.6       Hematocrit         32.6       Hemoglobin         10.8       Hemoglobin A1C External         9.8  Comment: ADA Screening Guidelines:  5.7-6.4%  Consistent with prediabetes  >or=6.5%  Consistent with diabetes    High levels of fetal hemoglobin interfere with the HbA1C  assay. Heterozygous hemoglobin variants (HbS, HgC, etc)do  not significantly interfere with this assay.   However, presence of multiple variants may affect accuracy.         Hyaline Casts, UA     3           Immature Grans (Abs)         0.19  Comment: Mild elevation in immature granulocytes is non specific and   can be seen in a variety of conditions including stress response,   acute inflammation, trauma and pregnancy. Correlation with other   laboratory and clinical findings is essential.         Immature Granulocytes         1.3       INR         1.1  Comment: Coumadin Therapy:  2.0 - 3.0 for INR for all indicators except mechanical heart valves  and antiphospholipid syndromes which should use 2.5 - 3.5.         Ketones, UA     1+           Lactic Acid Level   3.6  Comment: Falsely low lactic acid results can be found in samples   containing >=13.0 mg/dL total bilirubin and/or >=3.5 mg/dL   direct bilirubin.  *Critical value notification by [facility] with confirmation of receipt to    KAMLA VALLADARES RN at Date 9/26/24 Time 3:38PM               Leukocyte Esterase, UA     3+           Lipase         4       Lymph #         0.4       Lymph %         2.8       Magnesium          1.5       MCH         30.2       MCHC         33.1       MCV         91       Microscopic Comment     SEE COMMENT  Comment: Other formed elements not mentioned in the report are not   present in the microscopic examination.              Mode       SPONT         Mono #         1.2       Mono %         8.1       MPV         10.0       NITRITE UA     Negative           nRBC         0       Blood, UA     3+           pH, UA     8.0           Phosphorus Level         2.0       Platelet Count         108       POC Lactate       2.74         Potassium 5.1         4.6       PROTEIN TOTAL         5.7       Protein, UA     1+  Comment: Recommend a 24 hour urine protein or a urine   protein/creatinine ratio if globulin induced proteinuria is  clinically suspected.             PT         11.5       RBC         3.58       RBC, UA     0           RDW         12.3       Sample       VENOUS         Sodium 125         128       Spec Grav UA     1.020           Specimen UA     Urine, Clean Catch           Squam Epithel, UA     5           Troponin I         0.009  Comment: The reference interval for Troponin I represents the 99th percentile   cutoff   for our facility and is consistent with 3rd generation assay   performance.         TSH         2.363       WBC, UA     >100           WBC         14.52                              Significant Imaging:   X-Ray Chest AP Portable   Final Result   Abnormal      Repeat exam confirms ill-defined focal opacity in the left upper lobe of uncertain etiology.  Further evaluation recommended.      This report was flagged in Epic as abnormal..         Electronically signed by: Hima Ni MD   Date:    09/26/2024   Time:    14:34      X-Ray Chest AP Portable   Final Result      Focal opacity in  the left upper lung zone laterally not noted on 01/11/2024 is observed, partially overlain by an external monitoring lead.  A repeat chest radiograph at some point is indicated to exclude or confirm a pathologic entity.  Appearance of the chest, allowing for a poorer inspiratory depth level on the current exam, is otherwise unremarkable and unchanged since 01/11/2024.         Electronically signed by: Kailash Frances MD   Date:    09/26/2024   Time:    12:27      US Retroperitoneal Complete    (Results Pending)       Assessment/Plan:     * Severe sepsis  Patient presenting with tachycardia, leukocytosis, fevers to TMax 102.1 hypotension in the ED with concern for sepsis, Cr at 1.9 (BL 0.9-1.1). UA concerning for UTI, CXR with concern for consolidation in left upper lung zone. Concern for sepsis with urologic source vs respiratory PNA. Bcx collected, started on CTX in the ED. S/p 2.5 L LR without improvement in BP. Paitent started on levophed in the ED and escalated abx to cefepime, vancomycin, and azithromicin for empiric coverage    -F/u Bcx  -F/u UCx  -Titrate pressors as needed  -CTM CBC/BMP  -Continue vanc, cefepime, azithromycin, adjust as tolerated      Acute cystitis  Pt presenting with leukocytosis, tachycardia, hypotension chills without known fevers at home. Also noted dysuria, change in color and malodorous urine. UA in ED concerning for UTI. Previously grown proteus mirabilis and MSSA sensitive to CTX. Started on CTX in ED and given 2.5L IVF however did not respond. Started on pressors    -Continue IV abx  -Continue pressors, titrate as needed      Asthma  Pt with hx asthma on albuterol and trelegy at home. Wheezing in ED without acute decompensation. Concern for asthma exacerbation 2/2 ongoing sepsis.    -Duonebs PRN  -Continue daily breo + spiriva      Concern for Pneumonia  Admitted to ED for leukocytosis, elevated procal. CXR with concern for consolidation in left upper lobe not noted in imaging in  January. Patient initially without respiratory symptoms however with tachypnea shortly after presentation to ED. C/f asthma exacerbation vs infectious process    -Continue IV abx  -CTM CBC  -Consider respiratory culture  -F/U infectious panel    ALYSIA (acute kidney injury)  ALYSIA is likely due to pre-renal azotemia due to dehydration, hypotension. Baseline creatinine is  0.9-12.1 . S/p 2.5 L IVF in the ED.      Plan  - ALYSIA is stable  - Avoid nephrotoxins and renally dose meds for GFR listed above  - Monitor urine output, serial BMP, and adjust therapy as needed  - CTM, consider additional fluids as patient with poor P.O intake prior to admission.     Pseudohyponatremia  Pt with Na 127 on admission. Glucose elevated to 333. Corrected Na 134. Suspect hyponatremia 2/2 hyperglycemia. Hx T2DM previously on insulin however transitioned to metformin per chart review    -CTM BMP for Na  -LDSSI for hyperglycemia in the setting of T2DM without long term use of insulin      Hypophosphatemia  Patient has Abnormal Phosphorus: hypophosphatemia. Will continue to monitor electrolytes closely. Will replace the affected electrolytes and repeat labs to be done after interventions completed. The patient's phosphorus results have been reviewed and are listed below.  Recent Labs   Lab 09/26/24  1151   PHOS 2.0*        Hypomagnesemia  Patient has Abnormal Magnesium: hypomagnesemia. Will continue to monitor electrolytes closely. Will replace the affected electrolytes and repeat labs to be done after interventions completed. The patient's magnesium results have been reviewed and are listed below.  Recent Labs   Lab 09/26/24  1151   MG 1.5*        Normocytic anemia  Hx anemia, on eliquis at home. Currently stable without overt signs of bud bleeding (hematemesis, hematochezia)    Plan  - Monitor serial CBC: Daily  - Transfuse PRBC if patient becomes hemodynamically unstable, symptomatic or H/H drops below 7/21.  - Patient has not received any  PRBC transfusions to date  - Patient's anemia is currently stable    Gastroesophageal reflux disease without esophagitis  Continue home PPI      Primary insomnia  On trazodone at home    -Hold trazodone in setting of altered mental status, malaise can consider restarting if improves      Mobility impaired  Mobility impaired likely 2/2  peripheral neuropathy. Able to use walker at home however acutely decreased in setting of sepsis    -Fall precautions  -Consider PT/OT consult       Essential hypertension  Chronic, controlled. Latest blood pressure and vitals reviewed-     Temp:  [98.7 °F (37.1 °C)-102.1 °F (38.9 °C)]   Pulse:  []   Resp:  [16-40]   BP: ()/(46-63)   SpO2:  [95 %-99 %] .   Home meds for hypertension were reviewed and noted below.       While in the hospital, will manage blood pressure as follows; Adjust home antihypertensive regimen as follows- hold home medication in setting of hypotension 2/2 severe sepsis    Will utilize p.r.n. blood pressure medication only if patient's blood pressure greater than 180/110 and he develops symptoms such as worsening chest pain or shortness of breath.    Diabetic polyneuropathy associated with type 2 diabetes mellitus  Diabetic polyneuropathy controlled with lyrica, cymbalta, nortryptiline at home     -Continue lyrica at decreased dose, cymbalta, holding amitryptiline  -Adjust as tolerated    Type 2 diabetes mellitus with neurologic complication, without long-term current use of insulin  Patient's FSGs are uncontrolled due to hyperglycemia on current medication regimen. A1C 9.8 on admission, increased from prior. Patient with history of insulin use on chart review but transitioned to metformin, though not noted taking in patients chart.Hold Oral hypoglycemics while patient is in the hospital.    -Start LDSSI  -Consider long acting insulin vs. Insulin gtt       VTE Risk Mitigation (From admission, onward)           Ordered     apixaban tablet 5 mg  2 times  daily         09/26/24 1502     IP VTE HIGH RISK PATIENT  Once         09/26/24 1452     Place sequential compression device  Until discontinued         09/26/24 1417                               Pharmacokinetic Initial Assessment: IV Vancomycin    Assessment/Plan:    Initiate intravenous vancomycin with loading dose of 1500 mg once in the ED with subsequent doses when random concentrations are less than 20 mcg/mL  Desired empiric serum trough concentration is 15 to 20 mcg/mL  Patient with ALYSIA. Scr up to 1.9 from baseline of ~0.8-1.0. Will pulse dose.   Draw vancomycin random level as needed    Pharmacy will continue to follow and monitor vancomycin.      Please contact pharmacy at extension  with any questions regarding this assessment.     Thank you for the consult,   Hafsa Cordova       Patient brief summary:  Anthony Ball is a 74 y.o. male initiated on antimicrobial therapy with IV Vancomycin for treatment of suspected sepsis    Drug Allergies:   Review of patient's allergies indicates:  No Known Allergies    Actual Body Weight:   81.2 kg    Renal Function:   Estimated Creatinine Clearance: 37.1 mL/min (A) (based on SCr of 1.8 mg/dL (H)).,     Dialysis Method (if applicable):  N/A    CBC (last 72 hours):  Recent Labs   Lab Result Units 09/26/24  1151   WBC K/uL 14.52*   Hemoglobin g/dL 10.8*   Hemoglobin A1C % 9.8*   Hematocrit % 32.6*   Platelets K/uL 108*   Gran % % 87.6*   Lymph % % 2.8*   Mono % % 8.1   Eosinophil % % 0.1   Basophil % % 0.1   Differential Method  Automated       Metabolic Panel (last 72 hours):  Recent Labs   Lab Result Units 09/26/24  1151 09/26/24  1249 09/26/24  1546   Sodium mmol/L 128*  --  125*   Potassium mmol/L 4.6  --  5.1   Chloride mmol/L 99  --  99   CO2 mmol/L 19*  --  17*   Glucose mg/dL 395*  --  439*   Glucose, UA   --  2+*  --    BUN mg/dL 26*  --  24*   Creatinine mg/dL 1.9*  --  1.8*   Albumin g/dL 2.4*  --   --    Total Bilirubin mg/dL 1.1*  --   --    Alkaline  "Phosphatase U/L 148*  --   --    AST U/L 14  --   --    ALT U/L 9*  --   --    Magnesium mg/dL 1.5*  --   --    Phosphorus mg/dL 2.0*  --   --        Drug levels (last 3 results):  No results for input(s): "VANCOMYCINRA", "VANCORANDOM", "VANCOMYCINPE", "VANCOPEAK", "VANCOMYCINTR", "VANCOTROUGH" in the last 72 hours.    Microbiologic Results:  Microbiology Results (last 7 days)       Procedure Component Value Units Date/Time    Urine culture [[id number]] Collected: 09/26/24 1249    Order Status: No result Specimen: Urine Updated: 09/26/24 1320    Blood culture x two cultures. Draw prior to antibiotics. [[id number]] Collected: 09/26/24 1151    Order Status: Sent Specimen: Blood from Peripheral, Antecubital, Left Updated: 09/26/24 1158    Blood culture x two cultures. Draw prior to antibiotics. [[id number]] Collected: 09/26/24 1151    Order Status: Sent Specimen: Blood from Peripheral, Forearm, Right Updated: 09/26/24 1158              Zan Vance MD  Internal Medicine, PGY-1  Department of [facility]  [facility] - Emergency Dept          "

## 2024-09-26 NOTE — ASSESSMENT & PLAN NOTE
Pt with hx asthma on albuterol and trelegy at home. Wheezing in ED without acute decompensation. Concern for asthma exacerbation 2/2 ongoing sepsis.    -Duonebs PRN  -Continue daily breo + spiriva

## 2024-09-26 NOTE — ASSESSMENT & PLAN NOTE
Pt presenting with leukocytosis, tachycardia, hypotension chills without known fevers at home. Also noted dysuria, change in color and malodorous urine. UA in ED concerning for UTI. Previously grown proteus mirabilis and MSSA sensitive to CTX. Started on CTX in ED and given 2.5L IVF however did not respond. Started on pressors    -Continue IV abx  -Continue pressors, titrate as needed

## 2024-09-26 NOTE — ASSESSMENT & PLAN NOTE
Pt with Na 127 on admission. Glucose elevated to 333. Corrected Na 134. Suspect hyponatremia 2/2 uncontrolled hyperglycemia     -CTM BMP for Na  -see DM for glucose management

## 2024-09-26 NOTE — ASSESSMENT & PLAN NOTE
- takes trazadone 100mg x2 PRN for insomnia at home  - held in setting of AMS/malaise, will consider restarting with improvement of condition

## 2024-09-26 NOTE — ASSESSMENT & PLAN NOTE
Hx anemia, on eliquis at home. Currently stable without overt signs of bud bleeding (hematemesis, hematochezia). Possibly due to diabetic nephropathy but unclear at this time      Plan  - Monitor serial CBC: Daily  - Transfuse PRBC if patient becomes hemodynamically unstable, symptomatic or H/H drops below 7/21.  - Patient has not received any PRBC transfusions to date  - Patient's anemia is currently stable

## 2024-09-26 NOTE — PROGRESS NOTES
Pharmacokinetic Initial Assessment: IV Vancomycin    Assessment/Plan:    Initiate intravenous vancomycin with loading dose of 1500 mg once in the ED with subsequent doses when random concentrations are less than 20 mcg/mL  Desired empiric serum trough concentration is 15 to 20 mcg/mL  Patient with ALYSIA. Scr up to 1.9 from baseline of ~0.8-1.0. Will pulse dose.   Draw vancomycin random level on 9/27 at 1700.    Pharmacy will continue to follow and monitor vancomycin.      Please contact pharmacy at extension [phone] with any questions regarding this assessment.     Thank you for the consult,   Hafsa Cordova       Patient brief summary:  Anthony Ball is a 74 y.o. male initiated on antimicrobial therapy with IV Vancomycin for treatment of suspected sepsis    Drug Allergies:   Review of patient's allergies indicates:  No Known Allergies    Actual Body Weight:   81.2 kg    Renal Function:   Estimated Creatinine Clearance: 37.1 mL/min (A) (based on SCr of 1.8 mg/dL (H)).,     Dialysis Method (if applicable):  N/A    CBC (last 72 hours):  Recent Labs   Lab Result Units 09/26/24  1151   WBC K/uL 14.52*   Hemoglobin g/dL 10.8*   Hemoglobin A1C % 9.8*   Hematocrit % 32.6*   Platelets K/uL 108*   Gran % % 87.6*   Lymph % % 2.8*   Mono % % 8.1   Eosinophil % % 0.1   Basophil % % 0.1   Differential Method  Automated       Metabolic Panel (last 72 hours):  Recent Labs   Lab Result Units 09/26/24  1151 09/26/24  1249 09/26/24  1546   Sodium mmol/L 128*  --  125*   Potassium mmol/L 4.6  --  5.1   Chloride mmol/L 99  --  99   CO2 mmol/L 19*  --  17*   Glucose mg/dL 395*  --  439*   Glucose, UA   --  2+*  --    BUN mg/dL 26*  --  24*   Creatinine mg/dL 1.9*  --  1.8*   Albumin g/dL 2.4*  --   --    Total Bilirubin mg/dL 1.1*  --   --    Alkaline Phosphatase U/L 148*  --   --    AST U/L 14  --   --    ALT U/L 9*  --   --    Magnesium mg/dL 1.5*  --   --    Phosphorus mg/dL 2.0*  --   --        Drug levels (last 3 results):  No results  "for input(s): "VANCOMYCINRA", "VANCORANDOM", "VANCOMYCINPE", "VANCOPEAK", "VANCOMYCINTR", "VANCOTROUGH" in the last 72 hours.    Microbiologic Results:  Microbiology Results (last 7 days)       Procedure Component Value Units Date/Time    Urine culture [[id number]] Collected: 09/26/24 1249    Order Status: No result Specimen: Urine Updated: 09/26/24 1320    Blood culture x two cultures. Draw prior to antibiotics. [[id number]] Collected: 09/26/24 1151    Order Status: Sent Specimen: Blood from Peripheral, Antecubital, Left Updated: 09/26/24 1158    Blood culture x two cultures. Draw prior to antibiotics. [[id number]] Collected: 09/26/24 1151    Order Status: Sent Specimen: Blood from Peripheral, Forearm, Right Updated: 09/26/24 1158            "

## 2024-09-26 NOTE — ASSESSMENT & PLAN NOTE
Mobility impaired likely 2/2  peripheral neuropathy. Able to use walker at home however acutely decreased in setting of sepsis     -Fall precautions  -Will consider PT/OT as condition improves

## 2024-09-26 NOTE — ASSESSMENT & PLAN NOTE
Creatinine 1.9 on admit, baseline around 0.9-1.2  - Likely pre-renal from dehydration and/or hypotension in setting of septic shock     Lab Results   Component Value Date    CREATININE 1.8 (H) 09/26/2024    CREATININE 1.9 (H) 09/26/2024    CREATININE 0.9 05/17/2024       Plan:   - Strict I&Os and daily weights   - Avoid nephrotoxic agents such as NSAIDs, gadolinium and IV radiocontrast.  - Renally dose meds to current GFR  - Maintain MAP > 65

## 2024-09-26 NOTE — HPI
Anthony Ball is a 74 y.o. male with a PMH of HTN, DM2, CAD, neuropathy, DVT, PE, Afib (on eliquis), GERD, asthma who presented to the ED for generalized weakness for ~2 weeks. Patient was planning to go to PT for his neuropathy today but was having increased weakness and unable to use his walker. Per family, patient also noted to be more confused recently. He has a history of recurrent UTIs. Does not self catheterize. No known history of BPH. Patient has been endorsing dysuria. Denies chest pain, SOB, abdominal pain, N/V/D.     In ED, patient was tachycardic and hypotensive. Temp of 102. Lactate 2.74. Elevated WBC. Glucose 395. Sepsis w/u was initiated. Patient received 2.5L IVF and was started on ceftriaxone. UA concerning for UTI. CXR with JOCELYN opacification. Patient also with ALYSIA, Cr 1.9 (baseline ~0.9-1.1). Repeat lactate 3.6. Patient remained hypotensive despite fluid resuscitation and was placed on levophed gtt.     MICU consulted for severe sepsis

## 2024-09-26 NOTE — Clinical Note
Diagnosis: Hypotension, unspecified hypotension type [2087704]   Future Attending Provider: NAZ BRITO [[id number]]   Reason for IP Medical Treatment  (Clinical interventions that can only be accomplished in the IP setting? ) :: ALYSIA, UTI, sepsis concerns   Plans for Post-Acute care--if anticipated (pick the single best option):: A. No post acute care anticipated at this time

## 2024-09-26 NOTE — ASSESSMENT & PLAN NOTE
Patient's FSGs are uncontrolled due to hyperglycemia on current medication regimen. A1C 9.8 on admission, increased from prior. Patient with history of insulin use on chart review but transitioned to metformin. Unclear whether patient is currently on home DM regimen. Hold Oral hypoglycemics while patient is in the hospital.     -Start moderate dose sliding scale   -will titrate as necessary, goal 140-180

## 2024-09-26 NOTE — CONSULTS
Critical Care evaluated pt in the ED and will admit to MICU for severe sepsis requiring vasopressor support, HPI and note to follow.    Corona Lindsey MD  [facility], PGY-2

## 2024-09-26 NOTE — ASSESSMENT & PLAN NOTE
Admitted to ED for leukocytosis, elevated procal. CXR with concern for consolidation in left upper lobe not noted in imaging in January. Patient initially without respiratory symptoms however with tachypnea shortly after presentation to ED. C/f asthma exacerbation vs infectious process     -Continue IV abx  -CTM CBC  -Consider respiratory culture  -F/U infectious panel

## 2024-09-26 NOTE — ASSESSMENT & PLAN NOTE
Hx anemia, on eliquis at home. Currently stable without overt signs of bud bleeding (hematemesis, hematochezia)    Plan  - Monitor serial CBC: Daily  - Transfuse PRBC if patient becomes hemodynamically unstable, symptomatic or H/H drops below 7/21.  - Patient has not received any PRBC transfusions to date  - Patient's anemia is currently stable

## 2024-09-26 NOTE — H&P
[facility] - Emergency Dept  Critical Care Medicine  History & Physical    Patient Name: Anthnoy Ball  MRN: [id number]  Admission Date: 9/26/2024  Hospital Length of Stay: 0 days  Code Status: Full Code  Attending Physician: Mateo Carrillo MD   Primary Care Provider: Isaias Myles MD   Principal Problem: Severe sepsis    Subjective:     HPI:  Anthony Ball is a 74 y.o. male with a PMH of HTN, DM2, CAD, neuropathy, DVT, PE, Afib (on eliquis), GERD, asthma who presented to the ED for generalized weakness for ~2 weeks. Patient was planning to go to PT for his neuropathy today but was having increased weakness and unable to use his walker. Per family, patient also noted to be more confused recently. He has a history of recurrent UTIs. Does not self catheterize. No known history of BPH. Patient has been endorsing dysuria. Denies chest pain, SOB, abdominal pain, N/V/D.     In ED, patient was tachycardic and hypotensive. Temp of 102. Lactate 2.74. Elevated WBC. Glucose 395. Sepsis w/u was initiated. Patient received 2.5L IVF and was started on ceftriaxone. UA concerning for UTI. CXR with JOCELYN opacification. Patient also with ALYSIA, Cr 1.9 (baseline ~0.9-1.1). Repeat lactate 3.6. Patient remained hypotensive despite fluid resuscitation and was placed on levophed gtt.     MICU consulted for severe sepsis    Hospital/ICU Course:  No notes on file     Past Medical History:   Diagnosis Date    Acute deep vein thrombosis (DVT) of left lower extremity 12/2023    Anxiety     Atrial fibrillation 12/2023    Cataract     Coronary artery disease     CAC score 1430    Depression     Diabetic neuropathy     Essential (primary) hypertension     GERD (gastroesophageal reflux disease)     History of bariatric surgery 07/08/2021    History of total right knee replacement 07/08/2021    Insomnia     Neuromyopathy     Possibly DM and/or alcohol related.    Pulmonary embolism 12/2023    Reactive airway disease     Type 2 diabetes mellitus         Past Surgical History:   Procedure Laterality Date    CATARACT EXTRACTION W/  INTRAOCULAR LENS IMPLANT Right 7/15/2024    Procedure: EXTRACTION, CATARACT, WITH IOL INSERTION;  Surgeon: Margot Jacobson MD;  Location: [facility] OR;  Service: Ophthalmology;  Laterality: Right;    CATARACT EXTRACTION W/  INTRAOCULAR LENS IMPLANT Left 8/29/2024    Procedure: EXTRACTION, CATARACT, WITH IOL INSERTION;  Surgeon: Margot Jacobson MD;  Location: [facility] OR;  Service: Ophthalmology;  Laterality: Left;    COLONOSCOPY      Normal around 2020    COLONOSCOPY N/A 9/6/2024    Procedure: COLONOSCOPY;  Surgeon: Alessandro Serna MD;  Location: [facility] (4TH FLR);  Service: Endoscopy;  Laterality: N/A;  8/28-new case created-lvm for pre call-pt has cataract surgery 8/29/24-tb  ref-dr rico goldstein-peg-[place]  ok to hold elivickie Perdomo  9/5-LVM for precall-Kp[facility]    COLONOSCOPY N/A 9/6/2024    Procedure: COLONOSCOPY;  Surgeon: Alessandro Serna MD;  Location: [facility] (4TH FLR);  Service: Endoscopy;  Laterality: N/A;  + cologuard  8/8 ref by Isaias Goldstein MD, [facility], instr. to portal, Eliquis hold approval pending-  ok to hold Eliquis 2 days per Dr Goldstein-[facility]    TOTAL KNEE ARTHROPLASTY Right        Review of patient's allergies indicates:  No Known Allergies    Family History       Problem Relation (Age of Onset)    Colon cancer Paternal Grandfather (89)    Hypertension Mother          Tobacco Use    Smoking status: Never    Smokeless tobacco: Never   Substance and Sexual Activity    Alcohol use: Yes     Comment: Former heavy use    Drug use: Never    Sexual activity: Yes     Comment: unknown      Review of Systems   Constitutional:  Positive for activity change and fever. Negative for chills and diaphoresis.   Respiratory:  Negative for cough and shortness of breath.    Cardiovascular:  Negative for chest pain.   Gastrointestinal:  Negative for abdominal distention.   Genitourinary:  Positive for dysuria. Negative for frequency.    Neurological: Negative.      Objective:     Vital Signs (Most Recent):  Temp: (!) 102.1 °F (38.9 °C) (09/26/24 1645)  Pulse: 110 (09/26/24 1742)  Resp: 20 (09/26/24 1742)  BP: (!) 104/51 (09/26/24 1742)  SpO2: 96 % (09/26/24 1742) Vital Signs (24h Range):  Temp:  [98.7 °F (37.1 °C)-102.1 °F (38.9 °C)] 102.1 °F (38.9 °C)  Pulse:  [] 110  Resp:  [16-40] 20  SpO2:  [95 %-99 %] 96 %  BP: ()/(46-63) 104/51   Weight: 81.2 kg (179 lb)  Body mass index is 26.43 kg/m².      Intake/Output Summary (Last 24 hours) at 9/26/2024 1751  Last data filed at 9/26/2024 1723  Gross per 24 hour   Intake 2700 ml   Output --   Net 2700 ml          Physical Exam  Constitutional:       Appearance: He is ill-appearing. He is not diaphoretic.   HENT:      Mouth/Throat:      Mouth: Mucous membranes are moist.      Pharynx: Oropharynx is clear.   Cardiovascular:      Rate and Rhythm: Regular rhythm. Tachycardia present.      Heart sounds: No murmur heard.     No gallop.   Pulmonary:      Effort: Pulmonary effort is normal.      Breath sounds: No wheezing or rales.   Abdominal:      General: Bowel sounds are normal. There is no distension.      Tenderness: There is no abdominal tenderness.   Musculoskeletal:         General: No swelling.      Right lower leg: No edema.      Left lower leg: No edema.   Skin:     General: Skin is warm.      Coloration: Skin is not jaundiced.   Neurological:      Mental Status: He is alert and oriented to person, place, and time.            Vents:     Lines/Drains/Airways       Peripheral Intravenous Line  Duration                  Peripheral IV - Single Lumen 09/26/24 0000 18 G Anterior;Right Wrist <1 day         Peripheral IV - Single Lumen 09/26/24 1155 18 G Anterior;Left;Proximal Forearm <1 day                  Significant Labs:    CBC/Anemia Profile:  Recent Labs   Lab 09/26/24  1151   WBC 14.52*   HGB 10.8*   HCT 32.6*   *   MCV 91   RDW 12.3        Chemistries:  Recent Labs   Lab  09/26/24  1151 09/26/24  1546   * 125*   K 4.6 5.1   CL 99 99   CO2 19* 17*   BUN 26* 24*   CREATININE 1.9* 1.8*   CALCIUM 8.4* 8.9   ALBUMIN 2.4*  --    PROT 5.7*  --    BILITOT 1.1*  --    ALKPHOS 148*  --    ALT 9*  --    AST 14  --    MG 1.5*  --    PHOS 2.0*  --          Significant Imaging: I have reviewed all pertinent imaging results/findings within the past 24 hours.  Assessment/Plan:     Neuro  Diabetic polyneuropathy associated with type 2 diabetes mellitus  - continue home duloxetine and pregabalin     Pulmonary  Asthma  Pt with hx asthma on albuterol and trelegy at home. Wheezing in ED without acute decompensation. Concern for asthma exacerbation 2/2 ongoing sepsis.     -Duonebs PRN  -Continue daily breo + spiriva    Concern for Pneumonia  Admitted to ED for leukocytosis, elevated procal. CXR with concern for consolidation in left upper lobe not noted in imaging in January. Patient initially without respiratory symptoms however with tachypnea shortly after presentation to ED. C/f asthma exacerbation vs infectious process     -Continue IV abx  -CTM CBC  -Consider respiratory culture  -F/U infectious panel    Cardiac/Vascular  Essential hypertension  Chronic, controlled. Latest blood pressure and vitals reviewed-      While in the hospital, will manage blood pressure as follows; Adjust home antihypertensive regimen as follows- hold home medication in setting of hypotension 2/2 severe urosepsis     Will utilize p.r.n. blood pressure medication only if patient's blood pressure greater than 180/110 and he develops symptoms such as worsening chest pain or shortness of breath    Renal/  ALYSIA (acute kidney injury)  Creatinine 1.9 on admit, baseline around 0.9-1.2  - Likely pre-renal from dehydration and/or hypotension in setting of septic shock     Lab Results   Component Value Date    CREATININE 1.8 (H) 09/26/2024    CREATININE 1.9 (H) 09/26/2024    CREATININE 0.9 05/17/2024       Plan:   - Strict I&Os  "and daily weights   - Avoid nephrotoxic agents such as NSAIDs, gadolinium and IV radiocontrast.  - Renally dose meds to current GFR  - Maintain MAP > 65      Hypophosphatemia  - will replete PRN for goal of greater than or equal to 3     Hypomagnesemia  - will replete PRN for Mg greater than or equal to 2    Acute cystitis  See sepsis     ID  * Severe sepsis  This patient does have evidence of infective focus  My overall impression is septic shock due to SBP < 90.  Source: Urinary Tract - UA cloudy with +3 leukocytes and blood, WBC 14.5 in ED, past urine cultures w/ proteus and staph aureus   - Chest XR in ED with left upper lobe focal opacity concerning for infectious, inflammatory, or neoplastic etiologies   Antibiotics given-   Antibiotics (72h ago, onward)      Start     Stop Route Frequency Ordered    09/26/24 1830  ceFEPIme (MAXIPIME) 2 g in D5W 100 mL IVPB (MB+)         -- IV Every 12 hours (non-standard times) 09/26/24 1722    09/26/24 1817  vancomycin - pharmacy to dose  (vancomycin IVPB (PEDS and ADULTS))        Placed in "And" Linked Group    -- IV pharmacy to manage frequency 09/26/24 1718    09/26/24 1807  vancomycin - pharmacy to dose  (vancomycin IVPB (PEDS and ADULTS))        Placed in "And" Linked Group    -- IV pharmacy to manage frequency 09/26/24 1708    09/26/24 1800  vancomycin 1,500 mg in D5W 250 mL IVPB (admixture device)         09/27/24 0559 IV ED 1 Time 09/26/24 1713          Latest lactate reviewed-  Recent Labs   Lab 09/26/24  1200 09/26/24  1453   LACTATE  --  3.6*   POCLAC 2.74*  --      Organ dysfunction indicated by Acute kidney injury    Post- resuscitation assessment Yes Perfusion exam was performed within 6 hours of septic shock presentation after bolus shows Adequate tissue perfusion assessed by non-invasive monitoring   2.5L LR given in ED prior to bedside US. IVC collapsible at bedside. Ordered additional 500mL       Will Start Pressors- Levophed for MAP of 65  Source control " achieved by: cefepime and vancomycin    - will start cefepime and vancomycin   - obtained blood cultures x2  - follow up urine culture  - follow up renal ultrasound  - will consider CT abdomen pelvis when cr improves  - trend lactate     Oncology  Normocytic anemia  Hx anemia, on eliquis at home. Currently stable without overt signs of bud bleeding (hematemesis, hematochezia). Possibly due to diabetic nephropathy but unclear at this time      Plan  - Monitor serial CBC: Daily  - Transfuse PRBC if patient becomes hemodynamically unstable, symptomatic or H/H drops below 7/21.  - Patient has not received any PRBC transfusions to date  - Patient's anemia is currently stable    Endocrine  Type 2 diabetes mellitus with neurologic complication, without long-term current use of insulin  Patient's FSGs are uncontrolled due to hyperglycemia on current medication regimen. A1C 9.8 on admission, increased from prior. Patient with history of insulin use on chart review but transitioned to metformin. Unclear whether patient is currently on home DM regimen. Hold Oral hypoglycemics while patient is in the hospital.     -Start moderate dose sliding scale   -will titrate as necessary, goal 140-180    GI  Gastroesophageal reflux disease without esophagitis  - continue home pantoprazole    Other  Pseudohyponatremia  Pt with Na 127 on admission. Glucose elevated to 333. Corrected Na 134. Suspect hyponatremia 2/2 uncontrolled hyperglycemia     -CTM BMP for Na  -see DM for glucose management     Primary insomnia  - takes trazadone 100mg x2 PRN for insomnia at home  - held in setting of AMS/malaise, will consider restarting with improvement of condition     Mobility impaired  Mobility impaired likely 2/2  peripheral neuropathy. Able to use walker at home however acutely decreased in setting of sepsis     -Fall precautions  -Will consider PT/OT as condition improves         Critical Care Daily Checklist:    A: Awake: RASS Goal/Actual Goal:     Actual:     B: Spontaneous Breathing Trial Performed?     C: SAT & SBT Coordinated?                        D: Delirium: CAM-ICU     E: Early Mobility Performed? No   F: Feeding Goal:    Status:     Current Diet Order   Procedures    Diet diabetic 2000 Calorie; Standard Tray     Order Specific Question:   Total calories:     Answer:   2000 Calorie     Order Specific Question:   Tray type:     Answer:   Standard Tray      AS: Analgesia/Sedation Pregabalin, tylenol prn   T: Thromboembolic Prophylaxis eliquis   H: HOB > 300 Yes   U: Stress Ulcer Prophylaxis (if needed) PPI   G: Glucose Control Glargine 10u, aspart 2u TIDWM, MDSSI   B: Bowel Function     I: Indwelling Catheter (Lines & Mercado) Necessity PIVx2   D: De-escalation of Antimicrobials/Pharmacotherapies Vanc, cefepime, azithromycin    Plan for the day/ETD Trend lactate q6h, f/u sepsis w/u    Code Status:  Family/Goals of Care: Full Code         Critical secondary to Patient has a condition that poses threat to life and bodily function: septic shock    Critical care was time spent personally by me on the following activities: development of treatment plan with patient or surrogate and bedside caregivers, discussions with consultants, evaluation of patient's response to treatment, examination of patient, ordering and performing treatments and interventions, ordering and review of laboratory studies, ordering and review of radiographic studies, pulse oximetry, re-evaluation of patient's condition. This critical care time did not overlap with that of any other provider or involve time for any procedures.     Hafsa Edwards MD  Critical Care Medicine  [facility] - Emergency Dept

## 2024-09-26 NOTE — ED NOTES
Telemetry Verification   Patient placed on Telemetry Box  Verified with War Room  Box # [id number]   Monitor Tech chin   Rate 119   Rhythm Sinus tach

## 2024-09-26 NOTE — HPI
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 Eliquis, afib, recurrent UTI presenting to the ED with generalized weakness and confusion.    History obtained via patient and his significant other. The patient noted that he has been feeling off and on malaise for the past week and decreased oral intake for the past few days. On 9/25, he and his partner noted that he was doing very well and able to ambulate with a walker but on 9/26 he had become weak to the point he was not able to ambulate on his own. His partner also noted increased confusion on 9/26, not being able to remember dates, locations, or where he was. The patient also noted chills for the past day without fevers. Patient also mentions dysuria at the end of urination and a darkened, malodorous urine from baseline.  Of note, patient has a history of recurrent UTIs last growing P. Mirabilis and S. Aureus both sensitive to cefepime.     In the ED, patient with soft blood pressures (SBP 90s) and mildly tachycardic. Other vitals stable. Labs significant for Na 128, Cr 1.9 (Baseline 0.8-1.1), Lactate 2.74, , Albumin 2.4, procal 23.72. UA with presence of moderate bacteria and WBCs.  CXR with focal opacity in left upper lung zone.  Patient started on ceftriaxone, given 2 L LR and admitted to hospital medicine for further workup and management.

## 2024-09-26 NOTE — ASSESSMENT & PLAN NOTE
Chronic, controlled. Latest blood pressure and vitals reviewed-     Temp:  [98.7 °F (37.1 °C)-102.1 °F (38.9 °C)]   Pulse:  []   Resp:  [16-40]   BP: ()/(46-63)   SpO2:  [95 %-99 %] .   Home meds for hypertension were reviewed and noted below.       While in the hospital, will manage blood pressure as follows; Adjust home antihypertensive regimen as follows- hold home medication in setting of hypotension 2/2 severe sepsis    Will utilize p.r.n. blood pressure medication only if patient's blood pressure greater than 180/110 and he develops symptoms such as worsening chest pain or shortness of breath.

## 2024-09-26 NOTE — PROGRESS NOTES
OUTPATIENT PHYSICAL THERAPY DISCHARGE SUMMARY     Name: Anthony Ball  Clinic Number: [id number]    Diagnosis:   R53.1 (ICD-10-CM) - Generalized weakness   G62.9 (ICD-10-CM) - Peripheral polyneuropathy   R26.89 (ICD-10-CM) - Impaired gait and mobility     Physician: Isaias Myles MD   Treatment Orders: PT Eval and Treat  Past Medical History:   Diagnosis Date    Acute deep vein thrombosis (DVT) of left lower extremity 12/2023    Anxiety     Atrial fibrillation 12/2023    Cataract     Coronary artery disease     CAC score 1430    Depression     Diabetic neuropathy     Essential (primary) hypertension     GERD (gastroesophageal reflux disease)     History of bariatric surgery 07/08/2021    History of total right knee replacement 07/08/2021    Insomnia     Neuromyopathy     Possibly DM and/or alcohol related.    Pulmonary embolism 12/2023    Reactive airway disease     Type 2 diabetes mellitus        Initial visit: 4/23/24  Date of Last visit: 8/28/24  Date of Discharge Note:  9/26/24  Total Visits Received: 27  Missed Visits: multiple cancelled visits  ASSESSMENT   Status Towards Goals Met:      The below indicates pt's goal achievement as of his last attended visit on 8/28/24: Pt met all STGs and most LTGs.      Short Term Goals: 4 weeks   Pt will be issued first installment of HEP and report at least partial compliance~MET, 5/24/24   Pt will improve his 30 second chair rise to 3-4 reps to demonstrate increased LE strength and muscular endurance~MET, 5/24/24   Pt will improve his TUG score to 1 minute, 10 seconds or < with appropriate AD to improve household ambulation and decrease fall risk~MET, 5/24/24   Pt will increase his SSWS to 0.13 m/sec with appropriate AD to improve community ambulation and decrease fall risk~MET, 5/24/24   Pt will complete mCTSIB balance testing and PT to set appropriate goals~ MET, 5/10/24  Pt will improve any 2 LE muscle grades by 1/3 to help with functional mobility skills~MET,  5/24/24   Pt will perform all sit<> stand and stand pivot transfers with no more than SBA using RW; min A without device~MET, 5/24/24   (NEW Goal, 5/10/24): pt to increase his score on condition # 2 of the mCTSIB to 16 seconds for improved ability to balance in low/eliminated vision environments~MET, 5/24/24      Long Term Goals: 8 weeks   Pt will be partially compliant with finalized HEP to help maintain potential gains realized in PT~ongoing  Pt will improve his 30 second chair rise to 4-5 reps to demonstrate increased LE strength and muscular endurance~ongoing and progressing, MET, 6/18/24~ revise slightly to 5 reps, remains current, 7/23/24, 8/13/24  Pt will improve his TUG score to 60 seconds or < with appropriate AD to improve household ambulation and decrease fall risk~MET, 5/24/24 ~ revise this goal to 28 seconds, remains current, 6/18/24, revise to 30 seconds, 7/23/24, progressing, 8/13/24  Pt will increase his SSWS to 0.14 m/sec with or without appropriate AD to improve community ambulation and decrease fall risk~MET, 5/24/24 ~ revise this goal to 0.55 m/sec, remains current, 6/18/24, 7/23/24, 8/13/24  Pt will improve any 4 LE muscle grades by 1/3 to help with functional mobility skills~ MET, 5/24/24   Pt will perform all sit<> stand and stand pivot transfers with no more than S using RW; CGA without device~MET, 6/18/24, NOT MET, 8/13/24  7.   ( NEW Goal, 5/10/24): pt to increase his score on condition # 2 of the mCTSIB to 19 seconds for improved ability to balance in low/eliminated vision environments~MET, 5/24/24         Goals Not achieved and why:     Pt was unable to complete his therapy episode due to declining medical status, which eventually necessitated admission to hospital. As of this writing, pt is currently at [facility]. PT has communicated with pt's partner, Jacob, that pt will be discharged from [facility].        Discharge reason : Decreased medical status    PLAN   This patient is  discharged from Outpatient Physical Therapy Services.     Ba Diaz, PT  09/26/2024

## 2024-09-26 NOTE — ASSESSMENT & PLAN NOTE
Chronic, controlled. Latest blood pressure and vitals reviewed-      While in the hospital, will manage blood pressure as follows; Adjust home antihypertensive regimen as follows- hold home medication in setting of hypotension 2/2 severe urosepsis     Will utilize p.r.n. blood pressure medication only if patient's blood pressure greater than 180/110 and he develops symptoms such as worsening chest pain or shortness of breath

## 2024-09-26 NOTE — ASSESSMENT & PLAN NOTE
Patient has Abnormal Magnesium: hypomagnesemia. Will continue to monitor electrolytes closely. Will replace the affected electrolytes and repeat labs to be done after interventions completed. The patient's magnesium results have been reviewed and are listed below.  Recent Labs   Lab 09/26/24  1151   MG 1.5*

## 2024-09-26 NOTE — ASSESSMENT & PLAN NOTE
On trazodone at home    -Hold trazodone in setting of altered mental status, malaise can consider restarting if improves

## 2024-09-26 NOTE — CARE UPDATE
RAPID RESPONSE NURSE PROACTIVE ROUNDING NOTE       Time of Visit: 1710    Admit Date: 2024  LOS: 0  Code Status: Full Code   Date of Visit: 2024  : 1950  Age: 74 y.o.  Sex: male  Race: White  Bed: ED 3535:   MRN: [id number]  Was the patient discharged from an ICU this admission? No   Was the patient discharged from a PACU within last 24 hours? No   Did the patient receive conscious sedation/general anesthesia in last 24 hours? No  Was the patient in the ED within the past 24 hours? No  Was the patient on NIPPV within the past 24 hours? No   Attending Physician: Xin Deras*  Primary Service: [facility] 4   Time spent at the bedside: < 15 min    SITUATION    Notified by Normal patient alert.  Reason for alert: hypotension  Called to evaluate the patient for Circulatory.    BACKGROUND     Why is the patient in the hospital?: Severe sepsis    Patient has a past medical history of Acute deep vein thrombosis (DVT) of left lower extremity, Anxiety, Atrial fibrillation, Cataract, Coronary artery disease, Depression, Diabetic neuropathy, Essential (primary) hypertension, GERD (gastroesophageal reflux disease), History of bariatric surgery, History of total right knee replacement, Insomnia, Neuromyopathy, Pulmonary embolism, Reactive airway disease, and Type 2 diabetes mellitus.    Last Vitals:  Temp: 102.1 °F (38.9 °C) ( 1645)  Pulse: 112 ( 1728)  Resp: 20 ( 1728)  BP: 82/50 ( 1728)  SpO2: 96 % (1728)    24 Hours Vitals Range:  Temp:  [98.7 °F (37.1 °C)-102.1 °F (38.9 °C)]   Pulse:  []   Resp:  [16-40]   BP: ()/(46-63)   SpO2:  [95 %-99 %]     Labs:  Recent Labs     24  1151   WBC 14.52*   HGB 10.8*   HCT 32.6*   *       Recent Labs     24  1151 24  1546   * 125*   K 4.6 5.1   CL 99 99   CO2 19* 17*   BUN * 24*   CREATININE 1.9* 1.8*   * 439*   PHOS 2.0*  --    MG 1.5*  --             ASSESSMENT     Patient resting in  bed, appears uncomfortable. He is oriented; however, answers questions in appropriately at times. Partner at bedside states he gets confused when he gets UTIs. Patients BP 83/52 (63). Has received 2.5L NS.       Physical Exam  Vitals reviewed.   Cardiovascular:      Rate and Rhythm: Tachycardia present.   Skin:     General: Skin is warm and dry.   Neurological:      General: No focal deficit present.      Mental Status: He is alert.      GCS: GCS eye subscore is 4. GCS verbal subscore is 5. GCS motor subscore is 6.         INTERVENTIONS    The patient was seen for Cardiac problem. Staff concerns included hypotension. The following interventions were performed: continuous cardiac monitoring, continuous pulse ox monitoring , and critical care consult.    RECOMMENDATIONS    - MICU consult  - Continuous pulse ox and cardiac monitoring  - respiratory panel to include Covid  - May need to broaden antibiotic regimen    PROVIDER ESCALATION    Yes/No  Yes    Orders received and case discussed with Dr. Vance .    Disposition:  Remain in ED    FOLLOW-UP    Bedside Burt HUFF  updated on plan of care. Instructed to call the Rapid Response Nurse, Sherice Wilson RN at [phone] for additional questions or concerns.

## 2024-09-26 NOTE — SUBJECTIVE & OBJECTIVE
Past Medical History:   Diagnosis Date    Acute deep vein thrombosis (DVT) of left lower extremity 12/2023    Anxiety     Atrial fibrillation 12/2023    Cataract     Coronary artery disease     CAC score 1430    Depression     Diabetic neuropathy     Essential (primary) hypertension     GERD (gastroesophageal reflux disease)     History of bariatric surgery 07/08/2021    History of total right knee replacement 07/08/2021    Insomnia     Neuromyopathy     Possibly DM and/or alcohol related.    Pulmonary embolism 12/2023    Reactive airway disease     Type 2 diabetes mellitus        Past Surgical History:   Procedure Laterality Date    CATARACT EXTRACTION W/  INTRAOCULAR LENS IMPLANT Right 7/15/2024    Procedure: EXTRACTION, CATARACT, WITH IOL INSERTION;  Surgeon: Margot Jacobson MD;  Location: [facility] OR;  Service: Ophthalmology;  Laterality: Right;    CATARACT EXTRACTION W/  INTRAOCULAR LENS IMPLANT Left 8/29/2024    Procedure: EXTRACTION, CATARACT, WITH IOL INSERTION;  Surgeon: Margot Jacobson MD;  Location: [facility] OR;  Service: Ophthalmology;  Laterality: Left;    COLONOSCOPY      Normal around 2020    COLONOSCOPY N/A 9/6/2024    Procedure: COLONOSCOPY;  Surgeon: Alessandro Serna MD;  Location: [facility] (4TH FLR);  Service: Endoscopy;  Laterality: N/A;  8/28-new case created-lvm for pre call-pt has cataract surgery 8/29/24-tb  ref-dr rico myles-peg-Rattan  ok to hold elivickie myles-GT  9/5-LVM for precall-Kpvt    COLONOSCOPY N/A 9/6/2024    Procedure: COLONOSCOPY;  Surgeon: Alessandro Serna MD;  Location: [facility] (4TH FLR);  Service: Endoscopy;  Laterality: N/A;  + cologuard  8/8 ref by Isaias Myles MD, [facility], instr. to portal Eliquis hold approval pending-  ok to hold Eliquis 2 days per Dr Myles-GT    TOTAL KNEE ARTHROPLASTY Right        Review of patient's allergies indicates:  No Known Allergies    Family History       Problem Relation (Age of Onset)    Colon cancer Paternal Grandfather (89)     Hypertension Mother          Tobacco Use    Smoking status: Never    Smokeless tobacco: Never   Substance and Sexual Activity    Alcohol use: Yes     Comment: Former heavy use    Drug use: Never    Sexual activity: Yes     Comment: unknown      Review of Systems   Constitutional:  Positive for activity change and fever. Negative for chills and diaphoresis.   Respiratory:  Negative for cough and shortness of breath.    Cardiovascular:  Negative for chest pain.   Gastrointestinal:  Negative for abdominal distention.   Genitourinary:  Positive for dysuria. Negative for frequency.   Neurological: Negative.      Objective:     Vital Signs (Most Recent):  Temp: (!) 102.1 °F (38.9 °C) (09/26/24 1645)  Pulse: 110 (09/26/24 1742)  Resp: 20 (09/26/24 1742)  BP: (!) 104/51 (09/26/24 1742)  SpO2: 96 % (09/26/24 1742) Vital Signs (24h Range):  Temp:  [98.7 °F (37.1 °C)-102.1 °F (38.9 °C)] 102.1 °F (38.9 °C)  Pulse:  [] 110  Resp:  [16-40] 20  SpO2:  [95 %-99 %] 96 %  BP: ()/(46-63) 104/51   Weight: 81.2 kg (179 lb)  Body mass index is 26.43 kg/m².      Intake/Output Summary (Last 24 hours) at 9/26/2024 1751  Last data filed at 9/26/2024 1723  Gross per 24 hour   Intake 2700 ml   Output --   Net 2700 ml          Physical Exam  Constitutional:       Appearance: He is ill-appearing. He is not diaphoretic.   HENT:      Mouth/Throat:      Mouth: Mucous membranes are moist.      Pharynx: Oropharynx is clear.   Cardiovascular:      Rate and Rhythm: Regular rhythm. Tachycardia present.      Heart sounds: No murmur heard.     No gallop.   Pulmonary:      Effort: Pulmonary effort is normal.      Breath sounds: No wheezing or rales.   Abdominal:      General: Bowel sounds are normal. There is no distension.      Tenderness: There is no abdominal tenderness.   Musculoskeletal:         General: No swelling.      Right lower leg: No edema.      Left lower leg: No edema.   Skin:     General: Skin is warm.      Coloration: Skin is  not jaundiced.   Neurological:      Mental Status: He is alert and oriented to person, place, and time.            Vents:     Lines/Drains/Airways       Peripheral Intravenous Line  Duration                  Peripheral IV - Single Lumen 09/26/24 0000 18 G Anterior;Right Wrist <1 day         Peripheral IV - Single Lumen 09/26/24 1155 18 G Anterior;Left;Proximal Forearm <1 day                  Significant Labs:    CBC/Anemia Profile:  Recent Labs   Lab 09/26/24  1151   WBC 14.52*   HGB 10.8*   HCT 32.6*   *   MCV 91   RDW 12.3        Chemistries:  Recent Labs   Lab 09/26/24  1151 09/26/24  1546   * 125*   K 4.6 5.1   CL 99 99   CO2 19* 17*   BUN 26* 24*   CREATININE 1.9* 1.8*   CALCIUM 8.4* 8.9   ALBUMIN 2.4*  --    PROT 5.7*  --    BILITOT 1.1*  --    ALKPHOS 148*  --    ALT 9*  --    AST 14  --    MG 1.5*  --    PHOS 2.0*  --          Significant Imaging: I have reviewed all pertinent imaging results/findings within the past 24 hours.

## 2024-09-26 NOTE — ASSESSMENT & PLAN NOTE
Patient presenting with tachycardia, leukocytosis, fevers to TMax 102.1 hypotension in the ED with concern for sepsis, Cr at 1.9 (BL 0.9-1.1). UA concerning for UTI, CXR with concern for consolidation in left upper lung zone. Concern for sepsis with urologic source vs respiratory PNA. Bcx collected, started on CTX in the ED. S/p 2.5 L LR without improvement in BP. Paitent started on levophed in the ED and escalated abx to cefepime, vancomycin, and azithromicin for empiric coverage    -F/u Bcx  -F/u UCx  -Titrate pressors as needed  -CTM CBC/BMP  -Continue vanc, cefepime, azithromycin, adjust as tolerated

## 2024-09-26 NOTE — ASSESSMENT & PLAN NOTE
ALYSIA is likely due to pre-renal azotemia due to dehydration, hypotension. Baseline creatinine is  0.9-12.1 . S/p 2.5 L IVF in the ED.      Plan  - ALYSIA is stable  - Avoid nephrotoxins and renally dose meds for GFR listed above  - Monitor urine output, serial BMP, and adjust therapy as needed  - CTM, consider additional fluids as patient with poor P.O intake prior to admission.

## 2024-09-26 NOTE — MEDICAL/APP STUDENT
[facility] [address] [facility] - Emergency Dept  [facility] Medicine  History & Physical    Patient Name: Anthony Ball  MRN: [id number]  Patient Class: Emergency  Admission Date: 9/26/2024  Attending Physician: Xin Deras*   Primary Care Provider: Isaias Myles MD  .     Subjective:   Chief Complaint:   Chief Complaint   Patient presents with    Fatigue     Generalized weakness per family x1 week worsening this morning.Pt unable to use walker. Recent dx of UTI. Initial pressure 80/40. Pt endorses increased confusion.          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 He also endorses dysuria and poor oral intake for several days. His spouse is present and maintains that the patient is not at baseline mentation and remains confused. In the past the patient has had UTIs that were positive for pan-sensitive Proteus and Staphylococcus species, and has been treated with Fosfomycin powder due to difficulty swallowing medications at that time.     ED Course: Patient was hypotensive and mildly tachycardic and was given 2 boluses of 1L LR. UA results likely UTI in origin. Ceftriaxone 1g was given. Other labs of note,  (CorrNA 134), , Cr 1.9, WBC 14, Lactic Acid 2.74, Procalcitonin elevated. CXR showed focal opacity in left upper lobe. Blood and urine cultures were drawn.     Vitals:    09/26/24 1645   BP:    Pulse:    Resp:    Temp: (!) 102.1 °F (38.9 °C)        Recent Labs   Lab 09/26/24  1151   WBC 14.52*   RBC 3.58*   HGB 10.8*   HCT 32.6*   *   MCV 91   MCH 30.2   MCHC 33.1      Physical Exam  Constitutional:       Appearance: He is obese. He is ill-appearing and toxic-appearing.   HENT:      Mouth/Throat:      Mouth: Mucous membranes are dry.   Cardiovascular:      Rate and Rhythm:  Tachycardia present.      Heart sounds: No murmur heard.     No friction rub. No gallop.   Pulmonary:      Breath sounds: Wheezing present. No rhonchi or rales.   Abdominal:      General: Abdomen is protuberant. Bowel sounds are normal.      Palpations: Abdomen is soft.      Tenderness: There is no abdominal tenderness.   Musculoskeletal:         General: Tenderness present.      Right lower leg: No edema.      Left lower leg: No edema.   Neurological:      Mental Status: He is alert and oriented to person, place, and time. He is confused.      Sensory: Sensory deficit present.      Motor: Weakness and tremor present.       Assessment/Plan:   Severe Sepsis  UTI  Patient admitted due to WBC 14, Tachycardic, hypotensive, positive UA, lactic acidosis, and kidney function declining.     - Ceftriaxone 1g qd for 5 days  - Azythromycin 500mg qd for 3 days, cover for possible PNA  - 1 dose of Vancomycin   - Recheck lactic acid at 1800hrs   - Continue to monitor hypotension and response to fluids with low threshold for upgrade to MICU    ALYSIA  Patient met sepsis criteria with Cr 1.9 present on admission, baseline 1.0. 2L LR delivered in ED.     - 500mL bolus LR   - Renally dose medications   - Monitor renal function daily    Asthma  Hx of asthma on Trelegy and albuterol inhaler at home. Had an asthma exacerbation episode in the ED and was given Duonebs with relief.     - DuoNebs q6hrs PRN  - Continue home albuterol as needed    T2DM  Hx of type 2 diabetes with no home medications and a past A1c showing well controlled. Severely hyperglycemic on admission and continued to rise throughout first few hours. New A1c 9.8    - LDSSI with meals     Afib  - Continue home eliquis     GERD  - Continue home pantoprazole      Sherif Saldivar, [facility]  Department of Hospital Medicine  [facility] - Emergency Dept

## 2024-09-26 NOTE — SUBJECTIVE & OBJECTIVE
Past Medical History:   Diagnosis Date    Acute deep vein thrombosis (DVT) of left lower extremity 12/2023    Anxiety     Atrial fibrillation 12/2023    Cataract     Coronary artery disease     CAC score 1430    Depression     Diabetic neuropathy     Essential (primary) hypertension     GERD (gastroesophageal reflux disease)     History of bariatric surgery 07/08/2021    History of total right knee replacement 07/08/2021    Insomnia     Neuromyopathy     Possibly DM and/or alcohol related.    Pulmonary embolism 12/2023    Reactive airway disease     Type 2 diabetes mellitus        Past Surgical History:   Procedure Laterality Date    CATARACT EXTRACTION W/  INTRAOCULAR LENS IMPLANT Right 7/15/2024    Procedure: EXTRACTION, CATARACT, WITH IOL INSERTION;  Surgeon: Margot Jacobson MD;  [facility];  Service: Ophthalmology;  Laterality: Right;    CATARACT EXTRACTION W/  INTRAOCULAR LENS IMPLANT Left 8/29/2024    Procedure: EXTRACTION, CATARACT, WITH IOL INSERTION;  Surgeon: Margot Jacobson MD;  [facility];  Service: Ophthalmology;  Laterality: Left;    COLONOSCOPY      Normal around 2020    COLONOSCOPY N/A 9/6/2024    Procedure: COLONOSCOPY;  Surgeon: Alessandro Serna MD;  Location: [facility] (4TH FLR);  Service: Endoscopy;  Laterality: N/A;  8/28-new case created-lvm for pre call-pt has cataract surgery 8/29/24-tb  ref-dr rico goldstein-[facility]  ok to hold elivickie Perdomo  9/5-LVM for precall-[facility]    COLONOSCOPY N/A 9/6/2024    Procedure: COLONOSCOPY;  Surgeon: Alessandro Serna MD;  Location: [facility] (4TH FLR);  Service: Endoscopy;  Laterality: N/A;  + cologuard  8/8 ref by Isaias Goldstein MD, [facility], instr. to portal Eliquis hold approval pending-  ok to hold Eliquis 2 days per Dr Goldstein-[facility]    TOTAL KNEE ARTHROPLASTY Right        Review of patient's allergies indicates:  No Known Allergies    No current facility-administered medications on file prior to encounter.     Current Outpatient  Medications on File Prior to Encounter   Medication Sig    apixaban (ELIQUIS) 5 mg Tab TAKE 1 TABLET(5 MG) BY MOUTH TWICE DAILY    busPIRone (BUSPAR) 15 MG tablet Take 1 tablet (15 mg total) by mouth 2 (two) times daily as needed (Anxiety).    cyanocobalamin (VITAMIN B-12) 1000 MCG tablet Take 1 tablet (1,000 mcg total) by mouth once daily.    DULoxetine (CYMBALTA) 60 MG capsule Take 1 capsule (60 mg total) by mouth 2 (two) times daily.    fluticasone-umeclidin-vilanter (TRELEGY ELLIPTA) 100-62.5-25 mcg DsDv Inhale 1 puff into the lungs once daily.    hydrOXYzine pamoate (VISTARIL) 25 MG Cap Take 25 mg by mouth every 8 (eight) hours as needed.    methocarbamoL (ROBAXIN) 750 MG Tab Take 1 tablet (750 mg total) by mouth 3 (three) times daily as needed (Back pain).    pantoprazole (PROTONIX) 40 MG tablet TAKE 1 TABLET(40 MG) BY MOUTH EVERY DAY    pregabalin (LYRICA) 150 MG capsule Take 1 capsule (150 mg total) by mouth 3 (three) times daily.    traZODone (DESYREL) 100 MG tablet Take 2 tablets (200 mg total) by mouth nightly as needed for Insomnia.    albuterol (VENTOLIN HFA) 90 mcg/actuation inhaler Inhale 2 puffs into the lungs every 6 (six) hours as needed for Wheezing. Rescue    magnesium oxide 400 mg magnesium Tab Take 1 tablet by mouth every evening.    nortriptyline (PAMELOR) 10 MG capsule Take 1 capsule (10 mg total) by mouth 3 (three) times daily.    ondansetron (ZOFRAN) 8 MG tablet Take 1 tablet (8 mg total) by mouth every 8 (eight) hours as needed for Nausea.    [DISCONTINUED] capsicum 0.075% (ZOSTRIX) 0.075 % topical cream Apply topically 3 (three) times daily as needed (Foot pain).    [DISCONTINUED] LIDOcaine (LIDODERM) 5 % Place 3 patches onto the skin daily as needed (Rib pain). Okay to use 1 to 3 patches. Remove & Discard patches within 12 hours or as directed by MD    [DISCONTINUED] triamcinolone acetonide 0.1% (KENALOG) 0.1 % cream Apply topically 2 (two) times daily. Use for 1 to 2 weeks as needed for  rash.     Family History       Problem Relation (Age of Onset)    Colon cancer Paternal Grandfather (89)    Hypertension Mother          Tobacco Use    Smoking status: Never    Smokeless tobacco: Never   Substance and Sexual Activity    Alcohol use: Yes     Comment: Former heavy use    Drug use: Never    Sexual activity: Yes     Comment: unknown     Review of Systems   Constitutional:  Positive for appetite change (Decreased appetite) and chills. Negative for fever.   Respiratory:  Positive for wheezing. Negative for cough, choking and shortness of breath.    Cardiovascular:  Negative for chest pain, palpitations and leg swelling.   Gastrointestinal:  Positive for constipation. Negative for abdominal distention, abdominal pain, diarrhea, nausea and vomiting.     Objective:     Vital Signs (Most Recent):  Temp: (!) 102.1 °F (38.9 °C) (09/26/24 1645)  Pulse: 110 (09/26/24 1742)  Resp: 20 (09/26/24 1742)  BP: (!) 104/51 (09/26/24 1742)  SpO2: 96 % (09/26/24 1742) Vital Signs (24h Range):  Temp:  [98.7 °F (37.1 °C)-102.1 °F (38.9 °C)] 102.1 °F (38.9 °C)  Pulse:  [] 110  Resp:  [16-40] 20  SpO2:  [95 %-99 %] 96 %  BP: ()/(46-63) 104/51     Weight: 81.2 kg (179 lb)  Body mass index is 26.43 kg/m².     Physical Exam  Constitutional:       General: He is not in acute distress.     Appearance: Normal appearance. He is ill-appearing.   HENT:      Head: Normocephalic and atraumatic.      Nose: Nose normal. No congestion.      Mouth/Throat:      Pharynx: Oropharynx is clear. No oropharyngeal exudate.   Eyes:      General: No scleral icterus.  Cardiovascular:      Rate and Rhythm: Tachycardia present.      Pulses: Normal pulses.      Heart sounds: Normal heart sounds. No murmur heard.     No friction rub. No gallop.   Pulmonary:      Effort: Pulmonary effort is normal. No respiratory distress.      Breath sounds: No stridor. Wheezing present. No rhonchi or rales.   Chest:      Chest wall: No tenderness.   Abdominal:       General: Abdomen is flat. Bowel sounds are normal. There is distension (mild distension).      Palpations: Abdomen is soft. There is no mass.      Tenderness: There is no abdominal tenderness. There is no guarding or rebound.      Hernia: No hernia is present.   Musculoskeletal:         General: No swelling.      Right lower leg: No edema.      Left lower leg: No edema.   Neurological:      Mental Status: He is alert. He is disoriented.      Gait: Gait abnormal.      Comments: Alert to person, place. Not at baseline per partner                Significant Labs: All pertinent labs within the past 24 hours have been reviewed.  Recent Lab Results  (Last 5 results in the past 24 hours)        09/26/24  1546   09/26/24  1453   09/26/24  1249   09/26/24  1200   09/26/24  1151        Procalcitonin         23.72  Comment: A concentration < 0.25 ng/mL represents a low risk of bacterial   infection.  Procalcitonin may not be accurate among patients with localized   infection, recent trauma or major surgery, immunosuppressed state,   invasive fungal infection, renal dysfunction. Decisions regarding   initiation or continuation of antibiotic therapy should not be based   solely on procalcitonin levels.         Albumin         2.4       ALP         148       Allens Test       N/A         ALT         9       Anion Gap 9         10       Appearance, UA     Cloudy           AST         14       Bacteria, UA     Moderate           Baso #         0.02       Basophil %         0.1       Bilirubin (UA)     Negative           BILIRUBIN TOTAL         1.1  Comment: For infants and newborns, interpretation of results should be based  on gestational age, weight and in agreement with clinical  observations.    Premature Infant recommended reference ranges:  Up to 24 hours.............<8.0 mg/dL  Up to 48 hours............<12.0 mg/dL  3-5 days..................<15.0 mg/dL  6-29 days.................<15.0 mg/dL         BNP          127  Comment: Values of less than 100 pg/ml are consistent with non-CHF populations.       Site       Other         BUN 24         26       Calcium 8.9         8.4       Chloride 99         99       CO2 17         19       Color, UA     Yellow           Creatinine 1.8         1.9       cVidyas       Room Air         Differential Method         Automated       eGFR 39.5         37.0       Eos #         0.0       Eos %         0.1       Estimated Avg Glucose         235       Glucose 439         395       Glucose, UA     2+           Gran # (ANC)         12.7       Gran %         87.6       Hematocrit         32.6       Hemoglobin         10.8       Hemoglobin A1C External         9.8  Comment: ADA Screening Guidelines:  5.7-6.4%  Consistent with prediabetes  >or=6.5%  Consistent with diabetes    High levels of fetal hemoglobin interfere with the HbA1C  assay. Heterozygous hemoglobin variants (HbS, HgC, etc)do  not significantly interfere with this assay.   However, presence of multiple variants may affect accuracy.         Hyaline Casts, UA     3           Immature Grans (Abs)         0.19  Comment: Mild elevation in immature granulocytes is non specific and   can be seen in a variety of conditions including stress response,   acute inflammation, trauma and pregnancy. Correlation with other   laboratory and clinical findings is essential.         Immature Granulocytes         1.3       INR         1.1  Comment: Coumadin Therapy:  2.0 - 3.0 for INR for all indicators except mechanical heart valves  and antiphospholipid syndromes which should use 2.5 - 3.5.         Ketones, UA     1+           Lactic Acid Level   3.6  Comment: Falsely low lactic acid results can be found in samples   containing >=13.0 mg/dL total bilirubin and/or >=3.5 mg/dL   direct bilirubin.  *Critical value notification by [facility] with confirmation of receipt to   KAMLA VALLADARES RN at Date 9/26/24 Time 3:38PM               Leukocyte Esterase, UA      3+           Lipase         4       Lymph #         0.4       Lymph %         2.8       Magnesium          1.5       MCH         30.2       MCHC         33.1       MCV         91       Microscopic Comment     SEE COMMENT  Comment: Other formed elements not mentioned in the report are not   present in the microscopic examination.              Mode       SPONT         Mono #         1.2       Mono %         8.1       MPV         10.0       NITRITE UA     Negative           nRBC         0       Blood, UA     3+           pH, UA     8.0           Phosphorus Level         2.0       Platelet Count         108       POC Lactate       2.74         Potassium 5.1         4.6       PROTEIN TOTAL         5.7       Protein, UA     1+  Comment: Recommend a 24 hour urine protein or a urine   protein/creatinine ratio if globulin induced proteinuria is  clinically suspected.             PT         11.5       RBC         3.58       RBC, UA     0           RDW         12.3       Sample       VENOUS         Sodium 125         128       Spec Grav UA     1.020           Specimen UA     Urine, Clean Catch           Squam Epithel, UA     5           Troponin I         0.009  Comment: The reference interval for Troponin I represents the 99th percentile   cutoff   for our facility and is consistent with 3rd generation assay   performance.         TSH         2.363       WBC, UA     >100           WBC         14.52                              Significant Imaging:   X-Ray Chest AP Portable   Final Result   Abnormal      Repeat exam confirms ill-defined focal opacity in the left upper lobe of uncertain etiology.  Further evaluation recommended.      This report was flagged in Epic as abnormal..         Electronically signed by: Hima Ni MD   Date:    09/26/2024   Time:    14:34      X-Ray Chest AP Portable   Final Result      Focal opacity in the left upper lung zone laterally not noted on 01/11/2024 is observed, partially overlain by  an external monitoring lead.  A repeat chest radiograph at some point is indicated to exclude or confirm a pathologic entity.  Appearance of the chest, allowing for a poorer inspiratory depth level on the current exam, is otherwise unremarkable and unchanged since 01/11/2024.         Electronically signed by: Kailash Frances MD   Date:    09/26/2024   Time:    12:27      US Retroperitoneal Complete    (Results Pending)

## 2024-09-26 NOTE — ED TRIAGE NOTES
Arrives via EMS from home for generalized weakness over the past week. Pt states he is disabled and recently had a UTI. Family reported to EMS pt has seemed confused. Pt states he has bilateral weakness. EMS reported high CBG. Pt states he no longer takes insulin and switched to metformin. AAOX4. Denies pain.

## 2024-09-26 NOTE — ASSESSMENT & PLAN NOTE
Mobility impaired likely 2/2  peripheral neuropathy. Able to use walker at home however acutely decreased in setting of sepsis    -Fall precautions  -Consider PT/OT consult

## 2024-09-26 NOTE — ED PROVIDER NOTES
Source of History  patient and EMR    Chief Complaint    Fatigue (Generalized weakness per family x1 week worsening this morning.Pt unable to use walker. Recent dx of UTI. Initial pressure 80/40. Pt endorses increased confusion./)      History of Present Illness    Anthony Ball is a 74 y.o. male presenting with concerns for generalized weakness and unable to use his walker this morning, could not get out of bed.  Currently in physical rehabilitation for issues with his lower back, but states he has not been going recently.  Had colonoscopy 2 weeks ago without any issues, polyps were removed, no bleeding.  Was recently diagnosed with the urinary tract infection due to dysuria, but no labs seen.  Has had frequent urinary tract infections.  Does not use urinary catheter.  Has never been told he has BPH or any other prostate or genitourinary pathology.  Currently took 1 dose of a antibiotic but does not recall the name of the antibiotic.    Denies fevers, chest pain, diaphoresis, nausea, vomiting, abdominal pain, diarrhea.    EMS reported that there was some increased confusion, but the patient has no complaints in his not appear overtly confused at the time of my evaluation.    Lives at home.  Has spouse.  Retired physician.    Review of Systems    As per HPI and below:  Constitutional symptoms:  No chills, no sweats, no fever   Skin symptoms:  No rash    Respiratory symptoms:  No shortness of breath, no cough  Cardiovascular symptoms:  No chest pain   Gastrointestinal symptoms:  No abdominal pain, no nausea, no vomiting, no diarrhea  Genitourinary symptoms:  No dysuria/increased frequency/hematuria at this time.  No pain in the groin.  No suprapubic discomfort.  Previous dysuria has resolved.  Musculoskeletal symptoms:  No back pain, No joint pains or aches    Neurologic symptoms:  No headache, generalized weakness.  No saddle anesthesia.  Endocrine symptoms:  None except as in HPI     Past History    As per HPI and  below:  Past Medical History:   Diagnosis Date    Acute deep vein thrombosis (DVT) of left lower extremity 12/2023    Anxiety     Atrial fibrillation 12/2023    Cataract     Coronary artery disease     CAC score 1430    Depression     Diabetic neuropathy     Essential (primary) hypertension     GERD (gastroesophageal reflux disease)     Insomnia     Neuromyopathy     Possibly DM and/or alcohol related.    Pulmonary embolism 12/2023    Reactive airway disease     Type 2 diabetes mellitus        Past Surgical History:   Procedure Laterality Date    CATARACT EXTRACTION W/  INTRAOCULAR LENS IMPLANT Right 7/15/2024    Procedure: EXTRACTION, CATARACT, WITH IOL INSERTION;  Surgeon: Margot Jacobson MD;  Location: [facility] OR;  Service: Ophthalmology;  Laterality: Right;    CATARACT EXTRACTION W/  INTRAOCULAR LENS IMPLANT Left 8/29/2024    Procedure: EXTRACTION, CATARACT, WITH IOL INSERTION;  Surgeon: Margot Jacobson MD;  Location: [facility] OR;  Service: Ophthalmology;  Laterality: Left;    COLONOSCOPY      Normal around 2020    COLONOSCOPY N/A 9/6/2024    Procedure: COLONOSCOPY;  Surgeon: Alessandro Serna MD;  Location: [facility] (4TH FLR);  Service: Endoscopy;  Laterality: N/A;  8/28-new case created-lvm for pre call-pt has cataract surgery 8/29/24-tb  ref-dr rico myles-peg-portal  ok to hold elivickie myles-GT  9/5-LVM for precall-Kpvt    COLONOSCOPY N/A 9/6/2024    Procedure: COLONOSCOPY;  Surgeon: Alessandro Serna MD;  Location: [facility] (4TH FLR);  Service: Endoscopy;  Laterality: N/A;  + cologuard  8/8 ref by Isaias Myles MD, [facility], instr. to portal, Eliquis hold approval pending-  ok to hold Eliquis 2 days per Dr Myles-GT    TOTAL KNEE ARTHROPLASTY Right        Social History     Socioeconomic History    Marital status:    Tobacco Use    Smoking status: Never    Smokeless tobacco: Never   Substance and Sexual Activity    Alcohol use: Yes     Comment: Former heavy use    Drug use: Never    Sexual activity:  Yes     Comment: unknown     Social Determinants of Health     Financial Resource Strain: Low Risk  (1/15/2024)    Overall Financial Resource Strain (CARDIA)     Difficulty of Paying Living Expenses: Not very hard   Food Insecurity: No Food Insecurity (1/15/2024)    Hunger Vital Sign     Worried About Running Out of Food in the Last Year: Never true     Ran Out of Food in the Last Year: Never true   Transportation Needs: No Transportation Needs (1/15/2024)    PRAPARE - Transportation     Lack of Transportation (Medical): No     Lack of Transportation (Non-Medical): No   Physical Activity: Inactive (1/15/2024)    Exercise Vital Sign     Days of Exercise per Week: 0 days     Minutes of Exercise per Session: 0 min   Stress: No Stress Concern Present (1/15/2024)    [employer] - Occupational Stress Questionnaire     Feeling of Stress : Only a little   Housing Stability: Low Risk  (1/15/2024)    Housing Stability Vital Sign     Unable to Pay for Housing in the Last Year: No     Number of Places Lived in the Last Year: 1     Unstable Housing in the Last Year: No       Family History   Problem Relation Name Age of Onset    Hypertension Mother      Colon cancer Paternal Grandfather  89       Review of patient's allergies indicates:  No Known Allergies    No current facility-administered medications on file prior to encounter.     Current Outpatient Medications on File Prior to Encounter   Medication Sig Dispense Refill    albuterol (VENTOLIN HFA) 90 mcg/actuation inhaler Inhale 2 puffs into the lungs every 6 (six) hours as needed for Wheezing. Rescue 8 g 2    apixaban (ELIQUIS) 5 mg Tab TAKE 1 TABLET(5 MG) BY MOUTH TWICE DAILY 180 tablet 3    busPIRone (BUSPAR) 15 MG tablet Take 1 tablet (15 mg total) by mouth 2 (two) times daily as needed (Anxiety). 180 tablet 3    capsicum 0.075% (ZOSTRIX) 0.075 % topical cream Apply topically 3 (three) times daily as needed (Foot pain). 120 g 5     cyanocobalamin (VITAMIN B-12) 1000 MCG tablet Take 1 tablet (1,000 mcg total) by mouth once daily. 90 tablet 3    DULoxetine (CYMBALTA) 60 MG capsule Take 1 capsule (60 mg total) by mouth 2 (two) times daily. 180 capsule 3    fluticasone-umeclidin-vilanter (TRELEGY ELLIPTA) 100-62.5-25 mcg DsDv Inhale 1 puff into the lungs once daily. 60 each 5    hydrOXYzine pamoate (VISTARIL) 25 MG Cap Take 25 mg by mouth every 8 (eight) hours as needed.      LIDOcaine (LIDODERM) 5 % Place 3 patches onto the skin daily as needed (Rib pain). Okay to use 1 to 3 patches. Remove & Discard patches within 12 hours or as directed by MD 30 patch 1    magnesium oxide 400 mg magnesium Tab Take 1 tablet by mouth every evening. 30 tablet 2    methocarbamoL (ROBAXIN) 750 MG Tab Take 1 tablet (750 mg total) by mouth 3 (three) times daily as needed (Back pain). 90 tablet 0    nortriptyline (PAMELOR) 10 MG capsule Take 1 capsule (10 mg total) by mouth 3 (three) times daily. 90 capsule 3    ondansetron (ZOFRAN) 8 MG tablet Take 1 tablet (8 mg total) by mouth every 8 (eight) hours as needed for Nausea. 30 tablet 1    pantoprazole (PROTONIX) 40 MG tablet TAKE 1 TABLET(40 MG) BY MOUTH EVERY DAY 90 tablet 3    pregabalin (LYRICA) 150 MG capsule Take 1 capsule (150 mg total) by mouth 3 (three) times daily. 90 capsule 5    traZODone (DESYREL) 100 MG tablet Take 2 tablets (200 mg total) by mouth nightly as needed for Insomnia. 14 tablet 0    triamcinolone acetonide 0.1% (KENALOG) 0.1 % cream Apply topically 2 (two) times daily. Use for 1 to 2 weeks as needed for rash. 453.6 g 0       Physical Exam    Reviewed nursing notes.  Vitals:    09/26/24 1207 09/26/24 1230 09/26/24 1312 09/26/24 1315   BP: (!) 108/51 (!) 93/53 (!) 100/57 (!) 104/59   Patient Position:    Lying   Pulse: 101 97 97 96   Resp: 20 20 18 20   Temp:    98.7 °F (37.1 °C)   TempSrc:    Oral   SpO2: 98% 96% 97% 98%   Weight:       Height:         General:  Alert, no acute distress.    Skin:   Warm, dry, intact.  No rash.  Head:  Normocephalic, atraumatic.    Neck:  Supple.   HEENT:  Pupils are equal and round, appropriate for room, extraocular movements are intact.  Normal phonation.  tacky mucous membranes.  Cardiovascular:  Regular rate and rhythm, Normal peripheral perfusion, No edema.    Respiratory:  Lungs are clear to auscultation, respirations are non-labored, breath sounds are equal.    Gastrointestinal:  Soft, Nontender, Non distended.   Musculoskeletal:  Normal range of motion observed.  Neurological:  Alert and oriented to person, place, time, and situation.  No focal deficits observed.  Generally weak diffusely, but moving all extremities appropriately.  Psychiatric:  Cooperative, appropriate mood & affect.          Initial MDM and Data Review    74 y.o. male presenting for evaluation of hypotension and generalized weakness in the setting of recent urinary tract infection.    Differential includes but is not limited to:  Urinary tract infection, electrolyte abnormalities, kidney injury, sepsis, pneumonia, doubt ureterolithiasis given lack of abdominal pain, back pain, nausea, vomiting  Dehydration and poor intake certainly possible as well    Work-up includes:  Sepsis workup    Interventions include:  Infection directed antibiotics, likely urinary source    The patient has significant medical comorbidities that influence decision making for this acute process, such as:  Diabetes (on metformin), hypertension, mobility impaired    I decided to obtain the patient's medical records and review relevant documentation from hospital records and clinic records.  Pertinent information is noted.  Review of medical records do reveal frequent urinary tract infection, however I do not see any recent antibiotic prescribed to the patient.    Medications   magnesium sulfate 2g in water 50mL IVPB (premix) (2 g Intravenous New Bag 9/26/24 1777)   lactated ringers bolus 1,000 mL (0 mLs Intravenous Stopped  9/26/24 1236)   lactated ringers bolus 1,000 mL (1,000 mLs Intravenous New Bag 9/26/24 1247)   cefTRIAXone (Rocephin) 1 g in D5W 100 mL IVPB (MB+) (0 g Intravenous Stopped 9/26/24 1339)       Results and ED Course    Labs Reviewed   CBC W/ AUTO DIFFERENTIAL - Abnormal       Result Value    WBC 14.52 (*)     RBC 3.58 (*)     Hemoglobin 10.8 (*)     Hematocrit 32.6 (*)     MCV 91      MCH 30.2      MCHC 33.1      RDW 12.3      Platelets 108 (*)     MPV 10.0      Immature Granulocytes 1.3 (*)     Gran # (ANC) 12.7 (*)     Immature Grans (Abs) 0.19 (*)     Lymph # 0.4 (*)     Mono # 1.2 (*)     Eos # 0.0      Baso # 0.02      nRBC 0      Gran % 87.6 (*)     Lymph % 2.8 (*)     Mono % 8.1      Eosinophil % 0.1      Basophil % 0.1      Differential Method Automated     COMPREHENSIVE METABOLIC PANEL - Abnormal    Sodium 128 (*)     Potassium 4.6      Chloride 99      CO2 19 (*)     Glucose 395 (*)     BUN 26 (*)     Creatinine 1.9 (*)     Calcium 8.4 (*)     Total Protein 5.7 (*)     Albumin 2.4 (*)     Total Bilirubin 1.1 (*)     Alkaline Phosphatase 148 (*)     AST 14      ALT 9 (*)     eGFR 37.0 (*)     Anion Gap 10     URINALYSIS, REFLEX TO URINE CULTURE - Abnormal    Specimen UA Urine, Clean Catch      Color, UA Yellow      Appearance, UA Cloudy (*)     pH, UA 8.0      Specific Gravity, UA 1.020      Protein, UA 1+ (*)     Glucose, UA 2+ (*)     Ketones, UA 1+ (*)     Bilirubin (UA) Negative      Occult Blood UA 3+ (*)     Nitrite, UA Negative      Leukocytes, UA 3+ (*)     Narrative:     Specimen Source->Urine   MAGNESIUM - Abnormal    Magnesium 1.5 (*)    PHOSPHORUS - Abnormal    Phosphorus 2.0 (*)    B-TYPE NATRIURETIC PEPTIDE - Abnormal     (*)    PROCALCITONIN - Abnormal    Procalcitonin 23.72 (*)    URINALYSIS MICROSCOPIC - Abnormal    RBC, UA 0      WBC, UA >100 (*)     Bacteria Moderate (*)     Squam Epithel, UA 5      Hyaline Casts, UA 3 (*)     Microscopic Comment SEE COMMENT      Narrative:      Specimen Source->Urine   ISTAT LACTATE - Abnormal    POC Lactate 2.74 (*)     Sample VENOUS      Site Other      Allens Test N/A      DelSys Room Air      Mode SPONT     CULTURE, BLOOD   CULTURE, BLOOD   CULTURE, URINE   PROTIME-INR    Prothrombin Time 11.5      INR 1.1     LIPASE    Lipase 4     TROPONIN I    Troponin I 0.009     TSH    TSH 2.363     POCT GLUCOSE MONITORING CONTINUOUS       Imaging Results              X-Ray Chest AP Portable (In process)                      X-Ray Chest AP Portable (Final result)  Result time 09/26/24 12:27:08      Final result by Kailash Frances MD (09/26/24 12:27:08)                   Impression:      Focal opacity in the left upper lung zone laterally not noted on 01/11/2024 is observed, partially overlain by an external monitoring lead.  A repeat chest radiograph at some point is indicated to exclude or confirm a pathologic entity.  Appearance of the chest, allowing for a poorer inspiratory depth level on the current exam, is otherwise unremarkable and unchanged since 01/11/2024.      Electronically signed by: Kailash Frances MD  Date:    09/26/2024  Time:    12:27               Narrative:    EXAMINATION:  XR CHEST AP PORTABLE    CLINICAL HISTORY:  Sepsis;    TECHNIQUE:  One view    COMPARISON:  Comparison is made to 01/11/2024.    FINDINGS:  Heart size is normal, as is the appearance of the pulmonary vascularity.  There is an opacity in the left upper lung zone, projected primarily in the 2nd anterior interspace, which was not present on 01/11/2024.  Unfortunately, an external monitoring lead is projected over this area.  A follow-up chest radiograph is suggested to determine if this is a consistent finding and to exclude the development of a pulmonary nodule.  Lung zones elsewhere appear clear and are free of significant airspace consolidation or volume loss.  No pleural fluid.  No hilar or mediastinal mass lesion.  No pneumothorax.  Deformities relating to several old healed  right-sided rib fractures are incidentally observed.                                      ED Course as of 09/26/24 1414   Thu Sep 26, 2024   1211 WBC(!): 14.52 [AC]   1211 Hemoglobin(!): 10.8 [AC]   1222 POC Lactate(!): 2.74 [AC]   1241 Impression:     Focal opacity in the left upper lung zone laterally not noted on 01/11/2024 is observed, partially overlain by an external monitoring lead.  A repeat chest radiograph at some point is indicated to exclude or confirm a pathologic entity.  Appearance of the chest, allowing for a poorer inspiratory depth level on the current exam, is otherwise unremarkable and unchanged since 01/11/2024.        Electronically signed by:Kailash Frances MD  Date:                                            09/26/2024  Time:                                           12:27   [AC]   1245 Will do repeat x-ray.  Patient attempting to urinate now. [AC]   1247 Sodium(!): 128  Likely related to elevated glucose at 395 [AC]   1247 Creatinine(!): 1.9  Acute kidney injury [AC]   1247 No evidence of DKA, given normal anion gap, CO2 of 19 [AC]   1325 Urinalysis, Reflex to Urine Culture Urine, Clean Catch(!)  Urinary tract infection is present [AC]   1332 Given the patient's hypotension, given 2L IVF  [AC]   1332 Given source appears to be urine, targeted antibiotic therapy is appropriate [AC]      ED Course User Index  [AC] Marlon Erickson, DO         This patient does have evidence of infective focus  My overall impression is sepsis.  Source: Urinary Tract  Antibiotics given-   Antibiotics (72h ago, onward)      None          Latest lactate reviewed-  Recent Labs   Lab 09/26/24  1200   POCLAC 2.74*     Organ dysfunction indicated by Acute kidney injury    Fluid challenge Ideal Body Weight- The patient's ideal body weight is Ideal body weight: 70.7 kg (155 lb 13.8 oz) which will be used to calculate fluid bolus of 30 ml/kg for treatment of septic shock.      Post- resuscitation assessment Yes Perfusion exam  was performed within 6 hours of septic shock presentation after bolus shows Adequate tissue perfusion assessed by non-invasive monitoring       Will Not start Pressors- Levophed for MAP of 65  Source control achieved by:  Antibiotics        EKG interpreted by myself    EKG  Performed: 09/26/2024   Rate/Rhythm/Axis:  103 bpm, sinus rhythm, normal axis  QRS 78 ms  Qtc 437 ms  Impression:  Sinus tachycardia.  Low-voltage QRS complex.  No obvious ischemia.      Relevant imaging interpreted by myself  No obvious focal opacity on chest x-ray    Impression and Plan    74 y.o. male with findings of urinary tract infection, with improved blood pressure after IV fluids and concerns for sepsis based on the work up in the emergency department as above.    Important lab/imaging findings include:  Reviewed as above    All tests, treatment options and disposition options were discussed with the patient.  The decision was made to admit the patient to the hospital.    The patient was admitted in stable condition and all further questions/concerns by patient and/or family were addressed.  Critical Care:  Date: 09/26/2024  Performed by: Dr. Marlon Erickson  Authorized by: Dr. Marlon Erickson   Total critical care time (exclusive of procedural time) : 40 minutes  Upon my evaluation, this patient had a high probability of imminent or life-threatening deterioration due to [ sepsis, urinary tract infection] which required my direct attention, intervention and personal management.  Critical care was necessary to treat or prevent imminent or life-threatening deterioration.  This may include review of laboratory data, radiology results, discussion with consultants and family, and monitoring for potential decompensations. Interventions were performed as documented.          5:15 PM  Patient became febrile. Tachycardic. Hypotensive after addl 500cc ordered by . Septic shock - ICU consulted by , they will evaluate, starting NE..         Final  diagnoses:  [R53.1] Weakness  [R41.82] AMS (altered mental status)  [I95.9] Hypotension, unspecified hypotension type (Primary)  [R93.89] Abnormal chest x-ray  [R73.9] Hyperglycemia  [N17.9] Acute kidney injury  [E83.42] Hypomagnesemia        ED Disposition Condition    Admit Marlon Avila,   09/26/24 1414       Marlon Erickson,   09/26/24 [id number]

## 2024-09-26 NOTE — ASSESSMENT & PLAN NOTE
ACUTE PHYSICAL THERAPY GOALS:  (Developed with and agreed upon by patient and/or caregiver.)  STG:  (1.)Mr. Hurt will move from supine to sit and sit to supine , scoot up and down and roll side to side with STAND BY ASSIST within 3 treatment day(s). (2.)Mr. Hurt will transfer from bed to chair and chair to bed with STAND BY ASSIST using the least restrictive device within 3 treatment day(s). (3.)Mr. Hurt will ambulate with CONTACT GUARD ASSIST for 100 feet with the least restrictive device within 3 treatment day(s).      LTG:  (1.)Mr. Hurt will move from supine to sit and sit to supine , scoot up and down and roll side to side in bed with INDEPENDENT within 7 treatment day(s). (2.)Mr. Hurt will transfer from bed to chair and chair to bed with INDEPENDENT using the least restrictive device within 7 treatment day(s). (3.)Mr. Hurt will ambulate with SUPERVISION for 400+ feet with the least restrictive device within 7 treatment day(s).     PHYSICAL THERAPY: Daily Note and AM Treatment Day # 3    Demetra Frias is a 79 y.o. male   PRIMARY DIAGNOSIS: NSTEMI (non-ST elevated myocardial infarction) ([facility] [address]  NSTEMI (non-ST elevated myocardial infarction) ([address] [I21.4]  NSTEMI (non-ST elevation myocardial infarction) ([facility] [address] [I21.4]  Procedure(s) (LRB):  CORONARY ARTERY BYPASS GRAFT (CABG X 5), LIMA (N/A)  VEIN HARVEST ENDOSCOPIC, GREATER SAPHENOUS VEIN (Left)  ESOPHAGEAL TRANS ECHOCARDIOGRAM (N/A)  3 Days Post-Op    ASSESSMENT:     REHAB RECOMMENDATIONS: CURRENT LEVEL OF FUNCTION:  (Most Recently Demonstrated)   Recommendation to date pending progress:  Setting:   No further skilled therapy after discharge from hospital  Equipment:    To Be Determined Bed Mobility:   Not tested  Sit to Stand:   Standby Assistance  Transfers:   Standby Assistance  Gait/Mobility:  Xochilt Pitts Assistance- Supervision     ASSESSMENT:  Mr. Darlene Irwin continues to make progress toward goals with decreased assistance Patient's FSGs are uncontrolled due to hyperglycemia on current medication regimen. A1C 9.8 on admission, increased from prior. Patient with history of insulin use on chart review but transitioned to metformin, though not noted taking in patients chart.Hold Oral hypoglycemics while patient is in the hospital.    -Start LDSSI  -Consider long acting insulin vs. Insulin gtt    levels and increased gait distances. He ambulated 300' with the RW and SBA to supervision. Performed seated exercises with good technique. Cueing for pacing and rest breaks.      SUBJECTIVE:   Mr. Mark Horton states, \"I am feeling  better\"    SOCIAL HISTORY/ LIVING ENVIRONMENT: lives with spouse in 1 story, IND with all ADLs, no AD used, drives, PT work as contractor  Home Environment: Private residence  One/Two Story Residence: One story  Living Alone: No  Support Systems: Spouse/Significant Other  OBJECTIVE:     PAIN: VITAL SIGNS: LINES/DRAINS:   Pre Treatment: Pain Screen  Pain Scale 1: Numeric (0 - 10)  Pain Intensity 1: 5  Post Treatment: 4   Wound Vac  O2 Device: Nasal cannula     MOBILITY: I Mod I S SBA CGA Min Mod Max Total  NT x2 Comments:   Bed Mobility    Rolling [] [] [] [] [] [] [] [] [] [x] []    Supine to Sit [] [] [] [] [] [] [] [] [] [x] []    Scooting [] [] [] [] [] [] [] [] [] [x] []    Sit to Supine [] [] [] [] [] [] [] [] [] [x] []    Transfers    Sit to Stand [] [] [] [x] [] [] [] [] [] [] []    Bed to Chair [] [] [] [] [] [] [] [] [] [x] []    Stand to Sit [] [] [] [x] [] [] [] [] [] [] []    I=Independent, Mod I=Modified Independent, S=Supervision, SBA=Standby Assistance, CGA=Contact Guard Assistance,   Min=Minimal Assistance, Mod=Moderate Assistance, Max=Maximal Assistance, Total=Total Assistance, NT=Not Tested    BALANCE: Good Fair+ Fair Fair- Poor NT Comments   Sitting Static [x] [] [] [] [] []    Sitting Dynamic [x] [] [] [] [] []              Standing Static [x] [x] [] [] [] []    Standing Dynamic [] [x] [] [] [] []      GAIT: I Mod I S SBA CGA Min Mod Max Total  NT x2 Comments:   Level of Assistance [] [] [] [x] [] [] [] [] [] [] [] 15' with no AD   Distance 300    DME Rolling Walker    Gait Quality Decreased pace    Weightbearing  Status N/A     I=Independent, Mod I=Modified Independent, S=Supervision, SBA=Standby Assistance, CGA=Contact Guard Assistance,   Min=Minimal Assistance, Mod=Moderate Assistance, Max=Maximal Assistance, Total=Total Assistance, NT=Not Tested    PLAN:   FREQUENCY/DURATION: PT Plan of Care: BID for duration of hospital stay or until stated goals are met, whichever comes first.  TREATMENT:     TREATMENT:   ($$ Therapeutic Activity: 8-22 mins  $$ Therapeutic Exercises: 8-22 mins    )  Therapeutic Activity (19 Minutes): Therapeutic activity included Lateral Scooting, Transfer Training, Ambulation on level ground, Sitting balance  and Standing balance to improve functional Mobility, Strength and Activity tolerance. Therapeutic Exercise (10 Minutes): Therapeutic exercises noted below to improve functional activity tolerance and strength.      TREATMENT GRID:   Date:  4/17/22 Date:   Date:     Activity/Exercise Parameters Parameters Parameters   Ankle pumps x15     marching x15     LAQ x15     abduction x15     Shoulder shrugs x15                       AFTER TREATMENT POSITION/PRECAUTIONS:  Chair, Needs within reach and RN notified    INTERDISCIPLINARY COLLABORATION:  RN/PCT and PT/PTA    TOTAL TREATMENT DURATION:  PT Patient Time In/Time Out  Time In: 0931  Time Out: GUSTAVO Yo

## 2024-09-26 NOTE — ASSESSMENT & PLAN NOTE
Diabetic polyneuropathy controlled with lyrica, cymbalta, nortryptiline at home     -Continue lyrica at decreased dose, cymbalta, holding amitryptiline  -Adjust as tolerated

## 2024-09-26 NOTE — CLINICAL REVIEW
IP Sepsis Screen (most recent)       Sepsis Screen (IP) - 09/26/24 1701       Is the patient's history or complaint suggestive of a possible infection? Yes  -TR    Are there at least two of the following signs and symptoms present? Yes  -TR    Sepsis signs/symptoms - Tachycardia Tachycardia     >90  -TR    Sepsis signs/symptoms - WBC WBC < 4,000 or WBC > 12,000  -TR    Are any of the following organ dysfunction criteria present and not considered to be due to a chronic condition? Yes   procal 23.72 -TR    Organ Dysfunction Criteria Lactate > 2.0  -TR    Initiate Sepsis Protocol No   IVF and IVAB initiated in ED; blood and urine cx in process -TR    Reason sepsis not considered Pt. receiving appropriate management  -TR              User Key  (r) = Recorded By, (t) = Taken By, (c) = Cosigned By      Initials Name    Betty Cleary RN

## 2024-09-27 ENCOUNTER — ANESTHESIA EVENT (OUTPATIENT)
Dept: SURGERY | Facility: HOSPITAL | Age: 74
End: 2024-09-27
Payer: MEDICARE

## 2024-09-27 ENCOUNTER — ANESTHESIA (OUTPATIENT)
Dept: SURGERY | Facility: HOSPITAL | Age: 74
End: 2024-09-27
Payer: MEDICARE

## 2024-09-27 PROBLEM — N20.1 RIGHT URETERAL STONE: Status: ACTIVE | Noted: 2024-09-27

## 2024-09-27 LAB
ACINETOBACTER CALCOACETICUS/BAUMANNII COMPLEX: NOT DETECTED
ALBUMIN SERPL BCP-MCNC: 2.2 G/DL (ref 3.5–5.2)
ALLENS TEST: ABNORMAL
ALP SERPL-CCNC: 149 U/L (ref 55–135)
ALT SERPL W/O P-5'-P-CCNC: 12 U/L (ref 10–44)
ANION GAP SERPL CALC-SCNC: 11 MMOL/L (ref 8–16)
ANION GAP SERPL CALC-SCNC: 9 MMOL/L (ref 8–16)
ANION GAP SERPL CALC-SCNC: 9 MMOL/L (ref 8–16)
ANISOCYTOSIS BLD QL SMEAR: SLIGHT
AST SERPL-CCNC: 29 U/L (ref 10–40)
BACTEROIDES FRAGILIS: NOT DETECTED
BASO STIPL BLD QL SMEAR: ABNORMAL
BASOPHILS # BLD AUTO: ABNORMAL K/UL (ref 0–0.2)
BASOPHILS NFR BLD: 0 % (ref 0–1.9)
BILIRUB SERPL-MCNC: 0.9 MG/DL (ref 0.1–1)
BUN SERPL-MCNC: 28 MG/DL (ref 8–23)
BUN SERPL-MCNC: 29 MG/DL (ref 8–23)
BUN SERPL-MCNC: 29 MG/DL (ref 8–23)
CALCIUM SERPL-MCNC: 7.9 MG/DL (ref 8.7–10.5)
CALCIUM SERPL-MCNC: 8.1 MG/DL (ref 8.7–10.5)
CALCIUM SERPL-MCNC: 8.1 MG/DL (ref 8.7–10.5)
CANDIDA ALBICANS: NOT DETECTED
CANDIDA AURIS: NOT DETECTED
CANDIDA GLABRATA: NOT DETECTED
CANDIDA KRUSEI: NOT DETECTED
CANDIDA PARAPSILOSIS: NOT DETECTED
CANDIDA TROPICALIS: NOT DETECTED
CHLORIDE SERPL-SCNC: 100 MMOL/L (ref 95–110)
CHLORIDE SERPL-SCNC: 101 MMOL/L (ref 95–110)
CHLORIDE SERPL-SCNC: 101 MMOL/L (ref 95–110)
CO2 SERPL-SCNC: 15 MMOL/L (ref 23–29)
CO2 SERPL-SCNC: 17 MMOL/L (ref 23–29)
CO2 SERPL-SCNC: 17 MMOL/L (ref 23–29)
CREAT SERPL-MCNC: 1.9 MG/DL (ref 0.5–1.4)
CREAT SERPL-MCNC: 1.9 MG/DL (ref 0.5–1.4)
CREAT SERPL-MCNC: 2 MG/DL (ref 0.5–1.4)
CRYPTOCOCCUS NEOFORMANS/GATTII: NOT DETECTED
CTX-M GENE (ESBL PRODUCER): NOT DETECTED
DIFFERENTIAL METHOD BLD: ABNORMAL
DOHLE BOD BLD QL SMEAR: PRESENT
ENTEROBACTER CLOACAE COMPLEX: NOT DETECTED
ENTEROBACTERALES: ABNORMAL
ENTEROCOCCUS FAECALIS: NOT DETECTED
ENTEROCOCCUS FAECIUM: NOT DETECTED
EOSINOPHIL # BLD AUTO: ABNORMAL K/UL (ref 0–0.5)
EOSINOPHIL NFR BLD: 0 % (ref 0–8)
ERYTHROCYTE [DISTWIDTH] IN BLOOD BY AUTOMATED COUNT: 12.6 % (ref 11.5–14.5)
ESCHERICHIA COLI: NOT DETECTED
EST. GFR  (NO RACE VARIABLE): 34.8 ML/MIN/1.73 M^2
EST. GFR  (NO RACE VARIABLE): 37 ML/MIN/1.73 M^2
EST. GFR  (NO RACE VARIABLE): 37 ML/MIN/1.73 M^2
GLUCOSE SERPL-MCNC: 227 MG/DL (ref 70–110)
GLUCOSE SERPL-MCNC: 227 MG/DL (ref 70–110)
GLUCOSE SERPL-MCNC: 371 MG/DL (ref 70–110)
HAEMOPHILUS INFLUENZAE: NOT DETECTED
HCO3 UR-SCNC: 20.3 MMOL/L (ref 24–28)
HCT VFR BLD AUTO: 30.4 % (ref 40–54)
HGB BLD-MCNC: 10.4 G/DL (ref 14–18)
IMM GRANULOCYTES # BLD AUTO: ABNORMAL K/UL (ref 0–0.04)
IMM GRANULOCYTES NFR BLD AUTO: ABNORMAL % (ref 0–0.5)
IMP GENE (CARBAPENEM RESISTANT): NOT DETECTED
INFLUENZA A, MOLECULAR: NOT DETECTED
INFLUENZA B, MOLECULAR: NOT DETECTED
KLEBSIELLA AEROGENES: NOT DETECTED
KLEBSIELLA OXYTOCA: NOT DETECTED
KLEBSIELLA PNEUMONIAE GROUP: NOT DETECTED
KPC RESISTANCE GENE (CARBAPENEM): NOT DETECTED
LACTATE SERPL-SCNC: 1.8 MMOL/L (ref 0.5–2.2)
LACTATE SERPL-SCNC: 2.7 MMOL/L (ref 0.5–2.2)
LACTATE SERPL-SCNC: 3.5 MMOL/L (ref 0.5–2.2)
LACTATE SERPL-SCNC: 4.3 MMOL/L (ref 0.5–2.2)
LISTERIA MONOCYTOGENES: NOT DETECTED
LYMPHOCYTES # BLD AUTO: ABNORMAL K/UL (ref 1–4.8)
LYMPHOCYTES NFR BLD: 5.5 % (ref 18–48)
MAGNESIUM SERPL-MCNC: 1.8 MG/DL (ref 1.6–2.6)
MCH RBC QN AUTO: 30.6 PG (ref 27–31)
MCHC RBC AUTO-ENTMCNC: 34.2 G/DL (ref 32–36)
MCR-1: ABNORMAL
MCV RBC AUTO: 89 FL (ref 82–98)
MEC A/C AND MREJ (MRSA): ABNORMAL
MEC A/C: ABNORMAL
METAMYELOCYTES NFR BLD MANUAL: 2 %
MONOCYTES # BLD AUTO: ABNORMAL K/UL (ref 0.3–1)
MONOCYTES NFR BLD: 2 % (ref 4–15)
NDM GENE (CARBAPENEM RESISTANT): NOT DETECTED
NEISSERIA MENINGITIDIS: NOT DETECTED
NEUTROPHILS NFR BLD: 82.5 % (ref 38–73)
NEUTS BAND NFR BLD MANUAL: 8 %
NRBC BLD-RTO: 0 /100 WBC
OSMOLALITY SERPL: 283 MOSM/KG (ref 280–300)
OXA-48-LIKE (CARBAPENEM RESISTANT): NOT DETECTED
PCO2 BLDA: 47.7 MMHG (ref 35–45)
PH SMN: 7.24 [PH] (ref 7.35–7.45)
PHOSPHATE SERPL-MCNC: 2.1 MG/DL (ref 2.7–4.5)
PLATELET # BLD AUTO: 72 K/UL (ref 150–450)
PLATELET BLD QL SMEAR: ABNORMAL
PMV BLD AUTO: 11.1 FL (ref 9.2–12.9)
PO2 BLDA: 23 MMHG (ref 40–60)
POC BE: -7 MMOL/L
POC SATURATED O2: 31 % (ref 95–100)
POC TCO2: 22 MMOL/L (ref 24–29)
POCT GLUCOSE: 193 MG/DL (ref 70–110)
POCT GLUCOSE: 198 MG/DL (ref 70–110)
POCT GLUCOSE: 219 MG/DL (ref 70–110)
POCT GLUCOSE: 233 MG/DL (ref 70–110)
POCT GLUCOSE: 248 MG/DL (ref 70–110)
POCT GLUCOSE: 260 MG/DL (ref 70–110)
POCT GLUCOSE: 268 MG/DL (ref 70–110)
POCT GLUCOSE: 283 MG/DL (ref 70–110)
POCT GLUCOSE: 325 MG/DL (ref 70–110)
POCT GLUCOSE: 359 MG/DL (ref 70–110)
POCT GLUCOSE: 437 MG/DL (ref 70–110)
POCT GLUCOSE: >500 MG/DL (ref 70–110)
POTASSIUM SERPL-SCNC: 3.9 MMOL/L (ref 3.5–5.1)
POTASSIUM SERPL-SCNC: 3.9 MMOL/L (ref 3.5–5.1)
POTASSIUM SERPL-SCNC: 4.1 MMOL/L (ref 3.5–5.1)
PROT SERPL-MCNC: 5.7 G/DL (ref 6–8.4)
PROTEUS SPECIES: DETECTED
PSEUDOMONAS AERUGINOSA: NOT DETECTED
RBC # BLD AUTO: 3.4 M/UL (ref 4.6–6.2)
RSV AG BY MOLECULAR METHOD: NOT DETECTED
SALMONELLA SP: NOT DETECTED
SAMPLE: ABNORMAL
SARS-COV-2 RNA RESP QL NAA+PROBE: NOT DETECTED
SERRATIA MARCESCENS: NOT DETECTED
SITE: ABNORMAL
SODIUM SERPL-SCNC: 126 MMOL/L (ref 136–145)
SODIUM SERPL-SCNC: 127 MMOL/L (ref 136–145)
SODIUM SERPL-SCNC: 127 MMOL/L (ref 136–145)
STAPHYLOCOCCUS AUREUS: NOT DETECTED
STAPHYLOCOCCUS EPIDERMIDIS: NOT DETECTED
STAPHYLOCOCCUS LUGDUNESIS: NOT DETECTED
STAPHYLOCOCCUS SPECIES: NOT DETECTED
STENOTROPHOMONAS MALTOPHILIA: NOT DETECTED
STREPTOCOCCUS AGALACTIAE: NOT DETECTED
STREPTOCOCCUS PNEUMONIAE: NOT DETECTED
STREPTOCOCCUS PYOGENES: NOT DETECTED
STREPTOCOCCUS SPECIES: NOT DETECTED
VAN A/B (VRE GENE): ABNORMAL
VIM GENE (CARBAPENEM RESISTANT): NOT DETECTED
WBC # BLD AUTO: 23.97 K/UL (ref 3.9–12.7)
WBC TOXIC VACUOLES BLD QL SMEAR: PRESENT

## 2024-09-27 PROCEDURE — 94640 AIRWAY INHALATION TREATMENT: CPT

## 2024-09-27 PROCEDURE — C2617 STENT, NON-COR, TEM W/O DEL: HCPCS | Performed by: UROLOGY

## 2024-09-27 PROCEDURE — D9220A PRA ANESTHESIA: Mod: ANES,,, | Performed by: ANESTHESIOLOGY

## 2024-09-27 PROCEDURE — 37000009 HC ANESTHESIA EA ADD 15 MINS: Performed by: UROLOGY

## 2024-09-27 PROCEDURE — 82803 BLOOD GASES ANY COMBINATION: CPT

## 2024-09-27 PROCEDURE — 83930 ASSAY OF BLOOD OSMOLALITY: CPT

## 2024-09-27 PROCEDURE — D9220A PRA ANESTHESIA: Mod: CRNA,,, | Performed by: STUDENT IN AN ORGANIZED HEALTH CARE EDUCATION/TRAINING PROGRAM

## 2024-09-27 PROCEDURE — 74420 UROGRAPHY RTRGR +-KUB: CPT | Mod: 26,,, | Performed by: UROLOGY

## 2024-09-27 PROCEDURE — 63600175 PHARM REV CODE 636 W HCPCS

## 2024-09-27 PROCEDURE — 99291 CRITICAL CARE FIRST HOUR: CPT | Mod: GC,,, | Performed by: INTERNAL MEDICINE

## 2024-09-27 PROCEDURE — 85007 BL SMEAR W/DIFF WBC COUNT: CPT

## 2024-09-27 PROCEDURE — 36000707: Performed by: UROLOGY

## 2024-09-27 PROCEDURE — 25000003 PHARM REV CODE 250

## 2024-09-27 PROCEDURE — 84100 ASSAY OF PHOSPHORUS: CPT

## 2024-09-27 PROCEDURE — 94761 N-INVAS EAR/PLS OXIMETRY MLT: CPT

## 2024-09-27 PROCEDURE — 36000706: Performed by: UROLOGY

## 2024-09-27 PROCEDURE — 37000008 HC ANESTHESIA 1ST 15 MINUTES: Performed by: UROLOGY

## 2024-09-27 PROCEDURE — 25500020 PHARM REV CODE 255: Performed by: INTERNAL MEDICINE

## 2024-09-27 PROCEDURE — 25000003 PHARM REV CODE 250: Performed by: STUDENT IN AN ORGANIZED HEALTH CARE EDUCATION/TRAINING PROGRAM

## 2024-09-27 PROCEDURE — S5010 5% DEXTROSE AND 0.45% SALINE: HCPCS

## 2024-09-27 PROCEDURE — 20000000 HC ICU ROOM

## 2024-09-27 PROCEDURE — 83605 ASSAY OF LACTIC ACID: CPT | Performed by: INTERNAL MEDICINE

## 2024-09-27 PROCEDURE — 82365 CALCULUS SPECTROSCOPY: CPT | Performed by: UROLOGY

## 2024-09-27 PROCEDURE — 25000242 PHARM REV CODE 250 ALT 637 W/ HCPCS

## 2024-09-27 PROCEDURE — C1758 CATHETER, URETERAL: HCPCS | Performed by: UROLOGY

## 2024-09-27 PROCEDURE — 63700000 PHARM REV CODE 250 ALT 637 W/O HCPCS

## 2024-09-27 PROCEDURE — 63600175 PHARM REV CODE 636 W HCPCS: Performed by: STUDENT IN AN ORGANIZED HEALTH CARE EDUCATION/TRAINING PROGRAM

## 2024-09-27 PROCEDURE — 99900035 HC TECH TIME PER 15 MIN (STAT)

## 2024-09-27 PROCEDURE — 83735 ASSAY OF MAGNESIUM: CPT

## 2024-09-27 PROCEDURE — 52332 CYSTOSCOPY AND TREATMENT: CPT | Mod: RT,,, | Performed by: UROLOGY

## 2024-09-27 PROCEDURE — 0T768DZ DILATION OF RIGHT URETER WITH INTRALUMINAL DEVICE, VIA NATURAL OR ARTIFICIAL OPENING ENDOSCOPIC: ICD-10-PCS | Performed by: UROLOGY

## 2024-09-27 PROCEDURE — 80053 COMPREHEN METABOLIC PANEL: CPT

## 2024-09-27 PROCEDURE — C1769 GUIDE WIRE: HCPCS | Performed by: UROLOGY

## 2024-09-27 PROCEDURE — 85027 COMPLETE CBC AUTOMATED: CPT

## 2024-09-27 PROCEDURE — 25000242 PHARM REV CODE 250 ALT 637 W/ HCPCS: Performed by: STUDENT IN AN ORGANIZED HEALTH CARE EDUCATION/TRAINING PROGRAM

## 2024-09-27 PROCEDURE — 25500020 PHARM REV CODE 255: Performed by: UROLOGY

## 2024-09-27 PROCEDURE — 0TCB8ZZ EXTIRPATION OF MATTER FROM BLADDER, VIA NATURAL OR ARTIFICIAL OPENING ENDOSCOPIC: ICD-10-PCS | Performed by: UROLOGY

## 2024-09-27 DEVICE — STENT URETERAL UNIV 6FR 26CM: Type: IMPLANTABLE DEVICE | Site: URETER | Status: FUNCTIONAL

## 2024-09-27 RX ORDER — OXYCODONE HYDROCHLORIDE 5 MG/1
5 TABLET ORAL EVERY 6 HOURS PRN
Status: DISCONTINUED | OUTPATIENT
Start: 2024-09-27 | End: 2024-10-01 | Stop reason: HOSPADM

## 2024-09-27 RX ORDER — ROCURONIUM BROMIDE 10 MG/ML
INJECTION, SOLUTION INTRAVENOUS
Status: DISCONTINUED | OUTPATIENT
Start: 2024-09-27 | End: 2024-09-29

## 2024-09-27 RX ORDER — INSULIN ASPART 100 [IU]/ML
0-10 INJECTION, SOLUTION INTRAVENOUS; SUBCUTANEOUS
Status: DISCONTINUED | OUTPATIENT
Start: 2024-09-27 | End: 2024-10-01 | Stop reason: HOSPADM

## 2024-09-27 RX ORDER — ALBUTEROL SULFATE 90 UG/1
2 INHALANT RESPIRATORY (INHALATION) EVERY 6 HOURS PRN
Status: DISCONTINUED | OUTPATIENT
Start: 2024-09-27 | End: 2024-10-01 | Stop reason: HOSPADM

## 2024-09-27 RX ORDER — SUCCINYLCHOLINE CHLORIDE 20 MG/ML
INJECTION INTRAMUSCULAR; INTRAVENOUS
Status: DISCONTINUED | OUTPATIENT
Start: 2024-09-27 | End: 2024-09-29

## 2024-09-27 RX ORDER — ALBUTEROL SULFATE 90 UG/1
INHALANT RESPIRATORY (INHALATION)
Status: DISCONTINUED | OUTPATIENT
Start: 2024-09-27 | End: 2024-09-29

## 2024-09-27 RX ORDER — PROPOFOL 10 MG/ML
VIAL (ML) INTRAVENOUS
Status: DISCONTINUED | OUTPATIENT
Start: 2024-09-27 | End: 2024-09-29

## 2024-09-27 RX ORDER — PREGABALIN 75 MG/1
75 CAPSULE ORAL 2 TIMES DAILY
Status: DISCONTINUED | OUTPATIENT
Start: 2024-09-27 | End: 2024-10-01 | Stop reason: HOSPADM

## 2024-09-27 RX ORDER — LIDOCAINE HYDROCHLORIDE 20 MG/ML
INJECTION INTRAVENOUS
Status: DISCONTINUED | OUTPATIENT
Start: 2024-09-27 | End: 2024-09-29

## 2024-09-27 RX ORDER — PHENYLEPHRINE HYDROCHLORIDE 10 MG/ML
INJECTION INTRAVENOUS
Status: DISCONTINUED | OUTPATIENT
Start: 2024-09-27 | End: 2024-09-29

## 2024-09-27 RX ORDER — ONDANSETRON HYDROCHLORIDE 2 MG/ML
INJECTION, SOLUTION INTRAVENOUS
Status: DISCONTINUED | OUTPATIENT
Start: 2024-09-27 | End: 2024-09-29

## 2024-09-27 RX ORDER — OXYCODONE HYDROCHLORIDE 5 MG/1
5 TABLET ORAL ONCE
Status: COMPLETED | OUTPATIENT
Start: 2024-09-27 | End: 2024-09-27

## 2024-09-27 RX ORDER — NOREPINEPHRINE BITARTRATE/D5W 4MG/250ML
0-.2 PLASTIC BAG, INJECTION (ML) INTRAVENOUS CONTINUOUS
Status: DISPENSED | OUTPATIENT
Start: 2024-09-27 | End: 2024-09-28

## 2024-09-27 RX ORDER — FENTANYL CITRATE 50 UG/ML
INJECTION, SOLUTION INTRAMUSCULAR; INTRAVENOUS
Status: DISCONTINUED | OUTPATIENT
Start: 2024-09-27 | End: 2024-09-29

## 2024-09-27 RX ORDER — INSULIN GLARGINE 100 [IU]/ML
20 INJECTION, SOLUTION SUBCUTANEOUS DAILY
Status: DISCONTINUED | OUTPATIENT
Start: 2024-09-27 | End: 2024-09-28

## 2024-09-27 RX ORDER — DEXAMETHASONE SODIUM PHOSPHATE 4 MG/ML
INJECTION, SOLUTION INTRA-ARTICULAR; INTRALESIONAL; INTRAMUSCULAR; INTRAVENOUS; SOFT TISSUE
Status: DISCONTINUED | OUTPATIENT
Start: 2024-09-27 | End: 2024-09-29

## 2024-09-27 RX ORDER — DULOXETIN HYDROCHLORIDE 60 MG/1
60 CAPSULE, DELAYED RELEASE ORAL DAILY
Status: DISCONTINUED | OUTPATIENT
Start: 2024-09-28 | End: 2024-10-01 | Stop reason: HOSPADM

## 2024-09-27 RX ORDER — MAGNESIUM SULFATE HEPTAHYDRATE 40 MG/ML
2 INJECTION, SOLUTION INTRAVENOUS ONCE
Status: COMPLETED | OUTPATIENT
Start: 2024-09-27 | End: 2024-09-27

## 2024-09-27 RX ADMIN — SODIUM CHLORIDE, SODIUM GLUCONATE, SODIUM ACETATE, POTASSIUM CHLORIDE, MAGNESIUM CHLORIDE, SODIUM PHOSPHATE, DIBASIC, AND POTASSIUM PHOSPHATE: .53; .5; .37; .037; .03; .012; .00082 INJECTION, SOLUTION INTRAVENOUS at 09:09

## 2024-09-27 RX ADMIN — FLUTICASONE FUROATE AND VILANTEROL TRIFENATATE 1 PUFF: 100; 25 POWDER RESPIRATORY (INHALATION) at 07:09

## 2024-09-27 RX ADMIN — INSULIN GLARGINE 20 UNITS: 100 INJECTION, SOLUTION SUBCUTANEOUS at 08:09

## 2024-09-27 RX ADMIN — AZITHROMYCIN DIHYDRATE 500 MG: 250 TABLET ORAL at 11:09

## 2024-09-27 RX ADMIN — POTASSIUM BICARBONATE 25 MEQ: 978 TABLET, EFFERVESCENT ORAL at 05:09

## 2024-09-27 RX ADMIN — PHENYLEPHRINE HYDROCHLORIDE 200 MCG: 10 INJECTION INTRAVENOUS at 09:09

## 2024-09-27 RX ADMIN — MAGNESIUM SULFATE HEPTAHYDRATE 2 G: 40 INJECTION, SOLUTION INTRAVENOUS at 06:09

## 2024-09-27 RX ADMIN — SUGAMMADEX 200 MG: 100 INJECTION, SOLUTION INTRAVENOUS at 09:09

## 2024-09-27 RX ADMIN — ROCURONIUM BROMIDE 25 MG: 10 INJECTION, SOLUTION INTRAVENOUS at 09:09

## 2024-09-27 RX ADMIN — DEXAMETHASONE SODIUM PHOSPHATE 4 MG: 4 INJECTION, SOLUTION INTRAMUSCULAR; INTRAVENOUS at 09:09

## 2024-09-27 RX ADMIN — SUCCINYLCHOLINE 140 MG: 20 INJECTION, SOLUTION INTRAMUSCULAR; INTRAVENOUS at 09:09

## 2024-09-27 RX ADMIN — PIPERACILLIN SODIUM AND TAZOBACTAM SODIUM 4.5 G: 4; .5 INJECTION, POWDER, FOR SOLUTION INTRAVENOUS at 01:09

## 2024-09-27 RX ADMIN — ROCURONIUM BROMIDE 5 MG: 10 INJECTION, SOLUTION INTRAVENOUS at 09:09

## 2024-09-27 RX ADMIN — PROPOFOL 150 MG: 10 INJECTION, EMULSION INTRAVENOUS at 09:09

## 2024-09-27 RX ADMIN — DULOXETINE HYDROCHLORIDE 60 MG: 60 CAPSULE, DELAYED RELEASE ORAL at 08:09

## 2024-09-27 RX ADMIN — INSULIN ASPART 6 UNITS: 100 INJECTION, SOLUTION INTRAVENOUS; SUBCUTANEOUS at 05:09

## 2024-09-27 RX ADMIN — NOREPINEPHRINE BITARTRATE 0.1 MCG/KG/MIN: 4 INJECTION, SOLUTION INTRAVENOUS at 03:09

## 2024-09-27 RX ADMIN — CEFEPIME 2 G: 2 INJECTION, POWDER, FOR SOLUTION INTRAVENOUS at 06:09

## 2024-09-27 RX ADMIN — INSULIN ASPART 6 UNITS: 100 INJECTION, SOLUTION INTRAVENOUS; SUBCUTANEOUS at 11:09

## 2024-09-27 RX ADMIN — PREGABALIN 75 MG: 75 CAPSULE ORAL at 08:09

## 2024-09-27 RX ADMIN — LIDOCAINE HYDROCHLORIDE 60 MG: 20 INJECTION INTRAVENOUS at 09:09

## 2024-09-27 RX ADMIN — FENTANYL CITRATE 50 MCG: 50 INJECTION, SOLUTION INTRAMUSCULAR; INTRAVENOUS at 09:09

## 2024-09-27 RX ADMIN — INSULIN ASPART 5 UNITS: 100 INJECTION, SOLUTION INTRAVENOUS; SUBCUTANEOUS at 11:09

## 2024-09-27 RX ADMIN — ACETAMINOPHEN 650 MG: 325 TABLET ORAL at 12:09

## 2024-09-27 RX ADMIN — OXYCODONE HYDROCHLORIDE 5 MG: 5 TABLET ORAL at 03:09

## 2024-09-27 RX ADMIN — DEXTROSE AND SODIUM CHLORIDE: 5; 450 INJECTION, SOLUTION INTRAVENOUS at 01:09

## 2024-09-27 RX ADMIN — PIPERACILLIN SODIUM AND TAZOBACTAM SODIUM 4.5 G: 4; .5 INJECTION, POWDER, FOR SOLUTION INTRAVENOUS at 08:09

## 2024-09-27 RX ADMIN — ALBUTEROL SULFATE 8 PUFF: 108 AEROSOL, METERED RESPIRATORY (INHALATION) at 09:09

## 2024-09-27 RX ADMIN — OXYCODONE HYDROCHLORIDE 5 MG: 5 TABLET ORAL at 06:09

## 2024-09-27 RX ADMIN — APIXABAN 5 MG: 5 TABLET, FILM COATED ORAL at 08:09

## 2024-09-27 RX ADMIN — OXYCODONE 5 MG: 5 TABLET ORAL at 01:09

## 2024-09-27 RX ADMIN — ALBUTEROL SULFATE 2 PUFF: 108 INHALANT RESPIRATORY (INHALATION) at 05:09

## 2024-09-27 RX ADMIN — INSULIN ASPART 2 UNITS: 100 INJECTION, SOLUTION INTRAVENOUS; SUBCUTANEOUS at 08:09

## 2024-09-27 RX ADMIN — ONDANSETRON 4 MG: 2 INJECTION INTRAMUSCULAR; INTRAVENOUS at 09:09

## 2024-09-27 RX ADMIN — TIOTROPIUM BROMIDE INHALATION SPRAY 2 PUFF: 3.12 SPRAY, METERED RESPIRATORY (INHALATION) at 07:09

## 2024-09-27 RX ADMIN — NOREPINEPHRINE BITARTRATE 0.04 MCG/KG/MIN: 4 INJECTION, SOLUTION INTRAVENOUS at 10:09

## 2024-09-27 RX ADMIN — PANTOPRAZOLE SODIUM 40 MG: 40 TABLET, DELAYED RELEASE ORAL at 08:09

## 2024-09-27 RX ADMIN — SODIUM CHLORIDE AND POTASSIUM CHLORIDE: .9; .15 SOLUTION INTRAVENOUS at 01:09

## 2024-09-27 RX ADMIN — IOHEXOL 100 ML: 350 INJECTION, SOLUTION INTRAVENOUS at 04:09

## 2024-09-27 NOTE — ED NOTES
Nurses Note -- 4 Eyes      9/26/2024   10:30 PM      Skin assessed during: Q Shift Change      [x] No Altered Skin Integrity Present    [x]Prevention Measures Documented      [] Yes- Altered Skin Integrity Present or Discovered   [] LDA Added if Not in Epic (Describe Wound)   [] New Altered Skin Integrity was Present on Admit and Documented in LDA   [] Wound Image Taken    Wound Care Consulted? No    Attending Nurse:  Taniya Santa RN/Staff Member:  Ely

## 2024-09-27 NOTE — PLAN OF CARE
MICU DAILY GOALS     Family/Goals of care/Code Status   Code Status: Full Code    24H Vital Sign Range  Temp:  [98.7 °F (37.1 °C)-102.1 °F (38.9 °C)]   Pulse:  []   Resp:  [16-40]   BP: ()/(46-67)   SpO2:  [92 %-99 %]      Shift Events (include procedures and significant events)  Pt admitted to MICU from ED over night for sepsis requiring levophed and hyperglycemia (HHS) requiring insulin infusion. Levophed weaned off early this AM. Insulin gtt turned off per provider orders following HHS protocol - now on basal long-acting insulin and prn short-acting insulin regimen. Blood cx x2 positive for gram negative rods - provider notified. Lactic downtrending. C/o frequent back pain.    AWAKE RASS: Goal - RASS Goal: 0-->alert and calm  Actual - RASS (Sharpe Agitation-Sedation Scale): alert and calm    Restraint necessity: Not necessary   BREATHE SBT: Not intubated    Coordinate A & B, analgesics/sedatives Pain: managed   SAT: Not intubated   Delirium CAM-ICU: Overall CAM-ICU: Negative   Early(intubated/ Progressive (non-intubated) Mobility MOVE Screen (INTUBATED ONLY): Not intubated    Activity: Activity Management: Rolling - L1, Arm raise - L1   Feeding/Nutrition Diet order: Diet/Nutrition Received: consistent carb/diabetic diet,     Thrombus DVT prophylaxis: VTE Required Core Measure: Pharmacological prophylaxis initiated/maintained   HOB Elevation Head of Bed (HOB) Positioning: HOB at 30-45 degrees   Ulcer Prophylaxis GI: yes   Glucose control managed Glycemic Management: blood glucose monitored, insulin infusion adjusted, supplemental insulin given   Skin Skin assessed during: Admit    Sacrum intact/not altered? Yes  Heels intact/not altered? Yes  Surgical wound? No    CHECK ONE!   (no altered skin or altered skin) and sub boxes:  [x] No Altered Skin Integrity Present    [x]Prevention Measures Documented    [] Altered Skin Integrity Present or Discovered   [] LDA present in EPIC, daily doc completed               [] LDA added if not in EPIC (describe wound).                    When describing wound, do not stage, use descriptive words only.    [] Wound Image Taken (required on admit,                   transfer/discharge and every Tuesday)    Wound Care Consulted? No    4 EYES:  Attending Nurse (1st set of eyes): Feliz HANKINS RN    Second RN/Staff Member (2nd set of eyes): Sofiya ASHLEY RN and Kathy HILLIARD RN   Bowel Function no issues    Indwelling Catheter Necessity         De-escalation Antibiotics No        VS and assessment per flow sheet, patient progressing towards goals as tolerated, plan of care reviewed with [unfilled] and family, all concerns addressed at this time.    Feliz Roca RN

## 2024-09-27 NOTE — CARE UPDATE
[facility] ED Sepsis Follow Up Note     Patient's sepsis care was reviewed and a Yes Perfusion exam was performed within 6 hours of septic shock presentation after bolus shows Adequate tissue perfusion assessed by non-invasive monitoring  on 09/26/2024 at 3pm.    Patient has received appropriate sepsis care and a fluid challenge of Actual Body weight- Patient will receive 30ml/kg actual body weight to calculate fluid bolus for treatment of septic shock..  Initially received 2 L, patient had tachycardia (shortly after albuterol neb administered) and hypotension that developed and did not respond to 500mL fluid bolus.    MICU was consulted, levophed gtt started and admitted to ICU with uptrending lactic acidosis .    Marlon York, PA-C  [facility]

## 2024-09-27 NOTE — CONSULTS
VANCOMYCIN DOSING BY PHARMACY DISCONTINUATION NOTE    Anthony Ball is a 74 y.o. male who had been consulted for vancomycin dosing.    The pharmacy consult for vancomycin dosing has been discontinued.     Vancomycin Dosing by Pharmacy Consult will sign-off. Please reconsult if necessary. Thank you for allowing us to participate in this patient's care.       Chrissy Calderon,PharmD   [id number]

## 2024-09-27 NOTE — ASSESSMENT & PLAN NOTE
Creatinine 1.9 on admit, baseline around 0.9-1.2  - Likely pre-renal from dehydration and/or hypotension in setting of septic shock     Lab Results   Component Value Date    CREATININE 1.9 (H) 09/27/2024    CREATININE 1.9 (H) 09/27/2024    CREATININE 2.0 (H) 09/26/2024       Plan:   - Strict I&Os and daily weights   - Avoid nephrotoxic agents such as NSAIDs, gadolinium and IV radiocontrast.  - Renally dose meds to current GFR  - Maintain MAP > 65

## 2024-09-27 NOTE — ASSESSMENT & PLAN NOTE
This patient does have evidence of infective focus  My overall impression is septic shock due to SBP < 90.  Source: Urinary Tract - UA cloudy with +3 leukocytes and blood, WBC 14.5 in ED, past urine cultures w/ proteus and staph aureus   - Chest XR in ED with left upper lobe focal opacity concerning for infectious, inflammatory, or neoplastic etiologies   Antibiotics given-   Antibiotics (72h ago, onward)      Start     Stop Route Frequency Ordered    09/27/24 1230  piperacillin-tazobactam (ZOSYN) 4.5 g in D5W 100 mL IVPB (MB+)         -- IV Every 8 hours (non-standard times) 09/27/24 1124    09/27/24 0900  azithromycin tablet 500 mg         -- Oral Daily 09/26/24 1854          Latest lactate reviewed-  Recent Labs   Lab 09/26/24  1200 09/26/24  1453 09/27/24  1719   LACTATE  --    < > 1.8   POCLAC 2.74*  --   --     < > = values in this interval not displayed.       Organ dysfunction indicated by Acute kidney injury    Post- resuscitation assessment Yes Perfusion exam was performed within 6 hours of septic shock presentation after bolus shows Adequate tissue perfusion assessed by non-invasive monitoring   2.5L LR given in ED prior to bedside US. IVC collapsible at bedside. Ordered additional 500mL       Will Start Pressors- Levophed for MAP of 65  Source control achieved by: cefepime and vancomycin    - will start cefepime and vancomycin   - Bcx growing GNR  - f/u urine Cx with GNR  - renal US with possible stone  - CT revealed obstructing stone in right ureter. Urology consulted  - trend lactate

## 2024-09-27 NOTE — CONSULTS
Food & Nutrition  Education    Diet Education: A1C  Time Spent: 10 minutes  Learners: Patient and Caregiver    Nutrition Education provided with handouts: Meal Planning Using the Plate Method, CHO Counting for People with Diabetes    Comments: Discussed importance of choosing healthy carbohydrates and to control the amount of carbohydrates you eat at each meal for blood sugar management. Reminded patient not to skip meals. Encouraged intake of fresh, unprocessed foods. Reviewed limiting sugary beverages such as soda pop (choosing diet or zero sugar). Briefly discussed alcoholic beverages and making sure that pt is limiting alcoholic drinks and consuming with protein. Food labels, CHO counting, and recommended CHO intake reviewed. Educated patient on meal planning and eating out. Patient is aware of A1C >9% and what the lab value means.     All questions and concerns answered. Dietitian's contact information provided.     Follow-Up: Yes    Please Re-consult as needed    Thanks!   Aida Collier, MS, RD, LDN

## 2024-09-27 NOTE — PLAN OF CARE
MICU DAILY GOALS     Family/Goals of care/Code Status   Code Status: Full Code    24H Vital Sign Range  Temp:  [98.5 °F (36.9 °C)-101.3 °F (38.5 °C)]   Pulse:  []   Resp:  [16-34]   BP: ()/(40-74)   SpO2:  [77 %-98 %]      Shift Events (include procedures and significant events)   Levophed gtt titrated to maintain MAP goal of 65. CT abdomen completed.     AWAKE RASS: Goal - RASS Goal: 0-->alert and calm  Actual - RASS (Sharpe Agitation-Sedation Scale): drowsy    Restraint necessity: Not necessary   BREATHE SBT: Not intubated    Coordinate A & B, analgesics/sedatives Pain: managed   SAT: Not intubated   Delirium CAM-ICU: Overall CAM-ICU: Positive   Early(intubated/ Progressive (non-intubated) Mobility MOVE Screen (INTUBATED ONLY): Not intubated    Activity: Activity Management: Rolling - L1   Feeding/Nutrition Diet order: Diet/Nutrition Received: consistent carb/diabetic diet,     Thrombus DVT prophylaxis: VTE Required Core Measure: Pharmacological prophylaxis initiated/maintained   HOB Elevation Head of Bed (HOB) Positioning: HOB at 30-45 degrees   Ulcer Prophylaxis GI: yes   Glucose control managed Glycemic Management: blood glucose monitored, insulin infusion adjusted, supplemental insulin given   Skin Skin assessed during: Q Shift Change    Sacrum intact/not altered? Yes  Heels intact/not altered? Yes  Surgical wound? No    CHECK ONE!   (no altered skin or altered skin) and sub boxes:  [x] No Altered Skin Integrity Present    [x]Prevention Measures Documented    [] Altered Skin Integrity Present or Discovered   [] LDA present in EPIC, daily doc completed              [] LDA added if not in EPIC (describe wound).                    When describing wound, do not stage, use descriptive words only.    [] Wound Image Taken (required on admit,                   transfer/discharge and every Tuesday)    Wound Care Consulted? No    4 EYES:  Attending Nurse (1st set of eyes): REFUGIO Ventura     Second RN/Staff  Member (2nd set of eyes): Alverto, PCT    Bowel Function no issues    Indwelling Catheter Necessity            De-escalation Antibiotics Yes        VS and assessment per flow sheet, patient progressing towards goals as tolerated, plan of care reviewed with family, all concerns addressed, will continue to monitor.

## 2024-09-27 NOTE — PLAN OF CARE
[facility] - Medical ICU  Discharge Assessment    Primary Care Provider: Isaias Myles MD     Discharge Assessment (most recent)       BRIEF DISCHARGE ASSESSMENT - 09/27/24 1332          Discharge Planning    Assessment Type Discharge Planning Brief Assessment     Resource/Environmental Concerns none     Support Systems Spouse/significant other     Equipment Currently Used at Home walker, rolling;wheelchair;raised toilet;grab bar     Current Living Arrangements home     Patient/Family Anticipates Transition to home;home with family     Patient/Family Anticipated Services at Transition none     DME Needed Upon Discharge  none     Discharge Plan A Home with family     Discharge Plan B Home with family                   Pt is a 74 y.o. male admitted with severe sepsis and has a PMH of HTN, CAD, DM@ and aFib om eliquis. He lives with his  in a single story house with 5 stesp to enter. Ther will be moving next week into a house with 2 steps to enter. He requires assistance with his self care and his  provides transportation he was active with outpatient PT/OT and wishes to continue after d/c. [facility] given to patient and/or family with understanding verbalized.   name and number and estimated discharge date written on white board in patient's room with request to call for any questions or concerns.  Will continue to follow for needs.  Discharge Plan A and Plan B have been determined by review of patient's clinical status, future medical and therapeutic needs, and coverage/benefits for post-acute care in coordination with multidisciplinary team members.  Kailash Billy RN,BSN

## 2024-09-27 NOTE — PROGRESS NOTES
[facility] - Medical ICU  Critical Care Medicine  Progress Note    Patient Name: Anthony Ball  MRN: [id number]  Admission Date: 9/26/2024  Hospital Length of Stay: 1 days  Code Status: Full Code  Attending Provider: Mateo Carrillo MD  Primary Care Provider: Isaias Myles MD   Principal Problem: Severe sepsis    Subjective:     HPI:  Anthony Ball is a 74 y.o. male with a PMH of HTN, DM2, CAD, neuropathy, DVT, PE, Afib (on eliquis), GERD, asthma who presented to the ED for generalized weakness for ~2 weeks. Patient was planning to go to PT for his neuropathy today but was having increased weakness and unable to use his walker. Per family, patient also noted to be more confused recently. He has a history of recurrent UTIs. Does not self catheterize. No known history of BPH. Patient has been endorsing dysuria. Denies chest pain, SOB, abdominal pain, N/V/D.     In ED, patient was tachycardic and hypotensive. Temp of 102. Lactate 2.74. Elevated WBC. Glucose 395. Sepsis w/u was initiated. Patient received 2.5L IVF and was started on ceftriaxone. UA concerning for UTI. CXR with JOCELYN opacification. Patient also with ALYSIA, Cr 1.9 (baseline ~0.9-1.1). Repeat lactate 3.6. Patient remained hypotensive despite fluid resuscitation and was placed on levophed gtt.     MICU consulted for severe sepsis    Hospital/ICU Course:  No notes on file    Interval History/Significant Events: NAEON. Bcx w GNR. Levo drip paused.    Review of Systems  Objective:     Vital Signs (Most Recent):  Temp: 98.7 °F (37.1 °C) (09/27/24 1501)  Pulse: 87 (09/27/24 1815)  Resp: 15 (09/27/24 1815)  BP: 96/65 (09/27/24 1815)  SpO2: (!) 90 % (09/27/24 1815) Vital Signs (24h Range):  Temp:  [98.5 °F (36.9 °C)-100.1 °F (37.8 °C)] 98.7 °F (37.1 °C)  Pulse:  [] 87  Resp:  [15-34] 15  SpO2:  [77 %-98 %] 90 %  BP: ()/(40-74) 96/65   Weight: 96.6 kg (212 lb 15.4 oz)  Body mass index is 31.45 kg/m².      Intake/Output Summary (Last 24 hours) at 9/27/2024  1837  Last data filed at 9/27/2024 1744  Gross per 24 hour   Intake 4605.12 ml   Output 200 ml   Net 4405.12 ml          Physical Exam  Constitutional:       Appearance: He is ill-appearing. He is not diaphoretic.   HENT:      Mouth/Throat:      Mouth: Mucous membranes are moist.      Pharynx: Oropharynx is clear.   Cardiovascular:      Rate and Rhythm: Normal rate and regular rhythm.      Heart sounds: No murmur heard.     No gallop.   Pulmonary:      Effort: Pulmonary effort is normal.      Breath sounds: No wheezing or rales.   Abdominal:      General: Bowel sounds are normal. There is no distension.      Tenderness: There is no abdominal tenderness.   Musculoskeletal:         General: No swelling.      Right lower leg: No edema.      Left lower leg: No edema.   Skin:     General: Skin is warm.      Coloration: Skin is not jaundiced.   Neurological:      Mental Status: He is alert. He is disoriented.            Vents:     Lines/Drains/Airways       Peripheral Intravenous Line  Duration                  Peripheral IV - Single Lumen 09/26/24 0000 18 G Anterior;Right Hand 1 day         Peripheral IV - Single Lumen 09/26/24 1155 18 G Anterior;Left;Proximal Forearm 1 day         Peripheral IV - Single Lumen 09/27/24 1649 20 G Anterior;Left Wrist <1 day                  Significant Labs:    CBC/Anemia Profile:  Recent Labs   Lab 09/26/24  1151 09/27/24  0337   WBC 14.52* 23.97*   HGB 10.8* 10.4*   HCT 32.6* 30.4*   * 72*   MCV 91 89   RDW 12.3 12.6        Chemistries:  Recent Labs   Lab 09/26/24  1151 09/26/24  1546 09/26/24  2140 09/26/24  2340 09/27/24  0337   *   < > 123* 126* 127*  127*   K 4.6   < > 4.2 4.1 3.9  3.9   CL 99   < > 98 100 101  101   CO2 19*   < > 17* 15* 17*  17*   BUN 26*   < > 27* 28* 29*  29*   CREATININE 1.9*   < > 1.9* 2.0* 1.9*  1.9*   CALCIUM 8.4*   < > 8.1* 7.9* 8.1*  8.1*   ALBUMIN 2.4*  --   --   --  2.2*   PROT 5.7*  --   --   --  5.7*   BILITOT 1.1*  --   --   --  0.9    ALKPHOS 148*  --   --   --  149*   ALT 9*  --   --   --  12   AST 14  --   --   --  29   MG 1.5*  --   --   --  1.8   PHOS 2.0*  --   --   --  2.1*    < > = values in this interval not displayed.       All pertinent labs within the past 24 hours have been reviewed.    Significant Imaging:  I have reviewed all pertinent imaging results/findings within the past 24 hours.    ABG  Recent Labs   Lab 09/27/24  0909   PH 7.237*   PO2 23*   PCO2 47.7*   HCO3 20.3*   BE -7*     Assessment/Plan:     Neuro  Diabetic polyneuropathy associated with type 2 diabetes mellitus  - continue home duloxetine and pregabalin     Pulmonary  Asthma  Pt with hx asthma on albuterol and trelegy at home. Wheezing in ED without acute decompensation. Concern for asthma exacerbation 2/2 ongoing sepsis.     -Duonebs PRN  -Continue daily breo + spiriva    Concern for Pneumonia  Admitted to ED for leukocytosis, elevated procal. CXR with concern for consolidation in left upper lobe not noted in imaging in January. Patient initially without respiratory symptoms however with tachypnea shortly after presentation to ED. C/f asthma exacerbation vs infectious process     -Continue IV abx  -CTM CBC  -Consider respiratory culture  -F/U infectious panel    Cardiac/Vascular  Essential hypertension  Chronic, controlled. Latest blood pressure and vitals reviewed-      While in the hospital, will manage blood pressure as follows; Adjust home antihypertensive regimen as follows- hold home medication in setting of hypotension 2/2 severe urosepsis     Will utilize p.r.n. blood pressure medication only if patient's blood pressure greater than 180/110 and he develops symptoms such as worsening chest pain or shortness of breath    Renal/  ALYSIA (acute kidney injury)  Creatinine 1.9 on admit, baseline around 0.9-1.2  - Likely pre-renal from dehydration and/or hypotension in setting of septic shock     Lab Results   Component Value Date    CREATININE 1.9 (H) 09/27/2024     CREATININE 1.9 (H) 09/27/2024    CREATININE 2.0 (H) 09/26/2024       Plan:   - Strict I&Os and daily weights   - Avoid nephrotoxic agents such as NSAIDs, gadolinium and IV radiocontrast.  - Renally dose meds to current GFR  - Maintain MAP > 65      Hypophosphatemia  - will replete PRN for goal of greater than or equal to 3     Hypomagnesemia  - will replete PRN for Mg greater than or equal to 2    Acute cystitis  See sepsis     ID  * Severe sepsis  This patient does have evidence of infective focus  My overall impression is septic shock due to SBP < 90.  Source: Urinary Tract - UA cloudy with +3 leukocytes and blood, WBC 14.5 in ED, past urine cultures w/ proteus and staph aureus   - Chest XR in ED with left upper lobe focal opacity concerning for infectious, inflammatory, or neoplastic etiologies   Antibiotics given-   Antibiotics (72h ago, onward)      Start     Stop Route Frequency Ordered    09/27/24 1230  piperacillin-tazobactam (ZOSYN) 4.5 g in D5W 100 mL IVPB (MB+)         -- IV Every 8 hours (non-standard times) 09/27/24 1124    09/27/24 0900  azithromycin tablet 500 mg         -- Oral Daily 09/26/24 1854          Latest lactate reviewed-  Recent Labs   Lab 09/26/24  1200 09/26/24  1453 09/27/24  1719   LACTATE  --    < > 1.8   POCLAC 2.74*  --   --     < > = values in this interval not displayed.       Organ dysfunction indicated by Acute kidney injury    Post- resuscitation assessment Yes Perfusion exam was performed within 6 hours of septic shock presentation after bolus shows Adequate tissue perfusion assessed by non-invasive monitoring   2.5L LR given in ED prior to bedside US. IVC collapsible at bedside. Ordered additional 500mL       Will Start Pressors- Levophed for MAP of 65  Source control achieved by: cefepime and vancomycin    - will start cefepime and vancomycin   - Bcx growing GNR  - f/u urine Cx with GNR  - renal US with possible stone  - CT revealed obstructing stone in right ureter.  Urology consulted  - trend lactate     Oncology  Normocytic anemia  Hx anemia, on eliquis at home. Currently stable without overt signs of bud bleeding (hematemesis, hematochezia). Possibly due to diabetic nephropathy but unclear at this time      Plan  - Monitor serial CBC: Daily  - Transfuse PRBC if patient becomes hemodynamically unstable, symptomatic or H/H drops below 7/21.  - Patient has not received any PRBC transfusions to date  - Patient's anemia is currently stable    Endocrine  Type 2 diabetes mellitus with neurologic complication, without long-term current use of insulin  Patient's FSGs are uncontrolled due to hyperglycemia on current medication regimen. A1C 9.8 on admission, increased from prior. Patient with history of insulin use on chart review but transitioned to metformin. Unclear whether patient is currently on home DM regimen. Hold Oral hypoglycemics while patient is in the hospital.     -Start moderate dose sliding scale   -will titrate as necessary, goal 140-180    GI  Gastroesophageal reflux disease without esophagitis  - continue home pantoprazole    Other  Pseudohyponatremia  Pt with Na 127 on admission. Glucose elevated to 333. Corrected Na 134. Suspect hyponatremia 2/2 uncontrolled hyperglycemia     -CTM BMP for Na  -see DM for glucose management     Primary insomnia  - takes trazadone 100mg x2 PRN for insomnia at home  - held in setting of AMS/malaise, will consider restarting with improvement of condition     Mobility impaired  Mobility impaired likely 2/2  peripheral neuropathy. Able to use walker at home however acutely decreased in setting of sepsis     -Fall precautions  -Will consider PT/OT as condition improves        Critical Care Daily Checklist:    A: Awake: RASS Goal/Actual Goal: RASS Goal: 0-->alert and calm  Actual:     B: Spontaneous Breathing Trial Performed?     C: SAT & SBT Coordinated?                        D: Delirium: CAM-ICU Overall CAM-ICU: Positive   E: Early  Mobility Performed? No   F: Feeding Goal:    Status:     Current Diet Order   Procedures    Diet diabetic 2000 Calorie; Standard Tray     Order Specific Question:   Total calories:     Answer:   2000 Calorie     Order Specific Question:   Tray type:     Answer:   Standard Tray      AS: Analgesia/Sedation    T: Thromboembolic Prophylaxis eliquis   H: HOB > 300 Yes   U: Stress Ulcer Prophylaxis (if needed) PPI   G: Glucose Control Glargine 10u, aspart 2u TIDWM, MDSSI    B: Bowel Function     I: Indwelling Catheter (Lines & Mercado) Necessity PIV x2   D: De-escalation of Antimicrobials/Pharmacotherapies zosyn    Plan for the day/ETD monitor    Code Status:  Family/Goals of Care: Full Code         Critical secondary to Patient has a condition that poses threat to life and bodily function: septic shock      Critical care was time spent personally by me on the following activities: development of treatment plan with patient or surrogate and bedside caregivers, discussions with consultants, evaluation of patient's response to treatment, examination of patient, ordering and performing treatments and interventions, ordering and review of laboratory studies, ordering and review of radiographic studies, pulse oximetry, re-evaluation of patient's condition. This critical care time did not overlap with that of any other provider or involve time for any procedures.     Hafsa Edwards MD  Critical Care Medicine  [facility] - Medical ICU

## 2024-09-27 NOTE — SUBJECTIVE & OBJECTIVE
Interval History/Significant Events: NAEON. Bcx w GNR. Levo drip paused.    Review of Systems  Objective:     Vital Signs (Most Recent):  Temp: 98.7 °F (37.1 °C) (09/27/24 1501)  Pulse: 87 (09/27/24 1815)  Resp: 15 (09/27/24 1815)  BP: 96/65 (09/27/24 1815)  SpO2: (!) 90 % (09/27/24 1815) Vital Signs (24h Range):  Temp:  [98.5 °F (36.9 °C)-100.1 °F (37.8 °C)] 98.7 °F (37.1 °C)  Pulse:  [] 87  Resp:  [15-34] 15  SpO2:  [77 %-98 %] 90 %  BP: ()/(40-74) 96/65   Weight: 96.6 kg (212 lb 15.4 oz)  Body mass index is 31.45 kg/m².      Intake/Output Summary (Last 24 hours) at 9/27/2024 1837  Last data filed at 9/27/2024 1744  Gross per 24 hour   Intake 4605.12 ml   Output 200 ml   Net 4405.12 ml          Physical Exam  Constitutional:       Appearance: He is ill-appearing. He is not diaphoretic.   HENT:      Mouth/Throat:      Mouth: Mucous membranes are moist.      Pharynx: Oropharynx is clear.   Cardiovascular:      Rate and Rhythm: Normal rate and regular rhythm.      Heart sounds: No murmur heard.     No gallop.   Pulmonary:      Effort: Pulmonary effort is normal.      Breath sounds: No wheezing or rales.   Abdominal:      General: Bowel sounds are normal. There is no distension.      Tenderness: There is no abdominal tenderness.   Musculoskeletal:         General: No swelling.      Right lower leg: No edema.      Left lower leg: No edema.   Skin:     General: Skin is warm.      Coloration: Skin is not jaundiced.   Neurological:      Mental Status: He is alert. He is disoriented.            Vents:     Lines/Drains/Airways       Peripheral Intravenous Line  Duration                  Peripheral IV - Single Lumen 09/26/24 0000 18 G Anterior;Right Hand 1 day         Peripheral IV - Single Lumen 09/26/24 1155 18 G Anterior;Left;Proximal Forearm 1 day         Peripheral IV - Single Lumen 09/27/24 1649 20 G Anterior;Left Wrist <1 day                  Significant Labs:    CBC/Anemia Profile:  Recent Labs   Lab  09/26/24  1151 09/27/24  0337   WBC 14.52* 23.97*   HGB 10.8* 10.4*   HCT 32.6* 30.4*   * 72*   MCV 91 89   RDW 12.3 12.6        Chemistries:  Recent Labs   Lab 09/26/24  1151 09/26/24  1546 09/26/24  2140 09/26/24  2340 09/27/24  0337   *   < > 123* 126* 127*  127*   K 4.6   < > 4.2 4.1 3.9  3.9   CL 99   < > 98 100 101  101   CO2 19*   < > 17* 15* 17*  17*   BUN 26*   < > 27* 28* 29*  29*   CREATININE 1.9*   < > 1.9* 2.0* 1.9*  1.9*   CALCIUM 8.4*   < > 8.1* 7.9* 8.1*  8.1*   ALBUMIN 2.4*  --   --   --  2.2*   PROT 5.7*  --   --   --  5.7*   BILITOT 1.1*  --   --   --  0.9   ALKPHOS 148*  --   --   --  149*   ALT 9*  --   --   --  12   AST 14  --   --   --  29   MG 1.5*  --   --   --  1.8   PHOS 2.0*  --   --   --  2.1*    < > = values in this interval not displayed.       All pertinent labs within the past 24 hours have been reviewed.    Significant Imaging:  I have reviewed all pertinent imaging results/findings within the past 24 hours.

## 2024-09-28 PROBLEM — N13.30 HYDRONEPHROSIS: Status: ACTIVE | Noted: 2024-09-28

## 2024-09-28 PROBLEM — J18.9 PNEUMONIA: Status: RESOLVED | Noted: 2024-09-26 | Resolved: 2024-09-28

## 2024-09-28 LAB
ALBUMIN SERPL BCP-MCNC: 2.1 G/DL (ref 3.5–5.2)
ALP SERPL-CCNC: 113 U/L (ref 55–135)
ALT SERPL W/O P-5'-P-CCNC: 13 U/L (ref 10–44)
ANION GAP SERPL CALC-SCNC: 10 MMOL/L (ref 8–16)
ANION GAP SERPL CALC-SCNC: 10 MMOL/L (ref 8–16)
AST SERPL-CCNC: 35 U/L (ref 10–40)
BACTERIA UR CULT: ABNORMAL
BASOPHILS # BLD AUTO: 0.05 K/UL (ref 0–0.2)
BASOPHILS NFR BLD: 0.4 % (ref 0–1.9)
BILIRUB SERPL-MCNC: 0.6 MG/DL (ref 0.1–1)
BUN SERPL-MCNC: 30 MG/DL (ref 8–23)
BUN SERPL-MCNC: 31 MG/DL (ref 8–23)
CALCIUM SERPL-MCNC: 8.1 MG/DL (ref 8.7–10.5)
CALCIUM SERPL-MCNC: 8.4 MG/DL (ref 8.7–10.5)
CHLORIDE SERPL-SCNC: 101 MMOL/L (ref 95–110)
CHLORIDE SERPL-SCNC: 102 MMOL/L (ref 95–110)
CO2 SERPL-SCNC: 15 MMOL/L (ref 23–29)
CO2 SERPL-SCNC: 17 MMOL/L (ref 23–29)
CREAT SERPL-MCNC: 1.7 MG/DL (ref 0.5–1.4)
CREAT SERPL-MCNC: 1.9 MG/DL (ref 0.5–1.4)
DIFFERENTIAL METHOD BLD: ABNORMAL
EOSINOPHIL # BLD AUTO: 0.2 K/UL (ref 0–0.5)
EOSINOPHIL NFR BLD: 1.6 % (ref 0–8)
ERYTHROCYTE [DISTWIDTH] IN BLOOD BY AUTOMATED COUNT: 12.7 % (ref 11.5–14.5)
EST. GFR  (NO RACE VARIABLE): 37 ML/MIN/1.73 M^2
EST. GFR  (NO RACE VARIABLE): 42.3 ML/MIN/1.73 M^2
GLUCOSE SERPL-MCNC: 368 MG/DL (ref 70–110)
GLUCOSE SERPL-MCNC: 387 MG/DL (ref 70–110)
HCT VFR BLD AUTO: 31 % (ref 40–54)
HGB BLD-MCNC: 10.8 G/DL (ref 14–18)
IMM GRANULOCYTES # BLD AUTO: 0.1 K/UL (ref 0–0.04)
IMM GRANULOCYTES NFR BLD AUTO: 0.7 % (ref 0–0.5)
LYMPHOCYTES # BLD AUTO: 0.5 K/UL (ref 1–4.8)
LYMPHOCYTES NFR BLD: 3.7 % (ref 18–48)
MAGNESIUM SERPL-MCNC: 2.3 MG/DL (ref 1.6–2.6)
MCH RBC QN AUTO: 31.1 PG (ref 27–31)
MCHC RBC AUTO-ENTMCNC: 34.8 G/DL (ref 32–36)
MCV RBC AUTO: 89 FL (ref 82–98)
MONOCYTES # BLD AUTO: 0.2 K/UL (ref 0.3–1)
MONOCYTES NFR BLD: 1.6 % (ref 4–15)
NEUTROPHILS # BLD AUTO: 12.8 K/UL (ref 1.8–7.7)
NEUTROPHILS NFR BLD: 92 % (ref 38–73)
NRBC BLD-RTO: 0 /100 WBC
PHOSPHATE SERPL-MCNC: 2.9 MG/DL (ref 2.7–4.5)
PLATELET # BLD AUTO: 43 K/UL (ref 150–450)
PMV BLD AUTO: 12.1 FL (ref 9.2–12.9)
POCT GLUCOSE: 294 MG/DL (ref 70–110)
POCT GLUCOSE: 318 MG/DL (ref 70–110)
POCT GLUCOSE: 321 MG/DL (ref 70–110)
POCT GLUCOSE: 338 MG/DL (ref 70–110)
POCT GLUCOSE: 345 MG/DL (ref 70–110)
POCT GLUCOSE: 357 MG/DL (ref 70–110)
POCT GLUCOSE: 367 MG/DL (ref 70–110)
POTASSIUM SERPL-SCNC: 4.8 MMOL/L (ref 3.5–5.1)
POTASSIUM SERPL-SCNC: 5.3 MMOL/L (ref 3.5–5.1)
PROT SERPL-MCNC: 6 G/DL (ref 6–8.4)
RBC # BLD AUTO: 3.47 M/UL (ref 4.6–6.2)
SODIUM SERPL-SCNC: 127 MMOL/L (ref 136–145)
SODIUM SERPL-SCNC: 128 MMOL/L (ref 136–145)
WBC # BLD AUTO: 13.96 K/UL (ref 3.9–12.7)

## 2024-09-28 PROCEDURE — 94640 AIRWAY INHALATION TREATMENT: CPT

## 2024-09-28 PROCEDURE — 99223 1ST HOSP IP/OBS HIGH 75: CPT | Mod: ,,, | Performed by: UROLOGY

## 2024-09-28 PROCEDURE — 25000003 PHARM REV CODE 250

## 2024-09-28 PROCEDURE — 93010 ELECTROCARDIOGRAM REPORT: CPT | Mod: ,,, | Performed by: INTERNAL MEDICINE

## 2024-09-28 PROCEDURE — 99291 CRITICAL CARE FIRST HOUR: CPT | Mod: GC,,, | Performed by: INTERNAL MEDICINE

## 2024-09-28 PROCEDURE — 63600175 PHARM REV CODE 636 W HCPCS

## 2024-09-28 PROCEDURE — 83735 ASSAY OF MAGNESIUM: CPT

## 2024-09-28 PROCEDURE — 94761 N-INVAS EAR/PLS OXIMETRY MLT: CPT

## 2024-09-28 PROCEDURE — 87040 BLOOD CULTURE FOR BACTERIA: CPT | Mod: 59

## 2024-09-28 PROCEDURE — 93005 ELECTROCARDIOGRAM TRACING: CPT

## 2024-09-28 PROCEDURE — 20000000 HC ICU ROOM

## 2024-09-28 PROCEDURE — 80053 COMPREHEN METABOLIC PANEL: CPT

## 2024-09-28 PROCEDURE — 63700000 PHARM REV CODE 250 ALT 637 W/O HCPCS

## 2024-09-28 PROCEDURE — 84100 ASSAY OF PHOSPHORUS: CPT

## 2024-09-28 PROCEDURE — 85025 COMPLETE CBC W/AUTO DIFF WBC: CPT

## 2024-09-28 PROCEDURE — 80048 BASIC METABOLIC PNL TOTAL CA: CPT | Mod: XB

## 2024-09-28 RX ORDER — INSULIN GLARGINE 100 [IU]/ML
30 INJECTION, SOLUTION SUBCUTANEOUS DAILY
Status: DISCONTINUED | OUTPATIENT
Start: 2024-09-29 | End: 2024-10-01 | Stop reason: HOSPADM

## 2024-09-28 RX ORDER — INSULIN GLARGINE 100 [IU]/ML
5 INJECTION, SOLUTION SUBCUTANEOUS ONCE
Status: COMPLETED | OUTPATIENT
Start: 2024-09-28 | End: 2024-09-28

## 2024-09-28 RX ORDER — SODIUM CHLORIDE 9 MG/ML
INJECTION, SOLUTION INTRAVENOUS CONTINUOUS
Status: DISCONTINUED | OUTPATIENT
Start: 2024-09-28 | End: 2024-09-29

## 2024-09-28 RX ORDER — OXYBUTYNIN CHLORIDE 5 MG/1
5 TABLET, EXTENDED RELEASE ORAL DAILY
Status: DISCONTINUED | OUTPATIENT
Start: 2024-09-29 | End: 2024-10-01 | Stop reason: HOSPADM

## 2024-09-28 RX ORDER — DEXTROSE MONOHYDRATE AND SODIUM CHLORIDE 5; .9 G/100ML; G/100ML
INJECTION, SOLUTION INTRAVENOUS CONTINUOUS
Status: DISCONTINUED | OUTPATIENT
Start: 2024-09-28 | End: 2024-09-28

## 2024-09-28 RX ORDER — POLYETHYLENE GLYCOL 3350 17 G/17G
17 POWDER, FOR SOLUTION ORAL DAILY
Status: CANCELLED | OUTPATIENT
Start: 2024-09-28

## 2024-09-28 RX ORDER — INSULIN ASPART 100 [IU]/ML
10 INJECTION, SOLUTION INTRAVENOUS; SUBCUTANEOUS ONCE
Status: COMPLETED | OUTPATIENT
Start: 2024-09-28 | End: 2024-09-28

## 2024-09-28 RX ORDER — TAMSULOSIN HYDROCHLORIDE 0.4 MG/1
0.4 CAPSULE ORAL DAILY
Status: DISCONTINUED | OUTPATIENT
Start: 2024-09-28 | End: 2024-10-01 | Stop reason: HOSPADM

## 2024-09-28 RX ADMIN — INSULIN GLARGINE 5 UNITS: 100 INJECTION, SOLUTION SUBCUTANEOUS at 08:09

## 2024-09-28 RX ADMIN — INSULIN ASPART 10 UNITS: 100 INJECTION, SOLUTION INTRAVENOUS; SUBCUTANEOUS at 04:09

## 2024-09-28 RX ADMIN — PIPERACILLIN SODIUM AND TAZOBACTAM SODIUM 4.5 G: 4; .5 INJECTION, POWDER, FOR SOLUTION INTRAVENOUS at 11:09

## 2024-09-28 RX ADMIN — PIPERACILLIN SODIUM AND TAZOBACTAM SODIUM 4.5 G: 4; .5 INJECTION, POWDER, FOR SOLUTION INTRAVENOUS at 03:09

## 2024-09-28 RX ADMIN — PREGABALIN 75 MG: 75 CAPSULE ORAL at 08:09

## 2024-09-28 RX ADMIN — INSULIN GLARGINE 20 UNITS: 100 INJECTION, SOLUTION SUBCUTANEOUS at 08:09

## 2024-09-28 RX ADMIN — PANTOPRAZOLE SODIUM 40 MG: 40 TABLET, DELAYED RELEASE ORAL at 08:09

## 2024-09-28 RX ADMIN — OXYCODONE HYDROCHLORIDE 5 MG: 5 TABLET ORAL at 02:09

## 2024-09-28 RX ADMIN — POLYETHYLENE GLYCOL 3350 17 G: 17 POWDER, FOR SOLUTION ORAL at 09:09

## 2024-09-28 RX ADMIN — HYDROXYZINE PAMOATE 25 MG: 25 CAPSULE ORAL at 10:09

## 2024-09-28 RX ADMIN — APIXABAN 5 MG: 5 TABLET, FILM COATED ORAL at 08:09

## 2024-09-28 RX ADMIN — FLUTICASONE FUROATE AND VILANTEROL TRIFENATATE 1 PUFF: 100; 25 POWDER RESPIRATORY (INHALATION) at 07:09

## 2024-09-28 RX ADMIN — INSULIN ASPART 8 UNITS: 100 INJECTION, SOLUTION INTRAVENOUS; SUBCUTANEOUS at 11:09

## 2024-09-28 RX ADMIN — POLYETHYLENE GLYCOL 3350 17 G: 17 POWDER, FOR SOLUTION ORAL at 10:09

## 2024-09-28 RX ADMIN — SODIUM CHLORIDE: 9 INJECTION, SOLUTION INTRAVENOUS at 11:09

## 2024-09-28 RX ADMIN — TIOTROPIUM BROMIDE INHALATION SPRAY 2 PUFF: 3.12 SPRAY, METERED RESPIRATORY (INHALATION) at 07:09

## 2024-09-28 RX ADMIN — CEFTRIAXONE 1 G: 1 INJECTION, POWDER, FOR SOLUTION INTRAMUSCULAR; INTRAVENOUS at 02:09

## 2024-09-28 RX ADMIN — SODIUM CHLORIDE: 9 INJECTION, SOLUTION INTRAVENOUS at 07:09

## 2024-09-28 RX ADMIN — OXYCODONE HYDROCHLORIDE 5 MG: 5 TABLET ORAL at 05:09

## 2024-09-28 RX ADMIN — SENNOSIDES AND DOCUSATE SODIUM 1 TABLET: 50; 8.6 TABLET ORAL at 10:09

## 2024-09-28 RX ADMIN — INSULIN ASPART 10 UNITS: 100 INJECTION, SOLUTION INTRAVENOUS; SUBCUTANEOUS at 08:09

## 2024-09-28 RX ADMIN — AZITHROMYCIN DIHYDRATE 500 MG: 250 TABLET ORAL at 08:09

## 2024-09-28 RX ADMIN — DULOXETINE HYDROCHLORIDE 60 MG: 60 CAPSULE, DELAYED RELEASE ORAL at 08:09

## 2024-09-28 RX ADMIN — TAMSULOSIN HYDROCHLORIDE 0.4 MG: 0.4 CAPSULE ORAL at 08:09

## 2024-09-28 RX ADMIN — INSULIN ASPART 10 UNITS: 100 INJECTION, SOLUTION INTRAVENOUS; SUBCUTANEOUS at 05:09

## 2024-09-28 RX ADMIN — SENNOSIDES AND DOCUSATE SODIUM 1 TABLET: 50; 8.6 TABLET ORAL at 09:09

## 2024-09-28 NOTE — SUBJECTIVE & OBJECTIVE
Past Medical History:   Diagnosis Date    Acute deep vein thrombosis (DVT) of left lower extremity 12/2023    Anxiety     Atrial fibrillation 12/2023    Cataract     Coronary artery disease     CAC score 1430    Depression     Diabetic neuropathy     Essential (primary) hypertension     GERD (gastroesophageal reflux disease)     History of bariatric surgery 07/08/2021    History of total right knee replacement 07/08/2021    Insomnia     Neuromyopathy     Possibly DM and/or alcohol related.    Pulmonary embolism 12/2023    Reactive airway disease     Type 2 diabetes mellitus        Past Surgical History:   Procedure Laterality Date    CATARACT EXTRACTION W/  INTRAOCULAR LENS IMPLANT Right 7/15/2024    Procedure: EXTRACTION, CATARACT, WITH IOL INSERTION;  Surgeon: Margot Jacobson MD;  Location: [facility] OR;  Service: Ophthalmology;  Laterality: Right;    CATARACT EXTRACTION W/  INTRAOCULAR LENS IMPLANT Left 8/29/2024    Procedure: EXTRACTION, CATARACT, WITH IOL INSERTION;  Surgeon: Margot Jacobson MD;  Location: [facility] OR;  Service: Ophthalmology;  Laterality: Left;    COLONOSCOPY      Normal around 2020    COLONOSCOPY N/A 9/6/2024    Procedure: COLONOSCOPY;  Surgeon: Alessandro Serna MD;  Location: [facility] (4TH FLR);  Service: Endoscopy;  Laterality: N/A;  8/28-new case created-lvm for pre call-pt has cataract surgery 8/29/24-tb  ref-dr rico myles-[facility]  ok to hold elivickie myles-GT  9/5-LVM for precall-Kpvt    COLONOSCOPY N/A 9/6/2024    Procedure: COLONOSCOPY;  Surgeon: Alessandro Serna MD;  Location: [facility] (4TH FLR);  Service: Endoscopy;  Laterality: N/A;  + cologuard  8/8 ref by Isaias Myles MD, [facility], instr. to portal Eliquis hold approval pending-  ok to hold Eliquis 2 days per Dr Myles-GT    TOTAL KNEE ARTHROPLASTY Right        Review of patient's allergies indicates:  No Known Allergies    Family History       Problem Relation (Age of Onset)    Colon cancer Paternal Grandfather (89)     Hypertension Mother            Tobacco Use    Smoking status: Never    Smokeless tobacco: Never   Substance and Sexual Activity    Alcohol use: Yes     Comment: Former heavy use    Drug use: Never    Sexual activity: Yes     Comment: unknown           Objective:     Temp:  [98.5 °F (36.9 °C)-100.1 °F (37.8 °C)] 98.7 °F (37.1 °C)  Pulse:  [] 95  Resp:  [14-34] 24  SpO2:  [77 %-100 %] 100 %  BP: ()/(40-74) 101/63  Weight: 96.6 kg (212 lb 15.4 oz)  Body mass index is 31.45 kg/m².    Date 09/27/24 0700 - 09/28/24 0659   Shift 1308-2866 2801-3992 4092-9353 24 Hour Total   INTAKE   P.O. 420   420   I.V.(mL/kg) 1459.2(15.1) 18.9(0.2)  1478.1(15.3)   IV Piggyback 119.5 38.8  158.3   Shift Total(mL/kg) 1998.7(20.7) 57.6(0.6)  2056.3(21.3)   OUTPUT   Urine(mL/kg/hr)  200  200   Shift Total(mL/kg)  200(2.1)  200(2.1)   Weight (kg) 96.6 96.6 96.6 96.6          Drains       None                    Physical Exam  HENT:      Head: Normocephalic and atraumatic.      Nose: Nose normal.   Eyes:      Conjunctiva/sclera: Conjunctivae normal.   Pulmonary:      Effort: Pulmonary effort is normal. No respiratory distress.   Musculoskeletal:         General: No deformity.   Skin:     General: Skin is warm and dry.   Neurological:      General: No focal deficit present.      Mental Status: He is alert.   Psychiatric:         Mood and Affect: Mood normal.         Behavior: Behavior normal.          Significant Labs:    BMP:  Recent Labs   Lab 09/26/24  2140 09/26/24  2340 09/27/24  0337   * 126* 127*  127*   K 4.2 4.1 3.9  3.9   CL 98 100 101  101   CO2 17* 15* 17*  17*   BUN 27* 28* 29*  29*   CREATININE 1.9* 2.0* 1.9*  1.9*   CALCIUM 8.1* 7.9* 8.1*  8.1*       CBC:  Recent Labs   Lab 09/26/24  1151 09/27/24  0337   WBC 14.52* 23.97*   HGB 10.8* 10.4*   HCT 32.6* 30.4*   * 72*       All pertinent labs results from the past 24 hours have been reviewed.    Significant Imaging:  All pertinent imaging  results/findings from the past 24 hours have been reviewed.

## 2024-09-28 NOTE — ANESTHESIA PROCEDURE NOTES
Intubation    Date/Time: 9/27/2024 9:09 PM    Performed by: Cindi Leon CRNA  Authorized by: Zeinab Rai MD    Intubation:     Induction:  Intravenous    Intubated:  Postinduction    Mask Ventilation:  Not attempted    Attempts:  1    Attempted By:  CRNA    Method of Intubation:  Video laryngoscopy    Blade:  Kathi 3    Laryngeal View Grade: Grade I - full view of cords      Difficult Airway Encountered?: No      Complications:  None    Airway Device:  Oral endotracheal tube    Airway Device Size:  7.5    Style/Cuff Inflation:  Cuffed    Tube secured:  24    Secured at:  The lips    Placement Verified By:  Capnometry    Complicating Factors:  None    Findings Post-Intubation:  BS equal bilateral and atraumatic/condition of teeth unchanged

## 2024-09-28 NOTE — SUBJECTIVE & OBJECTIVE
Interval History/Significant Events: Ureteral stent placement last night by urology. Levo discontinued today due to MAP goal > 60.     Review of Systems  Objective:     Vital Signs (Most Recent):  Temp: 98 °F (36.7 °C) (09/28/24 1105)  Pulse: 88 (09/28/24 1421)  Resp: 20 (09/28/24 1438)  BP: (!) 79/52 (09/28/24 1420)  SpO2: 100 % (09/28/24 1405) Vital Signs (24h Range):  Temp:  [97.7 °F (36.5 °C)-98.7 °F (37.1 °C)] 98 °F (36.7 °C)  Pulse:  [75-98] 88  Resp:  [12-37] 20  SpO2:  [90 %-100 %] 100 %  BP: ()/(50-75) 79/52   Weight: 96.6 kg (212 lb 15.4 oz)  Body mass index is 31.45 kg/m².      Intake/Output Summary (Last 24 hours) at 9/28/2024 1505  Last data filed at 9/28/2024 1405  Gross per 24 hour   Intake 1258.55 ml   Output 1400 ml   Net -141.45 ml          Physical Exam  Constitutional:       Appearance: He is obese. He is ill-appearing. He is not diaphoretic.   HENT:      Mouth/Throat:      Mouth: Mucous membranes are moist.      Pharynx: Oropharynx is clear.   Cardiovascular:      Rate and Rhythm: Normal rate and regular rhythm.      Heart sounds: No murmur heard.     No gallop.   Pulmonary:      Effort: Pulmonary effort is normal.      Breath sounds: No wheezing or rales.   Abdominal:      General: Bowel sounds are normal. There is no distension.      Tenderness: There is no abdominal tenderness.   Musculoskeletal:         General: No swelling.      Right lower leg: No edema.      Left lower leg: No edema.   Skin:     General: Skin is warm.      Coloration: Skin is not jaundiced.   Neurological:      Mental Status: He is alert and oriented to person, place, and time.            Vents:     Lines/Drains/Airways       Drain  Duration                  Urethral Catheter 09/27/24 2200 22 Fr. <1 day              Peripheral Intravenous Line  Duration                  Peripheral IV - Single Lumen 09/26/24 0000 18 G Anterior;Right Hand 2 days         Peripheral IV - Single Lumen 09/26/24 1155 18 G  Anterior;Left;Proximal Forearm 2 days         Peripheral IV - Single Lumen 09/27/24 1649 20 G Anterior;Left Wrist <1 day                  Significant Labs:    CBC/Anemia Profile:  Recent Labs   Lab 09/27/24 0337 09/28/24 0328   WBC 23.97* 13.96*   HGB 10.4* 10.8*   HCT 30.4* 31.0*   PLT 72* 43*   MCV 89 89   RDW 12.6 12.7        Chemistries:  Recent Labs   Lab 09/27/24 0337 09/28/24 0328 09/28/24  0835   *  127* 128* 127*   K 3.9  3.9 5.3* 4.8     101 101 102   CO2 17*  17* 17* 15*   BUN 29*  29* 31* 30*   CREATININE 1.9*  1.9* 1.9* 1.7*   CALCIUM 8.1*  8.1* 8.4* 8.1*   ALBUMIN 2.2* 2.1*  --    PROT 5.7* 6.0  --    BILITOT 0.9 0.6  --    ALKPHOS 149* 113  --    ALT 12 13  --    AST 29 35  --    MG 1.8 2.3  --    PHOS 2.1* 2.9  --        All pertinent labs within the past 24 hours have been reviewed.    Significant Imaging:  I have reviewed all pertinent imaging results/findings within the past 24 hours.

## 2024-09-28 NOTE — ASSESSMENT & PLAN NOTE
Patient's FSGs are uncontrolled due to hyperglycemia on current medication regimen. A1C 9.8 on admission, increased from prior. Patient with history of insulin use on chart review but transitioned to metformin. Unclear whether patient is currently on home DM regimen. Hold Oral hypoglycemics while patient is in the hospital.     -Start moderate dose sliding scale   -glargine increased to 30 units/ day   -will titrate as necessary, goal 140-180

## 2024-09-28 NOTE — PLAN OF CARE
MICU DAILY GOALS     Family/Goals of care/Code Status   Code Status: Full Code    24H Vital Sign Range  Temp:  [97.7 °F (36.5 °C)-98.7 °F (37.1 °C)]   Pulse:  [74-98]   Resp:  [12-37]   BP: ()/(50-75)   SpO2:  [90 %-100 %]      Shift Events (include procedures and significant events)   Levo turned back on briefly but weaned off this morning after MAP goal changed to >60 per MD. Blood cultures redrawn. Pt continues to have bladder spasms and leak around Mercado catheter -- urology aware. MD notified of consistent blood sugars in the 300s. No acute events throughout shift.    AWAKE RASS: Goal - RASS Goal: 0-->alert and calm  Actual - RASS ([place] Agitation-Sedation Scale): alert and calm    Restraint necessity: Not necessary   BREATHE SBT: Not intubated    Coordinate A & B, analgesics/sedatives Pain: managed   SAT: Not intubated   Delirium CAM-ICU: Overall CAM-ICU: Positive   Early(intubated/ Progressive (non-intubated) Mobility MOVE Screen (INTUBATED ONLY): Not intubated    Activity: Activity Management: Arm raise - L1, Rolling - L1   Feeding/Nutrition Diet order: Diet/Nutrition Received: consistent carb/diabetic diet,     Thrombus DVT prophylaxis: VTE Required Core Measure: Pharmacological prophylaxis initiated/maintained   HOB Elevation Head of Bed (HOB) Positioning: HOB at 30-45 degrees   Ulcer Prophylaxis GI: yes   Glucose control uncontrolled Glycemic Management: blood glucose monitored   Skin Skin assessed during: Daily Assessment    Sacrum intact/not altered? Yes  Heels intact/not altered? Yes  Surgical wound? No    CHECK ONE!   (no altered skin or altered skin) and sub boxes:  [x] No Altered Skin Integrity Present    [x]Prevention Measures Documented    [] Altered Skin Integrity Present or Discovered   [] LDA present in EPIC, daily doc completed              [] LDA added if not in EPIC (describe wound).                    When describing wound, do not stage, use descriptive words only.    [] Wound Image  Taken (required on admit,                   transfer/discharge and every Tuesday)    Wound Care Consulted? No    4 EYES:  Attending Nurse (1st set of eyes): REFUGIO Bond    Second RN/Staff Member (2nd set of eyes): REFUGIO Love   Bowel Function constipation   Indwelling Catheter Necessity      Urethral Catheter 09/27/24 2200 22 Fr.-Reason for Continuing Urinary Catheterization: Placed by Urology Service          De-escalation Antibiotics Yes        VS and assessment per flow sheet, patient progressing towards goals as tolerated, plan of care reviewed with  Anthony Ball & family , all concerns addressed, will continue to monitor.

## 2024-09-28 NOTE — PROGRESS NOTES
[facility] - Medical [facility]  Critical Care Medicine  Progress Note    Patient Name: Anthony Ball  MRN: [id number]  Admission Date: 9/26/2024  Hospital Length of Stay: 2 days  Code Status: Full Code  Attending Provider: Mateo Carrillo MD  Primary Care Provider: Isaias Myles MD   Principal Problem: Severe sepsis    Subjective:     HPI:  Anthony Ball is a 74 y.o. male with a PMH of HTN, DM2, CAD, neuropathy, DVT, PE, Afib (on eliquis), GERD, asthma who presented to the ED for generalized weakness for ~2 weeks. Patient was planning to go to PT for his neuropathy today but was having increased weakness and unable to use his walker. Per family, patient also noted to be more confused recently. He has a history of recurrent UTIs. Does not self catheterize. No known history of BPH. Patient has been endorsing dysuria. Denies chest pain, SOB, abdominal pain, N/V/D.     In ED, patient was tachycardic and hypotensive. Temp of 102. Lactate 2.74. Elevated WBC. Glucose 395. Sepsis w/u was initiated. Patient received 2.5L IVF and was started on ceftriaxone. UA concerning for UTI. CXR with JOCELYN opacification. Patient also with ALYSIA, Cr 1.9 (baseline ~0.9-1.1). Repeat lactate 3.6. Patient remained hypotensive despite fluid resuscitation and was placed on levophed gtt.     MICU consulted for severe sepsis      Interval History/Significant Events: Ureteral stent placement last night by urology. Levo discontinued today due to MAP goal > 60. Tentative plans to step down tomorrow if his BP continues to remain stable.     Review of Systems  Objective:     Vital Signs (Most Recent):  Temp: 98 °F (36.7 °C) (09/28/24 1105)  Pulse: 88 (09/28/24 1421)  Resp: 20 (09/28/24 1438)  BP: (!) 79/52 (09/28/24 1420)  SpO2: 100 % (09/28/24 1405) Vital Signs (24h Range):  Temp:  [97.7 °F (36.5 °C)-98.7 °F (37.1 °C)] 98 °F (36.7 °C)  Pulse:  [75-98] 88  Resp:  [12-37] 20  SpO2:  [90 %-100 %] 100 %  BP: ()/(50-75) 79/52   Weight: 96.6 kg (212 lb 15.4  oz)  Body mass index is 31.45 kg/m².      Intake/Output Summary (Last 24 hours) at 9/28/2024 1505  Last data filed at 9/28/2024 1405  Gross per 24 hour   Intake 1258.55 ml   Output 1400 ml   Net -141.45 ml          Physical Exam  Constitutional:       Appearance: He is obese. He is ill-appearing. He is not diaphoretic.   HENT:      Mouth/Throat:      Mouth: Mucous membranes are moist.      Pharynx: Oropharynx is clear.   Cardiovascular:      Rate and Rhythm: Normal rate and regular rhythm.      Heart sounds: No murmur heard.     No gallop.   Pulmonary:      Effort: Pulmonary effort is normal.      Breath sounds: No wheezing or rales.   Abdominal:      General: Bowel sounds are normal. There is no distension.      Tenderness: There is no abdominal tenderness.   Musculoskeletal:         General: No swelling.      Right lower leg: No edema.      Left lower leg: No edema.   Skin:     General: Skin is warm.      Coloration: Skin is not jaundiced.   Neurological:      Mental Status: He is alert and oriented to person, place, and time.            Vents:     Lines/Drains/Airways       Drain  Duration                  Urethral Catheter 09/27/24 2200 22 Fr. <1 day              Peripheral Intravenous Line  Duration                  Peripheral IV - Single Lumen 09/26/24 0000 18 G Anterior;Right Hand 2 days         Peripheral IV - Single Lumen 09/26/24 1155 18 G Anterior;Left;Proximal Forearm 2 days         Peripheral IV - Single Lumen 09/27/24 1649 20 G Anterior;Left Wrist <1 day                  Significant Labs:    CBC/Anemia Profile:  Recent Labs   Lab 09/27/24  0337 09/28/24  0328   WBC 23.97* 13.96*   HGB 10.4* 10.8*   HCT 30.4* 31.0*   PLT 72* 43*   MCV 89 89   RDW 12.6 12.7        Chemistries:  Recent Labs   Lab 09/27/24  0337 09/28/24  0328 09/28/24  0835   *  127* 128* 127*   K 3.9  3.9 5.3* 4.8     101 101 102   CO2 17*  17* 17* 15*   BUN 29*  29* 31* 30*   CREATININE 1.9*  1.9* 1.9* 1.7*   CALCIUM  8.1*  8.1* 8.4* 8.1*   ALBUMIN 2.2* 2.1*  --    PROT 5.7* 6.0  --    BILITOT 0.9 0.6  --    ALKPHOS 149* 113  --    ALT 12 13  --    AST 29 35  --    MG 1.8 2.3  --    PHOS 2.1* 2.9  --        All pertinent labs within the past 24 hours have been reviewed.    Significant Imaging:  I have reviewed all pertinent imaging results/findings within the past 24 hours.    ABG  Recent Labs   Lab 09/27/24  0909   PH 7.237*   PO2 23*   PCO2 47.7*   HCO3 20.3*   BE -7*     Assessment/Plan:     Neuro  Diabetic polyneuropathy associated with type 2 diabetes mellitus  - continue home duloxetine and pregabalin     Pulmonary  Asthma  Pt with hx asthma on albuterol and trelegy at home. Wheezing in ED without acute decompensation. Concern for asthma exacerbation 2/2 ongoing sepsis.     -Duonebs PRN  -Continue daily breo + spiriva    Cardiac/Vascular  Essential hypertension  Chronic, controlled. Latest blood pressure and vitals reviewed-      While in the hospital, will manage blood pressure as follows; Adjust home antihypertensive regimen as follows- hold home medication in setting of hypotension 2/2 severe urosepsis     Will utilize p.r.n. blood pressure medication only if patient's blood pressure greater than 180/110 and he develops symptoms such as worsening chest pain or shortness of breath    Renal/  ALYSIA (acute kidney injury)  Creatinine 1.9 on admit, baseline around 0.9-1.2  - Likely pre-renal from dehydration and/or hypotension in setting of septic shock     Lab Results   Component Value Date    CREATININE 1.9 (H) 09/27/2024    CREATININE 1.9 (H) 09/27/2024    CREATININE 2.0 (H) 09/26/2024       Plan:   - Strict I&Os and daily weights   - Avoid nephrotoxic agents such as NSAIDs, gadolinium and IV radiocontrast.  - Renally dose meds to current GFR  - Maintain MAP > 65      Hypophosphatemia  - will replete PRN for goal of greater than or equal to 3     Hypomagnesemia  - will replete PRN for Mg greater than or equal to  2    Acute cystitis  See sepsis     ID  * Severe sepsis  This patient does have evidence of infective focus  My overall impression is septic shock due to SBP < 90.  Source: Urinary Tract - UA cloudy with +3 leukocytes and blood, WBC 14.5 in ED, past urine cultures w/ proteus and staph aureus   - Chest XR in ED with left upper lobe focal opacity concerning for infectious, inflammatory, or neoplastic etiologies   Antibiotics given-   Antibiotics (72h ago, onward)      Start     Stop Route Frequency Ordered    09/28/24 1330  cefTRIAXone (Rocephin) 1 g in D5W 100 mL IVPB (MB+)         -- IV Every 24 hours (non-standard times) 09/28/24 1220          Latest lactate reviewed-  Recent Labs   Lab 09/26/24  1200 09/26/24  1453 09/27/24  1719   LACTATE  --    < > 1.8   POCLAC 2.74*  --   --     < > = values in this interval not displayed.       Organ dysfunction indicated by Acute kidney injury    Post- resuscitation assessment Yes Perfusion exam was performed within 6 hours of septic shock presentation after bolus shows Adequate tissue perfusion assessed by non-invasive monitoring   2.5L LR given in ED prior to bedside US. IVC collapsible at bedside. Ordered additional 500mL       Will Start Pressors- Levophed for MAP of 65  Source control achieved by: cefepime and vancomycin    - will start cefepime and vancomycin   - Bcx growing GNR  - f/u urine Cx with GNR  - renal US with possible stone  - post op day 1 from ureteral stent placement  - follow up repeat blood cultures   - continue flomax for ureteral stone   - zosyn switched to ceftriaxone (9/28 is day 1)    Oncology  Normocytic anemia  Hx anemia, on eliquis at home. Currently stable without overt signs of bud bleeding (hematemesis, hematochezia). Possibly due to diabetic nephropathy but unclear at this time      Plan  - Monitor serial CBC: Daily  - Transfuse PRBC if patient becomes hemodynamically unstable, symptomatic or H/H drops below 7/21.  - Patient has not  received any PRBC transfusions to date  - Patient's anemia is currently stable    Endocrine  Type 2 diabetes mellitus with neurologic complication, without long-term current use of insulin  Patient's FSGs are uncontrolled due to hyperglycemia on current medication regimen. A1C 9.8 on admission, increased from prior. Patient with history of insulin use on chart review but transitioned to metformin. Unclear whether patient is currently on home DM regimen. Hold Oral hypoglycemics while patient is in the hospital.     -Start moderate dose sliding scale   -glargine increased to 30 units/ day   -will titrate as necessary, goal 140-180    GI  Gastroesophageal reflux disease without esophagitis  - continue home pantoprazole    Other  Pseudohyponatremia  Pt with Na 127 on admission. Glucose elevated to 333. Corrected Na 134. Suspect hyponatremia 2/2 uncontrolled hyperglycemia     -CTM BMP for Na  -see DM for glucose management     Primary insomnia  - takes trazadone 100mg x2 PRN for insomnia at home  - held in setting of AMS/malaise, will consider restarting with improvement of condition     Mobility impaired  Mobility impaired likely 2/2  peripheral neuropathy. Able to use walker at home however acutely decreased in setting of sepsis     -Fall precautions  -Will consider PT/OT as condition improves        Critical Care Daily Checklist:    A: Awake: RASS Goal/Actual Goal: RASS Goal: 0-->alert and calm  Actual:     B: Spontaneous Breathing Trial Performed?     C: SAT & SBT Coordinated?                        D: Delirium: CAM-ICU Overall CAM-ICU: Positive   E: Early Mobility Performed? No   F: Feeding Goal:    Status:     Current Diet Order   Procedures    Diet diabetic 2000 Calorie     Order Specific Question:   Total calories:     Answer:   2000 Calorie      AS: Analgesia/Sedation    T: Thromboembolic Prophylaxis Eliquis   H: HOB > 300 Yes   U: Stress Ulcer Prophylaxis (if needed) PPI   G: Glucose Control 140-180 goal w/  insulin   B: Bowel Function     I: Indwelling Catheter (Lines & Mercado) Necessity PIV x 2   D: De-escalation of Antimicrobials/Pharmacotherapies Zosyn switched to zosyn     Plan for the day/ETD Monitor BP    Code Status:  Family/Goals of Care: Full Code         Critical secondary to Patient has a condition that poses threat to life and bodily function: Urosepsis complicated by ureteral stone necessitating ureteral stent placement/stone removal      Critical care was time spent personally by me on the following activities: development of treatment plan with patient or surrogate and bedside caregivers, discussions with consultants, evaluation of patient's response to treatment, examination of patient, ordering and performing treatments and interventions, ordering and review of laboratory studies, ordering and review of radiographic studies, pulse oximetry, re-evaluation of patient's condition. This critical care time did not overlap with that of any other provider or involve time for any procedures.     Bulmaro Chapman, DO  Internal Medicine Intern   Critical Care Medicine  [facility] - Medical [facility]

## 2024-09-28 NOTE — ASSESSMENT & PLAN NOTE
74yoM with 5mm right distal and 2mm right UVJ ureteral stone and sepsis     - Emergently had right ureteral stent placed on 9/27, appears to be clinically improving since   - ALYSIA appears to be improving  - WBC downtrending  - IVF  - Follow up urine culture, recommend two weeks of culture appropriate antibiotics  - Pain control  - Nausea control  - Flomax  - Can use ditropan for bladder spasms   - Keep katz in place until step down from ICU  - OK for nursing to irrigate catheter with catheter tipped syringe PRN  - Strain all urine  - Recommend bowel regimen   - Patient now has a ureteral stent in place. Counseled that while the stent is necessary now, it needs to be removed later and cannot stay in place long term. Patient needs to follow up with urology for stent management, emphasized risks of being lost to follow up. Risks of retained stent include but are not limited to need for later invasive surgery, renal failure, sepsis, and death.

## 2024-09-28 NOTE — ASSESSMENT & PLAN NOTE
This patient does have evidence of infective focus  My overall impression is septic shock due to SBP < 90.  Source: Urinary Tract - UA cloudy with +3 leukocytes and blood, WBC 14.5 in ED, past urine cultures w/ proteus and staph aureus   - Chest XR in ED with left upper lobe focal opacity concerning for infectious, inflammatory, or neoplastic etiologies   Antibiotics given-   Antibiotics (72h ago, onward)      Start     Stop Route Frequency Ordered    09/28/24 1330  cefTRIAXone (Rocephin) 1 g in D5W 100 mL IVPB (MB+)         -- IV Every 24 hours (non-standard times) 09/28/24 1220          Latest lactate reviewed-  Recent Labs   Lab 09/26/24  1200 09/26/24  1453 09/27/24  1719   LACTATE  --    < > 1.8   POCLAC 2.74*  --   --     < > = values in this interval not displayed.       Organ dysfunction indicated by Acute kidney injury    Post- resuscitation assessment Yes Perfusion exam was performed within 6 hours of septic shock presentation after bolus shows Adequate tissue perfusion assessed by non-invasive monitoring   2.5L LR given in ED prior to bedside US. IVC collapsible at bedside. Ordered additional 500mL       Will Start Pressors- Levophed for MAP of 65  Source control achieved by: cefepime and vancomycin    - will start cefepime and vancomycin   - Bcx growing GNR  - f/u urine Cx with GNR  - renal US with possible stone  - post op day 1 from ureteral stent placement  - follow up repeat blood cultures   - continue flomax for ureteral stone   - zosyn switched to ceftriaxone (9/28 is day 1)

## 2024-09-28 NOTE — SUBJECTIVE & OBJECTIVE
Interval History: Right ureteral stent placed emergently last night, doing significantly better since -  now off pressors, WBC downtrending, Cr downtrending.        Objective:     Temp:  [97.7 °F (36.5 °C)-98.7 °F (37.1 °C)] 98 °F (36.7 °C)  Pulse:  [75-98] 75  Resp:  [12-37] 20  SpO2:  [90 %-100 %] 100 %  BP: ()/(50-75) 98/60     Body mass index is 31.45 kg/m².    Date 09/28/24 0700 - 09/29/24 0659   Shift 2390-9937 8910-4483 7963-4356 24 Hour Total   INTAKE   I.V.(mL/kg) 199.7(2.1)   199.7(2.1)   Other 50   50   IV Piggyback 123.7   123.7   Shift Total(mL/kg) 373.4(3.9)   373.4(3.9)   OUTPUT   Urine(mL/kg/hr) 200   200   Shift Total(mL/kg) 200(2.1)   200(2.1)   Weight (kg) 96.6 96.6 96.6 96.6          Drains       Drain  Duration                  Urethral Catheter 09/27/24 2200 22 Fr. <1 day                     Physical Exam  HENT:      Head: Normocephalic and atraumatic.      Nose: Nose normal.   Eyes:      Conjunctiva/sclera: Conjunctivae normal.   Pulmonary:      Effort: Pulmonary effort is normal. No respiratory distress.   Genitourinary:     Comments: 22Fr 2-way catheter in place draining pink urine  Musculoskeletal:         General: No deformity.   Skin:     General: Skin is warm and dry.   Neurological:      General: No focal deficit present.      Mental Status: He is alert.   Psychiatric:         Mood and Affect: Mood normal.         Behavior: Behavior normal.           Significant Labs:    BMP:  Recent Labs   Lab 09/27/24  0337 09/28/24  0328 09/28/24  0835   *  127* 128* 127*   K 3.9  3.9 5.3* 4.8     101 101 102   CO2 17*  17* 17* 15*   BUN 29*  29* 31* 30*   CREATININE 1.9*  1.9* 1.9* 1.7*   CALCIUM 8.1*  8.1* 8.4* 8.1*       CBC:   Recent Labs   Lab 09/26/24  1151 09/27/24  0337 09/28/24  0328   WBC 14.52* 23.97* 13.96*   HGB 10.8* 10.4* 10.8*   HCT 32.6* 30.4* 31.0*   * 72* 43*       All pertinent labs results from the past 24 hours have been reviewed.    Significant  Imaging:  All pertinent imaging results/findings from the past 24 hours have been reviewed.

## 2024-09-28 NOTE — ANESTHESIA PREPROCEDURE EVALUATION
[facility]  Anesthesia Pre-Operative Evaluation         Patient Name: Anthony Ball  YOB: 1950  MRN: [id number]    SUBJECTIVE:     Pre-operative evaluation for Procedure(s) (LRB):  CYSTOSCOPY, WITH RETROGRADE PYELOGRAM AND URETERAL STENT INSERTION (Right)     09/27/2024    Anthony Ball is a 74 y.o. male w/ a significant PMHx of HTN, DM2, CAD, neuropathy, DVT, PE, Afib (on eliquis), GERD, asthma who presented to the ED for generalized weakness for ~2 weeks. Patient was planning to go to PT for his neuropathy today but was having increased weakness and unable to use his walker. Per family, patient also noted to be more confused recently. He has a history of recurrent UTIs.      In ED, patient was tachycardic and hypotensive. Temp of 102. Lactate 2.74. Elevated WBC. Glucose 395. Patient received 2.5L IVF and was started on ceftriaxone. UA concerning for UTI. CXR with JOCELYN opacification. Patient also with ALYSIA, Cr 1.9 (baseline ~0.9-1.1). Repeat lactate 3.6. Patient remained hypotensive despite fluid resuscitation and was placed on levophed gtt. CT from 9/27 with 5mm distal right ureteral stone, 2mm right UVJ stone, associated mild hydro, no left sided ureteral stones.     Ate dinner at 630 PM.         Patient now presents for the above procedure(s).      LDA:        Peripheral IV - Single Lumen 09/26/24 0000 18 G Anterior;Right Hand (Active)   Site Assessment Clean;Dry;No redness;Intact;No swelling 09/27/24 1915   Extremity Assessment Distal to IV No abnormal discoloration;No redness;No swelling;No warmth 09/27/24 1915   Line Status Infusing 09/27/24 1915   Dressing Status Clean;Dry;Intact 09/27/24 1915   Dressing Intervention Integrity maintained 09/27/24 1915   Dressing Change Due 09/30/24 09/27/24 1915   Site Change Due 09/30/24 09/27/24 1915   Reason Not Rotated Not due 09/27/24 1915   Number of days: 1            Peripheral IV - Single Lumen 09/26/24 1155 18 G Anterior;Left;Proximal  Forearm (Active)   Site Assessment Clean;Dry;Intact;No redness;No swelling 09/27/24 1915   Extremity Assessment Distal to IV No abnormal discoloration;No redness;No swelling 09/27/24 1915   Line Status Blood return noted;Flushed;Infusing 09/27/24 1520   Dressing Status Clean;Dry;Intact 09/27/24 1915   Dressing Intervention Integrity maintained 09/27/24 1915   Dressing Change Due 09/30/24 09/27/24 1915   Site Change Due 09/30/24 09/27/24 1915   Reason Not Rotated Not due 09/27/24 1915   Number of days: 1            Peripheral IV - Single Lumen 09/27/24 1649 20 G Anterior;Left Wrist (Active)   Site Assessment Clean;Dry;Intact;No redness;No swelling 09/27/24 1915   Extremity Assessment Distal to IV No abnormal discoloration;No redness;No swelling;No warmth 09/27/24 1915   Line Status Infusing 09/27/24 1915   Dressing Status Clean;Dry;Intact 09/27/24 1915   Dressing Intervention Integrity maintained 09/27/24 1915   Dressing Change Due 10/01/24 09/27/24 1915   Site Change Due 10/01/24 09/27/24 1915   Reason Not Rotated Not due 09/27/24 1915   Number of days: 0       Prev airway: None Documented    Drips:   NORepinephrine bitartrate-D5W  0-0.2 mcg/kg/min Intravenous Continuous   Stopped at 09/27/24 1951       Patient Active Problem List   Diagnosis    Type 2 diabetes mellitus with neurologic complication, without long-term current use of insulin    Diabetic polyneuropathy associated with type 2 diabetes mellitus    Essential hypertension    Recurrent falls    Mobility impaired    Primary insomnia    Gastroesophageal reflux disease without esophagitis    Weakness of both lower extremities    Abnormal gait    Agatston CAC score, >400    Class 1 obesity due to excess calories with body mass index (BMI) of 32.0 to 32.9 in adult    Balance disorder    Back spasm    Alcohol use    Impaired mobility and ADLs    Coordination impairment    Decreased  strength    Impaired functional mobility, balance, gait, and endurance     Acute cystitis    Nuclear sclerotic cataract of left eye    Positive colorectal cancer screening using Cologuard test    Severe sepsis    Normocytic anemia    Hypomagnesemia    Hypophosphatemia    Pseudohyponatremia    ALYSIA (acute kidney injury)    Concern for Pneumonia    Asthma       Review of patient's allergies indicates:  No Known Allergies    Current Inpatient Medications:   apixaban  5 mg Oral BID    azithromycin  500 mg Oral Daily    [START ON 9/28/2024] DULoxetine  60 mg Oral Daily    fluticasone furoate-vilanteroL  1 puff Inhalation Daily    insulin glargine U-100  20 Units Subcutaneous Daily    pantoprazole  40 mg Oral Daily    piperacillin-tazobactam (Zosyn) IV (PEDS and ADULTS) (extended infusion is not appropriate)  4.5 g Intravenous Q8H    pregabalin  75 mg Oral BID    tiotropium bromide  2 puff Inhalation Daily       No current facility-administered medications on file prior to encounter.     Current Outpatient Medications on File Prior to Encounter   Medication Sig Dispense Refill    apixaban (ELIQUIS) 5 mg Tab TAKE 1 TABLET(5 MG) BY MOUTH TWICE DAILY 180 tablet 3    busPIRone (BUSPAR) 15 MG tablet Take 1 tablet (15 mg total) by mouth 2 (two) times daily as needed (Anxiety). 180 tablet 3    cyanocobalamin (VITAMIN B-12) 1000 MCG tablet Take 1 tablet (1,000 mcg total) by mouth once daily. 90 tablet 3    DULoxetine (CYMBALTA) 60 MG capsule Take 1 capsule (60 mg total) by mouth 2 (two) times daily. 180 capsule 3    fluticasone-umeclidin-vilanter (TRELEGY ELLIPTA) 100-62.5-25 mcg DsDv Inhale 1 puff into the lungs once daily. 60 each 5    hydrOXYzine pamoate (VISTARIL) 25 MG Cap Take 25 mg by mouth every 8 (eight) hours as needed.      methocarbamoL (ROBAXIN) 750 MG Tab Take 1 tablet (750 mg total) by mouth 3 (three) times daily as needed (Back pain). 90 tablet 0    pantoprazole (PROTONIX) 40 MG tablet TAKE 1 TABLET(40 MG) BY MOUTH EVERY DAY 90 tablet 3    pregabalin (LYRICA) 150 MG capsule Take 1 capsule  (150 mg total) by mouth 3 (three) times daily. 90 capsule 5    traZODone (DESYREL) 100 MG tablet Take 2 tablets (200 mg total) by mouth nightly as needed for Insomnia. 14 tablet 0    albuterol (VENTOLIN HFA) 90 mcg/actuation inhaler Inhale 2 puffs into the lungs every 6 (six) hours as needed for Wheezing. Rescue 8 g 2    magnesium oxide 400 mg magnesium Tab Take 1 tablet by mouth every evening. 30 tablet 2    nortriptyline (PAMELOR) 10 MG capsule Take 1 capsule (10 mg total) by mouth 3 (three) times daily. 90 capsule 3    ondansetron (ZOFRAN) 8 MG tablet Take 1 tablet (8 mg total) by mouth every 8 (eight) hours as needed for Nausea. 30 tablet 1       Past Surgical History:   Procedure Laterality Date    CATARACT EXTRACTION W/  INTRAOCULAR LENS IMPLANT Right 7/15/2024    Procedure: EXTRACTION, CATARACT, WITH IOL INSERTION;  Surgeon: Margot Jacobson MD;  Location: [facility] OR;  Service: Ophthalmology;  Laterality: Right;    CATARACT EXTRACTION W/  INTRAOCULAR LENS IMPLANT Left 8/29/2024    Procedure: EXTRACTION, CATARACT, WITH IOL INSERTION;  Surgeon: Margot Jacobson MD;  Location: [facility] OR;  Service: Ophthalmology;  Laterality: Left;    COLONOSCOPY      Normal around 2020    COLONOSCOPY N/A 9/6/2024    Procedure: COLONOSCOPY;  Surgeon: Alessandro Serna MD;  Location: [facility] (4TH FLR);  Service: Endoscopy;  Laterality: N/A;  8/28-new case created-lvm for pre call-pt has cataract surgery 8/29/24-tb  ref-dr rico goldstein-peg-Brogan  ok to hold taran Perdomo  9/5-LVM for precall-Kpvt    COLONOSCOPY N/A 9/6/2024    Procedure: COLONOSCOPY;  Surgeon: Alessandro Serna MD;  Location: [facility] [place] (4TH FLR);  Service: Endoscopy;  Laterality: N/A;  + cologuard  8/8 ref by Isaias Goldstein MD, [facility], instr. to portal, Eliquis hold approval pending-  ok to hold Taarn 2 days per Dr Perdomo    TOTAL KNEE ARTHROPLASTY Right        Social History     Socioeconomic History    Marital status:    Tobacco Use    Smoking  status: Never    Smokeless tobacco: Never   Substance and Sexual Activity    Alcohol use: Yes     Comment: Former heavy use    Drug use: Never    Sexual activity: Yes     Comment: unknown     Social Determinants of Health     Financial Resource Strain: Low Risk  (1/15/2024)    Overall Financial Resource Strain (CARDIA)     Difficulty of Paying Living Expenses: Not very hard   Food Insecurity: No Food Insecurity (1/15/2024)    Hunger Vital Sign     Worried About Running Out of Food in the Last Year: Never true     Ran Out of Food in the Last Year: Never true   Transportation Needs: No Transportation Needs (1/15/2024)    PRAPARE - Transportation     Lack of Transportation (Medical): No     Lack of Transportation (Non-Medical): No   Physical Activity: Inactive (1/15/2024)    Exercise Vital Sign     Days of Exercise per Week: 0 days     Minutes of Exercise per Session: 0 min   Stress: No Stress Concern Present (1/15/2024)    [employer] [place] [employer] - Occupational Stress Questionnaire     Feeling of Stress : Only a little   Housing Stability: Low Risk  (1/15/2024)    Housing Stability Vital Sign     Unable to Pay for Housing in the Last Year: No     Number of Places Lived in the Last Year: 1     Unstable Housing in the Last Year: No       OBJECTIVE:     Vital Signs Range (Last 24H):  Temp:  [36.9 °C (98.5 °F)-37.8 °C (100.1 °F)]   Pulse:  []   Resp:  [14-34]   BP: ()/(40-74)   SpO2:  [77 %-98 %]       Significant Labs:  Lab Results   Component Value Date    WBC 23.97 (H) 09/27/2024    HGB 10.4 (L) 09/27/2024    HCT 30.4 (L) 09/27/2024    PLT 72 (L) 09/27/2024    CHOL 156 03/18/2022    TRIG 69 03/18/2022    HDL 93 (H) 03/18/2022    ALT 12 09/27/2024    AST 29 09/27/2024     (L) 09/27/2024     (L) 09/27/2024    K 3.9 09/27/2024    K 3.9 09/27/2024     09/27/2024     09/27/2024    CREATININE 1.9 (H) 09/27/2024    CREATININE 1.9 (H) 09/27/2024    BUN 29 (H) 09/27/2024     BUN 29 (H) 09/27/2024    CO2 17 (L) 09/27/2024    CO2 17 (L) 09/27/2024    TSH 2.363 09/26/2024    PSA 0.68 01/05/2024    INR 1.1 09/26/2024    GLUF 193 (H) 02/11/2022    HGBA1C 9.8 (H) 09/26/2024       Diagnostic Studies: No relevant studies.    EKG:   Results for orders placed or performed during the hospital encounter of 09/26/24   EKG 12-lead    Collection Time: 09/26/24 11:28 AM   Result Value Ref Range    QRS Duration 78 ms    [facility] QTC Calculation 437 ms    Narrative    Test Reason : R53.1,    Vent. Rate : 103 BPM     Atrial Rate : 103 BPM     P-R Int : 166 ms          QRS Dur : 078 ms      QT Int : 334 ms       P-R-T Axes : 029 026 043 degrees     QTc Int : 437 ms    Sinus tachycardia  Low voltage QRS  Borderline Abnormal ECG  When compared with ECG of 30-MAR-2024 23:14,  premature atrial complexes are no longer present  QT has shortened  Confirmed by KB ABEL MD ([id number]) on 9/26/2024 11:53:52 AM    Referred By: [email or web address]   SELF           Confirmed By:KB ABEL MD       2D ECHO:  TTE:  No results found for this or any previous visit.    TANYA:  No results found for this or any previous visit.    ASSESSMENT/PLAN:       Pre-op Assessment    I have reviewed the Patient Summary Reports.    I have reviewed the NPO Status.   I have reviewed the Medications.     Review of Systems  Anesthesia Hx:  No problems with previous Anesthesia   History of prior surgery of interest to airway management or planning:            Denies Personal Hx of Anesthesia complications.                    Cardiovascular:     Hypertension   CAD    Dysrhythmias atrial fibrillation                                   Pulmonary:    Asthma                    Renal/:  Chronic Renal Disease renal calculi  ALYSIA              Hepatic/GI:     GERD             Neurological:    Neuromuscular Disease,                                   Endocrine:  Diabetes               Physical Exam  General: Well nourished, Cooperative, Alert and  Oriented    Airway:  Mallampati: / II  Mouth Opening: Normal  TM Distance: Normal  Tongue: Normal    Dental:  Intact        Anesthesia Plan  Type of Anesthesia, risks & benefits discussed:    Anesthesia Type: Gen ETT  Intra-op Monitoring Plan: Standard ASA Monitors  Post Op Pain Control Plan: multimodal analgesia and IV/PO Opioids PRN  Induction:  IV and rapid sequence  Airway Plan: Direct, Post-Induction  Informed Consent: Informed consent signed with the Patient and all parties understand the risks and agree with anesthesia plan.  All questions answered.   ASA Score: 3 Emergent  Day of Surgery Review of History & Physical: H&P Update referred to the surgeon/provider.    Ready For Surgery From Anesthesia Perspective.     .

## 2024-09-28 NOTE — HPI
74yoM with right ureteral stone and sepsis. Came to ED yesterday febrile, hypotensive, tachycardic. UA then concerning for infection, admitted to MICU for urosepsis. Started on broad spectrum abx, fluids. Since then has been on pressors up to 0.08 of levo. Now on 0.02 of levo, room air, RCIB, WBC 24, Cr 1.9 (baseline 0.9), lactate 5.7, UCx from 9/26 with >100,000GNRs, CT from 9/27 with 5mm distal right ureteral stone, 2mm right UVJ stone, associated mild hydro, no left sided ureteral stones. Patient does not have prior urologic history. Is currently on eliquis for afib. Urology consulted for recommendations.

## 2024-09-28 NOTE — PROGRESS NOTES
[facility] - Medical ICU  Urology  Progress Note    Patient Name: Anthony Ball  MRN: [id number]  Admission Date: 9/26/2024  Hospital Length of Stay: 2 days  Code Status: Full Code   Attending Provider: Mateo Carrillo MD   Primary Care Physician: Isaias Myles MD    Subjective:     HPI:  74yoM with right ureteral stone and sepsis. Came to ED yesterday febrile, hypotensive, tachycardic. UA then concerning for infection, admitted to MICU for urosepsis. Started on broad spectrum abx, fluids. Since then has been on pressors up to 0.08 of levo. Now on 0.02 of levo, room air, RCIB, WBC 24, Cr 1.9 (baseline 0.9), lactate 5.7, UCx from 9/26 with >100,000GNRs, CT from 9/27 with 5mm distal right ureteral stone, 2mm right UVJ stone, associated mild hydro, no left sided ureteral stones. Patient does not have prior urologic history. Is currently on eliquis for afib. Urology consulted for recommendations.     Interval History: Right ureteral stent placed emergently last night, doing significantly better since -  now off pressors, WBC downtrending, Cr downtrending.        Objective:     Temp:  [97.7 °F (36.5 °C)-98.7 °F (37.1 °C)] 98 °F (36.7 °C)  Pulse:  [75-98] 75  Resp:  [12-37] 20  SpO2:  [90 %-100 %] 100 %  BP: ()/(50-75) 98/60     Body mass index is 31.45 kg/m².    Date 09/28/24 0700 - 09/29/24 0659   Shift 1616-9242 2639-7801 3535-0198 24 Hour Total   INTAKE   I.V.(mL/kg) 199.7(2.1)   199.7(2.1)   Other 50   50   IV Piggyback 123.7   123.7   Shift Total(mL/kg) 373.4(3.9)   373.4(3.9)   OUTPUT   Urine(mL/kg/hr) 200   200   Shift Total(mL/kg) 200(2.1)   200(2.1)   Weight (kg) 96.6 96.6 96.6 96.6          Drains       Drain  Duration                  Urethral Catheter 09/27/24 2200 22 Fr. <1 day                     Physical Exam  HENT:      Head: Normocephalic and atraumatic.      Nose: Nose normal.   Eyes:      Conjunctiva/sclera: Conjunctivae normal.   Pulmonary:      Effort: Pulmonary effort is normal. No respiratory  distress.   Genitourinary:     Comments: 22Fr 2-way catheter in place draining pink urine  Musculoskeletal:         General: No deformity.   Skin:     General: Skin is warm and dry.   Neurological:      General: No focal deficit present.      Mental Status: He is alert.   Psychiatric:         Mood and Affect: Mood normal.         Behavior: Behavior normal.           Significant Labs:    BMP:  Recent Labs   Lab 09/27/24  0337 09/28/24  0328 09/28/24  0835   *  127* 128* 127*   K 3.9  3.9 5.3* 4.8     101 101 102   CO2 17*  17* 17* 15*   BUN 29*  29* 31* 30*   CREATININE 1.9*  1.9* 1.9* 1.7*   CALCIUM 8.1*  8.1* 8.4* 8.1*       CBC:   Recent Labs   Lab 09/26/24  1151 09/27/24  0337 09/28/24  0328   WBC 14.52* 23.97* 13.96*   HGB 10.8* 10.4* 10.8*   HCT 32.6* 30.4* 31.0*   * 72* 43*       All pertinent labs results from the past 24 hours have been reviewed.    Significant Imaging:  All pertinent imaging results/findings from the past 24 hours have been reviewed.                  Assessment/Plan:     Right ureteral stone  74yoM with 5mm right distal and 2mm right UVJ ureteral stone and sepsis     - Emergently had right ureteral stent placed on 9/27, appears to be clinically improving since   - ALYSIA appears to be improving  - WBC downtrending  - IVF  - Follow up urine culture, recommend two weeks of culture appropriate antibiotics  - Pain control  - Nausea control  - Flomax  - Can use ditropan for bladder spasms   - Keep katz in place until step down from ICU  - OK for nursing to irrigate catheter with catheter tipped syringe PRN  - Strain all urine  - Recommend bowel regimen   - Patient now has a ureteral stent in place. Counseled that while the stent is necessary now, it needs to be removed later and cannot stay in place long term. Patient needs to follow up with urology for stent management, emphasized risks of being lost to follow up. Risks of retained stent include but are not limited to  need for later invasive surgery, renal failure, sepsis, and death.          VTE Risk Mitigation (From admission, onward)           Ordered     apixaban tablet 5 mg  2 times daily         09/26/24 1502     IP VTE HIGH RISK PATIENT  Once         09/26/24 1452     Place sequential compression device  Until discontinued         09/26/24 1417                    Julio Story MD  Urology  [facility] - Medical ICU

## 2024-09-28 NOTE — ASSESSMENT & PLAN NOTE
74yoM with 5mm right distal and 2mm right UVJ ureteral stone and sepsis     - Class A to OR for cystoscopy with right ureteral stent placement   - NPO  - IVF  - Abx per primary  - Consent  - Pain control  - Nausea control  - Flomax   - Strain all urine

## 2024-09-28 NOTE — OP NOTE
[facility]  Urology Department  Operative Note    Date: 09/27/2024    Pre-Op Diagnosis: Right ureteral stone, sepsis    Post-Op Diagnosis: same    Procedure(s) Performed:    1. Cystoscopy with right ureteral JJ stent placement  2. right retrograde pyelogram  3. Removal of bladder stone, size 1.5cm    Specimen(s): bladder stone for analysis    Staff Surgeon: Joni Wagoner MD    Assistant Surgeon: Julio Story MD     Circulator: Aby Singh RN     Anesthesia: RSI General    Indications: Anthony Ball is a 74 y.o. male with right ureteral stone with sepsis.    Findings:  Stone not visible on , stent successfully placed, no pyonephrosis noted, stone in bladder removed     Estimated Blood Loss: min    Drains:  6 Vincentian x 26 cm right JJ ureteral stent without strings, 22Fr 2-way katz catheter with 10cc sterile water in balloon    Procedure in Detail:  After risks, benefits and possible complications of the procedure were explained, the patient elected to undergo the procedure and informed consent was obtained. All questions were answered in the romero-operative area. The patient was transferred to the cystoscopy suite and placed on the fluoroscopy table in the supine position.  SCDs were applied and working. Time out was performed, romero-procedural antibiotics were given. Anesthesia was administered.  After adequate anesthesia the patient was placed in dorsal lithotomy position and prepped and draped in the usual sterile fashion.     A rigid cystoscope in a 22 Fr sheath was introduced into the patients bladder per urethra. This passed easily.  The entire urethra was visualized and revealed no strictures or masses.  Cystoscopy was performed which showed the right and left ureteral orifices in the normal anatomic position.  There were no bladder tumors, no trabeculations, but there was a bladder stone noted.      The right UO was identified and a 5 Fr  open-ended ureteral catheter was advanced over a  motion wire and placement was confirmed using fluoroscopy. A retrograde pyelogram (RPG) was then performed which showed mild hydronephrosis, no filling defects. The wire was then inserted via the ureteral catheter to the level of the renal pelvis. This was confirmed with fluoroscopy.    We then passed a 6 Fr x 26 cm JJ ureteral stent without strings over the wire to the level of the renal pelvis under direct vision as well as flouroscopy. The guide wire was removed.  A 180 degree coil was observed in the renal pelvis as well as the bladder using fluoroscopy.  A 180 degree coil was also seen using direct visualization in the bladder. The bladder stone seen was then removed using the cystoscope, the size of which was 1.5cm.     We then placed a 22Fr katz catheter with 10cc of sterile water in the balloon. The patient tolerated the procedure well and was transferred back to the ICU in stable condition.      Disposition: The patient will return back to the care of the MICU. Recommend keeping the katz catheter in place for maximal drainage until step down from the ICU. Recommend patient be treated with 2 weeks of culture appropriate antibiotics. Patient will need to follow up with outpatient urology to discuss definitive stone management.       Julio Story MD

## 2024-09-28 NOTE — CONSULTS
[facility] - Medical ICU  Urology  Consult Note    Patient Name: Anthony Ball  MRN: [id number]  Admission Date: 9/26/2024  Hospital Length of Stay: 1   Code Status: Full Code   Attending Provider: Mateo Carrillo MD   Consulting Provider: Julio Story MD  Primary Care Physician: Isaias Myles MD  Principal Problem:Severe sepsis    Inpatient consult to Urology  Consult performed by: Julio Story MD  Consult ordered by: Hafsa Edwards MD          Subjective:     HPI:  74yoM with right ureteral stone and sepsis. Came to ED yesterday febrile, hypotensive, tachycardic. UA then concerning for infection, admitted to MICU for urosepsis. Started on broad spectrum abx, fluids. Since then has been on pressors up to 0.08 of levo. Now on 0.02 of levo, room air, RCIB, WBC 24, Cr 1.9 (baseline 0.9), lactate 5.7, UCx from 9/26 with >100,000GNRs, CT from 9/27 with 5mm distal right ureteral stone, 2mm right UVJ stone, associated mild hydro, no left sided ureteral stones. Patient does not have prior urologic history. Is currently on eliquis for afib. Urology consulted for recommendations.     Past Medical History:   Diagnosis Date    Acute deep vein thrombosis (DVT) of left lower extremity 12/2023    Anxiety     Atrial fibrillation 12/2023    Cataract     Coronary artery disease     CAC score 1430    Depression     Diabetic neuropathy     Essential (primary) hypertension     GERD (gastroesophageal reflux disease)     History of bariatric surgery 07/08/2021    History of total right knee replacement 07/08/2021    Insomnia     Neuromyopathy     Possibly DM and/or alcohol related.    Pulmonary embolism 12/2023    Reactive airway disease     Type 2 diabetes mellitus        Past Surgical History:   Procedure Laterality Date    CATARACT EXTRACTION W/  INTRAOCULAR LENS IMPLANT Right 7/15/2024    Procedure: EXTRACTION, CATARACT, WITH IOL INSERTION;  Surgeon: Margot Jacobson MD;  Location: [facility];  Service: Ophthalmology;  Laterality:  Right;    CATARACT EXTRACTION W/  INTRAOCULAR LENS IMPLANT Left 8/29/2024    Procedure: EXTRACTION, CATARACT, WITH IOL INSERTION;  Surgeon: Margot Jacobson MD;  Location: [facility] OR;  Service: Ophthalmology;  Laterality: Left;    COLONOSCOPY      Normal around 2020    COLONOSCOPY N/A 9/6/2024    Procedure: COLONOSCOPY;  Surgeon: Alessandro Serna MD;  Location: [facility] (4TH FLR);  Service: Endoscopy;  Laterality: N/A;  8/28-new case created-lvm for pre call-pt has cataract surgery 8/29/24-tb  ref-dr rico myles-peg-portal  ok to hold taran myles-GT  9/5-LVM for precall-Kpvt    COLONOSCOPY N/A 9/6/2024    Procedure: COLONOSCOPY;  Surgeon: Alessandro Serna MD;  Location: [facility] ENDO (4TH FLR);  Service: Endoscopy;  Laterality: N/A;  + cologuard  8/8 ref by Isaias Myles MD, [facility], instr. to portal, Eliquis hold approval pending-[facility] to hold Eliquis 2 days per Dr Myles-GT    TOTAL KNEE ARTHROPLASTY Right        Review of patient's allergies indicates:  No Known Allergies    Family History       Problem Relation (Age of Onset)    Colon cancer Paternal Grandfather (89)    Hypertension Mother            Tobacco Use    Smoking status: Never    Smokeless tobacco: Never   Substance and Sexual Activity    Alcohol use: Yes     Comment: Former heavy use    Drug use: Never    Sexual activity: Yes     Comment: unknown           Objective:     Temp:  [98.5 °F (36.9 °C)-100.1 °F (37.8 °C)] 98.7 °F (37.1 °C)  Pulse:  [] 95  Resp:  [14-34] 24  SpO2:  [77 %-100 %] 100 %  BP: ()/(40-74) 101/63  Weight: 96.6 kg (212 lb 15.4 oz)  Body mass index is 31.45 kg/m².    Date 09/27/24 0700 - 09/28/24 0659   Shift 9344-6164 4944-9288 4325-8798 24 Hour Total   INTAKE   P.O. 420   420   I.V.(mL/kg) 1459.2(15.1) 18.9(0.2)  1478.1(15.3)   IV Piggyback 119.5 38.8  158.3   Shift Total(mL/kg) 1998.7(20.7) 57.6(0.6)  2056.3(21.3)   OUTPUT   Urine(mL/kg/hr)  200  200   Shift Total(mL/kg)  200(2.1)  200(2.1)   Weight (kg) 96.6 96.6 96.6  96.6          Drains       None                    Physical Exam  HENT:      Head: Normocephalic and atraumatic.      Nose: Nose normal.   Eyes:      Conjunctiva/sclera: Conjunctivae normal.   Pulmonary:      Effort: Pulmonary effort is normal. No respiratory distress.   Musculoskeletal:         General: No deformity.   Skin:     General: Skin is warm and dry.   Neurological:      General: No focal deficit present.      Mental Status: He is alert.   Psychiatric:         Mood and Affect: Mood normal.         Behavior: Behavior normal.          Significant Labs:    BMP:  Recent Labs   Lab 09/26/24  2140 09/26/24  2340 09/27/24  0337   * 126* 127*  127*   K 4.2 4.1 3.9  3.9   CL 98 100 101  101   CO2 17* 15* 17*  17*   BUN 27* 28* 29*  29*   CREATININE 1.9* 2.0* 1.9*  1.9*   CALCIUM 8.1* 7.9* 8.1*  8.1*       CBC:  Recent Labs   Lab 09/26/24  1151 09/27/24  0337   WBC 14.52* 23.97*   HGB 10.8* 10.4*   HCT 32.6* 30.4*   * 72*       All pertinent labs results from the past 24 hours have been reviewed.    Significant Imaging:  All pertinent imaging results/findings from the past 24 hours have been reviewed.                    Assessment and Plan:     Right ureteral stone  74yoM with 5mm right distal and 2mm right UVJ ureteral stone and sepsis     - Class A to OR for cystoscopy with right ureteral stent placement   - NPO  - IVF  - Abx per primary  - Consent  - Pain control  - Nausea control  - Flomax   - Strain all urine        VTE Risk Mitigation (From admission, onward)           Ordered     apixaban tablet 5 mg  2 times daily         09/26/24 1502     IP VTE HIGH RISK PATIENT  Once         09/26/24 1452     Place sequential compression device  Until discontinued         09/26/24 1417                    Thank you for your consult. I will follow-up with patient. Please contact us if you have any additional questions.    Julio Story MD  Urology  [facility] - Medical ICU

## 2024-09-29 PROBLEM — R91.1 PULMONARY NODULE: Status: ACTIVE | Noted: 2024-09-29

## 2024-09-29 LAB
ALBUMIN SERPL BCP-MCNC: 1.9 G/DL (ref 3.5–5.2)
ALP SERPL-CCNC: 96 U/L (ref 55–135)
ALT SERPL W/O P-5'-P-CCNC: 11 U/L (ref 10–44)
ANION GAP SERPL CALC-SCNC: 7 MMOL/L (ref 8–16)
AST SERPL-CCNC: 16 U/L (ref 10–40)
BACTERIA BLD CULT: ABNORMAL
BASOPHILS # BLD AUTO: 0.01 K/UL (ref 0–0.2)
BASOPHILS # BLD AUTO: 0.02 K/UL (ref 0–0.2)
BASOPHILS NFR BLD: 0.1 % (ref 0–1.9)
BASOPHILS NFR BLD: 0.2 % (ref 0–1.9)
BILIRUB SERPL-MCNC: 0.4 MG/DL (ref 0.1–1)
BUN SERPL-MCNC: 29 MG/DL (ref 8–23)
CALCIUM SERPL-MCNC: 8.1 MG/DL (ref 8.7–10.5)
CHLORIDE SERPL-SCNC: 105 MMOL/L (ref 95–110)
CO2 SERPL-SCNC: 21 MMOL/L (ref 23–29)
CREAT SERPL-MCNC: 1.4 MG/DL (ref 0.5–1.4)
DIFFERENTIAL METHOD BLD: ABNORMAL
DIFFERENTIAL METHOD BLD: ABNORMAL
EOSINOPHIL # BLD AUTO: 0 K/UL (ref 0–0.5)
EOSINOPHIL # BLD AUTO: 0 K/UL (ref 0–0.5)
EOSINOPHIL NFR BLD: 0.1 % (ref 0–8)
EOSINOPHIL NFR BLD: 0.2 % (ref 0–8)
ERYTHROCYTE [DISTWIDTH] IN BLOOD BY AUTOMATED COUNT: 12.6 % (ref 11.5–14.5)
ERYTHROCYTE [DISTWIDTH] IN BLOOD BY AUTOMATED COUNT: 12.8 % (ref 11.5–14.5)
EST. GFR  (NO RACE VARIABLE): 53.4 ML/MIN/1.73 M^2
GLUCOSE SERPL-MCNC: 265 MG/DL (ref 70–110)
HCT VFR BLD AUTO: 28.5 % (ref 40–54)
HCT VFR BLD AUTO: 28.6 % (ref 40–54)
HGB BLD-MCNC: 9.5 G/DL (ref 14–18)
HGB BLD-MCNC: 9.8 G/DL (ref 14–18)
IMM GRANULOCYTES # BLD AUTO: 0.04 K/UL (ref 0–0.04)
IMM GRANULOCYTES # BLD AUTO: 0.04 K/UL (ref 0–0.04)
IMM GRANULOCYTES NFR BLD AUTO: 0.4 % (ref 0–0.5)
IMM GRANULOCYTES NFR BLD AUTO: 0.4 % (ref 0–0.5)
LYMPHOCYTES # BLD AUTO: 0.8 K/UL (ref 1–4.8)
LYMPHOCYTES # BLD AUTO: 1.1 K/UL (ref 1–4.8)
LYMPHOCYTES NFR BLD: 8.5 % (ref 18–48)
LYMPHOCYTES NFR BLD: 9.7 % (ref 18–48)
MAGNESIUM SERPL-MCNC: 1.9 MG/DL (ref 1.6–2.6)
MCH RBC QN AUTO: 30.4 PG (ref 27–31)
MCH RBC QN AUTO: 30.8 PG (ref 27–31)
MCHC RBC AUTO-ENTMCNC: 33.2 G/DL (ref 32–36)
MCHC RBC AUTO-ENTMCNC: 34.4 G/DL (ref 32–36)
MCV RBC AUTO: 90 FL (ref 82–98)
MCV RBC AUTO: 92 FL (ref 82–98)
MONOCYTES # BLD AUTO: 0.3 K/UL (ref 0.3–1)
MONOCYTES # BLD AUTO: 0.3 K/UL (ref 0.3–1)
MONOCYTES NFR BLD: 3.2 % (ref 4–15)
MONOCYTES NFR BLD: 3.2 % (ref 4–15)
NEUTROPHILS # BLD AUTO: 7.9 K/UL (ref 1.8–7.7)
NEUTROPHILS # BLD AUTO: 9.3 K/UL (ref 1.8–7.7)
NEUTROPHILS NFR BLD: 86.4 % (ref 38–73)
NEUTROPHILS NFR BLD: 87.6 % (ref 38–73)
NRBC BLD-RTO: 0 /100 WBC
NRBC BLD-RTO: 0 /100 WBC
OHS QRS DURATION: 90 MS
OHS QTC CALCULATION: 442 MS
PHOSPHATE SERPL-MCNC: 2.6 MG/DL (ref 2.7–4.5)
PLATELET # BLD AUTO: 40 K/UL (ref 150–450)
PLATELET # BLD AUTO: 41 K/UL (ref 150–450)
PMV BLD AUTO: 12.3 FL (ref 9.2–12.9)
PMV BLD AUTO: 13.7 FL (ref 9.2–12.9)
POCT GLUCOSE: 161 MG/DL (ref 70–110)
POCT GLUCOSE: 182 MG/DL (ref 70–110)
POCT GLUCOSE: 206 MG/DL (ref 70–110)
POCT GLUCOSE: 245 MG/DL (ref 70–110)
POCT GLUCOSE: 288 MG/DL (ref 70–110)
POTASSIUM SERPL-SCNC: 4 MMOL/L (ref 3.5–5.1)
PROT SERPL-MCNC: 5.2 G/DL (ref 6–8.4)
RBC # BLD AUTO: 3.12 M/UL (ref 4.6–6.2)
RBC # BLD AUTO: 3.18 M/UL (ref 4.6–6.2)
SODIUM SERPL-SCNC: 133 MMOL/L (ref 136–145)
WBC # BLD AUTO: 10.77 K/UL (ref 3.9–12.7)
WBC # BLD AUTO: 9.07 K/UL (ref 3.9–12.7)

## 2024-09-29 PROCEDURE — 25000003 PHARM REV CODE 250

## 2024-09-29 PROCEDURE — 63600175 PHARM REV CODE 636 W HCPCS

## 2024-09-29 PROCEDURE — 85025 COMPLETE CBC W/AUTO DIFF WBC: CPT | Performed by: INTERNAL MEDICINE

## 2024-09-29 PROCEDURE — 80053 COMPREHEN METABOLIC PANEL: CPT | Performed by: INTERNAL MEDICINE

## 2024-09-29 PROCEDURE — 99233 SBSQ HOSP IP/OBS HIGH 50: CPT | Mod: GC,,, | Performed by: INTERNAL MEDICINE

## 2024-09-29 PROCEDURE — 83735 ASSAY OF MAGNESIUM: CPT | Performed by: INTERNAL MEDICINE

## 2024-09-29 PROCEDURE — 94761 N-INVAS EAR/PLS OXIMETRY MLT: CPT

## 2024-09-29 PROCEDURE — 94640 AIRWAY INHALATION TREATMENT: CPT

## 2024-09-29 PROCEDURE — 84100 ASSAY OF PHOSPHORUS: CPT | Performed by: INTERNAL MEDICINE

## 2024-09-29 PROCEDURE — 20600001 HC STEP DOWN PRIVATE ROOM

## 2024-09-29 RX ORDER — SODIUM,POTASSIUM PHOSPHATES 280-250MG
2 POWDER IN PACKET (EA) ORAL ONCE
Status: COMPLETED | OUTPATIENT
Start: 2024-09-29 | End: 2024-09-29

## 2024-09-29 RX ORDER — DOCUSATE SODIUM 283 MG/5ML
1 LIQUID RECTAL ONCE
Status: COMPLETED | OUTPATIENT
Start: 2024-09-29 | End: 2024-09-29

## 2024-09-29 RX ORDER — POLYETHYLENE GLYCOL 3350 17 G/17G
17 POWDER, FOR SOLUTION ORAL 2 TIMES DAILY
Status: DISCONTINUED | OUTPATIENT
Start: 2024-09-29 | End: 2024-10-01 | Stop reason: HOSPADM

## 2024-09-29 RX ORDER — AMOXICILLIN 250 MG
1 CAPSULE ORAL DAILY
Status: DISCONTINUED | OUTPATIENT
Start: 2024-09-30 | End: 2024-10-01 | Stop reason: HOSPADM

## 2024-09-29 RX ORDER — SODIUM CHLORIDE 9 MG/ML
INJECTION, SOLUTION INTRAVENOUS CONTINUOUS
Status: ACTIVE | OUTPATIENT
Start: 2024-09-29 | End: 2024-09-30

## 2024-09-29 RX ADMIN — POTASSIUM & SODIUM PHOSPHATES POWDER PACK 280-160-250 MG 2 PACKET: 280-160-250 PACK at 08:09

## 2024-09-29 RX ADMIN — INSULIN ASPART 4 UNITS: 100 INJECTION, SOLUTION INTRAVENOUS; SUBCUTANEOUS at 09:09

## 2024-09-29 RX ADMIN — TAMSULOSIN HYDROCHLORIDE 0.4 MG: 0.4 CAPSULE ORAL at 08:09

## 2024-09-29 RX ADMIN — CEFTRIAXONE 1 G: 1 INJECTION, POWDER, FOR SOLUTION INTRAMUSCULAR; INTRAVENOUS at 01:09

## 2024-09-29 RX ADMIN — DULOXETINE HYDROCHLORIDE 60 MG: 60 CAPSULE, DELAYED RELEASE ORAL at 08:09

## 2024-09-29 RX ADMIN — APIXABAN 5 MG: 5 TABLET, FILM COATED ORAL at 08:09

## 2024-09-29 RX ADMIN — TIOTROPIUM BROMIDE INHALATION SPRAY 2 PUFF: 3.12 SPRAY, METERED RESPIRATORY (INHALATION) at 09:09

## 2024-09-29 RX ADMIN — PANTOPRAZOLE SODIUM 40 MG: 40 TABLET, DELAYED RELEASE ORAL at 08:09

## 2024-09-29 RX ADMIN — SODIUM CHLORIDE: 9 INJECTION, SOLUTION INTRAVENOUS at 10:09

## 2024-09-29 RX ADMIN — PREGABALIN 75 MG: 75 CAPSULE ORAL at 08:09

## 2024-09-29 RX ADMIN — PREGABALIN 75 MG: 75 CAPSULE ORAL at 09:09

## 2024-09-29 RX ADMIN — DOCUSATE SODIUM 1 ENEMA: 283 LIQUID RECTAL at 09:09

## 2024-09-29 RX ADMIN — INSULIN GLARGINE 30 UNITS: 100 INJECTION, SOLUTION SUBCUTANEOUS at 09:09

## 2024-09-29 RX ADMIN — FLUTICASONE FUROATE AND VILANTEROL TRIFENATATE 1 PUFF: 100; 25 POWDER RESPIRATORY (INHALATION) at 09:09

## 2024-09-29 RX ADMIN — SODIUM CHLORIDE: 9 INJECTION, SOLUTION INTRAVENOUS at 09:09

## 2024-09-29 RX ADMIN — OXYBUTYNIN CHLORIDE 5 MG: 5 TABLET, EXTENDED RELEASE ORAL at 08:09

## 2024-09-29 RX ADMIN — SODIUM CHLORIDE: 9 INJECTION, SOLUTION INTRAVENOUS at 01:09

## 2024-09-29 NOTE — TRANSFER OF CARE
"Anesthesia Transfer of Care Note    Patient: Anthony Ball    Procedure(s) Performed: Procedure(s) (LRB):  CYSTOSCOPY, WITH RETROGRADE PYELOGRAM AND URETERAL STENT INSERTION (Right)  REMOVAL, CALCULUS, BLADDER (N/A)    Patient location: ICU    Anesthesia Type: general    Transport from OR: Transported from OR on 6-10 L/min O2 by face mask with adequate spontaneous ventilation. Continuous SpO2 monitoring in transport. Continuous ECG monitoring in transport    Post pain: adequate analgesia    Post assessment: no apparent anesthetic complications and tolerated procedure well    Post vital signs: stable    Level of consciousness: responds to stimulation    Nausea/Vomiting: no nausea/vomiting    Complications: none    Transfer of care protocol was followedComments: ICU nurse at  to receive patient. Vitals stable. Patient on 6L O2 simple face mask and 0.04 mcg/kg/min of levophed. Patients family in room. All questions answered.       Last vitals: Visit Vitals  /69 (BP Location: Right arm, Patient Position: Lying)   Pulse 74   Temp 36.5 °C (97.7 °F) (Oral)   Resp 19   Ht 5' 9" (1.753 m)   Wt 96.6 kg (212 lb 15.4 oz)   SpO2 100%   BMI 31.45 kg/m²     "

## 2024-09-29 NOTE — ASSESSMENT & PLAN NOTE
1.8cm pulmonary nodule incidentally found on CT.   - upon discharge, will need a repeat CT and follow up with the Pulmonary clinic in 3 months

## 2024-09-29 NOTE — ASSESSMENT & PLAN NOTE
Patient's FSGs are uncontrolled due to hyperglycemia on current medication regimen. A1C 9.8 on admission, increased from prior. Patient with history of insulin use on chart review but transitioned to metformin. Patient reported that he hasn't required medication for his DM in a while as it was previously well-controlled with diet and life-style modification.      -Start moderate dose sliding scale   -glargine increased to 30 units/ day   -will titrate as necessary, goal 140-180

## 2024-09-29 NOTE — NURSING TRANSFER
Nursing Transfer Note      9/29/2024   10:45 AM    Nurse giving handoff:REFUGIO Bond  Nurse receiving handoff:REFUGIO Lezama    Reason patient is being transferred: Stepdown    Transfer To: [facility]    Transfer via bed    Transfer with cardiac monitoring    Transported by RN & PCT    Transfer Vital Signs:  Blood Pressure:120/68  Heart Rate:81  O2:100  Respirations:22    Telemetry: Rate 81  Order for Tele Monitor? Yes    Additional Lines: Mercado Catheter    4eyes on Skin: no    Medicines sent: Insulin aspart & glargine, inhalers      Patient belongings transferred with patient: Yes    Chart send with patient: Yes    Notified: spouse Bulmaro via phone    Report called to REFUGIO Lezama prior to transfer. Upon arrival to room, pt connected to monitor in room, fluids restarted, and bed alarm turned on. Pt left with call light in reach; Lise at bedside upon arrival.

## 2024-09-29 NOTE — SUBJECTIVE & OBJECTIVE
Interval History/Significant Events: NAEON. Patient has remained HDS off levo. ALYSIA improving. Reported constipation, states his last BM was 2 weeks ago. Patient requesting enema.     Review of Systems  Objective:     Vital Signs (Most Recent):  Temp: 97.7 °F (36.5 °C) (09/29/24 0705)  Pulse: 85 (09/29/24 0905)  Resp: 20 (09/29/24 0905)  BP: 117/69 (09/29/24 0805)  SpO2: 100 % (09/29/24 0805) Vital Signs (24h Range):  Temp:  [97.7 °F (36.5 °C)-98.1 °F (36.7 °C)] 97.7 °F (36.5 °C)  Pulse:  [65-88] 85  Resp:  [9-29] 20  SpO2:  [97 %-100 %] 100 %  BP: ()/(50-74) 117/69   Weight: 96.6 kg (212 lb 15.4 oz)  Body mass index is 31.45 kg/m².      Intake/Output Summary (Last 24 hours) at 9/29/2024 0910  Last data filed at 9/29/2024 0805  Gross per 24 hour   Intake 2928.55 ml   Output 1270 ml   Net 1658.55 ml          Physical Exam  Constitutional:       Appearance: He is obese. He is not ill-appearing or diaphoretic.   HENT:      Mouth/Throat:      Mouth: Mucous membranes are moist.      Pharynx: Oropharynx is clear.   Cardiovascular:      Rate and Rhythm: Normal rate and regular rhythm.      Heart sounds: No murmur heard.     No gallop.   Pulmonary:      Effort: Pulmonary effort is normal.      Breath sounds: No wheezing or rales.   Abdominal:      General: Bowel sounds are normal. There is distension.      Tenderness: There is no abdominal tenderness.   Musculoskeletal:         General: No swelling.      Right lower leg: No edema.      Left lower leg: No edema.   Skin:     General: Skin is warm.      Coloration: Skin is not jaundiced.   Neurological:      Mental Status: He is alert and oriented to person, place, and time.            Vents:     Lines/Drains/Airways       Drain  Duration                  Urethral Catheter 09/27/24 2200 22 Fr. 1 day              Peripheral Intravenous Line  Duration                  Peripheral IV - Single Lumen 09/26/24 0000 18 G Anterior;Right Hand 3 days         Peripheral IV - Single  Lumen 09/27/24 1649 20 G Anterior;Left Wrist 1 day                  Significant Labs:    CBC/Anemia Profile:  Recent Labs   Lab 09/28/24 0328   WBC 13.96*   HGB 10.8*   HCT 31.0*   PLT 43*   MCV 89   RDW 12.7        Chemistries:  Recent Labs   Lab 09/28/24  0328 09/28/24  0835 09/29/24  0635   * 127* 133*   K 5.3* 4.8 4.0    102 105   CO2 17* 15* 21*   BUN 31* 30* 29*   CREATININE 1.9* 1.7* 1.4   CALCIUM 8.4* 8.1* 8.1*   ALBUMIN 2.1*  --  1.9*   PROT 6.0  --  5.2*   BILITOT 0.6  --  0.4   ALKPHOS 113  --  96   ALT 13  --  11   AST 35  --  16   MG 2.3  --  1.9   PHOS 2.9  --  2.6*       All pertinent labs within the past 24 hours have been reviewed.    Significant Imaging:  I have reviewed all pertinent imaging results/findings within the past 24 hours.

## 2024-09-29 NOTE — SUBJECTIVE & OBJECTIVE
Interval History: NAOE, clinically improving still off pressors, hemodynamically stable, Cr downtrending, katz draining blood tinged urine, tolerating stent well       Objective:     Temp:  [97.7 °F (36.5 °C)-98.1 °F (36.7 °C)] 97.7 °F (36.5 °C)  Pulse:  [65-88] 74  Resp:  [9-29] 19  SpO2:  [97 %-100 %] 100 %  BP: ()/(50-75) 117/69     Body mass index is 31.45 kg/m².    Date 09/29/24 0700 - 09/30/24 0659   Shift 6151-8465 3506-0136 7799-9661 24 Hour Total   INTAKE   I.V.(mL/kg) 1723.7(17.8)   1723.7(17.8)   Shift Total(mL/kg) 1723.7(17.8)   1723.7(17.8)   OUTPUT   Shift Total(mL/kg)       Weight (kg) 96.6 96.6 96.6 96.6          Drains       Drain  Duration                  Urethral Catheter 09/27/24 2200 22 Fr. 1 day                     Physical Exam  HENT:      Head: Normocephalic and atraumatic.      Nose: Nose normal.   Eyes:      Conjunctiva/sclera: Conjunctivae normal.   Pulmonary:      Effort: Pulmonary effort is normal. No respiratory distress.   Genitourinary:     Comments: 22Fr 2-way catheter in place draining pink urine  Musculoskeletal:         General: No deformity.   Skin:     General: Skin is warm and dry.   Neurological:      General: No focal deficit present.      Mental Status: He is alert.   Psychiatric:         Mood and Affect: Mood normal.         Behavior: Behavior normal.           Significant Labs:    BMP:  Recent Labs   Lab 09/28/24  0328 09/28/24  0835 09/29/24  0635   * 127* 133*   K 5.3* 4.8 4.0    102 105   CO2 17* 15* 21*   BUN 31* 30* 29*   CREATININE 1.9* 1.7* 1.4   CALCIUM 8.4* 8.1* 8.1*       CBC:   Recent Labs   Lab 09/26/24  1151 09/27/24  0337 09/28/24  0328   WBC 14.52* 23.97* 13.96*   HGB 10.8* 10.4* 10.8*   HCT 32.6* 30.4* 31.0*   * 72* 43*       All pertinent labs results from the past 24 hours have been reviewed.    Significant Imaging:  All pertinent imaging results/findings from the past 24 hours have been reviewed.

## 2024-09-29 NOTE — NURSING
Nurses Note -- 4 Eyes      9/29/2024   11:15 AM      Skin assessed during: Q Shift Change      [x] No Altered Skin Integrity Present    []Prevention Measures Documented      [] Yes- Altered Skin Integrity Present or Discovered   [] LDA Added if Not in Epic (Describe Wound)   [] New Altered Skin Integrity was Present on Admit and Documented in LDA   [] Wound Image Taken    Wound Care Consulted?  NO    Attending Nurse:  Lise Santa RN/Staff Member:  JORDAN MCKEON

## 2024-09-29 NOTE — ASSESSMENT & PLAN NOTE
This patient does have evidence of infective focus  My overall impression is septic shock due to SBP < 90.  Source: Urinary Tract - UA cloudy with +3 leukocytes and blood, WBC 14.5 in ED, past urine cultures w/ proteus and staph aureus   - Chest XR in ED with left upper lobe focal opacity concerning for infectious, inflammatory, or neoplastic etiologies   Antibiotics given-   Antibiotics (72h ago, onward)      Start     Stop Route Frequency Ordered    09/28/24 1330  cefTRIAXone (Rocephin) 1 g in D5W 100 mL IVPB (MB+)         -- IV Every 24 hours (non-standard times) 09/28/24 1220          Latest lactate reviewed-  Recent Labs   Lab 09/26/24  1200 09/26/24  1453 09/27/24  1719   LACTATE  --    < > 1.8   POCLAC 2.74*  --   --     < > = values in this interval not displayed.       Organ dysfunction indicated by Acute kidney injury    Post- resuscitation assessment Yes Perfusion exam was performed within 6 hours of septic shock presentation after bolus shows Adequate tissue perfusion assessed by non-invasive monitoring   2.5L LR given in ED prior to bedside US. IVC collapsible at bedside. Ordered additional 500mL       Will Start Pressors- Levophed for MAP of 65  Source control achieved by: cefepime and vancomycin    - Bcx growing GNR  - f/u urine Cx with GNR. Proteus  - renal US with possible stone  - s/p ureteral stent placement  - repeat blood cultures ngtd x1d  - continue flomax for ureteral stone   - zosyn switched to ceftriaxone (9/28 is day 1)  - f/u urology recs

## 2024-09-29 NOTE — RESIDENT HANDOFF
Handoff     Primary Team: [facility] CRITICAL CARE MEDICINE TEAM 1 Room Number: [id number]     Patient Name: Anthony Ball MRN: [id number]     Date of Birth: [id number] Allergies: Patient has no known allergies.     Age: 74 y.o. Admit Date: 9/26/2024     Sex: male  BMI: Body mass index is 31.45 kg/m².     Code Status: Full Code        Illness Level (current clinical status): Watcher - No    Reason for Admission: Severe sepsis    Brief HPI (pertinent PMH and diagnosis or differential diagnosis): Anthony Ball is a 74 y.o. male with a PMH of HTN, DM2, CAD, neuropathy, DVT, PE, Afib (on eliquis), GERD, asthma who presented to the ED for generalized weakness for ~2 weeks. Patient was planning to go to PT for his neuropathy today but was having increased weakness and unable to use his walker. Per family, patient also noted to be more confused recently. He has a history of recurrent UTIs. Does not self catheterize. No known history of BPH. Patient has been endorsing dysuria. Denies chest pain, SOB, abdominal pain, N/V/D.      In ED, patient was tachycardic and hypotensive. Temp of 102. Lactate 2.74. Elevated WBC. Glucose 395. Sepsis w/u was initiated. Patient received 2.5L IVF and was started on ceftriaxone. UA concerning for UTI. CXR with JOCELYN opacification. Patient also with ALYSIA, Cr 1.9 (baseline ~0.9-1.1). Repeat lactate 3.6. Patient remained hypotensive despite fluid resuscitation and was placed on levophed gtt.      MICU consulted for severe sepsis    Procedure Date: 9/27    Hospital Course (updated, brief assessment by system or problem, significant events):   Patient with severe sepsis, likely urologic source. CT with right obstructing ureteral stone. 9/27 Underwent ureteral spent placement with urology. On CTX. Patient now HDS off levophed.     Tasks (specific, using if-then statements):     Contingency Plan (special circumstances anticipated and plan):   - incidental 1.8cm pulmonary nodule found on CT, will need follow up with  pulmonary clinic in 3 months upon discharge    Estimated Discharge Date: 1-2 days    Discharge Disposition: Home or Self Care    Mentored By: Dr. Ferrer

## 2024-09-29 NOTE — ASSESSMENT & PLAN NOTE
74yoM with 5mm right distal and 2mm right UVJ ureteral stone and sepsis     - [facility] had right ureteral stent placed on 9/27, appears to be clinically improving since   - ALYSIA appears to be improving  - WBC downtrending  - IVF  - Follow up urine culture, recommend two weeks of culture appropriate antibiotics. Growing pan-sensitive proteus  - Pain control  - Nausea control  - Flomax  - Can use ditropan for bladder spasms   - Keep katz in place until step down from ICU. Voiding trial once stepped down.   - OK for nursing to irrigate catheter with catheter tipped syringe PRN  - Strain all urine  - Recommend bowel regimen   - Patient now has a ureteral stent in place. Counseled that while the stent is necessary now, it needs to be removed later and cannot stay in place long term. Patient needs to follow up with urology for stent management, emphasized risks of being lost to follow up. Risks of retained stent include but are not limited to need for later invasive surgery, renal failure, sepsis, and death.      Patient clinically improving, will set up follow up for patient to see outpatient urology in clinic next week to discuss definitive stone management. Urology to sign off. Please feel free to reach out with any questions.

## 2024-09-29 NOTE — ASSESSMENT & PLAN NOTE
Creatinine 1.9 on admit, baseline around 0.9-1.2  - Likely pre-renal from dehydration and/or hypotension in setting of septic shock     Lab Results   Component Value Date    CREATININE 1.4 09/29/2024    CREATININE 1.7 (H) 09/28/2024    CREATININE 1.9 (H) 09/28/2024       Plan:   - ALYSIA improving  - Strict I&Os and daily weights   - Avoid nephrotoxic agents such as NSAIDs, gadolinium and IV radiocontrast.  - Renally dose meds to current GFR  - Maintain MAP > 65

## 2024-09-29 NOTE — SUBJECTIVE & OBJECTIVE
Interval History/Significant Events: NAEON. Patient has remained HDS off levo. ALYSIA improving. Reported constipation, states his last BM was 2 weeks ago. Patient requesting enema.     Review of Systems  Objective:     Vital Signs (Most Recent):  Temp: 97.7 °F (36.5 °C) (09/29/24 0705)  Pulse: 86 (09/29/24 0917)  Resp: 20 (09/29/24 0905)  BP: 126/84 (09/29/24 0905)  SpO2: 99 % (09/29/24 0905) Vital Signs (24h Range):  Temp:  [97.7 °F (36.5 °C)-98.1 °F (36.7 °C)] 97.7 °F (36.5 °C)  Pulse:  [65-88] 86  Resp:  [9-29] 20  SpO2:  [97 %-100 %] 99 %  BP: ()/(50-84) 126/84   Weight: 96.6 kg (212 lb 15.4 oz)  Body mass index is 31.45 kg/m².      Intake/Output Summary (Last 24 hours) at 9/29/2024 1053  Last data filed at 9/29/2024 0905  Gross per 24 hour   Intake 3047.33 ml   Output 1270 ml   Net 1777.33 ml          Physical Exam  Constitutional:       Appearance: He is obese. He is not ill-appearing or diaphoretic.   HENT:      Mouth/Throat:      Mouth: Mucous membranes are moist.      Pharynx: Oropharynx is clear.   Cardiovascular:      Rate and Rhythm: Normal rate and regular rhythm.      Heart sounds: No murmur heard.     No gallop.   Pulmonary:      Effort: Pulmonary effort is normal.      Breath sounds: No wheezing or rales.   Abdominal:      General: Bowel sounds are normal. There is distension.      Tenderness: There is no abdominal tenderness.   Musculoskeletal:         General: No swelling.      Right lower leg: No edema.      Left lower leg: No edema.   Skin:     General: Skin is warm.      Coloration: Skin is not jaundiced.   Neurological:      Mental Status: He is alert and oriented to person, place, and time.            Vents:     Lines/Drains/Airways       Drain  Duration                  Urethral Catheter 09/27/24 2200 22 Fr. 1 day              Peripheral Intravenous Line  Duration                  Peripheral IV - Single Lumen 09/26/24 0000 18 G Anterior;Right Hand 3 days         Peripheral IV - Single  Lumen 09/27/24 1649 20 G Anterior;Left Wrist 1 day                  Significant Labs:    CBC/Anemia Profile:  Recent Labs   Lab 09/28/24 0328 09/29/24  0635   WBC 13.96* 9.07   HGB 10.8* 9.8*   HCT 31.0* 28.5*   PLT 43* 40*   MCV 89 90   RDW 12.7 12.6        Chemistries:  Recent Labs   Lab 09/28/24 0328 09/28/24  0835 09/29/24  0635   * 127* 133*   K 5.3* 4.8 4.0    102 105   CO2 17* 15* 21*   BUN 31* 30* 29*   CREATININE 1.9* 1.7* 1.4   CALCIUM 8.4* 8.1* 8.1*   ALBUMIN 2.1*  --  1.9*   PROT 6.0  --  5.2*   BILITOT 0.6  --  0.4   ALKPHOS 113  --  96   ALT 13  --  11   AST 35  --  16   MG 2.3  --  1.9   PHOS 2.9  --  2.6*       All pertinent labs within the past 24 hours have been reviewed.    Significant Imaging:  I have reviewed all pertinent imaging results/findings within the past 24 hours.

## 2024-09-29 NOTE — PROGRESS NOTES
[facility] - Medical [facility]  Urology  Progress Note    Patient Name: Anthony Ball  MRN: [id number]  Admission Date: 9/26/2024  Hospital Length of Stay: 3 days  Code Status: Full Code   Attending Provider: Laura Ferrer MD   Primary Care Physician: Isaias Myles MD    Subjective:     HPI:  74yoM with right ureteral stone and sepsis. Came to ED yesterday febrile, hypotensive, tachycardic. UA then concerning for infection, admitted to MICU for urosepsis. Started on broad spectrum abx, fluids. Since then has been on pressors up to 0.08 of levo. Now on 0.02 of levo, room air, RCIB, WBC 24, Cr 1.9 (baseline 0.9), lactate 5.7, UCx from 9/26 with >100,000GNRs, CT from 9/27 with 5mm distal right ureteral stone, 2mm right UVJ stone, associated mild hydro, no left sided ureteral stones. Patient does not have prior urologic history. Is currently on eliquis for afib. Urology consulted for recommendations.     Interval History: NAOE, clinically improving still off pressors, hemodynamically stable, Cr downtrending, katz draining blood tinged urine, tolerating stent well       Objective:     Temp:  [97.7 °F (36.5 °C)-98.1 °F (36.7 °C)] 97.7 °F (36.5 °C)  Pulse:  [65-88] 74  Resp:  [9-29] 19  SpO2:  [97 %-100 %] 100 %  BP: ()/(50-75) 117/69     Body mass index is 31.45 kg/m².    Date 09/29/24 0700 - 09/30/24 0659   Shift 1981-6806 4004-0665 6840-8039 24 Hour Total   INTAKE   I.V.(mL/kg) 1723.7(17.8)   1723.7(17.8)   Shift Total(mL/kg) 1723.7(17.8)   1723.7(17.8)   OUTPUT   Shift Total(mL/kg)       Weight (kg) 96.6 96.6 96.6 96.6          Drains       Drain  Duration                  Urethral Catheter 09/27/24 2200 22 Fr. 1 day                     Physical Exam  HENT:      Head: Normocephalic and atraumatic.      Nose: Nose normal.   Eyes:      Conjunctiva/sclera: Conjunctivae normal.   Pulmonary:      Effort: Pulmonary effort is normal. No respiratory distress.   Genitourinary:     Comments: 22Fr 2-way catheter in place  draining pink urine  Musculoskeletal:         General: No deformity.   Skin:     General: Skin is warm and dry.   Neurological:      General: No focal deficit present.      Mental Status: He is alert.   Psychiatric:         Mood and Affect: Mood normal.         Behavior: Behavior normal.           Significant Labs:    BMP:  Recent Labs   Lab 09/28/24  0328 09/28/24  0835 09/29/24  0635   * 127* 133*   K 5.3* 4.8 4.0    102 105   CO2 17* 15* 21*   BUN 31* 30* 29*   CREATININE 1.9* 1.7* 1.4   CALCIUM 8.4* 8.1* 8.1*       CBC:   Recent Labs   Lab 09/26/24  1151 09/27/24  0337 09/28/24  0328   WBC 14.52* 23.97* 13.96*   HGB 10.8* 10.4* 10.8*   HCT 32.6* 30.4* 31.0*   * 72* 43*       All pertinent labs results from the past 24 hours have been reviewed.    Significant Imaging:  All pertinent imaging results/findings from the past 24 hours have been reviewed.                  Assessment/Plan:     Right ureteral stone  74yoM with 5mm right distal and 2mm right UVJ ureteral stone and sepsis     - Emergently had right ureteral stent placed on 9/27, appears to be clinically improving since   - ALYSIA appears to be improving  - WBC downtrending  - IVF  - Follow up urine culture, recommend two weeks of culture appropriate antibiotics. Growing pan-sensitive proteus  - Pain control  - Nausea control  - Flomax  - Can use ditropan for bladder spasms   - Keep katz in place until step down from ICU. Voiding trial once stepped down.   - OK for nursing to irrigate catheter with catheter tipped syringe PRN  - Strain all urine  - Recommend bowel regimen   - Patient now has a ureteral stent in place. Counseled that while the stent is necessary now, it needs to be removed later and cannot stay in place long term. Patient needs to follow up with urology for stent management, emphasized risks of being lost to follow up. Risks of retained stent include but are not limited to need for later invasive surgery, renal failure,  sepsis, and death.      Patient clinically improving, will set up follow up for patient to see outpatient urology in clinic next week to discuss definitive stone management. Urology to sign off. Please feel free to reach out with any questions.         VTE Risk Mitigation (From admission, onward)           Ordered     apixaban tablet 5 mg  2 times daily         09/26/24 1502     IP VTE HIGH RISK PATIENT  Once         09/26/24 1452     Place sequential compression device  Until discontinued         09/26/24 1417                    Julio Story MD  Urology  [facility] - Medical ICU

## 2024-09-30 ENCOUNTER — TELEPHONE (OUTPATIENT)
Dept: INTERNAL MEDICINE | Facility: CLINIC | Age: 74
End: 2024-09-30
Payer: MEDICARE

## 2024-09-30 ENCOUNTER — TELEPHONE (OUTPATIENT)
Dept: UROLOGY | Facility: CLINIC | Age: 74
End: 2024-09-30
Payer: MEDICARE

## 2024-09-30 DIAGNOSIS — R91.1 NODULE OF LEFT LUNG: Primary | ICD-10-CM

## 2024-09-30 PROBLEM — D69.6 THROMBOCYTOPENIA: Status: ACTIVE | Noted: 2024-09-30

## 2024-09-30 LAB
ALBUMIN SERPL BCP-MCNC: 1.9 G/DL (ref 3.5–5.2)
ALP SERPL-CCNC: 94 U/L (ref 55–135)
ALT SERPL W/O P-5'-P-CCNC: 12 U/L (ref 10–44)
ANION GAP SERPL CALC-SCNC: 5 MMOL/L (ref 8–16)
AST SERPL-CCNC: 12 U/L (ref 10–40)
BASOPHILS # BLD AUTO: 0.02 K/UL (ref 0–0.2)
BASOPHILS NFR BLD: 0.3 % (ref 0–1.9)
BILIRUB SERPL-MCNC: 0.4 MG/DL (ref 0.1–1)
BUN SERPL-MCNC: 21 MG/DL (ref 8–23)
CALCIUM SERPL-MCNC: 8.1 MG/DL (ref 8.7–10.5)
CHLORIDE SERPL-SCNC: 110 MMOL/L (ref 95–110)
CO2 SERPL-SCNC: 23 MMOL/L (ref 23–29)
CREAT SERPL-MCNC: 1.2 MG/DL (ref 0.5–1.4)
DIFFERENTIAL METHOD BLD: ABNORMAL
EOSINOPHIL # BLD AUTO: 0.1 K/UL (ref 0–0.5)
EOSINOPHIL NFR BLD: 1.5 % (ref 0–8)
ERYTHROCYTE [DISTWIDTH] IN BLOOD BY AUTOMATED COUNT: 12.8 % (ref 11.5–14.5)
EST. GFR  (NO RACE VARIABLE): >60 ML/MIN/1.73 M^2
GLUCOSE SERPL-MCNC: 117 MG/DL (ref 70–110)
HCT VFR BLD AUTO: 29.2 % (ref 40–54)
HGB BLD-MCNC: 9.4 G/DL (ref 14–18)
IMM GRANULOCYTES # BLD AUTO: 0.04 K/UL (ref 0–0.04)
IMM GRANULOCYTES NFR BLD AUTO: 0.7 % (ref 0–0.5)
LYMPHOCYTES # BLD AUTO: 0.8 K/UL (ref 1–4.8)
LYMPHOCYTES NFR BLD: 13.4 % (ref 18–48)
MAGNESIUM SERPL-MCNC: 1.6 MG/DL (ref 1.6–2.6)
MCH RBC QN AUTO: 29.7 PG (ref 27–31)
MCHC RBC AUTO-ENTMCNC: 32.2 G/DL (ref 32–36)
MCV RBC AUTO: 92 FL (ref 82–98)
MONOCYTES # BLD AUTO: 0.4 K/UL (ref 0.3–1)
MONOCYTES NFR BLD: 5.8 % (ref 4–15)
NEUTROPHILS # BLD AUTO: 4.8 K/UL (ref 1.8–7.7)
NEUTROPHILS NFR BLD: 78.3 % (ref 38–73)
NRBC BLD-RTO: 0 /100 WBC
PATH REV BLD -IMP: NORMAL
PHOSPHATE SERPL-MCNC: 2.7 MG/DL (ref 2.7–4.5)
PLATELET # BLD AUTO: 44 K/UL (ref 150–450)
PMV BLD AUTO: 12.9 FL (ref 9.2–12.9)
POCT GLUCOSE: 130 MG/DL (ref 70–110)
POCT GLUCOSE: 132 MG/DL (ref 70–110)
POCT GLUCOSE: 156 MG/DL (ref 70–110)
POCT GLUCOSE: 184 MG/DL (ref 70–110)
POTASSIUM SERPL-SCNC: 3.7 MMOL/L (ref 3.5–5.1)
PROT SERPL-MCNC: 5.2 G/DL (ref 6–8.4)
RBC # BLD AUTO: 3.17 M/UL (ref 4.6–6.2)
SODIUM SERPL-SCNC: 138 MMOL/L (ref 136–145)
WBC # BLD AUTO: 6.06 K/UL (ref 3.9–12.7)

## 2024-09-30 PROCEDURE — 85025 COMPLETE CBC W/AUTO DIFF WBC: CPT | Performed by: INTERNAL MEDICINE

## 2024-09-30 PROCEDURE — 25000003 PHARM REV CODE 250

## 2024-09-30 PROCEDURE — 80053 COMPREHEN METABOLIC PANEL: CPT | Performed by: INTERNAL MEDICINE

## 2024-09-30 PROCEDURE — 97530 THERAPEUTIC ACTIVITIES: CPT

## 2024-09-30 PROCEDURE — 84100 ASSAY OF PHOSPHORUS: CPT | Performed by: INTERNAL MEDICINE

## 2024-09-30 PROCEDURE — 85060 BLOOD SMEAR INTERPRETATION: CPT | Mod: ,,, | Performed by: PATHOLOGY

## 2024-09-30 PROCEDURE — 97161 PT EVAL LOW COMPLEX 20 MIN: CPT

## 2024-09-30 PROCEDURE — 36415 COLL VENOUS BLD VENIPUNCTURE: CPT | Performed by: INTERNAL MEDICINE

## 2024-09-30 PROCEDURE — 63600175 PHARM REV CODE 636 W HCPCS: Performed by: HOSPITALIST

## 2024-09-30 PROCEDURE — 20600001 HC STEP DOWN PRIVATE ROOM

## 2024-09-30 PROCEDURE — 83735 ASSAY OF MAGNESIUM: CPT | Performed by: INTERNAL MEDICINE

## 2024-09-30 PROCEDURE — 25000003 PHARM REV CODE 250: Performed by: HOSPITALIST

## 2024-09-30 RX ADMIN — OXYCODONE HYDROCHLORIDE 5 MG: 5 TABLET ORAL at 08:09

## 2024-09-30 RX ADMIN — SENNOSIDES AND DOCUSATE SODIUM 1 TABLET: 50; 8.6 TABLET ORAL at 08:09

## 2024-09-30 RX ADMIN — INSULIN ASPART 1 UNITS: 100 INJECTION, SOLUTION INTRAVENOUS; SUBCUTANEOUS at 08:09

## 2024-09-30 RX ADMIN — FLUTICASONE FUROATE AND VILANTEROL TRIFENATATE 1 PUFF: 100; 25 POWDER RESPIRATORY (INHALATION) at 08:09

## 2024-09-30 RX ADMIN — TAMSULOSIN HYDROCHLORIDE 0.4 MG: 0.4 CAPSULE ORAL at 08:09

## 2024-09-30 RX ADMIN — OXYCODONE HYDROCHLORIDE 5 MG: 5 TABLET ORAL at 01:09

## 2024-09-30 RX ADMIN — PREGABALIN 75 MG: 75 CAPSULE ORAL at 08:09

## 2024-09-30 RX ADMIN — INSULIN GLARGINE 30 UNITS: 100 INJECTION, SOLUTION SUBCUTANEOUS at 08:09

## 2024-09-30 RX ADMIN — OXYBUTYNIN CHLORIDE 5 MG: 5 TABLET, EXTENDED RELEASE ORAL at 08:09

## 2024-09-30 RX ADMIN — POLYETHYLENE GLYCOL 3350 17 G: 17 POWDER, FOR SOLUTION ORAL at 08:09

## 2024-09-30 RX ADMIN — DULOXETINE HYDROCHLORIDE 60 MG: 60 CAPSULE, DELAYED RELEASE ORAL at 08:09

## 2024-09-30 RX ADMIN — TIOTROPIUM BROMIDE INHALATION SPRAY 2 PUFF: 3.12 SPRAY, METERED RESPIRATORY (INHALATION) at 08:09

## 2024-09-30 RX ADMIN — CEFTRIAXONE 2 G: 2 INJECTION, POWDER, FOR SOLUTION INTRAMUSCULAR; INTRAVENOUS at 01:09

## 2024-09-30 RX ADMIN — PANTOPRAZOLE SODIUM 40 MG: 40 TABLET, DELAYED RELEASE ORAL at 08:09

## 2024-09-30 RX ADMIN — OXYCODONE HYDROCHLORIDE 5 MG: 5 TABLET ORAL at 06:09

## 2024-09-30 NOTE — ASSESSMENT & PLAN NOTE
Patient has Abnormal Magnesium: hypomagnesemia. Will continue to monitor electrolytes closely. Will replace the affected electrolytes and repeat labs to be done after interventions completed. The patient's magnesium results have been reviewed and are listed below.  Recent Labs   Lab 09/30/24  0653   MG 1.6

## 2024-09-30 NOTE — PROGRESS NOTES
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 Eliquis, afib, recurrent UTI presenting to the ED with generalized weakness and confusion.    History obtained via patient and his significant other. The patient noted that he has been feeling off and on malaise for the past week and decreased oral intake for the past few days. On 9/25, he and his partner noted that he was doing very well and able to ambulate with a walker but on 9/26 he had become weak to the point he was not able to ambulate on his own. His partner also noted increased confusion on 9/26, not being able to remember dates, locations, or where he was. The patient also noted chills for the past day without fevers. Patient also mentions dysuria at the end of urination and a darkened, malodorous urine from baseline.  Of note, patient has a history of recurrent UTIs last growing P. Mirabilis and S. Aureus both sensitive to cefepime.     In the ED, patient with soft blood pressures (SBP 90s) and mildly tachycardic. Other vitals stable. Labs significant for Na 128, Cr 1.9 (Baseline 0.8-1.1), Lactate 2.74, , Albumin 2.4, procal 23.72. UA with presence of moderate bacteria and WBCs.  CXR with focal opacity in left upper lung zone.  Patient started on ceftriaxone, given 2 L LR and admitted to hospital medicine for further workup and management.     Overview/Hospital Course:  74M w/ NIDDM c/b neuropathy, normocytic anemia, GERD presenting with severe sepsis in setting of UTI w/ ALYSIA. Tachycardic, hypotensive, with leukocytosis on arrival. UA suggestive of  infections, clinically corroborated by LUTS. H/o prior UTIs in the past. Initially started on CTX and given IVF however patient hypotensive despite fluids. Required transient pressor support. Urology consulted and found to have ureteral stones in place. S/p rt ureteral stent placement. CTX to continue for 2 weeks. ALYSIA improving, mental status improving. Patient thrombocytopenic on admission and decreased while inpatient. Currently being worked up for thrombocytopenia. Pseudohyponatremic in setting of hyperglycemia. Improved with insulin regimen    Interval History: NAEON and VSS. Patient feeling well this AM after being stepped down to the floor and with significant improvements in mental status. Without complaints this AM. Denies fevers, chills, shortness of breath, nausea, vomiting, diarrhea, constipation.    Review of Systems   Constitutional:  Positive for appetite change (Improving apppetite, but still decreased). Negative for chills, diaphoresis, fatigue and fever.   Respiratory:  Negative for cough, choking, shortness of breath and wheezing.    Cardiovascular:  Negative for chest pain, palpitations and leg swelling.   Gastrointestinal:  Negative for abdominal distention, abdominal pain, constipation, diarrhea, nausea and vomiting.     Objective:     Vital Signs (Most Recent):  Temp: 98 °F (36.7 °C) (09/30/24 1531)  Pulse: 91 (09/30/24 1531)  Resp: 16 (09/30/24 1531)  BP: 134/71 (09/30/24 1531)  SpO2: 97 % (09/30/24 1531) Vital Signs (24h Range):  Temp:  [98 °F (36.7 °C)-99 °F (37.2 °C)] 98 °F (36.7 °C)  Pulse:  [] 91  Resp:  [16-21] 16  SpO2:  [97 %-100 %] 97 %  BP: (102-142)/(56-77) 134/71     Weight: 96.6 kg (212 lb 15.4 oz)  Body mass index is 31.45 kg/m².    Intake/Output Summary (Last 24 hours) at 9/30/2024 1536  Last data filed at 9/30/2024 1357  Gross per 24 hour   Intake 800 ml   Output 2100 ml   Net -1300 ml         Physical Exam  Vitals and nursing note reviewed.   Constitutional:        General: He is not in acute distress.     Appearance: Normal appearance. He is not ill-appearing.   HENT:      Head: Normocephalic and atraumatic.      Mouth/Throat:      Pharynx: Oropharynx is clear. No oropharyngeal exudate.   Eyes:      General: No scleral icterus.     Extraocular Movements: Extraocular movements intact.   Cardiovascular:      Rate and Rhythm: Regular rate and rhythm      Pulses: Normal pulses.      Heart sounds: Normal heart sounds. No murmur heard.     No friction rub. No gallop.   Pulmonary:      Effort: Pulmonary effort is normal. No respiratory distress.      Breath sounds: No stridor. No wheezing, rhonchi or rales.   Chest:      Chest wall: No tenderness.   Abdominal:      General: Abdomen is flat. Bowel sounds are normal. There is distension (mild distension).      Palpations: Abdomen is soft. There is no mass.      Tenderness: There is no abdominal tenderness. There is no guarding or rebound.      Hernia: No hernia is present.   Musculoskeletal:         General: No swelling.      Right lower leg: No edema.      Left lower leg: No edema.   Neurological:      Mental Status: He is alert. He is disoriented.      Gait: Gait abnormal.      Comments: Alert to person, place. Not at baseline per partner         Significant Labs: All pertinent labs within the past 24 hours have been reviewed.    Significant Imaging: I have reviewed all pertinent imaging results/findings within the past 24 hours.    Assessment/Plan:      * Severe sepsis  Patient presenting with tachycardia, leukocytosis, fevers to TMax 102.1 hypotension in the ED with concern for sepsis, Cr at 1.9 (BL 0.9-1.1). UA concerning for UTI, CXR with concern for consolidation in left upper lung zone. Concern for sepsis with urologic source vs respiratory PNA. Bcx collected, started on CTX in the ED. S/p 2.5 L LR without improvement in BP. Paitent started on levophed in the ED and escalated abx to cefepime, vancomycin, and azithromicin for  empiric coverage. S/p ureteral stent placement by urology. Ucx growing pansensitive proteus mirabilis. Deescalated abx to CTX with plan for 14 day course. Bcx with NGTD     -Continue CTX  -Urology consulted, appreciate recs      -F/u outpatient       -Complete 14 day course of CTX  -CTM CBC/BMP        Acute cystitis  Pt presenting with leukocytosis, tachycardia, hypotension chills without known fevers at home. Also noted dysuria, change in color and malodorous urine. UA in ED concerning for UTI. Previously grown proteus mirabilis and MSSA sensitive to CTX. Started on CTX in ED and given 2.5L IVF however did not respond. Started on pressors transiently and stepped up to ICU. Cx growing P. Mirabilis. S/p R ureteral stent placement via urology     -Continue IV abx  -See severe sepsis for urology recs      Thrombocytopenia  On presentation patient with thrombocytopenia without active bleeding. Decreased 9/27 s/p ureteral stent placement. Etiology unclear but differential includes infectious vs post surgical.     Plan  - Will transfuse if platelet count is <50k (if undergoing surgical procedure or have active bleeding).  - F/u peripheral smear  -CTM daily CBC      Pulmonary nodule  1.8cm pulmonary nodule incidentally found on CT.   - upon discharge, will need a repeat CT and follow up with the Pulmonary clinic in 3 months           Right ureteral stone  Patient found to have 2mm right UVJ ureteral stone with emergent rt ureteral stent placed on 9/27. Clinically improving since. Urology consulted    -Urology consulted, appreciate recs      -Flomax     -Abx for 2 weeks     -CTNU IVF     -Strain all urine     -CTNU bowel reg    Asthma  Pt with hx asthma on albuterol and trelegy at home. Wheezing in ED without acute decompensation. Concern for asthma exacerbation 2/2 ongoing sepsis. Currently well controlled    -Duonebs PRN  -Continue daily breo + spiriva      ALYSIA (acute kidney injury)  ALYSIA is likely due to pre-renal  azotemia due to dehydration, hypotension. Baseline creatinine is  0.9-12.1 . S/p 2.5 L IVF in the ED.      Plan  - ALYSIA is stable  - Avoid nephrotoxins and renally dose meds for GFR listed above  - Monitor urine output, serial BMP, and adjust therapy as needed  - CTM, consider additional fluids as patient with poor P.O intake prior to admission.     Pseudohyponatremia  Pt with Na 127 on admission. Glucose elevated to 333. Corrected Na 134. Suspect hyponatremia 2/2 hyperglycemia. Hx T2DM previously on insulin however transitioned to metformin per chart review. Improvement of Na after glucose with improvement    -CTM BMP for Na  -MDSSI and lantus 30U for glucose mgmt      Hypophosphatemia  Patient has Abnormal Phosphorus: hypophosphatemia. Will continue to monitor electrolytes closely. Will replace the affected electrolytes and repeat labs to be done after interventions completed. The patient's phosphorus results have been reviewed and are listed below.  Recent Labs   Lab 09/30/24  0653   PHOS 2.7          Hypomagnesemia  Patient has Abnormal Magnesium: hypomagnesemia. Will continue to monitor electrolytes closely. Will replace the affected electrolytes and repeat labs to be done after interventions completed. The patient's magnesium results have been reviewed and are listed below.  Recent Labs   Lab 09/30/24  0653   MG 1.6          Normocytic anemia  Hx anemia, on eliquis at home. Currently stable without overt signs of bud bleeding (hematemesis, hematochezia)    Plan  - Monitor serial CBC: Daily  - Transfuse PRBC if patient becomes hemodynamically unstable, symptomatic or H/H drops below 7/21.  - Patient has not received any PRBC transfusions to date  - Patient's anemia is currently stable    Gastroesophageal reflux disease without esophagitis  Continue home PPI      Primary insomnia  On trazodone at home    -Hold trazodone in setting of altered mental status, malaise can consider restarting if  improves      Mobility impaired  Mobility impaired likely 2/2  peripheral neuropathy. Able to use walker at home however acutely decreased in setting of sepsis    -Fall precautions  -PT/OT consulted, appreciate assistance      -Recommending low intensity therapy       Essential hypertension  Chronic, controlled. Latest blood pressure and vitals reviewed-     Temp:  [98 °F (36.7 °C)-99 °F (37.2 °C)]   Pulse:  []   Resp:  [16-21]   BP: (102-142)/(56-77)   SpO2:  [97 %-100 %] .   Home meds for hypertension were reviewed and noted below.       While in the hospital, will manage blood pressure as follows; Adjust home antihypertensive regimen as follows- hold home medication in setting of hypotension 2/2 severe sepsis    Will utilize p.r.n. blood pressure medication only if patient's blood pressure greater than 180/110 and he develops symptoms such as worsening chest pain or shortness of breath.    Diabetic polyneuropathy associated with type 2 diabetes mellitus  Diabetic polyneuropathy controlled with lyrica, cymbalta, nortryptiline at home     -Continue lyrica at decreased dose, cymbalta, holding amitryptiline  -Adjust as tolerated    Type 2 diabetes mellitus with neurologic complication, without long-term current use of insulin  Patient's FSGs are uncontrolled due to hyperglycemia on current medication regimen. A1C 9.8 on admission, increased from prior. Patient with history of insulin use on chart review but transitioned to metformin, though not noted taking in patients chart.Hold Oral hypoglycemics while patient is in the hospital.    -Start MDSSI  -Lantus 30 U      VTE Risk Mitigation (From admission, onward)           Ordered     IP VTE HIGH RISK PATIENT  Once         09/26/24 1452     Place sequential compression device  Until discontinued         09/26/24 1417                    Discharge Planning   CHRISTA: 10/1/2024     Code Status: Full Code   Is the patient medically ready for discharge?:     Reason for  patient still in hospital (select all that apply): Patient trending condition  Discharge Plan A: Home with family                  Zan Vance MD  Internal Medicine, PGY-1  Department of Hospital Medicine   [facility] - Telemetry Stepdown

## 2024-09-30 NOTE — ASSESSMENT & PLAN NOTE
Chronic, controlled. Latest blood pressure and vitals reviewed-     Temp:  [98 °F (36.7 °C)-99 °F (37.2 °C)]   Pulse:  []   Resp:  [16-21]   BP: (102-142)/(56-77)   SpO2:  [97 %-100 %] .   Home meds for hypertension were reviewed and noted below.       While in the hospital, will manage blood pressure as follows; Adjust home antihypertensive regimen as follows- hold home medication in setting of hypotension 2/2 severe sepsis    Will utilize p.r.n. blood pressure medication only if patient's blood pressure greater than 180/110 and he develops symptoms such as worsening chest pain or shortness of breath.

## 2024-09-30 NOTE — TELEPHONE ENCOUNTER
Appt made and mailed. Pt is still in hospital. Note placed on appt slip asking pt to call if this appt date/time is not acceptable

## 2024-09-30 NOTE — ASSESSMENT & PLAN NOTE
Pt with hx asthma on albuterol and trelegy at home. Wheezing in ED without acute decompensation. Concern for asthma exacerbation 2/2 ongoing sepsis. Currently well controlled    -Duonebs PRN  -Continue daily breo + spiriva

## 2024-09-30 NOTE — PT/OT/SLP EVAL
"Physical Therapy Evaluation    Patient Name:  Anthony Ball   MRN:  [id number]    Recommendations:     Discharge Recommendations: Low Intensity Therapy   Discharge Equipment Recommendations: none   Barriers to discharge:  patient below functional baseline    Assessment:     Anthony Ball is a 74 y.o. male admitted with a medical diagnosis of Severe sepsis.  He presents with the following impairments/functional limitations: weakness, impaired endurance, impaired self care skills, impaired functional mobility, gait instability, pain. The patient's mobility was limited this session due to pain with urination and fatigue. Patient did not require assist for supine to sit, patient encouraged to stand and attempt gait with RW. Patient stated he was too tired and in too much pain to mobilize, but stated "I know I can do this when I go home". Patient spontaneously returned himself to bed when needing to urinate due to severity of pain. Prior to admission, patient lives with spouse and uses RW for gait. Patient currently demonstrates a need for low intensity therapy on a scheduled basis secondary to a decline in functional status due to illness and surgical procedure     Rehab Prognosis: Good; patient would benefit from acute skilled PT services to address these deficits and reach maximum level of function.    Recent Surgery: Procedure(s) (LRB):  CYSTOSCOPY, WITH RETROGRADE PYELOGRAM AND URETERAL STENT INSERTION (Right)  REMOVAL, CALCULUS, BLADDER (N/A) 3 Days Post-Op    Plan:     During this hospitalization, patient to be seen 4 x/week to address the identified rehab impairments via gait training, therapeutic activities, therapeutic exercises, neuromuscular re-education and progress toward the following goals:    Plan of Care Expires:  10/30/24    Subjective     Chief Complaint: "I know I can go home and walk around", "Please don't make me do this, I'm going home tomorrow, all of this is pointless"  Patient/Family Comments/goals: " ""I would like to go back to outpatient therapy"  Pain/Comfort:  Pain Rating 1: 0/10  Location 1:  (pain at katz, spasms radiating to abdomen upwards)  Pain Addressed 1: Reposition, Cessation of Activity  Pain Rating Post-Intervention 1:  (severe pain with urinating)    Patients cultural, spiritual, Protestant conflicts given the current situation: no    Living Environment:  The patient lives with his spouse in a H in the [facility], 5 HEIKE L HR, WIS. No falls. Retired anesthesiologist.   Prior to admission, patients level of function was modified independent with RW for gait, requires some assist for mobility and ADLs.  Equipment used at home: walker, rolling, cane, straight, wheelchair, raised toilet, grab bar, shower chair, rollator.  DME owned (not currently used): none.  Upon discharge, patient will have assistance from spouse.    Objective:     Communicated with RN prior to session.  Patient found HOB elevated with katz catheter, pulse ox (continuous), telemetry  upon PT entry to room.    General Precautions: Standard, fall  Orthopedic Precautions:N/A   Braces: N/A  Respiratory Status: Room air    Exams:    Cognitive Exam  Patient is A&O x4 and follows 100% of one -step commands    Fine Motor Coordination   -       WFL     Postural Exam Patient presented with the following abnormalities:    -       Rounded shoulders  -       Forward head  -       Kyphosis  -       Posterior pelvic tilt  -        Sensation    -       Light touch diminished in HILDA feet, chronic neuropathy   Skin Integrity/Edema     -       Skin integrity: visibly intact  -       Edema: mild HILDA lower legs   R LE ROM WFL   R LE Strength 4-/5 hip flexion, 4/5 knee ext/flex, and ankle DF/PF   L LE ROM WFL   L LE Strength  4-/5 hip flexion,4/5 knee ext/flex, and ankle DF/PF          Functional Mobility:    Bed Mobility  Rolling to L: contact guard assist   Supine to Sit on the L side:  contact guard assist, use of HR and HOB elevated  Sit to " supine: stand by assistance   Scoot to HOB in supine: minimum assistance, partial bridging  Scoot to EOB in sitting: minimum assistance    Transfers Sit to Stand:  deferred, returned to supine due to pain          AM-PAC 6 CLICK MOBILITY  Total Score:13       Treatment & Education:  Patient educated on:  -role of therapy  -goals of session  -PT POC  -benefits of out of bed mobility and consequences of immobility  -calling for staff assist to mobilize safely  Patient agreeable to mobilize with therapy.      Patient educated on importance of mobility and consequences of immobility, purpose of therapy evaluation in order to determine safety with discharge.    Patient encouraged to ambulate, sit up in chair 3x/day to prevent deconditioning during hospitalization. Patient verbalized understanding and agreement to mobilize only with RN assist for safety.     Patient left HOB elevated with all lines intact and call button in reach.    GOALS:   Multidisciplinary Problems       Physical Therapy Goals          Problem: Physical Therapy    Goal Priority Disciplines Outcome Interventions   Physical Therapy Goal     PT, PT/OT Progressing    Description: Goals to be met by: 10/12     Patient will increase functional independence with mobility by performin. Supine to sit with Modified [place]  2. Sit to supine with Modified [place]  3. Sit to stand transfer with Modified [place]  4. Gait  x 100 feet with Supervision using LRAD.   5. Ascend/descend 5 stair with left Handrails Stand-by Assistance using LRAD.                          History:     Past Medical History:   Diagnosis Date    Acute deep vein thrombosis (DVT) of left lower extremity 2023    Anxiety     Atrial fibrillation 2023    Cataract     Coronary artery disease     CAC score 1430    Depression     Diabetic neuropathy     Essential (primary) hypertension     GERD (gastroesophageal reflux disease)     History of bariatric surgery 2021     History of total right knee replacement 07/08/2021    Insomnia     Neuromyopathy     Possibly DM and/or alcohol related.    Pulmonary embolism 12/2023    Reactive airway disease     Type 2 diabetes mellitus        Past Surgical History:   Procedure Laterality Date    CATARACT EXTRACTION W/  INTRAOCULAR LENS IMPLANT Right 7/15/2024    Procedure: EXTRACTION, CATARACT, WITH IOL INSERTION;  Surgeon: Margot Jacobson MD;  Location: [facility] OR;  Service: Ophthalmology;  Laterality: Right;    CATARACT EXTRACTION W/  INTRAOCULAR LENS IMPLANT Left 8/29/2024    Procedure: EXTRACTION, CATARACT, WITH IOL INSERTION;  Surgeon: Margot Jacobson MD;  Location: [facility] OR;  Service: Ophthalmology;  Laterality: Left;    COLONOSCOPY      Normal around 2020    COLONOSCOPY N/A 9/6/2024    Procedure: COLONOSCOPY;  Surgeon: Alessandro Serna MD;  [facility] ([facility]);  Service: Endoscopy;  Laterality: N/A;  8/28-new case created-lvm for pre call-pt has cataract surgery 8/29/24-tb  ref-dr rico goldstein-peg-[place]  ok to hold taran Perdomo  9/5-LVM for precall-Kpvt    COLONOSCOPY N/A 9/6/2024    Procedure: COLONOSCOPY;  Surgeon: Alessandro Serna MD;  Location: [facility] (4TH FLR);  Service: Endoscopy;  Laterality: N/A;  + cologuard  8/8 ref by Isaias Goldstein MD, [facility], instr. to portal, Eliquis hold approval pending-[facility] to hold Taran 2 days per Dr Perdomo    CYSTOSCOPY WITH URETEROSCOPY, RETROGRADE PYELOGRAPHY, AND INSERTION OF STENT Right 9/27/2024    Procedure: CYSTOSCOPY, WITH RETROGRADE PYELOGRAM AND URETERAL STENT INSERTION;  Surgeon: Joni Wagoner MD;  Location: [facility] OR 1ST FLR;  Service: Urology;  Laterality: Right;    REMOVAL, CALCULUS, BLADDER N/A 9/27/2024    Procedure: REMOVAL, CALCULUS, BLADDER;  Surgeon: Joni Wagoner MD;  Location: [facility] OR 1ST FLR;  Service: Urology;  Laterality: N/A;    TOTAL KNEE ARTHROPLASTY Right        Time Tracking:     PT Received On: 09/30/24  PT Start Time: 0930     PT Stop Time:  0954  PT Total Time (min): 24 min     Billable Minutes: Evaluation 12 and Therapeutic Activity 12      09/30/2024

## 2024-09-30 NOTE — ASSESSMENT & PLAN NOTE
Patient has Abnormal Phosphorus: hypophosphatemia. Will continue to monitor electrolytes closely. Will replace the affected electrolytes and repeat labs to be done after interventions completed. The patient's phosphorus results have been reviewed and are listed below.  Recent Labs   Lab 09/30/24  0653   PHOS 2.7

## 2024-09-30 NOTE — HOSPITAL COURSE
Admitted to [facility] on 9/26 for severe sepsis with noted tachycardia, hypotension, significant leukocytosis in setting of complicated, recurrent UTI with infectious UA c/b ALYSIA with elevated creatinine 2.0 from baseline 0.9. Hospital course complicated by septic shock requiring ICU admission for vasopressor support after noted persistent hypotension and elevation of serum lactate 3.6>5.6 despite IV fluid resuscitation per sepsis protocol. Found to have evidence of obstruction with right hydroureteronephrosis due to clusters of ureteral stones in the distal ureter, measuring up to 4-5 mm as well as right sided nonobstructive nephrolithiasis on CT imaging. Patient underwent emergent cystoscopy with right ureteral stent insertion with removal of bladder calculus with urology on 9/27 due to persistent shock and need for source control. Patient improved clinically following procedure with work up completed on admission revealing for GNR/proteus bacteremia on blood cultures. Initially on broad spectrum IV antibiotics which eventually deescalated based on sensitivities on culture. Continued on IV ceftriaxone while inpatient and transitioned to oral equivalent (cefpodoxime) for total of 2 weeks per urology recommendations s/p ureteral stent placement (EED 10/11 from date of urologic procedure) and treatment of severe sepsis with bacteremia. Pt also completed course of azithromycin as empiric treatment for lower respiratory infection following  asthma attack on day of admission.     Stepdown to hospital medicine service on 9/29. Repeat blood cultures negative at time of discharge. Thrombocytopenia monitored closely, likely to be related to severe sepsis with shock. Repeat labs arranged 1 week following discharge. Ambulatory referrals sent to Pulmonology to follow incidental pulmonary nodule 1.7 cm in size seen on CT, planned to repeat CT chest in 3 months along with clinic f/u. Nephrology/urology referral and  follow up planned for urolith and stent management. Started on subcutaneous basal insulin regimen due to uncontrolled hyperglycemia during admission. Referred to diabetes education and close PCP follow up after discharge for further management. ALYSIA resolved at time of discharge with creatinine 1.0 near baseline. Mercado removed with successful voiding trial prior to discharge on 10/1 with continued home health services.

## 2024-09-30 NOTE — PLAN OF CARE
Problem: Physical Therapy  Goal: Physical Therapy Goal  Description: Goals to be met by: 10/12     Patient will increase functional independence with mobility by performin. Supine to sit with Modified [place]  2. Sit to supine with Modified [place]  3. Sit to stand transfer with Modified [place]  4. Gait  x 100 feet with Supervision using LRAD.   5. Ascend/descend 5 stair with left Handrails Stand-by Assistance using LRAD.     Outcome: Progressing   Evaluation completed, initiated plan of care.   Viktoria Pino, PT  2024

## 2024-09-30 NOTE — TELEPHONE ENCOUNTER
----- Message from Julio Story sent at 9/29/2024  9:06 AM CDT -----  Regarding: follow up  Hi,    Could you please schedule this patient for follow up in two weeks with Dr. Wagoner to discuss definitive stone management. Thank you so much!    Thanks,  Bebeto

## 2024-09-30 NOTE — PT/OT/SLP PROGRESS
Occupational Therapy      Patient Name:  Anthony Ball   MRN:  [id number]    Patient not seen today secondary to PT evaluated pt. Earlier on this date and pt. Requested that OT not come see pt. As he did not feel he needed the services and did not want to get up again due to pain..OT to discharge orders at this time.  .    9/30/2024

## 2024-09-30 NOTE — PLAN OF CARE
Problem: Adult Inpatient Plan of Care  Goal: Plan of Care Review  Outcome: Progressing  Goal: Patient-Specific Goal (Individualized)  Outcome: Progressing  Goal: Absence of Hospital-Acquired Illness or Injury  Outcome: Progressing  Goal: Optimal Comfort and Wellbeing  Outcome: Progressing  Goal: Readiness for Transition of Care  Outcome: Progressing     Problem: Diabetes Comorbidity  Goal: Blood Glucose Level Within Targeted Range  Outcome: Progressing     Problem: Skin Injury Risk Increased  Goal: Skin Health and Integrity  Outcome: Progressing     Problem: Diabetes Comorbidity  Goal: Blood Glucose Level Within Targeted Range  Outcome: Progressing     Problem: Sepsis/Septic Shock  Goal: Optimal Coping  Outcome: Progressing  Goal: Absence of Bleeding  Outcome: Progressing  Goal: Blood Glucose Level Within Targeted Range  Outcome: Progressing  Goal: Absence of Infection Signs and Symptoms  Outcome: Progressing  Goal: Optimal Nutrition Intake  Outcome: Progressing     Problem: Acute Kidney Injury/Impairment  Goal: Fluid and Electrolyte Balance  Outcome: Progressing  Goal: Improved Oral Intake  Outcome: Progressing  Goal: Effective Renal Function  Outcome: Progressing     Problem: Pneumonia  Goal: Fluid Balance  Outcome: Progressing  Goal: Resolution of Infection Signs and Symptoms  Outcome: Progressing  Goal: Effective Oxygenation and Ventilation  Outcome: Progressing     Problem: Glycemic Control Impaired  Goal: Blood Glucose Level Within Targeted Range  Outcome: Progressing  Goal: Minimize Risk of Hypoglycemia  Outcome: Progressing     Problem: Electrolyte Imbalance  Goal: Electrolyte Balance  Outcome: Progressing     Problem: Pain Acute  Goal: Optimal Pain Control and Function  Outcome: Progressing     Problem: Infection  Goal: Absence of Infection Signs and Symptoms  Outcome: Progressing     POC in progress. Address questions and concerns. AAOX4. VVS. IV Fluids. Remain IV Abx. Mercado. Remain with poor appetite.  Denies any discomfort or pain. Call light in reach.

## 2024-09-30 NOTE — ASSESSMENT & PLAN NOTE
Pt with Na 127 on admission. Glucose elevated to 333. Corrected Na 134. Suspect hyponatremia 2/2 hyperglycemia. Hx T2DM previously on insulin however transitioned to metformin per chart review. Improvement of Na after glucose with improvement    -CTM BMP for Na  -MDSSI and lantus 30U for glucose mgmt

## 2024-09-30 NOTE — ASSESSMENT & PLAN NOTE
On presentation patient with thrombocytopenia without active bleeding. Decreased 9/27 s/p ureteral stent placement. Etiology unclear but differential includes infectious vs post surgical.     Plan  - Will transfuse if platelet count is <50k (if undergoing surgical procedure or have active bleeding).  - F/u peripheral smear  -CTM daily CBC

## 2024-09-30 NOTE — PHYSICIAN QUERY
Please clarify if there is any clinical correlation between condition and condition. Are the conditions:  Due to or associated with each other

## 2024-09-30 NOTE — ASSESSMENT & PLAN NOTE
Patient found to have 2mm right UVJ ureteral stone with emergent rt ureteral stent placed on 9/27. Clinically improving since. Urology consulted    -Urology consulted, appreciate recs      -Flomax     -Abx for 2 weeks     -CTNU IVF     -Strain all urine     -CTNU bowel reg

## 2024-09-30 NOTE — ASSESSMENT & PLAN NOTE
Patient's FSGs are uncontrolled due to hyperglycemia on current medication regimen. A1C 9.8 on admission, increased from prior. Patient with history of insulin use on chart review but transitioned to metformin, though not noted taking in patients chart.Hold Oral hypoglycemics while patient is in the hospital.    -Start MDSSI  -Lantus 30 U

## 2024-09-30 NOTE — ASSESSMENT & PLAN NOTE
Mobility impaired likely 2/2  peripheral neuropathy. Able to use walker at home however acutely decreased in setting of sepsis    -Fall precautions  -PT/OT consulted, appreciate assistance      -Recommending low intensity therapy

## 2024-09-30 NOTE — SUBJECTIVE & OBJECTIVE
Interval History: NAEON and VSS. Patient feeling well this AM after being stepped down to the floor and with significant improvements in mental status. Without complaints this AM. Denies fevers, chills, shortness of breath, nausea, vomiting, diarrhea, constipation.    Review of Systems   Constitutional:  Positive for appetite change (Improving apppetite, but still decreased). Negative for chills, diaphoresis, fatigue and fever.   Respiratory:  Negative for cough, choking, shortness of breath and wheezing.    Cardiovascular:  Negative for chest pain, palpitations and leg swelling.   Gastrointestinal:  Negative for abdominal distention, abdominal pain, constipation, diarrhea, nausea and vomiting.     Objective:     Vital Signs (Most Recent):  Temp: 98 °F (36.7 °C) (09/30/24 1531)  Pulse: 91 (09/30/24 1531)  Resp: 16 (09/30/24 1531)  BP: 134/71 (09/30/24 1531)  SpO2: 97 % (09/30/24 1531) Vital Signs (24h Range):  Temp:  [98 °F (36.7 °C)-99 °F (37.2 °C)] 98 °F (36.7 °C)  Pulse:  [] 91  Resp:  [16-21] 16  SpO2:  [97 %-100 %] 97 %  BP: (102-142)/(56-77) 134/71     Weight: 96.6 kg (212 lb 15.4 oz)  Body mass index is 31.45 kg/m².    Intake/Output Summary (Last 24 hours) at 9/30/2024 1536  Last data filed at 9/30/2024 1357  Gross per 24 hour   Intake 800 ml   Output 2100 ml   Net -1300 ml         Physical Exam        Significant Labs: All pertinent labs within the past 24 hours have been reviewed.    Significant Imaging: I have reviewed all pertinent imaging results/findings within the past 24 hours.

## 2024-09-30 NOTE — ASSESSMENT & PLAN NOTE
Pt presenting with leukocytosis, tachycardia, hypotension chills without known fevers at home. Also noted dysuria, change in color and malodorous urine. UA in ED concerning for UTI. Previously grown proteus mirabilis and MSSA sensitive to CTX. Started on CTX in ED and given 2.5L IVF however did not respond. Started on pressors transiently and stepped up to ICU. Cx growing P. Mirabilis. S/p R ureteral stent placement via urology     -Continue IV abx  -See severe sepsis for urology recs

## 2024-09-30 NOTE — ANESTHESIA POSTPROCEDURE EVALUATION
Anesthesia Post Evaluation    Patient: Anthony Ball    Procedure(s) Performed: Procedure(s) (LRB):  CYSTOSCOPY, WITH RETROGRADE PYELOGRAM AND URETERAL STENT INSERTION (Right)  REMOVAL, CALCULUS, BLADDER (N/A)    Final Anesthesia Type: general      Patient location during evaluation: ICU  Patient participation: No - Unable to Participate, Sedation  Level of consciousness: sedated  Post-procedure vital signs: reviewed and stable  Pain management: adequate  Airway patency: patent    PONV status at discharge: No PONV  Anesthetic complications: no      Cardiovascular status: blood pressure returned to baseline  Respiratory status: unassisted and spontaneous ventilation  Fluid status: Mercado in place.  Follow-up not needed.          Vitals Value Taken Time   /77 09/30/24 1130   Temp 36.8 °C (98.2 °F) 09/30/24 1130   Pulse 84 09/30/24 1159   Resp 16 09/30/24 1159   SpO2 95 % 09/30/24 1138   Vitals shown include unfiled device data.      No case tracking events are documented in the log.      Pain/Ze Score: Pain Rating Prior to Med Admin: 10 (9/30/2024  6:19 AM)  Pain Rating Post Med Admin: 0 (9/30/2024  7:19 AM)

## 2024-09-30 NOTE — PLAN OF CARE
Problem: Adult Inpatient Plan of Care  Goal: Plan of Care Review  Outcome: Progressing  Goal: Patient-Specific Goal (Individualized)  Outcome: Progressing  Goal: Absence of Hospital-Acquired Illness or Injury  Outcome: Progressing  Goal: Optimal Comfort and Wellbeing  Outcome: Progressing  Goal: Readiness for Transition of Care  Outcome: Progressing     Problem: Diabetes Comorbidity  Goal: Blood Glucose Level Within Targeted Range  Outcome: Progressing  Intervention: Monitor and Manage Glycemia  Flowsheets (Taken 9/30/2024 0141)  Glycemic Management: blood glucose monitored     Problem: Sepsis/Septic Shock  Goal: Optimal Coping  Outcome: Progressing  Goal: Absence of Bleeding  Outcome: Progressing  Goal: Blood Glucose Level Within Targeted Range  Outcome: Progressing  Goal: Absence of Infection Signs and Symptoms  Outcome: Progressing  Goal: Optimal Nutrition Intake  Outcome: Progressing     Problem: Acute Kidney Injury/Impairment  Goal: Fluid and Electrolyte Balance  Outcome: Progressing  Goal: Improved Oral Intake  Outcome: Progressing  Goal: Effective Renal Function  Outcome: Progressing  Intervention: Monitor and Support Renal Function  Flowsheets (Taken 9/30/2024 1960)  Stabilization Measures: legs elevated     Problem: Pneumonia  Goal: Fluid Balance  Outcome: Progressing  Goal: Resolution of Infection Signs and Symptoms  Outcome: Progressing  Goal: Effective Oxygenation and Ventilation  Outcome: Progressing     Problem: Glycemic Control Impaired  Goal: Blood Glucose Level Within Targeted Range  Outcome: Progressing  Goal: Minimize Risk of Hypoglycemia  Outcome: Progressing     Problem: Electrolyte Imbalance  Goal: Electrolyte Balance  Outcome: Progressing     Problem: Pain Acute  Goal: Optimal Pain Control and Function  Outcome: Progressing     Problem: Infection  Goal: Absence of Infection Signs and Symptoms  Outcome: Progressing     POC in progress. No acute changes. Prn for bladder spasm. Bed in lowest  position. Frequent safety checks. Call light in reach.

## 2024-09-30 NOTE — PLAN OF CARE
Problem: Skin Injury Risk Increased  Goal: Skin Health and Integrity  Outcome: Progressing     Problem: Adult Inpatient Plan of Care  Goal: Plan of Care Review  Outcome: Progressing  Goal: Patient-Specific Goal (Individualized)  Outcome: Progressing  Goal: Absence of Hospital-Acquired Illness or Injury  Outcome: Progressing  Goal: Optimal Comfort and Wellbeing  Outcome: Progressing  Goal: Readiness for Transition of Care  Outcome: Progressing     Problem: Diabetes Comorbidity  Goal: Blood Glucose Level Within Targeted Range  Outcome: Progressing     Problem: Sepsis/Septic Shock  Goal: Optimal Coping  Outcome: Progressing  Goal: Absence of Bleeding  Outcome: Progressing  Goal: Blood Glucose Level Within Targeted Range  Outcome: Progressing  Goal: Absence of Infection Signs and Symptoms  Outcome: Progressing  Goal: Optimal Nutrition Intake  Outcome: Progressing     Problem: Acute Kidney Injury/Impairment  Goal: Fluid and Electrolyte Balance  Outcome: Progressing  Goal: Improved Oral Intake  Outcome: Progressing  Goal: Effective Renal Function  Outcome: Progressing     Problem: Pneumonia  Goal: Fluid Balance  Outcome: Progressing  Goal: Resolution of Infection Signs and Symptoms  Outcome: Progressing  Goal: Effective Oxygenation and Ventilation  Outcome: Progressing     Problem: Glycemic Control Impaired  Goal: Blood Glucose Level Within Targeted Range  Outcome: Progressing  Goal: Minimize Risk of Hypoglycemia  Outcome: Progressing     Problem: Electrolyte Imbalance  Goal: Electrolyte Balance  Outcome: Progressing     Problem: Pain Acute  Goal: Optimal Pain Control and Function  Outcome: Progressing     Problem: Infection  Goal: Absence of Infection Signs and Symptoms  Outcome: Progressing

## 2024-09-30 NOTE — TELEPHONE ENCOUNTER
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  As part of the evaluation, he had an abdominal CT which found nodules.  I believe them to be inflammatory and part of the overall clinical presentation and hospitalization. Would recommend a follow up CT of chest in three months.  I told him that I would let him know, that you could order and cc me the results.  If they are still present, I would be happy to follow up with him.  Thanks,  Laura Ferrer

## 2024-09-30 NOTE — ASSESSMENT & PLAN NOTE
Patient presenting with tachycardia, leukocytosis, fevers to TMax 102.1 hypotension in the ED with concern for sepsis, Cr at 1.9 (BL 0.9-1.1). UA concerning for UTI, CXR with concern for consolidation in left upper lung zone. Concern for sepsis with urologic source vs respiratory PNA. Bcx collected, started on CTX in the ED. S/p 2.5 L LR without improvement in BP. Paitent started on levophed in the ED and escalated abx to cefepime, vancomycin, and azithromicin for empiric coverage. S/p ureteral stent placement by urology. Ucx growing pansensitive proteus mirabilis. Deescalated abx to CTX with plan for 14 day course. Bcx with NGTD     -Continue CTX  -Urology consulted, appreciate recs      -F/u outpatient       -Complete 14 day course of CTX  -CTM CBC/BMP

## 2024-10-01 ENCOUNTER — TELEPHONE (OUTPATIENT)
Dept: OPTOMETRY | Facility: CLINIC | Age: 74
End: 2024-10-01
Payer: MEDICARE

## 2024-10-01 VITALS
WEIGHT: 212.94 LBS | SYSTOLIC BLOOD PRESSURE: 146 MMHG | BODY MASS INDEX: 31.54 KG/M2 | HEART RATE: 88 BPM | RESPIRATION RATE: 16 BRPM | DIASTOLIC BLOOD PRESSURE: 75 MMHG | HEIGHT: 69 IN | TEMPERATURE: 99 F | OXYGEN SATURATION: 95 %

## 2024-10-01 LAB
ALBUMIN SERPL BCP-MCNC: 2 G/DL (ref 3.5–5.2)
ALP SERPL-CCNC: 95 U/L (ref 55–135)
ALT SERPL W/O P-5'-P-CCNC: 10 U/L (ref 10–44)
ANION GAP SERPL CALC-SCNC: 8 MMOL/L (ref 8–16)
AST SERPL-CCNC: 12 U/L (ref 10–40)
BASOPHILS # BLD AUTO: 0.02 K/UL (ref 0–0.2)
BASOPHILS NFR BLD: 0.3 % (ref 0–1.9)
BILIRUB SERPL-MCNC: 0.5 MG/DL (ref 0.1–1)
BUN SERPL-MCNC: 15 MG/DL (ref 8–23)
CALCIUM SERPL-MCNC: 7.9 MG/DL (ref 8.7–10.5)
CHLORIDE SERPL-SCNC: 108 MMOL/L (ref 95–110)
CO2 SERPL-SCNC: 20 MMOL/L (ref 23–29)
CREAT SERPL-MCNC: 1 MG/DL (ref 0.5–1.4)
DIFFERENTIAL METHOD BLD: ABNORMAL
EOSINOPHIL # BLD AUTO: 0.1 K/UL (ref 0–0.5)
EOSINOPHIL NFR BLD: 1.4 % (ref 0–8)
ERYTHROCYTE [DISTWIDTH] IN BLOOD BY AUTOMATED COUNT: 12.5 % (ref 11.5–14.5)
EST. GFR  (NO RACE VARIABLE): >60 ML/MIN/1.73 M^2
GLUCOSE SERPL-MCNC: 150 MG/DL (ref 70–110)
HCT VFR BLD AUTO: 28.6 % (ref 40–54)
HCV AB SERPL QL IA: NORMAL
HGB BLD-MCNC: 9.4 G/DL (ref 14–18)
HIV 1+2 AB+HIV1 P24 AG SERPL QL IA: NORMAL
IMM GRANULOCYTES # BLD AUTO: 0.05 K/UL (ref 0–0.04)
IMM GRANULOCYTES NFR BLD AUTO: 0.8 % (ref 0–0.5)
LYMPHOCYTES # BLD AUTO: 0.8 K/UL (ref 1–4.8)
LYMPHOCYTES NFR BLD: 11.9 % (ref 18–48)
MAGNESIUM SERPL-MCNC: 1.5 MG/DL (ref 1.6–2.6)
MCH RBC QN AUTO: 29.8 PG (ref 27–31)
MCHC RBC AUTO-ENTMCNC: 32.9 G/DL (ref 32–36)
MCV RBC AUTO: 91 FL (ref 82–98)
MONOCYTES # BLD AUTO: 0.4 K/UL (ref 0.3–1)
MONOCYTES NFR BLD: 6.6 % (ref 4–15)
NEUTROPHILS # BLD AUTO: 5.1 K/UL (ref 1.8–7.7)
NEUTROPHILS NFR BLD: 79 % (ref 38–73)
NRBC BLD-RTO: 0 /100 WBC
PATH REV BLD -IMP: NORMAL
PHOSPHATE SERPL-MCNC: 2.9 MG/DL (ref 2.7–4.5)
PLATELET # BLD AUTO: 51 K/UL (ref 150–450)
PMV BLD AUTO: 12 FL (ref 9.2–12.9)
POCT GLUCOSE: 136 MG/DL (ref 70–110)
POCT GLUCOSE: 151 MG/DL (ref 70–110)
POTASSIUM SERPL-SCNC: 3.9 MMOL/L (ref 3.5–5.1)
PROT SERPL-MCNC: 5.3 G/DL (ref 6–8.4)
RBC # BLD AUTO: 3.15 M/UL (ref 4.6–6.2)
SODIUM SERPL-SCNC: 136 MMOL/L (ref 136–145)
WBC # BLD AUTO: 6.41 K/UL (ref 3.9–12.7)

## 2024-10-01 PROCEDURE — 63600175 PHARM REV CODE 636 W HCPCS: Performed by: HOSPITALIST

## 2024-10-01 PROCEDURE — 80053 COMPREHEN METABOLIC PANEL: CPT | Performed by: INTERNAL MEDICINE

## 2024-10-01 PROCEDURE — 25000003 PHARM REV CODE 250

## 2024-10-01 PROCEDURE — 83735 ASSAY OF MAGNESIUM: CPT | Performed by: INTERNAL MEDICINE

## 2024-10-01 PROCEDURE — 36415 COLL VENOUS BLD VENIPUNCTURE: CPT

## 2024-10-01 PROCEDURE — 25000003 PHARM REV CODE 250: Performed by: HOSPITALIST

## 2024-10-01 PROCEDURE — 85025 COMPLETE CBC W/AUTO DIFF WBC: CPT | Performed by: INTERNAL MEDICINE

## 2024-10-01 PROCEDURE — 86803 HEPATITIS C AB TEST: CPT

## 2024-10-01 PROCEDURE — 63600175 PHARM REV CODE 636 W HCPCS

## 2024-10-01 PROCEDURE — 84100 ASSAY OF PHOSPHORUS: CPT | Performed by: INTERNAL MEDICINE

## 2024-10-01 PROCEDURE — 87389 HIV-1 AG W/HIV-1&-2 AB AG IA: CPT

## 2024-10-01 RX ORDER — CEFPODOXIME PROXETIL 100 MG/1
200 TABLET, FILM COATED ORAL EVERY 12 HOURS
Qty: 40 TABLET | Refills: 0 | Status: SHIPPED | OUTPATIENT
Start: 2024-10-01 | End: 2024-10-11

## 2024-10-01 RX ORDER — OXYBUTYNIN CHLORIDE 5 MG/1
5 TABLET, EXTENDED RELEASE ORAL DAILY
Qty: 30 TABLET | Refills: 11 | Status: SHIPPED | OUTPATIENT
Start: 2024-10-02 | End: 2024-10-01

## 2024-10-01 RX ORDER — DEXTROSE 4 G
TABLET,CHEWABLE ORAL
Qty: 1 EACH | Refills: 0 | Status: SHIPPED | OUTPATIENT
Start: 2024-10-01

## 2024-10-01 RX ORDER — INSULIN GLARGINE-YFGN 100 [IU]/ML
15 INJECTION, SOLUTION SUBCUTANEOUS DAILY
Qty: 6 ML | Refills: 0 | Status: SHIPPED | OUTPATIENT
Start: 2024-10-02 | End: 2024-11-10

## 2024-10-01 RX ORDER — LANCETS
EACH MISCELLANEOUS
Qty: 100 EACH | Refills: 0 | Status: SHIPPED | OUTPATIENT
Start: 2024-10-01 | End: 2024-10-01

## 2024-10-01 RX ORDER — TAMSULOSIN HYDROCHLORIDE 0.4 MG/1
0.4 CAPSULE ORAL DAILY
Qty: 30 CAPSULE | Refills: 11 | Status: SHIPPED | OUTPATIENT
Start: 2024-10-02 | End: 2024-10-01

## 2024-10-01 RX ORDER — INSULIN PUMP SYRINGE, 3 ML
EACH MISCELLANEOUS
Qty: 1 EACH | Refills: 0 | Status: SHIPPED | OUTPATIENT
Start: 2024-10-01 | End: 2024-10-01

## 2024-10-01 RX ORDER — INSULIN GLARGINE-YFGN 100 [IU]/ML
15 INJECTION, SOLUTION SUBCUTANEOUS DAILY
Qty: 4.5 ML | Refills: 0 | Status: SHIPPED | OUTPATIENT
Start: 2024-10-02 | End: 2024-10-01

## 2024-10-01 RX ORDER — CEFPODOXIME PROXETIL 100 MG/1
200 TABLET, FILM COATED ORAL EVERY 12 HOURS
Qty: 40 TABLET | Refills: 0 | Status: SHIPPED | OUTPATIENT
Start: 2024-10-01 | End: 2024-10-01

## 2024-10-01 RX ORDER — LANCETS 33 GAUGE
EACH MISCELLANEOUS
Qty: 100 EACH | Refills: 0 | Status: SHIPPED | OUTPATIENT
Start: 2024-10-01

## 2024-10-01 RX ORDER — TAMSULOSIN HYDROCHLORIDE 0.4 MG/1
0.4 CAPSULE ORAL DAILY
Qty: 30 CAPSULE | Refills: 0 | Status: SHIPPED | OUTPATIENT
Start: 2024-10-02 | End: 2024-11-01

## 2024-10-01 RX ORDER — OXYBUTYNIN CHLORIDE 5 MG/1
5 TABLET, EXTENDED RELEASE ORAL DAILY
Qty: 30 TABLET | Refills: 0 | Status: SHIPPED | OUTPATIENT
Start: 2024-10-02 | End: 2024-11-01

## 2024-10-01 RX ORDER — MAGNESIUM SULFATE HEPTAHYDRATE 40 MG/ML
2 INJECTION, SOLUTION INTRAVENOUS ONCE
Status: COMPLETED | OUTPATIENT
Start: 2024-10-01 | End: 2024-10-01

## 2024-10-01 RX ADMIN — TIOTROPIUM BROMIDE INHALATION SPRAY 2 PUFF: 3.12 SPRAY, METERED RESPIRATORY (INHALATION) at 08:10

## 2024-10-01 RX ADMIN — SENNOSIDES AND DOCUSATE SODIUM 1 TABLET: 50; 8.6 TABLET ORAL at 08:10

## 2024-10-01 RX ADMIN — PREGABALIN 75 MG: 75 CAPSULE ORAL at 08:10

## 2024-10-01 RX ADMIN — DULOXETINE HYDROCHLORIDE 60 MG: 60 CAPSULE, DELAYED RELEASE ORAL at 08:10

## 2024-10-01 RX ADMIN — CEFTRIAXONE 2 G: 2 INJECTION, POWDER, FOR SOLUTION INTRAMUSCULAR; INTRAVENOUS at 12:10

## 2024-10-01 RX ADMIN — APIXABAN 5 MG: 5 TABLET, FILM COATED ORAL at 09:10

## 2024-10-01 RX ADMIN — INSULIN GLARGINE 30 UNITS: 100 INJECTION, SOLUTION SUBCUTANEOUS at 08:10

## 2024-10-01 RX ADMIN — MAGNESIUM SULFATE HEPTAHYDRATE 2 G: 40 INJECTION, SOLUTION INTRAVENOUS at 08:10

## 2024-10-01 RX ADMIN — PANTOPRAZOLE SODIUM 40 MG: 40 TABLET, DELAYED RELEASE ORAL at 08:10

## 2024-10-01 RX ADMIN — OXYBUTYNIN CHLORIDE 5 MG: 5 TABLET, EXTENDED RELEASE ORAL at 08:10

## 2024-10-01 RX ADMIN — FLUTICASONE FUROATE AND VILANTEROL TRIFENATATE 1 PUFF: 100; 25 POWDER RESPIRATORY (INHALATION) at 08:10

## 2024-10-01 RX ADMIN — OXYCODONE HYDROCHLORIDE 5 MG: 5 TABLET ORAL at 04:10

## 2024-10-01 RX ADMIN — TAMSULOSIN HYDROCHLORIDE 0.4 MG: 0.4 CAPSULE ORAL at 08:10

## 2024-10-01 NOTE — NURSING
Patient being discharge to home and medications being delivered [facility] pharmacy. Removed IV access and telemetry. Discharge instructions reviewed with pt and partner.

## 2024-10-01 NOTE — ASSESSMENT & PLAN NOTE
Patient presenting with tachycardia, leukocytosis, fevers to TMax 102.1 hypotension in the ED with concern for sepsis, Cr at 1.9 (BL 0.9-1.1). UA concerning for UTI, CXR with concern for consolidation in left upper lung zone. Concern for sepsis with urologic source vs respiratory PNA. Bcx collected, started on CTX in the ED. S/p 2.5 L LR without improvement in BP. Paitent started on levophed in the ED and escalated abx to cefepime, vancomycin, and azithromicin for empiric coverage. S/p ureteral stent placement by urology. Ucx growing pansensitive proteus mirabilis. Deescalated abx to CTX with plan for 14 day course. Bcx with NGTD     -Continue CTX  -Urology consulted, appreciate recs      -F/u outpatient       -Complete 14 day course of CTX      -Voiding trial prior prior to discharge  -CTM CBC/BMP

## 2024-10-01 NOTE — PLAN OF CARE
[facility] - Telemetry Stepdown    HOME HEALTH ORDERS  FACE TO FACE ENCOUNTER    Patient Name: Anthony Ball  YOB: 1950    PCP: Isaias Myles MD   [facility] [address] [facility] [address]  PCP Phone Number: [phone]  PCP Fax: [phone]    Encounter Date: 9/26/24    Admit to Home Health    Diagnoses:  Active Hospital Problems    Diagnosis  POA    *Severe sepsis [A41.9, R65.20]  Yes    Thrombocytopenia [D69.6]  Yes    Pulmonary nodule [R91.1]  Yes    Hydronephrosis [N13.30]  Yes    Right ureteral stone [N20.1]  Unknown    Normocytic anemia [D64.9]  Yes     Chronic    Hypomagnesemia [E83.42]  Yes    Hypophosphatemia [E83.39]  Yes    Pseudohyponatremia [R79.89]  Yes    ALYSIA (acute kidney injury) [N17.9]  Yes    Asthma [J45.909]  Yes    Acute cystitis [N30.00]  Yes    Diabetic polyneuropathy associated with type 2 diabetes mellitus [E11.42]  Yes     Chronic    Primary insomnia [F51.01]  Yes     Chronic    Type 2 diabetes mellitus with neurologic complication, without long-term current use of insulin [E11.49]  Yes     Chronic    Gastroesophageal reflux disease without esophagitis [K21.9]  Yes     Chronic    Essential hypertension [I10]  Yes    Mobility impaired [Z74.09]  Yes      Resolved Hospital Problems   No resolved problems to display.       Follow Up Appointments:  Future Appointments   Date Time Provider Department Center   10/2/2024 12:40 PM Christal Adorno OD [facility] OPTOMTY [place]   10/16/2024 11:00 AM Joni Wagoner MD [facility] UROLOG [place]       Allergies:Review of patient's allergies indicates:  No Known Allergies    Medications: Review discharge medications with patient and family and provide education.    Current Facility-Administered Medications   Medication Dose Route Frequency Provider Last Rate Last Admin    acetaminophen tablet 650 mg  650 mg Oral Q4H PRN Zan Vance MD   650 mg at 09/27/24 0035    albuterol inhaler 2 puff  2 puff Inhalation Q6H PRN  Cathy Vee MD   2 puff at 09/27/24 0525    apixaban tablet 5 mg  5 mg Oral BID Zan Vance MD   5 mg at 10/01/24 0912    busPIRone tablet 15 mg  15 mg Oral BID PRN Zan Vance MD        cefTRIAXone (Rocephin) 1 g in D5W 100 mL IVPB (MB+)  1 g Intravenous Once Xin Deras MD        cefTRIAXone (ROCEPHIN) 2 g in D5W 100 mL IVPB (MB+)  2 g Intravenous Q24H Xin Deras MD   Stopped at 10/01/24 1321    dextrose 10% bolus 125 mL 125 mL  12.5 g Intravenous PRN Corona Lindsey MD        dextrose 10% bolus 250 mL 250 mL  25 g Intravenous PRN Cathy Vee MD        DULoxetine DR capsule 60 mg  60 mg Oral Daily Zak Rosales MD   60 mg at 10/01/24 0829    fluticasone furoate-vilanteroL 100-25 mcg/dose diskus inhaler 1 puff  1 puff Inhalation Daily Zan Vance MD   1 puff at 10/01/24 0830    hydrOXYzine pamoate capsule 25 mg  25 mg Oral Q8H PRN Zan Vance MD   25 mg at 09/28/24 2207    insulin aspart U-100 pen 0-10 Units  0-10 Units Subcutaneous QID (AC + HS) PRN Cathy Vee MD   1 Units at 09/30/24 2047    insulin glargine U-100 (Lantus) pen 30 Units  30 Units Subcutaneous Daily Zak Rosales MD   30 Units at 10/01/24 0831    naloxone 0.4 mg/mL injection 0.02 mg  0.02 mg Intravenous PRN Zan Vance MD        oxybutynin 24 hr tablet 5 mg  5 mg Oral Daily Bulmaro Chapman DO   5 mg at 10/01/24 0829    oxyCODONE immediate release tablet 5 mg  5 mg Oral Q6H PRN Cathy Vee MD   5 mg at 10/01/24 0416    pantoprazole EC tablet 40 mg  40 mg Oral Daily Zan Vance MD   40 mg at 10/01/24 0829    polyethylene glycol packet 17 g  17 g Oral BID Christal Harris MD   17 g at 09/30/24 0847    pregabalin capsule 75 mg  75 mg Oral BID Zak Rosales MD   75 mg at 10/01/24 0829    senna-docusate 8.6-50 mg per tablet 1 tablet  1 tablet Oral Daily Christal Harris MD   1 tablet at 10/01/24 0829    sodium chloride 0.9% flush 10 mL  10 mL Intravenous PRN  Marlon Erickson,         sodium chloride 0.9% flush 10 mL  10 mL Intravenous Q12H PRN Zan Vance MD        sodium chloride 0.9% flush 10 mL  10 mL Intravenous PRN Cathy Vee MD        tamsulosin 24 hr capsule 0.4 mg  0.4 mg Oral Daily Cathy Vee MD   0.4 mg at 10/01/24 0829    tiotropium bromide 2.5 mcg/actuation inhaler 2 puff  2 puff Inhalation Daily Zan Vance MD   2 puff at 10/01/24 0830        Medication List        START taking these medications      blood sugar diagnostic Strp  To check BG 3 times daily, to use with insurance preferred meter     blood-glucose meter kit  To check BG 3 times daily, to use with insurance preferred meter     cefpodoxime 100 MG tablet  Commonly known as: VANTIN  Take 2 tablets (200 mg total) by mouth every 12 (twelve) hours. for 10 days     insulin glargine-yfgn 100 unit/mL (3 mL) Inpn  Commonly known as: SEMGLEE(INSULIN GLARG-YFGN)PEN  Inject 15 Units into the skin once daily.  Start taking on: October 2, 2024     lancets Misc  To check BG 3 times daily, to use with insurance preferred meter     oxybutynin 5 MG Tr24  Commonly known as: DITROPAN-XL  Take 1 tablet (5 mg total) by mouth once daily.  Start taking on: October 2, 2024     tamsulosin 0.4 mg Cap  Commonly known as: FLOMAX  Take 1 capsule (0.4 mg total) by mouth once daily.  Start taking on: October 2, 2024            CONTINUE taking these medications      albuterol 90 mcg/actuation inhaler  Commonly known as: VENTOLIN HFA  Inhale 2 puffs into the lungs every 6 (six) hours as needed for Wheezing. Rescue     busPIRone 15 MG tablet  Commonly known as: BUSPAR  Take 1 tablet (15 mg total) by mouth 2 (two) times daily as needed (Anxiety).     cyanocobalamin 1000 MCG tablet  Commonly known as: VITAMIN B-12  Take 1 tablet (1,000 mcg total) by mouth once daily.     DULoxetine 60 MG capsule  Commonly known as: CYMBALTA  Take 1 capsule (60 mg total) by mouth 2 (two) times daily.     ELIQUIS 5 mg  Tab  Generic drug: apixaban  TAKE 1 TABLET(5 MG) BY MOUTH TWICE DAILY     fluticasone-umeclidin-vilanter 100-62.5-25 mcg Dsdv  Commonly known as: TRELEGY ELLIPTA  Inhale 1 puff into the lungs once daily.     hydrOXYzine pamoate 25 MG Cap  Commonly known as: VISTARIL  Take 25 mg by mouth every 8 (eight) hours as needed.     magnesium oxide 400 mg magnesium Tab  Take 1 tablet by mouth every evening.     methocarbamoL 750 MG Tab  Commonly known as: ROBAXIN  Take 1 tablet (750 mg total) by mouth 3 (three) times daily as needed (Back pain).     nortriptyline 10 MG capsule  Commonly known as: PAMELOR  Take 1 capsule (10 mg total) by mouth 3 (three) times daily.     ondansetron 8 MG tablet  Commonly known as: ZOFRAN  Take 1 tablet (8 mg total) by mouth every 8 (eight) hours as needed for Nausea.     pantoprazole 40 MG tablet  Commonly known as: PROTONIX  TAKE 1 TABLET(40 MG) BY MOUTH EVERY DAY     pregabalin 150 MG capsule  Commonly known as: LYRICA  Take 1 capsule (150 mg total) by mouth 3 (three) times daily.     traZODone 100 MG tablet  Commonly known as: DESYREL  Take 2 tablets (200 mg total) by mouth nightly as needed for Insomnia.                I have seen and examined this patient within the last 30 days. My clinical findings that support the need for the home health skilled services and home bound status are the following:no   Weakness/numbness causing balance and gait disturbance due to Infection and Weakness/Debility making it taxing to leave home.     Diet:   diabetic diet 2000 calorie    Labs:  SN to perform labs:  CBC: once; in 1 week(s) and Report Lab results to PCP.    Referrals/ Consults  Physical Therapy to evaluate and treat. Evaluate for home safety and equipment needs; Establish/upgrade home exercise program. Perform / instruct on therapeutic exercises, gait training, transfer training, and Range of Motion.  Occupational Therapy to evaluate and treat. Evaluate home environment for safety and equipment  needs. Perform/Instruct on transfers, ADL training, ROM, and therapeutic exercises.    Activities:   activity as tolerated    Nursing:   Agency to admit patient within 24 hours of hospital discharge unless specified on physician order or at patient request    SN to complete comprehensive assessment including routine vital signs. Instruct on disease process and s/s of complications to report to MD. Review/verify medication list sent home with the patient at time of discharge  and instruct patient/caregiver as needed. Frequency may be adjusted depending on start of care date.     Skilled nurse to perform up to 3 visits PRN for symptoms related to diagnosis    Notify MD if SBP > 160 or < 90; DBP > 90 or < 50; HR > 120 or < 50; Temp > 101; O2 < 88%    Ok to schedule additional visits based on staff availability and patient request on consecutive days within the home health episode.    When multiple disciplines ordered:    Start of Care occurs on Sunday - Wednesday schedule remaining discipline evaluations as ordered on separate consecutive days following the start of care.    Thursday SOC -schedule subsequent evaluations Friday and Monday the following week.     Friday - Saturday SOC - schedule subsequent discipline evaluations on consecutive days starting Monday of the following week.    For all post-discharge communication and subsequent orders please contact patient's primary care physician.     Miscellaneous   Routine Skin for Bedridden Patients: Instruct patient/caregiver to apply moisture barrier cream to all skin folds and wet areas in perineal area daily and after baths and all bowel movements.  Diabetic Care:   SN to perform and educate Diabetic management with blood glucose monitoring:, Fingerstick blood sugar AC and HS, and Report CBG < 60 or > 350 to physician.    Home Health Aide:  Nursing Three times weekly, Physical Therapy Three times weekly, and Occupational Therapy Three times weekly    Wound Care  Orders  no    I certify that this patient is confined to his home and needs intermittent skilled nursing care, physical therapy, and occupational therapy.      Christal Harris MD  10/01/2024

## 2024-10-01 NOTE — CARE UPDATE
I have reviewed the chart of Anthony Ball and collaborated with Xin Deras* in the care of the patient who is hospitalized for the following:    Active Hospital Problems    Diagnosis    *Severe sepsis    Thrombocytopenia    Pulmonary nodule    Hydronephrosis    Right ureteral stone    Normocytic anemia    Hypomagnesemia    Hypophosphatemia    Pseudohyponatremia    ALYSIA (acute kidney injury)    Asthma    Acute cystitis    Diabetic polyneuropathy associated with type 2 diabetes mellitus    Primary insomnia    Type 2 diabetes mellitus with neurologic complication, without long-term current use of insulin    Gastroesophageal reflux disease without esophagitis    Essential hypertension    Mobility impaired          I have reviewed Anthony Ball with the multidisciplinary team during discharge huddle.       Usha Barrios PA-C  [facility]

## 2024-10-01 NOTE — ASSESSMENT & PLAN NOTE
Pt presenting with leukocytosis, tachycardia, hypotension chills without known fevers at home. Also noted dysuria, change in color and malodorous urine. UA in ED concerning for UTI. Previously grown proteus mirabilis and MSSA sensitive to CTX. Started on CTX in ED and given 2.5L IVF however did not respond. Started on pressors transiently and stepped up to ICU. Cx growing P. Mirabilis. S/p R ureteral stent placement via urology     -Continue IV abx, transition to P.O on discharge  -See severe sepsis for urology recs

## 2024-10-01 NOTE — PLAN OF CARE
Patient in bed, no complaints voiced at this time, safety measures in place, call light in reach, NADN, POC ongoing    Problem: Skin Injury Risk Increased  Goal: Skin Health and Integrity  Outcome: Progressing     Problem: Adult Inpatient Plan of Care  Goal: Plan of Care Review  Outcome: Progressing  Goal: Patient-Specific Goal (Individualized)  Outcome: Progressing  Goal: Absence of Hospital-Acquired Illness or Injury  Outcome: Progressing  Goal: Optimal Comfort and Wellbeing  Outcome: Progressing  Goal: Readiness for Transition of Care  Outcome: Progressing     Problem: Diabetes Comorbidity  Goal: Blood Glucose Level Within Targeted Range  Outcome: Progressing     Problem: Sepsis/Septic Shock  Goal: Optimal Coping  Outcome: Progressing  Goal: Absence of Bleeding  Outcome: Progressing  Goal: Blood Glucose Level Within Targeted Range  Outcome: Progressing  Goal: Absence of Infection Signs and Symptoms  Outcome: Progressing  Goal: Optimal Nutrition Intake  Outcome: Progressing     Problem: Infection  Goal: Absence of Infection Signs and Symptoms  Outcome: Progressing     Problem: Pain Acute  Goal: Optimal Pain Control and Function  Outcome: Progressing     Problem: Electrolyte Imbalance  Goal: Electrolyte Balance  Outcome: Progressing     Problem: Glycemic Control Impaired  Goal: Blood Glucose Level Within Targeted Range  Outcome: Progressing  Goal: Minimize Risk of Hypoglycemia  Outcome: Progressing     Problem: Pneumonia  Goal: Fluid Balance  Outcome: Progressing  Goal: Resolution of Infection Signs and Symptoms  Outcome: Progressing  Goal: Effective Oxygenation and Ventilation  Outcome: Progressing

## 2024-10-01 NOTE — PLAN OF CARE
CHW met with patient/family at bedside. Patient experience rounding completed and reviewed the following.     Do you know your discharge plan? Yes,      If yes, what is the plan? Home    Have you discussed your needs and preferences with your SW/CM? Yes     If you are discharging home, do you have help at home? Yes    Do you think you will need help additional at home at discharge? No     Do you currently have difficulty keeping up with bills, affording medicine or buying food? Yes or No    Assigned SW/CM notified of any patient/family needs or concerns. Appropriate resources provided to address patient's needs.

## 2024-10-01 NOTE — PLAN OF CARE
[facility] - Telemetry Stepdown  Discharge Final Note    Primary Care Provider: Isaias Myles MD    Expected Discharge Date: 10/1/2024    Final Discharge Note (most recent)       Final Note - 10/01/24 1516          Final Note    Assessment Type Final Discharge Note     Anticipated Discharge Disposition [place] [facility]     What phone number can be called within the next 1-3 days to see how you are doing after discharge? [phone]     Hospital Resources/Appts/Education Provided Provided patient/caregiver with written discharge plan information;Appointments scheduled and added to AVS        Post-Acute Status    Post-Acute Authorization Home Health     Home Health Status Set-up Complete/Auth obtained     Patient choice form signed by patient/caregiver List with quality metrics by geographic area provided     Discharge Delays None known at this time                     Important Message from Medicare  Important Message from Medicare regarding Discharge Appeal Rights: Given to patient/caregiver, Explained to patient/caregiver, Signed/date by patient/caregiver     Date IMM was signed: 10/01/24  Time IMM was signed: 1334    Contact Info       Isaias Myles MD   Specialty: Internal Medicine   Relationship: PCP - General    [facility] [address]  [facility] [address]   Phone: [phone]       Next Steps: Follow up    Instructions: CHW left a voice message for Chelsea to contact pt to schedule his hospital f/u visit.          Patient to discharge to home today with [facility]. No other case management needs at this time.    Future Appointments   Date Time Provider Department Center   10/2/2024 12:40 PM Christal Adorno OD [facility] OPTOMTY [place]   10/16/2024 11:00 AM Joni Wagoner MD [facility] UROLOG IBETH Keane  [facility]   Ext: [phone]

## 2024-10-01 NOTE — SUBJECTIVE & OBJECTIVE
Interval History: NAEON and VSS. Patient states he is feeling well today and continues to feel better. No complaints this AM. Patient with improving mentation and malaise.     Review of Systems   Constitutional:  Positive for appetite change (Improving apppetite, but still decreased). Negative for chills, diaphoresis, fatigue and fever.   Respiratory:  Negative for cough, choking, shortness of breath and wheezing.    Cardiovascular:  Negative for chest pain, palpitations and leg swelling.   Gastrointestinal:  Negative for abdominal distention, abdominal pain, constipation, diarrhea, nausea and vomiting.   Musculoskeletal:  Negative for myalgias.     Objective:     Vital Signs (Most Recent):  Temp: 98.5 °F (36.9 °C) (10/01/24 0409)  Pulse: 83 (10/01/24 0409)  Resp: 20 (10/01/24 0416)  BP: (!) 164/79 (10/01/24 0409)  SpO2: 96 % (10/01/24 0409) Vital Signs (24h Range):  Temp:  [98 °F (36.7 °C)-98.5 °F (36.9 °C)] 98.5 °F (36.9 °C)  Pulse:  [] 83  Resp:  [16-21] 20  SpO2:  [94 %-100 %] 96 %  BP: (124-164)/(64-79) 164/79     Weight: 96.6 kg (212 lb 15.4 oz)  Body mass index is 31.45 kg/m².    Intake/Output Summary (Last 24 hours) at 10/1/2024 0659  Last data filed at 10/1/2024 0647  Gross per 24 hour   Intake 920 ml   Output 1750 ml   Net -830 ml         Physical Exam  Vitals and nursing note reviewed.   Constitutional:       General: He is not in acute distress.     Appearance: Normal appearance. He is not ill-appearing.   HENT:      Head: Normocephalic and atraumatic.      Mouth/Throat:      Pharynx: Oropharynx is clear. No oropharyngeal exudate.   Eyes:      General: No scleral icterus.     Extraocular Movements: Extraocular movements intact.   Cardiovascular:      Rate and Rhythm: Normal rate and regular rhythm.      Pulses: Normal pulses.      Heart sounds: Normal heart sounds. No murmur heard.     No friction rub. No gallop.   Pulmonary:      Effort: Pulmonary effort is normal. No respiratory distress.       Breath sounds: No stridor. No wheezing, rhonchi or rales.   Chest:      Chest wall: No tenderness.   Abdominal:      General: Abdomen is flat. Bowel sounds are normal. There is distension (mild distension).      Palpations: Abdomen is soft. There is no mass.      Tenderness: There is no abdominal tenderness. There is no guarding or rebound.      Hernia: No hernia is present.   Musculoskeletal:         General: No swelling.      Right lower leg: No edema.      Left lower leg: No edema.   Neurological:      Mental Status: He is alert and oriented to person, place, and time.      Gait: Gait abnormal.             Significant Labs: All pertinent labs within the past 24 hours have been reviewed.    Significant Imaging: I have reviewed all pertinent imaging results/findings within the past 24 hours.

## 2024-10-01 NOTE — PLAN OF CARE
10/01/24 1308   Post-Acute Status   Post-Acute Authorization Home Health   Home Health Status Referrals Sent   Discharge Delays None known at this time   Discharge Plan   Discharge Plan A Home Health   Discharge Plan B Home Health;Home with family     Met with patient to review discharge recommendation of home health and is agreeable to plan    Patient/family provided list of facilities in-network with patient's payor plan. Providers that are owned, operated, or affiliated with [facility] are included on the list.     Notified that referral sent to below listed facilities from in-network list based on proximity to home/family support:   [facility]  2.  3.  4.  5. (can send more than 5)    Patient/family instructed to identify preference.    Preferred Facility: (if more than 1, listed in order of descending preference)  [facility]    If an additional preferred facility not listed above is identified, additional referral to be sent. If above facilities unable to accept, will send additional referrals to in-network providers.     Discharge Plan A and Plan B have been determined by review of patient's clinical status, future medical and therapeutic needs, and coverage/benefits for post-acute care in coordination with multidisciplinary team members.    IBETH López  [facility]   Ext: [phone]

## 2024-10-02 ENCOUNTER — OUTPATIENT CASE MANAGEMENT (OUTPATIENT)
Dept: ADMINISTRATIVE | Facility: OTHER | Age: 74
End: 2024-10-02
Payer: MEDICARE

## 2024-10-02 NOTE — LETTER
October 3, 2024             Dear Anthony,    Welcome to [facility] Complex Care Management Program.  It was a pleasure talking with you today.  My name is Luzma Masters, and I look forward to being your Care Manager.  My goal is to help you function at the healthiest and highest level possible.  You can contact me directly at [phone].    As an [facility] patient, some of the services we may be able to provide include:     Development of an individualized care plan with a Registered Nurse   Connection with a   Connection with available resources and services    Coordinate communication among your care team members   Provide coaching and education   Help you understand your doctors treatment plan  Help you obtain information about your insurance coverage.     All services provided by [facility] Complex Care Managers and other care team members are coordinated with and communicated to your primary care team.      As part of your enrollment, you will be receiving education materials and more information about these services in your [facility] account, by phone or through the mail.  If you do not wish to participate or receive information, please contact our office at [phone].      Sincerely,        Luzma Masters, RN  [facility]   Out-patient RN Complex Care Manager

## 2024-10-02 NOTE — DISCHARGE SUMMARY
[facility] - Telemetry [facility] Medicine  Discharge Summary      Patient Name: Anthony Ball  MRN: [id number]  DA: [id number]  Patient Class: IP- Inpatient  Admission Date: 9/26/2024  Hospital Length of Stay: 5 days  Discharge Date and Time:  10/01/2024 10:17 PM  Attending Physician: Tabitha att. 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 Eliquis, afib, recurrent UTI presenting to the ED with generalized weakness and confusion.    History obtained via patient and his significant other. The patient noted that he has been feeling off and on malaise for the past week and decreased oral intake for the past few days. On 9/25, he and his partner noted that he was doing very well and able to ambulate with a walker but on 9/26 he had become weak to the point he was not able to ambulate on his own. His partner also noted increased confusion on 9/26, not being able to remember dates, locations, or where he was. The patient also noted chills for the past day without fevers. Patient also mentions dysuria at the end of urination and a darkened, malodorous urine from baseline.  Of note, patient has a history of recurrent UTIs last growing P. Mirabilis and S. Aureus both sensitive to cefepime.     In the ED, patient with soft blood pressures (SBP 90s) and mildly tachycardic. Other vitals stable. Labs significant for Na 128, Cr 1.9 (Baseline 0.8-1.1), Lactate 2.74, , Albumin 2.4, procal 23.72. UA with presence of moderate bacteria and WBCs.  CXR with focal opacity in left upper lung zone.  Patient started on ceftriaxone, given 2 L LR and admitted to hospital medicine for further workup and management.     Procedure(s) (LRB):  CYSTOSCOPY, WITH RETROGRADE PYELOGRAM  AND URETERAL STENT INSERTION (Right)  REMOVAL, CALCULUS, BLADDER (N/A)      Hospital Course:   Admitted to [facility] Medicine on 9/26 for severe sepsis with noted tachycardia, hypotension, significant leukocytosis in setting of complicated, recurrent UTI with infectious UA c/b ALYSIA with elevated creatinine 2.0 from baseline 0.9. Hospital course complicated by septic shock requiring ICU admission for vasopressor support after noted persistent hypotension and elevation of serum lactate 3.6>5.6 despite IV fluid resuscitation per sepsis protocol. Found to have evidence of obstruction with right hydroureteronephrosis due to clusters of ureteral stones in the distal ureter, measuring up to 4-5 mm as well as right sided nonobstructive nephrolithiasis on CT imaging. Patient underwent emergent cystoscopy with right ureteral stent insertion with removal of bladder calculus with urology on 9/27 due to persistent shock and need for source control. Patient improved clinically following procedure with work up completed on admission revealing for GNR/proteus bacteremia on blood cultures. Initially on broad spectrum IV antibiotics which eventually deescalated based on sensitivities on culture. Continued on IV ceftriaxone while inpatient and transitioned to oral equivalent (cefpodoxime) for total of 2 weeks per urology recommendations s/p ureteral stent placement (EED 10/11 from date of urologic procedure) and treatment of severe sepsis with bacteremia. Pt also completed course of azithromycin as empiric treatment for lower respiratory infection following  asthma attack on day of admission.     Stepdown to hospital medicine service on 9/29. Repeat blood cultures negative at time of discharge. Thrombocytopenia monitored closely, likely to be related to severe sepsis with shock. Repeat labs arranged 1 week following discharge. Ambulatory referrals sent to Pulmonology to follow incidental pulmonary nodule 1.7 cm in size seen on CT,  planned to repeat CT chest in 3 months along with clinic f/u. Nephrology/urology referral and follow up planned for urolith and stent management. Started on subcutaneous basal insulin regimen due to uncontrolled hyperglycemia during admission. Referred to diabetes education and close PCP follow up after discharge for further management. ALYSIA resolved at time of discharge with creatinine 1.0 near baseline. Mercado removed with successful voiding trial prior to discharge on 10/1 with continued home health services.      Goals of Care Treatment Preferences:  Code Status: Full Code     Physical Exam  Vitals and nursing note reviewed.   Constitutional:       General: He is not in acute distress.     Appearance: Normal appearance. He is not ill-appearing.   HENT:      Head: Normocephalic and atraumatic.      Mouth/Throat:      Pharynx: Oropharynx is clear. No oropharyngeal exudate.   Eyes:      General: No scleral icterus.     Extraocular Movements: Extraocular movements intact.   Cardiovascular:      Rate and Rhythm: Regular rate and rhythm      Pulses: Normal pulses.      Heart sounds: Normal heart sounds. No murmur heard.     No friction rub. No gallop.   Pulmonary:      Effort: Pulmonary effort is normal. No respiratory distress.      Breath sounds: No stridor. No wheezing, rhonchi or rales.   Chest:      Chest wall: No tenderness.   Abdominal:      General: Abdomen is flat. Bowel sounds are normal. There is distension (mild distension).      Palpations: Abdomen is soft. There is no mass.      Tenderness: There is no abdominal tenderness. There is no guarding or rebound.      Hernia: No hernia is present.   Musculoskeletal:         General: No swelling.      Right lower leg: No edema.      Left lower leg: No edema.   Neurological:      Mental Status: He is alert. He is oriented.     Consults:   Consults (From admission, onward)          Status Ordering Provider     Inpatient consult to Urology  Once        Provider:   (Not yet assigned)    Completed JULIANNE DEMPSEY     Inpatient consult to Registered Dietitian/Nutritionist  Once        Provider:  (Not yet assigned)    Completed TERESO BRAVO     Inpatient consult to Critical Care Medicine  Once        Provider:  (Not yet assigned)    Completed MIGUELITO REDMOND            Renal/  Acute cystitis  Pt presenting with leukocytosis, tachycardia, hypotension chills without known fevers at home. Also noted dysuria, change in color and malodorous urine. UA in ED concerning for UTI. Previously grown proteus mirabilis and MSSA sensitive to CTX. Started on CTX in ED and given 2.5L IVF however did not respond. Started on pressors transiently and stepped up to ICU. Cx growing P. Mirabilis. S/p R ureteral stent placement via urology     -Continue IV abx, transition to P.O on discharge  -See severe sepsis for urology recs      ID  * Severe sepsis  Patient presenting with tachycardia, leukocytosis, fevers to TMax 102.1 hypotension in the ED with concern for sepsis, Cr at 1.9 (BL 0.9-1.1). UA concerning for UTI, CXR with concern for consolidation in left upper lung zone. Concern for sepsis with urologic source vs respiratory PNA. Bcx collected, started on CTX in the ED. S/p 2.5 L LR without improvement in BP. Paitent started on levophed in the ED and escalated abx to cefepime, vancomycin, and azithromicin for empiric coverage. S/p ureteral stent placement by urology. Ucx growing pansensitive proteus mirabilis. Deescalated abx to CTX with plan for 14 day course. Bcx with NGTD     -Continue CTX  -Urology consulted, appreciate recs      -F/u outpatient       -Complete 14 day course of CTX      -Voiding trial prior prior to discharge  -CTM CBC/BMP          Final Active Diagnoses:    Diagnosis Date Noted POA    PRINCIPAL PROBLEM:  Severe sepsis [A41.9, R65.20] 09/26/2024 Yes    Thrombocytopenia [D69.6] 09/30/2024 Yes    Pulmonary nodule [R91.1] 09/29/2024 Yes    Hydronephrosis [N13.30] 09/28/2024 Yes     Right ureteral stone [N20.1] 09/27/2024 Unknown    Normocytic anemia [D64.9] 09/26/2024 Yes     Chronic    Hypomagnesemia [E83.42] 09/26/2024 Yes    Hypophosphatemia [E83.39] 09/26/2024 Yes    Pseudohyponatremia [R79.89] 09/26/2024 Yes    ALYSIA (acute kidney injury) [N17.9] 09/26/2024 Yes    Asthma [J45.909] 09/26/2024 Yes    Acute cystitis [N30.00] 01/11/2024 Yes    Diabetic polyneuropathy associated with type 2 diabetes mellitus [E11.42] 07/08/2021 Yes     Chronic    Primary insomnia [F51.01] 07/08/2021 Yes     Chronic    Type 2 diabetes mellitus with neurologic complication, without long-term current use of insulin [E11.49] 07/08/2021 Yes     Chronic    Gastroesophageal reflux disease without esophagitis [K21.9] 07/08/2021 Yes     Chronic    Essential hypertension [I10] 07/08/2021 Yes    Mobility impaired [Z74.09] 07/08/2021 Yes      Problems Resolved During this Admission:       Discharged Condition: stable    Disposition: Home or Self Care    Follow Up:   Follow-up Information       Isaias Myles MD Follow up.    Specialty: Internal Medicine  Why: CHW left a voice message for Chelsea to contact pt to schedule his hospital f/u visit.  Contact information:  [id number]  [facility] [address]  [phone]                           Patient Instructions:      CBC auto differential   Standing Status: Future Standing Exp. Date: 12/30/25     Order Specific Question Answer Comments   Send normal result to authorizing provider's In Basket if patient is active on MyChart: Yes      Ambulatory referral/consult to Outpatient Case Management   Referral Priority: Routine Referral Type: Consultation   Referral Reason: Specialty Services Required   Number of Visits Requested: 1     Ambulatory referral/consult to Pulmonology   Standing Status: Future   Referral Priority: Routine Referral Type: Consultation   Referral Reason: Specialty Services Required   Requested Specialty: Pulmonary Disease   Number of Visits  Requested: 1     Ambulatory referral/consult to Nephrology   Standing Status: Future   Referral Priority: Routine Referral Type: Consultation   Referral Reason: Specialty Services Required   Requested Specialty: Nephrology   Number of Visits Requested: 1     Ambulatory referral/consult to Urology   Standing Status: Future   Referral Priority: Routine Referral Type: Consultation   Referral Reason: Specialty Services Required   Requested Specialty: Urology   Number of Visits Requested: 1     Ambulatory referral/consult to Diabetes Education   Standing Status: Future   Referral Priority: Routine Referral Type: Consultation   Referral Reason: Specialty Services Required   Requested Specialty: Diabetes   Number of Visits Requested: 1 Expiration Date: 10/01/25     Diet diabetic     Notify your health care provider if you experience any of the following:  temperature >100.4     Notify your health care provider if you experience any of the following:  persistent nausea and vomiting or diarrhea     Notify your health care provider if you experience any of the following:  difficulty breathing or increased cough     Notify your health care provider if you experience any of the following:  persistent dizziness, light-headedness, or visual disturbances     Notify your health care provider if you experience any of the following:  increased confusion or weakness     Activity as tolerated       Significant Diagnostic Studies: Labs: CMP   Recent Labs   Lab 09/30/24  0653 10/01/24  0246    136   K 3.7 3.9    108   CO2 23 20*   * 150*   BUN 21 15   CREATININE 1.2 1.0   CALCIUM 8.1* 7.9*   PROT 5.2* 5.3*   ALBUMIN 1.9* 2.0*   BILITOT 0.4 0.5   ALKPHOS 94 95   AST 12 12   ALT 12 10   ANIONGAP 5* 8   , CBC   Recent Labs   Lab 09/30/24  0653 10/01/24  0246   WBC 6.06 6.41   HGB 9.4* 9.4*   HCT 29.2* 28.6*   PLT 44* 51*   , and All labs within the past 24 hours have been reviewed    Pending Diagnostic Studies:       None            Medications:  Reconciled Home Medications:      Medication List        START taking these medications      blood sugar diagnostic Strp  To check BG 3 times daily, to use with insurance preferred meter     blood-glucose meter Misc  To check BG 3 times daily, to use with insurance preferred meter     cefpodoxime 100 MG tablet  Commonly known as: VANTIN  Take 2 tablets (200 mg total) by mouth every 12 (twelve) hours. for 10 days     lancets 33 gauge Misc  To check BG 3 times daily, to use with insurance preferred meter     oxybutynin 5 MG Tr24  Commonly known as: DITROPAN-XL  Take 1 tablet (5 mg total) by mouth once daily.  Start taking on: October 2, 2024     SEMGLEE(INSULIN GLARG-YFGN) unit/mL (3 mL) Inpn  Generic drug: insulin glargine-yfgn  Inject 15 Units into the skin once daily.  Start taking on: October 2, 2024     tamsulosin 0.4 mg Cap  Commonly known as: FLOMAX  Take 1 capsule (0.4 mg total) by mouth once daily.  Start taking on: October 2, 2024            CONTINUE taking these medications      albuterol 90 mcg/actuation inhaler  Commonly known as: VENTOLIN HFA  Inhale 2 puffs into the lungs every 6 (six) hours as needed for Wheezing. Rescue     busPIRone 15 MG tablet  Commonly known as: BUSPAR  Take 1 tablet (15 mg total) by mouth 2 (two) times daily as needed (Anxiety).     cyanocobalamin 1000 MCG tablet  Commonly known as: VITAMIN B-12  Take 1 tablet (1,000 mcg total) by mouth once daily.     DULoxetine 60 MG capsule  Commonly known as: CYMBALTA  Take 1 capsule (60 mg total) by mouth 2 (two) times daily.     ELIQUIS 5 mg Tab  Generic drug: apixaban  TAKE 1 TABLET(5 MG) BY MOUTH TWICE DAILY     fluticasone-umeclidin-vilanter 100-62.5-25 mcg Dsdv  Commonly known as: TRELEGY ELLIPTA  Inhale 1 puff into the lungs once daily.     hydrOXYzine pamoate 25 MG Cap  Commonly known as: VISTARIL  Take 25 mg by mouth every 8 (eight) hours as needed.     magnesium oxide 400 mg magnesium Tab  Take 1  tablet by mouth every evening.     methocarbamoL 750 MG Tab  Commonly known as: ROBAXIN  Take 1 tablet (750 mg total) by mouth 3 (three) times daily as needed (Back pain).     nortriptyline 10 MG capsule  Commonly known as: PAMELOR  Take 1 capsule (10 mg total) by mouth 3 (three) times daily.     ondansetron 8 MG tablet  Commonly known as: ZOFRAN  Take 1 tablet (8 mg total) by mouth every 8 (eight) hours as needed for Nausea.     pantoprazole 40 MG tablet  Commonly known as: PROTONIX  TAKE 1 TABLET(40 MG) BY MOUTH EVERY DAY     pregabalin 150 MG capsule  Commonly known as: LYRICA  Take 1 capsule (150 mg total) by mouth 3 (three) times daily.     traZODone 100 MG tablet  Commonly known as: DESYREL  Take 2 tablets (200 mg total) by mouth nightly as needed for Insomnia.              Indwelling Lines/Drains at time of discharge:   Lines/Drains/Airways       None                   Time spent on the discharge of patient: 35 minutes      Christal Harris MD  Department of Hospital Medicine  [facility] - Telemetry Stepdown

## 2024-10-03 ENCOUNTER — PATIENT OUTREACH (OUTPATIENT)
Dept: ADMINISTRATIVE | Facility: CLINIC | Age: 74
End: 2024-10-03
Payer: MEDICARE

## 2024-10-03 LAB
BACTERIA BLD CULT: NORMAL
BACTERIA BLD CULT: NORMAL

## 2024-10-03 NOTE — PROGRESS NOTES
C3 nurse attempted to contact Anthony Ball for a TCC post hospital discharge follow up call. The patient is unable to conduct the call @ this time. The patient requested a callback.    The patient does not have a scheduled HOSFU appointment within 5-7 days post hospital discharge date 10/01/2024. Message sent to Physician staff to assist with HOSFU appointment scheduling.

## 2024-10-03 NOTE — PROGRESS NOTES
Outpatient Care Management  Initial Patient Assessment    Patient: Anthony Ball  MRN: [id number]  Date of Service: 10/02/2024  Completed by: Luzma Masters RN  Referral Date: 09/27/2024  Date of Eligibility: 9/28/2024  Program:   High Risk  Status: Ongoing  Effective Dates: 10/3/2024 - present  Responsible Staff: Luzma Masters RN        Reason for Visit   Patient presents with    OPCM Enrollment Call     10/3/2024    Nursing Assessment     10/3/2024       Brief Summary:  Anthony Ball was referred by Dr. Gavino Carrillo, \[facility]\"" provider for recurrent falls. Patient qualifies for program based on high risk.   Active problem list, medical, surgical and social history reviewed. Active comorbidities include DM, recurrent falls, weakness, htn, afib on eliquis, severe sepsis, CAD, neuropathy, gerd, asthma. Areas of need identified by patient include education on chronic conditions, need for PCP f/u appointment from hospital, social work consult for PHQ=11.   Next steps: Consult  for referral, educate on chronic conditions, message PCP to schedule and appointment.     Disability Status  Is the patient alert and oriented (person, place, time, and situation)?: Alert and oriented x 4  Hearing Difficulty or Deaf: no  Visual Difficulty or Blind: yes  Visual and Hearing Needs Conclusion: Patient has glasses  Vision Management: Other (Patient uses glasses for reading and prescription.)  Difficulty Concentrating, Remembering or Making Decisions: yes  Concentration Management: Patient states he is forgetful.  Communication Difficulty: no  Eating/Swallowing Difficulty: no  Walking or Climbing Stairs Difficulty: yes  Walking or Climbing Stairs: ambulation difficulty, requires equipment  Mobility Management: Patient uses a rolling walker for ambulation for past year.  Dressing/Bathing Difficulty: yes  Dressing/Bathing: bathing difficulty, assistance 1 person; dressing difficulty, assistance 1 person  Dressing/Bathing  Management: Patient requires some assistance with bathing and dressing. (Spouse assists with bathing and dressing.)  Toileting : Independent  Continence : Incontinence - Bowel; Incontience - Bladder (Patient has incontinence occassionally.)  Difficulty Managing Errands Independently: yes  Errands Management: Bulmaro runs all errands. (Patient no longer drives.)  Equipment Currently Used at Home: shower chair; walker, rolling; wheelchair; cane, straight; glucometer; blood pressure machine; raised toilet; scale  ADL Conclusion Statement: Patient needs some assistance with adl's, Bulmaro helps when needed.  Change in Functional Status Since Onset of Current Illness/Injury: no        Spiritual Beliefs  Spiritual, Cultural Beliefs, [employer] Practices, Values that Affect Care: no      Social History     Socioeconomic History    Marital status:    Tobacco Use    Smoking status: Never    Smokeless tobacco: Never   Substance and Sexual Activity    Alcohol use: Yes     Comment: Former heavy use    Drug use: Never    Sexual activity: Yes     Comment: unknown     Social Drivers of Health     Financial Resource Strain: Low Risk  (1/15/2024)    Overall Financial Resource Strain (CARDIA)     Difficulty of Paying Living Expenses: Not very hard   Food Insecurity: No Food Insecurity (1/15/2024)    Hunger Vital Sign     Worried About Running Out of Food in the Last Year: Never true     Ran Out of Food in the Last Year: Never true   Transportation Needs: No Transportation Needs (1/15/2024)    PRAPARE - Transportation     Lack of Transportation (Medical): No     Lack of Transportation (Non-Medical): No   Physical Activity: Inactive (1/15/2024)    Exercise Vital Sign     Days of Exercise per Week: 0 days     Minutes of Exercise per Session: 0 min   Stress: No Stress Concern Present (1/15/2024)    [employer] - Occupational Stress Questionnaire     Feeling of Stress : Only a little   Housing Stability:  Low Risk  (1/15/2024)    Housing Stability Vital Sign     Unable to Pay for Housing in the Last Year: No     Number of Places Lived in the Last Year: 1     Unstable Housing in the Last Year: No       Roles and Relationships  Primary Source of Support/Comfort: spouse  Name of Support/Comfort Primary Source: Bulmaro Melton  Secondary Source of Support/Comfort: no one (Patient states no secondary support person.)  Name of Support/Comfort Secondary Source: n/a      Advance Directives (For Healthcare)  Advance Directive  (If Adv Dir status is received, view document under Adv Dir in header or Chart Review Media tab): Patient does not have Advance Directive, declines information.  Patient Requests Assistance: Patient will do independently        Patient Reported Insurance  Verified current insurance plan:: Medicare; Other (see comment)            10/3/2024     9:12 AM 8/1/2024    10:34 AM 6/7/2024    10:29 AM 4/30/2024    10:03 AM 4/19/2024     1:20 PM 4/12/2023    12:58 PM 7/21/2022    10:36 AM   Depression Patient Health Questionnaire   Over the last two weeks how often have you been bothered by little interest or pleasure in doing things More than half the days Not at all Nearly every day Not at all Not at all Not at all Not at all   Over the last two weeks how often have you been bothered by feeling down, depressed or hopeless More than half the days Not at all Nearly every day Not at all Not at all Not at all Not at all   PHQ-2 Total Score 4 0 6 0 0 0 0   Over the last two weeks how often have you been bothered by trouble falling or staying asleep, or sleeping too much Not at all         Over the last two weeks how often have you been bothered by feeling tired or having little energy Nearly every day         Over the last two weeks how often have you been bothered by a poor appetite or overeating Not at all         Over the last two weeks how often have you been bothered by feeling bad about yourself - or that you  are a failure or have let yourself or your family down Several days         Over the last two weeks how often have you been bothered by trouble concentrating on things, such as reading the newspaper or watching television Nearly every day         Over the last two weeks how often have you been bothered by moving or speaking so slowly that other people could have noticed. Or the opposite - being so fidgety or restless that you have been moving around a lot more than usual. Not at all         Over the last two weeks how often have you been bothered by thoughts that you would be better off dead, or of hurting yourself --         If you checked off any problems, how difficult have these problems made it for you to do your work, take care of things at home or get along with other people? Somewhat difficult             Learning Assessment       10/03/2024 0936 [facility] (10/2/2024 - Present)   Created by Luzma Mastres RN -  (Nurse) Status: Complete                 PRIMARY LEARNER     Primary Learner Name:  Anthony Ball  - 10/03/2024 0936    Relationship:  Patient WM - 10/03/2024 0936    Does the primary learner have any barriers to learning?:  No Barriers  - 10/03/2024 0936    What is the preferred language of the primary learner?:  English  - 10/03/2024 0936    Is an  required?:  No  - 10/03/2024 0936    How does the primary learner prefer to learn new concepts?:  Listening  - 10/03/2024 0936    How often do you need to have someone help you read instructions, pamphlets, or written material from your doctor or pharmacy?:  Sometimes  - 10/03/2024 0936        CO-LEARNER #1     No question answered        CO-LEARNER #2     No question answered        SPECIAL TOPICS     No question answered        ANSWERED BY:     No question answered        Comments         Edit History       Luzma Masters, RN -  (Nurse)   10/03/2024 0936

## 2024-10-03 NOTE — PROGRESS NOTES
C3 nurse attempted to contact Anthony Ball for a TCC post hospital discharge follow up call. The patient is unable to conduct the call @ this time. The patient requested a callback.    The patient has a scheduled HOSFU appointment with Isaias Myles MD on 10/07/2024 @ 1000.

## 2024-10-04 LAB
COMPN STONE: NORMAL
LABORATORY COMMENT REPORT: NORMAL
SPECIMEN SOURCE: NORMAL
STONE ANALYSIS IR-IMP: NORMAL

## 2024-10-04 NOTE — PROGRESS NOTES
C3 nurse spoke with Anthony Ball for a TCC post hospital discharge follow up call. The patient has a scheduled hospitals appointment with Isaias Myles MD on 10/07/2024 @ 1000.

## 2024-10-07 ENCOUNTER — OUTPATIENT CASE MANAGEMENT (OUTPATIENT)
Dept: ADMINISTRATIVE | Facility: OTHER | Age: 74
End: 2024-10-07
Payer: MEDICARE

## 2024-10-07 ENCOUNTER — PATIENT MESSAGE (OUTPATIENT)
Dept: INTERNAL MEDICINE | Facility: CLINIC | Age: 74
End: 2024-10-07

## 2024-10-07 ENCOUNTER — OFFICE VISIT (OUTPATIENT)
Dept: INTERNAL MEDICINE | Facility: CLINIC | Age: 74
End: 2024-10-07
Payer: MEDICARE

## 2024-10-07 VITALS — SYSTOLIC BLOOD PRESSURE: 102 MMHG | DIASTOLIC BLOOD PRESSURE: 67 MMHG | HEART RATE: 109 BPM

## 2024-10-07 DIAGNOSIS — N39.0 URINARY TRACT INFECTION WITHOUT HEMATURIA, SITE UNSPECIFIED: ICD-10-CM

## 2024-10-07 DIAGNOSIS — N17.9 AKI (ACUTE KIDNEY INJURY): ICD-10-CM

## 2024-10-07 DIAGNOSIS — E11.42 TYPE 2 DIABETES MELLITUS WITH DIABETIC POLYNEUROPATHY, WITHOUT LONG-TERM CURRENT USE OF INSULIN: Primary | ICD-10-CM

## 2024-10-07 DIAGNOSIS — R60.0 LEG EDEMA: ICD-10-CM

## 2024-10-07 DIAGNOSIS — E11.42 TYPE 2 DIABETES MELLITUS WITH DIABETIC POLYNEUROPATHY, WITHOUT LONG-TERM CURRENT USE OF INSULIN: ICD-10-CM

## 2024-10-07 DIAGNOSIS — Z09 HOSPITAL DISCHARGE FOLLOW-UP: Primary | Chronic | ICD-10-CM

## 2024-10-07 DIAGNOSIS — R78.81 BACTEREMIA: ICD-10-CM

## 2024-10-07 DIAGNOSIS — Z23 NEEDS FLU SHOT: ICD-10-CM

## 2024-10-07 PROCEDURE — G0008 ADMIN INFLUENZA VIRUS VAC: HCPCS | Mod: PBBFAC

## 2024-10-07 PROCEDURE — 90653 IIV ADJUVANT VACCINE IM: CPT | Mod: PBBFAC

## 2024-10-07 PROCEDURE — 99999 PR PBB SHADOW E&M-EST. PATIENT-LVL III: CPT | Mod: PBBFAC,,, | Performed by: INTERNAL MEDICINE

## 2024-10-07 PROCEDURE — 99213 OFFICE O/P EST LOW 20 MIN: CPT | Mod: S$PBB,,, | Performed by: INTERNAL MEDICINE

## 2024-10-07 PROCEDURE — 99213 OFFICE O/P EST LOW 20 MIN: CPT | Mod: PBBFAC | Performed by: INTERNAL MEDICINE

## 2024-10-07 PROCEDURE — 99999PBSHW PR PBB SHADOW TECHNICAL ONLY FILED TO HB: Mod: PBBFAC,,,

## 2024-10-07 RX ORDER — BLOOD-GLUCOSE SENSOR
1 EACH MISCELLANEOUS
Qty: 3 EACH | Refills: 2 | Status: SHIPPED | OUTPATIENT
Start: 2024-10-07 | End: 2024-10-11 | Stop reason: SDUPTHER

## 2024-10-07 RX ORDER — BLOOD-GLUCOSE SENSOR
1 EACH MISCELLANEOUS
Qty: 4 EACH | Refills: 2 | Status: SHIPPED | OUTPATIENT
Start: 2024-10-07 | End: 2024-10-07

## 2024-10-07 RX ORDER — FLASH GLUCOSE SCANNING READER
EACH MISCELLANEOUS
Qty: 1 EACH | Refills: 0 | Status: SHIPPED | OUTPATIENT
Start: 2024-10-07 | End: 2024-10-11

## 2024-10-07 RX ORDER — BLOOD-GLUCOSE,RECEIVER,CONT
EACH MISCELLANEOUS
Qty: 1 EACH | Refills: 0 | Status: SHIPPED | OUTPATIENT
Start: 2024-10-07 | End: 2024-10-11 | Stop reason: SDUPTHER

## 2024-10-07 RX ADMIN — INFLUENZA A VIRUS A/VICTORIA/4897/2022 IVR-238 (H1N1) ANTIGEN (FORMALDEHYDE INACTIVATED), INFLUENZA A VIRUS A/THAILAND/8/2022 IVR-237 (H3N2) ANTIGEN (FORMALDEHYDE INACTIVATED), INFLUENZA B VIRUS B/AUSTRIA/1359417/2021 BVR-26 ANTIGEN (FORMALDEHYDE INACTIVATED) 0.5 ML: 15; 15; 15 INJECTION, SUSPENSION INTRAMUSCULAR at 10:10

## 2024-10-07 NOTE — PROGRESS NOTES
Subjective:       Patient ID: Anthony Ball is a 74 y.o. male.    Chief Complaint: Follow-up      HPI:  Hospital f/u. Recent stay in hospital for Proteus UTI with bacteremia in setting of stone with right hydronephrosis and subsequent stent placement by Urology. Still feeling weak and fatigued but back home now and does feel generally okay relatively. Is on insulin since hospital. T2DM had been well controlled without meds previous to all of this. A1c up to 9%, though probably some question as to how long this UTI was lingering since doesn't get classic symptoms. Using 15 U long acting insulin daily currently. HHPT will be seeing him.  Received Rocephin IV in hospital and now on home course po Cefpodoxime.  Having 1 soft stool daily.    Past Medical History:   Diagnosis Date    Acute deep vein thrombosis (DVT) of left lower extremity 12/2023    Anxiety     Atrial fibrillation 12/2023    Cataract     Coronary artery disease     CAC score 1430    Depression     Diabetic neuropathy     Essential (primary) hypertension     GERD (gastroesophageal reflux disease)     History of bariatric surgery 07/08/2021    History of total right knee replacement 07/08/2021    Insomnia     Neuromyopathy     Possibly DM and/or alcohol related.    Pulmonary embolism 12/2023    Reactive airway disease     Type 2 diabetes mellitus          Current Outpatient Medications:     albuterol (VENTOLIN HFA) 90 mcg/actuation inhaler, Inhale 2 puffs into the lungs every 6 (six) hours as needed for Wheezing. Rescue, Disp: 8 g, Rfl: 2    apixaban (ELIQUIS) 5 mg Tab, TAKE 1 TABLET(5 MG) BY MOUTH TWICE DAILY, Disp: 180 tablet, Rfl: 3    blood sugar diagnostic Strp, To check BG 3 times daily, to use with insurance preferred meter, Disp: 100 each, Rfl: 0    blood-glucose meter Misc, To check BG 3 times daily, to use with insurance preferred meter, Disp: 1 each, Rfl: 0    blood-glucose sensor (FREESTYLE SOPHY 3 SENSOR) Rima, 1 Device by Misc.(Non-Drug; Combo  Route) route every 14 (fourteen) days., Disp: 4 each, Rfl: 2    busPIRone (BUSPAR) 15 MG tablet, Take 1 tablet (15 mg total) by mouth 2 (two) times daily as needed (Anxiety)., Disp: 180 tablet, Rfl: 3    cefpodoxime (VANTIN) 100 MG tablet, Take 2 tablets (200 mg total) by mouth every 12 (twelve) hours. for 10 days, Disp: 40 tablet, Rfl: 0    cyanocobalamin (VITAMIN B-12) 1000 MCG tablet, Take 1 tablet (1,000 mcg total) by mouth once daily., Disp: 90 tablet, Rfl: 3    DULoxetine (CYMBALTA) 60 MG capsule, Take 1 capsule (60 mg total) by mouth 2 (two) times daily., Disp: 180 capsule, Rfl: 3    flash glucose scanning reader ([employer] SOPHY 14 DAY READER) Misc, Use device to check blood glucose level 3 times daily., Disp: 1 each, Rfl: 0    fluticasone-umeclidin-vilanter (TRELEGY ELLIPTA) 100-62.5-25 mcg DsDv, Inhale 1 puff into the lungs once daily., Disp: 60 each, Rfl: 5    hydrOXYzine pamoate (VISTARIL) 25 MG Cap, Take 25 mg by mouth every 8 (eight) hours as needed., Disp: , Rfl:     insulin glargine-yfgn (SEMGLEE,INSULIN GLARG-YFGN,PEN) 100 unit/mL (3 mL) InPn, Inject 15 Units into the skin once daily., Disp: 6 mL, Rfl: 0    lancets 33 gauge Misc, To check BG 3 times daily, to use with insurance preferred meter, Disp: 100 each, Rfl: 0    magnesium oxide 400 mg magnesium Tab, Take 1 tablet by mouth every evening., Disp: 30 tablet, Rfl: 2    methocarbamoL (ROBAXIN) 750 MG Tab, Take 1 tablet (750 mg total) by mouth 3 (three) times daily as needed (Back pain)., Disp: 90 tablet, Rfl: 0    nortriptyline (PAMELOR) 10 MG capsule, Take 1 capsule (10 mg total) by mouth 3 (three) times daily., Disp: 90 capsule, Rfl: 3    ondansetron (ZOFRAN) 8 MG tablet, Take 1 tablet (8 mg total) by mouth every 8 (eight) hours as needed for Nausea., Disp: 30 tablet, Rfl: 1    oxybutynin (DITROPAN-XL) 5 MG TR24, Take 1 tablet (5 mg total) by mouth once daily., Disp: 30 tablet, Rfl: 0    pantoprazole (PROTONIX) 40 MG tablet, TAKE 1 TABLET(40 MG)  BY MOUTH EVERY DAY, Disp: 90 tablet, Rfl: 3    pregabalin (LYRICA) 150 MG capsule, Take 1 capsule (150 mg total) by mouth 3 (three) times daily., Disp: 90 capsule, Rfl: 5    tamsulosin (FLOMAX) 0.4 mg Cap, Take 1 capsule (0.4 mg total) by mouth once daily., Disp: 30 capsule, Rfl: 0    traZODone (DESYREL) 100 MG tablet, Take 2 tablets (200 mg total) by mouth nightly as needed for Insomnia., Disp: 14 tablet, Rfl: 0  No current facility-administered medications for this visit.    Past Surgical History:   Procedure Laterality Date    CATARACT EXTRACTION W/  INTRAOCULAR LENS IMPLANT Right 7/15/2024    Procedure: EXTRACTION, CATARACT, WITH IOL INSERTION;  Surgeon: Margot Jacobson MD;  Location: [facility] OR;  Service: Ophthalmology;  Laterality: Right;    CATARACT EXTRACTION W/  INTRAOCULAR LENS IMPLANT Left 8/29/2024    Procedure: EXTRACTION, CATARACT, WITH IOL INSERTION;  Surgeon: Margot Jacobson MD;  Location: [facility] OR;  Service: Ophthalmology;  Laterality: Left;    COLONOSCOPY      Normal around 2020    COLONOSCOPY N/A 9/6/2024    Procedure: COLONOSCOPY;  Surgeon: Alessandro Serna MD;  Location: [facility] (4TH FLR);  Service: Endoscopy;  Laterality: N/A;  8/28-new case created-lvm for pre call-pt has cataract surgery 8/29/24-tb  ref-dr rico goldstein-peg[facility] to hold taran Perdomo  9/5-LVM for precall-Kpvt    COLONOSCOPY N/A 9/6/2024    Procedure: COLONOSCOPY;  Surgeon: Alessandro Serna MD;  Location: [facility] ENDO (4TH FLR);  Service: Endoscopy;  Laterality: N/A;  + cologuard  8/8 ref by Isaias Goldstein MD, [facility], instr. to portal, Eliquis hold approval pending-  ok to hold Taran 2 days per Dr Perdomo    CYSTOSCOPY WITH URETEROSCOPY, RETROGRADE PYELOGRAPHY, AND INSERTION OF STENT Right 9/27/2024    Procedure: CYSTOSCOPY, WITH RETROGRADE PYELOGRAM AND URETERAL STENT INSERTION;  Surgeon: Joni Wagoner MD;  Location: [facility] OR 1ST FLR;  Service: Urology;  Laterality: Right;    REMOVAL, CALCULUS, BLADDER N/A  9/27/2024    Procedure: REMOVAL, CALCULUS, BLADDER;  Surgeon: Joni Wagoner MD;  Location: [facility] OR [address] [place] [address];  Service: Urology;  Laterality: N/A;    TOTAL KNEE ARTHROPLASTY Right        Social History     Tobacco Use    Smoking status: Never    Smokeless tobacco: Never   Substance Use Topics    Alcohol use: Yes     Comment: Former heavy use    Drug use: Never         Objective:      Vitals:    10/07/24 1008   BP: 102/67   Pulse: 109       Physical Exam  Constitutional:       General: He is not in acute distress.     Appearance: Normal appearance. He is not ill-appearing.   HENT:      Head: Normocephalic and atraumatic.   Eyes:      Extraocular Movements: Extraocular movements intact.      Conjunctiva/sclera: Conjunctivae normal.   Cardiovascular:      Rate and Rhythm: Regular rhythm. Tachycardia present.      Heart sounds: Normal heart sounds.   Pulmonary:      Effort: Pulmonary effort is normal. No respiratory distress.      Breath sounds: Normal breath sounds.   Abdominal:      General: Bowel sounds are normal. There is no distension.      Palpations: Abdomen is soft.      Tenderness: There is no abdominal tenderness. There is no right CVA tenderness, left CVA tenderness, guarding or rebound.   Musculoskeletal:      Right lower leg: Edema (1+ b/l) present.      Left lower leg: Edema present.   Neurological:      Mental Status: He is alert and oriented to person, place, and time. Mental status is at baseline.   Psychiatric:         Mood and Affect: Mood normal.         Behavior: Behavior normal.         Thought Content: Thought content normal.         Judgment: Judgment normal.           Assessment/Plan:     1) Hospital f/u, Proteus UTI and bacteremia, Right nephrolithiasis with hydronephrosis (s/p stent placement) - Received IV Ceftriaxone in hospital and now completing course of Cefopoxime. Seeing Urology for f/u soon outpt. HHN going to home. Will be getting updated labs today (cbc, will add bmp for renal  function f/u).  2) T2DM - Uncontrolled now, previously well off meds. Suspect the UTI was ruminating for a while. Will order Freestyle Fernando and wean insulin as able. Oral Tradjenta will be an option to add and help wean insulin if needed.  3) Lung nodule - Noted during hospital stay. Pulm has recommended 3 mth CT f/u.

## 2024-10-08 ENCOUNTER — OUTPATIENT CASE MANAGEMENT (OUTPATIENT)
Dept: ADMINISTRATIVE | Facility: OTHER | Age: 74
End: 2024-10-08
Payer: MEDICARE

## 2024-10-08 NOTE — PROGRESS NOTES
Outpatient Care Management  Patient Does Not Consent    Patient: Anthony Ball  MRN:  [id number]  Date of Service:  10/8/2024  Completed by:  Cathy Camejo LMSW    Chief Complaint   Patient presents with    Social Work Assessment     10/8/2024    Case Closure     10/8/2024       Patient Summary         SW completed SDOH and social assessment with pt and partner, Jacob via telephone. Pt lives with Jacob and does not have any family that lives nearby. Jacob helps pt with all ADLs. Pt uses a walker to ambulate. Pt denied any financial issues with groceries or utilities. Jacob also provides transportation for pt to MD appmnts. Pt stated he is feeling a lot better and that he has very good insurance coverage. Pt stated he has been feeling anxious. He takes Buspar but does not think it is helping. Pt stated that he will contact PCP in regards to medication. SW inquired if he was interested in counseling and pt stated he will speak with his PCP about it. Pt stated he chris with his anxiety by drinking wine daily. Sw and pt discussed healthier coping skills like listening to music. Pt stated he has no SW needs at this time. SW encouraged pt to reach out with any needs. Collaborated with OPCM RN, case closed.

## 2024-10-10 ENCOUNTER — LAB VISIT (OUTPATIENT)
Dept: LAB | Facility: HOSPITAL | Age: 74
End: 2024-10-10
Payer: MEDICARE

## 2024-10-10 ENCOUNTER — PATIENT MESSAGE (OUTPATIENT)
Dept: DIABETES | Facility: CLINIC | Age: 74
End: 2024-10-10
Payer: MEDICARE

## 2024-10-10 DIAGNOSIS — E11.42 DIABETIC POLYNEUROPATHY: Primary | ICD-10-CM

## 2024-10-10 LAB
BASOPHILS # BLD AUTO: 0.06 K/UL (ref 0–0.2)
BASOPHILS NFR BLD: 0.9 % (ref 0–1.9)
DIFFERENTIAL METHOD BLD: ABNORMAL
EOSINOPHIL # BLD AUTO: 0.2 K/UL (ref 0–0.5)
EOSINOPHIL NFR BLD: 3 % (ref 0–8)
ERYTHROCYTE [DISTWIDTH] IN BLOOD BY AUTOMATED COUNT: 13.3 % (ref 11.5–14.5)
HCT VFR BLD AUTO: 31.3 % (ref 40–54)
HGB BLD-MCNC: 10.4 G/DL (ref 14–18)
IMM GRANULOCYTES # BLD AUTO: 0.04 K/UL (ref 0–0.04)
IMM GRANULOCYTES NFR BLD AUTO: 0.6 % (ref 0–0.5)
LYMPHOCYTES # BLD AUTO: 1.1 K/UL (ref 1–4.8)
LYMPHOCYTES NFR BLD: 16.4 % (ref 18–48)
MCH RBC QN AUTO: 29.9 PG (ref 27–31)
MCHC RBC AUTO-ENTMCNC: 33.2 G/DL (ref 32–36)
MCV RBC AUTO: 90 FL (ref 82–98)
MONOCYTES # BLD AUTO: 0.4 K/UL (ref 0.3–1)
MONOCYTES NFR BLD: 5.9 % (ref 4–15)
NEUTROPHILS # BLD AUTO: 5.1 K/UL (ref 1.8–7.7)
NEUTROPHILS NFR BLD: 73.2 % (ref 38–73)
NRBC BLD-RTO: 0 /100 WBC
PLATELET # BLD AUTO: 203 K/UL (ref 150–450)
PMV BLD AUTO: 11.3 FL (ref 9.2–12.9)
RBC # BLD AUTO: 3.48 M/UL (ref 4.6–6.2)
WBC # BLD AUTO: 6.94 K/UL (ref 3.9–12.7)

## 2024-10-10 PROCEDURE — 85025 COMPLETE CBC W/AUTO DIFF WBC: CPT | Performed by: INTERNAL MEDICINE

## 2024-10-11 ENCOUNTER — OUTPATIENT CASE MANAGEMENT (OUTPATIENT)
Dept: ADMINISTRATIVE | Facility: OTHER | Age: 74
End: 2024-10-11
Payer: MEDICARE

## 2024-10-11 ENCOUNTER — PATIENT MESSAGE (OUTPATIENT)
Dept: INTERNAL MEDICINE | Facility: CLINIC | Age: 74
End: 2024-10-11
Payer: MEDICARE

## 2024-10-11 ENCOUNTER — PATIENT MESSAGE (OUTPATIENT)
Dept: OPTOMETRY | Facility: CLINIC | Age: 74
End: 2024-10-11
Payer: MEDICARE

## 2024-10-11 DIAGNOSIS — E83.42 HYPOMAGNESEMIA: ICD-10-CM

## 2024-10-11 DIAGNOSIS — E11.42 TYPE 2 DIABETES MELLITUS WITH DIABETIC POLYNEUROPATHY, WITHOUT LONG-TERM CURRENT USE OF INSULIN: ICD-10-CM

## 2024-10-11 RX ORDER — BLOOD-GLUCOSE SENSOR
1 EACH MISCELLANEOUS
Qty: 3 EACH | Refills: 2 | Status: SHIPPED | OUTPATIENT
Start: 2024-10-11

## 2024-10-11 RX ORDER — CALCIUM CARBONATE 300MG(750)
1 TABLET,CHEWABLE ORAL NIGHTLY
Qty: 90 TABLET | Refills: 3 | Status: SHIPPED | OUTPATIENT
Start: 2024-10-11

## 2024-10-11 RX ORDER — BLOOD-GLUCOSE,RECEIVER,CONT
EACH MISCELLANEOUS
Qty: 1 EACH | Refills: 0 | Status: SHIPPED | OUTPATIENT
Start: 2024-10-11

## 2024-10-11 NOTE — TELEPHONE ENCOUNTER
No care due was identified.  [facility] Embedded Care Due Messages. Reference number: [id number].   10/11/2024 11:23:44 AM CDT

## 2024-10-16 ENCOUNTER — OFFICE VISIT (OUTPATIENT)
Dept: UROLOGY | Facility: CLINIC | Age: 74
End: 2024-10-16
Payer: MEDICARE

## 2024-10-16 VITALS
BODY MASS INDEX: 31.54 KG/M2 | DIASTOLIC BLOOD PRESSURE: 69 MMHG | HEIGHT: 69 IN | SYSTOLIC BLOOD PRESSURE: 110 MMHG | WEIGHT: 212.94 LBS | HEART RATE: 116 BPM

## 2024-10-16 DIAGNOSIS — N20.1 RIGHT URETERAL STONE: ICD-10-CM

## 2024-10-16 PROCEDURE — 99999 PR PBB SHADOW E&M-EST. PATIENT-LVL IV: CPT | Mod: PBBFAC,,, | Performed by: UROLOGY

## 2024-10-16 PROCEDURE — 99214 OFFICE O/P EST MOD 30 MIN: CPT | Mod: PBBFAC | Performed by: UROLOGY

## 2024-10-16 PROCEDURE — 99214 OFFICE O/P EST MOD 30 MIN: CPT | Mod: S$PBB,,, | Performed by: UROLOGY

## 2024-10-21 ENCOUNTER — HOSPITAL ENCOUNTER (INPATIENT)
Facility: HOSPITAL | Age: 74
LOS: 3 days | Discharge: HOME | DRG: 871 | End: 2024-10-24
Attending: STUDENT IN AN ORGANIZED HEALTH CARE EDUCATION/TRAINING PROGRAM | Admitting: EMERGENCY MEDICINE
Payer: MEDICARE

## 2024-10-21 ENCOUNTER — PATIENT MESSAGE (OUTPATIENT)
Dept: INTERNAL MEDICINE | Facility: CLINIC | Age: 74
End: 2024-10-21
Payer: MEDICARE

## 2024-10-21 DIAGNOSIS — R73.9 HYPERGLYCEMIA: ICD-10-CM

## 2024-10-21 DIAGNOSIS — E11.42 TYPE 2 DIABETES MELLITUS WITH DIABETIC POLYNEUROPATHY, WITHOUT LONG-TERM CURRENT USE OF INSULIN: Chronic | ICD-10-CM

## 2024-10-21 DIAGNOSIS — N39.0 URINARY TRACT INFECTION WITHOUT HEMATURIA, SITE UNSPECIFIED: ICD-10-CM

## 2024-10-21 DIAGNOSIS — R07.9 CHEST PAIN: ICD-10-CM

## 2024-10-21 DIAGNOSIS — R30.0 DYSURIA: Primary | ICD-10-CM

## 2024-10-21 DIAGNOSIS — A41.9 SEPSIS, DUE TO UNSPECIFIED ORGANISM, UNSPECIFIED WHETHER ACUTE ORGAN DYSFUNCTION PRESENT: Primary | ICD-10-CM

## 2024-10-21 DIAGNOSIS — R41.82 ALTERED MENTAL STATUS: ICD-10-CM

## 2024-10-21 LAB
ALBUMIN SERPL BCP-MCNC: 2.3 G/DL (ref 3.5–5.2)
ALLENS TEST: ABNORMAL
ALLENS TEST: ABNORMAL
ALLENS TEST: NORMAL
ALP SERPL-CCNC: 107 U/L (ref 40–150)
ALT SERPL W/O P-5'-P-CCNC: 6 U/L (ref 10–44)
ANION GAP SERPL CALC-SCNC: 7 MMOL/L (ref 8–16)
AST SERPL-CCNC: 9 U/L (ref 10–40)
B-OH-BUTYR BLD STRIP-SCNC: 0.4 MMOL/L (ref 0–0.5)
BACTERIA #/AREA URNS AUTO: ABNORMAL /HPF
BASOPHILS # BLD AUTO: 0.01 K/UL (ref 0–0.2)
BASOPHILS NFR BLD: 0.1 % (ref 0–1.9)
BILIRUB SERPL-MCNC: 0.8 MG/DL (ref 0.1–1)
BILIRUB UR QL STRIP: NEGATIVE
BUN SERPL-MCNC: 26 MG/DL (ref 8–23)
CALCIUM SERPL-MCNC: 8 MG/DL (ref 8.7–10.5)
CHLORIDE SERPL-SCNC: 105 MMOL/L (ref 95–110)
CLARITY UR REFRACT.AUTO: ABNORMAL
CO2 SERPL-SCNC: 18 MMOL/L (ref 23–29)
COLOR UR AUTO: ABNORMAL
CREAT SERPL-MCNC: 1.5 MG/DL (ref 0.5–1.4)
DIFFERENTIAL METHOD BLD: ABNORMAL
EOSINOPHIL # BLD AUTO: 0 K/UL (ref 0–0.5)
EOSINOPHIL NFR BLD: 0.5 % (ref 0–8)
ERYTHROCYTE [DISTWIDTH] IN BLOOD BY AUTOMATED COUNT: 13.7 % (ref 11.5–14.5)
EST. GFR  (NO RACE VARIABLE): 49.2 ML/MIN/1.73 M^2
GLUCOSE SERPL-MCNC: 348 MG/DL (ref 70–110)
GLUCOSE UR QL STRIP: ABNORMAL
HCO3 UR-SCNC: 19.2 MMOL/L (ref 24–28)
HCO3 UR-SCNC: 20.7 MMOL/L (ref 24–28)
HCT VFR BLD AUTO: 30.1 % (ref 40–54)
HGB BLD-MCNC: 10 G/DL (ref 14–18)
HGB UR QL STRIP: ABNORMAL
HYALINE CASTS UR QL AUTO: 0 /LPF
IMM GRANULOCYTES # BLD AUTO: 0.03 K/UL (ref 0–0.04)
IMM GRANULOCYTES NFR BLD AUTO: 0.4 % (ref 0–0.5)
KETONES UR QL STRIP: ABNORMAL
LDH SERPL L TO P-CCNC: 1.47 MMOL/L (ref 0.5–2.2)
LEUKOCYTE ESTERASE UR QL STRIP: ABNORMAL
LYMPHOCYTES # BLD AUTO: 0.7 K/UL (ref 1–4.8)
LYMPHOCYTES NFR BLD: 8.3 % (ref 18–48)
MCH RBC QN AUTO: 30.3 PG (ref 27–31)
MCHC RBC AUTO-ENTMCNC: 33.2 G/DL (ref 32–36)
MCV RBC AUTO: 91 FL (ref 82–98)
MICROSCOPIC COMMENT: ABNORMAL
MONOCYTES # BLD AUTO: 0.6 K/UL (ref 0.3–1)
MONOCYTES NFR BLD: 7.3 % (ref 4–15)
NEUTROPHILS # BLD AUTO: 6.7 K/UL (ref 1.8–7.7)
NEUTROPHILS NFR BLD: 83.4 % (ref 38–73)
NITRITE UR QL STRIP: NEGATIVE
NRBC BLD-RTO: 0 /100 WBC
PCO2 BLDA: 33.8 MMHG (ref 35–45)
PCO2 BLDA: 37.9 MMHG (ref 35–45)
PH SMN: 7.34 [PH] (ref 7.35–7.45)
PH SMN: 7.36 [PH] (ref 7.35–7.45)
PH UR STRIP: 6 [PH] (ref 5–8)
PLATELET # BLD AUTO: 90 K/UL (ref 150–450)
PMV BLD AUTO: 11.1 FL (ref 9.2–12.9)
PO2 BLDA: 26 MMHG (ref 40–60)
PO2 BLDA: 45 MMHG (ref 40–60)
POC BE: -5 MMOL/L
POC BE: -6 MMOL/L
POC SATURATED O2: 47 % (ref 95–100)
POC SATURATED O2: 79 % (ref 95–100)
POC TCO2: 20 MMOL/L (ref 24–29)
POC TCO2: 22 MMOL/L (ref 24–29)
POCT GLUCOSE: 355 MG/DL (ref 70–110)
POCT GLUCOSE: 386 MG/DL (ref 70–110)
POCT GLUCOSE: 423 MG/DL (ref 70–110)
POCT GLUCOSE: 456 MG/DL (ref 70–110)
POTASSIUM SERPL-SCNC: 4 MMOL/L (ref 3.5–5.1)
PROT SERPL-MCNC: 6.2 G/DL (ref 6–8.4)
PROT UR QL STRIP: ABNORMAL
RBC # BLD AUTO: 3.3 M/UL (ref 4.6–6.2)
RBC #/AREA URNS AUTO: 38 /HPF (ref 0–4)
SAMPLE: ABNORMAL
SAMPLE: ABNORMAL
SAMPLE: NORMAL
SITE: ABNORMAL
SITE: ABNORMAL
SITE: NORMAL
SODIUM SERPL-SCNC: 130 MMOL/L (ref 136–145)
SP GR UR STRIP: 1.02 (ref 1–1.03)
SQUAMOUS #/AREA URNS AUTO: 1 /HPF
URN SPEC COLLECT METH UR: ABNORMAL
WBC # BLD AUTO: 8.07 K/UL (ref 3.9–12.7)
WBC #/AREA URNS AUTO: >100 /HPF (ref 0–5)
WBC CLUMPS UR QL AUTO: ABNORMAL

## 2024-10-21 PROCEDURE — 82010 KETONE BODYS QUAN: CPT

## 2024-10-21 PROCEDURE — 96365 THER/PROPH/DIAG IV INF INIT: CPT

## 2024-10-21 PROCEDURE — 80053 COMPREHEN METABOLIC PANEL: CPT

## 2024-10-21 PROCEDURE — 93010 ELECTROCARDIOGRAM REPORT: CPT | Mod: ,,, | Performed by: INTERNAL MEDICINE

## 2024-10-21 PROCEDURE — 21400001 HC TELEMETRY ROOM

## 2024-10-21 PROCEDURE — 81001 URINALYSIS AUTO W/SCOPE: CPT

## 2024-10-21 PROCEDURE — 94761 N-INVAS EAR/PLS OXIMETRY MLT: CPT | Mod: XB

## 2024-10-21 PROCEDURE — 82803 BLOOD GASES ANY COMBINATION: CPT

## 2024-10-21 PROCEDURE — 87088 URINE BACTERIA CULTURE: CPT

## 2024-10-21 PROCEDURE — 99285 EMERGENCY DEPT VISIT HI MDM: CPT | Mod: 25

## 2024-10-21 PROCEDURE — 63600175 PHARM REV CODE 636 W HCPCS

## 2024-10-21 PROCEDURE — 85025 COMPLETE CBC W/AUTO DIFF WBC: CPT

## 2024-10-21 PROCEDURE — 25000003 PHARM REV CODE 250

## 2024-10-21 PROCEDURE — 87040 BLOOD CULTURE FOR BACTERIA: CPT | Mod: 59

## 2024-10-21 PROCEDURE — 83605 ASSAY OF LACTIC ACID: CPT

## 2024-10-21 PROCEDURE — 25500020 PHARM REV CODE 255: Performed by: STUDENT IN AN ORGANIZED HEALTH CARE EDUCATION/TRAINING PROGRAM

## 2024-10-21 PROCEDURE — 96375 TX/PRO/DX INJ NEW DRUG ADDON: CPT

## 2024-10-21 PROCEDURE — 99900035 HC TECH TIME PER 15 MIN (STAT)

## 2024-10-21 PROCEDURE — 12000002 HC ACUTE/MED SURGE SEMI-PRIVATE ROOM

## 2024-10-21 PROCEDURE — 87186 SC STD MICRODIL/AGAR DIL: CPT

## 2024-10-21 PROCEDURE — 82962 GLUCOSE BLOOD TEST: CPT

## 2024-10-21 PROCEDURE — 93005 ELECTROCARDIOGRAM TRACING: CPT

## 2024-10-21 PROCEDURE — 87086 URINE CULTURE/COLONY COUNT: CPT

## 2024-10-21 RX ORDER — ACETAMINOPHEN 500 MG
1000 TABLET ORAL
Status: COMPLETED | OUTPATIENT
Start: 2024-10-21 | End: 2024-10-21

## 2024-10-21 RX ADMIN — VANCOMYCIN HYDROCHLORIDE 2000 MG: 500 INJECTION, POWDER, LYOPHILIZED, FOR SOLUTION INTRAVENOUS at 07:10

## 2024-10-21 RX ADMIN — INSULIN HUMAN 5 UNITS: 100 INJECTION, SOLUTION PARENTERAL at 11:10

## 2024-10-21 RX ADMIN — IOHEXOL 100 ML: 350 INJECTION, SOLUTION INTRAVENOUS at 08:10

## 2024-10-21 RX ADMIN — ACETAMINOPHEN 1000 MG: 500 TABLET ORAL at 07:10

## 2024-10-22 PROBLEM — F10.10 ALCOHOL ABUSE: Status: ACTIVE | Noted: 2024-10-22

## 2024-10-22 LAB
ALBUMIN SERPL BCP-MCNC: 2 G/DL (ref 3.5–5.2)
ALP SERPL-CCNC: 86 U/L (ref 40–150)
ALT SERPL W/O P-5'-P-CCNC: 6 U/L (ref 10–44)
ANION GAP SERPL CALC-SCNC: 7 MMOL/L (ref 8–16)
AST SERPL-CCNC: 9 U/L (ref 10–40)
BASOPHILS # BLD AUTO: 0.02 K/UL (ref 0–0.2)
BASOPHILS NFR BLD: 0.3 % (ref 0–1.9)
BILIRUB SERPL-MCNC: 0.7 MG/DL (ref 0.1–1)
BUN SERPL-MCNC: 24 MG/DL (ref 8–23)
CALCIUM SERPL-MCNC: 7.7 MG/DL (ref 8.7–10.5)
CHLORIDE SERPL-SCNC: 105 MMOL/L (ref 95–110)
CO2 SERPL-SCNC: 19 MMOL/L (ref 23–29)
CREAT SERPL-MCNC: 1.5 MG/DL (ref 0.5–1.4)
DIFFERENTIAL METHOD BLD: ABNORMAL
EOSINOPHIL # BLD AUTO: 0.2 K/UL (ref 0–0.5)
EOSINOPHIL NFR BLD: 3.1 % (ref 0–8)
ERYTHROCYTE [DISTWIDTH] IN BLOOD BY AUTOMATED COUNT: 14 % (ref 11.5–14.5)
EST. GFR  (NO RACE VARIABLE): 49.2 ML/MIN/1.73 M^2
GLUCOSE SERPL-MCNC: 278 MG/DL (ref 70–110)
HCT VFR BLD AUTO: 27.9 % (ref 40–54)
HGB BLD-MCNC: 9.3 G/DL (ref 14–18)
IMM GRANULOCYTES # BLD AUTO: 0.03 K/UL (ref 0–0.04)
IMM GRANULOCYTES NFR BLD AUTO: 0.4 % (ref 0–0.5)
LYMPHOCYTES # BLD AUTO: 1 K/UL (ref 1–4.8)
LYMPHOCYTES NFR BLD: 13 % (ref 18–48)
MAGNESIUM SERPL-MCNC: 1.4 MG/DL (ref 1.6–2.6)
MCH RBC QN AUTO: 30.4 PG (ref 27–31)
MCHC RBC AUTO-ENTMCNC: 33.3 G/DL (ref 32–36)
MCV RBC AUTO: 91 FL (ref 82–98)
MONOCYTES # BLD AUTO: 0.6 K/UL (ref 0.3–1)
MONOCYTES NFR BLD: 8 % (ref 4–15)
NEUTROPHILS # BLD AUTO: 5.8 K/UL (ref 1.8–7.7)
NEUTROPHILS NFR BLD: 75.2 % (ref 38–73)
NRBC BLD-RTO: 0 /100 WBC
OHS QRS DURATION: 84 MS
OHS QTC CALCULATION: 469 MS
PHOSPHATE SERPL-MCNC: 2.6 MG/DL (ref 2.7–4.5)
PLATELET # BLD AUTO: 95 K/UL (ref 150–450)
PMV BLD AUTO: 10.6 FL (ref 9.2–12.9)
POCT GLUCOSE: 231 MG/DL (ref 70–110)
POCT GLUCOSE: 243 MG/DL (ref 70–110)
POCT GLUCOSE: 325 MG/DL (ref 70–110)
POCT GLUCOSE: 338 MG/DL (ref 70–110)
POTASSIUM SERPL-SCNC: 3.6 MMOL/L (ref 3.5–5.1)
PROT SERPL-MCNC: 5.5 G/DL (ref 6–8.4)
RBC # BLD AUTO: 3.06 M/UL (ref 4.6–6.2)
SODIUM SERPL-SCNC: 131 MMOL/L (ref 136–145)
WBC # BLD AUTO: 7.67 K/UL (ref 3.9–12.7)

## 2024-10-22 PROCEDURE — 85025 COMPLETE CBC W/AUTO DIFF WBC: CPT

## 2024-10-22 PROCEDURE — 63600175 PHARM REV CODE 636 W HCPCS

## 2024-10-22 PROCEDURE — 84100 ASSAY OF PHOSPHORUS: CPT

## 2024-10-22 PROCEDURE — 80053 COMPREHEN METABOLIC PANEL: CPT

## 2024-10-22 PROCEDURE — 83735 ASSAY OF MAGNESIUM: CPT

## 2024-10-22 PROCEDURE — 25000003 PHARM REV CODE 250

## 2024-10-22 PROCEDURE — 20600001 HC STEP DOWN PRIVATE ROOM

## 2024-10-22 PROCEDURE — 63600175 PHARM REV CODE 636 W HCPCS: Performed by: INTERNAL MEDICINE

## 2024-10-22 RX ORDER — LANOLIN ALCOHOL/MO/W.PET/CERES
1000 CREAM (GRAM) TOPICAL DAILY
Status: DISCONTINUED | OUTPATIENT
Start: 2024-10-22 | End: 2024-10-24 | Stop reason: HOSPADM

## 2024-10-22 RX ORDER — INSULIN GLARGINE 100 [IU]/ML
10 INJECTION, SOLUTION SUBCUTANEOUS DAILY
Status: DISCONTINUED | OUTPATIENT
Start: 2024-10-22 | End: 2024-10-22

## 2024-10-22 RX ORDER — CAPSAICIN 0.03 G/100G
CREAM TOPICAL
COMMUNITY

## 2024-10-22 RX ORDER — POLYETHYLENE GLYCOL 3350 17 G/17G
17 POWDER, FOR SOLUTION ORAL DAILY
Status: DISCONTINUED | OUTPATIENT
Start: 2024-10-22 | End: 2024-10-24 | Stop reason: HOSPADM

## 2024-10-22 RX ORDER — GLUCAGON 1 MG
1 KIT INJECTION
Status: DISCONTINUED | OUTPATIENT
Start: 2024-10-22 | End: 2024-10-24 | Stop reason: HOSPADM

## 2024-10-22 RX ORDER — POTASSIUM CHLORIDE 20 MEQ/1
40 TABLET, EXTENDED RELEASE ORAL ONCE
Status: COMPLETED | OUTPATIENT
Start: 2024-10-22 | End: 2024-10-22

## 2024-10-22 RX ORDER — ACETAMINOPHEN 325 MG/1
650 TABLET ORAL EVERY 8 HOURS PRN
Status: DISCONTINUED | OUTPATIENT
Start: 2024-10-22 | End: 2024-10-24 | Stop reason: HOSPADM

## 2024-10-22 RX ORDER — DULOXETIN HYDROCHLORIDE 30 MG/1
60 CAPSULE, DELAYED RELEASE ORAL 2 TIMES DAILY
Status: DISCONTINUED | OUTPATIENT
Start: 2024-10-22 | End: 2024-10-24 | Stop reason: HOSPADM

## 2024-10-22 RX ORDER — IBUPROFEN 200 MG
16 TABLET ORAL
Status: DISCONTINUED | OUTPATIENT
Start: 2024-10-22 | End: 2024-10-22

## 2024-10-22 RX ORDER — INSULIN ASPART 100 [IU]/ML
4 INJECTION, SOLUTION INTRAVENOUS; SUBCUTANEOUS ONCE
Status: COMPLETED | OUTPATIENT
Start: 2024-10-22 | End: 2024-10-22

## 2024-10-22 RX ORDER — ONDANSETRON 8 MG/1
8 TABLET, ORALLY DISINTEGRATING ORAL EVERY 8 HOURS PRN
Status: DISCONTINUED | OUTPATIENT
Start: 2024-10-22 | End: 2024-10-24 | Stop reason: HOSPADM

## 2024-10-22 RX ORDER — INSULIN GLARGINE 100 [IU]/ML
15 INJECTION, SOLUTION SUBCUTANEOUS DAILY
Status: DISCONTINUED | OUTPATIENT
Start: 2024-10-23 | End: 2024-10-23

## 2024-10-22 RX ORDER — AMOXICILLIN 250 MG
1 CAPSULE ORAL 2 TIMES DAILY
Status: DISCONTINUED | OUTPATIENT
Start: 2024-10-22 | End: 2024-10-24 | Stop reason: HOSPADM

## 2024-10-22 RX ORDER — INSULIN ASPART 100 [IU]/ML
0-10 INJECTION, SOLUTION INTRAVENOUS; SUBCUTANEOUS
Status: DISCONTINUED | OUTPATIENT
Start: 2024-10-22 | End: 2024-10-24 | Stop reason: HOSPADM

## 2024-10-22 RX ORDER — PANTOPRAZOLE SODIUM 40 MG/1
40 TABLET, DELAYED RELEASE ORAL DAILY
Status: DISCONTINUED | OUTPATIENT
Start: 2024-10-22 | End: 2024-10-24 | Stop reason: HOSPADM

## 2024-10-22 RX ORDER — NALOXONE HCL 0.4 MG/ML
0.02 VIAL (ML) INJECTION
Status: DISCONTINUED | OUTPATIENT
Start: 2024-10-22 | End: 2024-10-24 | Stop reason: HOSPADM

## 2024-10-22 RX ORDER — ACETAMINOPHEN 325 MG/1
650 TABLET ORAL EVERY 4 HOURS PRN
Status: DISCONTINUED | OUTPATIENT
Start: 2024-10-22 | End: 2024-10-24 | Stop reason: HOSPADM

## 2024-10-22 RX ORDER — IBUPROFEN 200 MG
24 TABLET ORAL
Status: DISCONTINUED | OUTPATIENT
Start: 2024-10-22 | End: 2024-10-22

## 2024-10-22 RX ORDER — CEFEPIME HYDROCHLORIDE 2 G/1
2 INJECTION, POWDER, FOR SOLUTION INTRAVENOUS
Status: DISCONTINUED | OUTPATIENT
Start: 2024-10-22 | End: 2024-10-22

## 2024-10-22 RX ORDER — MAGNESIUM SULFATE HEPTAHYDRATE 40 MG/ML
2 INJECTION, SOLUTION INTRAVENOUS ONCE
Status: COMPLETED | OUTPATIENT
Start: 2024-10-22 | End: 2024-10-22

## 2024-10-22 RX ORDER — TALC
6 POWDER (GRAM) TOPICAL NIGHTLY PRN
Status: DISCONTINUED | OUTPATIENT
Start: 2024-10-22 | End: 2024-10-24 | Stop reason: HOSPADM

## 2024-10-22 RX ADMIN — APIXABAN 5 MG: 5 TABLET, FILM COATED ORAL at 08:10

## 2024-10-22 RX ADMIN — DULOXETINE HYDROCHLORIDE 60 MG: 30 CAPSULE, DELAYED RELEASE ORAL at 08:10

## 2024-10-22 RX ADMIN — MAGNESIUM SULFATE HEPTAHYDRATE 2 G: 40 INJECTION, SOLUTION INTRAVENOUS at 09:10

## 2024-10-22 RX ADMIN — INSULIN GLARGINE 10 UNITS: 100 INJECTION, SOLUTION SUBCUTANEOUS at 08:10

## 2024-10-22 RX ADMIN — CEFEPIME 2 G: 2 INJECTION, POWDER, FOR SOLUTION INTRAVENOUS at 03:10

## 2024-10-22 RX ADMIN — POTASSIUM CHLORIDE 40 MEQ: 1500 TABLET, EXTENDED RELEASE ORAL at 09:10

## 2024-10-22 RX ADMIN — INSULIN ASPART 4 UNITS: 100 INJECTION, SOLUTION INTRAVENOUS; SUBCUTANEOUS at 04:10

## 2024-10-22 RX ADMIN — CYANOCOBALAMIN TAB 1000 MCG 1000 MCG: 1000 TAB at 08:10

## 2024-10-22 RX ADMIN — CIPROFLOXACIN HYDROCHLORIDE 750 MG: 250 TABLET, FILM COATED ORAL at 01:10

## 2024-10-22 RX ADMIN — CIPROFLOXACIN HYDROCHLORIDE 750 MG: 250 TABLET, FILM COATED ORAL at 08:10

## 2024-10-22 RX ADMIN — INSULIN ASPART 8 UNITS: 100 INJECTION, SOLUTION INTRAVENOUS; SUBCUTANEOUS at 08:10

## 2024-10-22 RX ADMIN — PANTOPRAZOLE SODIUM 40 MG: 40 TABLET, DELAYED RELEASE ORAL at 08:10

## 2024-10-22 RX ADMIN — INSULIN ASPART 8 UNITS: 100 INJECTION, SOLUTION INTRAVENOUS; SUBCUTANEOUS at 12:10

## 2024-10-22 RX ADMIN — INSULIN ASPART 2 UNITS: 100 INJECTION, SOLUTION INTRAVENOUS; SUBCUTANEOUS at 08:10

## 2024-10-23 LAB
ALBUMIN SERPL BCP-MCNC: 2.1 G/DL (ref 3.5–5.2)
ALP SERPL-CCNC: 99 U/L (ref 40–150)
ALT SERPL W/O P-5'-P-CCNC: 9 U/L (ref 10–44)
ANION GAP SERPL CALC-SCNC: 9 MMOL/L (ref 8–16)
AST SERPL-CCNC: 14 U/L (ref 10–40)
BASOPHILS # BLD AUTO: 0.02 K/UL (ref 0–0.2)
BASOPHILS NFR BLD: 0.2 % (ref 0–1.9)
BILIRUB SERPL-MCNC: 0.6 MG/DL (ref 0.1–1)
BUN SERPL-MCNC: 16 MG/DL (ref 8–23)
CALCIUM SERPL-MCNC: 8.1 MG/DL (ref 8.7–10.5)
CHLORIDE SERPL-SCNC: 102 MMOL/L (ref 95–110)
CO2 SERPL-SCNC: 21 MMOL/L (ref 23–29)
CREAT SERPL-MCNC: 1.3 MG/DL (ref 0.5–1.4)
DIFFERENTIAL METHOD BLD: ABNORMAL
EOSINOPHIL # BLD AUTO: 0.2 K/UL (ref 0–0.5)
EOSINOPHIL NFR BLD: 2.4 % (ref 0–8)
ERYTHROCYTE [DISTWIDTH] IN BLOOD BY AUTOMATED COUNT: 13.5 % (ref 11.5–14.5)
EST. GFR  (NO RACE VARIABLE): 58.4 ML/MIN/1.73 M^2
GLUCOSE SERPL-MCNC: 212 MG/DL (ref 70–110)
HCT VFR BLD AUTO: 29.6 % (ref 40–54)
HGB BLD-MCNC: 9.6 G/DL (ref 14–18)
IMM GRANULOCYTES # BLD AUTO: 0.03 K/UL (ref 0–0.04)
IMM GRANULOCYTES NFR BLD AUTO: 0.4 % (ref 0–0.5)
LYMPHOCYTES # BLD AUTO: 0.9 K/UL (ref 1–4.8)
LYMPHOCYTES NFR BLD: 10.6 % (ref 18–48)
MAGNESIUM SERPL-MCNC: 1.7 MG/DL (ref 1.6–2.6)
MCH RBC QN AUTO: 29.9 PG (ref 27–31)
MCHC RBC AUTO-ENTMCNC: 32.4 G/DL (ref 32–36)
MCV RBC AUTO: 92 FL (ref 82–98)
MONOCYTES # BLD AUTO: 0.7 K/UL (ref 0.3–1)
MONOCYTES NFR BLD: 9.2 % (ref 4–15)
NEUTROPHILS # BLD AUTO: 6.2 K/UL (ref 1.8–7.7)
NEUTROPHILS NFR BLD: 77.2 % (ref 38–73)
NRBC BLD-RTO: 0 /100 WBC
PHOSPHATE SERPL-MCNC: 2.5 MG/DL (ref 2.7–4.5)
PLATELET # BLD AUTO: 120 K/UL (ref 150–450)
PMV BLD AUTO: 10.8 FL (ref 9.2–12.9)
POCT GLUCOSE: 202 MG/DL (ref 70–110)
POCT GLUCOSE: 244 MG/DL (ref 70–110)
POCT GLUCOSE: 253 MG/DL (ref 70–110)
POTASSIUM SERPL-SCNC: 4 MMOL/L (ref 3.5–5.1)
PROT SERPL-MCNC: 6 G/DL (ref 6–8.4)
RBC # BLD AUTO: 3.21 M/UL (ref 4.6–6.2)
SODIUM SERPL-SCNC: 132 MMOL/L (ref 136–145)
WBC # BLD AUTO: 8.03 K/UL (ref 3.9–12.7)

## 2024-10-23 PROCEDURE — 97535 SELF CARE MNGMENT TRAINING: CPT

## 2024-10-23 PROCEDURE — 25000003 PHARM REV CODE 250

## 2024-10-23 PROCEDURE — 97162 PT EVAL MOD COMPLEX 30 MIN: CPT

## 2024-10-23 PROCEDURE — 36415 COLL VENOUS BLD VENIPUNCTURE: CPT

## 2024-10-23 PROCEDURE — 85025 COMPLETE CBC W/AUTO DIFF WBC: CPT

## 2024-10-23 PROCEDURE — 20600001 HC STEP DOWN PRIVATE ROOM

## 2024-10-23 PROCEDURE — 84100 ASSAY OF PHOSPHORUS: CPT

## 2024-10-23 PROCEDURE — 83735 ASSAY OF MAGNESIUM: CPT

## 2024-10-23 PROCEDURE — 63600175 PHARM REV CODE 636 W HCPCS: Performed by: STUDENT IN AN ORGANIZED HEALTH CARE EDUCATION/TRAINING PROGRAM

## 2024-10-23 PROCEDURE — 97530 THERAPEUTIC ACTIVITIES: CPT

## 2024-10-23 PROCEDURE — 80053 COMPREHEN METABOLIC PANEL: CPT

## 2024-10-23 PROCEDURE — 97165 OT EVAL LOW COMPLEX 30 MIN: CPT

## 2024-10-23 RX ORDER — INSULIN GLARGINE 100 [IU]/ML
18 INJECTION, SOLUTION SUBCUTANEOUS DAILY
Status: DISCONTINUED | OUTPATIENT
Start: 2024-10-24 | End: 2024-10-23

## 2024-10-23 RX ORDER — INSULIN GLARGINE 100 [IU]/ML
20 INJECTION, SOLUTION SUBCUTANEOUS DAILY
Status: DISCONTINUED | OUTPATIENT
Start: 2024-10-24 | End: 2024-10-24 | Stop reason: HOSPADM

## 2024-10-23 RX ADMIN — APIXABAN 5 MG: 5 TABLET, FILM COATED ORAL at 09:10

## 2024-10-23 RX ADMIN — APIXABAN 5 MG: 5 TABLET, FILM COATED ORAL at 08:10

## 2024-10-23 RX ADMIN — DULOXETINE HYDROCHLORIDE 60 MG: 30 CAPSULE, DELAYED RELEASE ORAL at 08:10

## 2024-10-23 RX ADMIN — PANTOPRAZOLE SODIUM 40 MG: 40 TABLET, DELAYED RELEASE ORAL at 09:10

## 2024-10-23 RX ADMIN — INSULIN ASPART 2 UNITS: 100 INJECTION, SOLUTION INTRAVENOUS; SUBCUTANEOUS at 05:10

## 2024-10-23 RX ADMIN — INSULIN GLARGINE 15 UNITS: 100 INJECTION, SOLUTION SUBCUTANEOUS at 07:10

## 2024-10-23 RX ADMIN — INSULIN ASPART 4 UNITS: 100 INJECTION, SOLUTION INTRAVENOUS; SUBCUTANEOUS at 07:10

## 2024-10-23 RX ADMIN — INSULIN ASPART 5 UNITS: 100 INJECTION, SOLUTION INTRAVENOUS; SUBCUTANEOUS at 01:10

## 2024-10-23 RX ADMIN — CIPROFLOXACIN HYDROCHLORIDE 750 MG: 250 TABLET, FILM COATED ORAL at 08:10

## 2024-10-23 RX ADMIN — CYANOCOBALAMIN TAB 1000 MCG 1000 MCG: 1000 TAB at 09:10

## 2024-10-24 ENCOUNTER — EXTERNAL HOME HEALTH (OUTPATIENT)
Dept: HOME HEALTH SERVICES | Facility: HOSPITAL | Age: 74
End: 2024-10-24
Payer: MEDICARE

## 2024-10-24 VITALS
DIASTOLIC BLOOD PRESSURE: 68 MMHG | SYSTOLIC BLOOD PRESSURE: 139 MMHG | RESPIRATION RATE: 20 BRPM | HEART RATE: 77 BPM | TEMPERATURE: 99 F | OXYGEN SATURATION: 99 % | HEIGHT: 69 IN | WEIGHT: 212.94 LBS | BODY MASS INDEX: 31.54 KG/M2

## 2024-10-24 LAB
ALBUMIN SERPL BCP-MCNC: 2.1 G/DL (ref 3.5–5.2)
ALP SERPL-CCNC: 113 U/L (ref 40–150)
ALT SERPL W/O P-5'-P-CCNC: 13 U/L (ref 10–44)
ANION GAP SERPL CALC-SCNC: 9 MMOL/L (ref 8–16)
AST SERPL-CCNC: 23 U/L (ref 10–40)
BACTERIA UR CULT: ABNORMAL
BASOPHILS # BLD AUTO: 0.03 K/UL (ref 0–0.2)
BASOPHILS NFR BLD: 0.4 % (ref 0–1.9)
BILIRUB SERPL-MCNC: 0.5 MG/DL (ref 0.1–1)
BUN SERPL-MCNC: 13 MG/DL (ref 8–23)
CALCIUM SERPL-MCNC: 8.4 MG/DL (ref 8.7–10.5)
CHLORIDE SERPL-SCNC: 103 MMOL/L (ref 95–110)
CO2 SERPL-SCNC: 20 MMOL/L (ref 23–29)
CREAT SERPL-MCNC: 1.3 MG/DL (ref 0.5–1.4)
DIFFERENTIAL METHOD BLD: ABNORMAL
EOSINOPHIL # BLD AUTO: 0.2 K/UL (ref 0–0.5)
EOSINOPHIL NFR BLD: 2.4 % (ref 0–8)
ERYTHROCYTE [DISTWIDTH] IN BLOOD BY AUTOMATED COUNT: 13.6 % (ref 11.5–14.5)
EST. GFR  (NO RACE VARIABLE): 58.4 ML/MIN/1.73 M^2
GLUCOSE SERPL-MCNC: 180 MG/DL (ref 70–110)
HCT VFR BLD AUTO: 29.5 % (ref 40–54)
HGB BLD-MCNC: 9.6 G/DL (ref 14–18)
IMM GRANULOCYTES # BLD AUTO: 0.04 K/UL (ref 0–0.04)
IMM GRANULOCYTES NFR BLD AUTO: 0.5 % (ref 0–0.5)
LYMPHOCYTES # BLD AUTO: 1.2 K/UL (ref 1–4.8)
LYMPHOCYTES NFR BLD: 14.9 % (ref 18–48)
MAGNESIUM SERPL-MCNC: 1.6 MG/DL (ref 1.6–2.6)
MCH RBC QN AUTO: 29.6 PG (ref 27–31)
MCHC RBC AUTO-ENTMCNC: 32.5 G/DL (ref 32–36)
MCV RBC AUTO: 91 FL (ref 82–98)
MONOCYTES # BLD AUTO: 0.6 K/UL (ref 0.3–1)
MONOCYTES NFR BLD: 7 % (ref 4–15)
NEUTROPHILS # BLD AUTO: 6 K/UL (ref 1.8–7.7)
NEUTROPHILS NFR BLD: 74.8 % (ref 38–73)
NRBC BLD-RTO: 0 /100 WBC
PHOSPHATE SERPL-MCNC: 2.8 MG/DL (ref 2.7–4.5)
PLATELET # BLD AUTO: 147 K/UL (ref 150–450)
PMV BLD AUTO: 11 FL (ref 9.2–12.9)
POCT GLUCOSE: 133 MG/DL (ref 70–110)
POCT GLUCOSE: 183 MG/DL (ref 70–110)
POCT GLUCOSE: 232 MG/DL (ref 70–110)
POTASSIUM SERPL-SCNC: 3.9 MMOL/L (ref 3.5–5.1)
PROT SERPL-MCNC: 6.2 G/DL (ref 6–8.4)
RBC # BLD AUTO: 3.24 M/UL (ref 4.6–6.2)
SODIUM SERPL-SCNC: 132 MMOL/L (ref 136–145)
WBC # BLD AUTO: 8.04 K/UL (ref 3.9–12.7)

## 2024-10-24 PROCEDURE — 63600175 PHARM REV CODE 636 W HCPCS

## 2024-10-24 PROCEDURE — 97116 GAIT TRAINING THERAPY: CPT | Mod: CQ

## 2024-10-24 PROCEDURE — 25000003 PHARM REV CODE 250

## 2024-10-24 PROCEDURE — 80053 COMPREHEN METABOLIC PANEL: CPT

## 2024-10-24 PROCEDURE — 36415 COLL VENOUS BLD VENIPUNCTURE: CPT

## 2024-10-24 PROCEDURE — 84100 ASSAY OF PHOSPHORUS: CPT

## 2024-10-24 PROCEDURE — 85025 COMPLETE CBC W/AUTO DIFF WBC: CPT

## 2024-10-24 PROCEDURE — 83735 ASSAY OF MAGNESIUM: CPT

## 2024-10-24 RX ORDER — CIPROFLOXACIN 750 MG/1
750 TABLET, FILM COATED ORAL EVERY 12 HOURS
Qty: 23 TABLET | Refills: 0 | Status: SHIPPED | OUTPATIENT
Start: 2024-10-24 | End: 2024-11-05

## 2024-10-24 RX ORDER — BLOOD-GLUCOSE SENSOR
1 EACH MISCELLANEOUS
Qty: 3 EACH | Refills: 2 | Status: SHIPPED | OUTPATIENT
Start: 2024-10-24 | End: 2024-10-30

## 2024-10-24 RX ORDER — LANCETS 33 GAUGE
EACH MISCELLANEOUS
Qty: 100 EACH | Refills: 2 | Status: SHIPPED | OUTPATIENT
Start: 2024-10-24

## 2024-10-24 RX ORDER — METFORMIN HYDROCHLORIDE 500 MG/1
500 TABLET, EXTENDED RELEASE ORAL
Qty: 90 TABLET | Refills: 3 | Status: SHIPPED | OUTPATIENT
Start: 2024-10-24 | End: 2025-10-24

## 2024-10-24 RX ORDER — MAGNESIUM SULFATE HEPTAHYDRATE 40 MG/ML
2 INJECTION, SOLUTION INTRAVENOUS ONCE
Status: COMPLETED | OUTPATIENT
Start: 2024-10-24 | End: 2024-10-24

## 2024-10-24 RX ORDER — LANCETS 33 GAUGE
EACH MISCELLANEOUS
Qty: 100 EACH | Refills: 2 | Status: SHIPPED | OUTPATIENT
Start: 2024-10-24 | End: 2024-10-24

## 2024-10-24 RX ORDER — INSULIN ASPART 100 [IU]/ML
5 INJECTION, SOLUTION INTRAVENOUS; SUBCUTANEOUS
Status: DISCONTINUED | OUTPATIENT
Start: 2024-10-24 | End: 2024-10-24 | Stop reason: HOSPADM

## 2024-10-24 RX ORDER — MAGNESIUM SULFATE HEPTAHYDRATE 40 MG/ML
2 INJECTION, SOLUTION INTRAVENOUS ONCE
Status: DISCONTINUED | OUTPATIENT
Start: 2024-10-24 | End: 2024-10-24

## 2024-10-24 RX ORDER — BLOOD-GLUCOSE,RECEIVER,CONT
EACH MISCELLANEOUS
Qty: 1 EACH | Refills: 0 | Status: SHIPPED | OUTPATIENT
Start: 2024-10-24

## 2024-10-24 RX ADMIN — CIPROFLOXACIN HYDROCHLORIDE 750 MG: 250 TABLET, FILM COATED ORAL at 08:10

## 2024-10-24 RX ADMIN — DULOXETINE HYDROCHLORIDE 60 MG: 30 CAPSULE, DELAYED RELEASE ORAL at 08:10

## 2024-10-24 RX ADMIN — SENNOSIDES AND DOCUSATE SODIUM 1 TABLET: 50; 8.6 TABLET ORAL at 08:10

## 2024-10-24 RX ADMIN — PANTOPRAZOLE SODIUM 40 MG: 40 TABLET, DELAYED RELEASE ORAL at 08:10

## 2024-10-24 RX ADMIN — MAGNESIUM SULFATE HEPTAHYDRATE 2 G: 40 INJECTION, SOLUTION INTRAVENOUS at 08:10

## 2024-10-24 RX ADMIN — INSULIN GLARGINE 20 UNITS: 100 INJECTION, SOLUTION SUBCUTANEOUS at 08:10

## 2024-10-24 RX ADMIN — ONDANSETRON 8 MG: 8 TABLET, ORALLY DISINTEGRATING ORAL at 12:10

## 2024-10-24 RX ADMIN — ONDANSETRON 8 MG: 8 TABLET, ORALLY DISINTEGRATING ORAL at 05:10

## 2024-10-24 RX ADMIN — INSULIN ASPART 5 UNITS: 100 INJECTION, SOLUTION INTRAVENOUS; SUBCUTANEOUS at 12:10

## 2024-10-24 RX ADMIN — INSULIN ASPART 4 UNITS: 100 INJECTION, SOLUTION INTRAVENOUS; SUBCUTANEOUS at 08:10

## 2024-10-24 RX ADMIN — CYANOCOBALAMIN TAB 1000 MCG 1000 MCG: 1000 TAB at 08:10

## 2024-10-24 RX ADMIN — APIXABAN 5 MG: 5 TABLET, FILM COATED ORAL at 08:10

## 2024-10-24 RX ADMIN — INSULIN ASPART 2 UNITS: 100 INJECTION, SOLUTION INTRAVENOUS; SUBCUTANEOUS at 12:10

## 2024-10-26 LAB
BACTERIA BLD CULT: NORMAL
BACTERIA BLD CULT: NORMAL

## 2024-10-27 RX ORDER — BETAMETHASONE VALERATE 1 MG/G
CREAM TOPICAL DAILY
Qty: 45 G | Refills: 0 | Status: SHIPPED | OUTPATIENT
Start: 2024-10-27 | End: 2024-11-10

## 2024-10-29 ENCOUNTER — OUTPATIENT CASE MANAGEMENT (OUTPATIENT)
Dept: ADMINISTRATIVE | Facility: OTHER | Age: 74
End: 2024-10-29
Payer: MEDICARE

## 2024-10-30 ENCOUNTER — PATIENT MESSAGE (OUTPATIENT)
Dept: INTERNAL MEDICINE | Facility: CLINIC | Age: 74
End: 2024-10-30
Payer: MEDICARE

## 2024-10-30 DIAGNOSIS — E11.42 TYPE 2 DIABETES MELLITUS WITH DIABETIC POLYNEUROPATHY, WITHOUT LONG-TERM CURRENT USE OF INSULIN: ICD-10-CM

## 2024-10-30 RX ORDER — BLOOD-GLUCOSE SENSOR
1 EACH MISCELLANEOUS
Qty: 6 EACH | Refills: 3 | Status: SHIPPED | OUTPATIENT
Start: 2024-10-30

## 2024-10-30 RX ORDER — FLASH GLUCOSE SCANNING READER
EACH MISCELLANEOUS
Qty: 1 EACH | Refills: 0 | Status: SHIPPED | OUTPATIENT
Start: 2024-10-30

## 2024-10-31 ENCOUNTER — PATIENT MESSAGE (OUTPATIENT)
Dept: DIABETES | Facility: CLINIC | Age: 74
End: 2024-10-31
Payer: MEDICARE

## 2024-11-01 ENCOUNTER — PATIENT MESSAGE (OUTPATIENT)
Dept: INTERNAL MEDICINE | Facility: CLINIC | Age: 74
End: 2024-11-01
Payer: MEDICARE

## 2024-11-01 DIAGNOSIS — E11.42 TYPE 2 DIABETES MELLITUS WITH DIABETIC POLYNEUROPATHY, WITHOUT LONG-TERM CURRENT USE OF INSULIN: Primary | Chronic | ICD-10-CM

## 2024-11-01 RX ORDER — HYDROCHLOROTHIAZIDE 12.5 MG/1
1 CAPSULE ORAL
Qty: 6 EACH | Refills: 3 | Status: SHIPPED | OUTPATIENT
Start: 2024-11-01

## 2024-11-02 DIAGNOSIS — E53.8 B12 DEFICIENCY: ICD-10-CM

## 2024-11-02 DIAGNOSIS — E11.40 PAINFUL DIABETIC NEUROPATHY: ICD-10-CM

## 2024-11-02 RX ORDER — PANTOPRAZOLE SODIUM 40 MG/1
40 TABLET, DELAYED RELEASE ORAL DAILY
Qty: 90 TABLET | Refills: 3 | Status: SHIPPED | OUTPATIENT
Start: 2024-11-02

## 2024-11-02 RX ORDER — LANOLIN ALCOHOL/MO/W.PET/CERES
1000 CREAM (GRAM) TOPICAL DAILY
Qty: 90 TABLET | Refills: 3 | Status: SHIPPED | OUTPATIENT
Start: 2024-11-02

## 2024-11-04 RX ORDER — PREGABALIN 150 MG/1
150 CAPSULE ORAL 3 TIMES DAILY
Qty: 90 CAPSULE | Refills: 2 | Status: SHIPPED | OUTPATIENT
Start: 2024-11-04

## 2024-11-04 RX ORDER — NORTRIPTYLINE HYDROCHLORIDE 10 MG/1
10 CAPSULE ORAL 3 TIMES DAILY
Qty: 90 CAPSULE | Refills: 2 | Status: SHIPPED | OUTPATIENT
Start: 2024-11-04 | End: 2025-11-04

## 2024-11-06 ENCOUNTER — PATIENT MESSAGE (OUTPATIENT)
Dept: UROLOGY | Facility: CLINIC | Age: 74
End: 2024-11-06
Payer: MEDICARE

## 2024-11-07 RX ORDER — CALCIUM CITRATE/VITAMIN D3 200MG-6.25
TABLET ORAL
Qty: 300 STRIP | Refills: 3 | Status: SHIPPED | OUTPATIENT
Start: 2024-11-07

## 2024-11-07 NOTE — TELEPHONE ENCOUNTER
Refill Routing Note   Medication(s) are not appropriate for processing by [facility] for the following reason(s):        New or recently adjusted medication  No active prescription written by provider    [facility] action(s):  Defer             Appointments  past 12m or future 3m with PCP    Date Provider   Last Visit   10/7/2024 Isaias Myles MD   Next Visit   Visit date not found Isaias Myles MD   ED visits in past 90 days: 0        Note composed:6:08 PM 11/06/2024

## 2024-11-11 ENCOUNTER — PATIENT MESSAGE (OUTPATIENT)
Dept: INTERNAL MEDICINE | Facility: CLINIC | Age: 74
End: 2024-11-11
Payer: MEDICARE

## 2024-11-11 DIAGNOSIS — R41.0 CONFUSION: ICD-10-CM

## 2024-11-11 DIAGNOSIS — R53.83 FATIGUE, UNSPECIFIED TYPE: ICD-10-CM

## 2024-11-11 DIAGNOSIS — Z87.440 HISTORY OF UTI: Primary | ICD-10-CM

## 2024-11-12 ENCOUNTER — OUTPATIENT CASE MANAGEMENT (OUTPATIENT)
Dept: ADMINISTRATIVE | Facility: OTHER | Age: 74
End: 2024-11-12
Payer: MEDICARE

## 2024-11-12 DIAGNOSIS — A41.9 SYSTEMIC INFECTION: Primary | ICD-10-CM

## 2024-11-13 ENCOUNTER — TELEPHONE (OUTPATIENT)
Dept: INTERNAL MEDICINE | Facility: CLINIC | Age: 74
End: 2024-11-13
Payer: MEDICARE

## 2024-11-13 ENCOUNTER — HOSPITAL ENCOUNTER (INPATIENT)
Facility: HOSPITAL | Age: 74
LOS: 5 days | Discharge: NURSING, REHAB OR LONG-TERM CARE | DRG: 698 | End: 2024-11-18
Attending: EMERGENCY MEDICINE | Admitting: EMERGENCY MEDICINE
Payer: MEDICARE

## 2024-11-13 DIAGNOSIS — R07.9 CHEST PAIN: ICD-10-CM

## 2024-11-13 DIAGNOSIS — N30.00 ACUTE CYSTITIS: ICD-10-CM

## 2024-11-13 DIAGNOSIS — N39.0 URINARY TRACT INFECTION WITHOUT HEMATURIA, SITE UNSPECIFIED: Primary | ICD-10-CM

## 2024-11-13 DIAGNOSIS — N20.0 NEPHROLITHIASIS: ICD-10-CM

## 2024-11-13 DIAGNOSIS — A41.9 SEPSIS: ICD-10-CM

## 2024-11-13 LAB
ALBUMIN SERPL BCP-MCNC: 2.9 G/DL (ref 3.5–5.2)
ALLENS TEST: NORMAL
ALP SERPL-CCNC: 82 U/L (ref 40–150)
ALT SERPL W/O P-5'-P-CCNC: <5 U/L (ref 10–44)
ANION GAP SERPL CALC-SCNC: 10 MMOL/L (ref 8–16)
AST SERPL-CCNC: 9 U/L (ref 10–40)
BACTERIA #/AREA URNS AUTO: ABNORMAL /HPF
BASOPHILS # BLD AUTO: 0.04 K/UL (ref 0–0.2)
BASOPHILS NFR BLD: 0.4 % (ref 0–1.9)
BILIRUB SERPL-MCNC: 0.4 MG/DL (ref 0.1–1)
BILIRUB UR QL STRIP: NEGATIVE
BUN SERPL-MCNC: 38 MG/DL (ref 8–23)
CALCIUM SERPL-MCNC: 8.8 MG/DL (ref 8.7–10.5)
CHLORIDE SERPL-SCNC: 108 MMOL/L (ref 95–110)
CLARITY UR REFRACT.AUTO: ABNORMAL
CO2 SERPL-SCNC: 17 MMOL/L (ref 23–29)
COLOR UR AUTO: COLORLESS
CREAT SERPL-MCNC: 2.7 MG/DL (ref 0.5–1.4)
CRP SERPL-MCNC: 40.6 MG/L (ref 0–8.2)
DIFFERENTIAL METHOD BLD: ABNORMAL
EOSINOPHIL # BLD AUTO: 0.5 K/UL (ref 0–0.5)
EOSINOPHIL NFR BLD: 5.2 % (ref 0–8)
ERYTHROCYTE [DISTWIDTH] IN BLOOD BY AUTOMATED COUNT: 14.3 % (ref 11.5–14.5)
EST. GFR  (NO RACE VARIABLE): 24.3 ML/MIN/1.73 M^2
GLUCOSE SERPL-MCNC: 220 MG/DL (ref 70–110)
GLUCOSE UR QL STRIP: NEGATIVE
HCT VFR BLD AUTO: 34.4 % (ref 40–54)
HGB BLD-MCNC: 11.1 G/DL (ref 14–18)
HGB UR QL STRIP: ABNORMAL
IMM GRANULOCYTES # BLD AUTO: 0.06 K/UL (ref 0–0.04)
IMM GRANULOCYTES NFR BLD AUTO: 0.6 % (ref 0–0.5)
KETONES UR QL STRIP: NEGATIVE
LDH SERPL L TO P-CCNC: 1.57 MMOL/L (ref 0.5–2.2)
LEUKOCYTE ESTERASE UR QL STRIP: ABNORMAL
LYMPHOCYTES # BLD AUTO: 1.7 K/UL (ref 1–4.8)
LYMPHOCYTES NFR BLD: 17.9 % (ref 18–48)
MCH RBC QN AUTO: 29.3 PG (ref 27–31)
MCHC RBC AUTO-ENTMCNC: 32.3 G/DL (ref 32–36)
MCV RBC AUTO: 91 FL (ref 82–98)
MICROSCOPIC COMMENT: ABNORMAL
MONOCYTES # BLD AUTO: 0.5 K/UL (ref 0.3–1)
MONOCYTES NFR BLD: 5.7 % (ref 4–15)
NEUTROPHILS # BLD AUTO: 6.5 K/UL (ref 1.8–7.7)
NEUTROPHILS NFR BLD: 70.2 % (ref 38–73)
NITRITE UR QL STRIP: NEGATIVE
NRBC BLD-RTO: 0 /100 WBC
PH UR STRIP: 6 [PH] (ref 5–8)
PLATELET # BLD AUTO: 137 K/UL (ref 150–450)
PMV BLD AUTO: 11.7 FL (ref 9.2–12.9)
POCT GLUCOSE: 182 MG/DL (ref 70–110)
POTASSIUM SERPL-SCNC: 4.9 MMOL/L (ref 3.5–5.1)
PROT SERPL-MCNC: 7.4 G/DL (ref 6–8.4)
PROT UR QL STRIP: ABNORMAL
RBC # BLD AUTO: 3.79 M/UL (ref 4.6–6.2)
RBC #/AREA URNS AUTO: >100 /HPF (ref 0–4)
SAMPLE: NORMAL
SITE: NORMAL
SODIUM SERPL-SCNC: 135 MMOL/L (ref 136–145)
SP GR UR STRIP: 1.01 (ref 1–1.03)
SQUAMOUS #/AREA URNS AUTO: 0 /HPF
URN SPEC COLLECT METH UR: ABNORMAL
WBC # BLD AUTO: 9.32 K/UL (ref 3.9–12.7)
WBC #/AREA URNS AUTO: >100 /HPF (ref 0–5)

## 2024-11-13 PROCEDURE — 99285 EMERGENCY DEPT VISIT HI MDM: CPT | Mod: 25

## 2024-11-13 PROCEDURE — 83605 ASSAY OF LACTIC ACID: CPT

## 2024-11-13 PROCEDURE — 25000003 PHARM REV CODE 250: Performed by: NURSE PRACTITIONER

## 2024-11-13 PROCEDURE — 81001 URINALYSIS AUTO W/SCOPE: CPT

## 2024-11-13 PROCEDURE — 63600175 PHARM REV CODE 636 W HCPCS: Performed by: NURSE PRACTITIONER

## 2024-11-13 PROCEDURE — 93005 ELECTROCARDIOGRAM TRACING: CPT

## 2024-11-13 PROCEDURE — 87088 URINE BACTERIA CULTURE: CPT

## 2024-11-13 PROCEDURE — 12000002 HC ACUTE/MED SURGE SEMI-PRIVATE ROOM

## 2024-11-13 PROCEDURE — 63600175 PHARM REV CODE 636 W HCPCS

## 2024-11-13 PROCEDURE — 87086 URINE CULTURE/COLONY COUNT: CPT

## 2024-11-13 PROCEDURE — 87040 BLOOD CULTURE FOR BACTERIA: CPT

## 2024-11-13 PROCEDURE — 87186 SC STD MICRODIL/AGAR DIL: CPT | Mod: 59

## 2024-11-13 PROCEDURE — 84145 PROCALCITONIN (PCT): CPT | Performed by: NURSE PRACTITIONER

## 2024-11-13 PROCEDURE — 87077 CULTURE AEROBIC IDENTIFY: CPT

## 2024-11-13 PROCEDURE — 99900035 HC TECH TIME PER 15 MIN (STAT)

## 2024-11-13 PROCEDURE — 87154 CUL TYP ID BLD PTHGN 6+ TRGT: CPT

## 2024-11-13 PROCEDURE — 86140 C-REACTIVE PROTEIN: CPT | Performed by: NURSE PRACTITIONER

## 2024-11-13 PROCEDURE — 80053 COMPREHEN METABOLIC PANEL: CPT

## 2024-11-13 PROCEDURE — 85025 COMPLETE CBC W/AUTO DIFF WBC: CPT

## 2024-11-13 PROCEDURE — 93010 ELECTROCARDIOGRAM REPORT: CPT | Mod: ,,, | Performed by: INTERNAL MEDICINE

## 2024-11-13 RX ORDER — ONDANSETRON HYDROCHLORIDE 2 MG/ML
4 INJECTION, SOLUTION INTRAVENOUS EVERY 8 HOURS PRN
Status: DISCONTINUED | OUTPATIENT
Start: 2024-11-13 | End: 2024-11-18 | Stop reason: HOSPADM

## 2024-11-13 RX ORDER — GLUCAGON 1 MG
1 KIT INJECTION
Status: DISCONTINUED | OUTPATIENT
Start: 2024-11-13 | End: 2024-11-18 | Stop reason: HOSPADM

## 2024-11-13 RX ORDER — SODIUM CHLORIDE 0.9 % (FLUSH) 0.9 %
10 SYRINGE (ML) INJECTION EVERY 12 HOURS PRN
Status: DISCONTINUED | OUTPATIENT
Start: 2024-11-13 | End: 2024-11-18 | Stop reason: HOSPADM

## 2024-11-13 RX ORDER — LANOLIN ALCOHOL/MO/W.PET/CERES
1000 CREAM (GRAM) TOPICAL DAILY
Status: DISCONTINUED | OUTPATIENT
Start: 2024-11-14 | End: 2024-11-18 | Stop reason: HOSPADM

## 2024-11-13 RX ORDER — CEFTRIAXONE 1 G/1
1 INJECTION, POWDER, FOR SOLUTION INTRAMUSCULAR; INTRAVENOUS
Status: DISCONTINUED | OUTPATIENT
Start: 2024-11-13 | End: 2024-11-13

## 2024-11-13 RX ORDER — METHOCARBAMOL 500 MG/1
500 TABLET, FILM COATED ORAL 4 TIMES DAILY
Status: DISCONTINUED | OUTPATIENT
Start: 2024-11-13 | End: 2024-11-18 | Stop reason: HOSPADM

## 2024-11-13 RX ORDER — NALOXONE HCL 0.4 MG/ML
0.02 VIAL (ML) INJECTION
Status: DISCONTINUED | OUTPATIENT
Start: 2024-11-13 | End: 2024-11-18 | Stop reason: HOSPADM

## 2024-11-13 RX ORDER — ACETAMINOPHEN 500 MG
1000 TABLET ORAL ONCE
Status: DISCONTINUED | OUTPATIENT
Start: 2024-11-13 | End: 2024-11-18 | Stop reason: HOSPADM

## 2024-11-13 RX ORDER — OXYCODONE HYDROCHLORIDE 5 MG/1
5 TABLET ORAL EVERY 4 HOURS PRN
Status: DISCONTINUED | OUTPATIENT
Start: 2024-11-13 | End: 2024-11-18 | Stop reason: HOSPADM

## 2024-11-13 RX ORDER — IPRATROPIUM BROMIDE AND ALBUTEROL SULFATE 2.5; .5 MG/3ML; MG/3ML
3 SOLUTION RESPIRATORY (INHALATION) EVERY 4 HOURS PRN
Status: DISCONTINUED | OUTPATIENT
Start: 2024-11-14 | End: 2024-11-18 | Stop reason: HOSPADM

## 2024-11-13 RX ORDER — MORPHINE SULFATE 2 MG/ML
2 INJECTION, SOLUTION INTRAMUSCULAR; INTRAVENOUS EVERY 4 HOURS PRN
Status: DISCONTINUED | OUTPATIENT
Start: 2024-11-13 | End: 2024-11-18 | Stop reason: HOSPADM

## 2024-11-13 RX ORDER — INSULIN ASPART 100 [IU]/ML
0-5 INJECTION, SOLUTION INTRAVENOUS; SUBCUTANEOUS
Status: DISCONTINUED | OUTPATIENT
Start: 2024-11-13 | End: 2024-11-18 | Stop reason: HOSPADM

## 2024-11-13 RX ORDER — IBUPROFEN 200 MG
24 TABLET ORAL
Status: DISCONTINUED | OUTPATIENT
Start: 2024-11-13 | End: 2024-11-18 | Stop reason: HOSPADM

## 2024-11-13 RX ORDER — TAMSULOSIN HYDROCHLORIDE 0.4 MG/1
0.4 CAPSULE ORAL DAILY
Status: DISCONTINUED | OUTPATIENT
Start: 2024-11-14 | End: 2024-11-18 | Stop reason: HOSPADM

## 2024-11-13 RX ORDER — IBUPROFEN 200 MG
16 TABLET ORAL
Status: DISCONTINUED | OUTPATIENT
Start: 2024-11-13 | End: 2024-11-18 | Stop reason: HOSPADM

## 2024-11-13 RX ORDER — LANOLIN ALCOHOL/MO/W.PET/CERES
400 CREAM (GRAM) TOPICAL NIGHTLY
Status: DISCONTINUED | OUTPATIENT
Start: 2024-11-13 | End: 2024-11-18 | Stop reason: HOSPADM

## 2024-11-13 RX ORDER — PANTOPRAZOLE SODIUM 20 MG/1
40 TABLET, DELAYED RELEASE ORAL DAILY
Status: DISCONTINUED | OUTPATIENT
Start: 2024-11-14 | End: 2024-11-18 | Stop reason: HOSPADM

## 2024-11-13 RX ORDER — CIPROFLOXACIN 2 MG/ML
400 INJECTION, SOLUTION INTRAVENOUS ONCE
Status: COMPLETED | OUTPATIENT
Start: 2024-11-13 | End: 2024-11-13

## 2024-11-13 RX ADMIN — SODIUM CHLORIDE, POTASSIUM CHLORIDE, SODIUM LACTATE AND CALCIUM CHLORIDE 1000 ML: 600; 310; 30; 20 INJECTION, SOLUTION INTRAVENOUS at 10:11

## 2024-11-13 RX ADMIN — APIXABAN 5 MG: 5 TABLET, FILM COATED ORAL at 10:11

## 2024-11-13 RX ADMIN — CIPROFLOXACIN 400 MG: 2 INJECTION, SOLUTION INTRAVENOUS at 08:11

## 2024-11-13 NOTE — TELEPHONE ENCOUNTER
----- Message from Isaias Myles MD sent at 11/13/2024 11:09 AM CST -----  Regarding: RE: call back  Just for documentation. Pt has been advised to go to ED ASAP.    Dmitri Phillips  ----- Message -----  From: Chelsea Christina LPN  Sent: 11/12/2024   2:45 PM CST  To: Isaias Myles MD  Subject: FW: call back                                      ----- Message -----  From: Leonora Locke  Sent: 11/12/2024   2:18 PM CST  To: Shar Esparza Staff  Subject: call back                                        Type: Patient Call Back    [facility]     What is the request in detail: pt unable to void, unable to obtain UA, requesting call to see if provider wants pt testing rescheduled     Can the clinic reply by [facility]?no    Would the patient rather a call back or a response via [facility]? call    Best call back number:  [phone]    Additional Information:

## 2024-11-13 NOTE — Clinical Note
Diagnosis: Sepsis [176110]   Future Attending Provider: ALEJANDRO MORIN [[id number]]   Reason for IP Medical Treatment  (Clinical interventions that can only be accomplished in the IP setting? ) :: urosepsis

## 2024-11-13 NOTE — ED PROVIDER NOTES
Encounter Date: 11/13/2024       History     Chief Complaint   Patient presents with    Flank Pain     Complaining of back and flank pain, many recent falls.  Denies trauma. Numbness and tingling is normal for him.  Neuropathy.PMH, DM, HTN, A-fib, on Eliquis.     74-year-old male with a past medical history of type 2 DM, HTN, CAD, PE, afib on eliquis, and recurrent UTI's now presenting with chief complaint of back pain.  Patient was accompanied by his partner who tells me that the patient has been bed-bound for 3 days complaining of flank/back pain and foul-smelling urine.  Patient denies any fevers, spinal pain associated numbness/weakness of his lower extremities, or urinary retention.    The history is provided by the patient and a relative.     Review of patient's allergies indicates:  No Known Allergies  Past Medical History:   Diagnosis Date    Acute deep vein thrombosis (DVT) of left lower extremity 12/2023    Anxiety     Atrial fibrillation 12/2023    Cataract     Coronary artery disease     CAC score 1430    Depression     Diabetic neuropathy     Essential (primary) hypertension     GERD (gastroesophageal reflux disease)     History of bariatric surgery 07/08/2021    History of total right knee replacement 07/08/2021    Insomnia     Neuromyopathy     Possibly DM and/or alcohol related.    Pulmonary embolism 12/2023    Reactive airway disease     Type 2 diabetes mellitus      Past Surgical History:   Procedure Laterality Date    CATARACT EXTRACTION W/  INTRAOCULAR LENS IMPLANT Right 7/15/2024    Procedure: EXTRACTION, CATARACT, WITH IOL INSERTION;  Surgeon: Margot Jacobson MD;  Location: [facility] OR;  Service: Ophthalmology;  Laterality: Right;    CATARACT EXTRACTION W/  INTRAOCULAR LENS IMPLANT Left 8/29/2024    Procedure: EXTRACTION, CATARACT, WITH IOL INSERTION;  Surgeon: Margot Jacobson MD;  Location: [facility] OR;  Service: Ophthalmology;  Laterality: Left;    COLONOSCOPY      Normal around 2020    COLONOSCOPY  N/A 9/6/2024    Procedure: COLONOSCOPY;  Surgeon: Alessandro Serna MD;  Location: [facility] ENDO (4TH FLR);  Service: Endoscopy;  Laterality: N/A;  8/28-new case created-lvm for pre call-pt has cataract surgery 8/29/24-tb  ref-dr rico goldstein-peg-portal  ok to hold elivickie mukherjeeGT  9/5-LVM for precall-Kpvt    COLONOSCOPY N/A 9/6/2024    Procedure: COLONOSCOPY;  Surgeon: Alessandro Serna MD;  Location: [facility] ENDO (4TH FLR);  Service: Endoscopy;  Laterality: N/A;  + cologuard  8/8 ref by Isaias Goldstein MD, PeG, instr. to portal, Eliquis hold approval pending-  ok to hold Eliquis 2 days per Dr Hernandes    CYSTOSCOPY WITH URETEROSCOPY, RETROGRADE PYELOGRAPHY, AND INSERTION OF STENT Right 9/27/2024    Procedure: CYSTOSCOPY, WITH RETROGRADE PYELOGRAM AND URETERAL STENT INSERTION;  Surgeon: Joni Wagoner MD;  [facility];  Service: Urology;  Laterality: Right;    REMOVAL, CALCULUS, BLADDER N/A 9/27/2024    Procedure: REMOVAL, CALCULUS, BLADDER;  Surgeon: Joni Wagoner MD;  [facility];  Service: Urology;  Laterality: N/A;    TOTAL KNEE ARTHROPLASTY Right      Family History   Problem Relation Name Age of Onset    Hypertension Mother      Colon cancer Paternal Grandfather  89     Social History     Tobacco Use    Smoking status: Never    Smokeless tobacco: Never   Substance Use Topics    Alcohol use: Yes     Comment: Former heavy use    Drug use: Never     Review of Systems  See HPI     Physical Exam     Initial Vitals [11/13/24 1559]   BP Pulse Resp Temp SpO2   106/67 103 17 98.8 °F (37.1 °C) 98 %      MAP       --         Physical Exam    Nursing note and vitals reviewed.      Gen: AxOx3, well nourished, appears stated age, no pallor, no jaundice, appears well hydrated  Eye: EOMI, no scleral icterus, no periorbital edema or ecchymosis  Head: Normocephalic, atraumatic, no lesions, scalp appears normal  ENT: Neck supple, no stridor, no masses, no drooling or voice changes  CVS: All distal  pulses intact with normal rate and rhythm, no JVD, normal S1/S2, no murmur  Pulm: Normal breath sounds, no wheezes, rales or rhonchi, no increased work of breathing  Abd:  Nondistended, soft, nontender, no organomegaly, left-sided CVAT  Ext: No edema, no lesions, rashes, or deformity.  No midline tenderness over lumbar spine  Neuro: GCS15, moving all extremities, gait intact, face grossly symmetric  Power, tone, sensation intact laterally.    Bilateral straight leg raise test negative  Psych: normal affect, cooperative, well groomed, makes good eye contact      ED Course   Procedures  Labs Reviewed   CBC W/ AUTO DIFFERENTIAL - Abnormal       Result Value    WBC 9.32      RBC 3.79 (*)     Hemoglobin 11.1 (*)     Hematocrit 34.4 (*)     MCV 91      MCH 29.3      MCHC 32.3      RDW 14.3      Platelets 137 (*)     MPV 11.7      Immature Granulocytes 0.6 (*)     Gran # (ANC) 6.5      Immature Grans (Abs) 0.06 (*)     Lymph # 1.7      Mono # 0.5      Eos # 0.5      Baso # 0.04      nRBC 0      Gran % 70.2      Lymph % 17.9 (*)     Mono % 5.7      Eosinophil % 5.2      Basophil % 0.4      Differential Method Automated     COMPREHENSIVE METABOLIC PANEL - Abnormal    Sodium 135 (*)     Potassium 4.9      Chloride 108      CO2 17 (*)     Glucose 220 (*)     BUN 38 (*)     Creatinine 2.7 (*)     Calcium 8.8      Total Protein 7.4      Albumin 2.9 (*)     Total Bilirubin 0.4      Alkaline Phosphatase 82      AST 9 (*)     ALT <5 (*)     eGFR 24.3 (*)     Anion Gap 10     URINALYSIS, REFLEX TO URINE CULTURE - Abnormal    Specimen UA Urine, Clean Catch      Color, UA Colorless (*)     Appearance, UA Hazy (*)     pH, UA 6.0      Specific Gravity, UA 1.010      Protein, UA Trace (*)     Glucose, UA Negative      Ketones, UA Negative      Bilirubin (UA) Negative      Occult Blood UA 3+ (*)     Nitrite, UA Negative      Leukocytes, UA 3+ (*)     Narrative:     Specimen Source->Urine   URINALYSIS MICROSCOPIC - Abnormal    RBC, UA >100  (*)     WBC, UA >100 (*)     Bacteria Many (*)     Squam Epithel, UA 0      Microscopic Comment SEE COMMENT      Narrative:     Specimen Source->Urine   CULTURE, BLOOD   CULTURE, BLOOD   CULTURE, URINE   C-REACTIVE PROTEIN   POCT GLUCOSE, HAND-HELD DEVICE   ISTAT LACTATE    POC Lactate 1.57      Sample VENOUS      Site Other      Allens Test N/A            Imaging Results              CT Renal Stone Study ABD Pelvis WO (Final result)  Result time 11/13/24 20:47:33      Final result by Daryl Heart MD (11/13/24 20:47:33)                   Impression:      1. Redemonstrated right-sided double-J ureteral stent, stable in position.  No new or worsening hydronephrosis.  2. Bilateral nephrolithiasis.  3. Similar mild nonspecific thickening of the urinary bladder which may be related to suboptimal distension versus nonspecific cystitis.  4. Grossly stable other chronic findings in the body of the report.      Electronically signed by: Daryl Heart MD  Date:    11/13/2024  Time:    20:47               Narrative:    EXAMINATION:  CT RENAL STONE STUDY ABD PELVIS WO    CLINICAL HISTORY:  Nephrolithiasis, symptomatic/complicated;    TECHNIQUE:  Low dose axial images, sagittal and coronal reformations were obtained from the lung bases to the pubic symphysis.  Contrast was not administered.    COMPARISON:  CT abdomen and pelvis 10/21/2024    FINDINGS:  Lack of IV contrast limits evaluation of soft tissue and vascular structures.    Unchanged few scattered small calcified granulomas in the right lower lobe.  Similar platelike scarring versus atelectasis in the left lung base.  No consolidation or pleural effusion.    Base of the heart is normal in size without significant pericardial fluid noting multi-vessel coronary arterial calcifications.    Small hiatal hernia.  Remote operative change of prior gastric bypass without acute complication.    Noncontrast appearance of the gallbladder, liver, duodenum and bilateral adrenal  glands are within normal limits.  Pancreas mildly atrophic.  No discrete pancreatic mass or adjacent inflammatory change.  Spleen mildly enlarged without focal process, similar to prior.    Bilateral kidneys are stable in overall size, shape and location.  Redemonstrated right sided double-J ureteral stent in place with proximal loop in the upper pole calyx and distal loop within the dependent right aspect of the urinary bladder near the UVJ, similar to prior.  No hydronephrosis at either kidney.  Previous non dependent gas within the urinary bladder has resolved.  Few scattered punctate nephroliths at each kidney.  2.9 cm partially exophytic cyst with minimal thin peripheral calcification at the posterior right renal interpolar region and 9 cm exophytic cyst with minimal thin peripheral calcification at the peripheral left renal interpolar region.  Subcentimeter hypoattenuating exophytic lesion at the posterior aspect of the right renal mid to upper pole which is too small to characterize.  Additional subcentimeter hypoattenuating parenchymal lesion of the right kidney which is too small to characterize.  These findings are not significantly changed from most recent imaging.  Right more than left bilateral nonspecific perinephric stranding, similar to prior.  Previous right periureteral residual fat stranding is grossly stable.  Ureters are normal in course and caliber.  Urinary bladder is suboptimally distended with mild circumferential wall thickening.  Stable small urinary bladder diverticulum laterally on the right.  Pelvic phleboliths noted.  Prostate is normal in size noting dystrophic calcifications in the central gland.    Appendix and terminal ileum are within normal limits.  Mild to moderate amount of scattered fecal material throughout the colon and rectum.  No evidence of bowel obstruction or acute bowel inflammation.  Few scattered colonic diverticula without diverticulitis.  No bowel pneumatosis or  portal venous gas.    No ascites, free air or lymphadenopathy by CT criteria.  No significant aortic calcific atherosclerosis.  Aorta is not aneurysmal.    Extraperitoneal soft tissues and osseous structures appear stable without acute findings.                                       Medications   ciprofloxacin (CIPRO)400mg/200ml D5W IVPB 400 mg (400 mg Intravenous New Bag 11/13/24 2055)   lactated ringers bolus 1,000 mL (has no administration in time range)   oxyCODONE immediate release tablet 5 mg (has no administration in time range)   morphine injection 2 mg (has no administration in time range)   acetaminophen tablet 1,000 mg (has no administration in time range)   methocarbamoL tablet 500 mg (500 mg Oral Not Given 11/13/24 2100)   ondansetron injection 4 mg (has no administration in time range)   apixaban tablet 5 mg (has no administration in time range)   tamsulosin 24 hr capsule 0.4 mg (has no administration in time range)   pantoprazole EC tablet 40 mg (has no administration in time range)   cyanocobalamin tablet 1,000 mcg (has no administration in time range)   sodium chloride 0.9% flush 10 mL (has no administration in time range)   naloxone 0.4 mg/mL injection 0.02 mg (has no administration in time range)   glucose chewable tablet 16 g (has no administration in time range)   glucose chewable tablet 24 g (has no administration in time range)   glucagon (human recombinant) injection 1 mg (has no administration in time range)   insulin aspart U-100 pen 0-5 Units (has no administration in time range)   dextrose 10% bolus 125 mL 125 mL (has no administration in time range)   dextrose 10% bolus 250 mL 250 mL (has no administration in time range)     Medical Decision Making  Initial assessment  74-year-old male with a past medical history of type 2 DM, HTN, CAD, PE, afib on eliquis, and recurrent UTI's now presenting with chief complaint of back pain. Patient is able to vocalise, breathing spontaneously,  hemodynamically stable, oriented, moving all 4 limbs spontaneously.  Examination consistent with left-sided costovertebral angle tenderness.      Differential diagnosis  UTI/pyelonephritis  Nephrolithiasis considered associated sepsis  Considered cauda equina/conus medullaris but lower suspicion given physical examination      ED management  Workup consistent with CKD and CBC without leukocytosis.  Urine concerning for UTI and patient given ciprofloxacin as per recent culture sensitivities.  CT scan redemonstrated J stent present with stable nephrolithiasis and appearance of stranding concerning for possible pyelonephritis.  Patient was discussed with Hospital Medicine for admission who agreed to admit the patient to their service.  Patient remained stable in the emergency department.    This patient does have evidence of infective focus  My overall impression is sepsis.  Source: Urinary Tract  Antibiotics given- Antibiotics (72h ago, onward)    Start     Stop Route Frequency Ordered    11/13/24 2130  ciprofloxacin (CIPRO)400mg/200ml D5W IVPB 400 mg         -- IV Once 11/13/24 2023      Latest lactate reviewed-  Lab             11/13/24                       1826          POCLAC       1.57          Organ dysfunction indicated by none    Fluid challenge Not needed - patient is not hypotensive      Post- resuscitation assessment No - Post resuscitation assessment not needed       Will Not start Pressors- Levophed for MAP of 65  Source control achieved by:  Ciprofloxacin      Amount and/or Complexity of Data Reviewed  Labs: ordered. Decision-making details documented in ED Course.  Radiology: ordered.    Risk  Prescription drug management.  Decision regarding hospitalization.              Attending Attestation:   Physician Attestation Statement for Resident:  As the supervising MD   Physician Attestation Statement: I have personally seen and examined this patient.   I agree with the above history.  -:   As the  supervising MD I agree with the above PE.     As the supervising MD I agree with the above treatment, course, plan, and disposition.                    ED Course as of 11/13/24 2127 Wed Nov 13, 2024 1853 BP: 106/67 [PM]   1853 Temp: 98.8 °F (37.1 °C) [PM]   1853 Pulse: 103 [PM]   1853 Resp: 17 [PM]   1853 POC Lactate: 1.57 [PM]   1916 WBC: 9.32 [PM]   1916 Hemoglobin(!): 11.1 [PM]   1916 Platelet Count(!): 137 [PM]   1945 Sodium(!): 135 [PM]   1945 Potassium: 4.9 [PM]   1945 BUN(!): 38 [PM]   1945 Creatinine(!): 2.7 [PM]   2022 WBC, UA(!): >100 [PM]   2022 Bacteria, UA(!): Many [PM]   2022 Squam Epithel, UA: 0 [PM]      ED Course User Index  [PM] Kelley Espana MD                     Critical Care   Performed by: Ba Bolden MD   Authorized by: Ba Bolden MD    Total critical care time (exclusive of procedural time) : 30 minutes  Critical care was necessary to treat or prevent imminent or life-threatening deterioration of the following conditions:  ALYSIA, UTI        Clinical Impression:  Final diagnoses:  [A41.9] Sepsis  [N39.0] Urinary tract infection without hematuria, site unspecified (Primary)  [N20.0] Nephrolithiasis          ED Disposition Condition    Admit Stable                Kelley Espana MD  Resident  11/13/24 2127       Ba Bolden MD  11/13/24 2215

## 2024-11-13 NOTE — TELEPHONE ENCOUNTER
Called and spoke to Bulmaro. I did advise that Dr. Myles wants Mr. Ball to go to the ED as soon as he can this morning to be assessed. Bulmaro stated he will try to get the patient to go. I asked him to please call us back if the patient refuses to go so I can let Dr. Myles know.  He verbalized understanding.

## 2024-11-14 PROBLEM — I48.0 PAROXYSMAL A-FIB: Status: ACTIVE | Noted: 2024-11-14

## 2024-11-14 LAB
ACINETOBACTER CALCOACETICUS/BAUMANNII COMPLEX: NOT DETECTED
ALBUMIN SERPL BCP-MCNC: 2.7 G/DL (ref 3.5–5.2)
ALP SERPL-CCNC: 73 U/L (ref 40–150)
ALT SERPL W/O P-5'-P-CCNC: 6 U/L (ref 10–44)
ANION GAP SERPL CALC-SCNC: 8 MMOL/L (ref 8–16)
AST SERPL-CCNC: 10 U/L (ref 10–40)
BACTEROIDES FRAGILIS: NOT DETECTED
BASOPHILS # BLD AUTO: 0.03 K/UL (ref 0–0.2)
BASOPHILS NFR BLD: 0.4 % (ref 0–1.9)
BILIRUB SERPL-MCNC: 0.4 MG/DL (ref 0.1–1)
BUN SERPL-MCNC: 36 MG/DL (ref 8–23)
CALCIUM SERPL-MCNC: 9 MG/DL (ref 8.7–10.5)
CANDIDA ALBICANS: NOT DETECTED
CANDIDA AURIS: NOT DETECTED
CANDIDA GLABRATA: NOT DETECTED
CANDIDA KRUSEI: NOT DETECTED
CANDIDA PARAPSILOSIS: NOT DETECTED
CANDIDA TROPICALIS: NOT DETECTED
CHLORIDE SERPL-SCNC: 109 MMOL/L (ref 95–110)
CO2 SERPL-SCNC: 19 MMOL/L (ref 23–29)
CREAT SERPL-MCNC: 2.5 MG/DL (ref 0.5–1.4)
CRYPTOCOCCUS NEOFORMANS/GATTII: NOT DETECTED
CTX-M GENE (ESBL PRODUCER): DETECTED
DIFFERENTIAL METHOD BLD: ABNORMAL
ENTEROBACTER CLOACAE COMPLEX: NOT DETECTED
ENTEROBACTERALES: ABNORMAL
ENTEROCOCCUS FAECALIS: NOT DETECTED
ENTEROCOCCUS FAECIUM: NOT DETECTED
EOSINOPHIL # BLD AUTO: 0.5 K/UL (ref 0–0.5)
EOSINOPHIL NFR BLD: 7 % (ref 0–8)
ERYTHROCYTE [DISTWIDTH] IN BLOOD BY AUTOMATED COUNT: 14.7 % (ref 11.5–14.5)
ESCHERICHIA COLI: DETECTED
EST. GFR  (NO RACE VARIABLE): 26.6 ML/MIN/1.73 M^2
GLUCOSE SERPL-MCNC: 190 MG/DL (ref 70–110)
HAEMOPHILUS INFLUENZAE: NOT DETECTED
HCT VFR BLD AUTO: 29.7 % (ref 40–54)
HGB BLD-MCNC: 9.3 G/DL (ref 14–18)
IMM GRANULOCYTES # BLD AUTO: 0.05 K/UL (ref 0–0.04)
IMM GRANULOCYTES NFR BLD AUTO: 0.7 % (ref 0–0.5)
IMP GENE (CARBAPENEM RESISTANT): NOT DETECTED
KLEBSIELLA AEROGENES: NOT DETECTED
KLEBSIELLA OXYTOCA: NOT DETECTED
KLEBSIELLA PNEUMONIAE GROUP: NOT DETECTED
KPC RESISTANCE GENE (CARBAPENEM): NOT DETECTED
LISTERIA MONOCYTOGENES: NOT DETECTED
LYMPHOCYTES # BLD AUTO: 1.5 K/UL (ref 1–4.8)
LYMPHOCYTES NFR BLD: 20.2 % (ref 18–48)
MAGNESIUM SERPL-MCNC: 1.6 MG/DL (ref 1.6–2.6)
MCH RBC QN AUTO: 28.9 PG (ref 27–31)
MCHC RBC AUTO-ENTMCNC: 31.3 G/DL (ref 32–36)
MCR-1: NOT DETECTED
MCV RBC AUTO: 92 FL (ref 82–98)
MEC A/C AND MREJ (MRSA): ABNORMAL
MEC A/C: ABNORMAL
MONOCYTES # BLD AUTO: 0.5 K/UL (ref 0.3–1)
MONOCYTES NFR BLD: 6.7 % (ref 4–15)
NDM GENE (CARBAPENEM RESISTANT): NOT DETECTED
NEISSERIA MENINGITIDIS: NOT DETECTED
NEUTROPHILS # BLD AUTO: 4.7 K/UL (ref 1.8–7.7)
NEUTROPHILS NFR BLD: 65 % (ref 38–73)
NRBC BLD-RTO: 0 /100 WBC
OHS QRS DURATION: 82 MS
OHS QTC CALCULATION: 398 MS
OXA-48-LIKE (CARBAPENEM RESISTANT): NOT DETECTED
PLATELET # BLD AUTO: 148 K/UL (ref 150–450)
PMV BLD AUTO: 11.6 FL (ref 9.2–12.9)
POCT GLUCOSE: 195 MG/DL (ref 70–110)
POCT GLUCOSE: 272 MG/DL (ref 70–110)
POTASSIUM SERPL-SCNC: 4.3 MMOL/L (ref 3.5–5.1)
PROCALCITONIN SERPL IA-MCNC: 0.24 NG/ML
PROT SERPL-MCNC: 6.9 G/DL (ref 6–8.4)
PROTEUS SPECIES: NOT DETECTED
PSEUDOMONAS AERUGINOSA: NOT DETECTED
RBC # BLD AUTO: 3.22 M/UL (ref 4.6–6.2)
SALMONELLA SP: NOT DETECTED
SERRATIA MARCESCENS: NOT DETECTED
SODIUM SERPL-SCNC: 136 MMOL/L (ref 136–145)
STAPHYLOCOCCUS AUREUS: NOT DETECTED
STAPHYLOCOCCUS EPIDERMIDIS: NOT DETECTED
STAPHYLOCOCCUS LUGDUNESIS: NOT DETECTED
STAPHYLOCOCCUS SPECIES: NOT DETECTED
STENOTROPHOMONAS MALTOPHILIA: NOT DETECTED
STREPTOCOCCUS AGALACTIAE: NOT DETECTED
STREPTOCOCCUS PNEUMONIAE: NOT DETECTED
STREPTOCOCCUS PYOGENES: NOT DETECTED
STREPTOCOCCUS SPECIES: NOT DETECTED
VAN A/B (VRE GENE): ABNORMAL
VIM GENE (CARBAPENEM RESISTANT): NOT DETECTED
WBC # BLD AUTO: 7.28 K/UL (ref 3.9–12.7)

## 2024-11-14 PROCEDURE — 97535 SELF CARE MNGMENT TRAINING: CPT

## 2024-11-14 PROCEDURE — 63600175 PHARM REV CODE 636 W HCPCS: Performed by: INTERNAL MEDICINE

## 2024-11-14 PROCEDURE — 97116 GAIT TRAINING THERAPY: CPT

## 2024-11-14 PROCEDURE — 36415 COLL VENOUS BLD VENIPUNCTURE: CPT

## 2024-11-14 PROCEDURE — 85025 COMPLETE CBC W/AUTO DIFF WBC: CPT | Performed by: NURSE PRACTITIONER

## 2024-11-14 PROCEDURE — 36415 COLL VENOUS BLD VENIPUNCTURE: CPT | Performed by: HOSPITALIST

## 2024-11-14 PROCEDURE — 94640 AIRWAY INHALATION TREATMENT: CPT

## 2024-11-14 PROCEDURE — 63600175 PHARM REV CODE 636 W HCPCS: Performed by: NURSE PRACTITIONER

## 2024-11-14 PROCEDURE — 97166 OT EVAL MOD COMPLEX 45 MIN: CPT

## 2024-11-14 PROCEDURE — 94761 N-INVAS EAR/PLS OXIMETRY MLT: CPT

## 2024-11-14 PROCEDURE — 11000001 HC ACUTE MED/SURG PRIVATE ROOM

## 2024-11-14 PROCEDURE — 25000003 PHARM REV CODE 250: Performed by: NURSE PRACTITIONER

## 2024-11-14 PROCEDURE — 99223 1ST HOSP IP/OBS HIGH 75: CPT | Mod: ,,, | Performed by: UROLOGY

## 2024-11-14 PROCEDURE — 80053 COMPREHEN METABOLIC PANEL: CPT | Performed by: HOSPITALIST

## 2024-11-14 PROCEDURE — 25000003 PHARM REV CODE 250

## 2024-11-14 PROCEDURE — 25000242 PHARM REV CODE 250 ALT 637 W/ HCPCS: Performed by: NURSE PRACTITIONER

## 2024-11-14 PROCEDURE — 83735 ASSAY OF MAGNESIUM: CPT | Performed by: HOSPITALIST

## 2024-11-14 PROCEDURE — 97162 PT EVAL MOD COMPLEX 30 MIN: CPT

## 2024-11-14 PROCEDURE — 87040 BLOOD CULTURE FOR BACTERIA: CPT | Mod: 59

## 2024-11-14 PROCEDURE — 63600175 PHARM REV CODE 636 W HCPCS

## 2024-11-14 RX ORDER — FOLIC ACID 1 MG/1
1 TABLET ORAL DAILY
Status: DISCONTINUED | OUTPATIENT
Start: 2024-11-14 | End: 2024-11-18 | Stop reason: HOSPADM

## 2024-11-14 RX ORDER — ACETAMINOPHEN 325 MG/1
650 TABLET ORAL EVERY 4 HOURS PRN
Status: DISCONTINUED | OUTPATIENT
Start: 2024-11-14 | End: 2024-11-18 | Stop reason: HOSPADM

## 2024-11-14 RX ORDER — FLUTICASONE FUROATE AND VILANTEROL 100; 25 UG/1; UG/1
1 POWDER RESPIRATORY (INHALATION) DAILY
Status: DISCONTINUED | OUTPATIENT
Start: 2024-11-14 | End: 2024-11-18 | Stop reason: HOSPADM

## 2024-11-14 RX ORDER — CIPROFLOXACIN 2 MG/ML
400 INJECTION, SOLUTION INTRAVENOUS
Status: DISCONTINUED | OUTPATIENT
Start: 2024-11-14 | End: 2024-11-14

## 2024-11-14 RX ORDER — SODIUM CHLORIDE, SODIUM LACTATE, POTASSIUM CHLORIDE, CALCIUM CHLORIDE 600; 310; 30; 20 MG/100ML; MG/100ML; MG/100ML; MG/100ML
INJECTION, SOLUTION INTRAVENOUS CONTINUOUS
Status: DISCONTINUED | OUTPATIENT
Start: 2024-11-14 | End: 2024-11-15

## 2024-11-14 RX ORDER — PREGABALIN 50 MG/1
50 CAPSULE ORAL 3 TIMES DAILY
Status: DISCONTINUED | OUTPATIENT
Start: 2024-11-14 | End: 2024-11-18 | Stop reason: HOSPADM

## 2024-11-14 RX ORDER — THIAMINE HCL 100 MG
100 TABLET ORAL DAILY
Status: DISCONTINUED | OUTPATIENT
Start: 2024-11-14 | End: 2024-11-18 | Stop reason: HOSPADM

## 2024-11-14 RX ORDER — DULOXETIN HYDROCHLORIDE 30 MG/1
30 CAPSULE, DELAYED RELEASE ORAL 2 TIMES DAILY
Status: DISCONTINUED | OUTPATIENT
Start: 2024-11-14 | End: 2024-11-18 | Stop reason: HOSPADM

## 2024-11-14 RX ORDER — MEROPENEM 1 G/1
1 INJECTION, POWDER, FOR SOLUTION INTRAVENOUS
Status: DISCONTINUED | OUTPATIENT
Start: 2024-11-14 | End: 2024-11-15

## 2024-11-14 RX ORDER — HYDROXYZINE PAMOATE 25 MG/1
25 CAPSULE ORAL EVERY 8 HOURS PRN
Status: DISCONTINUED | OUTPATIENT
Start: 2024-11-14 | End: 2024-11-16

## 2024-11-14 RX ADMIN — Medication 400 MG: at 01:11

## 2024-11-14 RX ADMIN — SODIUM CHLORIDE, POTASSIUM CHLORIDE, SODIUM LACTATE AND CALCIUM CHLORIDE: 600; 310; 30; 20 INJECTION, SOLUTION INTRAVENOUS at 02:11

## 2024-11-14 RX ADMIN — TIOTROPIUM BROMIDE INHALATION SPRAY 2 PUFF: 3.12 SPRAY, METERED RESPIRATORY (INHALATION) at 09:11

## 2024-11-14 RX ADMIN — FOLIC ACID 1 MG: 1 TABLET ORAL at 09:11

## 2024-11-14 RX ADMIN — PANTOPRAZOLE SODIUM 40 MG: 20 TABLET, DELAYED RELEASE ORAL at 09:11

## 2024-11-14 RX ADMIN — Medication 400 MG: at 07:11

## 2024-11-14 RX ADMIN — TAMSULOSIN HYDROCHLORIDE 0.4 MG: 0.4 CAPSULE ORAL at 09:11

## 2024-11-14 RX ADMIN — Medication 100 MG: at 09:11

## 2024-11-14 RX ADMIN — THERA TABS 1 TABLET: TAB at 09:11

## 2024-11-14 RX ADMIN — APIXABAN 5 MG: 5 TABLET, FILM COATED ORAL at 07:11

## 2024-11-14 RX ADMIN — MEROPENEM 1 G: 1 INJECTION INTRAVENOUS at 06:11

## 2024-11-14 RX ADMIN — CYANOCOBALAMIN TAB 1000 MCG 1000 MCG: 1000 TAB at 09:11

## 2024-11-14 RX ADMIN — FLUTICASONE FUROATE AND VILANTEROL TRIFENATATE 1 PUFF: 100; 25 POWDER RESPIRATORY (INHALATION) at 09:11

## 2024-11-14 RX ADMIN — HYDROXYZINE PAMOATE 25 MG: 25 CAPSULE ORAL at 02:11

## 2024-11-14 RX ADMIN — METHOCARBAMOL 500 MG: 500 TABLET ORAL at 07:11

## 2024-11-14 RX ADMIN — PREGABALIN 50 MG: 50 CAPSULE ORAL at 09:11

## 2024-11-14 RX ADMIN — DULOXETINE HYDROCHLORIDE 30 MG: 30 CAPSULE, DELAYED RELEASE ORAL at 09:11

## 2024-11-14 RX ADMIN — ACETAMINOPHEN 650 MG: 325 TABLET ORAL at 07:11

## 2024-11-14 RX ADMIN — PREGABALIN 50 MG: 50 CAPSULE ORAL at 02:11

## 2024-11-14 RX ADMIN — METHOCARBAMOL 500 MG: 500 TABLET ORAL at 09:11

## 2024-11-14 RX ADMIN — DULOXETINE HYDROCHLORIDE 30 MG: 30 CAPSULE, DELAYED RELEASE ORAL at 07:11

## 2024-11-14 RX ADMIN — APIXABAN 5 MG: 5 TABLET, FILM COATED ORAL at 09:11

## 2024-11-14 RX ADMIN — METHOCARBAMOL 500 MG: 500 TABLET ORAL at 02:11

## 2024-11-14 RX ADMIN — PREGABALIN 50 MG: 50 CAPSULE ORAL at 07:11

## 2024-11-14 RX ADMIN — CIPROFLOXACIN 400 MG: 2 INJECTION, SOLUTION INTRAVENOUS at 09:11

## 2024-11-14 NOTE — ASSESSMENT & PLAN NOTE
Patient has paroxysmal (<7 days) atrial fibrillation. Patient is currently in sinus rhythm. [id number] Score: 3. The patients heart rate in the last 24 hours is as follows:  Pulse  Min: 71  Max: 103     Antiarrhythmics       Anticoagulants  apixaban tablet 5 mg, 2 times daily, Oral    Plan  - Replete lytes with a goal of K>4, Mg >2  - Patient is anticoagulated, see medications listed above.  - Patient's afib is currently controlled

## 2024-11-14 NOTE — ASSESSMENT & PLAN NOTE
Anemia is likely due to chronic disease due to diabetic nephropathy . Most recent hemoglobin and hematocrit are listed below.  Recent Labs     11/12/24  1330 11/13/24  1823 11/14/24  0442   HGB 11.3* 11.1* 9.3*   HCT 35.1* 34.4* 29.7*       Plan  - Monitor serial CBC: Daily  - Transfuse PRBC if patient becomes hemodynamically unstable, symptomatic or H/H drops below 7/21.  - Patient has not received any PRBC transfusions to date

## 2024-11-14 NOTE — ASSESSMENT & PLAN NOTE
Pt with flank pain, urgency, and foul smelling urine. Per family he has been more confused as well. He denies flank pain currently. 2 previous admissions in September and October for sepsis 2/2 UTI.    -Afebrile, no leukocytosis.  -POC lactate 1.57. CRP in process.  -Blood cxs in process.  -UA reviewed, follow cx.  -Started on ciprofloxacin in the ED, continue for now.  - Urology consult for inpatient evaluation     Component 3 wk ago   Urine Culture, Routine  Abnormal   ESCHERICHIA COLI ESBL  50,000 - 99,999 cfu/ml  No other significant isolate    Resulting Agency [facility]        Susceptibility     Escherichia coli ESBL     CULTURE, URINE     Amp/Sulbactam 16/8 mcg/mL Resistant     Ampicillin >16 mcg/mL Resistant     Cefepime >16 mcg/mL Resistant     Ceftriaxone >2 mcg/mL Resistant     Ciprofloxacin <=0.25 mcg/mL Sensitive     Ertapenem <=0.5 mcg/mL Sensitive     Gentamicin >8 mcg/mL Resistant     Levofloxacin <=0.5 mcg/mL Sensitive     Meropenem <=1 mcg/mL Sensitive     Nitrofurantoin <=32 mcg/mL Sensitive     Piperacillin/Tazo <=8 mcg/mL Resistant     Tobramycin 8 mcg/mL Resistant     Trimeth/Sulfa <=2/38 mcg/mL Sensitive

## 2024-11-14 NOTE — ASSESSMENT & PLAN NOTE
Pt with flank pain, urgency, and foul smelling urine. Per family he has been more confused as well. He denies flank pain currently. 2 previous admissions in September and October for sepsis 2/2 UTI.    - Afebrile, no leukocytosis.  - Blood cx positive for ESBL, on meropenam  - repeat BC pending.  - Discussed with urology, no inpatient intervention. Patient to follow up with urology for stent management

## 2024-11-14 NOTE — ASSESSMENT & PLAN NOTE
Patients blood pressure range in the last 24 hours was: BP  Min: 103/57  Max: 153/71.The patient's inpatient anti-hypertensive regimen is listed below:  Current Antihypertensives       Plan  - BP is controlled, no changes needed to their regimen

## 2024-11-14 NOTE — ASSESSMENT & PLAN NOTE
- R ureteral stent in appropriate position per CT scan with no hydronephrosis  - f/u new urine and blood cultures   - outpatient follow-up for definitive stone treatment

## 2024-11-14 NOTE — HPI
74-year-old male with a past medical history of insulin-dependent T2DM, CAD, prior PE on Eliquis, afib, admitted for AMS.  He recently underwent R ureteral stent placement on 9/27 for distal R ureteral stones in the setting of urosepsis and bacteremia.  Blood and urine cx at that time grew proteus and he was sent home on two weeks of cefpodoxime. He later returned to the ED 10/22 with UTI that was then treated.      Today he reports his mental status improved, denies any f/c, flank pain, dysuria, hematuria. On exam is AFVSS, RCIB. WBC wbl, Cr 2.5 (baseline ~1.3), UA with bacteria, CTAP w/ contrast shows R ureteral stent in appropriate position.  No left sided stones or hydronephrosis.  Likely R bladder diverticulum.       Of note he did have a clinic appointment with Dr. Wagoner to discuss definitive stone management that he missed due to being admitted.

## 2024-11-14 NOTE — PLAN OF CARE
[facility] (Hospital)  Initial Discharge Assessment       Primary Care Provider: Isaias Myles MD    Admission Diagnosis: Nephrolithiasis [N20.0]  Chest pain [R07.9]  Sepsis [A41.9]  Urinary tract infection without hematuria, site unspecified [N39.0]    Admission Date: 11/13/2024  Expected Discharge Date:     Transition of Care Barriers: (P) None    Payor: MEDICARE / Plan: MEDICARE PART A & B / Product Type: Government /     Extended Emergency Contact Information  Primary Emergency Contact: Bulmaro Melton   [place]  Mobile Phone: [phone]  Relation: Spouse    Discharge Plan A: (P) Home with family, Home Health  Discharge Plan B: (P) Rehab      [id number] - [place], [place] - [facility] [address] AT [facility] [address] [place] [address]  [facility] [place] [address]  Phone: [phone] Fax: [phone]      Initial Assessment (most recent)       Adult Discharge Assessment - 11/14/24 0948          Discharge Assessment    Assessment Type Discharge Planning Assessment (P)      Confirmed/corrected address, phone number and insurance Yes (P)      Confirmed Demographics Correct on Facesheet (P)      Source of Information patient (P)      When was your last doctors appointment? 10/07/24 (P)      Communicated CHRISTA with patient/caregiver Date not available/Unable to determine (P)      Reason For Admission acute cystitis (P)      People in Home spouse (P)      Do you expect to return to your current living situation? Yes (P)      Do you have help at home or someone to help you manage your care at home? Yes (P)      Who are your caregiver(s) and their phone number(s)? spouse Bulmaro (P)      Prior to hospitilization cognitive status: Alert/Oriented (P)      Current cognitive status: Alert/Oriented (P)      Walking or Climbing Stairs Difficulty yes (P)      Walking or Climbing Stairs ambulation difficulty, requires equipment (P)      Mobility Management RW (P)       Dressing/Bathing Difficulty no (P)      Home Accessibility wheelchair accessible (P)      Home Layout Able to live on 1st floor (P)      Equipment Currently Used at Home walker, rolling (P)      Readmission within 30 days? Yes (P)      Patient currently being followed by outpatient case management? -- (P)    referral for Acute Care at home on last admission    Do you currently have service(s) that help you manage your care at home? Yes (P)      How Many hours does patient receive services 3 (P)      Name and Contact number of agency [facility] (P)      Is the pt/caregiver preference to resume services with current agency Yes (P)      Do you take prescription medications? Yes (P)      Do you have prescription coverage? Yes (P)      Coverage Medicare (P)      Do you have any problems affording any of your prescribed medications? No (P)      Is the patient taking medications as prescribed? yes (P)      Who is going to help you get home at discharge? spouse Bulmaro (P)      How do you get to doctors appointments? car, drives self;family or friend will provide (P)      Are you on dialysis? No (P)      Do you take coumadin? No (P)      Discharge Plan A Home with family;[facility] (P)      Discharge Plan B Rehab (P)      DME Needed Upon Discharge  none (P)      Discharge Plan discussed with: Patient;Spouse/sig other (P)      Transition of Care Barriers None (P)         Physical Activity    On average, how many days per week do you engage in moderate to strenuous exercise (like a brisk walk)? 7 days (P)      On average, how many minutes do you engage in exercise at this level? 40 min (P)         Financial Resource Strain    How hard is it for you to pay for the very basics like food, housing, medical care, and heating? Not hard at all (P)         Housing Stability    In the last 12 months, was there a time when you were not able to pay the mortgage or rent on time? No (P)      At any time in the past 12 months,  were you homeless or living in a shelter (including now)? No (P)         Transportation Needs    Has the lack of transportation kept you from medical appointments, meetings, work or from getting things needed for daily living? No (P)         Food Insecurity    Within the past 12 months, you worried that your food would run out before you got the money to buy more. Never true (P)      Within the past 12 months, the food you bought just didn't last and you didn't have money to get more. Never true (P)         Stress    Do you feel stress - tense, restless, nervous, or anxious, or unable to sleep at night because your mind is troubled all the time - these days? Only a little (P)         Social Isolation    How often do you feel lonely or isolated from those around you?  Rarely (P)         Alcohol Use    Q1: How often do you have a drink containing alcohol? Never (P)      Q2: How many drinks containing alcohol do you have on a typical day when you are drinking? Patient does not drink (P)      Q3: How often do you have six or more drinks on one occasion? Never (P)         ralali    In the past 12 months has the electric, gas, oil, or water company threatened to shut off services in your home? No (P)         Health Literacy    How often do you need to have someone help you when you read instructions, pamphlets, or other written material from your doctor or pharmacy? Rarely (P)         OTHER    Name(s) of People in Home spouse Bulmaro (P)                    Patient resides with his spouse Bulmaro.  Patient uses a RW at home and started HH with [facility] after last visit on 10/24.  Patient is independent with ADL's and is not on HD or Coumadin.      Anticipate discharge home with spouse w/ resumption of HH services with [facility].      Discharge Plan A and Plan B have been determined by review of patient's clinical status, future medical and therapeutic needs, and coverage/benefits for post-acute care in coordination with  multidisciplinary team members.    Maddie Alvarado LMSW  [facility]  [phone]

## 2024-11-14 NOTE — ED NOTES
Telemetry Verification   Patient placed on Telemetry Box  Verified with War Room  Box # [id number]   Monitor Tech    Rate    Rhythm

## 2024-11-14 NOTE — ASSESSMENT & PLAN NOTE
74yoM with right ureteral stent and AMS    - no acute urologic intervention indicated at this time.  R ureteral stent in appropriate position   - f/u new urine and blood cultures   - f/u urine culture and please treat with two weeks of culture appropriate abx  - ALYSIA likely pre renal in etiology, no hydronephrosis or signs of obstructive ALYSIA. Recommend fluids.   - recommend improved blood glucose control to decrease  infection risk   - discussed with patient the need for definitive stone treatment and outpatient follow up

## 2024-11-14 NOTE — ED TRIAGE NOTES
Anthony Ball, a 74 y.o. male presents to the ED w/ complaint of elevated kidney function, pt stated he had blood work yesterday and today while at his PCP appointment he was told to come to the ED due to his kidney function being elevated. Recent dx of kidney stones.     Triage note:  Chief Complaint   Patient presents with    Flank Pain     Complaining of back and flank pain, many recent falls.  Denies trauma. Numbness and tingling is normal for him.  Neuropathy.PMH, DM, HTN, A-fib, on Eliquis.     Review of patient's allergies indicates:  No Known Allergies  Past Medical History:   Diagnosis Date    Acute deep vein thrombosis (DVT) of left lower extremity 12/2023    Anxiety     Atrial fibrillation 12/2023    Cataract     Coronary artery disease     CAC score 1430    Depression     Diabetic neuropathy     Essential (primary) hypertension     GERD (gastroesophageal reflux disease)     History of bariatric surgery 07/08/2021    History of total right knee replacement 07/08/2021    Insomnia     Neuromyopathy     Possibly DM and/or alcohol related.    Pulmonary embolism 12/2023    Reactive airway disease     Type 2 diabetes mellitus

## 2024-11-14 NOTE — SUBJECTIVE & OBJECTIVE
Past Medical History:   Diagnosis Date    Acute deep vein thrombosis (DVT) of left lower extremity 12/2023    Anxiety     Atrial fibrillation 12/2023    Cataract     Coronary artery disease     CAC score 1430    Depression     Diabetic neuropathy     Essential (primary) hypertension     GERD (gastroesophageal reflux disease)     History of bariatric surgery 07/08/2021    History of total right knee replacement 07/08/2021    Insomnia     Neuromyopathy     Possibly DM and/or alcohol related.    Pulmonary embolism 12/2023    Reactive airway disease     Type 2 diabetes mellitus        Past Surgical History:   Procedure Laterality Date    CATARACT EXTRACTION W/  INTRAOCULAR LENS IMPLANT Right 7/15/2024    Procedure: EXTRACTION, CATARACT, WITH IOL INSERTION;  Surgeon: Margot Jacobson MD;  Location: [facility] OR;  Service: Ophthalmology;  Laterality: Right;    CATARACT EXTRACTION W/  INTRAOCULAR LENS IMPLANT Left 8/29/2024    Procedure: EXTRACTION, CATARACT, WITH IOL INSERTION;  Surgeon: Margot Jacobson MD;  Location: [facility] OR;  Service: Ophthalmology;  Laterality: Left;    COLONOSCOPY      Normal around 2020    COLONOSCOPY N/A 9/6/2024    Procedure: COLONOSCOPY;  Surgeon: Alessandro Serna MD;  Location: [facility] (4TH FLR);  Service: Endoscopy;  Laterality: N/A;  8/28-new case created-lvm for pre call-pt has cataract surgery 8/29/24-tb  ref-dr rico goldstein-[facility]  ok to hold elivickie mukherjeeGT  9/5-LVM for precall-Kpvt    COLONOSCOPY N/A 9/6/2024    Procedure: COLONOSCOPY;  Surgeon: Alessandro Serna MD;  Location: [facility] (4TH FLR);  Service: Endoscopy;  Laterality: N/A;  + cologuard  8/8 ref by Isaias Goldstein MD, [facility], instr. to portal, Eliquis hold approval pending-  ok to hold Eliquis 2 days per Dr Goldstein-[facility]    CYSTOSCOPY WITH URETEROSCOPY, RETROGRADE PYELOGRAPHY, AND INSERTION OF STENT Right 9/27/2024    Procedure: CYSTOSCOPY, WITH RETROGRADE PYELOGRAM AND URETERAL STENT INSERTION;  Surgeon: Joni Wagoner MD;   Location: [address];  Service: Urology;  Laterality: Right;    REMOVAL, CALCULUS, BLADDER N/A 9/27/2024    Procedure: REMOVAL, CALCULUS, BLADDER;  Surgeon: Joni Wagoner MD;  Location: [address];  Service: Urology;  Laterality: N/A;    TOTAL KNEE ARTHROPLASTY Right        Review of patient's allergies indicates:  No Known Allergies    No current facility-administered medications on file prior to encounter.     Current Outpatient Medications on File Prior to Encounter   Medication Sig    albuterol (VENTOLIN HFA) 90 mcg/actuation inhaler Inhale 2 puffs into the lungs every 6 (six) hours as needed for Wheezing. Rescue    apixaban (ELIQUIS) 5 mg Tab TAKE 1 TABLET(5 MG) BY MOUTH TWICE DAILY    betamethasone valerate 0.1% (VALISONE) 0.1 % Crea Apply topically once daily. Apply to inner foreskin one daily for 14 days for 14 days (Patient not taking: Reported on 10/29/2024)    blood-glucose meter Misc To check BG 3 times daily, to use with insurance preferred meter    blood-glucose meter,continuous (DEXCOM ) Misc Check blood glucose level 3 times daily. (Patient not taking: Reported on 10/29/2024)    blood-glucose sensor (FREESTYLE SOPHY 3 PLUS SENSOR) Rima 1 Device by Misc.(Non-Drug; Combo Route) route every 14 (fourteen) days.    blood-glucose sensor (FREESTYLE SOPHY 3 SENSOR) Rima 1 Device by Misc.(Non-Drug; Combo Route) route every 14 (fourteen) days.    busPIRone (BUSPAR) 15 MG tablet Take 1 tablet (15 mg total) by mouth 2 (two) times daily as needed (Anxiety).    capsaicin (ZOSTRIX) 0.025 % cream Apply topically every 3 (three) months. (to both feet)    cyanocobalamin (VITAMIN B-12) 1000 MCG tablet Take 1 tablet (1,000 mcg total) by mouth once daily.    DULoxetine (CYMBALTA) 60 MG capsule Take 1 capsule (60 mg total) by mouth 2 (two) times daily.    flash glucose scanning reader (Totus PowerSTYLE SOPHY 14 DAY READER) Misc Use device to check blood glucose level 3 times daily.     fluticasone-umeclidin-vilanter (TRELEGY ELLIPTA) 100-62.5-25 mcg DsDv Inhale 1 puff into the lungs once daily.    hydrOXYzine pamoate (VISTARIL) 25 MG Cap Take 25 mg by mouth every 8 (eight) hours as needed (Itching).    lancets 33 gauge Misc To check BG 3 times daily, to use with insurance preferred meter    magnesium oxide 400 mg magnesium Tab Take 1 tablet by mouth every evening.    metFORMIN (GLUCOPHAGE-XR) 500 MG ER 24hr tablet Take 1 tablet (500 mg total) by mouth daily with breakfast.    methocarbamoL (ROBAXIN) 750 MG Tab Take 1 tablet (750 mg total) by mouth 3 (three) times daily as needed (Back pain).    nortriptyline (PAMELOR) 10 MG capsule Take 1 capsule (10 mg total) by mouth 3 (three) times daily.    ondansetron (ZOFRAN) 8 MG tablet Take 1 tablet (8 mg total) by mouth every 8 (eight) hours as needed for Nausea.    oxybutynin (DITROPAN-XL) 5 MG TR24 Take 1 tablet (5 mg total) by mouth once daily. (Patient not taking: Reported on 10/29/2024)    pantoprazole (PROTONIX) 40 MG tablet Take 1 tablet (40 mg total) by mouth once daily.    pregabalin (LYRICA) 150 MG capsule Take 1 capsule (150 mg total) by mouth 3 (three) times daily.    SITagliptin phosphate (JANUVIA) 100 MG Tab Take 1 tablet (100 mg total) by mouth once daily. (Patient not taking: Reported on 10/29/2024)    tamsulosin (FLOMAX) 0.4 mg Cap Take 1 capsule (0.4 mg total) by mouth once daily. (Patient not taking: Reported on 10/29/2024)    traZODone (DESYREL) 100 MG tablet Take 2 tablets (200 mg total) by mouth nightly as needed for Insomnia.    TRUE METRIX GLUCOSE TEST STRIP Strp USE TO CHECK BLOOD SUGAR THREE TIMES DAILY OR AS DIRECTED    UNABLE TO FIND OMEGA XL CAP - Take 1 capsule by mouth once daily.     Family History       Problem Relation (Age of Onset)    Colon cancer Paternal Grandfather (89)    Hypertension Mother          Tobacco Use    Smoking status: Never    Smokeless tobacco: Never   Substance and Sexual Activity    Alcohol use: Yes      Comment: Former heavy use    Drug use: Never    Sexual activity: Yes     Comment: unknown     Review of Systems   Constitutional:  Positive for fatigue. Negative for appetite change, chills, diaphoresis and fever.   HENT:  Negative for congestion, rhinorrhea and sore throat.    Eyes:  Negative for photophobia and visual disturbance.   Respiratory:  Negative for shortness of breath and wheezing.    Cardiovascular:  Negative for chest pain, palpitations and leg swelling.   Gastrointestinal:  Negative for abdominal distention, abdominal pain, diarrhea, nausea and vomiting.   Genitourinary:  Positive for flank pain (reports none currently) and urgency. Negative for dysuria and frequency.        +foul smelling urine   Musculoskeletal:  Positive for gait problem (chronic). Negative for back pain and neck pain.   Skin:  Negative for color change, pallor and rash.   Neurological:  Positive for weakness (BLE weakness, chronic) and numbness (chronic to BLE). Negative for syncope, light-headedness and headaches.   Psychiatric/Behavioral:  Negative for confusion and hallucinations. The patient is not nervous/anxious.      Objective:     Vital Signs (Most Recent):  Temp: 97.5 °F (36.4 °C) (11/14/24 0202)  Pulse: 76 (11/14/24 0302)  Resp: 16 (11/14/24 0302)  BP: (!) 120/56 (11/14/24 0302)  SpO2: 97 % (11/14/24 0302) Vital Signs (24h Range):  Temp:  [97.4 °F (36.3 °C)-98.8 °F (37.1 °C)] 97.5 °F (36.4 °C)  Pulse:  [] 76  Resp:  [15-20] 16  SpO2:  [93 %-100 %] 97 %  BP: (103-145)/(54-67) 120/56        There is no height or weight on file to calculate BMI.     Physical Exam  Vitals and nursing note reviewed.   Constitutional:       General: He is not in acute distress.     Appearance: He is not toxic-appearing or diaphoretic.      Comments: Chronically ill appearing   HENT:      Head: Normocephalic and atraumatic.      Nose: Nose normal.      Mouth/Throat:      Mouth: Mucous membranes are dry.   Eyes:      Pupils: Pupils  are equal, round, and reactive to light.   Cardiovascular:      Rate and Rhythm: Regular rhythm. Tachycardia present.      Pulses: Normal pulses.   Pulmonary:      Effort: Pulmonary effort is normal. No respiratory distress.      Breath sounds: No wheezing, rhonchi or rales.   Abdominal:      General: Bowel sounds are normal. There is no distension.      Palpations: Abdomen is soft.      Tenderness: There is no abdominal tenderness. There is no right CVA tenderness, left CVA tenderness or guarding.   Musculoskeletal:         General: Normal range of motion.      Cervical back: Normal range of motion.      Right lower leg: No edema.      Left lower leg: No edema.   Skin:     General: Skin is warm and dry.      Capillary Refill: Capillary refill takes less than 2 seconds.   Neurological:      General: No focal deficit present.      Mental Status: He is alert and oriented to person, place, and time. He is confused.      Cranial Nerves: No dysarthria.      Sensory: Sensory deficit (BLE) present.      Motor: Weakness (BLE) present.   Psychiatric:         Speech: Speech normal.         Behavior: Behavior is cooperative.         Cognition and Memory: Memory is impaired.      Comments: Poor historian              CRANIAL NERVES     CN III, IV, VI   Pupils are equal, round, and reactive to light.       Significant Labs: All pertinent labs within the past 24 hours have been reviewed.  CBC:   Recent Labs   Lab 11/12/24  1330 11/13/24  1823   WBC 8.74 9.32   HGB 11.3* 11.1*   HCT 35.1* 34.4*   * 137*     CMP:   Recent Labs   Lab 11/12/24  1330 11/13/24  1823   * 135*   K 4.8 4.9    108   CO2 16* 17*   * 220*   BUN 40* 38*   CREATININE 3.0* 2.7*   CALCIUM 8.7 8.8   PROT 7.0 7.4   ALBUMIN 2.8* 2.9*   BILITOT 0.5 0.4   ALKPHOS 83 82   AST 10 9*   ALT 5* <5*   ANIONGAP 9 10     Urine Studies:   Recent Labs   Lab 11/13/24  1930   COLORU Colorless*   APPEARANCEUA Hazy*   PHUR 6.0   SPECGRAV 1.010   PROTEINUA  Trace*   GLUCUA Negative   KETONESU Negative   BILIRUBINUA Negative   OCCULTUA 3+*   NITRITE Negative   LEUKOCYTESUR 3+*   RBCUA >100*   WBCUA >100*   BACTERIA Many*   SQUAMEPITHEL 0       Significant Imaging: I have reviewed all pertinent imaging results/findings within the past 24 hours.  Imaging Results              CT Renal Stone Study ABD Pelvis WO (Final result)  Result time 11/13/24 20:47:33      Final result by Daryl Heart MD (11/13/24 20:47:33)                   Impression:      1. Redemonstrated right-sided double-J ureteral stent, stable in position.  No new or worsening hydronephrosis.  2. Bilateral nephrolithiasis.  3. Similar mild nonspecific thickening of the urinary bladder which may be related to suboptimal distension versus nonspecific cystitis.  4. Grossly stable other chronic findings in the body of the report.      Electronically signed by: Daryl Heart MD  Date:    11/13/2024  Time:    20:47               Narrative:    EXAMINATION:  CT RENAL STONE STUDY ABD PELVIS WO    CLINICAL HISTORY:  Nephrolithiasis, symptomatic/complicated;    TECHNIQUE:  Low dose axial images, sagittal and coronal reformations were obtained from the lung bases to the pubic symphysis.  Contrast was not administered.    COMPARISON:  CT abdomen and pelvis 10/21/2024    FINDINGS:  Lack of IV contrast limits evaluation of soft tissue and vascular structures.    Unchanged few scattered small calcified granulomas in the right lower lobe.  Similar platelike scarring versus atelectasis in the left lung base.  No consolidation or pleural effusion.    Base of the heart is normal in size without significant pericardial fluid noting multi-vessel coronary arterial calcifications.    Small hiatal hernia.  Remote operative change of prior gastric bypass without acute complication.    Noncontrast appearance of the gallbladder, liver, duodenum and bilateral adrenal glands are within normal limits.  Pancreas mildly atrophic.  No  discrete pancreatic mass or adjacent inflammatory change.  Spleen mildly enlarged without focal process, similar to prior.    Bilateral kidneys are stable in overall size, shape and location.  Redemonstrated right sided double-J ureteral stent in place with proximal loop in the upper pole calyx and distal loop within the dependent right aspect of the urinary bladder near the UVJ, similar to prior.  No hydronephrosis at either kidney.  Previous non dependent gas within the urinary bladder has resolved.  Few scattered punctate nephroliths at each kidney.  2.9 cm partially exophytic cyst with minimal thin peripheral calcification at the posterior right renal interpolar region and 9 cm exophytic cyst with minimal thin peripheral calcification at the peripheral left renal interpolar region.  Subcentimeter hypoattenuating exophytic lesion at the posterior aspect of the right renal mid to upper pole which is too small to characterize.  Additional subcentimeter hypoattenuating parenchymal lesion of the right kidney which is too small to characterize.  These findings are not significantly changed from most recent imaging.  Right more than left bilateral nonspecific perinephric stranding, similar to prior.  Previous right periureteral residual fat stranding is grossly stable.  Ureters are normal in course and caliber.  Urinary bladder is suboptimally distended with mild circumferential wall thickening.  Stable small urinary bladder diverticulum laterally on the right.  Pelvic phleboliths noted.  Prostate is normal in size noting dystrophic calcifications in the central gland.    Appendix and terminal ileum are within normal limits.  Mild to moderate amount of scattered fecal material throughout the colon and rectum.  No evidence of bowel obstruction or acute bowel inflammation.  Few scattered colonic diverticula without diverticulitis.  No bowel pneumatosis or portal venous gas.    No ascites, free air or lymphadenopathy by  CT criteria.  No significant aortic calcific atherosclerosis.  Aorta is not aneurysmal.    Extraperitoneal soft tissues and osseous structures appear stable without acute findings.

## 2024-11-14 NOTE — SUBJECTIVE & OBJECTIVE
Interval History: NAEO. Patient AO this am without complaints.     Review of Systems   Constitutional:  Negative for activity change, chills and fatigue.   HENT:  Negative for congestion, postnasal drip, sinus pressure, sneezing and sore throat.    Respiratory:  Negative for cough, chest tightness, shortness of breath and wheezing.    Cardiovascular:  Negative for chest pain, palpitations and leg swelling.   Gastrointestinal:  Negative for abdominal pain, constipation, diarrhea, nausea and vomiting.   Genitourinary:  Negative for difficulty urinating, dysuria and flank pain.   Neurological:  Negative for tremors, syncope, speech difficulty, weakness, light-headedness and headaches.   Hematological:  Does not bruise/bleed easily.   Psychiatric/Behavioral:  Negative for agitation, behavioral problems and confusion.      Objective:     Vital Signs (Most Recent):  Temp: 97.8 °F (36.6 °C) (11/14/24 0721)  Pulse: 83 (11/14/24 0909)  Resp: 16 (11/14/24 0909)  BP: (!) 148/73 (11/14/24 0721)  SpO2: 99 % (11/14/24 0909) Vital Signs (24h Range):  Temp:  [97.4 °F (36.3 °C)-98.8 °F (37.1 °C)] 97.8 °F (36.6 °C)  Pulse:  [] 83  Resp:  [15-20] 16  SpO2:  [93 %-100 %] 99 %  BP: (103-153)/(54-73) 148/73     Weight: 88.3 kg (194 lb 10.7 oz)  Body mass index is 28.75 kg/m².    Intake/Output Summary (Last 24 hours) at 11/14/2024 1028  Last data filed at 11/14/2024 0800  Gross per 24 hour   Intake 120 ml   Output 800 ml   Net -680 ml         Physical Exam  Vitals reviewed.   Cardiovascular:      Rate and Rhythm: Normal rate and regular rhythm.      Pulses: Normal pulses.   Pulmonary:      Effort: Pulmonary effort is normal.   Abdominal:      General: Bowel sounds are normal. There is no distension.      Tenderness: There is no abdominal tenderness.   Skin:     General: Skin is warm.      Capillary Refill: Capillary refill takes less than 2 seconds.   Neurological:      General: No focal deficit present.      Mental Status: He is  alert and oriented to person, place, and time.             Significant Labs: All pertinent labs within the past 24 hours have been reviewed.    Significant Imaging: I have reviewed all pertinent imaging results/findings within the past 24 hours.

## 2024-11-14 NOTE — ASSESSMENT & PLAN NOTE
On trazodone at home     -Hold trazodone in setting of fatigue and confusion, can consider restarting if improves

## 2024-11-14 NOTE — CONSULTS
[facility] - Neurosurgery ([facility])  Urology  Consult Note    Patient Name: Anthony Ball  MRN: [id number]  Admission Date: 11/13/2024  Hospital Length of Stay: 1   Code Status: Full Code   Attending Provider: Rosanna Kim MD   Consulting Provider: Julio Story MD  Primary Care Physician: Isaias Myles MD  Principal Problem:Acute cystitis    Inpatient consult to Urology  Consult performed by: Julio Story MD  Consult ordered by: Manolo Nguyen MD          Subjective:     HPI:  74-year-old male with a past medical history of insulin-dependent T2DM, CAD, prior PE on Eliquis, afib, admitted for AMS.  He recently underwent R ureteral stent placement on 9/27 for distal R ureteral stones in the setting of urosepsis and bacteremia.  Blood and urine cx at that time grew proteus and he was sent home on two weeks of cefpodoxime. He later returned to the ED 10/22 with UTI that was then treated.      Today he reports his mental status improved, denies any f/c, flank pain, dysuria, hematuria. On exam is AFVSS, RCIB. WBC wbl, Cr 2.5 (baseline ~1.3), UA with bacteria, CTAP w/ contrast shows R ureteral stent in appropriate position.  No left sided stones or hydronephrosis.  Likely R bladder diverticulum.       Of note he did have a clinic appointment with Dr. Wagoner to discuss definitive stone management that he missed due to being admitted.     Past Medical History:   Diagnosis Date    Acute deep vein thrombosis (DVT) of left lower extremity 12/2023    Anxiety     Atrial fibrillation 12/2023    Cataract     Coronary artery disease     CAC score 1430    Depression     Diabetic neuropathy     Essential (primary) hypertension     GERD (gastroesophageal reflux disease)     History of bariatric surgery 07/08/2021    History of total right knee replacement 07/08/2021    Insomnia     Neuromyopathy     Possibly DM and/or alcohol related.    Pulmonary embolism 12/2023    Reactive airway disease     Type 2 diabetes mellitus         Past Surgical History:   Procedure Laterality Date    CATARACT EXTRACTION W/  INTRAOCULAR LENS IMPLANT Right 7/15/2024    Procedure: EXTRACTION, CATARACT, WITH IOL INSERTION;  Surgeon: Margot Jacobson MD;  Location: [facility] OR;  Service: Ophthalmology;  Laterality: Right;    CATARACT EXTRACTION W/  INTRAOCULAR LENS IMPLANT Left 8/29/2024    Procedure: EXTRACTION, CATARACT, WITH IOL INSERTION;  Surgeon: Margot Jacobson MD;  Location: [facility] OR;  Service: Ophthalmology;  Laterality: Left;    COLONOSCOPY      Normal around 2020    COLONOSCOPY N/A 9/6/2024    Procedure: COLONOSCOPY;  Surgeon: Alessandro Serna MD;  Location: [facility] (4TH FLR);  Service: Endoscopy;  Laterality: N/A;  8/28-new case created-lvm for pre call-pt has cataract surgery 8/29/24-tb  ref-dr rico goldstein-[facility]-[place]  ok to hold taran Perdomo  9/5-LVM for precall-Kpvt    COLONOSCOPY N/A 9/6/2024    Procedure: COLONOSCOPY;  Surgeon: Alessandro Serna MD;  Location: [facility] (4TH FLR);  Service: Endoscopy;  Laterality: N/A;  + cologuard  8/8 ref by Isaias Goldstein MD, PeG, instr. to portal, Eliquis hold approval pending-  ok to hold Eliquis 2 days per Dr Goldstein-[facility]ACE    CYSTOSCOPY WITH URETEROSCOPY, RETROGRADE PYELOGRAPHY, AND INSERTION OF STENT Right 9/27/2024    Procedure: CYSTOSCOPY, WITH RETROGRADE PYELOGRAM AND URETERAL STENT INSERTION;  Surgeon: Joni Wagoner MD;  Location: [facility] OR 1ST FLR;  Service: Urology;  Laterality: Right;    REMOVAL, CALCULUS, BLADDER N/A 9/27/2024    Procedure: REMOVAL, CALCULUS, BLADDER;  Surgeon: Joni Wagoner MD;  Location: [facility] OR 1ST FLR;  Service: Urology;  Laterality: N/A;    TOTAL KNEE ARTHROPLASTY Right        Review of patient's allergies indicates:  No Known Allergies    Family History       Problem Relation (Age of Onset)    Colon cancer Paternal Grandfather (89)    Hypertension Mother            Tobacco Use    Smoking status: Never    Smokeless tobacco: Never   Substance and Sexual Activity     Alcohol use: Yes     Comment: Former heavy use    Drug use: Never    Sexual activity: Yes     Comment: unknown           Objective:     Temp:  [97.4 °F (36.3 °C)-98.8 °F (37.1 °C)] 97.7 °F (36.5 °C)  Pulse:  [] 79  Resp:  [15-20] 18  SpO2:  [93 %-100 %] 100 %  BP: (103-153)/(54-73) 127/69  Weight: 88.3 kg (194 lb 10.7 oz)  Body mass index is 28.75 kg/m².    Date 11/14/24 0700 - 11/15/24 0659   Shift 3218-5673 0132-6066 6918-4015 24 Hour Total   INTAKE   P.O. 120   120   Shift Total(mL/kg) 120(1.4)   120(1.4)   OUTPUT   Urine(mL/kg/hr) 400   400   Shift Total(mL/kg) 400(4.5)   400(4.5)   Weight (kg) 88.3 88.3 88.3 88.3          Drains       Drain  Duration             Male External Urinary Catheter 11/14/24 0528 <1 day                     Physical Exam  Constitutional:       General: He is not in acute distress.  HENT:      Head: Normocephalic and atraumatic.      Nose: Nose normal.   Eyes:      Conjunctiva/sclera: Conjunctivae normal.   Cardiovascular:      Rate and Rhythm: Normal rate.   Pulmonary:      Effort: Pulmonary effort is normal. No respiratory distress.   Genitourinary:     Comments: Condom catheter draining dark yellow urine  Musculoskeletal:         General: No deformity.   Skin:     General: Skin is warm and dry.   Neurological:      General: No focal deficit present.      Mental Status: He is alert.   Psychiatric:         Mood and Affect: Mood normal.         Behavior: Behavior normal.      Significant Labs:    BMP:  Recent Labs   Lab 11/12/24  1330 11/13/24  1823 11/14/24  0618   * 135* 136   K 4.8 4.9 4.3    108 109   CO2 16* 17* 19*   BUN 40* 38* 36*   CREATININE 3.0* 2.7* 2.5*   CALCIUM 8.7 8.8 9.0       CBC:  Recent Labs   Lab 11/12/24  1330 11/13/24  1823 11/14/24  0442   WBC 8.74 9.32 7.28   HGB 11.3* 11.1* 9.3*   HCT 35.1* 34.4* 29.7*   * 137* 148*       All pertinent labs results from the past 24 hours have been reviewed.    Significant Imaging:  All pertinent imaging  results/findings from the past 24 hours have been reviewed.                    Assessment and Plan:     Right ureteral stone  74yoM with right ureteral stent and AMS    - no acute urologic intervention indicated at this time.  R ureteral stent in appropriate position   - f/u new urine and blood cultures   - f/u urine culture and please treat with two weeks of culture appropriate abx  - ALYSIA likely pre renal in etiology, no hydronephrosis or signs of obstructive ALYSIA. Recommend fluids.   - recommend improved blood glucose control to decrease  infection risk   - discussed with patient the need for definitive stone treatment and outpatient follow up         VTE Risk Mitigation (From admission, onward)           Ordered     apixaban tablet 5 mg  2 times daily         11/13/24 2058     IP VTE HIGH RISK PATIENT  Once         11/13/24 2058     Place sequential compression device  Until discontinued         11/13/24 2058                    Thank you for your consult. I will sign off. Please contact us if you have any additional questions.    Julio Story MD  Urology  [facility] - Neurosurgery ([facility])

## 2024-11-14 NOTE — ASSESSMENT & PLAN NOTE
Anemia is likely due to chronic disease due to diabetic nephropathy . Most recent hemoglobin and hematocrit are listed below.  Recent Labs     11/12/24  1330 11/13/24  1823   HGB 11.3* 11.1*   HCT 35.1* 34.4*     Plan  - Monitor serial CBC: Daily  - Transfuse PRBC if patient becomes hemodynamically unstable, symptomatic or H/H drops below 7/21.  - Patient has not received any PRBC transfusions to date  - Patient's anemia is currently stable

## 2024-11-14 NOTE — HPI
Anthony Ball is a 74 y.o. male with a PMHx of type 2 DM, HTN, CAD, PE, afib on eliquis, and recurrent UTI who presents to the ED for flank pain, urinary urgency, and foul smelling urine x3 days. Patient is a poor historian. He states his partner thinks he has been more confused and told him his urine smells bad again. The patient does endorse fatigue and  urinary urgency and states he has had flank pain as well, although denies it currently. He reports the pain has been so severe it has caused him to fall, so he has been staying in bed. He denies fevers/chills, CP, SOB, cough, N/V/D, abdominal pain, or dysuria. He notes chronic BLE numbness/weakness and typically uses a walker to ambulate.    In the ED, pt tachycardic otherwise vitals stable, afebrile. CBC with stable anemia. Na 135. Bicarb 17. Cr 2.7 (bl 1.2-1.3). Glucose 220. Albumin 2.9. Blood cxs in process. POC lactate 1.57. EKG with sinus rhythm, 102 bpm, non specific T wave abnormality. UA with 3+ leukocytes, >100 WBCs, and many bacteria, urine cx in process. CT AP with redemonstrated right-sided double-J ureteral stent, stable in position. No new or worsening hydronephrosis. Bilateral nephrolithiasis. Similar mild nonspecific thickening of the urinary bladder which may be related to suboptimal distension versus nonspecific cystitis. The patient received IV ciprofloxacin.

## 2024-11-14 NOTE — ASSESSMENT & PLAN NOTE
Pt with flank pain, urgency, and foul smelling urine. Per family he has been more confused as well. He denies flank pain currently. 2 previous admissions in September and October for sepsis 2/2 UTI.    -Afebrile, no leukocytosis.  -POC lactate 1.57. CRP in process.  -Blood cxs in process.  -UA reviewed, follow cx.  -Started on ciprofloxacin in the ED, continue for now.  Component 3 wk ago   Urine Culture, Routine  Abnormal   ESCHERICHIA COLI ESBL  50,000 - 99,999 cfu/ml  No other significant isolate    Resulting Agency [facility]        Susceptibility     Escherichia coli ESBL     CULTURE, URINE     Amp/Sulbactam 16/8 mcg/mL Resistant     Ampicillin >16 mcg/mL Resistant     Cefepime >16 mcg/mL Resistant     Ceftriaxone >2 mcg/mL Resistant     Ciprofloxacin <=0.25 mcg/mL Sensitive     Ertapenem <=0.5 mcg/mL Sensitive     Gentamicin >8 mcg/mL Resistant     Levofloxacin <=0.5 mcg/mL Sensitive     Meropenem <=1 mcg/mL Sensitive     Nitrofurantoin <=32 mcg/mL Sensitive     Piperacillin/Tazo <=8 mcg/mL Resistant     Tobramycin 8 mcg/mL Resistant     Trimeth/Sulfa <=2/38 mcg/mL Sensitive

## 2024-11-14 NOTE — PLAN OF CARE
Problem: Occupational Therapy  Goal: Occupational Therapy Goal  Description: Goals to be met by: 11/28/24     Patient will increase functional independence with ADLs by performing:    UE Dressing with Supervision.  LE Dressing with Supervision.  Grooming while standing at sink with Supervision.  Toileting from toilet with Supervision for hygiene and clothing management.   Sitting at edge of bed x5 minutes with Supervision.  Supine to sit with Supervision.  Step transfer with Supervision  Toilet transfer to toilet with Supervision.    Outcome: Progressing     Patient tolerated OT eval. Goals and POC established. See note for further details.

## 2024-11-14 NOTE — ED NOTES
Received report from Solange. Assumed care of patient. Patient is alert and resting comfortably in bed in NAD. BP, cardiac, and O2 monitoring continued. Family member at bedside. Patient updated on plan of care, pt denies any needs or complaints at this time. Bed locked in lowest position, side rails up x2, call light within reach. VSS. Awaiting urine from patient.

## 2024-11-14 NOTE — SUBJECTIVE & OBJECTIVE
Past Medical History:   Diagnosis Date    Acute deep vein thrombosis (DVT) of left lower extremity 12/2023    Anxiety     Atrial fibrillation 12/2023    Cataract     Coronary artery disease     CAC score 1430    Depression     Diabetic neuropathy     Essential (primary) hypertension     GERD (gastroesophageal reflux disease)     History of bariatric surgery 07/08/2021    History of total right knee replacement 07/08/2021    Insomnia     Neuromyopathy     Possibly DM and/or alcohol related.    Pulmonary embolism 12/2023    Reactive airway disease     Type 2 diabetes mellitus        Past Surgical History:   Procedure Laterality Date    CATARACT EXTRACTION W/  INTRAOCULAR LENS IMPLANT Right 7/15/2024    Procedure: EXTRACTION, CATARACT, WITH IOL INSERTION;  Surgeon: Margot Jacobson MD;  Location: [facility] OR;  Service: Ophthalmology;  Laterality: Right;    CATARACT EXTRACTION W/  INTRAOCULAR LENS IMPLANT Left 8/29/2024    Procedure: EXTRACTION, CATARACT, WITH IOL INSERTION;  Surgeon: Margot Jacobson MD;  Location: [facility] OR;  Service: Ophthalmology;  Laterality: Left;    COLONOSCOPY      Normal around 2020    COLONOSCOPY N/A 9/6/2024    Procedure: COLONOSCOPY;  Surgeon: Alessandro Serna MD;  Location: [facility] (4TH FLR);  Service: Endoscopy;  Laterality: N/A;  8/28-new case created-lvm for pre call-pt has cataract surgery 8/29/24-tb  ref-dr rico goldstein-[facility]  ok to hold elivickie mukherejeGT  9/5-LVM for precall-Kpvt    COLONOSCOPY N/A 9/6/2024    Procedure: COLONOSCOPY;  Surgeon: Alessandro Serna MD;  Location: [facility] (4TH FLR);  Service: Endoscopy;  Laterality: N/A;  + cologuard  8/8 ref by Isaias Goldstein MD, PeG, instr. to portal, Eliquis hold approval pending-  ok to hold Eliquis 2 days per Dr Goldstein-WALLACE    CYSTOSCOPY WITH URETEROSCOPY, RETROGRADE PYELOGRAPHY, AND INSERTION OF STENT Right 9/27/2024    Procedure: CYSTOSCOPY, WITH RETROGRADE PYELOGRAM AND URETERAL STENT INSERTION;  Surgeon: Joni Wagoner MD;   Location: [facility] OR [address] [place] [address];  Service: Urology;  Laterality: Right;    REMOVAL, CALCULUS, BLADDER N/A 9/27/2024    Procedure: REMOVAL, CALCULUS, BLADDER;  Surgeon: Joni Wagoner MD;  Location: [facility] OR [address] [place] [address];  Service: Urology;  Laterality: N/A;    TOTAL KNEE ARTHROPLASTY Right        Review of patient's allergies indicates:  No Known Allergies    Family History       Problem Relation (Age of Onset)    Colon cancer Paternal Grandfather (89)    Hypertension Mother            Tobacco Use    Smoking status: Never    Smokeless tobacco: Never   Substance and Sexual Activity    Alcohol use: Yes     Comment: Former heavy use    Drug use: Never    Sexual activity: Yes     Comment: unknown           Objective:     Temp:  [97.4 °F (36.3 °C)-98.8 °F (37.1 °C)] 97.7 °F (36.5 °C)  Pulse:  [] 79  Resp:  [15-20] 18  SpO2:  [93 %-100 %] 100 %  BP: (103-153)/(54-73) 127/69  Weight: 88.3 kg (194 lb 10.7 oz)  Body mass index is 28.75 kg/m².    Date 11/14/24 0700 - 11/15/24 0659   Shift 3959-1026 0385-3419 4873-5991 24 Hour Total   INTAKE   P.O. 120   120   Shift Total(mL/kg) 120(1.4)   120(1.4)   OUTPUT   Urine(mL/kg/hr) 400   400   Shift Total(mL/kg) 400(4.5)   400(4.5)   Weight (kg) 88.3 88.3 88.3 88.3          Drains       Drain  Duration             Male External Urinary Catheter 11/14/24 0528 <1 day                     Physical Exam  Constitutional:       General: He is not in acute distress.  HENT:      Head: Normocephalic and atraumatic.      Nose: Nose normal.   Eyes:      Conjunctiva/sclera: Conjunctivae normal.   Cardiovascular:      Rate and Rhythm: Normal rate.   Pulmonary:      Effort: Pulmonary effort is normal. No respiratory distress.   Genitourinary:     Comments: Condom catheter draining dark yellow urine  Musculoskeletal:         General: No deformity.   Skin:     General: Skin is warm and dry.   Neurological:      General: No focal deficit present.      Mental Status: He is alert.   Psychiatric:         Mood  and Affect: Mood normal.         Behavior: Behavior normal.      Significant Labs:    BMP:  Recent Labs   Lab 11/12/24  1330 11/13/24  1823 11/14/24  0618   * 135* 136   K 4.8 4.9 4.3    108 109   CO2 16* 17* 19*   BUN 40* 38* 36*   CREATININE 3.0* 2.7* 2.5*   CALCIUM 8.7 8.8 9.0       CBC:  Recent Labs   Lab 11/12/24  1330 11/13/24  1823 11/14/24  0442   WBC 8.74 9.32 7.28   HGB 11.3* 11.1* 9.3*   HCT 35.1* 34.4* 29.7*   * 137* 148*       All pertinent labs results from the past 24 hours have been reviewed.    Significant Imaging:  All pertinent imaging results/findings from the past 24 hours have been reviewed.

## 2024-11-14 NOTE — ASSESSMENT & PLAN NOTE
ALYSIA is likely due to pre-renal azotemia due to dehydration. Baseline creatinine is  1.2-1.3 . Most recent creatinine and eGFR are listed below.  Recent Labs     11/12/24  1330 11/13/24  1823   CREATININE 3.0* 2.7*   EGFRNORACEVR 21.4* 24.3*      Plan  - ALYSIA is stable  - Avoid nephrotoxins and renally dose meds for GFR listed above  - Monitor urine output, serial BMP, and adjust therapy as needed  - Patient is in need of IV fluids for the treatment of ALYSIA.  Due to a shortage of IV fluids, patient to receive 1L LR bolus.  Patient must receive this treatment in a hospital location due to the risk of adverse effects, insufficient response to treatment, or other potential complications of disease such as kidney failure.

## 2024-11-14 NOTE — PROGRESS NOTES
[facility] - Neurosurgery ([facility])  [facility] Medicine  Progress Note    Patient Name: Anthony Ball  MRN: [id number]  Patient Class: IP- Inpatient   Admission Date: 11/13/2024  Length of Stay: 1 days  Attending Physician: Rosanna Kim MD  Primary Care Provider: Isaias Myles MD        Subjective:     Principal Problem:Acute cystitis        HPI:  Anthony Ball is a 74 y.o. male with a PMHx of type 2 DM, HTN, CAD, PE, afib on eliquis, and recurrent UTI who presents to the ED for flank pain, urinary urgency, and foul smelling urine x3 days. Patient is a poor historian. He states his partner thinks he has been more confused and told him his urine smells bad again. The patient does endorse fatigue and  urinary urgency and states he has had flank pain as well, although denies it currently. He reports the pain has been so severe it has caused him to fall, so he has been staying in bed. He denies fevers/chills, CP, SOB, cough, N/V/D, abdominal pain, or dysuria. He notes chronic BLE numbness/weakness and typically uses a walker to ambulate.    In the ED, pt tachycardic otherwise vitals stable, afebrile. CBC with stable anemia. Na 135. Bicarb 17. Cr 2.7 (bl 1.2-1.3). Glucose 220. Albumin 2.9. Blood cxs in process. POC lactate 1.57. EKG with sinus rhythm, 102 bpm, non specific T wave abnormality. UA with 3+ leukocytes, >100 WBCs, and many bacteria, urine cx in process. CT AP with redemonstrated right-sided double-J ureteral stent, stable in position. No new or worsening hydronephrosis. Bilateral nephrolithiasis. Similar mild nonspecific thickening of the urinary bladder which may be related to suboptimal distension versus nonspecific cystitis. The patient received IV ciprofloxacin.    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 On ciprofloxacin. Discussed with urology, will plan for 14 day course of culture  appr abx and follow up with urology in clinic for stent management.     Interval History: NAEO. Patient AO this am without complaints.     Review of Systems   Constitutional:  Negative for activity change, chills and fatigue.   HENT:  Negative for congestion, postnasal drip, sinus pressure, sneezing and sore throat.    Respiratory:  Negative for cough, chest tightness, shortness of breath and wheezing.    Cardiovascular:  Negative for chest pain, palpitations and leg swelling.   Gastrointestinal:  Negative for abdominal pain, constipation, diarrhea, nausea and vomiting.   Genitourinary:  Negative for difficulty urinating, dysuria and flank pain.   Neurological:  Negative for tremors, syncope, speech difficulty, weakness, light-headedness and headaches.   Hematological:  Does not bruise/bleed easily.   Psychiatric/Behavioral:  Negative for agitation, behavioral problems and confusion.      Objective:     Vital Signs (Most Recent):  Temp: 97.8 °F (36.6 °C) (11/14/24 0721)  Pulse: 83 (11/14/24 0909)  Resp: 16 (11/14/24 0909)  BP: (!) 148/73 (11/14/24 0721)  SpO2: 99 % (11/14/24 0909) Vital Signs (24h Range):  Temp:  [97.4 °F (36.3 °C)-98.8 °F (37.1 °C)] 97.8 °F (36.6 °C)  Pulse:  [] 83  Resp:  [15-20] 16  SpO2:  [93 %-100 %] 99 %  BP: (103-153)/(54-73) 148/73     Weight: 88.3 kg (194 lb 10.7 oz)  Body mass index is 28.75 kg/m².    Intake/Output Summary (Last 24 hours) at 11/14/2024 1028  Last data filed at 11/14/2024 0800  Gross per 24 hour   Intake 120 ml   Output 800 ml   Net -680 ml         Physical Exam  Vitals reviewed.   Cardiovascular:      Rate and Rhythm: Normal rate and regular rhythm.      Pulses: Normal pulses.   Pulmonary:      Effort: Pulmonary effort is normal.   Abdominal:      General: Bowel sounds are normal. There is no distension.      Tenderness: There is no abdominal tenderness.   Skin:     General: Skin is warm.      Capillary Refill: Capillary refill takes less than 2 seconds.    Neurological:      General: No focal deficit present.      Mental Status: He is alert and oriented to person, place, and time.             Significant Labs: All pertinent labs within the past 24 hours have been reviewed.    Significant Imaging: I have reviewed all pertinent imaging results/findings within the past 24 hours.    Assessment/Plan:      * Acute cystitis  Pt with flank pain, urgency, and foul smelling urine. Per family he has been more confused as well. He denies flank pain currently. 2 previous admissions in September and October for sepsis 2/2 UTI.    -Afebrile, no leukocytosis.  -POC lactate 1.57. CRP in process.  -Blood cxs in process.  -UA reviewed, follow cx.  - Continue cipro for now  - Discussed with urology, will plan for 14 day culture appr abx course and patient follow up with urology for stent management       Component 3 wk ago   Urine Culture, Routine  Abnormal   ESCHERICHIA COLI ESBL  50,000 - 99,999 cfu/ml  No other significant isolate    Resulting Agency [facility]        Susceptibility     Escherichia coli ESBL     CULTURE, URINE     Amp/Sulbactam 16/8 mcg/mL Resistant     Ampicillin >16 mcg/mL Resistant     Cefepime >16 mcg/mL Resistant     Ceftriaxone >2 mcg/mL Resistant     Ciprofloxacin <=0.25 mcg/mL Sensitive     Ertapenem <=0.5 mcg/mL Sensitive     Gentamicin >8 mcg/mL Resistant     Levofloxacin <=0.5 mcg/mL Sensitive     Meropenem <=1 mcg/mL Sensitive     Nitrofurantoin <=32 mcg/mL Sensitive     Piperacillin/Tazo <=8 mcg/mL Resistant     Tobramycin 8 mcg/mL Resistant     Trimeth/Sulfa <=2/38 mcg/mL Sensitive                   Paroxysmal A-fib  Patient has paroxysmal (<7 days) atrial fibrillation. Patient is currently in sinus rhythm. [id number] Score: 3. The patients heart rate in the last 24 hours is as follows:  Pulse  Min: 71  Max: 103     Antiarrhythmics       Anticoagulants  apixaban tablet 5 mg, 2 times daily, Oral    Plan  - Replete lytes with a goal of K>4, Mg >2  - Patient is  anticoagulated, see medications listed above.  - Patient's afib is currently controlled    Alcohol abuse  -History noted. Reports he has cut back but unable to quantify.  -Nursing to assess CIWA q shift.  -MVI, folic acid, and thiamine daily.  -Discussed the dangers of continued ETOH use, encouraged cessation.    Thrombocytopenia  The likely etiology of thrombocytopenia is infection. The patients 3 most recent labs are listed below.  Recent Labs     11/12/24 1330 11/13/24 1823 11/14/24  0442   * 137* 148*     Plan  - Will transfuse if platelet count is <50k (if undergoing surgical procedure or have active bleeding).    Right ureteral stone  - R ureteral stent in appropriate position per CT scan with no hydronephrosis  - f/u new urine and blood cultures   - outpatient follow-up for definitive stone treatment     Asthma  -No s/s of acute exacerbation.  -Continue daily inhalers.  - PRN Duo nebs    ALYSIA (acute kidney injury)  ALYSIA is likely due to pre-renal azotemia due to dehydration. Baseline creatinine is  1.2-1.3 . Most recent creatinine and eGFR are listed below.  Recent Labs     11/12/24 1330 11/13/24 1823 11/14/24  0618   CREATININE 3.0* 2.7* 2.5*   EGFRNORACEVR 21.4* 24.3* 26.6*        Plan  - Avoid nephrotoxins and renally dose meds for GFR listed above  - Monitor urine output, serial BMP, and adjust therapy as needed  - Patient is in need of IV fluids for the treatment of ALYSIA.  Due to a shortage of IV fluids, patient to receive 1L LR bolus.  Patient must receive this treatment in a hospital location due to the risk of adverse effects, insufficient response to treatment, or other potential complications of disease such as kidney failure.     Normocytic anemia  Anemia is likely due to chronic disease due to diabetic nephropathy . Most recent hemoglobin and hematocrit are listed below.  Recent Labs     11/12/24 1330 11/13/24 1823 11/14/24  0442   HGB 11.3* 11.1* 9.3*   HCT 35.1* 34.4* 29.7*        Plan  - Monitor serial CBC: Daily  - Transfuse PRBC if patient becomes hemodynamically unstable, symptomatic or H/H drops below 7/21.  - Patient has not received any PRBC transfusions to date    Gastroesophageal reflux disease without esophagitis  -Chronic, stable.  -Continue PPI.    Primary insomnia  On trazodone at home     -Hold trazodone in setting of fatigue and confusion, can consider restarting if improves    Recurrent falls  - suspect acutely worsened in setting of UTI  - fall precautions  - PT/OT consult.    Essential hypertension  Patients blood pressure range in the last 24 hours was: BP  Min: 103/57  Max: 153/71.The patient's inpatient anti-hypertensive regimen is listed below:  Current Antihypertensives       Plan  - BP is controlled, no changes needed to their regimen    Diabetic polyneuropathy associated with type 2 diabetes mellitus  Diabetic polyneuropathy controlled with lyrica, cymbalta, nortryptiline at home      -Continue lyrica and cymbalta at decreased dose d/t mild confusion and ALYSIA, holding amitryptiline  -Adjust as tolerated    Type 2 diabetes mellitus with neurologic complication, without long-term current use of insulin  Patient's FSGs are controlled on current medication regimen.  Last A1c reviewed-   Lab Results   Component Value Date    HGBA1C 9.8 (H) 09/26/2024     Most recent fingerstick glucose reviewed-   Recent Labs   Lab 11/13/24  2259 11/14/24  0723   POCTGLUCOSE 182* 195*       Current correctional scale  Low  Maintain anti-hyperglycemic dose as follows-   Antihyperglycemics (From admission, onward)      Start     Stop Route Frequency Ordered    11/13/24 2157  insulin aspart U-100 pen 0-5 Units         -- SubQ Before meals & nightly PRN 11/13/24 2058          Hold Oral hypoglycemics while patient is in the hospital.  -Diabetic/cardiac diet.  -Accuchecks AC/HS      Plan  - Monitor serial CBC: Daily  - Transfuse PRBC if patient becomes hemodynamically unstable, symptomatic  or H/H drops below 7/21.  - Patient has not received any PRBC transfusions to date    Gastroesophageal reflux disease without esophagitis  -Chronic, stable.  -Continue PPI.    Primary insomnia  On trazodone at home     -Hold trazodone in setting of fatigue and confusion, can consider restarting if improves    Recurrent falls  - suspect acutely worsened in setting of UTI  - fall precautions  - PT/OT consult.    Essential hypertension  Patients blood pressure range in the last 24 hours was: BP  Min: 103/57  Max: 153/71.The patient's inpatient anti-hypertensive regimen is listed below:  Current Antihypertensives       Plan  - BP is controlled, no changes needed to their regimen    Diabetic polyneuropathy associated with type 2 diabetes mellitus  Diabetic polyneuropathy controlled with lyrica, cymbalta, nortryptiline at home      -Continue lyrica and cymbalta at decreased dose d/t mild confusion and ALYSIA, holding amitryptiline  -Adjust as tolerated    Type 2 diabetes mellitus with neurologic complication, without long-term current use of insulin  Patient's FSGs are controlled on current medication regimen.  Last A1c reviewed-   Lab Results   Component Value Date    HGBA1C 9.8 (H) 09/26/2024     Most recent fingerstick glucose reviewed-   Recent Labs   Lab 11/13/24  2259 11/14/24  0723   POCTGLUCOSE 182* 195*       Current correctional scale  Low  Maintain anti-hyperglycemic dose as follows-   Antihyperglycemics (From admission, onward)      Start     Stop Route Frequency Ordered    11/13/24 2157  insulin aspart U-100 pen 0-5 Units         -- SubQ Before meals & nightly PRN 11/13/24 2058          Hold Oral hypoglycemics while patient is in the hospital.  -Diabetic/cardiac diet.  -Accuchecks AC/HS      VTE Risk Mitigation (From admission, onward)           Ordered     apixaban tablet 5 mg  2 times daily         11/13/24 2058     IP VTE HIGH RISK PATIENT  Once         11/13/24 2058     Place sequential compression device   Until discontinued         11/13/24 2058                              Manolo Nguyen MD  Department of Hospital Medicine   [facility] - Neurosurgery ([facility])

## 2024-11-14 NOTE — ASSESSMENT & PLAN NOTE
The likely etiology of thrombocytopenia is infection. The patients 3 most recent labs are listed below.  Recent Labs     11/12/24  1330 11/13/24  1823 11/14/24  0442   * 137* 148*     Plan  - Will transfuse if platelet count is <50k (if undergoing surgical procedure or have active bleeding).

## 2024-11-14 NOTE — ASSESSMENT & PLAN NOTE
-History noted. Reports he has cut back but unable to quantify.  -Nursing to assess CIWA q shift.  -MVI, folic acid, and thiamine daily.  -Discussed the dangers of continued ETOH use, encouraged cessation.

## 2024-11-14 NOTE — NURSING
Patient Transferred to NPU Room 947       Upon arrival to the floor, patient greeted and oriented to room. Complete head to toe assessment completed per protocol. VSS, see flowsheet for details. Neuro assessment completed. Primary team notified of patient's transfer to floor. All current and transfer orders reviewed/reconciled per protocol. All emergency equipment set up in patient's room. Fall/seizure precautions initiated per providers orders. 4 Eyes skin assessment performed, see below for details. Reviewed assessment and rounding frequency with patient and family. Questions were encouraged and addressed. Repositioned patient for comfort with bed locked in lowest position, side rails up x 3, bed alarm set, and call light within reach. Instructed patient to call staff for mobility/assistance, verbalized understanding. No acute signs of distress noted at this time.

## 2024-11-14 NOTE — PT/OT/SLP EVAL
Physical Therapy Evaluation and Treatment    Patient Name:  Anthony Ball   MRN:  [id number]    Recommendations:     Discharge Recommendations: High Intensity Therapy  Discharge Equipment Recommendations: none   Barriers to Discharge: Inaccessible home and Decreased caregiver support  Safest Mobility Level with Nursing: Ambulation with CGA using RW    Assessment:     Anthony Ball is a 74 y.o. male admitted with a medical diagnosis of Acute cystitis. He presents with the following impairments/functional limitations: weakness, impaired endurance, impaired self care skills, impaired functional mobility, impaired balance, gait instability, decreased safety awareness, decreased lower extremity function, decreased coordination, pain. Pt presenting with HOB elevated and agreeable to completing therapy evaluation. Pt verbalizing being a limited ambulator with ability to manage household distances using RW, but able to ambulate extended distances this date. Pt with significant difficulty attempting to initiate stair training requiring maximal assistance to place RLE on first step with BUE support on rail. Pt unsafe to return home at this time 2/2 inability to safely navigate home environment. Recommend high intensity therapy following discharge once medically stable in order to reduce fall risk, reduce caregiver burden, improve quality of life, and return to PLOF.  Patient presents with good participation, motivation, and family support to return to prior level of function and demonstrates appropriate endurance, strength, pain control, and fine motor control to participate in up to 3 hours daily or 15hrs weekly of multidisciplinary intensive therapy. Pt would continue to benefit from skilled acute PT in order to address current deficits and progress functional mobility.     Rehab Prognosis: Good; patient would benefit from acute skilled PT services 4 x/week to address these deficits and reach maximum level of function.  Recent  "Surgery: * No surgery found *      Plan:     During this hospitalization, patient to be seen 4 x/week to address the identified rehab impairments via gait training, therapeutic activities, therapeutic exercises, neuromuscular re-education and progress toward the following goals:    Plan of Care Expires:  12/14/24    Subjective     Chief Complaint: None verbalized  Patient/Family Comments/Goals: "I want to get better and go home."  Pain/Comfort:  Pain Rating 1: 0/10    Patients cultural, spiritual, Jain conflicts given the current situation: no    Living Environment:  Living Environment: Patient lives with their partner  in a single story house with number of outside stair(s): 5 with L HR and walk-in shower.  Prior Level of Function: Prior to admission, patient modified independent for household mobility using RW and community mobility using wheelchair. Pt independent with ADLs.  Equipment Used at Home: walker, rolling, cane, straight, wheelchair, rollator, shower chair.  DME owned (not currently used): none  Assistance Upon Discharge: partner    Objective:     Communicated with nursing prior to session. Patient found HOB elevated with telemetry, bed alarm upon PT entry to room.    General Precautions: Standard, fall  Orthopedic Precautions:N/A    Braces: N/A    Exams:  Cognitive Exam:  Patient is oriented to Person, Place, Time, Situation, follows commands 100% of the time  RLE ROM: WFL  RLE Strength: WFL  LLE ROM: WFL  LLE Strength: WFL  Sensation: Intact light touch to BLEs    Functional Mobility:  Bed Mobility:    Supine to Sit: stand by assistance  Transfers:    Sit to Stand: contact guard assistance with rolling walker with cues for hand placement and foot placement  Gait: Patient ambulated 65' with rolling walker and CGA-min.   Patient demonstrates occasional unsteady gait, decreased step length, narrow base of support, decreased weight shift, decreased foot clearance, ambulates outside SIERRA of RW, flexed " posture, and decreased anjel.   Patient required cues for upright posture, gluteal activation, sequencing, rolling walker management, safe rolling walker usage, to ambulate within SIERRA of RW, increased step size, and increased foot clearance  All lines remained intact throughout ambulation trial, gait belt utilized, chair follow for patient safety.  Stairs:  Pt ascended/descended 1 stair(s) with No Assistive Device with right handrail with Maximum Assistance.     Therapeutic Activities and Exercises:  Patient educated on role of acute care PT and PT POC, safety while in hospital including calling nurse for mobility, and call light usage.  Pt educated on the effects of bed rest and the importance of OOB activity. Pt encouraged to sit UIC majority of day as tolerated and continue daily ambulation with nursing assist. Pt verbalized understanding.  Pt educated on importance of maximal participation in therapy session in order to reduce negative effects of prolonged sedentary positioning.   Answered all questions within PT scope of practice and addressed functional mobility concerns.    AM-PAC 6 CLICK MOBILITY  Total Score:17     Patient left up in chair with all lines intact, call button in reach, RN notified, and chair alarm on.    GOALS:   Multidisciplinary Problems       Physical Therapy Goals          Problem: Physical Therapy    Goal Priority Disciplines Outcome Interventions   Physical Therapy Goal     PT, PT/OT Progressing    Description: Goals to be met by: 2024     Patient will increase functional independence with mobility by performin. Supine to sit with Ray  2. Sit to supine with Ray  3. Sit to stand transfer with Supervision  4. Gait  x 200 feet with Supervision using LRAD.   5. Ascend/descend 5 stair with left Handrails Minimal Assistance using LRAD.   6. Lower extremity exercise program x15 reps per handout, with independence                         History:     Past  Medical History:   Diagnosis Date    Acute deep vein thrombosis (DVT) of left lower extremity 12/2023    Anxiety     Atrial fibrillation 12/2023    Cataract     Coronary artery disease     CAC score 1430    Depression     Diabetic neuropathy     Essential (primary) hypertension     GERD (gastroesophageal reflux disease)     History of bariatric surgery 07/08/2021    History of total right knee replacement 07/08/2021    Insomnia     Neuromyopathy     Possibly DM and/or alcohol related.    Pulmonary embolism 12/2023    Reactive airway disease     Type 2 diabetes mellitus        Past Surgical History:   Procedure Laterality Date    CATARACT EXTRACTION W/  INTRAOCULAR LENS IMPLANT Right 7/15/2024    Procedure: EXTRACTION, CATARACT, WITH IOL INSERTION;  Surgeon: Margot Jacobson MD;  Location: [facility] OR;  Service: Ophthalmology;  Laterality: Right;    CATARACT EXTRACTION W/  INTRAOCULAR LENS IMPLANT Left 8/29/2024    Procedure: EXTRACTION, CATARACT, WITH IOL INSERTION;  Surgeon: Margot Jacobson MD;  Location: [facility] OR;  Service: Ophthalmology;  Laterality: Left;    COLONOSCOPY      Normal around 2020    COLONOSCOPY N/A 9/6/2024    Procedure: COLONOSCOPY;  Surgeon: Alessandro Serna MD;  Location: [facility] (4TH FLR);  Service: Endoscopy;  Laterality: N/A;  8/28-new case created-lvm for pre call-pt has cataract surgery 8/29/24-tb  ref-dr rico goldstein-peg-[place]  ok to hold taran goldstein-GT  9/5-LVM for precall-Kpvt    COLONOSCOPY N/A 9/6/2024    Procedure: COLONOSCOPY;  Surgeon: Alessandro Serna MD;  Location: [facility] (4TH FLR);  Service: Endoscopy;  Laterality: N/A;  + cologuard  8/8 ref by Isaias Goldstein MD, [facility], instr. to portal, Eliquis hold approval pending-  ok to hold Eliquis 2 days per Dr Goldstein-GT    CYSTOSCOPY WITH URETEROSCOPY, RETROGRADE PYELOGRAPHY, AND INSERTION OF STENT Right 9/27/2024    Procedure: CYSTOSCOPY, WITH RETROGRADE PYELOGRAM AND URETERAL STENT INSERTION;  Surgeon: Joni Wagoner MD;   Location: [facility] OR [address] [place] [address];  Service: Urology;  Laterality: Right;    REMOVAL, CALCULUS, BLADDER N/A 9/27/2024    Procedure: REMOVAL, CALCULUS, BLADDER;  Surgeon: Joni Wagoner MD;  Location: [facility] OR [address] [place] [address];  Service: Urology;  Laterality: N/A;    TOTAL KNEE ARTHROPLASTY Right        Time Tracking:     PT Received On: 11/14/24  PT Start Time: 1034     PT Stop Time: 1103  PT Total Time (min): 29 min     Billable Minutes: Evaluation 15 Gait Training 14      11/14/2024

## 2024-11-14 NOTE — ASSESSMENT & PLAN NOTE
Diabetic polyneuropathy controlled with lyrica, cymbalta, nortryptiline at home      -Continue lyrica and cymbalta at decreased dose d/t mild confusion and ALYSIA, holding amitryptiline  -Adjust as tolerated

## 2024-11-14 NOTE — ASSESSMENT & PLAN NOTE
Patient's FSGs are controlled on current medication regimen.  Last A1c reviewed-   Lab Results   Component Value Date    HGBA1C 9.8 (H) 09/26/2024     Most recent fingerstick glucose reviewed-   Recent Labs   Lab 11/13/24  2259   POCTGLUCOSE 182*     Current correctional scale  Low  Maintain anti-hyperglycemic dose as follows-   Antihyperglycemics (From admission, onward)      Start     Stop Route Frequency Ordered    11/13/24 2157  insulin aspart U-100 pen 0-5 Units         -- SubQ Before meals & nightly PRN 11/13/24 2058          Hold Oral hypoglycemics while patient is in the hospital.  -Diabetic/cardiac diet.  -Accuchecks AC/HS

## 2024-11-14 NOTE — H&P
[facility] - Emergency Dept  [facility] Medicine  History & Physical    Patient Name: Anthony Ball  MRN: [id number]  Patient Class: IP- Inpatient  Admission Date: 11/13/2024  Attending Physician: Alisha Singh MD   Primary Care Provider: Isaias Myles MD         Patient information was obtained from patient, past medical records, and ER records.     Subjective:     Principal Problem:ALYSIA (acute kidney injury)    Chief Complaint:   Chief Complaint   Patient presents with    Flank Pain     Complaining of back and flank pain, many recent falls.  Denies trauma. Numbness and tingling is normal for him.  Neuropathy.PMH, DM, HTN, A-fib, on Eliquis.        HPI: Anthony Ball is a 74 y.o. male with a PMHx of type 2 DM, HTN, CAD, PE, afib on eliquis, and recurrent UTI who presents to the ED for flank pain, urinary urgency, and foul smelling urine x3 days. Patient is a poor historian. He states his partner thinks he has been more confused and told him his urine smells bad again. The patient does endorse fatigue and  urinary urgency and states he has had flank pain as well, although denies it currently. He reports the pain has been so severe it has caused him to fall, so he has been staying in bed. He denies fevers/chills, CP, SOB, cough, N/V/D, abdominal pain, or dysuria. He notes chronic BLE numbness/weakness and typically uses a walker to ambulate.    In the ED, pt tachycardic otherwise vitals stable, afebrile. CBC with stable anemia. Na 135. Bicarb 17. Cr 2.7 (bl 1.2-1.3). Glucose 220. Albumin 2.9. Blood cxs in process. POC lactate 1.57. EKG with sinus rhythm, 102 bpm, non specific T wave abnormality. UA with 3+ leukocytes, >100 WBCs, and many bacteria, urine cx in process. CT AP with redemonstrated right-sided double-J ureteral stent, stable in position. No new or worsening hydronephrosis. Bilateral nephrolithiasis. Similar mild nonspecific thickening of the urinary bladder which may be related to suboptimal distension  versus nonspecific cystitis. The patient received IV ciprofloxacin.    Past Medical History:   Diagnosis Date    Acute deep vein thrombosis (DVT) of left lower extremity 12/2023    Anxiety     Atrial fibrillation 12/2023    Cataract     Coronary artery disease     CAC score 1430    Depression     Diabetic neuropathy     Essential (primary) hypertension     GERD (gastroesophageal reflux disease)     History of bariatric surgery 07/08/2021    History of total right knee replacement 07/08/2021    Insomnia     Neuromyopathy     Possibly DM and/or alcohol related.    Pulmonary embolism 12/2023    Reactive airway disease     Type 2 diabetes mellitus        Past Surgical History:   Procedure Laterality Date    CATARACT EXTRACTION W/  INTRAOCULAR LENS IMPLANT Right 7/15/2024    Procedure: EXTRACTION, CATARACT, WITH IOL INSERTION;  Surgeon: Margot Jacobson MD;  Location: [facility];  Service: Ophthalmology;  Laterality: Right;    CATARACT EXTRACTION W/  INTRAOCULAR LENS IMPLANT Left 8/29/2024    Procedure: EXTRACTION, CATARACT, WITH IOL INSERTION;  Surgeon: Margot Jacobson MD;  Location: [facility] OR;  Service: Ophthalmology;  Laterality: Left;    COLONOSCOPY      Normal around 2020    COLONOSCOPY N/A 9/6/2024    Procedure: COLONOSCOPY;  Surgeon: Alessandro Serna MD;  Location: [facility] (4TH FLR);  Service: Endoscopy;  Laterality: N/A;  8/28-new case created-lvm for pre call-pt has cataract surgery 8/29/24-tb  ref-dr rico goldstein-[place]  ok to hold taran Perdomo  9/5-LVM for precall-Kp[facility]    COLONOSCOPY N/A 9/6/2024    Procedure: COLONOSCOPY;  Surgeon: Alesasndro Serna MD;  Location: [facility] (4TH FLR);  Service: Endoscopy;  Laterality: N/A;  + cologuard  8/8 ref by Isaias Goldstein MD, [facility], instr. to portal, Eliquis hold approval pending-  ok to hold Eliqudevante 2 days per Dr Goldstein-GT    CYSTOSCOPY WITH URETEROSCOPY, RETROGRADE PYELOGRAPHY, AND INSERTION OF STENT Right 9/27/2024    Procedure: CYSTOSCOPY, WITH RETROGRADE  PYELOGRAM AND URETERAL STENT INSERTION;  Surgeon: Joni Wagoner MD;  Location: [facility] OR [address] [place] [address];  Service: Urology;  Laterality: Right;    REMOVAL, CALCULUS, BLADDER N/A 9/27/2024    Procedure: REMOVAL, CALCULUS, BLADDER;  Surgeon: Joni Wagoner MD;  Location: [facility] OR [address] [place] [address];  Service: Urology;  Laterality: N/A;    TOTAL KNEE ARTHROPLASTY Right        Review of patient's allergies indicates:  No Known Allergies    No current facility-administered medications on file prior to encounter.     Current Outpatient Medications on File Prior to Encounter   Medication Sig    albuterol (VENTOLIN HFA) 90 mcg/actuation inhaler Inhale 2 puffs into the lungs every 6 (six) hours as needed for Wheezing. Rescue    apixaban (ELIQUIS) 5 mg Tab TAKE 1 TABLET(5 MG) BY MOUTH TWICE DAILY    betamethasone valerate 0.1% (VALISONE) 0.1 % Crea Apply topically once daily. Apply to inner foreskin one daily for 14 days for 14 days (Patient not taking: Reported on 10/29/2024)    blood-glucose meter Misc To check BG 3 times daily, to use with insurance preferred meter    blood-glucose meter,continuous (DEXCOM ) Misc Check blood glucose level 3 times daily. (Patient not taking: Reported on 10/29/2024)    blood-glucose sensor (FREESTYLE SOPHY 3 PLUS SENSOR) Rima 1 Device by Misc.(Non-Drug; Combo Route) route every 14 (fourteen) days.    blood-glucose sensor (FREESTYLE SOPHY 3 SENSOR) Rima 1 Device by Misc.(Non-Drug; Combo Route) route every 14 (fourteen) days.    busPIRone (BUSPAR) 15 MG tablet Take 1 tablet (15 mg total) by mouth 2 (two) times daily as needed (Anxiety).    capsaicin (ZOSTRIX) 0.025 % cream Apply topically every 3 (three) months. (to both feet)    cyanocobalamin (VITAMIN B-12) 1000 MCG tablet Take 1 tablet (1,000 mcg total) by mouth once daily.    DULoxetine (CYMBALTA) 60 MG capsule Take 1 capsule (60 mg total) by mouth 2 (two) times daily.    flash glucose scanning reader (RetraceSTYLE SOPHY 14 DAY READER) Misc Use device to  check blood glucose level 3 times daily.    fluticasone-umeclidin-vilanter (TRELEGY ELLIPTA) 100-62.5-25 mcg DsDv Inhale 1 puff into the lungs once daily.    hydrOXYzine pamoate (VISTARIL) 25 MG Cap Take 25 mg by mouth every 8 (eight) hours as needed (Itching).    lancets 33 gauge Misc To check BG 3 times daily, to use with insurance preferred meter    magnesium oxide 400 mg magnesium Tab Take 1 tablet by mouth every evening.    metFORMIN (GLUCOPHAGE-XR) 500 MG ER 24hr tablet Take 1 tablet (500 mg total) by mouth daily with breakfast.    methocarbamoL (ROBAXIN) 750 MG Tab Take 1 tablet (750 mg total) by mouth 3 (three) times daily as needed (Back pain).    nortriptyline (PAMELOR) 10 MG capsule Take 1 capsule (10 mg total) by mouth 3 (three) times daily.    ondansetron (ZOFRAN) 8 MG tablet Take 1 tablet (8 mg total) by mouth every 8 (eight) hours as needed for Nausea.    oxybutynin (DITROPAN-XL) 5 MG TR24 Take 1 tablet (5 mg total) by mouth once daily. (Patient not taking: Reported on 10/29/2024)    pantoprazole (PROTONIX) 40 MG tablet Take 1 tablet (40 mg total) by mouth once daily.    pregabalin (LYRICA) 150 MG capsule Take 1 capsule (150 mg total) by mouth 3 (three) times daily.    SITagliptin phosphate (JANUVIA) 100 MG Tab Take 1 tablet (100 mg total) by mouth once daily. (Patient not taking: Reported on 10/29/2024)    tamsulosin (FLOMAX) 0.4 mg Cap Take 1 capsule (0.4 mg total) by mouth once daily. (Patient not taking: Reported on 10/29/2024)    traZODone (DESYREL) 100 MG tablet Take 2 tablets (200 mg total) by mouth nightly as needed for Insomnia.    TRUE METRIX GLUCOSE TEST STRIP Strp USE TO CHECK BLOOD SUGAR THREE TIMES DAILY OR AS DIRECTED    UNABLE TO FIND OMEGA XL CAP - Take 1 capsule by mouth once daily.     Family History       Problem Relation (Age of Onset)    Colon cancer Paternal Grandfather (89)    Hypertension Mother          Tobacco Use    Smoking status: Never    Smokeless tobacco: Never    Substance and Sexual Activity    Alcohol use: Yes     Comment: Former heavy use    Drug use: Never    Sexual activity: Yes     Comment: unknown     Review of Systems   Constitutional:  Positive for fatigue. Negative for appetite change, chills, diaphoresis and fever.   HENT:  Negative for congestion, rhinorrhea and sore throat.    Eyes:  Negative for photophobia and visual disturbance.   Respiratory:  Negative for shortness of breath and wheezing.    Cardiovascular:  Negative for chest pain, palpitations and leg swelling.   Gastrointestinal:  Negative for abdominal distention, abdominal pain, diarrhea, nausea and vomiting.   Genitourinary:  Positive for flank pain (reports none currently) and urgency. Negative for dysuria and frequency.        +foul smelling urine   Musculoskeletal:  Positive for gait problem (chronic). Negative for back pain and neck pain.   Skin:  Negative for color change, pallor and rash.   Neurological:  Positive for weakness (BLE weakness, chronic) and numbness (chronic to BLE). Negative for syncope, light-headedness and headaches.   Psychiatric/Behavioral:  Negative for confusion and hallucinations. The patient is not nervous/anxious.      Objective:     Vital Signs (Most Recent):  Temp: 97.5 °F (36.4 °C) (11/14/24 0202)  Pulse: 76 (11/14/24 0302)  Resp: 16 (11/14/24 0302)  BP: (!) 120/56 (11/14/24 0302)  SpO2: 97 % (11/14/24 0302) Vital Signs (24h Range):  Temp:  [97.4 °F (36.3 °C)-98.8 °F (37.1 °C)] 97.5 °F (36.4 °C)  Pulse:  [] 76  Resp:  [15-20] 16  SpO2:  [93 %-100 %] 97 %  BP: (103-145)/(54-67) 120/56        There is no height or weight on file to calculate BMI.     Physical Exam  Vitals and nursing note reviewed.   Constitutional:       General: He is not in acute distress.     Appearance: He is not toxic-appearing or diaphoretic.      Comments: Chronically ill appearing   HENT:      Head: Normocephalic and atraumatic.      Nose: Nose normal.      Mouth/Throat:      Mouth:  Mucous membranes are dry.   Eyes:      Pupils: Pupils are equal, round, and reactive to light.   Cardiovascular:      Rate and Rhythm: Regular rhythm. Tachycardia present.      Pulses: Normal pulses.   Pulmonary:      Effort: Pulmonary effort is normal. No respiratory distress.      Breath sounds: No wheezing, rhonchi or rales.   Abdominal:      General: Bowel sounds are normal. There is no distension.      Palpations: Abdomen is soft.      Tenderness: There is no abdominal tenderness. There is no right CVA tenderness, left CVA tenderness or guarding.   Musculoskeletal:         General: Normal range of motion.      Cervical back: Normal range of motion.      Right lower leg: No edema.      Left lower leg: No edema.   Skin:     General: Skin is warm and dry.      Capillary Refill: Capillary refill takes less than 2 seconds.   Neurological:      General: No focal deficit present.      Mental Status: He is alert and oriented to person, place, and time. He is confused.      Cranial Nerves: No dysarthria.      Sensory: Sensory deficit (BLE) present.      Motor: Weakness (BLE) present.   Psychiatric:         Speech: Speech normal.         Behavior: Behavior is cooperative.         Cognition and Memory: Memory is impaired.      Comments: Poor historian              CRANIAL NERVES     CN III, IV, VI   Pupils are equal, round, and reactive to light.       Significant Labs: All pertinent labs within the past 24 hours have been reviewed.  CBC:   Recent Labs   Lab 11/12/24  1330 11/13/24  1823   WBC 8.74 9.32   HGB 11.3* 11.1*   HCT 35.1* 34.4*   * 137*     CMP:   Recent Labs   Lab 11/12/24  1330 11/13/24  1823   * 135*   K 4.8 4.9    108   CO2 16* 17*   * 220*   BUN 40* 38*   CREATININE 3.0* 2.7*   CALCIUM 8.7 8.8   PROT 7.0 7.4   ALBUMIN 2.8* 2.9*   BILITOT 0.5 0.4   ALKPHOS 83 82   AST 10 9*   ALT 5* <5*   ANIONGAP 9 10     Urine Studies:   Recent Labs   Lab 11/13/24 1930   COLORU Colorless*    APPEARANCEUA Hazy*   PHUR 6.0   SPECGRAV 1.010   PROTEINUA Trace*   GLUCUA Negative   KETONESU Negative   BILIRUBINUA Negative   OCCULTUA 3+*   NITRITE Negative   LEUKOCYTESUR 3+*   RBCUA >100*   WBCUA >100*   BACTERIA Many*   SQUAMEPITHEL 0       Significant Imaging: I have reviewed all pertinent imaging results/findings within the past 24 hours.  Imaging Results              CT Renal Stone Study ABD Pelvis WO (Final result)  Result time 11/13/24 20:47:33      Final result by Daryl Heart MD (11/13/24 20:47:33)                   Impression:      1. Redemonstrated right-sided double-J ureteral stent, stable in position.  No new or worsening hydronephrosis.  2. Bilateral nephrolithiasis.  3. Similar mild nonspecific thickening of the urinary bladder which may be related to suboptimal distension versus nonspecific cystitis.  4. Grossly stable other chronic findings in the body of the report.      Electronically signed by: Daryl Heart MD  Date:    11/13/2024  Time:    20:47               Narrative:    EXAMINATION:  CT RENAL STONE STUDY ABD PELVIS WO    CLINICAL HISTORY:  Nephrolithiasis, symptomatic/complicated;    TECHNIQUE:  Low dose axial images, sagittal and coronal reformations were obtained from the lung bases to the pubic symphysis.  Contrast was not administered.    COMPARISON:  CT abdomen and pelvis 10/21/2024    FINDINGS:  Lack of IV contrast limits evaluation of soft tissue and vascular structures.    Unchanged few scattered small calcified granulomas in the right lower lobe.  Similar platelike scarring versus atelectasis in the left lung base.  No consolidation or pleural effusion.    Base of the heart is normal in size without significant pericardial fluid noting multi-vessel coronary arterial calcifications.    Small hiatal hernia.  Remote operative change of prior gastric bypass without acute complication.    Noncontrast appearance of the gallbladder, liver, duodenum and bilateral adrenal glands  are within normal limits.  Pancreas mildly atrophic.  No discrete pancreatic mass or adjacent inflammatory change.  Spleen mildly enlarged without focal process, similar to prior.    Bilateral kidneys are stable in overall size, shape and location.  Redemonstrated right sided double-J ureteral stent in place with proximal loop in the upper pole calyx and distal loop within the dependent right aspect of the urinary bladder near the UVJ, similar to prior.  No hydronephrosis at either kidney.  Previous non dependent gas within the urinary bladder has resolved.  Few scattered punctate nephroliths at each kidney.  2.9 cm partially exophytic cyst with minimal thin peripheral calcification at the posterior right renal interpolar region and 9 cm exophytic cyst with minimal thin peripheral calcification at the peripheral left renal interpolar region.  Subcentimeter hypoattenuating exophytic lesion at the posterior aspect of the right renal mid to upper pole which is too small to characterize.  Additional subcentimeter hypoattenuating parenchymal lesion of the right kidney which is too small to characterize.  These findings are not significantly changed from most recent imaging.  Right more than left bilateral nonspecific perinephric stranding, similar to prior.  Previous right periureteral residual fat stranding is grossly stable.  Ureters are normal in course and caliber.  Urinary bladder is suboptimally distended with mild circumferential wall thickening.  Stable small urinary bladder diverticulum laterally on the right.  Pelvic phleboliths noted.  Prostate is normal in size noting dystrophic calcifications in the central gland.    Appendix and terminal ileum are within normal limits.  Mild to moderate amount of scattered fecal material throughout the colon and rectum.  No evidence of bowel obstruction or acute bowel inflammation.  Few scattered colonic diverticula without diverticulitis.  No bowel pneumatosis or portal  venous gas.    No ascites, free air or lymphadenopathy by CT criteria.  No significant aortic calcific atherosclerosis.  Aorta is not aneurysmal.    Extraperitoneal soft tissues and osseous structures appear stable without acute findings.                                      Assessment/Plan:     * Acute cystitis  Pt with flank pain, urgency, and foul smelling urine. Per family he has been more confused as well. He denies flank pain currently. 2 previous admissions in September and October for sepsis 2/2 UTI.    -Afebrile, no leukocytosis.  -POC lactate 1.57. CRP in process.  -Blood cxs in process.  -UA reviewed, follow cx.  -Started on ciprofloxacin in the ED, continue for now.  Component 3 wk ago   Urine Culture, Routine  Abnormal   ESCHERICHIA COLI ESBL  50,000 - 99,999 cfu/ml  No other significant isolate    Resulting Agency [facility]        Susceptibility     Escherichia coli ESBL     CULTURE, URINE     Amp/Sulbactam 16/8 mcg/mL Resistant     Ampicillin >16 mcg/mL Resistant     Cefepime >16 mcg/mL Resistant     Ceftriaxone >2 mcg/mL Resistant     Ciprofloxacin <=0.25 mcg/mL Sensitive     Ertapenem <=0.5 mcg/mL Sensitive     Gentamicin >8 mcg/mL Resistant     Levofloxacin <=0.5 mcg/mL Sensitive     Meropenem <=1 mcg/mL Sensitive     Nitrofurantoin <=32 mcg/mL Sensitive     Piperacillin/Tazo <=8 mcg/mL Resistant     Tobramycin 8 mcg/mL Resistant     Trimeth/Sulfa <=2/38 mcg/mL Sensitive                   ALYSIA (acute kidney injury)  ALYSIA is likely due to pre-renal azotemia due to dehydration. Baseline creatinine is  1.2-1.3 . Most recent creatinine and eGFR are listed below.  Recent Labs     11/12/24  1330 11/13/24  1823   CREATININE 3.0* 2.7*   EGFRNORACEVR 21.4* 24.3*      Plan  - ALYSIA is stable  - Avoid nephrotoxins and renally dose meds for GFR listed above  - Monitor urine output, serial BMP, and adjust therapy as needed  - Patient is in need of IV fluids for the treatment of ALYSIA.  Due to a shortage of IV fluids,  patient to receive 1L LR bolus.  Patient must receive this treatment in a hospital location due to the risk of adverse effects, insufficient response to treatment, or other potential complications of disease such as kidney failure.     Right ureteral stone  - R ureteral stent in appropriate position per CT scan with no hydronephrosis  - f/u new urine and blood cultures   - outpatient follow-up for definitive stone treatment     Paroxysmal A-fib  Patient has paroxysmal (<7 days) atrial fibrillation. Patient is currently in sinus rhythm. [id number] Score: 3. The patients heart rate in the last 24 hours is as follows:  Pulse  Min: 71  Max: 103     Antiarrhythmics       Anticoagulants  apixaban tablet 5 mg, 2 times daily, Oral    Plan  - Replete lytes with a goal of K>4, Mg >2  - Patient is anticoagulated, see medications listed above.  - Patient's afib is currently controlled    Alcohol abuse  -History noted. Reports he has cut back but unable to quantify.  -Nursing to assess CIWA q shift.  -MVI, folic acid, and thiamine daily.  -Discussed the dangers of continued ETOH use, encouraged cessation.    Thrombocytopenia  The likely etiology of thrombocytopenia is infection. The patients 3 most recent labs are listed below.  Recent Labs     11/12/24  1330 11/13/24  1823 11/14/24  0442   * 137* 148*     Plan  - Will transfuse if platelet count is <50k (if undergoing surgical procedure or have active bleeding).    Asthma  -No s/s of acute exacerbation.  -Continue daily inhalers.  - PRN Duo nebs    Normocytic anemia  Anemia is likely due to chronic disease due to diabetic nephropathy . Most recent hemoglobin and hematocrit are listed below.  Recent Labs     11/12/24  1330 11/13/24  1823   HGB 11.3* 11.1*   HCT 35.1* 34.4*     Plan  - Monitor serial CBC: Daily  - Transfuse PRBC if patient becomes hemodynamically unstable, symptomatic or H/H drops below 7/21.  - Patient has not received any PRBC transfusions to date  -  Patient's anemia is currently stable    Gastroesophageal reflux disease without esophagitis  -Chronic, stable.  -Continue PPI.    Primary insomnia  On trazodone at home     -Hold trazodone in setting of fatigue and confusion, can consider restarting if improves    Recurrent falls  - suspect acutely worsened in setting of UTI  - fall precautions  - PT/OT consult.    Essential hypertension  Patients blood pressure range in the last 24 hours was: BP  Min: 103/57  Max: 153/71.The patient's inpatient anti-hypertensive regimen is listed below:  Current Antihypertensives       Plan  - BP is controlled, no changes needed to their regimen    Diabetic polyneuropathy associated with type 2 diabetes mellitus  Diabetic polyneuropathy controlled with lyrica, cymbalta, nortryptiline at home      -Continue lyrica and cymbalta at decreased dose d/t mild confusion and ALYSIA, holding amitryptiline  -Adjust as tolerated    Type 2 diabetes mellitus with neurologic complication, without long-term current use of insulin  Patient's FSGs are controlled on current medication regimen.  Last A1c reviewed-   Lab Results   Component Value Date    HGBA1C 9.8 (H) 09/26/2024     Most recent fingerstick glucose reviewed-   Recent Labs   Lab 11/13/24  2259   POCTGLUCOSE 182*     Current correctional scale  Low  Maintain anti-hyperglycemic dose as follows-   Antihyperglycemics (From admission, onward)      Start     Stop Route Frequency Ordered    11/13/24 2157  insulin aspart U-100 pen 0-5 Units         -- SubQ Before meals & nightly PRN 11/13/24 2058          Hold Oral hypoglycemics while patient is in the hospital.  -Diabetic/cardiac diet.  -Accuchecks AC/HS      VTE Risk Mitigation (From admission, onward)           Ordered     apixaban tablet 5 mg  2 times daily         11/13/24 2058     IP VTE HIGH RISK PATIENT  Once         11/13/24 2058     Place sequential compression device  Until discontinued         11/13/24 2058                                     Kayleen Jessica NP  Department of Hospital Medicine  [facility] - Emergency Dept

## 2024-11-14 NOTE — HOSPITAL COURSE
Dr. Ball, admitted to  for management of recurrent ESBL UTI in the setting of renal stones and right sided double J ureteral stents. BC 11/13 with ESBL, repeat pending. On ertapen, ID consulted. Will need to continue IV abx until at least 48 hours post procedure.  Discussed with urology, no inpatient intervention required at this time, patient to follow up with urology in clinic for stent management. Attempting to coordinate timing. PMR consulted for inpatient rehab. Patient medically stable for discharge to [facility] with ertapenem x 14 day course and urology follow up outpatient

## 2024-11-14 NOTE — PT/OT/SLP EVAL
"Occupational Therapy  Co- Evaluation    Name: Anthony Ball  MRN: [id number]  Admitting Diagnosis: Acute cystitis  Recent Surgery: * No surgery found *      Recommendations:     Discharge Recommendations: High Intensity Therapy  Discharge Equipment Recommendations:  none  Barriers to discharge:  None    Assessment:     Anthony Ball is a 74 y.o. male with a medical diagnosis of Acute cystitis.  He presents agreeable to evaluation today. Patient able to ambulate well to bathroom but with poor dynamic standing balance at sink, leaning onto sink and wall despite cueing. Patient ambulated back to chair with increased assistance required secondary to weakness and fatigue. Patient endorses 7 falls within last 6 months. Performance deficits affecting function: weakness, impaired endurance, impaired self care skills, impaired functional mobility, gait instability, decreased lower extremity function, decreased upper extremity function, impaired balance, decreased safety awareness, impaired cognition.  Patient is significantly below PLOF. Patient has demonstrated sufficient progression to warrant high intensity therapy evidenced by objectives noted below.     Rehab Prognosis: Good; patient would benefit from acute skilled OT services to address these deficits and reach maximum level of function.       Plan:     Patient to be seen 4 x/week to address the above listed problems via self-care/home management, therapeutic activities, therapeutic exercises, neuromuscular re-education  Plan of Care Expires: 11/28/24  Plan of Care Reviewed with: patient    Subjective     Chief Complaint: weakness  Patient/Family Comments/goals: "look at me. I can't believe I'm this weak"    Occupational Profile:  Living Environment: lives with their spouse in a single story home with 5 steps to enter with left handrail. Bathroom set up: walk in shower with Shower chair  Previous level of function: Patient reports they were independent with ADLs, although " "requiring assistance for shower transfers. 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  Patient found up in chair with telemetry, chair check upon OT entry to room.    General Precautions: Standard, fall  Orthopedic Precautions: N/A  Braces: N/A  Respiratory Status: Room air    Occupational Performance:    Functional Mobility/Transfers:  Patient completed Sit <> Stand Transfer with contact guard assistance  with  rolling walker   Functional Mobility: patient ambulated to bathroom with contact guard assistance with rolling walker; patient requiring contact guard assistance progressing to min A for safety to ambulate back to bedside chair     Activities of Daily Living:  Feeding:  independence with meal at start of session  Grooming: contact guard assistance to brush teeth in stance; patient with lateral lean during dynamic standing requiring min A for balance    Cognitive/Visual Perceptual:  Cognitive/Psychosocial Skills:     -       Oriented to: Person, Place, Time, and Situation   -       Follows Commands/attention:Follows one-step commands  -       Communication: clear/fluent  -       Memory: Impaired STM  -       Safety awareness/insight to disability: intact   -       Mood/Affect/Coping skills/emotional control: Appropriate to situation  Visual/Perceptual:      -Intact      Brief Interview of Mental Status (BIMS):   Repetition of 3 words: "Sock, Blue, Bed": 3/3  3- Three  Temporal Orientation: 5/6         Able to report correct year: 3- Correct  Able to report correct month: 2- Accurate within 5 " "days   Able to report correct day of the week: 0- Incorrect or no answer   Recall: 4/6  Able to recall "Sock": 2- Yes, no cue required  Able to recall "Blue": 2- Yes, no cue required   Able to recall "Bed": 0- No, could not recall  Summary score: 12/15  13-15: Cognitively Intact  8-12: Moderately Impaired   0-7: Severe Impairment     Physical Exam:  Sensation:    -       Intact  Dominant hand:    -       R  Upper Extremity Range of Motion:     -       Right Upper Extremity: WFL  -       Left Upper Extremity: WFL  Upper Extremity Strength:    -       Right Upper Extremity: gross 4-/5  -       Left Upper Extremity: gross 4-/5   Strength:    -       Right Upper Extremity: WFL  -       Left Upper Extremity: WFL  Fine Motor Coordination:    -       Intact  Left hand thumb/finger opposition skills and Right hand thumb/finger opposition skills    AMPAC 6 Click ADL:  AMPAC Total Score: 17    Treatment & Education:  Patient educated on:   -purpose of OT and OT POC  -facilitation and education on proper body mechanics, energy conservation, and safety  -importance of early mobility and out of bed activities with staff assist  -overall benefits of therapy     All questions answered within OT scope and to patient's satisfaction    Patient left up in chair with all lines intact, call button in reach, bed alarm on, and spouse present    GOALS:   Multidisciplinary Problems       Occupational Therapy Goals          Problem: Occupational Therapy    Goal Priority Disciplines Outcome Interventions   Occupational Therapy Goal     OT, PT/OT Progressing    Description: Goals to be met by: 11/28/24     Patient will increase functional independence with ADLs by performing:    UE Dressing with Supervision.  LE Dressing with Supervision.  Grooming while standing at sink with Supervision.  Toileting from toilet with Supervision for hygiene and clothing management.   Sitting at edge of bed x5 minutes with Supervision.  Supine to sit with " Supervision.  Step transfer with Supervision  Toilet transfer to toilet with Supervision.                         History:     Past Medical History:   Diagnosis Date    Acute deep vein thrombosis (DVT) of left lower extremity 12/2023    Anxiety     Atrial fibrillation 12/2023    Cataract     Coronary artery disease     CAC score 1430    Depression     Diabetic neuropathy     Essential (primary) hypertension     GERD (gastroesophageal reflux disease)     History of bariatric surgery 07/08/2021    History of total right knee replacement 07/08/2021    Insomnia     Neuromyopathy     Possibly DM and/or alcohol related.    Pulmonary embolism 12/2023    Reactive airway disease     Type 2 diabetes mellitus          Past Surgical History:   Procedure Laterality Date    CATARACT EXTRACTION W/  INTRAOCULAR LENS IMPLANT Right 7/15/2024    Procedure: EXTRACTION, CATARACT, WITH IOL INSERTION;  Surgeon: Margot Jacobson MD;  Location: [facility] OR;  Service: Ophthalmology;  Laterality: Right;    CATARACT EXTRACTION W/  INTRAOCULAR LENS IMPLANT Left 8/29/2024    Procedure: EXTRACTION, CATARACT, WITH IOL INSERTION;  Surgeon: Margot Jacobson MD;  Location: [facility] OR;  Service: Ophthalmology;  Laterality: Left;    COLONOSCOPY      Normal around 2020    COLONOSCOPY N/A 9/6/2024    Procedure: COLONOSCOPY;  Surgeon: Alessandro Serna MD;  Location: [facility] (4TH FLR);  Service: Endoscopy;  Laterality: N/A;  8/28-new case created-lvm for pre call-pt has cataract surgery 8/29/24-tb  ref-dr rico goldstein-[facility] to hold elivickie mukherjeeGT  9/5-LVM for precall-Kpvt    COLONOSCOPY N/A 9/6/2024    Procedure: COLONOSCOPY;  Surgeon: Alessandro Serna MD;  Location: [facility] (4TH FLR);  Service: Endoscopy;  Laterality: N/A;  + cologuard  8/8 ref by Isaias Goldstein MD, [facility], instr. to portal, Eliquis hold approval pending-  ok to hold Eliquis 2 days per Dr Goldstein-GT    CYSTOSCOPY WITH URETEROSCOPY, RETROGRADE PYELOGRAPHY, AND INSERTION OF STENT  Right 9/27/2024    Procedure: CYSTOSCOPY, WITH RETROGRADE PYELOGRAM AND URETERAL STENT INSERTION;  Surgeon: Joni Wagoner MD;  Location: [facility] OR [address] [place] [address];  Service: Urology;  Laterality: Right;    REMOVAL, CALCULUS, BLADDER N/A 9/27/2024    Procedure: REMOVAL, CALCULUS, BLADDER;  Surgeon: Joni Wagoner MD;  Location: [facility] OR [address] [place] [address];  Service: Urology;  Laterality: N/A;    TOTAL KNEE ARTHROPLASTY Right        Time Tracking:     OT Date of Treatment: 11/14/24  OT Start Time: 1327  OT Stop Time: 1355  OT Total Time (min): 28 min    Billable Minutes:Evaluation 10  Self Care/Home Management 18    11/14/2024

## 2024-11-14 NOTE — NURSING
Pt allowed me to check his blood sugar this morning, then stated that he won't let us check is again until 6pm. MD notified.

## 2024-11-14 NOTE — ASSESSMENT & PLAN NOTE
ALYSIA is likely due to pre-renal azotemia due to dehydration. Baseline creatinine is  1.2-1.3 . Most recent creatinine and eGFR are listed below.  Recent Labs     11/12/24  1330 11/13/24  1823 11/14/24  0618   CREATININE 3.0* 2.7* 2.5*   EGFRNORACEVR 21.4* 24.3* 26.6*        Plan  - Avoid nephrotoxins and renally dose meds for GFR listed above  - Monitor urine output, serial BMP, and adjust therapy as needed  - Patient is in need of IV fluids for the treatment of ALYSIA.  Due to a shortage of IV fluids, patient to receive 1L LR bolus.  Patient must receive this treatment in a hospital location due to the risk of adverse effects, insufficient response to treatment, or other potential complications of disease such as kidney failure.

## 2024-11-15 PROBLEM — R78.81 BACTEREMIA: Status: ACTIVE | Noted: 2024-11-15

## 2024-11-15 PROBLEM — D68.69 HYPERCOAGULABLE STATE DUE TO ATRIAL FIBRILLATION: Status: ACTIVE | Noted: 2024-11-15

## 2024-11-15 PROBLEM — Z86.711 HISTORY OF PULMONARY EMBOLUS (PE): Status: ACTIVE | Noted: 2024-11-15

## 2024-11-15 PROBLEM — N20.0 NEPHROLITHIASIS: Status: ACTIVE | Noted: 2024-11-15

## 2024-11-15 PROBLEM — I48.91 HYPERCOAGULABLE STATE DUE TO ATRIAL FIBRILLATION: Status: ACTIVE | Noted: 2024-11-15

## 2024-11-15 PROBLEM — Z79.01 CHRONIC ANTICOAGULATION: Status: ACTIVE | Noted: 2024-11-15

## 2024-11-15 LAB
ALBUMIN SERPL BCP-MCNC: 2.5 G/DL (ref 3.5–5.2)
ALP SERPL-CCNC: 70 U/L (ref 40–150)
ALT SERPL W/O P-5'-P-CCNC: 5 U/L (ref 10–44)
ANION GAP SERPL CALC-SCNC: 9 MMOL/L (ref 8–16)
AST SERPL-CCNC: 9 U/L (ref 10–40)
BASOPHILS # BLD AUTO: 0.03 K/UL (ref 0–0.2)
BASOPHILS NFR BLD: 0.4 % (ref 0–1.9)
BILIRUB SERPL-MCNC: 0.4 MG/DL (ref 0.1–1)
BUN SERPL-MCNC: 32 MG/DL (ref 8–23)
CALCIUM SERPL-MCNC: 8.5 MG/DL (ref 8.7–10.5)
CHLORIDE SERPL-SCNC: 108 MMOL/L (ref 95–110)
CO2 SERPL-SCNC: 18 MMOL/L (ref 23–29)
CREAT SERPL-MCNC: 2.3 MG/DL (ref 0.5–1.4)
DIFFERENTIAL METHOD BLD: ABNORMAL
EOSINOPHIL # BLD AUTO: 0.2 K/UL (ref 0–0.5)
EOSINOPHIL NFR BLD: 3 % (ref 0–8)
ERYTHROCYTE [DISTWIDTH] IN BLOOD BY AUTOMATED COUNT: 14.1 % (ref 11.5–14.5)
EST. GFR  (NO RACE VARIABLE): 29.4 ML/MIN/1.73 M^2
GLUCOSE SERPL-MCNC: 198 MG/DL (ref 70–110)
HCT VFR BLD AUTO: 28.5 % (ref 40–54)
HGB BLD-MCNC: 9.3 G/DL (ref 14–18)
IMM GRANULOCYTES # BLD AUTO: 0.04 K/UL (ref 0–0.04)
IMM GRANULOCYTES NFR BLD AUTO: 0.6 % (ref 0–0.5)
LYMPHOCYTES # BLD AUTO: 0.9 K/UL (ref 1–4.8)
LYMPHOCYTES NFR BLD: 13.3 % (ref 18–48)
MAGNESIUM SERPL-MCNC: 1.6 MG/DL (ref 1.6–2.6)
MCH RBC QN AUTO: 29.2 PG (ref 27–31)
MCHC RBC AUTO-ENTMCNC: 32.6 G/DL (ref 32–36)
MCV RBC AUTO: 89 FL (ref 82–98)
MONOCYTES # BLD AUTO: 0.5 K/UL (ref 0.3–1)
MONOCYTES NFR BLD: 6.9 % (ref 4–15)
NEUTROPHILS # BLD AUTO: 5.3 K/UL (ref 1.8–7.7)
NEUTROPHILS NFR BLD: 75.8 % (ref 38–73)
NRBC BLD-RTO: 0 /100 WBC
PLATELET # BLD AUTO: 119 K/UL (ref 150–450)
PMV BLD AUTO: 11.4 FL (ref 9.2–12.9)
POCT GLUCOSE: 223 MG/DL (ref 70–110)
POCT GLUCOSE: 225 MG/DL (ref 70–110)
POTASSIUM SERPL-SCNC: 4.8 MMOL/L (ref 3.5–5.1)
PROT SERPL-MCNC: 6.3 G/DL (ref 6–8.4)
RBC # BLD AUTO: 3.19 M/UL (ref 4.6–6.2)
SODIUM SERPL-SCNC: 135 MMOL/L (ref 136–145)
WBC # BLD AUTO: 6.93 K/UL (ref 3.9–12.7)

## 2024-11-15 PROCEDURE — 25000242 PHARM REV CODE 250 ALT 637 W/ HCPCS: Performed by: NURSE PRACTITIONER

## 2024-11-15 PROCEDURE — 27000207 HC ISOLATION

## 2024-11-15 PROCEDURE — 99222 1ST HOSP IP/OBS MODERATE 55: CPT | Mod: ,,, | Performed by: NURSE PRACTITIONER

## 2024-11-15 PROCEDURE — 83735 ASSAY OF MAGNESIUM: CPT | Performed by: NURSE PRACTITIONER

## 2024-11-15 PROCEDURE — 80053 COMPREHEN METABOLIC PANEL: CPT | Performed by: NURSE PRACTITIONER

## 2024-11-15 PROCEDURE — 36415 COLL VENOUS BLD VENIPUNCTURE: CPT | Performed by: NURSE PRACTITIONER

## 2024-11-15 PROCEDURE — 99223 1ST HOSP IP/OBS HIGH 75: CPT | Mod: GC,,,

## 2024-11-15 PROCEDURE — 97535 SELF CARE MNGMENT TRAINING: CPT

## 2024-11-15 PROCEDURE — 25000003 PHARM REV CODE 250: Performed by: INTERNAL MEDICINE

## 2024-11-15 PROCEDURE — 97116 GAIT TRAINING THERAPY: CPT | Mod: CQ

## 2024-11-15 PROCEDURE — 63600175 PHARM REV CODE 636 W HCPCS: Performed by: NURSE PRACTITIONER

## 2024-11-15 PROCEDURE — 85025 COMPLETE CBC W/AUTO DIFF WBC: CPT | Performed by: NURSE PRACTITIONER

## 2024-11-15 PROCEDURE — 25000003 PHARM REV CODE 250: Performed by: NURSE PRACTITIONER

## 2024-11-15 PROCEDURE — 97110 THERAPEUTIC EXERCISES: CPT

## 2024-11-15 PROCEDURE — 25000003 PHARM REV CODE 250

## 2024-11-15 PROCEDURE — 99223 1ST HOSP IP/OBS HIGH 75: CPT | Mod: ,,, | Performed by: INTERNAL MEDICINE

## 2024-11-15 PROCEDURE — 11000001 HC ACUTE MED/SURG PRIVATE ROOM

## 2024-11-15 PROCEDURE — 63600175 PHARM REV CODE 636 W HCPCS

## 2024-11-15 PROCEDURE — 63600175 PHARM REV CODE 636 W HCPCS: Performed by: INTERNAL MEDICINE

## 2024-11-15 PROCEDURE — 94640 AIRWAY INHALATION TREATMENT: CPT

## 2024-11-15 RX ORDER — SODIUM CHLORIDE, SODIUM LACTATE, POTASSIUM CHLORIDE, CALCIUM CHLORIDE 600; 310; 30; 20 MG/100ML; MG/100ML; MG/100ML; MG/100ML
INJECTION, SOLUTION INTRAVENOUS CONTINUOUS
Status: ACTIVE | OUTPATIENT
Start: 2024-11-15 | End: 2024-11-15

## 2024-11-15 RX ADMIN — Medication 100 MG: at 08:11

## 2024-11-15 RX ADMIN — PANTOPRAZOLE SODIUM 40 MG: 20 TABLET, DELAYED RELEASE ORAL at 08:11

## 2024-11-15 RX ADMIN — DULOXETINE HYDROCHLORIDE 30 MG: 30 CAPSULE, DELAYED RELEASE ORAL at 08:11

## 2024-11-15 RX ADMIN — FOLIC ACID 1 MG: 1 TABLET ORAL at 08:11

## 2024-11-15 RX ADMIN — FLUTICASONE FUROATE AND VILANTEROL TRIFENATATE 1 PUFF: 100; 25 POWDER RESPIRATORY (INHALATION) at 10:11

## 2024-11-15 RX ADMIN — CYANOCOBALAMIN TAB 1000 MCG 1000 MCG: 1000 TAB at 08:11

## 2024-11-15 RX ADMIN — APIXABAN 5 MG: 5 TABLET, FILM COATED ORAL at 08:11

## 2024-11-15 RX ADMIN — MEROPENEM 1 G: 1 INJECTION INTRAVENOUS at 12:11

## 2024-11-15 RX ADMIN — PREGABALIN 50 MG: 50 CAPSULE ORAL at 08:11

## 2024-11-15 RX ADMIN — MEROPENEM 2 G: 2 INJECTION, POWDER, FOR SOLUTION INTRAVENOUS at 12:11

## 2024-11-15 RX ADMIN — TIOTROPIUM BROMIDE INHALATION SPRAY 2 PUFF: 3.12 SPRAY, METERED RESPIRATORY (INHALATION) at 10:11

## 2024-11-15 RX ADMIN — HYDROXYZINE PAMOATE 25 MG: 25 CAPSULE ORAL at 09:11

## 2024-11-15 RX ADMIN — IPRATROPIUM BROMIDE AND ALBUTEROL SULFATE 3 ML: .5; 3 SOLUTION RESPIRATORY (INHALATION) at 01:11

## 2024-11-15 RX ADMIN — Medication 400 MG: at 08:11

## 2024-11-15 RX ADMIN — THERA TABS 1 TABLET: TAB at 08:11

## 2024-11-15 RX ADMIN — TAMSULOSIN HYDROCHLORIDE 0.4 MG: 0.4 CAPSULE ORAL at 08:11

## 2024-11-15 RX ADMIN — SODIUM CHLORIDE, POTASSIUM CHLORIDE, SODIUM LACTATE AND CALCIUM CHLORIDE: 600; 310; 30; 20 INJECTION, SOLUTION INTRAVENOUS at 12:11

## 2024-11-15 RX ADMIN — SODIUM CHLORIDE, POTASSIUM CHLORIDE, SODIUM LACTATE AND CALCIUM CHLORIDE: 600; 310; 30; 20 INJECTION, SOLUTION INTRAVENOUS at 08:11

## 2024-11-15 RX ADMIN — METHOCARBAMOL 500 MG: 500 TABLET ORAL at 08:11

## 2024-11-15 RX ADMIN — PREGABALIN 50 MG: 50 CAPSULE ORAL at 03:11

## 2024-11-15 NOTE — HPI
74-year-old male PMHx of DM2, HTN, CAD, PE, afib on eliquis, and recurrent UTI's now presenting with chief complaint of flank/back pain, foul-smelling urine, and hallucinations. Patient denies any fevers, spinal pain associated numbness/weakness of his lower extremities, or urinary retention. Admitted to  for management of recurrent ESBL UTI with renal stones and right sided double J ureteral stents. Urology consulted and plans to f/u with pt for outpt stone treatment    ID hx:  9/27/24: admit for urosepsis, R urethral stent placed d/t nonobstructive nephrolithiasis, blood and urine cultures + for proteus mirabilis, sent home on Cefpodoxime (End date: 10/11/24)  10/21/24: admit again for UTI, urine + E. Coli, sent home on Cipro (end date: 11/5/24)  11/13/24: admit again for UTI, u/a (WBC > 100, RBC > 100, many bacteria), urine & blood cultures +GNR pending susceptibility, on Meropenem (dose increased on 11/15 to 2g q12 hrs)

## 2024-11-15 NOTE — ASSESSMENT & PLAN NOTE
Anemia is likely due to chronic disease due to diabetic nephropathy . Most recent hemoglobin and hematocrit are listed below.  Recent Labs     11/13/24  1823 11/14/24  0442 11/15/24  0419   HGB 11.1* 9.3* 9.3*   HCT 34.4* 29.7* 28.5*       Plan  - Monitor serial CBC: Daily  - Transfuse PRBC if patient becomes hemodynamically unstable, symptomatic or H/H drops below 7/21.  - Patient has not received any PRBC transfusions to date

## 2024-11-15 NOTE — PROGRESS NOTES
[facility] - Neurosurgery ([facility])  [facility] Medicine  Progress Note    Patient Name: Anthony Ball  MRN: [id number]  Patient Class: IP- Inpatient   Admission Date: 11/13/2024  Length of Stay: 2 days  Attending Physician: Rosanna Kim MD  Primary Care Provider: Isaias Myles MD        Subjective:     Principal Problem:Acute cystitis        HPI:  Anthony Ball is a 74 y.o. male with a PMHx of type 2 DM, HTN, CAD, PE, afib on eliquis, and recurrent UTI who presents to the ED for flank pain, urinary urgency, and foul smelling urine x3 days. Patient is a poor historian. He states his partner thinks he has been more confused and told him his urine smells bad again. The patient does endorse fatigue and  urinary urgency and states he has had flank pain as well, although denies it currently. He reports the pain has been so severe it has caused him to fall, so he has been staying in bed. He denies fevers/chills, CP, SOB, cough, N/V/D, abdominal pain, or dysuria. He notes chronic BLE numbness/weakness and typically uses a walker to ambulate.    In the ED, pt tachycardic otherwise vitals stable, afebrile. CBC with stable anemia. Na 135. Bicarb 17. Cr 2.7 (bl 1.2-1.3). Glucose 220. Albumin 2.9. Blood cxs in process. POC lactate 1.57. EKG with sinus rhythm, 102 bpm, non specific T wave abnormality. UA with 3+ leukocytes, >100 WBCs, and many bacteria, urine cx in process. CT AP with redemonstrated right-sided double-J ureteral stent, stable in position. No new or worsening hydronephrosis. Bilateral nephrolithiasis. Similar mild nonspecific thickening of the urinary bladder which may be related to suboptimal distension versus nonspecific cystitis. The patient received IV ciprofloxacin.    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 BC 11/13 with ESBL, repeat pending. On meropen, ID consulted. Discussed with  urology, no inpatient intervention required at this time, patient to follow up with urology in clinic for stent management.  PMR consulted for inpatient rehab consultation.     Interval History: BC with ESBL, on elisa. Pending ID recs and placement.     Review of Systems   Constitutional:  Negative for activity change, chills and fatigue.   HENT:  Negative for congestion, postnasal drip, sinus pressure, sneezing and sore throat.    Respiratory:  Negative for cough, chest tightness, shortness of breath and wheezing.    Cardiovascular:  Negative for chest pain, palpitations and leg swelling.   Gastrointestinal:  Negative for abdominal pain, constipation, diarrhea, nausea and vomiting.   Genitourinary:  Negative for difficulty urinating, dysuria and flank pain.   Neurological:  Negative for tremors, syncope, speech difficulty, weakness, light-headedness and headaches.   Hematological:  Does not bruise/bleed easily.   Psychiatric/Behavioral:  Negative for agitation, behavioral problems and confusion.      Objective:     Vital Signs (Most Recent):  Temp: 98.4 °F (36.9 °C) (11/15/24 1102)  Pulse: 89 (11/15/24 1104)  Resp: 16 (11/15/24 1102)  BP: (!) 101/57 (11/15/24 1102)  SpO2: (!) 94 % (11/15/24 1102) Vital Signs (24h Range):  Temp:  [97.8 °F (36.6 °C)-98.4 °F (36.9 °C)] 98.4 °F (36.9 °C)  Pulse:  [77-99] 89  Resp:  [16-20] 16  SpO2:  [94 %-99 %] 94 %  BP: (101-143)/(57-73) 101/57     Weight: 88.3 kg (194 lb 10.7 oz)  Body mass index is 28.75 kg/m².    Intake/Output Summary (Last 24 hours) at 11/15/2024 1134  Last data filed at 11/14/2024 1746  Gross per 24 hour   Intake 618.21 ml   Output 300 ml   Net 318.21 ml         Physical Exam  Vitals reviewed.   Cardiovascular:      Rate and Rhythm: Normal rate and regular rhythm.      Pulses: Normal pulses.   Pulmonary:      Effort: Pulmonary effort is normal.   Abdominal:      General: Bowel sounds are normal. There is no distension.      Tenderness: There is no abdominal  tenderness.   Skin:     General: Skin is warm.      Capillary Refill: Capillary refill takes less than 2 seconds.   Neurological:      General: No focal deficit present.      Mental Status: He is alert and oriented to person, place, and time.             Significant Labs: All pertinent labs within the past 24 hours have been reviewed.    Significant Imaging: I have reviewed all pertinent imaging results/findings within the past 24 hours.    Assessment/Plan:      * Acute cystitis  Pt with flank pain, urgency, and foul smelling urine. Per family he has been more confused as well. He denies flank pain currently. 2 previous admissions in September and October for sepsis 2/2 UTI.    - Afebrile, no leukocytosis.  - Blood cx positive for ESBL, on meropenam  - repeat BC pending.  - Discussed with urology, no inpatient intervention. Patient to follow up with urology for stent management     Bacteremia  See acute cystitis       Paroxysmal A-fib  Patient has paroxysmal (<7 days) atrial fibrillation. Patient is currently in sinus rhythm. [id number] Score: 3. The patients heart rate in the last 24 hours is as follows:  Pulse  Min: 71  Max: 103     Antiarrhythmics       Anticoagulants  apixaban tablet 5 mg, 2 times daily, Oral    Plan  - Replete lytes with a goal of K>4, Mg >2  - Patient is anticoagulated, see medications listed above.  - Patient's afib is currently controlled    Alcohol abuse  -History noted. Reports he has cut back but unable to quantify.  -Nursing to assess CIWA q shift.  -MVI, folic acid, and thiamine daily.  -Discussed the dangers of continued ETOH use, encouraged cessation.    Thrombocytopenia  The likely etiology of thrombocytopenia is infection. The patients 3 most recent labs are listed below.  Recent Labs     11/13/24  1823 11/14/24  0442 11/15/24  0419   * 148* 119*       Plan  - Will transfuse if platelet count is <50k (if undergoing surgical procedure or have active bleeding).    Right  ureteral stone  - R ureteral stent in appropriate position per CT scan with no hydronephrosis  - f/u new urine and blood cultures   - outpatient follow-up for definitive stone treatment     Asthma  -No s/s of acute exacerbation.  -Continue daily inhalers.  - PRN Duo nebs    ALYSIA (acute kidney injury)  ALYSIA is likely due to pre-renal azotemia due to dehydration. Baseline creatinine is  1.2-1.3 . Most recent creatinine and eGFR are listed below.  Recent Labs     11/13/24 1823 11/14/24  0618 11/15/24  0419   CREATININE 2.7* 2.5* 2.3*   EGFRNORACEVR 24.3* 26.6* 29.4*        Plan  - Avoid nephrotoxins and renally dose meds for GFR listed above  - Monitor urine output, serial BMP, and adjust therapy as needed  - Patient is in need of IV fluids for the treatment of ALYSIA.  Due to a shortage of IV fluids, patient to receive 1L LR bolus.  Patient must receive this treatment in a hospital location due to the risk of adverse effects, insufficient response to treatment, or other potential complications of disease such as kidney failure.     Normocytic anemia  Anemia is likely due to chronic disease due to diabetic nephropathy . Most recent hemoglobin and hematocrit are listed below.  Recent Labs     11/13/24 1823 11/14/24  0442 11/15/24  0419   HGB 11.1* 9.3* 9.3*   HCT 34.4* 29.7* 28.5*       Plan  - Monitor serial CBC: Daily  - Transfuse PRBC if patient becomes hemodynamically unstable, symptomatic or H/H drops below 7/21.  - Patient has not received any PRBC transfusions to date    Gastroesophageal reflux disease without esophagitis  -Chronic, stable.  -Continue PPI.    Primary insomnia  On trazodone at home     -Hold trazodone in setting of fatigue and confusion, can consider restarting if improves    Recurrent falls  - suspect acutely worsened in setting of UTI  - fall precautions  - PT/OT consult.    Essential hypertension  Patients blood pressure range in the last 24 hours was: BP  Min: 103/57  Max: 153/71.The patient's  inpatient anti-hypertensive regimen is listed below:  Current Antihypertensives       Plan  - BP is controlled, no changes needed to their regimen    Diabetic polyneuropathy associated with type 2 diabetes mellitus  Diabetic polyneuropathy controlled with lyrica, cymbalta, nortryptiline at home      -Continue lyrica and cymbalta at decreased dose d/t mild confusion and ALYSIA, holding amitryptiline  -Adjust as tolerated    Type 2 diabetes mellitus with neurologic complication, without long-term current use of insulin  Patient's FSGs are controlled on current medication regimen.  Last A1c reviewed-   Lab Results   Component Value Date    HGBA1C 9.8 (H) 09/26/2024     Most recent fingerstick glucose reviewed-   Recent Labs   Lab 11/14/24  1813 11/15/24  0422   POCTGLUCOSE 272* 225*       Current correctional scale  Low  Maintain anti-hyperglycemic dose as follows-   Antihyperglycemics (From admission, onward)      Start     Stop Route Frequency Ordered    11/13/24 2157  insulin aspart U-100 pen 0-5 Units         -- SubQ Before meals & nightly PRN 11/13/24 2058          Hold Oral hypoglycemics while patient is in the hospital.  -Diabetic/cardiac diet.  -Accuchecks AC/HS  - Patient refusing insulin inpatient, will attempt to re-educate importance of well controlled BG      VTE Risk Mitigation (From admission, onward)           Ordered     apixaban tablet 5 mg  2 times daily         11/13/24 2058     IP VTE HIGH RISK PATIENT  Once         11/13/24 2058     Place sequential compression device  Until discontinued         11/13/24 2058                    Discharge Planning   CHRISTA: 11/17/2024     Code Status: Full Code   Is the patient medically ready for discharge?:     Reason for patient still in hospital (select all that apply): Treatment and Pending disposition  Discharge Plan A: Home with family, Home Health                  Manolo Nguyen MD  Department of Hospital Medicine   [facility] - Neurosurgery ([facility])

## 2024-11-15 NOTE — PLAN OF CARE
11/15/24 1149   Post-Acute Status   Post-Acute Authorization Placement   Post-Acute Placement Status Referrals Sent   Discharge Plan   Discharge Plan A Rehab     Met with patient and partner Bulmaro to review discharge recommendation of Rehab and is agreeable to plan    Patient/family provided list of facilities in-network with patient's payor plan. Providers that are owned, operated, or affiliated with [facility] are included on the list.     Notified that referral sent to below listed facilities from in-network list based on proximity to home/family support:   [facility]  2.   [facility]  3.   [facility]  4.  5. (can send more than 5)    Patient/family instructed to identify preference.    Preferred Facility: (if more than 1, listed in order of descending preference)  [facility]    If an additional preferred facility not listed above is identified, additional referral to be sent. If above facilities unable to accept, will send additional referrals to in-network providers.      Discharge Plan A and Plan B have been determined by review of patient's clinical status, future medical and therapeutic needs, and coverage/benefits for post-acute care in coordination with multidisciplinary team members.    Maddie Alvarado, PORTIA  [facility]  [phone]

## 2024-11-15 NOTE — ASSESSMENT & PLAN NOTE
Patient's FSGs are controlled on current medication regimen.  Last A1c reviewed-   Lab Results   Component Value Date    HGBA1C 9.8 (H) 09/26/2024     Most recent fingerstick glucose reviewed-   Recent Labs   Lab 11/14/24  1813 11/15/24  0422   POCTGLUCOSE 272* 225*       Current correctional scale  Low  Maintain anti-hyperglycemic dose as follows-   Antihyperglycemics (From admission, onward)      Start     Stop Route Frequency Ordered    11/13/24 2157  insulin aspart U-100 pen 0-5 Units         -- SubQ Before meals & nightly PRN 11/13/24 2058          Hold Oral hypoglycemics while patient is in the hospital.  -Diabetic/cardiac diet.  -Accuchecks AC/HS  - Patient refusing insulin inpatient, will attempt to re-educate importance of well controlled BG

## 2024-11-15 NOTE — CONSULTS
Cj Pollock - Neurosurgery ([facility])  Infectious Disease  Consult Note    Patient Name: Anthony Ball  MRN: [id number]  Admission Date: 11/13/2024  Hospital Length of Stay: 2 days  Attending Physician: Rosanna Kim MD  Primary Care Provider: Isaias Myles MD     Isolation Status: Contact    Patient information was obtained from patient and ER records.      Inpatient consult to Infectious Diseases  Consult performed by: Becca Samuel FNP  Consult ordered by: Manolo Nguyen MD        Assessment/Plan:     ID  Bacteremia  I have reviewed hospital notes from HM service and other specialty providers. I have also reviewed CBC, CMP/BMP, cultures and imaging with my interpretation as documented.      74-year-old male PMHx of DM2, HTN, CAD, PE, afib on eliquis, and recurrent UTI's now presenting with flank/back pain, foul-smelling urine, and hallucinations. Admitted for management of recurrent ESBL UTI with renal stones and right sided double J ureteral stents. Urology consulted and plans to f/u with pt for outpt stone treatment      11/13/24: u/a (WBC > 100, RBC > 100, many bacteria), urine & blood cultures +GNR pending susceptibility, on Meropenem (dose increased on 11/15 to 2g q12 hrs)     Recommendations / Plan  Continue Meropenem 2 g q12 hrs  F/u suspectibilities and repeat cultures results  Will likely need ~48 hrs post urology procedure of antibiotics     -- Discussed with ID staff and primary team  -- ID will continue to follow for further recs        Thank you for your consult. I will follow-up with patient. Please contact us if you have any additional questions.    ESTEFANI Calixto  Infectious Disease  Cj Pollock - Neurosurgery ([facility])    Subjective:     Principal Problem: Acute cystitis    HPI: 74-year-old male PMHx of DM2, HTN, CAD, PE, afib on eliquis, and recurrent UTI's now presenting with chief complaint of flank/back pain, foul-smelling urine, and hallucinations. Patient denies any fevers, spinal  pain associated numbness/weakness of his lower extremities, or urinary retention. Admitted to  for management of recurrent ESBL UTI with renal stones and right sided double J ureteral stents. Urology consulted and plans to f/u with pt for outpt stone treatment    ID hx:  9/27/24: admit for urosepsis, R urethral stent placed d/t nonobstructive nephrolithiasis, blood and urine cultures + for proteus mirabilis, sent home on Cefpodoxime (End date: 10/11/24)  10/21/24: admit again for UTI, urine + E. Coli, sent home on Cipro (end date: 11/5/24)  11/13/24: admit again for UTI, u/a (WBC > 100, RBC > 100, many bacteria), urine & blood cultures +GNR pending susceptibility, on Meropenem (dose increased on 11/15 to 2g q12 hrs)       Past Medical History:   Diagnosis Date    Acute deep vein thrombosis (DVT) of left lower extremity 12/2023    Anxiety     Atrial fibrillation 12/2023    Cataract     Coronary artery disease     CAC score 1430    Depression     Diabetic neuropathy     Essential (primary) hypertension     GERD (gastroesophageal reflux disease)     History of bariatric surgery 07/08/2021    History of total right knee replacement 07/08/2021    Insomnia     Neuromyopathy     Possibly DM and/or alcohol related.    Pulmonary embolism 12/2023    Reactive airway disease     Type 2 diabetes mellitus        Past Surgical History:   Procedure Laterality Date    CATARACT EXTRACTION W/  INTRAOCULAR LENS IMPLANT Right 7/15/2024    Procedure: EXTRACTION, CATARACT, WITH IOL INSERTION;  Surgeon: Margot Jacobson MD;  Location: [facility] OR;  Service: Ophthalmology;  Laterality: Right;    CATARACT EXTRACTION W/  INTRAOCULAR LENS IMPLANT Left 8/29/2024    Procedure: EXTRACTION, CATARACT, WITH IOL INSERTION;  Surgeon: Margot Jacobson MD;  Location: [facility] OR;  Service: Ophthalmology;  Laterality: Left;    COLONOSCOPY      Normal around 2020    COLONOSCOPY N/A 9/6/2024    Procedure: COLONOSCOPY;  Surgeon: Alessandro Serna MD;  Location:  [facility] ENDO (4TH FLR);  Service: Endoscopy;  Laterality: N/A;  8/28-new case created-lvm for pre call-pt has cataract surgery 8/29/24-tb  ref-dr rico myles-peg-portal  ok to hold taran myles-GT  9/5-LVM for precall-Kpvt    COLONOSCOPY N/A 9/6/2024    Procedure: COLONOSCOPY;  Surgeon: Alessandro Serna MD;  Location: [facility] (4TH FLR);  Service: Endoscopy;  Laterality: N/A;  + cologuard  8/8 ref by Isaias Myles MD, PeG, instr. to portal, Eliquis hold approval pending-st  ok to hold Eliquis 2 days per Dr Myles-GT    CYSTOSCOPY WITH URETEROSCOPY, RETROGRADE PYELOGRAPHY, AND INSERTION OF STENT Right 9/27/2024    Procedure: CYSTOSCOPY, WITH RETROGRADE PYELOGRAM AND URETERAL STENT INSERTION;  Surgeon: Joni Wagoner MD;  [facility];  Service: Urology;  Laterality: Right;    REMOVAL, CALCULUS, BLADDER N/A 9/27/2024    Procedure: REMOVAL, CALCULUS, BLADDER;  Surgeon: Joni Wagoner MD;  [facility];  Service: Urology;  Laterality: N/A;    TOTAL KNEE ARTHROPLASTY Right        Review of patient's allergies indicates:  No Known Allergies    Medications:  Medications Prior to Admission   Medication Sig    albuterol (VENTOLIN HFA) 90 mcg/actuation inhaler Inhale 2 puffs into the lungs every 6 (six) hours as needed for Wheezing. Rescue    apixaban (ELIQUIS) 5 mg Tab TAKE 1 TABLET(5 MG) BY MOUTH TWICE DAILY    betamethasone valerate 0.1% (VALISONE) 0.1 % Crea Apply topically once daily. Apply to inner foreskin one daily for 14 days for 14 days (Patient not taking: Reported on 10/29/2024)    blood-glucose meter Misc To check BG 3 times daily, to use with insurance preferred meter    blood-glucose meter,continuous (DEXCOM ) Misc Check blood glucose level 3 times daily. (Patient not taking: Reported on 10/29/2024)    blood-glucose sensor (FREESTYLE SOPHY 3 PLUS SENSOR) Rima 1 Device by Misc.(Non-Drug; Combo Route) route every 14 (fourteen) days.    blood-glucose sensor (FREESTYLE SOPHY  3 SENSOR) Rima 1 Device by Misc.(Non-Drug; Combo Route) route every 14 (fourteen) days.    busPIRone (BUSPAR) 15 MG tablet Take 1 tablet (15 mg total) by mouth 2 (two) times daily as needed (Anxiety).    capsaicin (ZOSTRIX) 0.025 % cream Apply topically every 3 (three) months. (to both feet)    cyanocobalamin (VITAMIN B-12) 1000 MCG tablet Take 1 tablet (1,000 mcg total) by mouth once daily.    DULoxetine (CYMBALTA) 60 MG capsule Take 1 capsule (60 mg total) by mouth 2 (two) times daily.    flash glucose scanning reader (FREESTYLE SOPHY 14 DAY READER) Misc Use device to check blood glucose level 3 times daily.    fluticasone-umeclidin-vilanter (TRELEGY ELLIPTA) 100-62.5-25 mcg DsDv Inhale 1 puff into the lungs once daily.    hydrOXYzine pamoate (VISTARIL) 25 MG Cap Take 25 mg by mouth every 8 (eight) hours as needed (Itching).    lancets 33 gauge Misc To check BG 3 times daily, to use with insurance preferred meter    magnesium oxide 400 mg magnesium Tab Take 1 tablet by mouth every evening.    metFORMIN (GLUCOPHAGE-XR) 500 MG ER 24hr tablet Take 1 tablet (500 mg total) by mouth daily with breakfast.    methocarbamoL (ROBAXIN) 750 MG Tab Take 1 tablet (750 mg total) by mouth 3 (three) times daily as needed (Back pain).    nortriptyline (PAMELOR) 10 MG capsule Take 1 capsule (10 mg total) by mouth 3 (three) times daily.    ondansetron (ZOFRAN) 8 MG tablet Take 1 tablet (8 mg total) by mouth every 8 (eight) hours as needed for Nausea.    oxybutynin (DITROPAN-XL) 5 MG TR24 Take 1 tablet (5 mg total) by mouth once daily. (Patient not taking: Reported on 10/29/2024)    pantoprazole (PROTONIX) 40 MG tablet Take 1 tablet (40 mg total) by mouth once daily.    pregabalin (LYRICA) 150 MG capsule Take 1 capsule (150 mg total) by mouth 3 (three) times daily.    SITagliptin phosphate (JANUVIA) 100 MG Tab Take 1 tablet (100 mg total) by mouth once daily. (Patient not taking: Reported on 10/29/2024)    tamsulosin (FLOMAX) 0.4 mg  Cap Take 1 capsule (0.4 mg total) by mouth once daily. (Patient not taking: Reported on 10/29/2024)    traZODone (DESYREL) 100 MG tablet Take 2 tablets (200 mg total) by mouth nightly as needed for Insomnia.    TRUE METRIX GLUCOSE TEST STRIP Strp USE TO CHECK BLOOD SUGAR THREE TIMES DAILY OR AS DIRECTED    UNABLE TO FIND OMEGA XL CAP - Take 1 capsule by mouth once daily.     Antibiotics (From admission, onward)      Start     Stop Route Frequency Ordered    11/15/24 1200  meropenem 2 g in 0.9% NaCl 100 mL IVPB (MB+)         -- IV Every 12 hours (non-standard times) 11/15/24 0917          Antifungals (From admission, onward)      None          Antivirals (From admission, onward)      None             Immunization History   Administered Date(s) Administered    COVID-19, MRNA, LN-S, PF (Pfizer) (Gray Cap) 03/24/2022    COVID-19, MRNA, LN-S, PF (Pfizer) (Purple Cap) 12/18/2020, 12/18/2020, 01/08/2021, 01/08/2021, 08/17/2021, 03/24/2022, 12/13/2022    COVID-19, mRNA, LNP-S, bivalent booster, PF (PFIZER OMICRON) 12/13/2022    Influenza (FLUAD) - Quadrivalent - Adjuvanted - PF *Preferred* (65+) 09/28/2021, 09/15/2022    Influenza - Quadrivalent - PF *Preferred* (6 months and older) 09/28/2007    Influenza - Trivalent - Fluad - Adjuvanted - PF (65 years and older 10/07/2024    Influenza - Trivalent - Fluzone High Dose - PF (65 years and older) 06/30/2021    PPD Test 01/16/2024    Pneumococcal Conjugate - 20 Valent 07/21/2022    Tdap 12/02/2014    Vaccinia, smallpox monkeypox vaccine live, PF 09/15/2022    Zoster Recombinant 07/21/2021, 09/28/2021       Family History       Problem Relation (Age of Onset)    Colon cancer Paternal Grandfather (89)    Hypertension Mother          Social History     Socioeconomic History    Marital status:    Tobacco Use    Smoking status: Never    Smokeless tobacco: Never   Substance and Sexual Activity    Alcohol use: Yes     Comment: Former heavy use    Drug use: Never    Sexual  activity: Yes     Comment: unknown     Social Drivers of Health     Financial Resource Strain: Low Risk  (11/14/2024)    Overall Financial Resource Strain (CARDIA)     Difficulty of Paying Living Expenses: Not hard at all   Food Insecurity: No Food Insecurity (11/14/2024)    Hunger Vital Sign     Worried About Running Out of Food in the Last Year: Never true     Ran Out of Food in the Last Year: Never true   Transportation Needs: No Transportation Needs (11/14/2024)    TRANSPORTATION NEEDS     Transportation : No   Physical Activity: Sufficiently Active (11/14/2024)    Exercise Vital Sign     Days of Exercise per Week: 7 days     Minutes of Exercise per Session: 40 min   Recent Concern: Physical Activity - Inactive (10/8/2024)    Exercise Vital Sign     Days of Exercise per Week: 0 days     Minutes of Exercise per Session: 0 min   Stress: No Stress Concern Present (11/14/2024)    [employer] - Occupational Stress Questionnaire     Feeling of Stress : Only a little   Recent Concern: Stress - Stress Concern Present (10/22/2024)    [employer] - Occupational Stress Questionnaire     Feeling of Stress : To some extent   Housing Stability: Low Risk  (11/14/2024)    Housing Stability Vital Sign     Unable to Pay for Housing in the Last Year: No     Homeless in the Last Year: No     Review of Systems   Constitutional:  Negative for activity change, appetite change, chills, diaphoresis, fatigue and fever.   Respiratory:  Negative for chest tightness, shortness of breath and wheezing.    Cardiovascular:  Negative for chest pain, palpitations and leg swelling.   Gastrointestinal:  Negative for abdominal distention, abdominal pain, constipation, diarrhea, nausea and vomiting.   Genitourinary:  Negative for difficulty urinating, dysuria, frequency and urgency.   Musculoskeletal:  Positive for back pain. Negative for joint swelling.   Skin:  Negative for rash and wound.      Objective:     Vital Signs (Most Recent):  Temp: 97.4 °F (36.3 °C) (11/15/24 1513)  Pulse: 107 (11/15/24 1513)  Resp: 16 (11/15/24 1513)  BP: (!) 135/107 (11/15/24 1513)  SpO2: 99 % (11/15/24 1513) Vital Signs (24h Range):  Temp:  [97.4 °F (36.3 °C)-98.4 °F (36.9 °C)] 97.4 °F (36.3 °C)  Pulse:  [] 107  Resp:  [16-20] 16  SpO2:  [94 %-99 %] 99 %  BP: (101-143)/() 135/107     Weight: 88.3 kg (194 lb 10.7 oz)  Body mass index is 28.75 kg/m².    Estimated Creatinine Clearance: 31.9 mL/min (A) (based on SCr of 2.3 mg/dL (H)).     Physical Exam  Vitals and nursing note reviewed.   Constitutional:       General: He is not in acute distress.     Appearance: Normal appearance. He is not ill-appearing.   Cardiovascular:      Rate and Rhythm: Normal rate and regular rhythm.      Pulses: Normal pulses.      Heart sounds: Normal heart sounds. No murmur heard.  Pulmonary:      Effort: Pulmonary effort is normal. No respiratory distress.      Breath sounds: No wheezing.   Abdominal:      General: There is no distension.      Tenderness: There is no abdominal tenderness.   Musculoskeletal:         General: No swelling or tenderness.   Skin:     General: Skin is warm and dry.      Coloration: Skin is not pale.      Findings: No bruising.   Neurological:      Mental Status: He is alert.   Psychiatric:         Mood and Affect: Mood normal.         Behavior: Behavior normal.          Significant Labs: Blood Culture:   Recent Labs   Lab 10/21/24  1924 11/13/24  1823 11/13/24  1830 11/14/24  1717 11/14/24  1728   LABBLOO No growth after 5 days.  No growth after 5 days. No Growth to date  No Growth to date Gram stain aer bottle: Gram negative rods  Results called to and read back by:Leonel Pereyra RN 11/14/2024  12:34  ESCHERICHIA COLI  Susceptibility pending  * No Growth to date No Growth to date     Urine Culture:   Recent Labs   Lab 06/14/24  1032 09/26/24  1249 10/21/24  2146 11/13/24  1930   LABURIN PROTEUS  MIRABILIS  >100,000 cfu/ml  * PROTEUS MIRABILIS  >100,000 cfu/ml  No other significant isolate  * ESCHERICHIA COLI ESBL  50,000 - 99,999 cfu/ml  No other significant isolate  * GRAM NEGATIVE RAYMUNDO  >100,000 cfu/ml  Identification and susceptibility pending  *     Urine Studies:   Recent Labs   Lab 10/21/24  2146 11/13/24  1930   COLORU Orange* Colorless*   APPEARANCEUA Cloudy* Hazy*   PHUR 6.0 6.0   SPECGRAV 1.020 1.010   PROTEINUA 1+* Trace*   GLUCUA 1+* Negative   KETONESU Trace* Negative   BILIRUBINUA Negative Negative   OCCULTUA 3+* 3+*   NITRITE Negative Negative   LEUKOCYTESUR 3+* 3+*   RBCUA 38* >100*   WBCUA >100* >100*   BACTERIA Many* Many*   SQUAMEPITHEL 1 0   HYALINECASTS 0  --      All pertinent labs within the past 24 hours have been reviewed.    Significant Imaging: I have reviewed all pertinent imaging results/findings within the past 24 hours.

## 2024-11-15 NOTE — SUBJECTIVE & OBJECTIVE
Interval History: BC with ESBL, on elisa. Pending ID recs and placement.     Review of Systems   Constitutional:  Negative for activity change, chills and fatigue.   HENT:  Negative for congestion, postnasal drip, sinus pressure, sneezing and sore throat.    Respiratory:  Negative for cough, chest tightness, shortness of breath and wheezing.    Cardiovascular:  Negative for chest pain, palpitations and leg swelling.   Gastrointestinal:  Negative for abdominal pain, constipation, diarrhea, nausea and vomiting.   Genitourinary:  Negative for difficulty urinating, dysuria and flank pain.   Neurological:  Negative for tremors, syncope, speech difficulty, weakness, light-headedness and headaches.   Hematological:  Does not bruise/bleed easily.   Psychiatric/Behavioral:  Negative for agitation, behavioral problems and confusion.      Objective:     Vital Signs (Most Recent):  Temp: 98.4 °F (36.9 °C) (11/15/24 1102)  Pulse: 89 (11/15/24 1104)  Resp: 16 (11/15/24 1102)  BP: (!) 101/57 (11/15/24 1102)  SpO2: (!) 94 % (11/15/24 1102) Vital Signs (24h Range):  Temp:  [97.8 °F (36.6 °C)-98.4 °F (36.9 °C)] 98.4 °F (36.9 °C)  Pulse:  [77-99] 89  Resp:  [16-20] 16  SpO2:  [94 %-99 %] 94 %  BP: (101-143)/(57-73) 101/57     Weight: 88.3 kg (194 lb 10.7 oz)  Body mass index is 28.75 kg/m².    Intake/Output Summary (Last 24 hours) at 11/15/2024 1134  Last data filed at 11/14/2024 1746  Gross per 24 hour   Intake 618.21 ml   Output 300 ml   Net 318.21 ml         Physical Exam  Vitals reviewed.   Cardiovascular:      Rate and Rhythm: Normal rate and regular rhythm.      Pulses: Normal pulses.   Pulmonary:      Effort: Pulmonary effort is normal.   Abdominal:      General: Bowel sounds are normal. There is no distension.      Tenderness: There is no abdominal tenderness.   Skin:     General: Skin is warm.      Capillary Refill: Capillary refill takes less than 2 seconds.   Neurological:      General: No focal deficit present.      Mental  Status: He is alert and oriented to person, place, and time.             Significant Labs: All pertinent labs within the past 24 hours have been reviewed.    Significant Imaging: I have reviewed all pertinent imaging results/findings within the past 24 hours.

## 2024-11-15 NOTE — PT/OT/SLP PROGRESS
"Physical Therapy Treatment    Patient Name:  Anthony Ball   MRN:  [id number]    Recommendations:     Discharge Recommendations: High Intensity Therapy  Discharge Equipment Recommendations: none  Barriers to discharge: None    Assessment:     Anthony Ball is a 74 y.o. male admitted with a medical diagnosis of Acute cystitis.  He presents with the following impairments/functional limitations: weakness, impaired endurance, impaired self care skills, impaired functional mobility, gait instability, impaired balance. Pt agreeable for therapy, up in chair prior to treat, focused on gait and stair climbing, pt able to climb 5 and descent 5 steps w/CGA, returned to room    Rehab Prognosis: Good; patient would benefit from acute skilled PT services to address these deficits and reach maximum level of function.    Recent Surgery: * No surgery found *      Plan:     During this hospitalization, patient to be seen 4 x/week to address the identified rehab impairments via gait training, therapeutic activities, therapeutic exercises, neuromuscular re-education and progress toward the following goals:    Plan of Care Expires:  12/14/24    Subjective     Chief Complaint: no complaints  Patient/Family Comments/goals: "You have to unhook this thing"  Pain/Comfort:  Pain Rating 1: 0/10  Pain Rating Post-Intervention 1: 0/10      Objective:     Communicated with RN prior to session.  Patient found up in chair with telemetry, peripheral IV, PureWick upon PT entry to room.     General Precautions: Standard, fall  Orthopedic Precautions: N/A  Braces: N/A  Respiratory Status: Room air     Functional Mobility:    Transfers:   Sit <> Stand Transfer: stand by assistance from bedside chair using rolling walker     Balance:   Sitting balance: GOOD: Maintains balance through MODERATE excursions of active trunk movement  Standing balance:   FAIR+: Takes MINIMAL challenges from all directions  FAIR: Needs CONTACT GUARD during gait               "   Gait:  Distance: 50' x 2; 5 ascend/5 descend steps   Assistive Device: RW  Assistance Level: contact guard assistance  Gait Assessment: slow anjel, frequent cues for posture and to relax shoulders during gait, pt uses BUE on L hand rail to ascend and R handrail w/BUE to descend, cues to climb w/strong leg leading and to descend w/weak leg leading, step to gait pattern for ascent/descent of stairs, step through steps during flat land gait             AM-PAC 6 CLICK MOBILITY  Turning over in bed (including adjusting bedclothes, sheets and blankets)?: 3  Sitting down on and standing up from a chair with arms (e.g., wheelchair, bedside commode, etc.): 3  Moving from lying on back to sitting on the side of the bed?: 3  Moving to and from a bed to a chair (including a wheelchair)?: 3  Need to walk in hospital room?: 3  Climbing 3-5 steps with a railing?: 3  Basic Mobility Total Score: 18       Treatment & Education:  Education:  PT role and PoC to increase strength  Safe gait technique    Patient left up in chair with all lines intact, call button in reach, and RN notified..    GOALS:   Multidisciplinary Problems       Physical Therapy Goals          Problem: Physical Therapy    Goal Priority Disciplines Outcome Interventions   Physical Therapy Goal     PT, PT/OT Progressing    Description: Goals to be met by: 2024     Patient will increase functional independence with mobility by performin. Supine to sit with Spencer  2. Sit to supine with Spencer  3. Sit to stand transfer with Supervision  4. Gait  x 200 feet with Supervision using LRAD.   5. Ascend/descend 5 stair with left Handrails Minimal Assistance using LRAD.   6. Lower extremity exercise program x15 reps per handout, with independence                         Time Tracking:     PT Received On: 11/15/24  PT Start Time: 915     PT Stop Time: 947  PT Total Time (min): 32 min     Billable Minutes: Gait Training 32min    Treatment Type:  Treatment  PT/PTA: PTA     Number of PTA visits since last PT visit: 1     11/15/2024

## 2024-11-15 NOTE — CONSULTS
Inpatient consult to Physical Medicine Rehab  Consult performed by: Winifred Mckeon NP  Consult ordered by: Manolo Nguyen MD  Reason for consult: Rehab      Inpatient consult to Physical Medicine Rehab  Consult performed by: Winifred Mckeon NP  Consult ordered by: Rosanna Kim MD  Reason for consult: Rehab      Consult received.     CECILIO Ramsay, FNP-C  Physical Medicine & Rehabilitation   11/15/2024

## 2024-11-15 NOTE — CARE UPDATE
I have reviewed the chart of Anthony Ball who is hospitalized for the following:    Active Hospital Problems    Diagnosis    *Acute cystitis    Bacteremia    Hypercoagulable state due to atrial fibrillation     On eliquis      Chronic anticoagulation     On eliquis      History of pulmonary embolus (PE)    Nephrolithiasis    Paroxysmal A-fib    Alcohol abuse    Thrombocytopenia    Right ureteral stone    Normocytic anemia    Asthma    ALYSIA (acute kidney injury)    Type 2 diabetes mellitus with neurologic complication, without long-term current use of insulin    Recurrent falls    Primary insomnia    Gastroesophageal reflux disease without esophagitis    Essential hypertension    Diabetic polyneuropathy associated with type 2 diabetes mellitus        Alta George NP  Unit Based DEREK

## 2024-11-15 NOTE — SUBJECTIVE & OBJECTIVE
Past Medical History:   Diagnosis Date    Acute deep vein thrombosis (DVT) of left lower extremity 12/2023    Anxiety     Atrial fibrillation 12/2023    Cataract     Coronary artery disease     CAC score 1430    Depression     Diabetic neuropathy     Essential (primary) hypertension     GERD (gastroesophageal reflux disease)     History of bariatric surgery 07/08/2021    History of total right knee replacement 07/08/2021    Insomnia     Neuromyopathy     Possibly DM and/or alcohol related.    Pulmonary embolism 12/2023    Reactive airway disease     Type 2 diabetes mellitus        Past Surgical History:   Procedure Laterality Date    CATARACT EXTRACTION W/  INTRAOCULAR LENS IMPLANT Right 7/15/2024    Procedure: EXTRACTION, CATARACT, WITH IOL INSERTION;  Surgeon: Margot Jacobson MD;  Location: [facility] OR;  Service: Ophthalmology;  Laterality: Right;    CATARACT EXTRACTION W/  INTRAOCULAR LENS IMPLANT Left 8/29/2024    Procedure: EXTRACTION, CATARACT, WITH IOL INSERTION;  Surgeon: Margot Jacobson MD;  Location: [facility] OR;  Service: Ophthalmology;  Laterality: Left;    COLONOSCOPY      Normal around 2020    COLONOSCOPY N/A 9/6/2024    Procedure: COLONOSCOPY;  Surgeon: Alessandro Serna MD;  Location: [facility] (4TH FLR);  Service: Endoscopy;  Laterality: N/A;  8/28-new case created-lvm for pre call-pt has cataract surgery 8/29/24-tb  ref-dr rico goldstein-[place]  ok to hold elivickie mukherjeeGT  9/5-LVM for precall-Kpvt    COLONOSCOPY N/A 9/6/2024    Procedure: COLONOSCOPY;  Surgeon: Alessandro Serna MD;  Location: [facility] (4TH FLR);  Service: Endoscopy;  Laterality: N/A;  + cologuard  8/8 ref by Isaias Goldstein MD, [facility], instr. to portal, Eliquis hold approval pending-  ok to hold Eliquis 2 days per Dr Goldstein-WALLACE    CYSTOSCOPY WITH URETEROSCOPY, RETROGRADE PYELOGRAPHY, AND INSERTION OF STENT Right 9/27/2024    Procedure: CYSTOSCOPY, WITH RETROGRADE PYELOGRAM AND URETERAL STENT INSERTION;  Surgeon: Joni Wagoner MD;   Location: [facility] OR [address] [place] [address];  Service: Urology;  Laterality: Right;    REMOVAL, CALCULUS, BLADDER N/A 9/27/2024    Procedure: REMOVAL, CALCULUS, BLADDER;  Surgeon: Joni Wagoner MD;  Location: [facility] OR [address] [place] [address];  Service: Urology;  Laterality: N/A;    TOTAL KNEE ARTHROPLASTY Right        Review of patient's allergies indicates:  No Known Allergies    Medications:  Medications Prior to Admission   Medication Sig    albuterol (VENTOLIN HFA) 90 mcg/actuation inhaler Inhale 2 puffs into the lungs every 6 (six) hours as needed for Wheezing. Rescue    apixaban (ELIQUIS) 5 mg Tab TAKE 1 TABLET(5 MG) BY MOUTH TWICE DAILY    betamethasone valerate 0.1% (VALISONE) 0.1 % Crea Apply topically once daily. Apply to inner foreskin one daily for 14 days for 14 days (Patient not taking: Reported on 10/29/2024)    blood-glucose meter Misc To check BG 3 times daily, to use with insurance preferred meter    blood-glucose meter,continuous (DEXCOM ) Misc Check blood glucose level 3 times daily. (Patient not taking: Reported on 10/29/2024)    blood-glucose sensor (FREESTYLE SOPHY 3 PLUS SENSOR) Rima 1 Device by Misc.(Non-Drug; Combo Route) route every 14 (fourteen) days.    blood-glucose sensor (FREESTYLE SOPHY 3 SENSOR) Rima 1 Device by Misc.(Non-Drug; Combo Route) route every 14 (fourteen) days.    busPIRone (BUSPAR) 15 MG tablet Take 1 tablet (15 mg total) by mouth 2 (two) times daily as needed (Anxiety).    capsaicin (ZOSTRIX) 0.025 % cream Apply topically every 3 (three) months. (to both feet)    cyanocobalamin (VITAMIN B-12) 1000 MCG tablet Take 1 tablet (1,000 mcg total) by mouth once daily.    DULoxetine (CYMBALTA) 60 MG capsule Take 1 capsule (60 mg total) by mouth 2 (two) times daily.    flash glucose scanning reader (FREESTYLE SOPHY 14 DAY READER) Misc Use device to check blood glucose level 3 times daily.    fluticasone-umeclidin-vilanter (TRELEGY ELLIPTA) 100-62.5-25 mcg DsDv Inhale 1 puff into the lungs once daily.     hydrOXYzine pamoate (VISTARIL) 25 MG Cap Take 25 mg by mouth every 8 (eight) hours as needed (Itching).    lancets 33 gauge Misc To check BG 3 times daily, to use with insurance preferred meter    magnesium oxide 400 mg magnesium Tab Take 1 tablet by mouth every evening.    metFORMIN (GLUCOPHAGE-XR) 500 MG ER 24hr tablet Take 1 tablet (500 mg total) by mouth daily with breakfast.    methocarbamoL (ROBAXIN) 750 MG Tab Take 1 tablet (750 mg total) by mouth 3 (three) times daily as needed (Back pain).    nortriptyline (PAMELOR) 10 MG capsule Take 1 capsule (10 mg total) by mouth 3 (three) times daily.    ondansetron (ZOFRAN) 8 MG tablet Take 1 tablet (8 mg total) by mouth every 8 (eight) hours as needed for Nausea.    oxybutynin (DITROPAN-XL) 5 MG TR24 Take 1 tablet (5 mg total) by mouth once daily. (Patient not taking: Reported on 10/29/2024)    pantoprazole (PROTONIX) 40 MG tablet Take 1 tablet (40 mg total) by mouth once daily.    pregabalin (LYRICA) 150 MG capsule Take 1 capsule (150 mg total) by mouth 3 (three) times daily.    SITagliptin phosphate (JANUVIA) 100 MG Tab Take 1 tablet (100 mg total) by mouth once daily. (Patient not taking: Reported on 10/29/2024)    tamsulosin (FLOMAX) 0.4 mg Cap Take 1 capsule (0.4 mg total) by mouth once daily. (Patient not taking: Reported on 10/29/2024)    traZODone (DESYREL) 100 MG tablet Take 2 tablets (200 mg total) by mouth nightly as needed for Insomnia.    TRUE METRIX GLUCOSE TEST STRIP Strp USE TO CHECK BLOOD SUGAR THREE TIMES DAILY OR AS DIRECTED    UNABLE TO FIND OMEGA XL CAP - Take 1 capsule by mouth once daily.     Antibiotics (From admission, onward)      Start     Stop Route Frequency Ordered    11/15/24 1200  meropenem 2 g in 0.9% NaCl 100 mL IVPB (MB+)         -- IV Every 12 hours (non-standard times) 11/15/24 0917          Antifungals (From admission, onward)      None          Antivirals (From admission, onward)      None             Immunization History    Administered Date(s) Administered    COVID-19, MRNA, LN-S, PF (Pfizer) (Gray Cap) 03/24/2022    COVID-19, MRNA, LN-S, PF (Pfizer) (Purple Cap) 12/18/2020, 12/18/2020, 01/08/2021, 01/08/2021, 08/17/2021, 03/24/2022, 12/13/2022    COVID-19, mRNA, LNP-S, bivalent booster, PF (PFIZER OMICRON) 12/13/2022    Influenza (FLUAD) - Quadrivalent - Adjuvanted - PF *Preferred* (65+) 09/28/2021, 09/15/2022    Influenza - Quadrivalent - PF *Preferred* (6 months and older) 09/28/2007    Influenza - Trivalent - Fluad - Adjuvanted - PF (65 years and older 10/07/2024    Influenza - Trivalent - Fluzone High Dose - PF (65 years and older) 06/30/2021    PPD Test 01/16/2024    Pneumococcal Conjugate - 20 Valent 07/21/2022    Tdap 12/02/2014    Vaccinia, smallpox monkeypox vaccine live, PF 09/15/2022    Zoster Recombinant 07/21/2021, 09/28/2021       Family History       Problem Relation (Age of Onset)    Colon cancer Paternal Grandfather (89)    Hypertension Mother          Social History     Socioeconomic History    Marital status:    Tobacco Use    Smoking status: Never    Smokeless tobacco: Never   Substance and Sexual Activity    Alcohol use: Yes     Comment: Former heavy use    Drug use: Never    Sexual activity: Yes     Comment: unknown     Social Drivers of Health     Financial Resource Strain: Low Risk  (11/14/2024)    Overall Financial Resource Strain (CARDIA)     Difficulty of Paying Living Expenses: Not hard at all   Food Insecurity: No Food Insecurity (11/14/2024)    Hunger Vital Sign     Worried About Running Out of Food in the Last Year: Never true     Ran Out of Food in the Last Year: Never true   Transportation Needs: No Transportation Needs (11/14/2024)    TRANSPORTATION NEEDS     Transportation : No   Physical Activity: Sufficiently Active (11/14/2024)    Exercise Vital Sign     Days of Exercise per Week: 7 days     Minutes of Exercise per Session: 40 min   Recent Concern: Physical Activity - Inactive  (10/8/2024)    Exercise Vital Sign     Days of Exercise per Week: 0 days     Minutes of Exercise per Session: 0 min   Stress: No Stress Concern Present (11/14/2024)    [facility] of Occupational Health - Occupational Stress Questionnaire     Feeling of Stress : Only a little   Recent Concern: Stress - Stress Concern Present (10/22/2024)    [facility] of Occupational Health - Occupational Stress Questionnaire     Feeling of Stress : To some extent   Housing Stability: Low Risk  (11/14/2024)    Housing Stability Vital Sign     Unable to Pay for Housing in the Last Year: No     Homeless in the Last Year: No     Review of Systems   Constitutional:  Negative for activity change, appetite change, chills, diaphoresis, fatigue and fever.   Respiratory:  Negative for chest tightness, shortness of breath and wheezing.    Cardiovascular:  Negative for chest pain, palpitations and leg swelling.   Gastrointestinal:  Negative for abdominal distention, abdominal pain, constipation, diarrhea, nausea and vomiting.   Genitourinary:  Negative for difficulty urinating, dysuria, frequency and urgency.   Musculoskeletal:  Positive for back pain. Negative for joint swelling.   Skin:  Negative for rash and wound.     Objective:     Vital Signs (Most Recent):  Temp: 97.4 °F (36.3 °C) (11/15/24 1513)  Pulse: 107 (11/15/24 1513)  Resp: 16 (11/15/24 1513)  BP: (!) 135/107 (11/15/24 1513)  SpO2: 99 % (11/15/24 1513) Vital Signs (24h Range):  Temp:  [97.4 °F (36.3 °C)-98.4 °F (36.9 °C)] 97.4 °F (36.3 °C)  Pulse:  [] 107  Resp:  [16-20] 16  SpO2:  [94 %-99 %] 99 %  BP: (101-143)/() 135/107     Weight: 88.3 kg (194 lb 10.7 oz)  Body mass index is 28.75 kg/m².    Estimated Creatinine Clearance: 31.9 mL/min (A) (based on SCr of 2.3 mg/dL (H)).     Physical Exam  Vitals and nursing note reviewed.   Constitutional:       General: He is not in acute distress.     Appearance: Normal appearance. He is not ill-appearing.    Cardiovascular:      Rate and Rhythm: Normal rate and regular rhythm.      Pulses: Normal pulses.      Heart sounds: Normal heart sounds. No murmur heard.  Pulmonary:      Effort: Pulmonary effort is normal. No respiratory distress.      Breath sounds: No wheezing.   Abdominal:      General: There is no distension.      Tenderness: There is no abdominal tenderness.   Musculoskeletal:         General: No swelling or tenderness.   Skin:     General: Skin is warm and dry.      Coloration: Skin is not pale.      Findings: No bruising.   Neurological:      Mental Status: He is alert.   Psychiatric:         Mood and Affect: Mood normal.         Behavior: Behavior normal.          Significant Labs: Blood Culture:   Recent Labs   Lab 10/21/24  1924 11/13/24  1823 11/13/24  1830 11/14/24  1717 11/14/24  1728   LABBLOO No growth after 5 days.  No growth after 5 days. No Growth to date  No Growth to date Gram stain aer bottle: Gram negative rods  Results called to and read back by:Leonel Pereyra RN 11/14/2024  12:34  ESCHERICHIA COLI  Susceptibility pending  * No Growth to date No Growth to date     Urine Culture:   Recent Labs   Lab 06/14/24  1032 09/26/24  1249 10/21/24  2146 11/13/24  1930   LABURIN PROTEUS MIRABILIS  >100,000 cfu/ml  * PROTEUS MIRABILIS  >100,000 cfu/ml  No other significant isolate  * ESCHERICHIA COLI ESBL  50,000 - 99,999 cfu/ml  No other significant isolate  * GRAM NEGATIVE RAYMUNDO  >100,000 cfu/ml  Identification and susceptibility pending  *     Urine Studies:   Recent Labs   Lab 10/21/24  2146 11/13/24  1930   COLORU Orange* Colorless*   APPEARANCEUA Cloudy* Hazy*   PHUR 6.0 6.0   SPECGRAV 1.020 1.010   PROTEINUA 1+* Trace*   GLUCUA 1+* Negative   KETONESU Trace* Negative   BILIRUBINUA Negative Negative   OCCULTUA 3+* 3+*   NITRITE Negative Negative   LEUKOCYTESUR 3+* 3+*   RBCUA 38* >100*   WBCUA >100* >100*   BACTERIA Many* Many*   SQUAMEPITHEL 1 0   HYALINECASTS 0  --      All pertinent labs  within the past 24 hours have been reviewed.    Significant Imaging: I have reviewed all pertinent imaging results/findings within the past 24 hours.

## 2024-11-15 NOTE — PT/OT/SLP PROGRESS
Occupational Therapy   Treatment    Name: Anthony Ball  MRN: [id number]  Admitting Diagnosis:  Acute cystitis       Recommendations:     Discharge Recommendations: High Intensity Therapy  Discharge Equipment Recommendations:  none  Barriers to discharge:  None    Assessment:     Anthony Ball is a 74 y.o. male with a medical diagnosis of Acute cystitis.  He presents with improved safety awareness compared to previous session. Increased difficulty with toilet transfer today. Good participation and motivation with therapeutic exercises. Performance deficits affecting function are impaired endurance, weakness, impaired self care skills, impaired functional mobility, gait instability, decreased lower extremity function, decreased upper extremity function, impaired balance, decreased safety awareness, impaired cognition. Patient has demonstrated sufficient progression to warrant high intensity therapy evidenced by objectives noted below.     Rehab Prognosis:  Good; patient would benefit from acute skilled OT services to address these deficits and reach maximum level of function.       Plan:     Patient to be seen 4 x/week to address the above listed problems via self-care/home management, therapeutic activities, therapeutic exercises, neuromuscular re-education  Plan of Care Expires: 11/28/24  Plan of Care Reviewed with: patient    Subjective     Chief Complaint: need for bowel movement  Patient/Family Comments/goals: get better  Pain/Comfort:  Pain Rating 1: 0/10  Pain Rating Post-Intervention 1: 0/10    Objective:     Communicated with: nursing prior to session.  Patient found up in chair with telemetry, peripheral IV, PureWick upon OT entry to room.    General Precautions: Standard, fall    Orthopedic Precautions:N/A  Braces: N/A  Respiratory Status: Room air     Occupational Performance:     Functional Mobility/Transfers:  Patient completed Sit <> Stand Transfer with contact guard assistance  with  rolling walker   Patient  completed Bed <> Chair Transfer using Step Transfer technique with contact guard assistance with rolling walker  Patient completed Toilet Transfer Step Transfer technique with moderate assistance with  rolling walker  Functional Mobility: patient ambulated to/from bathroom with RW and CGA    Activities of Daily Living:  Toileting: moderate assistance for romero care      [facility] 6 Click ADL: 17    Treatment & Education:  Patient completed x10 modified sit ups at edge of bed to encourage and strengthen postural control and midline positioning.    Patient completed x10 standing marches with RW and CGA for balance to simulate tub transfers and encourage dynamic balance necessary for ADLs/IADLs.   Patient completed x10 scapular retractions.     Patient educated on:   -purpose of OT and OT POC  -facilitation and education on proper body mechanics, energy conservation, and safety  -importance of early mobility and out of bed activities with staff assist  -overall benefits of therapy     All questions answered within OT scope and to patient's satisfaction    Patient left up in chair with all lines intact, call button in reach, and chair alarm on    GOALS:   Multidisciplinary Problems       Occupational Therapy Goals          Problem: Occupational Therapy    Goal Priority Disciplines Outcome Interventions   Occupational Therapy Goal     OT, PT/OT Progressing    Description: Goals to be met by: 11/28/24     Patient will increase functional independence with ADLs by performing:    UE Dressing with Supervision.  LE Dressing with Supervision.  Grooming while standing at sink with Supervision.  Toileting from toilet with Supervision for hygiene and clothing management.   Sitting at edge of bed x5 minutes with Supervision.  Supine to sit with Supervision.  Step transfer with Supervision  Toilet transfer to toilet with Supervision.                         Time Tracking:     OT Date of Treatment: 11/15/24  OT Start Time: 1007  OT Stop  Time: 1036  OT Total Time (min): 29 min    Billable Minutes:Self Care/Home Management 19  Therapeutic Exercise 10    OT/TOM: OT          11/15/2024

## 2024-11-15 NOTE — ASSESSMENT & PLAN NOTE
I have reviewed hospital notes from HM service and other specialty providers. I have also reviewed CBC, CMP/BMP, cultures and imaging with my interpretation as documented.      74-year-old male PMHx of DM2, HTN, CAD, PE, afib on eliquis, and recurrent UTI's now presenting with flank/back pain, foul-smelling urine, and hallucinations. Admitted for management of recurrent ESBL UTI with renal stones and right sided double J ureteral stents. Urology consulted and plans to f/u with pt for outpt stone treatment      11/13/24: u/a (WBC > 100, RBC > 100, many bacteria), urine & blood cultures +GNR pending susceptibility, on Meropenem (dose increased on 11/15 to 2g q12 hrs)     Recommendations / Plan  Continue Meropenem 2 g q12 hrs  F/u suspectibilities and repeat cultures results  Will likely need ~48 hrs post urology procedure of antibiotics     -- Discussed with ID staff and primary team  -- ID will continue to follow for further recs

## 2024-11-15 NOTE — PLAN OF CARE
Problem: Adult Inpatient Plan of Care  Goal: Readiness for Transition of Care  Outcome: Progressing     Problem: Adult Inpatient Plan of Care  Goal: Optimal Comfort and Wellbeing  Outcome: Progressing   Plan of care discussed. Addressed questions and concerns.  Will cont to care.

## 2024-11-15 NOTE — ASSESSMENT & PLAN NOTE
The likely etiology of thrombocytopenia is infection. The patients 3 most recent labs are listed below.  Recent Labs     11/13/24  1823 11/14/24  0442 11/15/24  0419   * 148* 119*       Plan  - Will transfuse if platelet count is <50k (if undergoing surgical procedure or have active bleeding).

## 2024-11-15 NOTE — ASSESSMENT & PLAN NOTE
ALYSIA is likely due to pre-renal azotemia due to dehydration. Baseline creatinine is  1.2-1.3 . Most recent creatinine and eGFR are listed below.  Recent Labs     11/13/24  1823 11/14/24  0618 11/15/24  0419   CREATININE 2.7* 2.5* 2.3*   EGFRNORACEVR 24.3* 26.6* 29.4*        Plan  - Avoid nephrotoxins and renally dose meds for GFR listed above  - Monitor urine output, serial BMP, and adjust therapy as needed  - Patient is in need of IV fluids for the treatment of ALYSIA.  Due to a shortage of IV fluids, patient to receive 1L LR bolus.  Patient must receive this treatment in a hospital location due to the risk of adverse effects, insufficient response to treatment, or other potential complications of disease such as kidney failure.

## 2024-11-16 LAB
ALBUMIN SERPL BCP-MCNC: 2.5 G/DL (ref 3.5–5.2)
ALP SERPL-CCNC: 73 U/L (ref 40–150)
ALT SERPL W/O P-5'-P-CCNC: 8 U/L (ref 10–44)
ANION GAP SERPL CALC-SCNC: 11 MMOL/L (ref 8–16)
AST SERPL-CCNC: 10 U/L (ref 10–40)
BACTERIA BLD CULT: ABNORMAL
BACTERIA UR CULT: ABNORMAL
BASOPHILS # BLD AUTO: 0.03 K/UL (ref 0–0.2)
BASOPHILS NFR BLD: 0.5 % (ref 0–1.9)
BILIRUB SERPL-MCNC: 0.3 MG/DL (ref 0.1–1)
BUN SERPL-MCNC: 31 MG/DL (ref 8–23)
CALCIUM SERPL-MCNC: 8.3 MG/DL (ref 8.7–10.5)
CHLORIDE SERPL-SCNC: 108 MMOL/L (ref 95–110)
CO2 SERPL-SCNC: 20 MMOL/L (ref 23–29)
CREAT SERPL-MCNC: 2.1 MG/DL (ref 0.5–1.4)
DIFFERENTIAL METHOD BLD: ABNORMAL
EOSINOPHIL # BLD AUTO: 0.4 K/UL (ref 0–0.5)
EOSINOPHIL NFR BLD: 6.8 % (ref 0–8)
ERYTHROCYTE [DISTWIDTH] IN BLOOD BY AUTOMATED COUNT: 14.3 % (ref 11.5–14.5)
EST. GFR  (NO RACE VARIABLE): 32.8 ML/MIN/1.73 M^2
GLUCOSE SERPL-MCNC: 178 MG/DL (ref 70–110)
HCT VFR BLD AUTO: 28.9 % (ref 40–54)
HGB BLD-MCNC: 9.5 G/DL (ref 14–18)
IMM GRANULOCYTES # BLD AUTO: 0.05 K/UL (ref 0–0.04)
IMM GRANULOCYTES NFR BLD AUTO: 0.8 % (ref 0–0.5)
LYMPHOCYTES # BLD AUTO: 1.1 K/UL (ref 1–4.8)
LYMPHOCYTES NFR BLD: 17.7 % (ref 18–48)
MAGNESIUM SERPL-MCNC: 1.6 MG/DL (ref 1.6–2.6)
MCH RBC QN AUTO: 29.4 PG (ref 27–31)
MCHC RBC AUTO-ENTMCNC: 32.9 G/DL (ref 32–36)
MCV RBC AUTO: 90 FL (ref 82–98)
MONOCYTES # BLD AUTO: 0.4 K/UL (ref 0.3–1)
MONOCYTES NFR BLD: 7.2 % (ref 4–15)
NEUTROPHILS # BLD AUTO: 4.1 K/UL (ref 1.8–7.7)
NEUTROPHILS NFR BLD: 67 % (ref 38–73)
NRBC BLD-RTO: 0 /100 WBC
PLATELET # BLD AUTO: 133 K/UL (ref 150–450)
PMV BLD AUTO: 12 FL (ref 9.2–12.9)
POCT GLUCOSE: 189 MG/DL (ref 70–110)
POCT GLUCOSE: 228 MG/DL (ref 70–110)
POTASSIUM SERPL-SCNC: 4.7 MMOL/L (ref 3.5–5.1)
PROT SERPL-MCNC: 6.4 G/DL (ref 6–8.4)
RBC # BLD AUTO: 3.23 M/UL (ref 4.6–6.2)
SODIUM SERPL-SCNC: 139 MMOL/L (ref 136–145)
WBC # BLD AUTO: 6.15 K/UL (ref 3.9–12.7)

## 2024-11-16 PROCEDURE — 80053 COMPREHEN METABOLIC PANEL: CPT | Performed by: NURSE PRACTITIONER

## 2024-11-16 PROCEDURE — 85025 COMPLETE CBC W/AUTO DIFF WBC: CPT | Performed by: NURSE PRACTITIONER

## 2024-11-16 PROCEDURE — 83735 ASSAY OF MAGNESIUM: CPT | Performed by: NURSE PRACTITIONER

## 2024-11-16 PROCEDURE — 25000003 PHARM REV CODE 250

## 2024-11-16 PROCEDURE — 25000003 PHARM REV CODE 250: Performed by: INTERNAL MEDICINE

## 2024-11-16 PROCEDURE — 63600175 PHARM REV CODE 636 W HCPCS: Performed by: STUDENT IN AN ORGANIZED HEALTH CARE EDUCATION/TRAINING PROGRAM

## 2024-11-16 PROCEDURE — 11000001 HC ACUTE MED/SURG PRIVATE ROOM

## 2024-11-16 PROCEDURE — 63600175 PHARM REV CODE 636 W HCPCS: Performed by: NURSE PRACTITIONER

## 2024-11-16 PROCEDURE — 63600175 PHARM REV CODE 636 W HCPCS: Performed by: INTERNAL MEDICINE

## 2024-11-16 PROCEDURE — 99233 SBSQ HOSP IP/OBS HIGH 50: CPT | Mod: GC,,, | Performed by: INTERNAL MEDICINE

## 2024-11-16 PROCEDURE — 25000003 PHARM REV CODE 250: Performed by: NURSE PRACTITIONER

## 2024-11-16 PROCEDURE — 36415 COLL VENOUS BLD VENIPUNCTURE: CPT | Performed by: NURSE PRACTITIONER

## 2024-11-16 PROCEDURE — 27000207 HC ISOLATION

## 2024-11-16 PROCEDURE — 25000003 PHARM REV CODE 250: Performed by: STUDENT IN AN ORGANIZED HEALTH CARE EDUCATION/TRAINING PROGRAM

## 2024-11-16 RX ORDER — HYDROXYZINE PAMOATE 25 MG/1
50 CAPSULE ORAL EVERY 8 HOURS PRN
Status: DISCONTINUED | OUTPATIENT
Start: 2024-11-16 | End: 2024-11-18 | Stop reason: HOSPADM

## 2024-11-16 RX ADMIN — PREGABALIN 50 MG: 50 CAPSULE ORAL at 08:11

## 2024-11-16 RX ADMIN — TAMSULOSIN HYDROCHLORIDE 0.4 MG: 0.4 CAPSULE ORAL at 08:11

## 2024-11-16 RX ADMIN — FOLIC ACID 1 MG: 1 TABLET ORAL at 08:11

## 2024-11-16 RX ADMIN — Medication 400 MG: at 08:11

## 2024-11-16 RX ADMIN — DULOXETINE HYDROCHLORIDE 30 MG: 30 CAPSULE, DELAYED RELEASE ORAL at 08:11

## 2024-11-16 RX ADMIN — PREGABALIN 50 MG: 50 CAPSULE ORAL at 02:11

## 2024-11-16 RX ADMIN — MEROPENEM 2 G: 2 INJECTION, POWDER, FOR SOLUTION INTRAVENOUS at 12:11

## 2024-11-16 RX ADMIN — HYDROXYZINE PAMOATE 50 MG: 25 CAPSULE ORAL at 08:11

## 2024-11-16 RX ADMIN — Medication 100 MG: at 08:11

## 2024-11-16 RX ADMIN — SODIUM CHLORIDE 1 G: 9 INJECTION, SOLUTION INTRAVENOUS at 01:11

## 2024-11-16 RX ADMIN — THERA TABS 1 TABLET: TAB at 08:11

## 2024-11-16 RX ADMIN — METHOCARBAMOL 500 MG: 500 TABLET ORAL at 08:11

## 2024-11-16 RX ADMIN — APIXABAN 5 MG: 5 TABLET, FILM COATED ORAL at 08:11

## 2024-11-16 RX ADMIN — CYANOCOBALAMIN TAB 1000 MCG 1000 MCG: 1000 TAB at 08:11

## 2024-11-16 RX ADMIN — INSULIN ASPART 2 UNITS: 100 INJECTION, SOLUTION INTRAVENOUS; SUBCUTANEOUS at 08:11

## 2024-11-16 RX ADMIN — HYDROXYZINE PAMOATE 50 MG: 25 CAPSULE ORAL at 09:11

## 2024-11-16 RX ADMIN — PANTOPRAZOLE SODIUM 40 MG: 20 TABLET, DELAYED RELEASE ORAL at 08:11

## 2024-11-16 NOTE — CONSULTS
[facility])  Physical Medicine & Rehab  Consult Note    Patient Name: Anthony Ball  MRN: [id number]  Admission Date: 11/13/2024  Hospital Length of Stay: 3 days  Attending Physician: Rosanna Kim MD     Inpatient consult to Physical Medicine & Rehabilitation  Consult performed by: Winifred Mckeon NP  Consult requested by:  Rosanna Kim MD    Collaborating Physician: Lyubov Yu MD  Reason for Consult:  Assess rehabilitation needs      Consults  Subjective:     Principal Problem: Acute cystitis    HPI: Anthony Ball is a 74-year-old male with PMHx of DM2, HTN, CAD, PE, Afib on eliquis, and recurrent UTI's. Underwent R ureteral stent placement on 9/27/24 outpatient for distal R ureteral stones in the setting of urosepsis and bacteremia. Patient now presented to [facility] on 11/13/24 for back pain, foul-smelling urine, and hallucinations. Admitted for recurrent ESBL UTI with renal stones and right sided double J ureteral stents. Urology consulted and plans to f/u outpt stone treatment. ID consulted and recommending continue Meropenem. ID plans to  follow up on susceptibilities and repeat cultures results and will likely need 48 hrs post urology procedure of antibiotics. Blood cultures also growing growing negative rods, repeats no growth to date. ALYSIA improving.     Functional History: Patient lives with partner in a single story home with 5 steps to enter.  Prior to admission, [facility]. DME: RW and SPC.     Hospital Course:   11/15/24: Participated w/ OT. Patient completed Toilet Transfer Step Transfer technique with moderate assistance with  rolling walker. Functional Mobility: patient ambulated to/from bathroom with RW and CGA    Past Medical History:   Diagnosis Date    Acute deep vein thrombosis (DVT) of left lower extremity 12/2023    Anxiety     Atrial fibrillation 12/2023    Cataract     Coronary artery disease     CAC score 1430    Depression     Diabetic neuropathy     Essential (primary)  hypertension     GERD (gastroesophageal reflux disease)     History of bariatric surgery 07/08/2021    History of total right knee replacement 07/08/2021    Insomnia     Neuromyopathy     Possibly DM and/or alcohol related.    Pulmonary embolism 12/2023    Reactive airway disease     Type 2 diabetes mellitus      Past Surgical History:   Procedure Laterality Date    CATARACT EXTRACTION W/  INTRAOCULAR LENS IMPLANT Right 7/15/2024    Procedure: EXTRACTION, CATARACT, WITH IOL INSERTION;  Surgeon: Margot Jacobson MD;  Location: Counts include 234 beds at the [facility] OR;  Service: Ophthalmology;  Laterality: Right;    CATARACT EXTRACTION W/  INTRAOCULAR LENS IMPLANT Left 8/29/2024    Procedure: EXTRACTION, CATARACT, WITH IOL INSERTION;  Surgeon: Margot Jacobson MD;  Location: Counts include 234 beds at the [facility] OR;  Service: Ophthalmology;  Laterality: Left;    COLONOSCOPY      Normal around 2020    COLONOSCOPY N/A 9/6/2024    Procedure: COLONOSCOPY;  Surgeon: Alessandro Serna MD;  Location: [facility] (4TH FLR);  Service: Endoscopy;  Laterality: N/A;  8/28-new case created-lvm for pre call-pt has cataract surgery 8/29/24-tb  ref-dr rico goldstein-peg-[facility] to hold elivickie goldstein-GT  9/5-LVM for precall-Kpvt    COLONOSCOPY N/A 9/6/2024    Procedure: COLONOSCOPY;  Surgeon: Alessandro Serna MD;  Location: [facility] ENDO (4TH FLR);  Service: Endoscopy;  Laterality: N/A;  + cologuard  8/8 ref by Isaias Goldstein MD, [facility], instr. to portal, Eliquis hold approval pending-[facility] to hold Eliquis 2 days per Dr Goldstein-GT    CYSTOSCOPY WITH URETEROSCOPY, RETROGRADE PYELOGRAPHY, AND INSERTION OF STENT Right 9/27/2024    Procedure: CYSTOSCOPY, WITH RETROGRADE PYELOGRAM AND URETERAL STENT INSERTION;  Surgeon: Joni Wagoner MD;  Location: [facility] OR 1ST FLR;  Service: Urology;  Laterality: Right;    REMOVAL, CALCULUS, BLADDER N/A 9/27/2024    Procedure: REMOVAL, CALCULUS, BLADDER;  Surgeon: Joni Wagoner MD;  Location: [facility] OR 1ST FLR;  Service: Urology;  Laterality: N/A;    TOTAL KNEE ARTHROPLASTY  Right      Review of patient's allergies indicates:  No Known Allergies    Scheduled Medications:    acetaminophen  1,000 mg Oral Once    apixaban  5 mg Oral BID    cyanocobalamin  1,000 mcg Oral Daily    DULoxetine  30 mg Oral BID    fluticasone furoate-vilanteroL  1 puff Inhalation Daily    folic acid  1 mg Oral Daily    magnesium oxide  400 mg Oral QHS    meropenem IV (PEDS and ADULTS)  2 g Intravenous Q12H    methocarbamoL  500 mg Oral QID    multivitamin  1 tablet Oral Daily    pantoprazole  40 mg Oral Daily    pregabalin  50 mg Oral TID    tamsulosin  0.4 mg Oral Daily    thiamine  100 mg Oral Daily    tiotropium bromide  2 puff Inhalation Daily       PRN Medications:   Current Facility-Administered Medications:     acetaminophen, 650 mg, Oral, Q4H PRN    albuterol-ipratropium, 3 mL, Nebulization, Q4H PRN    dextrose 10%, 12.5 g, Intravenous, PRN    dextrose 10%, 25 g, Intravenous, PRN    glucagon (human recombinant), 1 mg, Intramuscular, PRN    glucose, 16 g, Oral, PRN    glucose, 24 g, Oral, PRN    hydrOXYzine pamoate, 50 mg, Oral, Q8H PRN    insulin aspart U-100, 0-5 Units, Subcutaneous, QID (AC + HS) PRN    morphine, 2 mg, Intravenous, Q4H PRN    naloxone, 0.02 mg, Intravenous, PRN    ondansetron, 4 mg, Intravenous, Q8H PRN    oxyCODONE, 5 mg, Oral, Q4H PRN    sodium chloride 0.9%, 10 mL, Intravenous, Q12H PRN    Family History       Problem Relation (Age of Onset)    Colon cancer Paternal Grandfather (89)    Hypertension Mother          Tobacco Use    Smoking status: Never    Smokeless tobacco: Never   Substance and Sexual Activity    Alcohol use: Yes     Comment: Former heavy use    Drug use: Never    Sexual activity: Yes     Comment: unknown     Review of Systems   Constitutional:  Positive for activity change. Negative for fatigue and fever.   HENT:  Negative for trouble swallowing and voice change.    Respiratory:  Negative for cough and shortness of breath.    Cardiovascular:  Negative for chest pain  and leg swelling.   Gastrointestinal:  Negative for abdominal distention and abdominal pain.   Genitourinary:  Positive for difficulty urinating.   Musculoskeletal:  Negative for back pain and gait problem.   Skin:  Negative for color change and rash.   Neurological:  Positive for weakness. Negative for speech difficulty and numbness.   Psychiatric/Behavioral:  Negative for agitation and confusion.      Objective:     Vital Signs (Most Recent):  Temp: 99.3 °F (37.4 °C) (11/16/24 0804)  Pulse: 90 (11/16/24 0804)  Resp: 18 (11/16/24 0804)  BP: 127/60 (11/16/24 0804)  SpO2: 98 % (11/16/24 0804)    Vital Signs (24h Range):  Temp:  [97.4 °F (36.3 °C)-99.3 °F (37.4 °C)] 99.3 °F (37.4 °C)  Pulse:  [] 90  Resp:  [16-18] 18  SpO2:  [94 %-100 %] 98 %  BP: (101-138)/() 127/60     Body mass index is 28.75 kg/m².     Physical Exam  Vitals and nursing note reviewed.   Constitutional:       Appearance: Normal appearance. He is well-developed.   HENT:      Head: Normocephalic and atraumatic.   Eyes:      General:         Right eye: No discharge.         Left eye: No discharge.      Pupils: Pupils are equal, round, and reactive to light.   Pulmonary:      Effort: Pulmonary effort is normal. No respiratory distress.   Abdominal:      General: There is no distension.      Palpations: Abdomen is soft.      Tenderness: There is no abdominal tenderness.   Musculoskeletal:         General: No deformity.      Cervical back: Neck supple.   Skin:     General: Skin is warm and dry.   Neurological:      Mental Status: He is alert. Mental status is at baseline.      Sensory: No sensory deficit.      Motor: Weakness present. No abnormal muscle tone.      Gait: Gait abnormal.   Psychiatric:         Mood and Affect: Mood normal.         Behavior: Behavior normal.         Thought Content: Thought content normal.     Diagnostic Results:   Labs: Reviewed  ECG: Reviewed  US: Reviewed  CT: Reviewed    Assessment/Plan:     * Acute  cystitis  - recurrent ESBL UTI with renal stones and right sided double J ureteral stents.   - Urology consulted and plans to f/u outpt stone treatment.   - ID consulted and recommending continue Meropenem. ID plans to  follow up on susceptibilities and repeat cultures results and will likely need 48 hrs post urology procedure of antibiotics.  - Blood cultures also growing growing negative rods, repeats no growth to date.     Right ureteral stone  - underwent R ureteral stent placement on 9/27 outpatient for distal R ureteral stones in the setting of urosepsis and bacteremia    ALYSIA (acute kidney injury)  - improving    Recurrent falls  -  Encourage mobility, OOB in chair at least 3 hours per day, and early ambulation as appropriate  -  PT/OT evaluate and treat  -  Pain management  -  Monitor for and prevent skin breakdown and pressure ulcers  Early mobility, repositioning/weight shifting every 20-30 minutes when sitting, turn patient every 2 hours, proper mattress/overlay and chair cushioning, pressure relief/heel protector boots  -  DVT prophylaxis    -  Reviewed discharge options (IP rehab)     PM&R Recommendation:     At this time, the PM&R team has reviewed this patient's ongoing medical case including inpatient diagnosis, medical history, clinical examination, labs, vitals, current social and functional history to provide the post-acute recommendation as follows:     RECOMMENDATIONS: inpatient rehabilitation due to good motivation/participation with therapies, has been determined to tolerate 3 hours of therapy and good potential for recovery.     The patient will be admitted for comprehensive interdisciplinary inpatient rehabilitation to address the impairments due to medical diagnosis of Debility and falls. The patient will benefit from an inpatient rehabilitation program to promote functional recovery, implement compensatory strategies and will undergo assessment for needs for durable medical equipment for  safe discharge to the community. This patient will benefit from a coordinated interdisciplinary rehabilitation program services that require close monitoring and treatment with 24-hour rehabilitative nursing and physical/occupational therapies for 3 hours/day for 5 days/week.This interdisciplinary program will be performed under the direction of a physiatrist.    We will continue to follow.     Thank you for your consult.     Winifred Mckeon NP  Department of Physical Medicine & Rehab  [facility])

## 2024-11-16 NOTE — SUBJECTIVE & OBJECTIVE
Interval History: Pending placement. Will need IV abx until ideally 48 hours post urologic procedure.    Review of Systems   Constitutional:  Negative for activity change, chills and fatigue.   HENT:  Negative for congestion, postnasal drip, sinus pressure, sneezing and sore throat.    Respiratory:  Negative for cough, chest tightness, shortness of breath and wheezing.    Cardiovascular:  Negative for chest pain, palpitations and leg swelling.   Gastrointestinal:  Negative for abdominal pain, constipation, diarrhea, nausea and vomiting.   Genitourinary:  Negative for difficulty urinating, dysuria and flank pain.   Neurological:  Negative for tremors, syncope, speech difficulty, weakness, light-headedness and headaches.   Hematological:  Does not bruise/bleed easily.   Psychiatric/Behavioral:  Negative for agitation, behavioral problems and confusion.      Objective:     Vital Signs (Most Recent):  Temp: 99.3 °F (37.4 °C) (11/16/24 0804)  Pulse: 90 (11/16/24 0804)  Resp: 18 (11/16/24 0804)  BP: 127/60 (11/16/24 0804)  SpO2: 98 % (11/16/24 0804) Vital Signs (24h Range):  Temp:  [97.4 °F (36.3 °C)-99.3 °F (37.4 °C)] 99.3 °F (37.4 °C)  Pulse:  [] 90  Resp:  [16-18] 18  SpO2:  [98 %-100 %] 98 %  BP: (125-138)/() 127/60     Weight: 88.3 kg (194 lb 10.7 oz)  Body mass index is 28.75 kg/m².    Intake/Output Summary (Last 24 hours) at 11/16/2024 1143  Last data filed at 11/16/2024 0538  Gross per 24 hour   Intake 1480 ml   Output 2635 ml   Net -1155 ml         Physical Exam  Vitals reviewed.   Cardiovascular:      Rate and Rhythm: Normal rate and regular rhythm.      Pulses: Normal pulses.   Pulmonary:      Effort: Pulmonary effort is normal.   Abdominal:      General: Bowel sounds are normal. There is no distension.      Tenderness: There is no abdominal tenderness.   Skin:     General: Skin is warm.      Capillary Refill: Capillary refill takes less than 2 seconds.   Neurological:      General: No focal deficit  present.      Mental Status: He is alert and oriented to person, place, and time.             Significant Labs: All pertinent labs within the past 24 hours have been reviewed.    Significant Imaging: I have reviewed all pertinent imaging results/findings within the past 24 hours.   1 pair

## 2024-11-16 NOTE — PROGRESS NOTES
[facility])  Infectious Disease  Progress Note    Patient Name: Anthony Ball  MRN: [id number]  Admission Date: 11/13/2024  Length of Stay: 3 days  Attending Physician: Rosanna Kim MD  Primary Care Provider: Isaias Myles MD    Isolation Status: Contact  Assessment/Plan:      ID  Bacteremia  74-year-old male PMHx of DM2, HTN, CAD, PE, afib on eliquis, and recurrent UTI's now presenting with flank/back pain, foul-smelling urine, and hallucinations. Admitted for management of recurrent ESBL UTI with renal stones and right sided double J ureteral stents. Urology consulted and plans to f/u with pt for outpt stone treatment      11/13/24: Blood culture grew ESBL E. coli and urine culture grew Klebsiella aerogenes.    Recommendations / Plan  Switch Meropenem to Ertapenem 1 g IV Q24H, will treat for total two weeks with EOT around 11/28/2024  Continue to monitor for neurotoxicity while on Ertapenem and if it develops then switch back to Meropenem (renally dosed per [facility])  Follow up with Urology on 11/18 regarding timing of procedure  Will likely need ~48 hrs post urology procedure of antibiotics with likely Ertapenem (or Meropenem)  Discussed with primary team        Anticipated Disposition: TBD    Thank you for your consult. I will follow-up with patient. Please contact us if you have any additional questions.    Nya Villaseñor MD  Infectious Disease  [facility])    Subjective:     Principal Problem:Acute cystitis    HPI: 74-year-old male PMHx of DM2, HTN, CAD, PE, afib on eliquis, and recurrent UTI's now presenting with chief complaint of flank/back pain, foul-smelling urine, and hallucinations. Patient denies any fevers, spinal pain associated numbness/weakness of his lower extremities, or urinary retention. Admitted to  for management of recurrent ESBL UTI with renal stones and right sided double J ureteral stents. Urology consulted and plans to f/u with pt for outpt  stone treatment    ID hx:  9/27/24: admit for urosepsis, R urethral stent placed d/t nonobstructive nephrolithiasis, blood and urine cultures + for proteus mirabilis, sent home on Cefpodoxime (End date: 10/11/24)  10/21/24: admit again for UTI, urine + E. Coli, sent home on Cipro (end date: 11/5/24)  11/13/24: admit again for UTI, u/a (WBC > 100, RBC > 100, many bacteria), urine & blood cultures +GNR pending susceptibility, on Meropenem (dose increased on 11/15 to 2g q12 hrs)     Interval History: Pending placement. Will need IV abx until ideally 48 hours post urologic procedure.    Review of Systems   Constitutional:  Negative for activity change, chills and fatigue.   HENT:  Negative for congestion, postnasal drip, sinus pressure, sneezing and sore throat.    Respiratory:  Negative for cough, chest tightness, shortness of breath and wheezing.    Cardiovascular:  Negative for chest pain, palpitations and leg swelling.   Gastrointestinal:  Negative for abdominal pain, constipation, diarrhea, nausea and vomiting.   Genitourinary:  Negative for difficulty urinating, dysuria and flank pain.   Neurological:  Negative for tremors, syncope, speech difficulty, weakness, light-headedness and headaches.   Hematological:  Does not bruise/bleed easily.   Psychiatric/Behavioral:  Negative for agitation, behavioral problems and confusion.      Objective:     Vital Signs (Most Recent):  Temp: 99.3 °F (37.4 °C) (11/16/24 0804)  Pulse: 90 (11/16/24 0804)  Resp: 18 (11/16/24 0804)  BP: 127/60 (11/16/24 0804)  SpO2: 98 % (11/16/24 0804) Vital Signs (24h Range):  Temp:  [97.4 °F (36.3 °C)-99.3 °F (37.4 °C)] 99.3 °F (37.4 °C)  Pulse:  [] 90  Resp:  [16-18] 18  SpO2:  [98 %-100 %] 98 %  BP: (125-138)/() 127/60     Weight: 88.3 kg (194 lb 10.7 oz)  Body mass index is 28.75 kg/m².    Intake/Output Summary (Last 24 hours) at 11/16/2024 1143  Last data filed at 11/16/2024 0538  Gross per 24 hour   Intake 1480 ml   Output 2635 ml    Net -1155 ml         Physical Exam  Vitals reviewed.   Cardiovascular:      Rate and Rhythm: Normal rate and regular rhythm.      Pulses: Normal pulses.   Pulmonary:      Effort: Pulmonary effort is normal.   Abdominal:      General: Bowel sounds are normal. There is no distension.      Tenderness: There is no abdominal tenderness.   Skin:     General: Skin is warm.      Capillary Refill: Capillary refill takes less than 2 seconds.   Neurological:      General: No focal deficit present.      Mental Status: He is alert and oriented to person, place, and time.             Significant Labs: All pertinent labs within the past 24 hours have been reviewed.    Significant Imaging: I have reviewed all pertinent imaging results/findings within the past 24 hours.

## 2024-11-16 NOTE — ASSESSMENT & PLAN NOTE
-  Encourage mobility, OOB in chair at least 3 hours per day, and early ambulation as appropriate  -  PT/OT evaluate and treat  -  Pain management  -  Monitor for and prevent skin breakdown and pressure ulcers  Early mobility, repositioning/weight shifting every 20-30 minutes when sitting, turn patient every 2 hours, proper mattress/overlay and chair cushioning, pressure relief/heel protector boots  -  DVT prophylaxis    -  Reviewed discharge options (IP rehab)

## 2024-11-16 NOTE — ASSESSMENT & PLAN NOTE
- underwent R ureteral stent placement on 9/27 outpatient for distal R ureteral stones in the setting of urosepsis and bacteremia

## 2024-11-16 NOTE — ASSESSMENT & PLAN NOTE
Pt with flank pain, urgency, and foul smelling urine. Per family he has been more confused as well. He denies flank pain currently. 2 previous admissions in September and October for sepsis 2/2 UTI.    - Afebrile, no leukocytosis.  - Blood cx positive for ESBL, on ertapenam  - repeat BC pending.  - Discussed with urology, no inpatient intervention. Patient to follow up with urology for stent management

## 2024-11-16 NOTE — SUBJECTIVE & OBJECTIVE
Past Medical History:   Diagnosis Date    Acute deep vein thrombosis (DVT) of left lower extremity 12/2023    Anxiety     Atrial fibrillation 12/2023    Cataract     Coronary artery disease     CAC score 1430    Depression     Diabetic neuropathy     Essential (primary) hypertension     GERD (gastroesophageal reflux disease)     History of bariatric surgery 07/08/2021    History of total right knee replacement 07/08/2021    Insomnia     Neuromyopathy     Possibly DM and/or alcohol related.    Pulmonary embolism 12/2023    Reactive airway disease     Type 2 diabetes mellitus      Past Surgical History:   Procedure Laterality Date    CATARACT EXTRACTION W/  INTRAOCULAR LENS IMPLANT Right 7/15/2024    Procedure: EXTRACTION, CATARACT, WITH IOL INSERTION;  Surgeon: Margot Jacobson MD;  Location: [facility] OR;  Service: Ophthalmology;  Laterality: Right;    CATARACT EXTRACTION W/  INTRAOCULAR LENS IMPLANT Left 8/29/2024    Procedure: EXTRACTION, CATARACT, WITH IOL INSERTION;  Surgeon: Margot Jacobson MD;  Location: [facility] OR;  Service: Ophthalmology;  Laterality: Left;    COLONOSCOPY      Normal around 2020    COLONOSCOPY N/A 9/6/2024    Procedure: COLONOSCOPY;  Surgeon: Alessandro Serna MD;  Location: [facility] (4TH FLR);  Service: Endoscopy;  Laterality: N/A;  8/28-new case created-lvm for pre call-pt has cataract surgery 8/29/24-tb  ref-dr rico goldstein-[place]  ok to hold elivickie mukherjeeGT  9/5-LVM for precall-Kpvt    COLONOSCOPY N/A 9/6/2024    Procedure: COLONOSCOPY;  Surgeon: Alessandro Serna MD;  Location: [facility] (4TH FLR);  Service: Endoscopy;  Laterality: N/A;  + cologuard  8/8 ref by Isaias Goldstein MD, [facility], instr. to portal, Eliquis hold approval pending-  ok to hold Eliquis 2 days per Dr Goldstein-WALLACE    CYSTOSCOPY WITH URETEROSCOPY, RETROGRADE PYELOGRAPHY, AND INSERTION OF STENT Right 9/27/2024    Procedure: CYSTOSCOPY, WITH RETROGRADE PYELOGRAM AND URETERAL STENT INSERTION;  Surgeon: Joni Wagoner MD;   [facility];  Service: Urology;  Laterality: Right;    REMOVAL, CALCULUS, BLADDER N/A 9/27/2024    Procedure: REMOVAL, CALCULUS, BLADDER;  Surgeon: Joni Wagoner MD;  [facility];  Service: Urology;  Laterality: N/A;    TOTAL KNEE ARTHROPLASTY Right      Review of patient's allergies indicates:  No Known Allergies    Scheduled Medications:    acetaminophen  1,000 mg Oral Once    apixaban  5 mg Oral BID    cyanocobalamin  1,000 mcg Oral Daily    DULoxetine  30 mg Oral BID    fluticasone furoate-vilanteroL  1 puff Inhalation Daily    folic acid  1 mg Oral Daily    magnesium oxide  400 mg Oral QHS    meropenem IV (PEDS and ADULTS)  2 g Intravenous Q12H    methocarbamoL  500 mg Oral QID    multivitamin  1 tablet Oral Daily    pantoprazole  40 mg Oral Daily    pregabalin  50 mg Oral TID    tamsulosin  0.4 mg Oral Daily    thiamine  100 mg Oral Daily    tiotropium bromide  2 puff Inhalation Daily       PRN Medications:   Current Facility-Administered Medications:     acetaminophen, 650 mg, Oral, Q4H PRN    albuterol-ipratropium, 3 mL, Nebulization, Q4H PRN    dextrose 10%, 12.5 g, Intravenous, PRN    dextrose 10%, 25 g, Intravenous, PRN    glucagon (human recombinant), 1 mg, Intramuscular, PRN    glucose, 16 g, Oral, PRN    glucose, 24 g, Oral, PRN    hydrOXYzine pamoate, 50 mg, Oral, Q8H PRN    insulin aspart U-100, 0-5 Units, Subcutaneous, QID (AC + HS) PRN    morphine, 2 mg, Intravenous, Q4H PRN    naloxone, 0.02 mg, Intravenous, PRN    ondansetron, 4 mg, Intravenous, Q8H PRN    oxyCODONE, 5 mg, Oral, Q4H PRN    sodium chloride 0.9%, 10 mL, Intravenous, Q12H PRN    Family History       Problem Relation (Age of Onset)    Colon cancer Paternal Grandfather (89)    Hypertension Mother          Tobacco Use    Smoking status: Never    Smokeless tobacco: Never   Substance and Sexual Activity    Alcohol use: Yes     Comment: Former heavy use    Drug use: Never    Sexual activity: Yes     Comment:  unknown     Review of Systems   Constitutional:  Positive for activity change. Negative for fatigue and fever.   HENT:  Negative for trouble swallowing and voice change.    Respiratory:  Negative for cough and shortness of breath.    Cardiovascular:  Negative for chest pain and leg swelling.   Gastrointestinal:  Negative for abdominal distention and abdominal pain.   Genitourinary:  Positive for difficulty urinating.   Musculoskeletal:  Negative for back pain and gait problem.   Skin:  Negative for color change and rash.   Neurological:  Positive for weakness. Negative for speech difficulty and numbness.   Psychiatric/Behavioral:  Negative for agitation and confusion.      Objective:     Vital Signs (Most Recent):  Temp: 99.3 °F (37.4 °C) (11/16/24 0804)  Pulse: 90 (11/16/24 0804)  Resp: 18 (11/16/24 0804)  BP: 127/60 (11/16/24 0804)  SpO2: 98 % (11/16/24 0804)    Vital Signs (24h Range):  Temp:  [97.4 °F (36.3 °C)-99.3 °F (37.4 °C)] 99.3 °F (37.4 °C)  Pulse:  [] 90  Resp:  [16-18] 18  SpO2:  [94 %-100 %] 98 %  BP: (101-138)/() 127/60     Body mass index is 28.75 kg/m².     Physical Exam  Vitals and nursing note reviewed.   Constitutional:       Appearance: Normal appearance. He is well-developed.   HENT:      Head: Normocephalic and atraumatic.   Eyes:      General:         Right eye: No discharge.         Left eye: No discharge.      Pupils: Pupils are equal, round, and reactive to light.   Pulmonary:      Effort: Pulmonary effort is normal. No respiratory distress.   Abdominal:      General: There is no distension.      Palpations: Abdomen is soft.      Tenderness: There is no abdominal tenderness.   Musculoskeletal:         General: No deformity.      Cervical back: Neck supple.   Skin:     General: Skin is warm and dry.   Neurological:      Mental Status: He is alert. Mental status is at baseline.      Sensory: No sensory deficit.      Motor: Weakness present. No abnormal muscle tone.      Gait:  Gait abnormal.   Psychiatric:         Mood and Affect: Mood normal.         Behavior: Behavior normal.         Thought Content: Thought content normal.          NEUROLOGICAL EXAMINATION:     CRANIAL NERVES     CN III, IV, VI   Pupils are equal, round, and reactive to light.      Diagnostic Results: Labs: Reviewed  ECG: Reviewed  US: Reviewed  CT: Reviewed

## 2024-11-16 NOTE — ASSESSMENT & PLAN NOTE
Patient's FSGs are controlled on current medication regimen.  Last A1c reviewed-   Lab Results   Component Value Date    HGBA1C 9.8 (H) 09/26/2024     Most recent fingerstick glucose reviewed-   Recent Labs   Lab 11/15/24  1529 11/16/24  0807   POCTGLUCOSE 223* 228*       Current correctional scale  Low  Maintain anti-hyperglycemic dose as follows-   Antihyperglycemics (From admission, onward)    Start     Stop Route Frequency Ordered    11/13/24 2157  insulin aspart U-100 pen 0-5 Units         -- SubQ Before meals & nightly PRN 11/13/24 2058        Hold Oral hypoglycemics while patient is in the hospital.  -Diabetic/cardiac diet.  -Accuchecks AC/HS  - Patient refusing insulin inpatient, will attempt to re-educate importance of well controlled BG

## 2024-11-16 NOTE — ASSESSMENT & PLAN NOTE
ALYSIA is likely due to pre-renal azotemia due to dehydration. Baseline creatinine is  1.2-1.3 . Most recent creatinine and eGFR are listed below.  Recent Labs     11/14/24  0618 11/15/24  0419 11/16/24  0452   CREATININE 2.5* 2.3* 2.1*   EGFRNORACEVR 26.6* 29.4* 32.8*        Plan  - Avoid nephrotoxins and renally dose meds for GFR listed above  - Monitor urine output, serial BMP, and adjust therapy as needed  - Patient is in need of IV fluids for the treatment of ALYSIA.  Due to a shortage of IV fluids, patient to receive 1L LR bolus.  Patient must receive this treatment in a hospital location due to the risk of adverse effects, insufficient response to treatment, or other potential complications of disease such as kidney failure.

## 2024-11-16 NOTE — ASSESSMENT & PLAN NOTE
74-year-old male PMHx of DM2, HTN, CAD, PE, afib on eliquis, and recurrent UTI's now presenting with flank/back pain, foul-smelling urine, and hallucinations. Admitted for management of recurrent ESBL UTI with renal stones and right sided double J ureteral stents. Urology consulted and plans to f/u with pt for outpt stone treatment      11/13/24: Blood culture grew ESBL E. coli and urine culture grew Klebsiella aerogenes.    Recommendations / Plan  Switch Meropenem to Ertapenem 1 g IV Q24H, will treat for total two weeks with EOT around 11/28/2024  Continue to monitor for neurotoxicity while on Ertapenem and if it develops then switch back to Meropenem (renally dosed per [facility])  Follow up with Urology on 11/18 regarding timing of procedure  Will likely need ~48 hrs post urology procedure of antibiotics with likely Ertapenem (or Meropenem)  Discussed with primary team

## 2024-11-16 NOTE — PLAN OF CARE
Problem: Adult Inpatient Plan of Care  Goal: Plan of Care Review  Outcome: Progressing  Goal: Patient-Specific Goal (Individualized)  Outcome: Progressing  Goal: Absence of Hospital-Acquired Illness or Injury  Outcome: Progressing  Goal: Optimal Comfort and Wellbeing  Outcome: Progressing  Goal: Readiness for Transition of Care  Outcome: Progressing     Problem: Diabetes Comorbidity  Goal: Blood Glucose Level Within Targeted Range  Outcome: Progressing   POC and meds reviewed with patient, all needs addressed.

## 2024-11-16 NOTE — ASSESSMENT & PLAN NOTE
The likely etiology of thrombocytopenia is infection. The patients 3 most recent labs are listed below.  Recent Labs     11/14/24  0442 11/15/24  0419 11/16/24  0452   * 119* 133*       Plan  - Will transfuse if platelet count is <50k (if undergoing surgical procedure or have active bleeding).

## 2024-11-16 NOTE — ASSESSMENT & PLAN NOTE
- recurrent ESBL UTI with renal stones and right sided double J ureteral stents.   - Urology consulted and plans to f/u outpt stone treatment.   - ID consulted and recommending continue Meropenem. ID plans to  follow up on susceptibilities and repeat cultures results and will likely need 48 hrs post urology procedure of antibiotics.  - Blood cultures also growing growing negative rods, repeats no growth to date.

## 2024-11-16 NOTE — HPI
Anthony Ball is a 74-year-old male with PMHx of DM2, HTN, CAD, PE, Afib on eliquis, and recurrent UTI's. Underwent R ureteral stent placement on 9/27/24 outpatient for distal R ureteral stones in the setting of urosepsis and bacteremia. Patient now presented to [facility] on 11/13/24 for back pain, foul-smelling urine, and hallucinations. Admitted for recurrent ESBL UTI with renal stones and right sided double J ureteral stents. Urology consulted and plans to f/u outpt stone treatment. ID consulted and recommending continue Meropenem. ID plans to  follow up on susceptibilities and repeat cultures results and will likely need 48 hrs post urology procedure of antibiotics. Blood cultures also growing growing negative rods, repeats no growth to date. ALYSIA improving. Ertapenem 1 gm IV q 24 hours      3) Therapy Duration:  14 days     Estimated end date of IV antibiotics: 11/27/24       Functional History: Patient lives with partner in a single story home with 5 steps to enter.  Prior to admission, [facility]. DME: PAT and .

## 2024-11-16 NOTE — PLAN OF CARE
VSS on RA, pt refused BG checks, education provided. Ext cath in place    Problem: Adult Inpatient Plan of Care  Goal: Absence of Hospital-Acquired Illness or Injury  Outcome: Progressing     Problem: Diabetes Comorbidity  Goal: Blood Glucose Level Within Targeted Range  Outcome: Progressing     Problem: Sepsis/Septic Shock  Goal: Absence of Bleeding  Outcome: Progressing  Goal: Optimal Nutrition Intake  Outcome: Progressing     Problem: Acute Kidney Injury/Impairment  Goal: Fluid and Electrolyte Balance  Outcome: Progressing     Problem: Infection  Goal: Absence of Infection Signs and Symptoms  Outcome: Progressing

## 2024-11-16 NOTE — PROGRESS NOTES
[facility] - Neurosurgery ([facility])  [facility] Medicine  Progress Note    Patient Name: Anthony Ball  MRN: [id number]  Patient Class: IP- Inpatient   Admission Date: 11/13/2024  Length of Stay: 3 days  Attending Physician: Rosanna Kim MD  Primary Care Provider: Isaias Myles MD        Subjective:     Principal Problem:Acute cystitis        HPI:  Anthony Ball is a 74 y.o. male with a PMHx of type 2 DM, HTN, CAD, PE, afib on eliquis, and recurrent UTI who presents to the ED for flank pain, urinary urgency, and foul smelling urine x3 days. Patient is a poor historian. He states his partner thinks he has been more confused and told him his urine smells bad again. The patient does endorse fatigue and  urinary urgency and states he has had flank pain as well, although denies it currently. He reports the pain has been so severe it has caused him to fall, so he has been staying in bed. He denies fevers/chills, CP, SOB, cough, N/V/D, abdominal pain, or dysuria. He notes chronic BLE numbness/weakness and typically uses a walker to ambulate.    In the ED, pt tachycardic otherwise vitals stable, afebrile. CBC with stable anemia. Na 135. Bicarb 17. Cr 2.7 (bl 1.2-1.3). Glucose 220. Albumin 2.9. Blood cxs in process. POC lactate 1.57. EKG with sinus rhythm, 102 bpm, non specific T wave abnormality. UA with 3+ leukocytes, >100 WBCs, and many bacteria, urine cx in process. CT AP with redemonstrated right-sided double-J ureteral stent, stable in position. No new or worsening hydronephrosis. Bilateral nephrolithiasis. Similar mild nonspecific thickening of the urinary bladder which may be related to suboptimal distension versus nonspecific cystitis. The patient received IV ciprofloxacin.    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 BC 11/13 with ESBL, repeat pending. On ertapen, ID consulted. Will need to  continue IV abx until at least 48 hours post procedure.  Discussed with urology, no inpatient intervention required at this time, patient to follow up with urology in clinic for stent management. Attempting to coordinate timing.  PMR consulted for inpatient rehab.     Interval History: Pending placement. Will need IV abx until ideally 48 hours post urologic procedure. Coordinating with urology/ID on timing.     Review of Systems   Constitutional:  Negative for activity change, chills and fatigue.   HENT:  Negative for congestion, postnasal drip, sinus pressure, sneezing and sore throat.    Respiratory:  Negative for cough, chest tightness, shortness of breath and wheezing.    Cardiovascular:  Negative for chest pain, palpitations and leg swelling.   Gastrointestinal:  Negative for abdominal pain, constipation, diarrhea, nausea and vomiting.   Genitourinary:  Negative for difficulty urinating, dysuria and flank pain.   Neurological:  Negative for tremors, syncope, speech difficulty, weakness, light-headedness and headaches.   Hematological:  Does not bruise/bleed easily.   Psychiatric/Behavioral:  Negative for agitation, behavioral problems and confusion.      Objective:     Vital Signs (Most Recent):  Temp: 99.3 °F (37.4 °C) (11/16/24 0804)  Pulse: 90 (11/16/24 0804)  Resp: 18 (11/16/24 0804)  BP: 127/60 (11/16/24 0804)  SpO2: 98 % (11/16/24 0804) Vital Signs (24h Range):  Temp:  [97.4 °F (36.3 °C)-99.3 °F (37.4 °C)] 99.3 °F (37.4 °C)  Pulse:  [] 90  Resp:  [16-18] 18  SpO2:  [98 %-100 %] 98 %  BP: (125-138)/() 127/60     Weight: 88.3 kg (194 lb 10.7 oz)  Body mass index is 28.75 kg/m².    Intake/Output Summary (Last 24 hours) at 11/16/2024 1132  Last data filed at 11/16/2024 0518  Gross per 24 hour   Intake 1480 ml   Output 2635 ml   Net -1155 ml         Physical Exam  Vitals reviewed.   Cardiovascular:      Rate and Rhythm: Normal rate and regular rhythm.      Pulses: Normal pulses.   Pulmonary:       Effort: Pulmonary effort is normal.   Abdominal:      General: Bowel sounds are normal. There is no distension.      Tenderness: There is no abdominal tenderness.   Skin:     General: Skin is warm.      Capillary Refill: Capillary refill takes less than 2 seconds.   Neurological:      General: No focal deficit present.      Mental Status: He is alert and oriented to person, place, and time.             Significant Labs: All pertinent labs within the past 24 hours have been reviewed.    Significant Imaging: I have reviewed all pertinent imaging results/findings within the past 24 hours.    Assessment/Plan:      * Acute cystitis  Pt with flank pain, urgency, and foul smelling urine. Per family he has been more confused as well. He denies flank pain currently. 2 previous admissions in September and October for sepsis 2/2 UTI.    - Afebrile, no leukocytosis.  - Blood cx positive for ESBL, on ertapenam  - repeat BC pending.  - Discussed with urology, no inpatient intervention. Patient to follow up with urology for stent management     Bacteremia  See acute cystitis       Paroxysmal A-fib  Patient has paroxysmal (<7 days) atrial fibrillation. Patient is currently in sinus rhythm. [id number] Score: 3. The patients heart rate in the last 24 hours is as follows:  Pulse  Min: 71  Max: 103     Antiarrhythmics       Anticoagulants  apixaban tablet 5 mg, 2 times daily, Oral    Plan  - Replete lytes with a goal of K>4, Mg >2  - Patient is anticoagulated, see medications listed above.  - Patient's afib is currently controlled    Alcohol abuse  -History noted. Reports he has cut back but unable to quantify.  -Nursing to assess CIWA q shift.  -MVI, folic acid, and thiamine daily.  -Discussed the dangers of continued ETOH use, encouraged cessation.    Thrombocytopenia  The likely etiology of thrombocytopenia is infection. The patients 3 most recent labs are listed below.  Recent Labs     11/14/24  0442 11/15/24  1749 11/16/24  7279    * 119* 133*       Plan  - Will transfuse if platelet count is <50k (if undergoing surgical procedure or have active bleeding).    Right ureteral stone  - R ureteral stent in appropriate position per CT scan with no hydronephrosis  - f/u new urine and blood cultures   - outpatient follow-up for definitive stone treatment     Asthma  -No s/s of acute exacerbation.  -Continue daily inhalers.  - PRN Duo nebs    ALYSIA (acute kidney injury)  ALYSIA is likely due to pre-renal azotemia due to dehydration. Baseline creatinine is  1.2-1.3 . Most recent creatinine and eGFR are listed below.  Recent Labs     11/14/24  0618 11/15/24  0419 11/16/24  0452   CREATININE 2.5* 2.3* 2.1*   EGFRNORACEVR 26.6* 29.4* 32.8*        Plan  - Avoid nephrotoxins and renally dose meds for GFR listed above  - Monitor urine output, serial BMP, and adjust therapy as needed  - Patient is in need of IV fluids for the treatment of ALYSIA.  Due to a shortage of IV fluids, patient to receive 1L LR bolus.  Patient must receive this treatment in a hospital location due to the risk of adverse effects, insufficient response to treatment, or other potential complications of disease such as kidney failure.     Normocytic anemia  Anemia is likely due to chronic disease due to diabetic nephropathy . Most recent hemoglobin and hematocrit are listed below.  Recent Labs     11/14/24  0442 11/15/24  0419 11/16/24  0452   HGB 9.3* 9.3* 9.5*   HCT 29.7* 28.5* 28.9*       Plan  - Monitor serial CBC: Daily  - Transfuse PRBC if patient becomes hemodynamically unstable, symptomatic or H/H drops below 7/21.  - Patient has not received any PRBC transfusions to date    Gastroesophageal reflux disease without esophagitis  -Chronic, stable.  -Continue PPI.    Primary insomnia  On trazodone at home     -Hold trazodone in setting of fatigue and confusion, can consider restarting if improves    Recurrent falls  - suspect acutely worsened in setting of UTI  - fall precautions  -  PT/OT consult.    Essential hypertension  Patients blood pressure range in the last 24 hours was: BP  Min: 103/57  Max: 153/71.The patient's inpatient anti-hypertensive regimen is listed below:  Current Antihypertensives       Plan  - BP is controlled, no changes needed to their regimen    Diabetic polyneuropathy associated with type 2 diabetes mellitus  Diabetic polyneuropathy controlled with lyrica, cymbalta, nortryptiline at home      -Continue lyrica and cymbalta at decreased dose d/t mild confusion and ALYSIA, holding amitryptiline  -Adjust as tolerated    Type 2 diabetes mellitus with neurologic complication, without long-term current use of insulin  Patient's FSGs are controlled on current medication regimen.  Last A1c reviewed-   Lab Results   Component Value Date    HGBA1C 9.8 (H) 09/26/2024     Most recent fingerstick glucose reviewed-   Recent Labs   Lab 11/15/24  1529 11/16/24  0807   POCTGLUCOSE 223* 228*       Current correctional scale  Low  Maintain anti-hyperglycemic dose as follows-   Antihyperglycemics (From admission, onward)      Start     Stop Route Frequency Ordered    11/13/24 2157  insulin aspart U-100 pen 0-5 Units         -- SubQ Before meals & nightly PRN 11/13/24 2058          Hold Oral hypoglycemics while patient is in the hospital.  -Diabetic/cardiac diet.  -Accuchecks AC/HS  - Patient refusing insulin inpatient, will attempt to re-educate importance of well controlled BG      VTE Risk Mitigation (From admission, onward)           Ordered     apixaban tablet 5 mg  2 times daily         11/13/24 2058     IP VTE HIGH RISK PATIENT  Once         11/13/24 2058     Place sequential compression device  Until discontinued         11/13/24 2058                    Discharge Planning   CHRISTA: 11/17/2024     Code Status: Full Code   Is the patient medically ready for discharge?:     Reason for patient still in hospital (select all that apply): Treatment  Discharge Plan A: Rehab                  Manolo  MD Ptarick  Department of Hospital Medicine   [facility] - Neurosurgery ([facility])

## 2024-11-16 NOTE — HOSPITAL COURSE
11/15/24: Participated w/ OT. Patient completed Toilet Transfer Step Transfer technique with moderate assistance with  rolling walker. Functional Mobility: patient ambulated to/from bathroom with RW and CGA

## 2024-11-16 NOTE — SUBJECTIVE & OBJECTIVE
Interval History: Pending placement. Will need IV abx until ideally 48 hours post urologic procedure. Coordinating with urology/ID on timing.     Review of Systems   Constitutional:  Negative for activity change, chills and fatigue.   HENT:  Negative for congestion, postnasal drip, sinus pressure, sneezing and sore throat.    Respiratory:  Negative for cough, chest tightness, shortness of breath and wheezing.    Cardiovascular:  Negative for chest pain, palpitations and leg swelling.   Gastrointestinal:  Negative for abdominal pain, constipation, diarrhea, nausea and vomiting.   Genitourinary:  Negative for difficulty urinating, dysuria and flank pain.   Neurological:  Negative for tremors, syncope, speech difficulty, weakness, light-headedness and headaches.   Hematological:  Does not bruise/bleed easily.   Psychiatric/Behavioral:  Negative for agitation, behavioral problems and confusion.      Objective:     Vital Signs (Most Recent):  Temp: 99.3 °F (37.4 °C) (11/16/24 0804)  Pulse: 90 (11/16/24 0804)  Resp: 18 (11/16/24 0804)  BP: 127/60 (11/16/24 0804)  SpO2: 98 % (11/16/24 0804) Vital Signs (24h Range):  Temp:  [97.4 °F (36.3 °C)-99.3 °F (37.4 °C)] 99.3 °F (37.4 °C)  Pulse:  [] 90  Resp:  [16-18] 18  SpO2:  [98 %-100 %] 98 %  BP: (125-138)/() 127/60     Weight: 88.3 kg (194 lb 10.7 oz)  Body mass index is 28.75 kg/m².    Intake/Output Summary (Last 24 hours) at 11/16/2024 1137  Last data filed at 11/16/2024 0538  Gross per 24 hour   Intake 1480 ml   Output 2635 ml   Net -1155 ml         Physical Exam  Vitals reviewed.   Cardiovascular:      Rate and Rhythm: Normal rate and regular rhythm.      Pulses: Normal pulses.   Pulmonary:      Effort: Pulmonary effort is normal.   Abdominal:      General: Bowel sounds are normal. There is no distension.      Tenderness: There is no abdominal tenderness.   Skin:     General: Skin is warm.      Capillary Refill: Capillary refill takes less than 2 seconds.    Neurological:      General: No focal deficit present.      Mental Status: He is alert and oriented to person, place, and time.             Significant Labs: All pertinent labs within the past 24 hours have been reviewed.    Significant Imaging: I have reviewed all pertinent imaging results/findings within the past 24 hours.

## 2024-11-16 NOTE — PLAN OF CARE
Problem: Adult Inpatient Plan of Care  Goal: Plan of Care Review  Outcome: Progressing  Goal: Patient-Specific Goal (Individualized)  Outcome: Progressing  Goal: Absence of Hospital-Acquired Illness or Injury  Outcome: Progressing  Goal: Optimal Comfort and Wellbeing  Outcome: Progressing  Goal: Readiness for Transition of Care  Outcome: Progressing   POC and meds reviewed with patient.Patient is AAOx4, on RA, no c/o pain or discomfort.Contact precaution r/t ESBL and MDRO in urines initiated.Will continue to monitor.

## 2024-11-16 NOTE — ASSESSMENT & PLAN NOTE
Anemia is likely due to chronic disease due to diabetic nephropathy . Most recent hemoglobin and hematocrit are listed below.  Recent Labs     11/14/24  0442 11/15/24  0419 11/16/24  0452   HGB 9.3* 9.3* 9.5*   HCT 29.7* 28.5* 28.9*       Plan  - Monitor serial CBC: Daily  - Transfuse PRBC if patient becomes hemodynamically unstable, symptomatic or H/H drops below 7/21.  - Patient has not received any PRBC transfusions to date

## 2024-11-17 LAB
GLUCOSE SERPL-MCNC: NORMAL MG/DL (ref 70–110)
POCT GLUCOSE: 146 MG/DL (ref 70–110)
POCT GLUCOSE: 161 MG/DL (ref 70–110)

## 2024-11-17 PROCEDURE — C1751 CATH, INF, PER/CENT/MIDLINE: HCPCS

## 2024-11-17 PROCEDURE — 11000001 HC ACUTE MED/SURG PRIVATE ROOM

## 2024-11-17 PROCEDURE — 85025 COMPLETE CBC W/AUTO DIFF WBC: CPT

## 2024-11-17 PROCEDURE — 25000003 PHARM REV CODE 250: Performed by: NURSE PRACTITIONER

## 2024-11-17 PROCEDURE — 36410 VNPNXR 3YR/> PHY/QHP DX/THER: CPT

## 2024-11-17 PROCEDURE — 36415 COLL VENOUS BLD VENIPUNCTURE: CPT

## 2024-11-17 PROCEDURE — 27000207 HC ISOLATION

## 2024-11-17 PROCEDURE — 25000003 PHARM REV CODE 250

## 2024-11-17 PROCEDURE — 76937 US GUIDE VASCULAR ACCESS: CPT

## 2024-11-17 PROCEDURE — 25000003 PHARM REV CODE 250: Performed by: STUDENT IN AN ORGANIZED HEALTH CARE EDUCATION/TRAINING PROGRAM

## 2024-11-17 PROCEDURE — 80048 BASIC METABOLIC PNL TOTAL CA: CPT

## 2024-11-17 PROCEDURE — 63600175 PHARM REV CODE 636 W HCPCS: Performed by: STUDENT IN AN ORGANIZED HEALTH CARE EDUCATION/TRAINING PROGRAM

## 2024-11-17 RX ORDER — SODIUM CHLORIDE 0.9 % (FLUSH) 0.9 %
10 SYRINGE (ML) INJECTION EVERY 12 HOURS PRN
Status: DISCONTINUED | OUTPATIENT
Start: 2024-11-17 | End: 2024-11-18 | Stop reason: HOSPADM

## 2024-11-17 RX ADMIN — DULOXETINE HYDROCHLORIDE 30 MG: 30 CAPSULE, DELAYED RELEASE ORAL at 09:11

## 2024-11-17 RX ADMIN — APIXABAN 5 MG: 5 TABLET, FILM COATED ORAL at 09:11

## 2024-11-17 RX ADMIN — PREGABALIN 50 MG: 50 CAPSULE ORAL at 02:11

## 2024-11-17 RX ADMIN — HYDROXYZINE PAMOATE 50 MG: 25 CAPSULE ORAL at 05:11

## 2024-11-17 RX ADMIN — Medication 100 MG: at 09:11

## 2024-11-17 RX ADMIN — PREGABALIN 50 MG: 50 CAPSULE ORAL at 09:11

## 2024-11-17 RX ADMIN — HYDROXYZINE PAMOATE 50 MG: 25 CAPSULE ORAL at 10:11

## 2024-11-17 RX ADMIN — Medication 400 MG: at 09:11

## 2024-11-17 RX ADMIN — CYANOCOBALAMIN TAB 1000 MCG 1000 MCG: 1000 TAB at 09:11

## 2024-11-17 RX ADMIN — TAMSULOSIN HYDROCHLORIDE 0.4 MG: 0.4 CAPSULE ORAL at 09:11

## 2024-11-17 RX ADMIN — SODIUM CHLORIDE 1 G: 9 INJECTION, SOLUTION INTRAVENOUS at 12:11

## 2024-11-17 RX ADMIN — METHOCARBAMOL 500 MG: 500 TABLET ORAL at 09:11

## 2024-11-17 RX ADMIN — PANTOPRAZOLE SODIUM 40 MG: 20 TABLET, DELAYED RELEASE ORAL at 09:11

## 2024-11-17 RX ADMIN — THERA TABS 1 TABLET: TAB at 09:11

## 2024-11-17 RX ADMIN — FOLIC ACID 1 MG: 1 TABLET ORAL at 09:11

## 2024-11-17 NOTE — PROGRESS NOTES
[facility] - Neurosurgery ([facility])  [facility] Medicine  Progress Note    Patient Name: Anthony Ball  MRN: [id number]  Patient Class: IP- Inpatient   Admission Date: 11/13/2024  Length of Stay: 4 days  Attending Physician: Rosanna Kim MD  Primary Care Provider: Isaias Myles MD        Subjective:     Principal Problem:Acute cystitis        HPI:  Anthony Ball is a 74 y.o. male with a PMHx of type 2 DM, HTN, CAD, PE, afib on eliquis, and recurrent UTI who presents to the ED for flank pain, urinary urgency, and foul smelling urine x3 days. Patient is a poor historian. He states his partner thinks he has been more confused and told him his urine smells bad again. The patient does endorse fatigue and  urinary urgency and states he has had flank pain as well, although denies it currently. He reports the pain has been so severe it has caused him to fall, so he has been staying in bed. He denies fevers/chills, CP, SOB, cough, N/V/D, abdominal pain, or dysuria. He notes chronic BLE numbness/weakness and typically uses a walker to ambulate.    In the ED, pt tachycardic otherwise vitals stable, afebrile. CBC with stable anemia. Na 135. Bicarb 17. Cr 2.7 (bl 1.2-1.3). Glucose 220. Albumin 2.9. Blood cxs in process. POC lactate 1.57. EKG with sinus rhythm, 102 bpm, non specific T wave abnormality. UA with 3+ leukocytes, >100 WBCs, and many bacteria, urine cx in process. CT AP with redemonstrated right-sided double-J ureteral stent, stable in position. No new or worsening hydronephrosis. Bilateral nephrolithiasis. Similar mild nonspecific thickening of the urinary bladder which may be related to suboptimal distension versus nonspecific cystitis. The patient received IV ciprofloxacin.    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 BC 11/13 with ESBL, repeat pending. On ertapen, ID consulted. Will need to  continue IV abx until at least 48 hours post procedure.  Discussed with urology, no inpatient intervention required at this time, patient to follow up with urology in clinic for stent management. Attempting to coordinate timing.  PMR consulted for inpatient rehab.     Interval History: Pending placement. Will need IV abx until ideally 48 hours post urologic procedure. Coordinating with urology/ID on timing.     Review of Systems   Constitutional:  Negative for activity change, chills and fatigue.   HENT:  Negative for congestion, postnasal drip, sinus pressure, sneezing and sore throat.    Respiratory:  Negative for cough, chest tightness, shortness of breath and wheezing.    Cardiovascular:  Negative for chest pain, palpitations and leg swelling.   Gastrointestinal:  Negative for abdominal pain, constipation, diarrhea, nausea and vomiting.   Genitourinary:  Negative for difficulty urinating, dysuria and flank pain.   Neurological:  Negative for tremors, syncope, speech difficulty, weakness, light-headedness and headaches.   Hematological:  Does not bruise/bleed easily.   Psychiatric/Behavioral:  Negative for agitation, behavioral problems and confusion.      Objective:     Vital Signs (Most Recent):  Temp: 98.2 °F (36.8 °C) (11/17/24 0747)  Pulse: 78 (11/17/24 0747)  Resp: 18 (11/17/24 0747)  BP: 135/73 (11/17/24 0747)  SpO2: 97 % (11/17/24 0747) Vital Signs (24h Range):  Temp:  [98 °F (36.7 °C)-98.7 °F (37.1 °C)] 98.2 °F (36.8 °C)  Pulse:  [75-95] 78  Resp:  [17-18] 18  SpO2:  [94 %-98 %] 97 %  BP: (132-146)/(65-81) 135/73     Weight: 88.3 kg (194 lb 10.7 oz)  Body mass index is 28.75 kg/m².    Intake/Output Summary (Last 24 hours) at 11/17/2024 1128  Last data filed at 11/17/2024 0600  Gross per 24 hour   Intake 540 ml   Output 1775 ml   Net -1235 ml         Physical Exam  Vitals reviewed.   Cardiovascular:      Rate and Rhythm: Normal rate and regular rhythm.      Pulses: Normal pulses.   Pulmonary:      Effort:  Pulmonary effort is normal.   Abdominal:      General: Bowel sounds are normal. There is no distension.      Tenderness: There is no abdominal tenderness.   Skin:     General: Skin is warm.      Capillary Refill: Capillary refill takes less than 2 seconds.   Neurological:      General: No focal deficit present.      Mental Status: He is alert and oriented to person, place, and time.             Significant Labs: All pertinent labs within the past 24 hours have been reviewed.    Significant Imaging: I have reviewed all pertinent imaging results/findings within the past 24 hours.    Assessment/Plan:      * Acute cystitis  Pt with flank pain, urgency, and foul smelling urine. Per family he has been more confused as well. He denies flank pain currently. 2 previous admissions in September and October for sepsis 2/2 UTI.    - Afebrile, no leukocytosis.  - Blood cx positive for ESBL, urine culture with klebsiella, on ertapenem   - Will continue ertapenam with EED 11/27, unless scheduled for urologic intervention. ID to follow up  - repeat BC NGTD  - Discussed with urology, no inpatient intervention. Patient to follow up with urology for stent management     Bacteremia  See acute cystitis       Paroxysmal A-fib  Patient has paroxysmal (<7 days) atrial fibrillation. Patient is currently in sinus rhythm. [id number] Score: 3. The patients heart rate in the last 24 hours is as follows:  Pulse  Min: 71  Max: 103     Antiarrhythmics       Anticoagulants  apixaban tablet 5 mg, 2 times daily, Oral    Plan  - Replete lytes with a goal of K>4, Mg >2  - Patient is anticoagulated, see medications listed above.  - Patient's afib is currently controlled    Alcohol abuse  -History noted. Reports he has cut back but unable to quantify.  -Nursing to assess CIWA q shift.  -MVI, folic acid, and thiamine daily.  -Discussed the dangers of continued ETOH use, encouraged cessation.    Thrombocytopenia  The likely etiology of thrombocytopenia is  infection. The patients 3 most recent labs are listed below.  Recent Labs     11/15/24  0419 11/16/24  0452   * 133*       Plan  - Will transfuse if platelet count is <50k (if undergoing surgical procedure or have active bleeding).    Right ureteral stone  - R ureteral stent in appropriate position per CT scan with no hydronephrosis  - f/u new urine and blood cultures   - outpatient follow-up for definitive stone treatment     Asthma  -No s/s of acute exacerbation.  -Continue daily inhalers.  - PRN Duo nebs    ALYSIA (acute kidney injury)  ALYSIA is likely due to pre-renal azotemia due to dehydration. Baseline creatinine is  1.2-1.3 . Most recent creatinine and eGFR are listed below.  Recent Labs     11/15/24  0419 11/16/24  0452   CREATININE 2.3* 2.1*   EGFRNORACEVR 29.4* 32.8*        Plan  - Avoid nephrotoxins and renally dose meds for GFR listed above  - Monitor urine output, serial BMP, and adjust therapy as needed  - Patient is in need of IV fluids for the treatment of ALYSIA.  Due to a shortage of IV fluids, patient to receive 1L LR bolus.  Patient must receive this treatment in a hospital location due to the risk of adverse effects, insufficient response to treatment, or other potential complications of disease such as kidney failure.     Normocytic anemia  Anemia is likely due to chronic disease due to diabetic nephropathy . Most recent hemoglobin and hematocrit are listed below.  Recent Labs     11/15/24  0419 11/16/24  0452   HGB 9.3* 9.5*   HCT 28.5* 28.9*       Plan  - Monitor serial CBC: Daily  - Transfuse PRBC if patient becomes hemodynamically unstable, symptomatic or H/H drops below 7/21.  - Patient has not received any PRBC transfusions to date    Gastroesophageal reflux disease without esophagitis  -Chronic, stable.  -Continue PPI.    Primary insomnia  On trazodone at home     -Hold trazodone in setting of fatigue and confusion, can consider restarting if improves    Recurrent falls  - suspect  acutely worsened in setting of UTI  - fall precautions  - PT/OT consult.    Essential hypertension  Patients blood pressure range in the last 24 hours was: BP  Min: 103/57  Max: 153/71.The patient's inpatient anti-hypertensive regimen is listed below:  Current Antihypertensives       Plan  - BP is controlled, no changes needed to their regimen    Diabetic polyneuropathy associated with type 2 diabetes mellitus  Diabetic polyneuropathy controlled with lyrica, cymbalta, nortryptiline at home      -Continue lyrica and cymbalta at decreased dose d/t mild confusion and ALYSIA, holding amitryptiline  -Adjust as tolerated    Type 2 diabetes mellitus with neurologic complication, without long-term current use of insulin  Patient's FSGs are controlled on current medication regimen.  Last A1c reviewed-   Lab Results   Component Value Date    HGBA1C 9.8 (H) 09/26/2024     Most recent fingerstick glucose reviewed-   Recent Labs   Lab 11/16/24  1722 11/17/24  0753   POCTGLUCOSE 189* 146*       Current correctional scale  Low  Maintain anti-hyperglycemic dose as follows-   Antihyperglycemics (From admission, onward)      Start     Stop Route Frequency Ordered    11/13/24 2157  insulin aspart U-100 pen 0-5 Units         -- SubQ Before meals & nightly PRN 11/13/24 2058          Hold Oral hypoglycemics while patient is in the hospital.  -Diabetic/cardiac diet.  -Accuchecks AC/HS  - Patient refusing insulin inpatient, will attempt to re-educate importance of well controlled BG      VTE Risk Mitigation (From admission, onward)           Ordered     apixaban tablet 5 mg  2 times daily         11/13/24 2058     IP VTE HIGH RISK PATIENT  Once         11/13/24 2058     Place sequential compression device  Until discontinued         11/13/24 2058                    Discharge Planning   CHRISTA: 11/18/2024     Code Status: Full Code   Is the patient medically ready for discharge?:     Reason for patient still in hospital (select all that apply):  Pending disposition  Discharge Plan A: Rehab                  Manolo Nguyen MD  Department of Hospital Medicine   [facility] - Neurosurgery ([facility])

## 2024-11-17 NOTE — ASSESSMENT & PLAN NOTE
Anemia is likely due to chronic disease due to diabetic nephropathy . Most recent hemoglobin and hematocrit are listed below.  Recent Labs     11/15/24  0419 11/16/24  0452   HGB 9.3* 9.5*   HCT 28.5* 28.9*       Plan  - Monitor serial CBC: Daily  - Transfuse PRBC if patient becomes hemodynamically unstable, symptomatic or H/H drops below 7/21.  - Patient has not received any PRBC transfusions to date

## 2024-11-17 NOTE — ASSESSMENT & PLAN NOTE
The likely etiology of thrombocytopenia is infection. The patients 3 most recent labs are listed below.  Recent Labs     11/15/24  0419 11/16/24  0452   * 133*       Plan  - Will transfuse if platelet count is <50k (if undergoing surgical procedure or have active bleeding).   We reviewed the pathophysiology of posterior vitreous detachment and the occasional association with retinal tear and retinal detachment less than 5% chance but CAN happen.

## 2024-11-17 NOTE — PLAN OF CARE
Infectious Disease Note      Chart has been reviewed.  Remains afebrile and without leukocytosis.     Recommendations:  OPAT plan below.  Will need midline placement for antibiotic administration.  Recommend giving empiric ertapenem starting on the day of planned urologic procedure, or at least a day prior, through the procedure, and at least 48 hours after the procedure. This was communicated to Urology.    Infectious Diseases will sign off. Please call if questions arise.  Gabriela Brady MD, [facility]  Infectious Diseases      Outpatient Antibiotic Therapy Plan:    Please send referral to [facility].    1) Infection: E coli bacteremia/E coli plus Klebsiella UTI    2) Discharge Antibiotics:    Intravenous antibiotics:  Ertapenem 1 gm IV q 24 hours     3) Therapy Duration:  14 days    Estimated end date of IV antibiotics: 11/27/24    4) Outpatient Weekly Labs:    Order the following labs to be drawn on Mondays:   CBC  CMP     5) Fax Lab Results to Infectious Diseases Provider: Dr. Nya Villaseñor/Dr. Brady    [facility] Fax Number: [phone]    6) Other orders:  Remove midline upon completion of IV antibiotics.    7) Outpatient Infectious Diseases Follow-up    Follow-up appointment will be arranged by the ID clinic and will be found in the patient's appointments tab.    Prior to discharge, please ensure the patient's follow-up has been scheduled.    If there is still no follow-up scheduled prior to discharge, please send an [employer] message to \[facility] [place] [facility]\""T Clinical Pool or Call Infectious Diseases Dept.

## 2024-11-17 NOTE — PLAN OF CARE
Problem: Adult Inpatient Plan of Care  Goal: Plan of Care Review  Outcome: Progressing     Problem: Adult Inpatient Plan of Care  Goal: Absence of Hospital-Acquired Illness or Injury  Outcome: Progressing     Problem: Diabetes Comorbidity  Goal: Blood Glucose Level Within Targeted Range  Outcome: Progressing     Problem: Sepsis/Septic Shock  Goal: Blood Glucose Level Within Targeted Range  Outcome: Progressing     Problem: Acute Kidney Injury/Impairment  Goal: Fluid and Electrolyte Balance  Outcome: Progressing     Problem: Fall Injury Risk  Goal: Absence of Fall and Fall-Related Injury  Outcome: Progressing

## 2024-11-17 NOTE — CONSULTS
SATHYAS consulted for Midline insertion in real time using u/s guidance.     Indication: IV ABX: ERTAPENEM; EED 11/27/2024  Gauge: 18  Location: LEFT BASILIC  Length in cm: 10  Max dwell date: 12/16/2024  Lot #: [id number]    Image saved and uploaded to EMR

## 2024-11-17 NOTE — SUBJECTIVE & OBJECTIVE
Interval History: Pending placement. Will need IV abx until ideally 48 hours post urologic procedure. Coordinating with urology/ID on timing.     Review of Systems   Constitutional:  Negative for activity change, chills and fatigue.   HENT:  Negative for congestion, postnasal drip, sinus pressure, sneezing and sore throat.    Respiratory:  Negative for cough, chest tightness, shortness of breath and wheezing.    Cardiovascular:  Negative for chest pain, palpitations and leg swelling.   Gastrointestinal:  Negative for abdominal pain, constipation, diarrhea, nausea and vomiting.   Genitourinary:  Negative for difficulty urinating, dysuria and flank pain.   Neurological:  Negative for tremors, syncope, speech difficulty, weakness, light-headedness and headaches.   Hematological:  Does not bruise/bleed easily.   Psychiatric/Behavioral:  Negative for agitation, behavioral problems and confusion.      Objective:     Vital Signs (Most Recent):  Temp: 98.2 °F (36.8 °C) (11/17/24 0747)  Pulse: 78 (11/17/24 0747)  Resp: 18 (11/17/24 0747)  BP: 135/73 (11/17/24 0747)  SpO2: 97 % (11/17/24 0747) Vital Signs (24h Range):  Temp:  [98 °F (36.7 °C)-98.7 °F (37.1 °C)] 98.2 °F (36.8 °C)  Pulse:  [75-95] 78  Resp:  [17-18] 18  SpO2:  [94 %-98 %] 97 %  BP: (132-146)/(65-81) 135/73     Weight: 88.3 kg (194 lb 10.7 oz)  Body mass index is 28.75 kg/m².    Intake/Output Summary (Last 24 hours) at 11/17/2024 1128  Last data filed at 11/17/2024 0600  Gross per 24 hour   Intake 540 ml   Output 1775 ml   Net -1235 ml         Physical Exam  Vitals reviewed.   Cardiovascular:      Rate and Rhythm: Normal rate and regular rhythm.      Pulses: Normal pulses.   Pulmonary:      Effort: Pulmonary effort is normal.   Abdominal:      General: Bowel sounds are normal. There is no distension.      Tenderness: There is no abdominal tenderness.   Skin:     General: Skin is warm.      Capillary Refill: Capillary refill takes less than 2 seconds.    Neurological:      General: No focal deficit present.      Mental Status: He is alert and oriented to person, place, and time.             Significant Labs: All pertinent labs within the past 24 hours have been reviewed.    Significant Imaging: I have reviewed all pertinent imaging results/findings within the past 24 hours.

## 2024-11-17 NOTE — ASSESSMENT & PLAN NOTE
Patient's FSGs are controlled on current medication regimen.  Last A1c reviewed-   Lab Results   Component Value Date    HGBA1C 9.8 (H) 09/26/2024     Most recent fingerstick glucose reviewed-   Recent Labs   Lab 11/16/24  1722 11/17/24  0753   POCTGLUCOSE 189* 146*       Current correctional scale  Low  Maintain anti-hyperglycemic dose as follows-   Antihyperglycemics (From admission, onward)    Start     Stop Route Frequency Ordered    11/13/24 2157  insulin aspart U-100 pen 0-5 Units         -- SubQ Before meals & nightly PRN 11/13/24 2058        Hold Oral hypoglycemics while patient is in the hospital.  -Diabetic/cardiac diet.  -Accuchecks AC/HS  - Patient refusing insulin inpatient, will attempt to re-educate importance of well controlled BG

## 2024-11-17 NOTE — PLAN OF CARE
Problem: Adult Inpatient Plan of Care  Goal: Plan of Care Review  Outcome: Progressing  Goal: Patient-Specific Goal (Individualized)  Outcome: Progressing  Goal: Absence of Hospital-Acquired Illness or Injury  Outcome: Progressing  Goal: Optimal Comfort and Wellbeing  Outcome: Progressing  Goal: Readiness for Transition of Care  Outcome: Progressing     Problem: Diabetes Comorbidity  Goal: Blood Glucose Level Within Targeted Range  Outcome: Progressing     Problem: Sepsis/Septic Shock  Goal: Optimal Coping  Outcome: Progressing  Goal: Absence of Bleeding  Outcome: Progressing  Goal: Blood Glucose Level Within Targeted Range  Outcome: Progressing  Goal: Absence of Infection Signs and Symptoms  Outcome: Progressing  Goal: Optimal Nutrition Intake  Outcome: Progressing     Problem: Acute Kidney Injury/Impairment  Goal: Fluid and Electrolyte Balance  Outcome: Progressing  Goal: Improved Oral Intake  Outcome: Progressing  Goal: Effective Renal Function  Outcome: Progressing     Problem: Infection  Goal: Absence of Infection Signs and Symptoms  Outcome: Progressing     Problem: Fall Injury Risk  Goal: Absence of Fall and Fall-Related Injury  Outcome: Progressing

## 2024-11-17 NOTE — ASSESSMENT & PLAN NOTE
Pt with flank pain, urgency, and foul smelling urine. Per family he has been more confused as well. He denies flank pain currently. 2 previous admissions in September and October for sepsis 2/2 UTI.    - Afebrile, no leukocytosis.  - Blood cx positive for ESBL, urine culture with klebsiella, on ertapenem   - Will continue ertapenam with EED 11/27, unless scheduled for urologic intervention. ID to follow up  - repeat BC NGTD  - Discussed with urology, no inpatient intervention. Patient to follow up with urology for stent management

## 2024-11-18 VITALS
HEIGHT: 69 IN | TEMPERATURE: 99 F | SYSTOLIC BLOOD PRESSURE: 146 MMHG | HEART RATE: 95 BPM | BODY MASS INDEX: 28.84 KG/M2 | OXYGEN SATURATION: 99 % | RESPIRATION RATE: 16 BRPM | DIASTOLIC BLOOD PRESSURE: 86 MMHG | WEIGHT: 194.69 LBS

## 2024-11-18 PROBLEM — G93.41 ENCEPHALOPATHY, METABOLIC: Status: ACTIVE | Noted: 2024-11-18

## 2024-11-18 PROBLEM — R44.3 HALLUCINATION: Status: ACTIVE | Noted: 2024-11-18

## 2024-11-18 LAB
ANION GAP SERPL CALC-SCNC: 8 MMOL/L (ref 8–16)
BACTERIA BLD CULT: NORMAL
BASOPHILS # BLD AUTO: 0.04 K/UL (ref 0–0.2)
BASOPHILS NFR BLD: 0.6 % (ref 0–1.9)
BUN SERPL-MCNC: 25 MG/DL (ref 8–23)
CALCIUM SERPL-MCNC: 8.4 MG/DL (ref 8.7–10.5)
CHLORIDE SERPL-SCNC: 109 MMOL/L (ref 95–110)
CO2 SERPL-SCNC: 21 MMOL/L (ref 23–29)
CREAT SERPL-MCNC: 1.8 MG/DL (ref 0.5–1.4)
DIFFERENTIAL METHOD BLD: ABNORMAL
EOSINOPHIL # BLD AUTO: 0.6 K/UL (ref 0–0.5)
EOSINOPHIL NFR BLD: 8.7 % (ref 0–8)
ERYTHROCYTE [DISTWIDTH] IN BLOOD BY AUTOMATED COUNT: 14 % (ref 11.5–14.5)
EST. GFR  (NO RACE VARIABLE): 39.5 ML/MIN/1.73 M^2
GLUCOSE SERPL-MCNC: 138 MG/DL (ref 70–110)
HCT VFR BLD AUTO: 30.8 % (ref 40–54)
HGB BLD-MCNC: 10 G/DL (ref 14–18)
IMM GRANULOCYTES # BLD AUTO: 0.05 K/UL (ref 0–0.04)
IMM GRANULOCYTES NFR BLD AUTO: 0.7 % (ref 0–0.5)
LYMPHOCYTES # BLD AUTO: 2 K/UL (ref 1–4.8)
LYMPHOCYTES NFR BLD: 28.6 % (ref 18–48)
MCH RBC QN AUTO: 28.9 PG (ref 27–31)
MCHC RBC AUTO-ENTMCNC: 32.5 G/DL (ref 32–36)
MCV RBC AUTO: 89 FL (ref 82–98)
MONOCYTES # BLD AUTO: 0.6 K/UL (ref 0.3–1)
MONOCYTES NFR BLD: 8.8 % (ref 4–15)
NEUTROPHILS # BLD AUTO: 3.7 K/UL (ref 1.8–7.7)
NEUTROPHILS NFR BLD: 52.6 % (ref 38–73)
NRBC BLD-RTO: 0 /100 WBC
PLATELET # BLD AUTO: 151 K/UL (ref 150–450)
PMV BLD AUTO: 11.2 FL (ref 9.2–12.9)
POCT GLUCOSE: 158 MG/DL (ref 70–110)
POTASSIUM SERPL-SCNC: 4.4 MMOL/L (ref 3.5–5.1)
RBC # BLD AUTO: 3.46 M/UL (ref 4.6–6.2)
SODIUM SERPL-SCNC: 138 MMOL/L (ref 136–145)
WBC # BLD AUTO: 7.02 K/UL (ref 3.9–12.7)

## 2024-11-18 PROCEDURE — 97110 THERAPEUTIC EXERCISES: CPT

## 2024-11-18 PROCEDURE — 63600175 PHARM REV CODE 636 W HCPCS: Performed by: STUDENT IN AN ORGANIZED HEALTH CARE EDUCATION/TRAINING PROGRAM

## 2024-11-18 PROCEDURE — 25000003 PHARM REV CODE 250: Performed by: STUDENT IN AN ORGANIZED HEALTH CARE EDUCATION/TRAINING PROGRAM

## 2024-11-18 PROCEDURE — 99900035 HC TECH TIME PER 15 MIN (STAT)

## 2024-11-18 PROCEDURE — 97530 THERAPEUTIC ACTIVITIES: CPT | Mod: CO

## 2024-11-18 PROCEDURE — 25000003 PHARM REV CODE 250: Performed by: NURSE PRACTITIONER

## 2024-11-18 PROCEDURE — 25000003 PHARM REV CODE 250

## 2024-11-18 PROCEDURE — 97110 THERAPEUTIC EXERCISES: CPT | Mod: CO

## 2024-11-18 RX ADMIN — APIXABAN 5 MG: 5 TABLET, FILM COATED ORAL at 08:11

## 2024-11-18 RX ADMIN — CYANOCOBALAMIN TAB 1000 MCG 1000 MCG: 1000 TAB at 08:11

## 2024-11-18 RX ADMIN — PANTOPRAZOLE SODIUM 40 MG: 20 TABLET, DELAYED RELEASE ORAL at 08:11

## 2024-11-18 RX ADMIN — Medication 1 CAPSULE: at 11:11

## 2024-11-18 RX ADMIN — SODIUM CHLORIDE 1 G: 9 INJECTION, SOLUTION INTRAVENOUS at 12:11

## 2024-11-18 RX ADMIN — METHOCARBAMOL 500 MG: 500 TABLET ORAL at 08:11

## 2024-11-18 RX ADMIN — PREGABALIN 50 MG: 50 CAPSULE ORAL at 08:11

## 2024-11-18 RX ADMIN — THERA TABS 1 TABLET: TAB at 08:11

## 2024-11-18 RX ADMIN — FOLIC ACID 1 MG: 1 TABLET ORAL at 08:11

## 2024-11-18 RX ADMIN — Medication 100 MG: at 08:11

## 2024-11-18 RX ADMIN — HYDROXYZINE PAMOATE 50 MG: 25 CAPSULE ORAL at 08:11

## 2024-11-18 RX ADMIN — DULOXETINE HYDROCHLORIDE 30 MG: 30 CAPSULE, DELAYED RELEASE ORAL at 08:11

## 2024-11-18 NOTE — PLAN OF CARE
[facility] - Neurosurgery (Hospital)  Discharge Final Note    Primary Care Provider: Isaias Myles MD    Expected Discharge Date: 11/18/2024    Final Discharge Note (most recent)       Final Note - 11/18/24 1740          Final Note    Assessment Type Final Discharge Note (P)      Anticipated Discharge Disposition Rehab Facility (P)    [facility]    What phone number can be called within the next 1-3 days to see how you are doing after discharge? [phone] (P)         Post-Acute Status    Post-Acute Authorization Placement (P)      Post-Acute Placement Status Set-up Complete/Auth obtained (P)    [facility]    Coverage Medicare (P)      Discharge Delays None known at this time (P)                      Important Message from Medicare           Pt. [facility]. Wheelchair Van transportation arranged via [facility].     Jeanette Barillas LMSW        Problem: Patient/Family Goals  Goal: Patient/Family Long Term Goal  Patient's Long Term Goal: to go back home able to breath better. Interventions:  - diuresis with IV lasix.  IV abx.  - See additional Care Plan goals for specific interventions  Outcome: P

## 2024-11-18 NOTE — SUBJECTIVE & OBJECTIVE
Interval History: NAEON. Patient upright in bed, eating breakfast. Later in bedside recliner with partner at bedside for support. Remains afebrile, HDS, on RA. Continues on Ertapenem x 14 day course, midline placed yesterday evening. Attempting to facilitate close urology follow up on discharge for stent removal as will ideally need romero-op antibiotics. Waiting for IPR placement.      Objective:     Vital Signs (Most Recent):  Temp: 98.6 °F (37 °C) (11/18/24 1207)  Pulse: 95 (11/18/24 1207)  Resp: 16 (11/18/24 1207)  BP: (!) 146/86 (11/18/24 1207)  SpO2: 99 % (11/18/24 1207) Vital Signs (24h Range):  Temp:  [97.8 °F (36.6 °C)-99 °F (37.2 °C)] 98.6 °F (37 °C)  Pulse:  [76-95] 95  Resp:  [16-18] 16  SpO2:  [97 %-99 %] 99 %  BP: (115-157)/(57-88) 146/86     Weight: 88.3 kg (194 lb 10.7 oz)  Body mass index is 28.75 kg/m².    Intake/Output Summary (Last 24 hours) at 11/18/2024 1233  Last data filed at 11/17/2024 1835  Gross per 24 hour   Intake 240 ml   Output 950 ml   Net -710 ml         Physical Exam  Vitals reviewed.   Constitutional:       General: He is not in acute distress.     Appearance: He is not ill-appearing, toxic-appearing or diaphoretic.   HENT:      Mouth/Throat:      Mouth: Mucous membranes are moist.   Eyes:      General: No scleral icterus.        Right eye: No discharge.         Left eye: No discharge.      Extraocular Movements: Extraocular movements intact.      Conjunctiva/sclera: Conjunctivae normal.   Cardiovascular:      Rate and Rhythm: Normal rate and regular rhythm.      Heart sounds: No murmur heard.  Pulmonary:      Effort: Pulmonary effort is normal. No respiratory distress.   Musculoskeletal:      Right lower leg: No edema.      Left lower leg: No edema.   Skin:     General: Skin is warm and dry.      Capillary Refill: Capillary refill takes less than 2 seconds.      Coloration: Skin is not jaundiced.   Neurological:      General: No focal deficit present.      Mental Status: He is alert  and oriented to person, place, and time.             Significant Labs: All pertinent labs within the past 24 hours have been reviewed.    Significant Imaging: I have reviewed all pertinent imaging results/findings within the past 24 hours.

## 2024-11-18 NOTE — CARE UPDATE
I have reviewed the chart of Anthony Ball who is hospitalized for the following:    Active Hospital Problems    Diagnosis    *Acute cystitis    Hallucination    Encephalopathy, metabolic     Presented with hallucinations  Due to uti and bacteremia  On antibiotics      Bacteremia    Hypercoagulable state due to atrial fibrillation     On eliquis      Chronic anticoagulation     On eliquis      History of pulmonary embolus (PE)    Nephrolithiasis    Paroxysmal A-fib    Alcohol abuse    Thrombocytopenia    Right ureteral stone    Normocytic anemia    Asthma    ALYSIA (acute kidney injury)    Type 2 diabetes mellitus with neurologic complication, without long-term current use of insulin    Recurrent falls    Primary insomnia    Gastroesophageal reflux disease without esophagitis    Essential hypertension    Diabetic polyneuropathy associated with type 2 diabetes mellitus        Alta George NP  Unit Based DEREK

## 2024-11-18 NOTE — PLAN OF CARE
ADELAIDA met with pt at bedside to inform him that he was accepted by [facility] and will be transferred to the facility today. Pt. contacting his partner, Bulmaro of his transfer to Rehab today. ADELAIDA arranging Wheelchair Van transportation via [facility].     Jeanette Barillas LMSW

## 2024-11-18 NOTE — PLAN OF CARE
[facility]    FACILITY TRANSFER ORDERS      Patient Name: Anthony Ball  YOB: 1950    PCP: Isaias Myles MD   PCP Address: [address] [place] [id number]  PCP Phone Number: [phone]  PCP Fax: [phone]    Encounter Date: 11/18/2024    Admit to: Inpatient Rehab    Vital Signs:  Routine    Diagnoses:   Active Hospital Problems    Diagnosis  POA    *Acute cystitis [N30.00]  Yes    Bacteremia [R78.81]  Yes    Hypercoagulable state due to atrial fibrillation [D68.69, I48.91]  Yes     On eliquis      Chronic anticoagulation [Z79.01]  Not Applicable     On eliquis      History of pulmonary embolus (PE) [Z86.711]  Yes    Nephrolithiasis [N20.0]  Yes    Paroxysmal A-fib [I48.0]  Yes    Alcohol abuse [F10.10]  Yes    Thrombocytopenia [D69.6]  Yes    Right ureteral stone [N20.1]  Yes    Normocytic anemia [D64.9]  Yes     Chronic    Asthma [J45.909]  Yes    ALYSIA (acute kidney injury) [N17.9]  Yes    Type 2 diabetes mellitus with neurologic complication, without long-term current use of insulin [E11.49]  Yes     Chronic    Recurrent falls [R29.6]  Not Applicable    Primary insomnia [F51.01]  Yes     Chronic    Gastroesophageal reflux disease without esophagitis [K21.9]  Yes     Chronic    Essential hypertension [I10]  Yes    Diabetic polyneuropathy associated with type 2 diabetes mellitus [E11.42]  Yes     Chronic      Resolved Hospital Problems   No resolved problems to display.       Allergies:Review of patient's allergies indicates:  No Known Allergies    Diet: diabetic diet: 2000 calorie    Activities: Activity as tolerated    Goals of Care Treatment Preferences:  Code Status: Full Code      Nursing: per facility protocol     Labs: CBC and CMP  weekly on Mondays      CONSULTS:    Physical Therapy to evaluate and treat.  and Occupational Therapy to evaluate and treat.    MISCELLANEOUS CARE:  Diabetes Care:   Report CBG < 60 or > 350 to physician  IV antibiotics:  Outpatient  Antibiotic Therapy Plan:     Please send referral to [facility].     1) Infection: E coli bacteremia/E coli plus Klebsiella UTI     2) Discharge Antibiotics:     Intravenous antibiotics:  Ertapenem 1 gm IV q 24 hours      3) Therapy Duration:  14 days     Estimated end date of IV antibiotics: 11/27/24     4) Outpatient Weekly Labs:     Order the following labs to be drawn on Mondays:   CBC  CMP      5) Fax Lab Results to Infectious Diseases Provider: Dr. Nya Villaseñor/Dr. Brady     [facility] Fax Number: [phone]     6) Other orders:  Remove midline upon completion of IV antibiotics.     7) Outpatient Infectious Diseases Follow-up     Follow-up appointment will be arranged by the ID clinic and will be found in the patient's appointments tab.     Prior to discharge, please ensure the patient's follow-up has been scheduled.    If there is still no follow-up scheduled prior to discharge, please send an Aquto message to [facility] Clinical Pool or Call Infectious Diseases Dept.    WOUND CARE ORDERS  None    Medications: Review discharge medications with patient and family and provide education.         Medication List        START taking these medications      0.9% NaCl PgBk 100 mL with ertapenem 1 gram SolR 1 g  Inject 1 g into the vein Daily. for 9 days     Lactobacillus rhamnosus GG 10 billion cell capsule  Commonly known as: CULTURELLE  Take 1 capsule by mouth once daily. for 8 days  Start taking on: November 19, 2024            CONTINUE taking these medications      albuterol 90 mcg/actuation inhaler  Commonly known as: VENTOLIN HFA  Inhale 2 puffs into the lungs every 6 (six) hours as needed for Wheezing. Rescue     blood-glucose meter Misc  To check BG 3 times daily, to use with insurance preferred meter     busPIRone 15 MG tablet  Commonly known as: BUSPAR  Take 1 tablet (15 mg total) by mouth 2 (two) times daily as needed (Anxiety).     capsaicin 0.025 % cream  Commonly known  as: ZOSTRIX  Apply topically every 3 (three) months. (to both feet)     cyanocobalamin 1000 MCG tablet  Commonly known as: VITAMIN B-12  Take 1 tablet (1,000 mcg total) by mouth once daily.     DULoxetine 60 MG capsule  Commonly known as: CYMBALTA  Take 1 capsule (60 mg total) by mouth 2 (two) times daily.     ELIQUIS 5 mg Tab  Generic drug: apixaban  TAKE 1 TABLET(5 MG) BY MOUTH TWICE DAILY     fluticasone-umeclidin-vilanter 100-62.5-25 mcg Dsdv  Commonly known as: TRELEGY ELLIPTA  Inhale 1 puff into the lungs once daily.     FREESTYLE SOPHY 14 DAY READER Misc  Generic drug: flash glucose scanning reader  Use device to check blood glucose level 3 times daily.     * FREESTYLE SOPHY 3 SENSOR Rima  Generic drug: blood-glucose sensor  1 Device by Misc.(Non-Drug; Combo Route) route every 14 (fourteen) days.     * FREESTYLE SOPHY 3 PLUS SENSOR Rima  Generic drug: blood-glucose sensor  1 Device by Misc.(Non-Drug; Combo Route) route every 14 (fourteen) days.     hydrOXYzine pamoate 25 MG Cap  Commonly known as: VISTARIL  Take 25 mg by mouth every 8 (eight) hours as needed (Itching).     lancets 33 gauge Misc  To check BG 3 times daily, to use with insurance preferred meter     magnesium oxide 400 mg magnesium Tab  Take 1 tablet by mouth every evening.     metFORMIN 500 MG ER 24hr tablet  Commonly known as: GLUCOPHAGE-XR  Take 1 tablet (500 mg total) by mouth daily with breakfast.     methocarbamoL 750 MG Tab  Commonly known as: ROBAXIN  Take 1 tablet (750 mg total) by mouth 3 (three) times daily as needed (Back pain).     nortriptyline 10 MG capsule  Commonly known as: PAMELOR  Take 1 capsule (10 mg total) by mouth 3 (three) times daily.     ondansetron 8 MG tablet  Commonly known as: ZOFRAN  Take 1 tablet (8 mg total) by mouth every 8 (eight) hours as needed for Nausea.     pantoprazole 40 MG tablet  Commonly known as: PROTONIX  Take 1 tablet (40 mg total) by mouth once daily.     pregabalin 150 MG capsule  Commonly known  as: LYRICA  Take 1 capsule (150 mg total) by mouth 3 (three) times daily.     tamsulosin 0.4 mg Cap  Commonly known as: FLOMAX  Take 1 capsule (0.4 mg total) by mouth once daily.     traZODone 100 MG tablet  Commonly known as: DESYREL  Take 2 tablets (200 mg total) by mouth nightly as needed for Insomnia.     TRUE METRIX GLUCOSE TEST STRIP Strp  Generic drug: blood sugar diagnostic  USE TO CHECK BLOOD SUGAR THREE TIMES DAILY OR AS DIRECTED     UNABLE TO FIND  OMEGA XL CAP - Take 1 capsule by mouth once daily.           * This list has 2 medication(s) that are the same as other medications prescribed for you. Read the directions carefully, and ask your doctor or other care provider to review them with you.                STOP taking these medications      betamethasone valerate 0.1% 0.1 % Crea  Commonly known as: VALISONE     DEXCOM  Misc  Generic drug: blood-glucose meter,continuous     oxybutynin 5 MG Tr24  Commonly known as: DITROPAN-XL     SITagliptin phosphate 100 MG Tab  Commonly known as: JANUVIA                Immunizations Administered as of 11/18/2024       Name Date Dose VIS Date Route Exp Date    COVID-19, MRNA, LN-S, PF (Pfizer) (Purple Cap) 12/13/2022 0.3 mL -- -- --    Lot: [id number]     External: Auto Reconciled From Outside Source     COVID-19, MRNA, LN-S, PF (Pfizer) (Purple Cap) 3/24/2022 0.3 mL -- -- --    Lot: [id number]     External: Auto Reconciled From Outside Source     COVID-19, MRNA, LN-S, PF (Pfizer) (Purple Cap) 8/17/2021 10:38 AM 0.3 mL 12/12/2020 Intramuscular 10/31/2021    Site: Left deltoid     Given By: Saira Saunders RN     : [employer]     Lot: [id number]     COVID-19, MRNA, LN-S, PF (Pfizer) (Purple Cap) 1/8/2021 -- -- -- --    COVID-19, MRNA, LN-S, PF (Pfizer) (Purple Cap) 1/8/2021 -- -- -- --    COVID-19, MRNA, LN-S, PF (Pfizer) (Purple Cap) 12/18/2020 -- -- -- --    COVID-19, MRNA, LN-S, PF (Pfizer) (Purple Cap) 12/18/2020 -- -- -- --            Some patients may  experience side effects after vaccination.  These may include fever, headache, muscle or joint aches.  Most symptoms resolve with 24-48 hours and do not require urgent medical evaluation unless they persist for more than 72 hours or symptoms are concerning for an unrelated medical condition.          _________________________________  Nito Monzon, DO  11/18/2024

## 2024-11-18 NOTE — PT/OT/SLP PROGRESS
"Physical Therapy Treatment    Patient Name:  Anthony Ball   MRN:  [id number]    Recommendations:     Discharge Recommendations: High Intensity Therapy  Discharge Equipment Recommendations: none  Barriers to discharge: None    Assessment:     Anthony Ball is a 74 y.o. male admitted with a medical diagnosis of Acute cystitis.  He presents with the following impairments/functional limitations: weakness, impaired endurance, impaired self care skills, impaired functional mobility, gait instability, impaired balance. Patient continues to demonstrate LE weakness and was able to practice sit to stand transfers without UE use, requiring moderate assistance for weight shifting and to achieve full upright stance. He engaged in ambulation trials with and without 2ww displaying improved gait pattern and safety with walker use. Overall patient is making progress toward goals and remains motivated.      Rehab Prognosis: Good; patient would benefit from acute skilled PT services to address these deficits and reach maximum level of function.    Recent Surgery: * No surgery found *      Plan:     During this hospitalization, patient to be seen 4 x/week to address the identified rehab impairments via gait training, therapeutic activities, therapeutic exercises, neuromuscular re-education and progress toward the following goals:    Plan of Care Expires:  12/14/24    Subjective     Chief Complaint: weakness  Patient/Family Comments/goals:   "I need to at least get to a cane" patient has goal of weaning from walker to cane for ambulation.   "Do you know why they make you wear a hospital gown? So you can't escape".   Pain/Comfort:  Pain Rating 1: 0/10  Pain Rating Post-Intervention 1: 0/10      Objective:     Communicated with RN prior to session.  Patient found HOB elevated with  (no active lines) upon PT entry to room.     General Precautions: Standard, fall  Orthopedic Precautions: N/A  Braces: N/A  Respiratory Status: Room air   "   Functional Mobility:  Bed Mobility:     Scooting: stand by assistance  Supine to Sit: stand by assistance  Sit to Supine: stand by assistance  Transfers:     Sit to Stand:    minimum assistance with rolling walker  Moderate assistance with no AD  Gait: patient ambulated 10' with 2ww and CGA  Patient ambulated 5' with minimal assistance and HHA  Deviations Noted: decreased gait speed, decreased step height R,L, and decreased step length R, L   Balance:   Sitting static: independent  Sitting dynamic: supervision  Standing static: CGA with 2ww      AM-PAC 6 CLICK MOBILITY  Turning over in bed (including adjusting bedclothes, sheets and blankets)?: 3  Sitting down on and standing up from a chair with arms (e.g., wheelchair, bedside commode, etc.): 2  Moving from lying on back to sitting on the side of the bed?: 3  Moving to and from a bed to a chair (including a wheelchair)?: 3  Need to walk in hospital room?: 3  Climbing 3-5 steps with a railing?: 3  Basic Mobility Total Score: 17       Treatment & Education:  STS x10, 5 without UE support, Verbal and tactile cues with assist during STS transfer for optimal SIERRA prior to transfer, forward weight shift to initiate, to engage LE mm, extend hips forward and bring head and chest up to achieve full upright stance.    Cues during ambulation for sequencing, weight shifting, upright posture throughout, appropriate step length and height.      Patient left HOB elevated with all lines intact and call button in reach..    GOALS:   Multidisciplinary Problems       Physical Therapy Goals          Problem: Physical Therapy    Goal Priority Disciplines Outcome Interventions   Physical Therapy Goal     PT, PT/OT Progressing    Description: Goals to be met by: 2024     Patient will increase functional independence with mobility by performin. Supine to sit with [place]  2. Sit to supine with [place]  3. Sit to stand transfer with Supervision  4. Gait  x 200 feet  with Supervision using LRAD.   5. Ascend/descend 5 stair with left Handrails Minimal Assistance using LRAD.   6. Lower extremity exercise program x15 reps per handout, with independence                         Time Tracking:     PT Received On: 11/18/24  PT Start Time: 1426     PT Stop Time: 1456  PT Total Time (min): 30 min     Billable Minutes: Therapeutic Exercise 30 minutes    Treatment Type: Treatment  PT/PTA: PT     Number of PTA visits since last PT visit: 0     11/18/2024

## 2024-11-18 NOTE — DISCHARGE SUMMARY
[facility]  [facility]  Discharge Summary      Patient Name: Anthony Ball  MRN: [id number]  DA: [id number]  Patient Class: IP- Inpatient  Admission Date: 11/13/2024  Hospital Length of Stay: 5 days  Discharge Date and Time:  11/18/2024 1:02 PM  Attending Physician: Rosanna Kim MD   Discharging Provider: Nito Monzon DO  Primary Care Provider: Isaias Myles MD  [facility]: [facility] Nito Monzon DO  Primary Care Team: [facility] V    HPI:   Anthony Ball is a 74 y.o. male with a PMHx of type 2 DM, HTN, CAD, PE, afib on eliquis, and recurrent UTI who presents to the ED for flank pain, urinary urgency, and foul smelling urine x3 days. Patient is a poor historian. He states his partner thinks he has been more confused and told him his urine smells bad again. The patient does endorse fatigue and  urinary urgency and states he has had flank pain as well, although denies it currently. He reports the pain has been so severe it has caused him to fall, so he has been staying in bed. He denies fevers/chills, CP, SOB, cough, N/V/D, abdominal pain, or dysuria. He notes chronic BLE numbness/weakness and typically uses a walker to ambulate.    In the ED, pt tachycardic otherwise vitals stable, afebrile. CBC with stable anemia. Na 135. Bicarb 17. Cr 2.7 (bl 1.2-1.3). Glucose 220. Albumin 2.9. Blood cxs in process. POC lactate 1.57. EKG with sinus rhythm, 102 bpm, non specific T wave abnormality. UA with 3+ leukocytes, >100 WBCs, and many bacteria, urine cx in process. CT AP with redemonstrated right-sided double-J ureteral stent, stable in position. No new or worsening hydronephrosis. Bilateral nephrolithiasis. Similar mild nonspecific thickening of the urinary bladder which may be related to suboptimal distension versus nonspecific cystitis. The patient received IV ciprofloxacin.    * No surgery found *      Hospital Course:   Dr. Ball, admitted to  for  management of recurrent ESBL UTI in the setting of renal stones and right sided double J ureteral stents. BC 11/13 with ESBL, repeat pending. On ertapen, ID consulted. Will need to continue IV abx until at least 48 hours post procedure.  Discussed with urology, no inpatient intervention required at this time, patient to follow up with urology in clinic for stent management. Attempting to coordinate timing. PMR consulted for inpatient rehab. Patient medically stable for discharge to [facility] with ertapenem x 14 day course and urology follow up outpatient     Goals of Care Treatment Preferences:  Code Status: Full Code      SDOH Screening:  The patient was screened for utility difficulties, food insecurity, transport difficulties, housing insecurity, and interpersonal safety and there were no concerns identified this admission.     Consults:   Consults (From admission, onward)          Status Ordering Provider     Inpatient consult to Midline team  Once        Provider:  (Not yet assigned)    Completed JOSELITO COMER     Inpatient consult to Physical Medicine Rehab  Once        Provider:  (Not yet assigned)    Completed NEAL CHACON     Inpatient consult to Physical Medicine Rehab  Once        Provider:  (Not yet assigned)    Completed JOSELITO COMER     Inpatient consult to Infectious Diseases  Once        Provider:  (Not yet assigned)    Completed JOSELITO COMER     Inpatient consult to Urology  Once        Provider:  (Not yet assigned)    Completed JOSELITO COMER            No new Assessment & Plan notes have been filed under this hospital service since the last note was generated.  Service: Hospital Medicine    Final Active Diagnoses:    Diagnosis Date Noted POA    PRINCIPAL PROBLEM:  Acute cystitis [N30.00] 01/11/2024 Yes    Bacteremia [R78.81] 11/15/2024 Yes    Hypercoagulable state due to atrial fibrillation [D68.69, I48.91] 11/15/2024 Yes    Chronic anticoagulation [Z79.01] 11/15/2024 Not Applicable     History of pulmonary embolus (PE) [Z86.711] 11/15/2024 Yes    Nephrolithiasis [N20.0] 11/15/2024 Yes    Paroxysmal A-fib [I48.0] 11/14/2024 Yes    Alcohol abuse [F10.10] 10/22/2024 Yes    Thrombocytopenia [D69.6] 09/30/2024 Yes    Right ureteral stone [N20.1] 09/27/2024 Yes    Normocytic anemia [D64.9] 09/26/2024 Yes     Chronic    Asthma [J45.909] 09/26/2024 Yes    ALYSIA (acute kidney injury) [N17.9] 09/26/2024 Yes    Type 2 diabetes mellitus with neurologic complication, without long-term current use of insulin [E11.49] 07/08/2021 Yes     Chronic    Recurrent falls [R29.6] 07/08/2021 Not Applicable    Primary insomnia [F51.01] 07/08/2021 Yes     Chronic    Gastroesophageal reflux disease without esophagitis [K21.9] 07/08/2021 Yes     Chronic    Essential hypertension [I10] 07/08/2021 Yes    Diabetic polyneuropathy associated with type 2 diabetes mellitus [E11.42] 07/08/2021 Yes     Chronic      Problems Resolved During this Admission:       Discharged Condition: good    Disposition:     Follow Up:    Patient Instructions:   No discharge procedures on file.    Significant Diagnostic Studies: N/A    Pending Diagnostic Studies:       None           Medications:  Reconciled Home Medications:      Medication List        START taking these medications      0.9% NaCl PgBk 100 mL with ertapenem 1 gram SolR 1 g  Inject 1 g into the vein Daily. for 9 days     Lactobacillus rhamnosus GG 10 billion cell capsule  Commonly known as: CULTURELLE  Take 1 capsule by mouth once daily. for 8 days  Start taking on: November 19, 2024            CONTINUE taking these medications      albuterol 90 mcg/actuation inhaler  Commonly known as: VENTOLIN HFA  Inhale 2 puffs into the lungs every 6 (six) hours as needed for Wheezing. Rescue     blood-glucose meter Misc  To check BG 3 times daily, to use with insurance preferred meter     busPIRone 15 MG tablet  Commonly known as: BUSPAR  Take 1 tablet (15 mg total) by mouth 2 (two) times daily as  needed (Anxiety).     capsaicin 0.025 % cream  Commonly known as: ZOSTRIX  Apply topically every 3 (three) months. (to both feet)     cyanocobalamin 1000 MCG tablet  Commonly known as: VITAMIN B-12  Take 1 tablet (1,000 mcg total) by mouth once daily.     DULoxetine 60 MG capsule  Commonly known as: CYMBALTA  Take 1 capsule (60 mg total) by mouth 2 (two) times daily.     ELIQUIS 5 mg Tab  Generic drug: apixaban  TAKE 1 TABLET(5 MG) BY MOUTH TWICE DAILY     fluticasone-umeclidin-vilanter 100-62.5-25 mcg Dsdv  Commonly known as: TRELEGY ELLIPTA  Inhale 1 puff into the lungs once daily.     FREESTYLE SOPHY 14 DAY READER Misc  Generic drug: flash glucose scanning reader  Use device to check blood glucose level 3 times daily.     * FREESTYLE SOPHY 3 SENSOR Rima  Generic drug: blood-glucose sensor  1 Device by Misc.(Non-Drug; Combo Route) route every 14 (fourteen) days.     * FREESTYLE SOPHY 3 PLUS SENSOR Rima  Generic drug: blood-glucose sensor  1 Device by Misc.(Non-Drug; Combo Route) route every 14 (fourteen) days.     hydrOXYzine pamoate 25 MG Cap  Commonly known as: VISTARIL  Take 25 mg by mouth every 8 (eight) hours as needed (Itching).     lancets 33 gauge Misc  To check BG 3 times daily, to use with insurance preferred meter     magnesium oxide 400 mg magnesium Tab  Take 1 tablet by mouth every evening.     metFORMIN 500 MG ER 24hr tablet  Commonly known as: GLUCOPHAGE-XR  Take 1 tablet (500 mg total) by mouth daily with breakfast.     methocarbamoL 750 MG Tab  Commonly known as: ROBAXIN  Take 1 tablet (750 mg total) by mouth 3 (three) times daily as needed (Back pain).     nortriptyline 10 MG capsule  Commonly known as: PAMELOR  Take 1 capsule (10 mg total) by mouth 3 (three) times daily.     ondansetron 8 MG tablet  Commonly known as: ZOFRAN  Take 1 tablet (8 mg total) by mouth every 8 (eight) hours as needed for Nausea.     pantoprazole 40 MG tablet  Commonly known as: PROTONIX  Take 1 tablet (40 mg total) by  mouth once daily.     pregabalin 150 MG capsule  Commonly known as: LYRICA  Take 1 capsule (150 mg total) by mouth 3 (three) times daily.     tamsulosin 0.4 mg Cap  Commonly known as: FLOMAX  Take 1 capsule (0.4 mg total) by mouth once daily.     traZODone 100 MG tablet  Commonly known as: DESYREL  Take 2 tablets (200 mg total) by mouth nightly as needed for Insomnia.     TRUE METRIX GLUCOSE TEST STRIP Strp  Generic drug: blood sugar diagnostic  USE TO CHECK BLOOD SUGAR THREE TIMES DAILY OR AS DIRECTED     UNABLE TO FIND  OMEGA XL CAP - Take 1 capsule by mouth once daily.           * This list has 2 medication(s) that are the same as other medications prescribed for you. Read the directions carefully, and ask your doctor or other care provider to review them with you.                STOP taking these medications      betamethasone valerate 0.1% 0.1 % Crea  Commonly known as: VALISONE     DEXCOM  Misc  Generic drug: blood-glucose meter,continuous     oxybutynin 5 MG Tr24  Commonly known as: DITROPAN-XL     SITagliptin phosphate 100 MG Tab  Commonly known as: MARIA DOLORESUVIA              Indwelling Lines/Drains at time of discharge:   Lines/Drains/Airways       Drain  Duration             Male External Urinary Catheter 11/14/24 0528 4 days                    Time spent on the discharge of patient: 35 minutes         Nito Monzon DO  Department of Hospital Medicine  [facility] Neurosurgery ([facility])

## 2024-11-18 NOTE — PHYSICIAN QUERY
Please clarify if there is any clinical correlation between UTI and right sided double J ureteral stents. Are the conditions:  Due to or associated with each other

## 2024-11-18 NOTE — PT/OT/SLP PROGRESS
Occupational Therapy   Treatment  A client care conference was completed by the OTR and the AARON prior to treatment by the OTR  to discuss the patient's POC and current status.     Additional staff present: AGNIESZKA Pino    Name: Anthony Ball  MRN: [id number]  Admitting Diagnosis:  Acute cystitis       Recommendations:     Discharge Recommendations: High Intensity Therapy  Discharge Equipment Recommendations:  none  Barriers to discharge:  None    Assessment:     Anthony Ball is a 74 y.o. male with a medical diagnosis of Acute cystitis.  He presents with the following performance deficits affecting function are weakness, decreased ROM, decreased upper extremity function, decreased lower extremity function, impaired endurance, decreased safety awareness, impaired self care skills, impaired functional mobility, gait instability, impaired balance, decreased coordination.  Pt agreeable to tx session this date and able to complete therapeutic exercises this date although pt demonstrated limited strength of L UE.    Rehab Prognosis:  Good; patient would benefit from acute skilled OT services to address these deficits and reach maximum level of function.       Plan:     Patient to be seen 4 x/week to address the above listed problems via self-care/home management, therapeutic activities, therapeutic exercises, neuromuscular re-education  Plan of Care Expires: 11/28/24  Plan of Care Reviewed with: patient (family member)    Subjective     Chief Complaint: L UE weakness  Patient/Family Comments/goals: Pt reports his family member is going to assist him with a shower.  Pain/Comfort:  Pain Rating 1: 0/10    Objective:     Communicated with: nurseAlina, prior to session.  Patient found supine with peripheral IV, telemetry (condom catheter not intact) upon OT entry to room.    General Precautions: Standard, fall    Orthopedic Precautions:N/A  Braces: N/A  Respiratory Status: Room air     Occupational Performance:     Bed  Mobility:    Patient completed Scooting/Bridging with stand by assistance  Patient completed Supine to Sit with stand by assistance, HOB elevated    Functional Mobility/Transfers:  Patient completed Sit <> Stand Transfer with contact guard assistance  with  rolling walker   Patient completed Bed <> Chair Transfer using Step Transfer technique with contact guard assistance with rolling walker  Functional Mobility: Pt completed small steps (~4-5 steps) from bed > chair transfer with RW    Activities of Daily Living:  Upper Body Dressing: minimum assistance to affix back of gown  Feeding: Independent as pt self-fed grits seated in chair      [facility] 6 Click ADL: 17    Treatment & Education:  Pt performed bed mobility, functional mobility/transfers, and ADLs as documented above.   Writing therapist provided pt with red theraband and instructed pt on theraband exercises for L UE.   Pt performed AROM exercises x 10 reps ea with red therband to improve L UE strengthening: shoulder flexion, B shoulder horiz abd/add (scapula retraction), elbow flexion, external rotation with shoulder adducted; chair push-ups for tricep extension strengthening.    Pt educated and instructed on the following:  OT POC  Role of AARON  Use of call bell for all assistance  No OOB mobility without staff assistance  Theraband exercises for L UE strengthening  Addressed questions and /or concerns within AARON scope of practice  Pt verbalized understanding     Patient left up in chair with all lines intact, call button in reach, chair alarm on, nurse notified, family member present, and pt appears in NAD    GOALS:   Multidisciplinary Problems       Occupational Therapy Goals          Problem: Occupational Therapy    Goal Priority Disciplines Outcome Interventions   Occupational Therapy Goal     OT, PT/OT Progressing    Description: Goals to be met by: 11/28/24     Patient will increase functional independence with ADLs by performing:    UE Dressing with  Supervision.  LE Dressing with Supervision.  Grooming while standing at sink with Supervision.  Toileting from toilet with Supervision for hygiene and clothing management.   Sitting at edge of bed x5 minutes with Supervision.  Supine to sit with Supervision.  Step transfer with Supervision  Toilet transfer to toilet with Supervision.                         Time Tracking:     OT Date of Treatment: 11/18/24  OT Start Time: 0906  OT Stop Time: 0932  OT Total Time (min): 26 min    Billable Minutes:Therapeutic Activity 13  Therapeutic Exercise 13    OT/TOM: TOM     Number of TOM visits since last OT visit: 1    11/18/2024

## 2024-11-18 NOTE — PROGRESS NOTES
[facility] - Neurosurgery ([facility])  [facility] Medicine  Progress Note    Patient Name: Anthony Ball  MRN: [id number]  Patient Class: IP- Inpatient   Admission Date: 11/13/2024  Length of Stay: 5 days  Attending Physician: Rosanna Kim MD  Primary Care Provider: Isaias Myles MD        Subjective:     Principal Problem:Acute cystitis        HPI:  Anthony Ball is a 74 y.o. male with a PMHx of type 2 DM, HTN, CAD, PE, afib on eliquis, and recurrent UTI who presents to the ED for flank pain, urinary urgency, and foul smelling urine x3 days. Patient is a poor historian. He states his partner thinks he has been more confused and told him his urine smells bad again. The patient does endorse fatigue and  urinary urgency and states he has had flank pain as well, although denies it currently. He reports the pain has been so severe it has caused him to fall, so he has been staying in bed. He denies fevers/chills, CP, SOB, cough, N/V/D, abdominal pain, or dysuria. He notes chronic BLE numbness/weakness and typically uses a walker to ambulate.    In the ED, pt tachycardic otherwise vitals stable, afebrile. CBC with stable anemia. Na 135. Bicarb 17. Cr 2.7 (bl 1.2-1.3). Glucose 220. Albumin 2.9. Blood cxs in process. POC lactate 1.57. EKG with sinus rhythm, 102 bpm, non specific T wave abnormality. UA with 3+ leukocytes, >100 WBCs, and many bacteria, urine cx in process. CT AP with redemonstrated right-sided double-J ureteral stent, stable in position. No new or worsening hydronephrosis. Bilateral nephrolithiasis. Similar mild nonspecific thickening of the urinary bladder which may be related to suboptimal distension versus nonspecific cystitis. The patient received IV ciprofloxacin.    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 BC 11/13 with ESBL, repeat pending. On ertapen, ID consulted. Will need to  continue IV abx until at least 48 hours post procedure.  Discussed with urology, no inpatient intervention required at this time, patient to follow up with urology in clinic for stent management. Attempting to coordinate timing.  PMR consulted for inpatient rehab.     Interval History: NAEON. Patient upright in bed, eating breakfast. Later in bedside recliner with partner at bedside for support. Remains afebrile, HDS, on RA. Continues on Ertapenem x 14 day course, midline placed yesterday evening. Attempting to facilitate close urology follow up on discharge for stent removal as will ideally need romero-op antibiotics. Waiting for IPR placement.      Objective:     Vital Signs (Most Recent):  Temp: 98.6 °F (37 °C) (11/18/24 1207)  Pulse: 95 (11/18/24 1207)  Resp: 16 (11/18/24 1207)  BP: (!) 146/86 (11/18/24 1207)  SpO2: 99 % (11/18/24 1207) Vital Signs (24h Range):  Temp:  [97.8 °F (36.6 °C)-99 °F (37.2 °C)] 98.6 °F (37 °C)  Pulse:  [76-95] 95  Resp:  [16-18] 16  SpO2:  [97 %-99 %] 99 %  BP: (115-157)/(57-88) 146/86     Weight: 88.3 kg (194 lb 10.7 oz)  Body mass index is 28.75 kg/m².    Intake/Output Summary (Last 24 hours) at 11/18/2024 1233  Last data filed at 11/17/2024 1835  Gross per 24 hour   Intake 240 ml   Output 950 ml   Net -710 ml         Physical Exam  Vitals reviewed.   Constitutional:       General: He is not in acute distress.     Appearance: He is not ill-appearing, toxic-appearing or diaphoretic.   HENT:      Mouth/Throat:      Mouth: Mucous membranes are moist.   Eyes:      General: No scleral icterus.        Right eye: No discharge.         Left eye: No discharge.      Extraocular Movements: Extraocular movements intact.      Conjunctiva/sclera: Conjunctivae normal.   Cardiovascular:      Rate and Rhythm: Normal rate and regular rhythm.      Heart sounds: No murmur heard.  Pulmonary:      Effort: Pulmonary effort is normal. No respiratory distress.   Musculoskeletal:      Right lower leg: No edema.       Left lower leg: No edema.   Skin:     General: Skin is warm and dry.      Capillary Refill: Capillary refill takes less than 2 seconds.      Coloration: Skin is not jaundiced.   Neurological:      General: No focal deficit present.      Mental Status: He is alert and oriented to person, place, and time.             Significant Labs: All pertinent labs within the past 24 hours have been reviewed.    Significant Imaging: I have reviewed all pertinent imaging results/findings within the past 24 hours.    Assessment/Plan:      * Acute cystitis  Pt with flank pain, urgency, and foul smelling urine. Per family he has been more confused as well. He denies flank pain currently. 2 previous admissions in September and October for sepsis 2/2 UTI.    - Afebrile, no leukocytosis.  - Blood cx positive for ESBL, urine culture with klebsiella, on ertapenem   - Will continue ertapenam with EED 11/27, unless scheduled for urologic intervention. ID to follow up  - repeat BC NGTD  - Discussed with urology, no inpatient intervention. Patient to follow up with urology for stent management     Bacteremia  See acute cystitis       Paroxysmal A-fib  Patient has paroxysmal (<7 days) atrial fibrillation. Patient is currently in sinus rhythm. [id number] Score: 3. The patients heart rate in the last 24 hours is as follows:  Pulse  Min: 71  Max: 103     Antiarrhythmics       Anticoagulants  apixaban tablet 5 mg, 2 times daily, Oral    Plan  - Replete lytes with a goal of K>4, Mg >2  - Patient is anticoagulated, see medications listed above.  - Patient's afib is currently controlled    Alcohol abuse  -History noted. Reports he has cut back but unable to quantify.  -Nursing to assess CIWA q shift.  -MVI, folic acid, and thiamine daily.  -Discussed the dangers of continued ETOH use, encouraged cessation.    Thrombocytopenia  The likely etiology of thrombocytopenia is infection. The patients 3 most recent labs are listed below.  Recent Labs      11/15/24  0419 11/16/24  0452   * 133*       Plan  - Will transfuse if platelet count is <50k (if undergoing surgical procedure or have active bleeding).    Right ureteral stone  - R ureteral stent in appropriate position per CT scan with no hydronephrosis  - f/u new urine and blood cultures   - outpatient follow-up for definitive stone treatment     Asthma  -No s/s of acute exacerbation.  -Continue daily inhalers.  - PRN Duo nebs    ALYSIA (acute kidney injury)  ALYSIA is likely due to pre-renal azotemia due to dehydration. Baseline creatinine is  1.2-1.3 . Most recent creatinine and eGFR are listed below.  Recent Labs     11/15/24  0419 11/16/24  0452   CREATININE 2.3* 2.1*   EGFRNORACEVR 29.4* 32.8*        Plan  - Avoid nephrotoxins and renally dose meds for GFR listed above  - Monitor urine output, serial BMP, and adjust therapy as needed  - Patient is in need of IV fluids for the treatment of ALYSIA.  Due to a shortage of IV fluids, patient to receive 1L LR bolus.  Patient must receive this treatment in a hospital location due to the risk of adverse effects, insufficient response to treatment, or other potential complications of disease such as kidney failure.     Normocytic anemia  Anemia is likely due to chronic disease due to diabetic nephropathy . Most recent hemoglobin and hematocrit are listed below.  Recent Labs     11/15/24  0419 11/16/24  0452   HGB 9.3* 9.5*   HCT 28.5* 28.9*       Plan  - Monitor serial CBC: Daily  - Transfuse PRBC if patient becomes hemodynamically unstable, symptomatic or H/H drops below 7/21.  - Patient has not received any PRBC transfusions to date    Gastroesophageal reflux disease without esophagitis  -Chronic, stable.  -Continue PPI.    Primary insomnia  On trazodone at home     -Hold trazodone in setting of fatigue and confusion, can consider restarting if improves    Recurrent falls  - suspect acutely worsened in setting of UTI  - fall precautions  - PT/OT  consult.    Essential hypertension  Patients blood pressure range in the last 24 hours was: BP  Min: 103/57  Max: 153/71.The patient's inpatient anti-hypertensive regimen is listed below:  Current Antihypertensives       Plan  - BP is controlled, no changes needed to their regimen    Diabetic polyneuropathy associated with type 2 diabetes mellitus  Diabetic polyneuropathy controlled with lyrica, cymbalta, nortryptiline at home      -Continue lyrica and cymbalta at decreased dose d/t mild confusion and ALYSIA, holding amitryptiline  -Adjust as tolerated    Type 2 diabetes mellitus with neurologic complication, without long-term current use of insulin  Patient's FSGs are controlled on current medication regimen.  Last A1c reviewed-   Lab Results   Component Value Date    HGBA1C 9.8 (H) 09/26/2024     Most recent fingerstick glucose reviewed-   Recent Labs   Lab 11/16/24  1722 11/17/24  0753   POCTGLUCOSE 189* 146*       Current correctional scale  Low  Maintain anti-hyperglycemic dose as follows-   Antihyperglycemics (From admission, onward)      Start     Stop Route Frequency Ordered    11/13/24 2157  insulin aspart U-100 pen 0-5 Units         -- SubQ Before meals & nightly PRN 11/13/24 2058          Hold Oral hypoglycemics while patient is in the hospital.  -Diabetic/cardiac diet.  -Accuchecks AC/HS  - Patient refusing insulin inpatient, will attempt to re-educate importance of well controlled BG      VTE Risk Mitigation (From admission, onward)           Ordered     apixaban tablet 5 mg  2 times daily         11/13/24 2058     IP VTE HIGH RISK PATIENT  Once         11/13/24 2058     Place sequential compression device  Until discontinued         11/13/24 2058                    Discharge Planning   CHRISTA: 11/18/2024     Code Status: Full Code   Is the patient medically ready for discharge?:     Reason for patient still in hospital (select all that apply): Treatment, Consult recommendations, and Pending  disposition  Discharge Plan A: Rehab                  Nito Monzon DO  Department of Hospital Medicine   [facility] - Neurosurgery ([facility])

## 2024-11-19 ENCOUNTER — DOCUMENT SCAN (OUTPATIENT)
Dept: HOME HEALTH SERVICES | Facility: HOSPITAL | Age: 74
End: 2024-11-19
Payer: MEDICARE

## 2024-11-19 LAB
BACTERIA BLD CULT: NORMAL
BACTERIA BLD CULT: NORMAL

## 2024-11-24 ENCOUNTER — DOCUMENT SCAN (OUTPATIENT)
Dept: HOME HEALTH SERVICES | Facility: HOSPITAL | Age: 74
End: 2024-11-24
Payer: MEDICARE

## 2024-11-25 ENCOUNTER — TELEPHONE (OUTPATIENT)
Dept: INFECTIOUS DISEASES | Facility: CLINIC | Age: 74
End: 2024-11-25
Payer: MEDICARE

## 2024-11-25 NOTE — TELEPHONE ENCOUNTER
Put a recall in until  books open.      ----- Message from Nya Villaseñor sent at 11/25/2024  2:33 PM CST -----  Regarding: RE: Reschedule Appointment  I would ideally want to see him after his Urology appointment so you can book him when I have January availability. Thanks  ----- Message -----  From: Cathy Quinones  Sent: 11/25/2024   2:30 PM CST  To: Holley Negrete MD; Nya Villaseñor MD  Subject: RE: Reschedule Appointment                       Looks like his urology appt is 1/06, your books not open around that time yet so I can put a recall in.  ----- Message -----  From: Nya Villaseñor MD  Sent: 11/25/2024   2:23 PM CST  To: Cathy Quinones  Subject: RE: Reschedule Appointment                       Good afternoon Cathy,    Do we know when his urological procedure is? He told me he wanted to get it done sometime this month and before Thanksgiving time. He can follow up with me next month and ideally after his procedure. Thanks  ----- Message -----  From: Cathy Quinones  Sent: 11/25/2024  10:05 AM CST  To: Holley Negrete MD; Nya Villaseñor MD  Subject: Reschedule Appointment                           Good Morning ,    I just spoke to pt he's out of town and won't be able to make his appt tomorrow he'll be back Saturday. What day you would like for me to reschedule him?

## 2024-11-27 DIAGNOSIS — E11.42 TYPE 2 DIABETES MELLITUS WITH DIABETIC POLYNEUROPATHY, WITHOUT LONG-TERM CURRENT USE OF INSULIN: ICD-10-CM

## 2024-11-27 RX ORDER — FLASH GLUCOSE SCANNING READER
EACH MISCELLANEOUS
Qty: 1 EACH | Refills: 0 | Status: SHIPPED | OUTPATIENT
Start: 2024-11-27

## 2024-12-02 ENCOUNTER — PATIENT MESSAGE (OUTPATIENT)
Dept: INFECTIOUS DISEASES | Facility: CLINIC | Age: 74
End: 2024-12-02
Payer: MEDICARE

## 2024-12-02 DIAGNOSIS — N20.1 RIGHT URETERAL STONE: Primary | ICD-10-CM

## 2024-12-02 PROCEDURE — G0180 MD CERTIFICATION HHA PATIENT: HCPCS | Mod: ,,, | Performed by: PHYSICAL MEDICINE & REHABILITATION

## 2024-12-03 ENCOUNTER — TELEPHONE (OUTPATIENT)
Dept: UROLOGY | Facility: CLINIC | Age: 74
End: 2024-12-03
Payer: MEDICARE

## 2024-12-04 ENCOUNTER — TELEPHONE (OUTPATIENT)
Dept: UROLOGY | Facility: CLINIC | Age: 74
End: 2024-12-04
Payer: MEDICARE

## 2024-12-04 ENCOUNTER — CLINICAL SUPPORT (OUTPATIENT)
Dept: UROLOGY | Facility: CLINIC | Age: 74
End: 2024-12-04
Payer: MEDICARE

## 2024-12-04 ENCOUNTER — PATIENT MESSAGE (OUTPATIENT)
Dept: UROLOGY | Facility: CLINIC | Age: 74
End: 2024-12-04
Payer: MEDICARE

## 2024-12-04 DIAGNOSIS — N20.1 RIGHT URETERAL STONE: ICD-10-CM

## 2024-12-04 PROCEDURE — 87086 URINE CULTURE/COLONY COUNT: CPT | Performed by: UROLOGY

## 2024-12-04 NOTE — PROGRESS NOTES
Saw patient today. Patient did not have a katz catheter so no exchange was done. Obtained a urine culture and will send out. Made MD aware of plan change.

## 2024-12-04 NOTE — TELEPHONE ENCOUNTER
Spoke with pt spouse Bulmaro and scheduled a nurse visit for today to do a catheter exchange, urine culture.

## 2024-12-05 LAB — BACTERIA UR CULT: NORMAL

## 2024-12-06 ENCOUNTER — OUTPATIENT CASE MANAGEMENT (OUTPATIENT)
Dept: ADMINISTRATIVE | Facility: OTHER | Age: 74
End: 2024-12-06
Payer: MEDICARE

## 2024-12-10 ENCOUNTER — TELEPHONE (OUTPATIENT)
Dept: PREADMISSION TESTING | Facility: HOSPITAL | Age: 74
End: 2024-12-10
Payer: MEDICARE

## 2024-12-10 ENCOUNTER — TELEPHONE (OUTPATIENT)
Dept: INTERNAL MEDICINE | Facility: CLINIC | Age: 74
End: 2024-12-10
Payer: MEDICARE

## 2024-12-10 DIAGNOSIS — Z01.818 PRE-OP EVALUATION: Primary | ICD-10-CM

## 2024-12-10 RX ORDER — TRAZODONE HYDROCHLORIDE 100 MG/1
TABLET ORAL
Qty: 180 TABLET | Refills: 3 | Status: SHIPPED | OUTPATIENT
Start: 2024-12-10

## 2024-12-10 NOTE — TELEPHONE ENCOUNTER
----- Message from REFUGIO Pan sent at 12/9/2024  3:20 PM CST -----  Angel Luis Myles,       Your patient indicated above requires Pre-Op Clearance/ Risk Stratification for a Cystoscopy with Retrograde Pyelogram with Dr. Wagoner on 12/17/2024 (General anesthesia, 90 minutes).  Anesthesia review is in progress.     If a chart review is appropriate for clearance, please indicate in a note in the EMR.       If not, please advise timely, so that your clinic may schedule an appointment.  The following labs/tests have been ordered:   A1C    If further diagnostics are required please feel free to initiate.     Additionally, please provide anticoagulant instructions for: BARTOLO      Thank you,   Viviane Cardoza RN   [facility]

## 2024-12-10 NOTE — TELEPHONE ENCOUNTER
----- Message from REFUGIO Pan sent at 12/9/2024  3:16 PM CST -----  Surgery date: 12/17/2024  PreOp appt:none  Surgeon: Rizwana    Please call Pt and schedule the following preop appts:    Lab    Thank you!  Viviane

## 2024-12-10 NOTE — TELEPHONE ENCOUNTER
Refill Routing Note   Medication(s) are not appropriate for processing by [facility] for the following reason(s):        ED/Hospital Visit since last OV with provider  No active prescription written by provider    [facility] action(s):  Defer             Extended chart review required: Yes     Appointments  past 12m or future 3m with PCP    Date Provider   Last Visit   10/7/2024 Isaias Myles MD   Next Visit   Visit date not found Isaias Myles MD   ED visits in past 90 days: 0        Note composed:2:07 PM 12/10/2024

## 2024-12-10 NOTE — TELEPHONE ENCOUNTER
No care due was identified.  [facility] Embedded Care Due Messages. Reference number: [id number].   12/10/2024 7:55:26 AM CST

## 2024-12-13 ENCOUNTER — IMMUNIZATION (OUTPATIENT)
Dept: INTERNAL MEDICINE | Facility: CLINIC | Age: 74
End: 2024-12-13
Payer: MEDICARE

## 2024-12-13 ENCOUNTER — LAB VISIT (OUTPATIENT)
Dept: LAB | Facility: HOSPITAL | Age: 74
End: 2024-12-13
Attending: ANESTHESIOLOGY
Payer: MEDICARE

## 2024-12-13 DIAGNOSIS — Z01.818 PRE-OP EVALUATION: ICD-10-CM

## 2024-12-13 DIAGNOSIS — Z23 NEED FOR VACCINATION: Primary | ICD-10-CM

## 2024-12-13 DIAGNOSIS — E11.42 TYPE 2 DIABETES MELLITUS WITH DIABETIC POLYNEUROPATHY, WITHOUT LONG-TERM CURRENT USE OF INSULIN: Chronic | ICD-10-CM

## 2024-12-13 PROCEDURE — 36415 COLL VENOUS BLD VENIPUNCTURE: CPT | Performed by: ANESTHESIOLOGY

## 2024-12-13 PROCEDURE — 83036 HEMOGLOBIN GLYCOSYLATED A1C: CPT | Performed by: ANESTHESIOLOGY

## 2024-12-13 PROCEDURE — 80053 COMPREHEN METABOLIC PANEL: CPT | Performed by: INTERNAL MEDICINE

## 2024-12-13 PROCEDURE — 85025 COMPLETE CBC W/AUTO DIFF WBC: CPT | Performed by: INTERNAL MEDICINE

## 2024-12-14 LAB
ALBUMIN SERPL BCP-MCNC: 2.9 G/DL (ref 3.5–5.2)
ALP SERPL-CCNC: 133 U/L (ref 40–150)
ALT SERPL W/O P-5'-P-CCNC: 5 U/L (ref 10–44)
ANION GAP SERPL CALC-SCNC: 11 MMOL/L (ref 8–16)
AST SERPL-CCNC: 10 U/L (ref 10–40)
BASOPHILS # BLD AUTO: 0.05 K/UL (ref 0–0.2)
BASOPHILS NFR BLD: 0.5 % (ref 0–1.9)
BILIRUB SERPL-MCNC: 0.4 MG/DL (ref 0.1–1)
BUN SERPL-MCNC: 22 MG/DL (ref 8–23)
CALCIUM SERPL-MCNC: 8.8 MG/DL (ref 8.7–10.5)
CHLORIDE SERPL-SCNC: 103 MMOL/L (ref 95–110)
CO2 SERPL-SCNC: 19 MMOL/L (ref 23–29)
CREAT SERPL-MCNC: 1.5 MG/DL (ref 0.5–1.4)
DIFFERENTIAL METHOD BLD: ABNORMAL
EOSINOPHIL # BLD AUTO: 0.7 K/UL (ref 0–0.5)
EOSINOPHIL NFR BLD: 6.5 % (ref 0–8)
ERYTHROCYTE [DISTWIDTH] IN BLOOD BY AUTOMATED COUNT: 15.5 % (ref 11.5–14.5)
EST. GFR  (NO RACE VARIABLE): 49.2 ML/MIN/1.73 M^2
ESTIMATED AVG GLUCOSE: 137 MG/DL (ref 68–131)
GLUCOSE SERPL-MCNC: 237 MG/DL (ref 70–110)
HBA1C MFR BLD: 6.4 % (ref 4–5.6)
HCT VFR BLD AUTO: 37.1 % (ref 40–54)
HGB BLD-MCNC: 11.6 G/DL (ref 14–18)
IMM GRANULOCYTES # BLD AUTO: 0.06 K/UL (ref 0–0.04)
IMM GRANULOCYTES NFR BLD AUTO: 0.6 % (ref 0–0.5)
LYMPHOCYTES # BLD AUTO: 1.8 K/UL (ref 1–4.8)
LYMPHOCYTES NFR BLD: 17 % (ref 18–48)
MCH RBC QN AUTO: 29.1 PG (ref 27–31)
MCHC RBC AUTO-ENTMCNC: 31.3 G/DL (ref 32–36)
MCV RBC AUTO: 93 FL (ref 82–98)
MONOCYTES # BLD AUTO: 0.5 K/UL (ref 0.3–1)
MONOCYTES NFR BLD: 5 % (ref 4–15)
NEUTROPHILS # BLD AUTO: 7.4 K/UL (ref 1.8–7.7)
NEUTROPHILS NFR BLD: 70.4 % (ref 38–73)
NRBC BLD-RTO: 0 /100 WBC
PLATELET # BLD AUTO: 181 K/UL (ref 150–450)
PMV BLD AUTO: 12 FL (ref 9.2–12.9)
POTASSIUM SERPL-SCNC: 4.7 MMOL/L (ref 3.5–5.1)
PROT SERPL-MCNC: 7.3 G/DL (ref 6–8.4)
RBC # BLD AUTO: 3.99 M/UL (ref 4.6–6.2)
SODIUM SERPL-SCNC: 133 MMOL/L (ref 136–145)
WBC # BLD AUTO: 10.46 K/UL (ref 3.9–12.7)

## 2024-12-16 ENCOUNTER — TELEPHONE (OUTPATIENT)
Dept: UROLOGY | Facility: CLINIC | Age: 74
End: 2024-12-16
Payer: MEDICARE

## 2024-12-16 ENCOUNTER — PATIENT MESSAGE (OUTPATIENT)
Dept: UROLOGY | Facility: CLINIC | Age: 74
End: 2024-12-16
Payer: MEDICARE

## 2024-12-16 ENCOUNTER — TELEPHONE (OUTPATIENT)
Dept: INTERNAL MEDICINE | Facility: CLINIC | Age: 74
End: 2024-12-16
Payer: MEDICARE

## 2024-12-16 ENCOUNTER — PATIENT MESSAGE (OUTPATIENT)
Dept: INTERNAL MEDICINE | Facility: CLINIC | Age: 74
End: 2024-12-16
Payer: MEDICARE

## 2024-12-16 DIAGNOSIS — E11.42 TYPE 2 DIABETES MELLITUS WITH DIABETIC POLYNEUROPATHY, WITHOUT LONG-TERM CURRENT USE OF INSULIN: Primary | Chronic | ICD-10-CM

## 2024-12-16 RX ORDER — LINAGLIPTIN 5 MG/1
5 TABLET, FILM COATED ORAL DAILY
Qty: 90 TABLET | Refills: 3 | Status: SHIPPED | OUTPATIENT
Start: 2024-12-16 | End: 2025-12-16

## 2024-12-16 RX ORDER — INSULIN GLARGINE-YFGN 100 [IU]/ML
15 INJECTION, SOLUTION SUBCUTANEOUS DAILY
Qty: 6 ML | Refills: 2 | Status: SHIPPED | OUTPATIENT
Start: 2024-12-16

## 2024-12-16 NOTE — TELEPHONE ENCOUNTER
Scheduled for cystoscopy tomorrow. Reviewed labs and recent history. A1c back controlled and renal function improved from previous levels. 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 Notes night-time bg gets to 200 to 300's typically. Is not on Januvia anymore. We will add Tradjenta and he will continue to monitor for now. Goal is to have him off insulin again in near future. He has been holding Eliquis past 2 days in anticipation of cystoscopy. Vitals at home have been normal. Okay to proceed as planned.

## 2024-12-16 NOTE — TELEPHONE ENCOUNTER
Spoke with patient regarding surgery time and instructions. Patient informed of NPO status after midnight, location of surgery, arrival time and that a  will need to be present post operatively due to patient receiving anesthesia. Patient verbalized understanding, agreed and confirmed.

## 2024-12-17 ENCOUNTER — PATIENT MESSAGE (OUTPATIENT)
Dept: SURGERY | Facility: HOSPITAL | Age: 74
End: 2024-12-17
Payer: MEDICARE

## 2024-12-17 DIAGNOSIS — R07.81 RIB PAIN ON LEFT SIDE: ICD-10-CM

## 2024-12-17 RX ORDER — METHOCARBAMOL 750 MG/1
750 TABLET, FILM COATED ORAL 3 TIMES DAILY PRN
Qty: 90 TABLET | Refills: 2 | Status: SHIPPED | OUTPATIENT
Start: 2024-12-17

## 2024-12-17 NOTE — TELEPHONE ENCOUNTER
No care due was identified.  [facility] Embedded Care Due Messages. Reference number: [id number].   12/17/2024 10:40:44 AM CST

## 2024-12-18 ENCOUNTER — TELEPHONE (OUTPATIENT)
Dept: INTERNAL MEDICINE | Facility: CLINIC | Age: 74
End: 2024-12-18
Payer: MEDICARE

## 2024-12-18 ENCOUNTER — TELEPHONE (OUTPATIENT)
Dept: ADMINISTRATIVE | Facility: CLINIC | Age: 74
End: 2024-12-18
Payer: MEDICARE

## 2024-12-18 DIAGNOSIS — R30.0 DYSURIA: Primary | ICD-10-CM

## 2024-12-18 NOTE — TELEPHONE ENCOUNTER
Called and spoke to Bulmaro. I let him know that a home collect urine order has been put in and he can bring Mr. Ball urine to any [facility] lab once collected. He verbalized understanding.

## 2024-12-19 ENCOUNTER — LAB VISIT (OUTPATIENT)
Dept: LAB | Facility: HOSPITAL | Age: 74
End: 2024-12-19
Attending: INTERNAL MEDICINE
Payer: MEDICARE

## 2024-12-19 DIAGNOSIS — R30.0 DYSURIA: ICD-10-CM

## 2024-12-19 LAB
BACTERIA #/AREA URNS AUTO: ABNORMAL /HPF
BILIRUB UR QL STRIP: NEGATIVE
CLARITY UR REFRACT.AUTO: ABNORMAL
COLOR UR AUTO: YELLOW
GLUCOSE UR QL STRIP: NEGATIVE
HGB UR QL STRIP: ABNORMAL
KETONES UR QL STRIP: NEGATIVE
LEUKOCYTE ESTERASE UR QL STRIP: ABNORMAL
MICROSCOPIC COMMENT: ABNORMAL
NITRITE UR QL STRIP: NEGATIVE
PH UR STRIP: 6 [PH] (ref 5–8)
PROT UR QL STRIP: ABNORMAL
RBC #/AREA URNS AUTO: 9 /HPF (ref 0–4)
SP GR UR STRIP: 1.01 (ref 1–1.03)
SQUAMOUS #/AREA URNS AUTO: 1 /HPF
URN SPEC COLLECT METH UR: ABNORMAL
WBC #/AREA URNS AUTO: >100 /HPF (ref 0–5)
WBC CLUMPS UR QL AUTO: ABNORMAL

## 2024-12-19 PROCEDURE — 81001 URINALYSIS AUTO W/SCOPE: CPT | Performed by: INTERNAL MEDICINE

## 2024-12-19 PROCEDURE — 87086 URINE CULTURE/COLONY COUNT: CPT | Performed by: INTERNAL MEDICINE

## 2024-12-20 ENCOUNTER — OUTPATIENT CASE MANAGEMENT (OUTPATIENT)
Dept: ADMINISTRATIVE | Facility: OTHER | Age: 74
End: 2024-12-20
Payer: MEDICARE

## 2024-12-20 ENCOUNTER — TELEPHONE (OUTPATIENT)
Dept: INTERNAL MEDICINE | Facility: CLINIC | Age: 74
End: 2024-12-20
Payer: MEDICARE

## 2024-12-20 DIAGNOSIS — N39.0 URINARY TRACT INFECTION WITHOUT HEMATURIA, SITE UNSPECIFIED: Primary | ICD-10-CM

## 2024-12-20 RX ORDER — NITROFURANTOIN 25; 75 MG/1; MG/1
100 CAPSULE ORAL 2 TIMES DAILY
Qty: 20 CAPSULE | Refills: 0 | Status: SHIPPED | OUTPATIENT
Start: 2024-12-20 | End: 2024-12-30

## 2024-12-21 LAB
BACTERIA UR CULT: NORMAL
BACTERIA UR CULT: NORMAL

## 2024-12-23 ENCOUNTER — LAB VISIT (OUTPATIENT)
Dept: LAB | Facility: HOSPITAL | Age: 74
End: 2024-12-23
Attending: INTERNAL MEDICINE
Payer: MEDICARE

## 2024-12-23 ENCOUNTER — TELEPHONE (OUTPATIENT)
Dept: OPTOMETRY | Facility: CLINIC | Age: 74
End: 2024-12-23
Payer: MEDICARE

## 2024-12-23 ENCOUNTER — TELEPHONE (OUTPATIENT)
Dept: INTERNAL MEDICINE | Facility: CLINIC | Age: 74
End: 2024-12-23
Payer: MEDICARE

## 2024-12-23 ENCOUNTER — PATIENT MESSAGE (OUTPATIENT)
Dept: INTERNAL MEDICINE | Facility: CLINIC | Age: 74
End: 2024-12-23
Payer: MEDICARE

## 2024-12-23 DIAGNOSIS — E11.42 TYPE 2 DIABETES MELLITUS WITH DIABETIC POLYNEUROPATHY, WITHOUT LONG-TERM CURRENT USE OF INSULIN: ICD-10-CM

## 2024-12-23 DIAGNOSIS — R30.0 DYSURIA: ICD-10-CM

## 2024-12-23 DIAGNOSIS — E11.42 TYPE 2 DIABETES MELLITUS WITH DIABETIC POLYNEUROPATHY, WITHOUT LONG-TERM CURRENT USE OF INSULIN: Chronic | ICD-10-CM

## 2024-12-23 DIAGNOSIS — R30.0 DYSURIA: Primary | ICD-10-CM

## 2024-12-23 LAB
BACTERIA #/AREA URNS AUTO: ABNORMAL /HPF
BILIRUB UR QL STRIP: NEGATIVE
CLARITY UR REFRACT.AUTO: ABNORMAL
COLOR UR AUTO: YELLOW
GLUCOSE UR QL STRIP: NEGATIVE
HGB UR QL STRIP: ABNORMAL
HYALINE CASTS UR QL AUTO: 0 /LPF
KETONES UR QL STRIP: ABNORMAL
LEUKOCYTE ESTERASE UR QL STRIP: ABNORMAL
MICROSCOPIC COMMENT: ABNORMAL
NITRITE UR QL STRIP: POSITIVE
PH UR STRIP: 6 [PH] (ref 5–8)
PROT UR QL STRIP: ABNORMAL
RBC #/AREA URNS AUTO: 14 /HPF (ref 0–4)
SP GR UR STRIP: 1.01 (ref 1–1.03)
SQUAMOUS #/AREA URNS AUTO: 1 /HPF
URN SPEC COLLECT METH UR: ABNORMAL
WBC #/AREA URNS AUTO: >100 /HPF (ref 0–5)
WBC CLUMPS UR QL AUTO: ABNORMAL

## 2024-12-23 PROCEDURE — 87086 URINE CULTURE/COLONY COUNT: CPT | Performed by: INTERNAL MEDICINE

## 2024-12-23 PROCEDURE — 81001 URINALYSIS AUTO W/SCOPE: CPT | Performed by: INTERNAL MEDICINE

## 2024-12-23 RX ORDER — INSULIN ASPART 100 [IU]/ML
INJECTION, SOLUTION INTRAVENOUS; SUBCUTANEOUS
Qty: 9 ML | Refills: 5 | Status: SHIPPED | OUTPATIENT
Start: 2024-12-23 | End: 2024-12-23

## 2024-12-23 RX ORDER — HUMAN INSULIN 100 [IU]/ML
INJECTION, SOLUTION SUBCUTANEOUS
Qty: 9 ML | Refills: 3 | Status: SHIPPED | OUTPATIENT
Start: 2024-12-23

## 2024-12-24 ENCOUNTER — OFFICE VISIT (OUTPATIENT)
Dept: OPTOMETRY | Facility: CLINIC | Age: 74
End: 2024-12-24
Payer: MEDICARE

## 2024-12-24 ENCOUNTER — PATIENT MESSAGE (OUTPATIENT)
Dept: INTERNAL MEDICINE | Facility: CLINIC | Age: 74
End: 2024-12-24
Payer: MEDICARE

## 2024-12-24 DIAGNOSIS — I10 ESSENTIAL HYPERTENSION: ICD-10-CM

## 2024-12-24 DIAGNOSIS — E11.41 TYPE 2 DIABETES MELLITUS WITH DIABETIC MONONEUROPATHY, WITHOUT LONG-TERM CURRENT USE OF INSULIN: Chronic | ICD-10-CM

## 2024-12-24 DIAGNOSIS — H04.123 DRY EYE SYNDROME, BILATERAL: ICD-10-CM

## 2024-12-24 DIAGNOSIS — E11.9 TYPE 2 DIABETES MELLITUS WITHOUT OPHTHALMIC MANIFESTATIONS: ICD-10-CM

## 2024-12-24 DIAGNOSIS — Z96.1 PSEUDOPHAKIA OF BOTH EYES: ICD-10-CM

## 2024-12-24 DIAGNOSIS — H47.393 OPTIC NERVE CUPPING OF BOTH EYES: Primary | ICD-10-CM

## 2024-12-24 PROBLEM — H25.12 NUCLEAR SCLEROTIC CATARACT OF LEFT EYE: Status: RESOLVED | Noted: 2024-07-15 | Resolved: 2024-12-24

## 2024-12-24 LAB — BACTERIA UR CULT: NORMAL

## 2024-12-24 PROCEDURE — 99214 OFFICE O/P EST MOD 30 MIN: CPT | Mod: S$PBB,,, | Performed by: OPTOMETRIST

## 2024-12-24 PROCEDURE — 92133 CPTRZD OPH DX IMG PST SGM ON: CPT | Mod: PBBFAC | Performed by: OPTOMETRIST

## 2024-12-24 RX ORDER — LINAGLIPTIN 5 MG/1
5 TABLET, FILM COATED ORAL DAILY
Qty: 90 TABLET | Refills: 3 | Status: SHIPPED | OUTPATIENT
Start: 2024-12-24 | End: 2025-12-24

## 2024-12-24 NOTE — TELEPHONE ENCOUNTER
Urine cx grew multiple micro-organisms. Is on course of Macrobid. Pt doesn't feel significant dysuria now, but given the culture sensitivity hx and complicated recent  hx, will have him send an additional sample for cx.    BG running 200 to 300's. Agreed on starting short acting insulin for the time being. Suspect insulin needs will resolve once his medical status closer to baseline.

## 2024-12-24 NOTE — PROGRESS NOTES
HPI    VA OU clearly. Needs glasses  for reading.  SX S/P Phaco/ IOL OU  Eye Meds: None  Last edited by Mary Rosales on 12/24/2024 10:22 AM.            Assessment /Plan     For exam results, see Encounter Report.    Optic nerve cupping of both eyes  -     Posterior Segment OCT Optic Nerve- OD WNL, OS borderline thin temp    Type 2 diabetes mellitus with diabetic mononeuropathy, without long-term current use of insulin  Type 2 diabetes mellitus without ophthalmic manifestations  Essential hypertension   No retinopathy, monitor yearly    Pseudophakia, trace PCO OU   monitor    Dry eye syndrome, bilateral   Start PF art tears BID OU      RTC 1 year

## 2024-12-26 ENCOUNTER — TELEPHONE (OUTPATIENT)
Dept: UROLOGY | Facility: CLINIC | Age: 74
End: 2024-12-26
Payer: MEDICARE

## 2024-12-26 ENCOUNTER — PATIENT MESSAGE (OUTPATIENT)
Dept: UROLOGY | Facility: CLINIC | Age: 74
End: 2024-12-26
Payer: MEDICARE

## 2024-12-26 NOTE — TELEPHONE ENCOUNTER
Spoke with patient regarding back pain message in in basket. Patient states pain has resolved. Pt verbalized understanding of instructions to call back in the event the pain resumes. Will send message to Dr. Wagoner about rescheduling procedure cancelled due to patient fall morning of.

## 2024-12-30 DIAGNOSIS — R39.9 SYMPTOMS INVOLVING URINARY SYSTEM: Primary | ICD-10-CM

## 2024-12-30 DIAGNOSIS — N20.1 RIGHT URETERAL STONE: Primary | ICD-10-CM

## 2024-12-31 ENCOUNTER — LAB VISIT (OUTPATIENT)
Dept: LAB | Facility: HOSPITAL | Age: 74
End: 2024-12-31
Attending: UROLOGY
Payer: MEDICARE

## 2024-12-31 DIAGNOSIS — R39.9 SYMPTOMS INVOLVING URINARY SYSTEM: ICD-10-CM

## 2024-12-31 LAB
BACTERIA #/AREA URNS AUTO: ABNORMAL /HPF
BILIRUB UR QL STRIP: NEGATIVE
CLARITY UR REFRACT.AUTO: CLEAR
COLOR UR AUTO: YELLOW
GLUCOSE UR QL STRIP: NEGATIVE
HGB UR QL STRIP: ABNORMAL
KETONES UR QL STRIP: NEGATIVE
LEUKOCYTE ESTERASE UR QL STRIP: ABNORMAL
MICROSCOPIC COMMENT: ABNORMAL
NITRITE UR QL STRIP: NEGATIVE
PH UR STRIP: 6 [PH] (ref 5–8)
PROT UR QL STRIP: NEGATIVE
RBC #/AREA URNS AUTO: 3 /HPF (ref 0–4)
SP GR UR STRIP: 1.01 (ref 1–1.03)
SQUAMOUS #/AREA URNS AUTO: 0 /HPF
URN SPEC COLLECT METH UR: ABNORMAL
WBC #/AREA URNS AUTO: 52 /HPF (ref 0–5)
WBC CLUMPS UR QL AUTO: ABNORMAL

## 2024-12-31 PROCEDURE — 81001 URINALYSIS AUTO W/SCOPE: CPT | Performed by: UROLOGY

## 2024-12-31 PROCEDURE — 87186 SC STD MICRODIL/AGAR DIL: CPT | Performed by: UROLOGY

## 2024-12-31 PROCEDURE — 87086 URINE CULTURE/COLONY COUNT: CPT | Performed by: UROLOGY

## 2024-12-31 PROCEDURE — 87088 URINE BACTERIA CULTURE: CPT | Performed by: UROLOGY

## 2025-01-02 DIAGNOSIS — N20.1 RIGHT URETERAL STONE: Primary | ICD-10-CM

## 2025-01-02 RX ORDER — HYDROCODONE BITARTRATE AND ACETAMINOPHEN 5; 325 MG/1; MG/1
1 TABLET ORAL EVERY 6 HOURS PRN
Qty: 8 TABLET | Refills: 0 | Status: SHIPPED | OUTPATIENT
Start: 2025-01-02

## 2025-01-02 RX ORDER — NITROFURANTOIN 25; 75 MG/1; MG/1
100 CAPSULE ORAL 2 TIMES DAILY
Qty: 14 CAPSULE | Refills: 0 | Status: SHIPPED | OUTPATIENT
Start: 2025-01-02 | End: 2025-01-09

## 2025-01-02 NOTE — PROGRESS NOTES
Called and spoke to patient, urine growing GNRS. Will place on empiric nitrofurantoin, await final culture results, postpone 1 week. Patient also experiencing acute low back pain, non-renal colic in natrue but midline and low, currently debilitating. No fevers/chills or signs of sepsis. Will prescribe NORCO and strict ER precautions given

## 2025-01-03 LAB — BACTERIA UR CULT: ABNORMAL

## 2025-01-05 DIAGNOSIS — E11.40 PAINFUL DIABETIC NEUROPATHY: ICD-10-CM

## 2025-01-05 RX ORDER — PREGABALIN 150 MG/1
150 CAPSULE ORAL 3 TIMES DAILY
Qty: 90 CAPSULE | Refills: 2 | Status: CANCELLED | OUTPATIENT
Start: 2025-01-05

## 2025-01-06 ENCOUNTER — OFFICE VISIT (OUTPATIENT)
Dept: UROLOGY | Facility: CLINIC | Age: 75
End: 2025-01-06
Payer: MEDICARE

## 2025-01-06 ENCOUNTER — OFFICE VISIT (OUTPATIENT)
Dept: INFECTIOUS DISEASES | Facility: CLINIC | Age: 75
End: 2025-01-06
Payer: MEDICARE

## 2025-01-06 VITALS
WEIGHT: 194.69 LBS | SYSTOLIC BLOOD PRESSURE: 123 MMHG | DIASTOLIC BLOOD PRESSURE: 82 MMHG | BODY MASS INDEX: 28.84 KG/M2 | HEIGHT: 69 IN | HEART RATE: 99 BPM | TEMPERATURE: 98 F

## 2025-01-06 VITALS
BODY MASS INDEX: 28.84 KG/M2 | WEIGHT: 194.69 LBS | SYSTOLIC BLOOD PRESSURE: 101 MMHG | HEART RATE: 102 BPM | HEIGHT: 69 IN | DIASTOLIC BLOOD PRESSURE: 67 MMHG

## 2025-01-06 DIAGNOSIS — B96.29 UTI DUE TO EXTENDED-SPECTRUM BETA LACTAMASE (ESBL) PRODUCING ESCHERICHIA COLI: Primary | ICD-10-CM

## 2025-01-06 DIAGNOSIS — N39.0 UTI DUE TO EXTENDED-SPECTRUM BETA LACTAMASE (ESBL) PRODUCING ESCHERICHIA COLI: Primary | ICD-10-CM

## 2025-01-06 DIAGNOSIS — N20.1 RIGHT URETERAL STONE: Primary | ICD-10-CM

## 2025-01-06 DIAGNOSIS — Z16.12 UTI DUE TO EXTENDED-SPECTRUM BETA LACTAMASE (ESBL) PRODUCING ESCHERICHIA COLI: Primary | ICD-10-CM

## 2025-01-06 PROCEDURE — 99999 PR PBB SHADOW E&M-EST. PATIENT-LVL IV: CPT | Mod: PBBFAC,,, | Performed by: UROLOGY

## 2025-01-06 PROCEDURE — 99214 OFFICE O/P EST MOD 30 MIN: CPT | Mod: S$PBB,,, | Performed by: INTERNAL MEDICINE

## 2025-01-06 PROCEDURE — 99999 PR PBB SHADOW E&M-EST. PATIENT-LVL V: CPT | Mod: PBBFAC,,, | Performed by: INTERNAL MEDICINE

## 2025-01-06 PROCEDURE — G2211 COMPLEX E/M VISIT ADD ON: HCPCS | Mod: S$PBB,,, | Performed by: UROLOGY

## 2025-01-06 PROCEDURE — 99214 OFFICE O/P EST MOD 30 MIN: CPT | Mod: S$PBB,,, | Performed by: UROLOGY

## 2025-01-06 PROCEDURE — 99215 OFFICE O/P EST HI 40 MIN: CPT | Mod: PBBFAC | Performed by: INTERNAL MEDICINE

## 2025-01-06 PROCEDURE — 99214 OFFICE O/P EST MOD 30 MIN: CPT | Mod: PBBFAC,27 | Performed by: UROLOGY

## 2025-01-06 RX ORDER — INSULIN ASPART 100 [IU]/ML
INJECTION, SOLUTION INTRAVENOUS; SUBCUTANEOUS
Status: ON HOLD | COMMUNITY

## 2025-01-06 RX ORDER — OXYBUTYNIN CHLORIDE 5 MG/1
TABLET, EXTENDED RELEASE ORAL
Status: ON HOLD | COMMUNITY

## 2025-01-06 RX ORDER — PEN NEEDLE, DIABETIC 31 GX5/16"
NEEDLE, DISPOSABLE MISCELLANEOUS
Status: ON HOLD | COMMUNITY
Start: 2024-12-23

## 2025-01-06 RX ORDER — CIPROFLOXACIN 750 MG/1
TABLET, FILM COATED ORAL
Status: ON HOLD | COMMUNITY

## 2025-01-06 RX ORDER — HYDROXYZINE HYDROCHLORIDE 25 MG/1
TABLET, FILM COATED ORAL
Status: ON HOLD | COMMUNITY

## 2025-01-06 RX ORDER — OXYCODONE HYDROCHLORIDE 5 MG/1
5 TABLET ORAL EVERY 8 HOURS PRN
Status: ON HOLD | COMMUNITY
Start: 2024-11-23

## 2025-01-06 RX ORDER — METHOCARBAMOL 500 MG/1
500 TABLET, FILM COATED ORAL 3 TIMES DAILY
Status: ON HOLD | COMMUNITY
Start: 2024-11-23

## 2025-01-06 RX ORDER — PREGABALIN 150 MG/1
150 CAPSULE ORAL 3 TIMES DAILY
Qty: 90 CAPSULE | Refills: 2 | Status: ON HOLD | OUTPATIENT
Start: 2025-01-06

## 2025-01-06 RX ORDER — BUDESONIDE AND FORMOTEROL FUMARATE DIHYDRATE 80; 4.5 UG/1; UG/1
2 AEROSOL RESPIRATORY (INHALATION)
Status: ON HOLD | COMMUNITY
Start: 2024-11-23

## 2025-01-06 RX ORDER — FLUTICASONE PROPIONATE AND SALMETEROL 250; 50 UG/1; UG/1
POWDER RESPIRATORY (INHALATION)
Status: ON HOLD | COMMUNITY

## 2025-01-06 RX ORDER — ERTAPENEM 1 G/1
INJECTION, POWDER, LYOPHILIZED, FOR SOLUTION INTRAMUSCULAR; INTRAVENOUS
Status: ON HOLD | COMMUNITY
Start: 2024-11-22

## 2025-01-06 RX ORDER — PREGABALIN 50 MG/1
50 CAPSULE ORAL 3 TIMES DAILY
Status: ON HOLD | COMMUNITY
Start: 2024-11-23

## 2025-01-06 NOTE — PLAN OF CARE
Hospital Medicine  Direct Admission Acceptance Note    Referring Physician: Dr. Ryan Cornelius Urology    Accepting Physician: Adele Duncan MD    Date of Acceptance: 01/06/2025      Reason ESBL uti with stones requiring IV antibiotics and stone removal    Report from Physician:     75yo man with CAD, PE, Afib on apixaban, T2DM, recurrent UTIs with ESBL E coliw with prior bacteremia, who will be undergoing stone removal on Friday. Urology is requesting direct admission for management of ESBL UTI with IV antibiotics prior to planned surgical intervention. Patient is at home, afebrile.    ID note from today reviewed.    ESBL E coli UTI in the setting of nephrolithiasis and stent. Suspect stone is colonized and leading to intermittent infections as well as persistently positive urine cultures. He is pending urologic intervention this week.   Agree with admission in order to start IV antibiotics.   Start ertapenem 1 gm PO daily. If albumin is less than 3 then would start meropenem.  Would recommend continuing antibiotics through perioperative period and for at least 3 days after. Duration may be prolonged pending operative findings.   Consult ID service for antibiotic duration post procedure.      Labs:   Results for orders placed or performed in visit on 12/31/24 (from the past 5 weeks)   CULTURE, URINE    Specimen: Urine, Clean Catch   Result Value Ref Range    Urine Culture, Routine (A)      ESCHERICHIA COLI ESBL  >100,000 cfu/ml  No other significant isolate         Susceptibility    Escherichia coli ESBL - CULTURE, URINE     Amp/Sulbactam 16/8 Resistant mcg/mL     Ampicillin >16 Resistant mcg/mL     Ceftriaxone >2 Resistant mcg/mL     Ciprofloxacin >2 Resistant mcg/mL     Cefepime >16 Resistant mcg/mL     Ertapenem <=0.5 Sensitive mcg/mL     Nitrofurantoin >64 Resistant mcg/mL     Gentamicin 4 Intermediate mcg/mL     Levofloxacin >4 Resistant mcg/mL     Meropenem <=1 Sensitive mcg/mL     Piperacillin/Tazo <=8  Resistant mcg/mL     Trimeth/Sulfa >2/38 Resistant mcg/mL     Tobramycin 4 Intermediate mcg/mL   Results for orders placed or performed in visit on 12/23/24 (from the past 5 weeks)   Urine culture    Specimen: Urine   Result Value Ref Range    Urine Culture, Routine No significant growth    Results for orders placed or performed in visit on 12/19/24 (from the past 5 weeks)   Urine culture    Specimen: Urine   Result Value Ref Range    Urine Culture, Routine       Multiple organisms isolated. None in predominance.  Repeat if    Urine Culture, Routine clinically necessary.    Results for orders placed or performed in visit on 12/04/24 (from the past 5 weeks)   Urine culture    Specimen: Urine, Clean Catch   Result Value Ref Range    Urine Culture, Routine No significant growth    ]               To Do List:   - Admit to Hospital Medicine  - Consult Infectious Disease  - Consult Urology    Dr. Ryan Cornelius has advised the patient to present to the [place] [facility] [place] [facility] [place]       Upon patient arrival to floor, please send SecureChat to [facility] NICOLE Duncan MD, MPH, FACP  Senior Physician, Department of Hospital Medicine  [facility]  [address], [place]

## 2025-01-06 NOTE — PROGRESS NOTES
[facility]  Urologic Oncology      Date of Service: 01/06/2025    History of Present Illness:   History of Present Illness      CHIEF COMPLAINT:  Mr. Ball presents today for follow up after kidney stone treatment.     74-year-old male with a past medical history of insulin-dependent T2DM, CAD, prior PE on Eliquis, afib, admitted for AMS. He underwent R ureteral stent placement on 9/27 for distal R ureteral stones in the setting of urosepsis and bacteremia. Ureteroscopy has had to be rescheduled several times due to UTI's. Most recently he has a urine culture growing E. Coli ESBL sensitive only to IV Ertapenem and Meropenem.     Today he reports vague left sided abdominal pain with movement.    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 He has been using a wheelchair for mobility during this time. He also mentions having undergone a total knee replacement, which he reports is feeling well. He denies any connection between the myopathy and the knee surgery.         Imaging: I have reviewed the imaging study CT Abdomen Pelvis on 10/21/24  personally, have independently interpreted this study, and agree with the findings    Allergies:  Review of patient's allergies indicates:   Allergen Reactions    (d)-limonene flavor         Medications per EMR:  (Not in a hospital admission)      Past Medical History:  Past Medical History:   Diagnosis Date    Acute deep vein thrombosis (DVT) of left lower extremity 12/2023    Anxiety     Atrial fibrillation 12/2023    Cataract     Coronary artery disease     CAC score 1430    Depression     Diabetic neuropathy     Essential (primary) hypertension     GERD (gastroesophageal reflux disease)     History of bariatric surgery 07/08/2021    History of total right knee replacement 07/08/2021    Insomnia     Neuromyopathy     Possibly DM and/or alcohol related.    Pulmonary embolism 12/2023    Reactive  airway disease     Type 2 diabetes mellitus         Past Surgical History:  Past Surgical History:   Procedure Laterality Date    CATARACT EXTRACTION W/  INTRAOCULAR LENS IMPLANT Right 7/15/2024    Procedure: EXTRACTION, CATARACT, WITH IOL INSERTION;  Surgeon: Margot Jacobson MD;  Location: [facility] OR;  Service: Ophthalmology;  Laterality: Right;    CATARACT EXTRACTION W/  INTRAOCULAR LENS IMPLANT Left 8/29/2024    Procedure: EXTRACTION, CATARACT, WITH IOL INSERTION;  Surgeon: Margot Jacobson MD;  Location: [facility] OR;  Service: Ophthalmology;  Laterality: Left;    COLONOSCOPY      Normal around 2020    COLONOSCOPY N/A 9/6/2024    Procedure: COLONOSCOPY;  Surgeon: Alessandro Serna MD;  Location: [facility] ENDO (4TH FLR);  Service: Endoscopy;  Laterality: N/A;  8/28-new case created-lvm for pre call-pt has cataract surgery 8/29/24-tb  ref-dr rico goldstein-peg-portal  ok to hold taran Perdomo  9/5-LVM for precall-Kpvt    COLONOSCOPY N/A 9/6/2024    Procedure: COLONOSCOPY;  Surgeon: Alessandro Serna MD;  Location: [facility] ENDO (4TH FLR);  Service: Endoscopy;  Laterality: N/A;  + cologuard  8/8 ref by Isaias Goldstein MD, [facility], instr. to Aaliyah hillquis hold approval pending-  ok to hold Eliquis 2 days per Dr Goldstein-WALLACE    CYSTOSCOPY WITH URETEROSCOPY, RETROGRADE PYELOGRAPHY, AND INSERTION OF STENT Right 9/27/2024    Procedure: CYSTOSCOPY, WITH RETROGRADE PYELOGRAM AND URETERAL STENT INSERTION;  Surgeon: Joni Wagoner MD;  Location: [facility] OR 1ST FLR;  Service: Urology;  Laterality: Right;    REMOVAL, CALCULUS, BLADDER N/A 9/27/2024    Procedure: REMOVAL, CALCULUS, BLADDER;  Surgeon: Joni Wagoner MD;  Location: [facility] OR 1ST FLR;  Service: Urology;  Laterality: N/A;    TOTAL KNEE ARTHROPLASTY Right         Family History:  Family History   Problem Relation Name Age of Onset    Hypertension Mother      Colon cancer Paternal Grandfather  89        Social History:  Social History     Tobacco Use    Smoking status: Never     "Smokeless tobacco: Never   Substance Use Topics    Alcohol use: Yes     Comment: Former heavy use          OBJECTIVE:     Vitals:    01/06/25 1017   BP: 101/67   BP Location: Right arm   Patient Position: Sitting   Pulse: 102   Weight: 88.3 kg (194 lb 10.7 oz)   Height: 5' 9" (1.753 m)        Physical Exam                LABS:    CBC:  Lab Results   Component Value Date    WBC 10.46 12/13/2024    HGB 11.6 (L) 12/13/2024    HCT 37.1 (L) 12/13/2024    MCV 93 12/13/2024     12/13/2024         BMP:  Lab Results   Component Value Date     (L) 12/13/2024    K 4.7 12/13/2024     12/13/2024    CO2 19 (L) 12/13/2024    BUN 22 12/13/2024    CREATININE 1.5 (H) 12/13/2024    CALCIUM 8.8 12/13/2024    ANIONGAP 11 12/13/2024    EGFRNORACEVR 49.2 (A) 12/13/2024         ASSESSMENT/PLAN:     Assessment & Plan      RIGHT KIDNEY STONE:   - Plan for right ureteroscopy with laser lithotripsy and stone basket extraction with ureteral stent removal and ureteral stent placement.  - Patient will need a few days of IV antibiotics prior to ureteroscopy due to E. Coli ESBL UTI.   - Plan for direct admission tomorrow for IV antibiotics and plan for ureteroscopy on Friday.      FOLLOW UP:  - Follow up tomorrow for direct admit        This encounter was dictated and transcribed using DeepScribe and Fluency[employer], please excuse any typographical or grammatical errors.      "

## 2025-01-06 NOTE — LETTER
January 6, 2025        Joni Wagoner MD  [address]  5th Floor  [facility] [address]             [facility] - Infectious Disease 1st Fl  [address]  [place] [address]  Phone: [phone]  Fax: [phone]   Patient: Anthony Ball   MR Number: [id number]   YOB: 1950   Date of Visit: 1/6/2025       Dear Dr. Wagoner:    Thank you for referring Anthony Ball to me for evaluation. Below are the relevant portions of my assessment and plan of care.            If you have questions, please do not hesitate to call me. I look forward to following Anthony along with you.    Sincerely,      Gabriela Walls MD           CC  No Recipients

## 2025-01-06 NOTE — PROGRESS NOTES
Infectious Disease Clinic  [facility]    Subjective:      Patient ID:. 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 male patient with CAD, PE, Afib on Eliquis, HTN, DM2, spinal canal stenosis, recurrent UTI, and ESBL E coli bacteremia in [facility] who is seen for recurrent UTI prior to Urologic intervention. Dr. Ball has been experiencing back pain which has now radiated to his left flank. His pain began approximately 7-10 days ago however it has improved today. He has not noted any hallucinations or LUTS.      Review of Systems   Skin:  Positive for dry skin and itching.   Musculoskeletal:  Positive for back pain.   Genitourinary:  Positive for flank pain.   Psychiatric/Behavioral:  The patient is nervous/anxious.    All other systems reviewed and are negative.      Objective:     Physical Exam  Vitals reviewed.   Constitutional:       Appearance: He is well-developed.   HENT:      Head: Normocephalic and atraumatic.      Right Ear: External ear normal.      Left Ear: External ear normal.   Eyes:      Conjunctiva/sclera: Conjunctivae normal.   Neck:      Thyroid: No thyromegaly.   Cardiovascular:      Rate and Rhythm: Normal rate and regular rhythm.      Heart sounds: Normal heart sounds. No murmur heard.  Pulmonary:      Effort: Pulmonary effort is normal.      Breath sounds: Normal breath sounds. No wheezing or rales.   Abdominal:      General: Bowel sounds are normal.      Palpations: Abdomen is soft. There is no mass.      Tenderness: There is no abdominal tenderness. There is no rebound.   Skin:     General: Skin is warm and dry.   Neurological:      Mental Status: He is alert and oriented to person, place, and time.   Psychiatric:         Behavior: Behavior normal.       Assessment:       ICD-10-CM ICD-9-CM   1. UTI due to extended-spectrum beta lactamase (ESBL) producing  Escherichia coli  N39.0 599.0    B96.29 041.49    Z16.12 V09.1       Plan:   I have reviewed clinic notes, laboratory, imaging tests, and pathology from the referring provider and other providers, as indicated for this visit, with my interpretation as documented below.     Known ESBL E coli UTI in the setting of nephrolithiasis and stent. Suspect stone is colonized and leading to intermittent infections as well as persistently positive urine cultures. He is pending urologic intervention this week.   Agree with admission in order to start IV antibiotics.   Start ertapenem 1 gm PO daily. If albumin is less than 3 then would start meropenem.  Would recommend continuing antibiotics through perioperative period and for at least 3 days after. Duration may be prolonged pending operative findings.   Consult ID service for antibiotic duration post procedure.  Discussed with Dr. Wagoner    The total time for evaluation and management services performed on 1/6/25 was 30 minutes

## 2025-01-07 ENCOUNTER — PATIENT MESSAGE (OUTPATIENT)
Dept: UROLOGY | Facility: CLINIC | Age: 75
End: 2025-01-07
Payer: MEDICARE

## 2025-01-07 ENCOUNTER — TELEPHONE (OUTPATIENT)
Dept: UROLOGY | Facility: CLINIC | Age: 75
End: 2025-01-07
Payer: MEDICARE

## 2025-01-07 ENCOUNTER — HOSPITAL ENCOUNTER (INPATIENT)
Facility: HOSPITAL | Age: 75
LOS: 6 days | Discharge: HOME | DRG: 690 | End: 2025-01-13
Attending: INTERNAL MEDICINE | Admitting: INTERNAL MEDICINE
Payer: MEDICARE

## 2025-01-07 DIAGNOSIS — N20.9 UROLITHIASIS: Primary | ICD-10-CM

## 2025-01-07 DIAGNOSIS — N39.0 UTI DUE TO EXTENDED-SPECTRUM BETA LACTAMASE (ESBL) PRODUCING ESCHERICHIA COLI: ICD-10-CM

## 2025-01-07 DIAGNOSIS — R07.9 CHEST PAIN: ICD-10-CM

## 2025-01-07 DIAGNOSIS — I48.0 PAROXYSMAL ATRIAL FIBRILLATION: ICD-10-CM

## 2025-01-07 DIAGNOSIS — N39.0 URINARY TRACT INFECTION DUE TO EXTENDED-SPECTRUM BETA LACTAMASE (ESBL) PRODUCING ESCHERICHIA COLI: ICD-10-CM

## 2025-01-07 DIAGNOSIS — Z16.12 URINARY TRACT INFECTION DUE TO EXTENDED-SPECTRUM BETA LACTAMASE (ESBL) PRODUCING ESCHERICHIA COLI: ICD-10-CM

## 2025-01-07 DIAGNOSIS — B96.29 UTI DUE TO EXTENDED-SPECTRUM BETA LACTAMASE (ESBL) PRODUCING ESCHERICHIA COLI: ICD-10-CM

## 2025-01-07 DIAGNOSIS — B96.29 URINARY TRACT INFECTION DUE TO EXTENDED-SPECTRUM BETA LACTAMASE (ESBL) PRODUCING ESCHERICHIA COLI: ICD-10-CM

## 2025-01-07 DIAGNOSIS — Z16.12 UTI DUE TO EXTENDED-SPECTRUM BETA LACTAMASE (ESBL) PRODUCING ESCHERICHIA COLI: ICD-10-CM

## 2025-01-07 PROBLEM — M54.9 BACK PAIN: Status: ACTIVE | Noted: 2025-01-07

## 2025-01-07 PROBLEM — E87.1 HYPONATREMIA: Status: ACTIVE | Noted: 2025-01-07

## 2025-01-07 LAB
ALBUMIN SERPL BCP-MCNC: 2.8 G/DL (ref 3.5–5.2)
ALP SERPL-CCNC: 128 U/L (ref 40–150)
ALT SERPL W/O P-5'-P-CCNC: 6 U/L (ref 10–44)
ANION GAP SERPL CALC-SCNC: 9 MMOL/L (ref 8–16)
APTT PPP: 29.6 SEC (ref 21–32)
AST SERPL-CCNC: 11 U/L (ref 10–40)
BASOPHILS # BLD AUTO: 0.06 K/UL (ref 0–0.2)
BASOPHILS NFR BLD: 0.6 % (ref 0–1.9)
BILIRUB SERPL-MCNC: 0.4 MG/DL (ref 0.1–1)
BUN SERPL-MCNC: 19 MG/DL (ref 8–23)
CALCIUM SERPL-MCNC: 8.8 MG/DL (ref 8.7–10.5)
CHLORIDE SERPL-SCNC: 104 MMOL/L (ref 95–110)
CO2 SERPL-SCNC: 21 MMOL/L (ref 23–29)
CREAT SERPL-MCNC: 1.3 MG/DL (ref 0.5–1.4)
CRP SERPL-MCNC: 24.8 MG/L (ref 0–8.2)
DIFFERENTIAL METHOD BLD: ABNORMAL
EOSINOPHIL # BLD AUTO: 0.4 K/UL (ref 0–0.5)
EOSINOPHIL NFR BLD: 4.3 % (ref 0–8)
ERYTHROCYTE [DISTWIDTH] IN BLOOD BY AUTOMATED COUNT: 14.7 % (ref 11.5–14.5)
EST. GFR  (NO RACE VARIABLE): 58.4 ML/MIN/1.73 M^2
GLUCOSE SERPL-MCNC: 168 MG/DL (ref 70–110)
HCT VFR BLD AUTO: 35.5 % (ref 40–54)
HGB BLD-MCNC: 12 G/DL (ref 14–18)
IMM GRANULOCYTES # BLD AUTO: 0.11 K/UL (ref 0–0.04)
IMM GRANULOCYTES NFR BLD AUTO: 1.1 % (ref 0–0.5)
INR PPP: 1 (ref 0.8–1.2)
LACTATE SERPL-SCNC: 1.4 MMOL/L (ref 0.5–2.2)
LYMPHOCYTES # BLD AUTO: 1.7 K/UL (ref 1–4.8)
LYMPHOCYTES NFR BLD: 16.6 % (ref 18–48)
MAGNESIUM SERPL-MCNC: 1.7 MG/DL (ref 1.6–2.6)
MCH RBC QN AUTO: 29.8 PG (ref 27–31)
MCHC RBC AUTO-ENTMCNC: 33.8 G/DL (ref 32–36)
MCV RBC AUTO: 88 FL (ref 82–98)
MONOCYTES # BLD AUTO: 0.6 K/UL (ref 0.3–1)
MONOCYTES NFR BLD: 6.2 % (ref 4–15)
NEUTROPHILS # BLD AUTO: 7.1 K/UL (ref 1.8–7.7)
NEUTROPHILS NFR BLD: 71.2 % (ref 38–73)
NRBC BLD-RTO: 0 /100 WBC
PLATELET # BLD AUTO: 179 K/UL (ref 150–450)
PMV BLD AUTO: 10.1 FL (ref 9.2–12.9)
POCT GLUCOSE: 220 MG/DL (ref 70–110)
POTASSIUM SERPL-SCNC: 4.7 MMOL/L (ref 3.5–5.1)
PROT SERPL-MCNC: 7.3 G/DL (ref 6–8.4)
PROTHROMBIN TIME: 11 SEC (ref 9–12.5)
RBC # BLD AUTO: 4.03 M/UL (ref 4.6–6.2)
SODIUM SERPL-SCNC: 134 MMOL/L (ref 136–145)
WBC # BLD AUTO: 10.01 K/UL (ref 3.9–12.7)

## 2025-01-07 PROCEDURE — 87040 BLOOD CULTURE FOR BACTERIA: CPT | Mod: 59 | Performed by: NURSE PRACTITIONER

## 2025-01-07 PROCEDURE — 25000003 PHARM REV CODE 250: Performed by: NURSE PRACTITIONER

## 2025-01-07 PROCEDURE — 85730 THROMBOPLASTIN TIME PARTIAL: CPT | Performed by: NURSE PRACTITIONER

## 2025-01-07 PROCEDURE — 86140 C-REACTIVE PROTEIN: CPT | Performed by: NURSE PRACTITIONER

## 2025-01-07 PROCEDURE — 85025 COMPLETE CBC W/AUTO DIFF WBC: CPT | Performed by: NURSE PRACTITIONER

## 2025-01-07 PROCEDURE — 93010 ELECTROCARDIOGRAM REPORT: CPT | Mod: ,,, | Performed by: INTERNAL MEDICINE

## 2025-01-07 PROCEDURE — 63600175 PHARM REV CODE 636 W HCPCS: Performed by: NURSE PRACTITIONER

## 2025-01-07 PROCEDURE — 99223 1ST HOSP IP/OBS HIGH 75: CPT | Mod: ,,, | Performed by: UROLOGY

## 2025-01-07 PROCEDURE — 93005 ELECTROCARDIOGRAM TRACING: CPT

## 2025-01-07 PROCEDURE — 21400001 HC TELEMETRY ROOM

## 2025-01-07 PROCEDURE — 85610 PROTHROMBIN TIME: CPT | Performed by: NURSE PRACTITIONER

## 2025-01-07 PROCEDURE — 25000003 PHARM REV CODE 250

## 2025-01-07 PROCEDURE — 83605 ASSAY OF LACTIC ACID: CPT | Performed by: NURSE PRACTITIONER

## 2025-01-07 PROCEDURE — 80053 COMPREHEN METABOLIC PANEL: CPT | Performed by: NURSE PRACTITIONER

## 2025-01-07 PROCEDURE — 83735 ASSAY OF MAGNESIUM: CPT | Performed by: NURSE PRACTITIONER

## 2025-01-07 PROCEDURE — 36415 COLL VENOUS BLD VENIPUNCTURE: CPT | Performed by: NURSE PRACTITIONER

## 2025-01-07 PROCEDURE — 80321 ALCOHOLS BIOMARKERS 1OR 2: CPT | Performed by: NURSE PRACTITIONER

## 2025-01-07 RX ORDER — SODIUM CHLORIDE 0.9 % (FLUSH) 0.9 %
10 SYRINGE (ML) INJECTION
Status: DISCONTINUED | OUTPATIENT
Start: 2025-01-07 | End: 2025-01-13 | Stop reason: HOSPADM

## 2025-01-07 RX ORDER — INSULIN GLARGINE 100 [IU]/ML
10 INJECTION, SOLUTION SUBCUTANEOUS DAILY
Status: DISCONTINUED | OUTPATIENT
Start: 2025-01-08 | End: 2025-01-08

## 2025-01-07 RX ORDER — METHOCARBAMOL 750 MG/1
750 TABLET, FILM COATED ORAL ONCE
Status: COMPLETED | OUTPATIENT
Start: 2025-01-07 | End: 2025-01-07

## 2025-01-07 RX ORDER — TALC
6 POWDER (GRAM) TOPICAL NIGHTLY PRN
Status: DISCONTINUED | OUTPATIENT
Start: 2025-01-07 | End: 2025-01-13 | Stop reason: HOSPADM

## 2025-01-07 RX ORDER — METHOCARBAMOL 750 MG/1
750 TABLET, FILM COATED ORAL 3 TIMES DAILY PRN
Status: DISCONTINUED | OUTPATIENT
Start: 2025-01-07 | End: 2025-01-13 | Stop reason: HOSPADM

## 2025-01-07 RX ORDER — HYDROCODONE BITARTRATE AND ACETAMINOPHEN 5; 325 MG/1; MG/1
1 TABLET ORAL EVERY 6 HOURS PRN
Status: DISCONTINUED | OUTPATIENT
Start: 2025-01-07 | End: 2025-01-13 | Stop reason: HOSPADM

## 2025-01-07 RX ORDER — ALUMINUM HYDROXIDE, MAGNESIUM HYDROXIDE, AND SIMETHICONE 1200; 120; 1200 MG/30ML; MG/30ML; MG/30ML
30 SUSPENSION ORAL 4 TIMES DAILY PRN
Status: DISCONTINUED | OUTPATIENT
Start: 2025-01-07 | End: 2025-01-13 | Stop reason: HOSPADM

## 2025-01-07 RX ORDER — LANOLIN ALCOHOL/MO/W.PET/CERES
400 CREAM (GRAM) TOPICAL NIGHTLY
Status: DISCONTINUED | OUTPATIENT
Start: 2025-01-07 | End: 2025-01-13 | Stop reason: HOSPADM

## 2025-01-07 RX ORDER — ONDANSETRON 8 MG/1
8 TABLET, ORALLY DISINTEGRATING ORAL EVERY 8 HOURS PRN
Status: DISCONTINUED | OUTPATIENT
Start: 2025-01-07 | End: 2025-01-13 | Stop reason: HOSPADM

## 2025-01-07 RX ORDER — ACETAMINOPHEN 325 MG/1
650 TABLET ORAL EVERY 4 HOURS PRN
Status: DISCONTINUED | OUTPATIENT
Start: 2025-01-07 | End: 2025-01-13 | Stop reason: HOSPADM

## 2025-01-07 RX ORDER — GLUCAGON 1 MG
1 KIT INJECTION
Status: DISCONTINUED | OUTPATIENT
Start: 2025-01-07 | End: 2025-01-13 | Stop reason: HOSPADM

## 2025-01-07 RX ORDER — INSULIN ASPART 100 [IU]/ML
5 INJECTION, SOLUTION INTRAVENOUS; SUBCUTANEOUS ONCE
Status: COMPLETED | OUTPATIENT
Start: 2025-01-07 | End: 2025-01-07

## 2025-01-07 RX ORDER — FLUTICASONE FUROATE AND VILANTEROL 100; 25 UG/1; UG/1
1 POWDER RESPIRATORY (INHALATION) DAILY
Status: DISCONTINUED | OUTPATIENT
Start: 2025-01-08 | End: 2025-01-13 | Stop reason: HOSPADM

## 2025-01-07 RX ORDER — IBUPROFEN 200 MG
24 TABLET ORAL
Status: DISCONTINUED | OUTPATIENT
Start: 2025-01-07 | End: 2025-01-13 | Stop reason: HOSPADM

## 2025-01-07 RX ORDER — INSULIN ASPART 100 [IU]/ML
3 INJECTION, SOLUTION INTRAVENOUS; SUBCUTANEOUS
Status: DISCONTINUED | OUTPATIENT
Start: 2025-01-08 | End: 2025-01-08

## 2025-01-07 RX ORDER — SODIUM CHLORIDE 0.9 % (FLUSH) 0.9 %
10 SYRINGE (ML) INJECTION EVERY 8 HOURS
Status: DISCONTINUED | OUTPATIENT
Start: 2025-01-07 | End: 2025-01-07

## 2025-01-07 RX ORDER — HYDROXYZINE HYDROCHLORIDE 25 MG/1
25 TABLET, FILM COATED ORAL 3 TIMES DAILY PRN
Status: DISCONTINUED | OUTPATIENT
Start: 2025-01-07 | End: 2025-01-13 | Stop reason: HOSPADM

## 2025-01-07 RX ORDER — PROCHLORPERAZINE EDISYLATE 5 MG/ML
5 INJECTION INTRAMUSCULAR; INTRAVENOUS EVERY 6 HOURS PRN
Status: DISCONTINUED | OUTPATIENT
Start: 2025-01-07 | End: 2025-01-13 | Stop reason: HOSPADM

## 2025-01-07 RX ORDER — TRAZODONE HYDROCHLORIDE 100 MG/1
100 TABLET ORAL NIGHTLY PRN
Status: DISCONTINUED | OUTPATIENT
Start: 2025-01-07 | End: 2025-01-07

## 2025-01-07 RX ORDER — IPRATROPIUM BROMIDE AND ALBUTEROL SULFATE 2.5; .5 MG/3ML; MG/3ML
3 SOLUTION RESPIRATORY (INHALATION) EVERY 6 HOURS PRN
Status: DISCONTINUED | OUTPATIENT
Start: 2025-01-07 | End: 2025-01-13 | Stop reason: HOSPADM

## 2025-01-07 RX ORDER — TRAZODONE HYDROCHLORIDE 100 MG/1
200 TABLET ORAL NIGHTLY PRN
Status: DISCONTINUED | OUTPATIENT
Start: 2025-01-07 | End: 2025-01-13 | Stop reason: HOSPADM

## 2025-01-07 RX ORDER — LANOLIN ALCOHOL/MO/W.PET/CERES
1000 CREAM (GRAM) TOPICAL DAILY
Status: DISCONTINUED | OUTPATIENT
Start: 2025-01-08 | End: 2025-01-13 | Stop reason: HOSPADM

## 2025-01-07 RX ORDER — NORTRIPTYLINE HYDROCHLORIDE 10 MG/1
10 CAPSULE ORAL 3 TIMES DAILY
Status: DISCONTINUED | OUTPATIENT
Start: 2025-01-07 | End: 2025-01-13 | Stop reason: HOSPADM

## 2025-01-07 RX ORDER — DULOXETIN HYDROCHLORIDE 60 MG/1
60 CAPSULE, DELAYED RELEASE ORAL 2 TIMES DAILY
Status: DISCONTINUED | OUTPATIENT
Start: 2025-01-07 | End: 2025-01-13 | Stop reason: HOSPADM

## 2025-01-07 RX ORDER — IBUPROFEN 200 MG
16 TABLET ORAL
Status: DISCONTINUED | OUTPATIENT
Start: 2025-01-07 | End: 2025-01-13 | Stop reason: HOSPADM

## 2025-01-07 RX ORDER — PANTOPRAZOLE SODIUM 40 MG/1
40 TABLET, DELAYED RELEASE ORAL DAILY
Status: DISCONTINUED | OUTPATIENT
Start: 2025-01-08 | End: 2025-01-13 | Stop reason: HOSPADM

## 2025-01-07 RX ORDER — SIMETHICONE 80 MG
1 TABLET,CHEWABLE ORAL 4 TIMES DAILY PRN
Status: DISCONTINUED | OUTPATIENT
Start: 2025-01-07 | End: 2025-01-13 | Stop reason: HOSPADM

## 2025-01-07 RX ORDER — HYDROCODONE BITARTRATE AND ACETAMINOPHEN 7.5; 325 MG/1; MG/1
1 TABLET ORAL ONCE
Status: COMPLETED | OUTPATIENT
Start: 2025-01-07 | End: 2025-01-07

## 2025-01-07 RX ORDER — THIAMINE HCL 100 MG
100 TABLET ORAL DAILY
Status: DISCONTINUED | OUTPATIENT
Start: 2025-01-08 | End: 2025-01-13 | Stop reason: HOSPADM

## 2025-01-07 RX ORDER — POLYETHYLENE GLYCOL 3350 17 G/17G
17 POWDER, FOR SOLUTION ORAL DAILY PRN
Status: DISCONTINUED | OUTPATIENT
Start: 2025-01-07 | End: 2025-01-13 | Stop reason: HOSPADM

## 2025-01-07 RX ORDER — NALOXONE HCL 0.4 MG/ML
0.02 VIAL (ML) INJECTION
Status: DISCONTINUED | OUTPATIENT
Start: 2025-01-07 | End: 2025-01-13 | Stop reason: HOSPADM

## 2025-01-07 RX ORDER — PREGABALIN 150 MG/1
150 CAPSULE ORAL 3 TIMES DAILY
Status: DISCONTINUED | OUTPATIENT
Start: 2025-01-07 | End: 2025-01-12

## 2025-01-07 RX ORDER — FOLIC ACID 1 MG/1
1 TABLET ORAL DAILY
Status: DISCONTINUED | OUTPATIENT
Start: 2025-01-08 | End: 2025-01-13 | Stop reason: HOSPADM

## 2025-01-07 RX ORDER — INSULIN ASPART 100 [IU]/ML
0-5 INJECTION, SOLUTION INTRAVENOUS; SUBCUTANEOUS
Status: DISCONTINUED | OUTPATIENT
Start: 2025-01-07 | End: 2025-01-13 | Stop reason: HOSPADM

## 2025-01-07 RX ORDER — HEPARIN SODIUM,PORCINE/D5W 25000/250
0-40 INTRAVENOUS SOLUTION INTRAVENOUS CONTINUOUS
Status: DISCONTINUED | OUTPATIENT
Start: 2025-01-07 | End: 2025-01-11

## 2025-01-07 RX ADMIN — NORTRIPTYLINE HYDROCHLORIDE 10 MG: 10 CAPSULE ORAL at 09:01

## 2025-01-07 RX ADMIN — METHOCARBAMOL 750 MG: 750 TABLET ORAL at 06:01

## 2025-01-07 RX ADMIN — PREGABALIN 150 MG: 150 CAPSULE ORAL at 09:01

## 2025-01-07 RX ADMIN — INSULIN ASPART 5 UNITS: 100 INJECTION, SOLUTION INTRAVENOUS; SUBCUTANEOUS at 09:01

## 2025-01-07 RX ADMIN — DULOXETINE HYDROCHLORIDE 60 MG: 60 CAPSULE, DELAYED RELEASE ORAL at 09:01

## 2025-01-07 RX ADMIN — MEROPENEM 1 G: 1 INJECTION, POWDER, FOR SOLUTION INTRAVENOUS at 09:01

## 2025-01-07 RX ADMIN — Medication 400 MG: at 09:01

## 2025-01-07 RX ADMIN — HYDROCODONE BITARTRATE AND ACETAMINOPHEN 1 TABLET: 7.5; 325 TABLET ORAL at 06:01

## 2025-01-07 RX ADMIN — HEPARIN SODIUM AND DEXTROSE 12 UNITS/KG/HR: 10000; 5 INJECTION INTRAVENOUS at 09:01

## 2025-01-08 LAB
ALBUMIN SERPL BCP-MCNC: 2.5 G/DL (ref 3.5–5.2)
ALP SERPL-CCNC: 118 U/L (ref 40–150)
ALT SERPL W/O P-5'-P-CCNC: 6 U/L (ref 10–44)
ANION GAP SERPL CALC-SCNC: 8 MMOL/L (ref 8–16)
APTT PPP: 44.7 SEC (ref 21–32)
APTT PPP: 60.1 SEC (ref 21–32)
AST SERPL-CCNC: 8 U/L (ref 10–40)
BACTERIA #/AREA URNS AUTO: ABNORMAL /HPF
BASOPHILS # BLD AUTO: 0.04 K/UL (ref 0–0.2)
BASOPHILS # BLD AUTO: 0.04 K/UL (ref 0–0.2)
BASOPHILS NFR BLD: 0.5 % (ref 0–1.9)
BASOPHILS NFR BLD: 0.5 % (ref 0–1.9)
BILIRUB SERPL-MCNC: 0.3 MG/DL (ref 0.1–1)
BILIRUB UR QL STRIP: NEGATIVE
BUN SERPL-MCNC: 18 MG/DL (ref 8–23)
CALCIUM SERPL-MCNC: 8.4 MG/DL (ref 8.7–10.5)
CHLORIDE SERPL-SCNC: 106 MMOL/L (ref 95–110)
CLARITY UR REFRACT.AUTO: CLEAR
CO2 SERPL-SCNC: 20 MMOL/L (ref 23–29)
COLOR UR AUTO: YELLOW
CREAT SERPL-MCNC: 1.2 MG/DL (ref 0.5–1.4)
DIFFERENTIAL METHOD BLD: ABNORMAL
DIFFERENTIAL METHOD BLD: ABNORMAL
EOSINOPHIL # BLD AUTO: 0.5 K/UL (ref 0–0.5)
EOSINOPHIL # BLD AUTO: 0.5 K/UL (ref 0–0.5)
EOSINOPHIL NFR BLD: 6.4 % (ref 0–8)
EOSINOPHIL NFR BLD: 6.4 % (ref 0–8)
ERYTHROCYTE [DISTWIDTH] IN BLOOD BY AUTOMATED COUNT: 14.6 % (ref 11.5–14.5)
ERYTHROCYTE [DISTWIDTH] IN BLOOD BY AUTOMATED COUNT: 14.6 % (ref 11.5–14.5)
EST. GFR  (NO RACE VARIABLE): >60 ML/MIN/1.73 M^2
GLUCOSE SERPL-MCNC: 197 MG/DL (ref 70–110)
GLUCOSE UR QL STRIP: NEGATIVE
HCT VFR BLD AUTO: 32.8 % (ref 40–54)
HCT VFR BLD AUTO: 32.8 % (ref 40–54)
HGB BLD-MCNC: 10.5 G/DL (ref 14–18)
HGB BLD-MCNC: 10.5 G/DL (ref 14–18)
HGB UR QL STRIP: ABNORMAL
IMM GRANULOCYTES # BLD AUTO: 0.08 K/UL (ref 0–0.04)
IMM GRANULOCYTES # BLD AUTO: 0.08 K/UL (ref 0–0.04)
IMM GRANULOCYTES NFR BLD AUTO: 1 % (ref 0–0.5)
IMM GRANULOCYTES NFR BLD AUTO: 1 % (ref 0–0.5)
KETONES UR QL STRIP: NEGATIVE
LEUKOCYTE ESTERASE UR QL STRIP: ABNORMAL
LYMPHOCYTES # BLD AUTO: 1.8 K/UL (ref 1–4.8)
LYMPHOCYTES # BLD AUTO: 1.8 K/UL (ref 1–4.8)
LYMPHOCYTES NFR BLD: 22.5 % (ref 18–48)
LYMPHOCYTES NFR BLD: 22.5 % (ref 18–48)
MCH RBC QN AUTO: 28.8 PG (ref 27–31)
MCH RBC QN AUTO: 28.8 PG (ref 27–31)
MCHC RBC AUTO-ENTMCNC: 32 G/DL (ref 32–36)
MCHC RBC AUTO-ENTMCNC: 32 G/DL (ref 32–36)
MCV RBC AUTO: 90 FL (ref 82–98)
MCV RBC AUTO: 90 FL (ref 82–98)
MICROSCOPIC COMMENT: ABNORMAL
MONOCYTES # BLD AUTO: 0.5 K/UL (ref 0.3–1)
MONOCYTES # BLD AUTO: 0.5 K/UL (ref 0.3–1)
MONOCYTES NFR BLD: 6.6 % (ref 4–15)
MONOCYTES NFR BLD: 6.6 % (ref 4–15)
NEUTROPHILS # BLD AUTO: 5 K/UL (ref 1.8–7.7)
NEUTROPHILS # BLD AUTO: 5 K/UL (ref 1.8–7.7)
NEUTROPHILS NFR BLD: 63 % (ref 38–73)
NEUTROPHILS NFR BLD: 63 % (ref 38–73)
NITRITE UR QL STRIP: POSITIVE
NRBC BLD-RTO: 0 /100 WBC
NRBC BLD-RTO: 0 /100 WBC
OHS QRS DURATION: 84 MS
OHS QTC CALCULATION: 437 MS
PH UR STRIP: 6 [PH] (ref 5–8)
PLATELET # BLD AUTO: 166 K/UL (ref 150–450)
PLATELET # BLD AUTO: 166 K/UL (ref 150–450)
PMV BLD AUTO: 10.1 FL (ref 9.2–12.9)
PMV BLD AUTO: 10.1 FL (ref 9.2–12.9)
POTASSIUM SERPL-SCNC: 3.9 MMOL/L (ref 3.5–5.1)
PROT SERPL-MCNC: 6.6 G/DL (ref 6–8.4)
PROT UR QL STRIP: ABNORMAL
RBC # BLD AUTO: 3.65 M/UL (ref 4.6–6.2)
RBC # BLD AUTO: 3.65 M/UL (ref 4.6–6.2)
RBC #/AREA URNS AUTO: 3 /HPF (ref 0–4)
SODIUM SERPL-SCNC: 134 MMOL/L (ref 136–145)
SP GR UR STRIP: 1.01 (ref 1–1.03)
SQUAMOUS #/AREA URNS AUTO: 3 /HPF
URN SPEC COLLECT METH UR: ABNORMAL
WBC # BLD AUTO: 7.99 K/UL (ref 3.9–12.7)
WBC # BLD AUTO: 7.99 K/UL (ref 3.9–12.7)
WBC #/AREA URNS AUTO: 88 /HPF (ref 0–5)
WBC CLUMPS UR QL AUTO: ABNORMAL

## 2025-01-08 PROCEDURE — 63600175 PHARM REV CODE 636 W HCPCS: Performed by: NURSE PRACTITIONER

## 2025-01-08 PROCEDURE — 36415 COLL VENOUS BLD VENIPUNCTURE: CPT

## 2025-01-08 PROCEDURE — 85730 THROMBOPLASTIN TIME PARTIAL: CPT | Performed by: NURSE PRACTITIONER

## 2025-01-08 PROCEDURE — 25000003 PHARM REV CODE 250: Performed by: NURSE PRACTITIONER

## 2025-01-08 PROCEDURE — 25000003 PHARM REV CODE 250

## 2025-01-08 PROCEDURE — 99223 1ST HOSP IP/OBS HIGH 75: CPT | Mod: GC,,,

## 2025-01-08 PROCEDURE — 80053 COMPREHEN METABOLIC PANEL: CPT | Performed by: NURSE PRACTITIONER

## 2025-01-08 PROCEDURE — 99900035 HC TECH TIME PER 15 MIN (STAT)

## 2025-01-08 PROCEDURE — 63600175 PHARM REV CODE 636 W HCPCS

## 2025-01-08 PROCEDURE — 87086 URINE CULTURE/COLONY COUNT: CPT | Performed by: NURSE PRACTITIONER

## 2025-01-08 PROCEDURE — 85025 COMPLETE CBC W/AUTO DIFF WBC: CPT

## 2025-01-08 PROCEDURE — 21400001 HC TELEMETRY ROOM

## 2025-01-08 PROCEDURE — 36415 COLL VENOUS BLD VENIPUNCTURE: CPT | Performed by: NURSE PRACTITIONER

## 2025-01-08 PROCEDURE — 81001 URINALYSIS AUTO W/SCOPE: CPT | Performed by: NURSE PRACTITIONER

## 2025-01-08 PROCEDURE — 85730 THROMBOPLASTIN TIME PARTIAL: CPT | Mod: 91

## 2025-01-08 RX ORDER — DOCUSATE SODIUM 100 MG/1
100 CAPSULE, LIQUID FILLED ORAL DAILY
Status: DISCONTINUED | OUTPATIENT
Start: 2025-01-08 | End: 2025-01-13 | Stop reason: HOSPADM

## 2025-01-08 RX ORDER — INSULIN ASPART 100 [IU]/ML
5 INJECTION, SOLUTION INTRAVENOUS; SUBCUTANEOUS
Status: DISCONTINUED | OUTPATIENT
Start: 2025-01-08 | End: 2025-01-08

## 2025-01-08 RX ORDER — INSULIN ASPART 100 [IU]/ML
5 INJECTION, SOLUTION INTRAVENOUS; SUBCUTANEOUS
Status: DISCONTINUED | OUTPATIENT
Start: 2025-01-08 | End: 2025-01-13 | Stop reason: HOSPADM

## 2025-01-08 RX ORDER — INSULIN GLARGINE 100 [IU]/ML
15 INJECTION, SOLUTION SUBCUTANEOUS DAILY
Status: DISCONTINUED | OUTPATIENT
Start: 2025-01-08 | End: 2025-01-13 | Stop reason: HOSPADM

## 2025-01-08 RX ADMIN — Medication 100 MG: at 08:01

## 2025-01-08 RX ADMIN — MEROPENEM 1 G: 1 INJECTION, POWDER, FOR SOLUTION INTRAVENOUS at 05:01

## 2025-01-08 RX ADMIN — DULOXETINE HYDROCHLORIDE 60 MG: 60 CAPSULE, DELAYED RELEASE ORAL at 09:01

## 2025-01-08 RX ADMIN — PANTOPRAZOLE SODIUM 40 MG: 40 TABLET, DELAYED RELEASE ORAL at 08:01

## 2025-01-08 RX ADMIN — PREGABALIN 150 MG: 150 CAPSULE ORAL at 04:01

## 2025-01-08 RX ADMIN — MEROPENEM 2 G: 2 INJECTION, POWDER, FOR SOLUTION INTRAVENOUS at 09:01

## 2025-01-08 RX ADMIN — THERA TABS 1 TABLET: TAB at 08:01

## 2025-01-08 RX ADMIN — NORTRIPTYLINE HYDROCHLORIDE 10 MG: 10 CAPSULE ORAL at 08:01

## 2025-01-08 RX ADMIN — NORTRIPTYLINE HYDROCHLORIDE 10 MG: 10 CAPSULE ORAL at 09:01

## 2025-01-08 RX ADMIN — INSULIN ASPART 5 UNITS: 100 INJECTION, SOLUTION INTRAVENOUS; SUBCUTANEOUS at 09:01

## 2025-01-08 RX ADMIN — FOLIC ACID 1 MG: 1 TABLET ORAL at 08:01

## 2025-01-08 RX ADMIN — DOCUSATE SODIUM 100 MG: 100 CAPSULE, LIQUID FILLED ORAL at 08:01

## 2025-01-08 RX ADMIN — DULOXETINE HYDROCHLORIDE 60 MG: 60 CAPSULE, DELAYED RELEASE ORAL at 08:01

## 2025-01-08 RX ADMIN — INSULIN ASPART 5 UNITS: 100 INJECTION, SOLUTION INTRAVENOUS; SUBCUTANEOUS at 05:01

## 2025-01-08 RX ADMIN — MEROPENEM 1 G: 1 INJECTION, POWDER, FOR SOLUTION INTRAVENOUS at 12:01

## 2025-01-08 RX ADMIN — CYANOCOBALAMIN TAB 1000 MCG 1000 MCG: 1000 TAB at 08:01

## 2025-01-08 RX ADMIN — INSULIN GLARGINE 15 UNITS: 100 INJECTION, SOLUTION SUBCUTANEOUS at 08:01

## 2025-01-08 RX ADMIN — TRAZODONE HYDROCHLORIDE 200 MG: 100 TABLET ORAL at 09:01

## 2025-01-08 RX ADMIN — NORTRIPTYLINE HYDROCHLORIDE 10 MG: 10 CAPSULE ORAL at 04:01

## 2025-01-08 RX ADMIN — HYDROCODONE BITARTRATE AND ACETAMINOPHEN 1 TABLET: 5; 325 TABLET ORAL at 04:01

## 2025-01-08 RX ADMIN — PREGABALIN 150 MG: 150 CAPSULE ORAL at 09:01

## 2025-01-08 RX ADMIN — PREGABALIN 150 MG: 150 CAPSULE ORAL at 08:01

## 2025-01-08 RX ADMIN — HEPARIN SODIUM AND DEXTROSE 12 UNITS/KG/HR: 10000; 5 INJECTION INTRAVENOUS at 05:01

## 2025-01-08 RX ADMIN — Medication 400 MG: at 09:01

## 2025-01-08 NOTE — ASSESSMENT & PLAN NOTE
- History noted. No history of alcohol withdrawal.  - Reports drinking 2-3 drinks daily, last drink this morning.   - PETH pending.  - Nursing to assess CIWA q8h  - MVI, folic acid, and thiamine daily

## 2025-01-08 NOTE — ASSESSMENT & PLAN NOTE
74-year-old male with a past medical history of insulin-dependent T2DM, CAD, prior PE on Eliquis, afib, admitted for AMS. He underwent R ureteral stent placement on 9/27 for distal R ureteral stones in the setting of urosepsis and bacteremia. Ureteroscopy has had to be rescheduled several times due to UTI's. Most recently he has a urine culture growing E. Coli ESBL sensitive only to IV Ertapenem and Meropenem. Patient admitted on 1/8 for IV antibiotics prior to ureteroscopy scheduled on Friday.    -- Admitted to   -- Ucx with sensitivity to Meropenem and Ertapenem  - Appreciate ID reccs  - Per ID - Start ertapenem 1 gm daily. If albumin is less than 3 then would start meropenem  -- Blood cx with NGTD  -- Hold Eliquis prior to procedure  -- NPO Thursday night for procedure on Friday  -- Will consent  -- Urology to continue to follow

## 2025-01-08 NOTE — ASSESSMENT & PLAN NOTE
SSI  Diabetic diet when not NPO  ACHS glucose checks  Resume home insulin regimen, Lantus 15U daily, Novolog 5 TIDAC

## 2025-01-08 NOTE — ASSESSMENT & PLAN NOTE
Patient has paroxysmal (<7 days) atrial fibrillation. Patient is currently in atrial fibrillation. HCQDN5EEMn Score: 3. The patients heart rate in the last 24 hours is as follows:  Pulse  Min: 86  Max: 102     Antiarrhythmics       Anticoagulants  heparin 25,000 units in dextrose 5% (100 units/ml) IV bolus from bag LOW INTENSITY nomogram - [facility], Once, Intravenous  heparin 25,000 units in dextrose 5% 250 mL (100 units/mL) infusion LOW INTENSITY nomogram - [facility], Continuous, Intravenous  heparin 25,000 units in dextrose 5% (100 units/ml) IV bolus from bag LOW INTENSITY nomogram - [facility], As needed (PRN), Intravenous  heparin 25,000 units in dextrose 5% (100 units/ml) IV bolus from bag LOW INTENSITY nomogram - [facility], As needed (PRN), Intravenous    Plan  - Replete lytes with a goal of K>4, Mg >2  - Patient is anticoagulated, see medications listed above.  - Patient's afib is currently controlled  - Hold Eliquis   - Start heparin drip, will need to pause midnight prior to procedure

## 2025-01-08 NOTE — PROGRESS NOTES
[facility]  Urology  Progress Note    Patient Name: Anthony Ball  MRN: [id number]  Admission Date: 1/7/2025  Hospital Length of Stay: 1 days  Code Status: Full Code   Attending Provider: Kenny Mojica DO   Primary Care Physician: Isaias Myles MD    Subjective:     HPI:  74-year-old male with a past medical history of insulin-dependent T2DM, CAD, prior PE on Eliquis, afib, admitted for AMS. He underwent R ureteral stent placement on 9/27 for distal R ureteral stones in the setting of urosepsis and bacteremia. Ureteroscopy has had to be rescheduled several times due to UTI's. Most recently he has a urine culture growing E. Coli ESBL sensitive only to IV Ertapenem and Meropenem. Patient admitted on 1/8 for IV antibiotics prior to ureteroscopy scheduled on Friday.    On assessment the patient is AFVSS. Complains of low back pain that is chronic in nature. Denies fevers, chills, difficulty emptying his bladder.     There are no labs to review. Urine culture from 12/31 growing ESBL E. Coli.     CT from 11/17 with shows R ureteral stent in appropriate position. No left sided stones or hydronephrosis. Large left renal cyst. Probably bladder diverticulum.     Interval History: No acute overnight events. Patient HDS and afebrile. No fevers or chills. Blood cx with NGTD.       Objective:     Temp:  [98 °F (36.7 °C)-98.3 °F (36.8 °C)] 98.2 °F (36.8 °C)  Pulse:  [] 83  Resp:  [17-18] 18  SpO2:  [92 %-98 %] 98 %  BP: (111-140)/(58-87) 111/67     Body mass index is 28.75 kg/m².           Drains       None                    Physical Exam  Vitals reviewed.   Constitutional:       General: He is awake. He is not in acute distress.  Cardiovascular:      Rate and Rhythm: Normal rate.   Pulmonary:      Effort: Pulmonary effort is normal. No respiratory distress.   Genitourinary:     Comments: Clear yellow urine in container next to bed  Neurological:      General: No focal deficit present.      Mental Status: He is  alert.         Significant Labs:    BMP:  Recent Labs   Lab 01/07/25  1900   *   K 4.7      CO2 21*   BUN 19   CREATININE 1.3   CALCIUM 8.8       CBC:   Recent Labs   Lab 01/07/25  1900 01/08/25  0325   WBC 10.01 7.99  7.99   HGB 12.0* 10.5*  10.5*   HCT 35.5* 32.8*  32.8*    166  166       Significant Imaging:  All pertinent imaging results/findings from the past 24 hours have been reviewed.      Assessment/Plan:     Urolithiasis  74-year-old male with a past medical history of insulin-dependent T2DM, CAD, prior PE on Eliquis, afib, admitted for AMS. He underwent R ureteral stent placement on 9/27 for distal R ureteral stones in the setting of urosepsis and bacteremia. Ureteroscopy has had to be rescheduled several times due to UTI's. Most recently he has a urine culture growing E. Coli ESBL sensitive only to IV Ertapenem and Meropenem. Patient admitted on 1/8 for IV antibiotics prior to ureteroscopy scheduled on Friday.    -- Admitted to   -- Ucx with sensitivity to Meropenem and Ertapenem  - Appreciate ID reccs  - Per ID - Start ertapenem 1 gm daily. If albumin is less than 3 then would start meropenem  -- Blood cx with NGTD  -- Hold Eliquis prior to procedure  -- NPO Thursday night for procedure on Friday  -- Will consent  -- Urology to continue to follow         VTE Risk Mitigation (From admission, onward)           Ordered     heparin 25,000 units in dextrose 5% (100 units/ml) IV bolus from bag LOW INTENSITY nomogram - [facility]  As needed (PRN)        Question:  Heparin Infusion Adjustment (DO NOT MODIFY ANSWER)  Answer:  [email or web address]\epic\Images\Pharmacy\HeparinInfusions\heparin LOW INTENSITY nomogram for [facility] [id number]    01/07/25 1919     heparin 25,000 units in dextrose 5% (100 units/ml) IV bolus from bag LOW INTENSITY nomogram - [facility]  As needed (PRN)        Question:  Heparin Infusion Adjustment (DO NOT MODIFY ANSWER)  Answer:   [email or web address]\epic\Images\Pharmacy\HeparinInfusions\heparin LOW INTENSITY nomogram for [facility] [id number]    01/07/25 1919     heparin 25,000 units in dextrose 5% 250 mL (100 units/mL) infusion LOW INTENSITY nomogram - [facility]  Continuous        Question:  Begin at (units/kg/hr)  Answer:  12    01/07/25 1919     IP VTE HIGH RISK PATIENT  Once         01/07/25 1810     Place sequential compression device  Until discontinued         01/07/25 1810                  Yue Pinzon DO  Urology  [facility] - Surgery

## 2025-01-08 NOTE — SUBJECTIVE & OBJECTIVE
Past Medical History:   Diagnosis Date    Acute deep vein thrombosis (DVT) of left lower extremity 12/2023    Anxiety     Atrial fibrillation 12/2023    Cataract     Coronary artery disease     CAC score 1430    Depression     Diabetic neuropathy     Essential (primary) hypertension     GERD (gastroesophageal reflux disease)     History of bariatric surgery 07/08/2021    History of total right knee replacement 07/08/2021    Insomnia     Neuromyopathy     Possibly DM and/or alcohol related.    Pulmonary embolism 12/2023    Reactive airway disease     Type 2 diabetes mellitus        Past Surgical History:   Procedure Laterality Date    CATARACT EXTRACTION W/  INTRAOCULAR LENS IMPLANT Right 7/15/2024    Procedure: EXTRACTION, CATARACT, WITH IOL INSERTION;  Surgeon: Margot Jacobson MD;  Location: [facility] OR;  Service: Ophthalmology;  Laterality: Right;    CATARACT EXTRACTION W/  INTRAOCULAR LENS IMPLANT Left 8/29/2024    Procedure: EXTRACTION, CATARACT, WITH IOL INSERTION;  Surgeon: aMrgot Jacobson MD;  Location: [facility] OR;  Service: Ophthalmology;  Laterality: Left;    COLONOSCOPY      Normal around 2020    COLONOSCOPY N/A 9/6/2024    Procedure: COLONOSCOPY;  Surgeon: Alessandro Serna MD;  Location: [facility] (4TH FLR);  Service: Endoscopy;  Laterality: N/A;  8/28-new case created-lvm for pre call-pt has cataract surgery 8/29/24-tb  ref-dr rico goldstein-peg-[place]  ok to hold elivickie mukherjeeGT  9/5-LVM for precall-Kpvt    COLONOSCOPY N/A 9/6/2024    Procedure: COLONOSCOPY;  Surgeon: Alessandro Serna MD;  Location: [facility] (4TH FLR);  Service: Endoscopy;  Laterality: N/A;  + cologuard  8/8 ref by Isaias Goldstein MD, PeG, instr. to portal, Eliquis hold approval pending-  ok to hold Eliquis 2 days per Dr Goldstein-WALLACE    CYSTOSCOPY WITH URETEROSCOPY, RETROGRADE PYELOGRAPHY, AND INSERTION OF STENT Right 9/27/2024    Procedure: CYSTOSCOPY, WITH RETROGRADE PYELOGRAM AND URETERAL STENT INSERTION;  Surgeon: Joni Wagoner MD;   "Location: [facility] OR [address] [place] [address];  Service: Urology;  Laterality: Right;    REMOVAL, CALCULUS, BLADDER N/A 9/27/2024    Procedure: REMOVAL, CALCULUS, BLADDER;  Surgeon: Joni Wagoner MD;  Location: [facility] OR [address] [place] [address];  Service: Urology;  Laterality: N/A;    TOTAL KNEE ARTHROPLASTY Right        Review of patient's allergies indicates:   Allergen Reactions    (d)-limonene flavor      Pt reports he is not allergic to anything        No current facility-administered medications on file prior to encounter.     Current Outpatient Medications on File Prior to Encounter   Medication Sig    albuterol (VENTOLIN HFA) 90 mcg/actuation inhaler Inhale 2 puffs into the lungs every 6 (six) hours as needed for Wheezing. Rescue    apixaban (ELIQUIS) 5 mg Tab TAKE 1 TABLET(5 MG) BY MOUTH TWICE DAILY    BD ULTRA-FINE MINI PEN NEEDLE 31 gauge x 3/16" Ndle SMARTSIG:SUB-Q 3-4 Times Daily    blood-glucose meter Misc To check BG 3 times daily, to use with insurance preferred meter    blood-glucose sensor (FREESTYLE SOPHY 3 PLUS SENSOR) Rima 1 Device by Misc.(Non-Drug; Combo Route) route every 14 (fourteen) days.    blood-glucose sensor (FREESTYLE SOPHY 3 SENSOR) Rima 1 Device by Misc.(Non-Drug; Combo Route) route every 14 (fourteen) days.    budesonide-formoterol 80-4.5 mcg (SYMBICORT) 80-4.5 mcg/actuation HFAA Inhale 2 puffs into the lungs.    busPIRone (BUSPAR) 15 MG tablet Take 1 tablet (15 mg total) by mouth 2 (two) times daily as needed (Anxiety).    capsaicin (ZOSTRIX) 0.025 % cream Apply topically every 3 (three) months. (to both feet)    ciprofloxacin HCl (CIPRO) 750 MG tablet     cyanocobalamin (VITAMIN B-12) 1000 MCG tablet Take 1 tablet (1,000 mcg total) by mouth once daily.    DULoxetine (CYMBALTA) 60 MG capsule Take 1 capsule (60 mg total) by mouth 2 (two) times daily.    empagliflozin (JARDIANCE) 10 mg tablet Take 1 tablet by mouth once daily.    ertapenem (INVANZ) 1 gram injection     flash glucose scanning reader (FREESTYLE SOPHY 14 " DAY READER) Misc Use device to check blood glucose level 3 times daily.    fluticasone-salmeterol diskus inhaler 250-50 mcg     fluticasone-umeclidin-vilanter (TRELEGY ELLIPTA) 100-62.5-25 mcg DsDv Inhale 1 puff into the lungs once daily.    HYDROcodone-acetaminophen (NORCO) 5-325 mg per tablet Take 1 tablet by mouth every 6 (six) hours as needed for Pain.    hydrOXYzine HCL (ATARAX) 25 MG tablet     hydrOXYzine pamoate (VISTARIL) 25 MG Cap Take 25 mg by mouth every 8 (eight) hours as needed (Itching).    insulin glargine-yfgn (SEMGLEE,INSULIN GLARG-YFGN,PEN) 100 unit/mL (3 mL) InPn Inject 15 Units into the skin once daily.    insulin regular human (NOVOLIN R FLEXPEN) 100 unit/mL (3 mL) InPn pen Inject 5 Units subcutaneously 2 to 3 times daily before breakfast, lunch and/or dinner.    lancets 33 gauge Misc To check BG 3 times daily, to use with insurance preferred meter    linaGLIPtin (TRADJENTA) 5 mg Tab tablet Take 1 tablet (5 mg total) by mouth once daily.    magnesium oxide 400 mg magnesium Tab Take 1 tablet by mouth every evening.    metFORMIN (GLUCOPHAGE-XR) 500 MG ER 24hr tablet Take 1 tablet (500 mg total) by mouth daily with breakfast.    methocarbamoL (ROBAXIN) 500 MG Tab Take 500 mg by mouth 3 (three) times daily.    methocarbamoL (ROBAXIN) 750 MG Tab Take 1 tablet (750 mg total) by mouth 3 (three) times daily as needed (Back pain).    nitrofurantoin, macrocrystal-monohydrate, (MACROBID) 100 MG capsule Take 1 capsule (100 mg total) by mouth 2 (two) times daily. for 7 days    nortriptyline (PAMELOR) 10 MG capsule Take 1 capsule (10 mg total) by mouth 3 (three) times daily.    NOVOLOG FLEXPEN U-100 INSULIN 100 unit/mL (3 mL) InPn pen SMARTSI Unit(s) SUB-Q 2-3 Times Daily    ondansetron (ZOFRAN) 8 MG tablet Take 1 tablet (8 mg total) by mouth every 8 (eight) hours as needed for Nausea.    oxybutynin (DITROPAN-XL) 5 MG TR24     oxyCODONE (ROXICODONE) 5 MG immediate release tablet Take 5 mg by mouth every 8  (eight) hours as needed.    pantoprazole (PROTONIX) 40 MG tablet Take 1 tablet (40 mg total) by mouth once daily.    pregabalin (LYRICA) 150 MG capsule Take 1 capsule (150 mg total) by mouth 3 (three) times daily.    pregabalin (LYRICA) 50 MG capsule Take 50 mg by mouth 3 (three) times daily.    tamsulosin (FLOMAX) 0.4 mg Cap Take 1 capsule (0.4 mg total) by mouth once daily. (Patient not taking: Reported on 10/29/2024)    traZODone (DESYREL) 100 MG tablet TAKE 2 TABLETS(200 MG) BY MOUTH EVERY NIGHT AS NEEDED FOR INSOMNIA    TRUE METRIX GLUCOSE TEST STRIP Strp USE TO CHECK BLOOD SUGAR THREE TIMES DAILY OR AS DIRECTED    UNABLE TO FIND OMEGA XL CAP - Take 1 capsule by mouth once daily.     Family History       Problem Relation (Age of Onset)    Colon cancer Paternal Grandfather (89)    Hypertension Mother          Tobacco Use    Smoking status: Never    Smokeless tobacco: Never   Substance and Sexual Activity    Alcohol use: Yes     Comment: Former heavy use    Drug use: Never    Sexual activity: Yes     Comment: unknown     Review of Systems   Constitutional:  Negative for activity change, chills, diaphoresis and fever.   HENT:  Negative for trouble swallowing.    Eyes:  Negative for photophobia and visual disturbance.   Respiratory:  Negative for chest tightness, shortness of breath and wheezing.    Cardiovascular:  Negative for chest pain, palpitations and leg swelling.   Gastrointestinal:  Positive for nausea. Negative for abdominal pain, constipation, diarrhea and vomiting.   Genitourinary:  Negative for difficulty urinating, dysuria, flank pain, frequency, hematuria and urgency.   Musculoskeletal:  Positive for back pain (chronic). Negative for arthralgias, joint swelling and neck pain.   Skin:  Negative for color change, rash and wound.   Neurological:  Positive for weakness (chronic, BLE) and numbness (chronic, BLE). Negative for dizziness, syncope, light-headedness and headaches.   Psychiatric/Behavioral:   Negative for agitation and confusion. The patient is not nervous/anxious.      Objective:     Vital Signs (Most Recent):    Vital Signs (24h Range):           There is no height or weight on file to calculate BMI.     Physical Exam  Vitals and nursing note reviewed.   Constitutional:       General: He is not in acute distress.     Appearance: He is well-developed. He is not toxic-appearing or diaphoretic.   HENT:      Head: Normocephalic and atraumatic.      Nose: Nose normal.      Mouth/Throat:      Mouth: Mucous membranes are moist.      Pharynx: No oropharyngeal exudate.   Eyes:      Conjunctiva/sclera: Conjunctivae normal.      Pupils: Pupils are equal, round, and reactive to light.   Cardiovascular:      Rate and Rhythm: Normal rate. Rhythm irregular.      Heart sounds: Normal heart sounds.   Pulmonary:      Effort: Pulmonary effort is normal. No respiratory distress.      Breath sounds: Normal breath sounds. No wheezing, rhonchi or rales.   Abdominal:      General: Bowel sounds are normal. There is no distension.      Palpations: Abdomen is soft.      Tenderness: There is no abdominal tenderness. There is no right CVA tenderness or left CVA tenderness.   Musculoskeletal:         General: No tenderness. Normal range of motion.      Cervical back: Normal range of motion and neck supple.      Right lower leg: No edema.      Left lower leg: No edema.   Lymphadenopathy:      Cervical: No cervical adenopathy.   Skin:     General: Skin is warm and dry.      Capillary Refill: Capillary refill takes less than 2 seconds.      Findings: No rash.   Neurological:      General: No focal deficit present.      Mental Status: He is alert and oriented to person, place, and time.      Sensory: Sensory deficit present.   Psychiatric:         Behavior: Behavior normal.         Thought Content: Thought content normal.         Judgment: Judgment normal.              CRANIAL NERVES     CN III, IV, VI   Pupils are equal, round, and  reactive to light.       Significant Labs: All pertinent labs within the past 24 hours have been reviewed.    Significant Imaging: I have reviewed all pertinent imaging results/findings within the past 24 hours.

## 2025-01-08 NOTE — ASSESSMENT & PLAN NOTE
- ESBL E coli UTI in the setting of nephrolithiasis and stent. Suspect stone is colonized and leading to intermittent infections as well as persistently positive urine cultures.    -  Plan for right ureteroscopy with laser lithotripsy and stone basket extraction with ureteral stent removal and ureteral stent placement.

## 2025-01-08 NOTE — ASSESSMENT & PLAN NOTE
Patient's FSGs are controlled on current hypoglycemics.   Last A1c reviewed-   Lab Results   Component Value Date    HGBA1C 6.4 (H) 12/13/2024     Most recent fingerstick glucose reviewed-   Recent Labs   Lab 01/07/25 2052   POCTGLUCOSE 220*     Current correctional scale  Low  Maintain anti-hyperglycemic dose as follows-   Antihyperglycemics (From admission, onward)      Start     Stop Route Frequency Ordered    01/08/25 0900  insulin glargine U-100 (Lantus) pen 10 Units         -- SubQ Daily 01/07/25 2057 01/08/25 0715  insulin aspart U-100 pen 3 Units         -- SubQ 3 times daily with meals 01/07/25 2057 01/07/25 2057  insulin aspart U-100 pen 5 Units         -- SubQ Once 01/07/25 2057 01/07/25 1940  insulin aspart U-100 pen 0-5 Units         -- SubQ Before meals & nightly PRN 01/07/25 1840          Hold Oral hypoglycemics while patient is in the hospital.  -Diabetic/cardiac diet.  -Accuchecks AC/HS- dexcom glucose readings verified using glucometer. Can use dexcom for accuchecks.

## 2025-01-08 NOTE — ASSESSMENT & PLAN NOTE
Patient's blood pressure range in the last 24 hours was: No data recorded.The patient's inpatient anti-hypertensive regimen is listed below:  Current Antihypertensives       Plan  - BP is controlled, no changes needed to their regimen  - Not currently on antihypertensives

## 2025-01-08 NOTE — SUBJECTIVE & OBJECTIVE
Interval History: No acute overnight events. Patient HDS and afebrile. No fevers or chills. Blood cx with NGTD.       Objective:     Temp:  [98 °F (36.7 °C)-98.3 °F (36.8 °C)] 98.2 °F (36.8 °C)  Pulse:  [] 83  Resp:  [17-18] 18  SpO2:  [92 %-98 %] 98 %  BP: (111-140)/(58-87) 111/67     Body mass index is 28.75 kg/m².           Drains       None                    Physical Exam  Vitals reviewed.   Constitutional:       General: He is awake. He is not in acute distress.  Cardiovascular:      Rate and Rhythm: Normal rate.   Pulmonary:      Effort: Pulmonary effort is normal. No respiratory distress.   Genitourinary:     Comments: Clear yellow urine in container next to bed  Neurological:      General: No focal deficit present.      Mental Status: He is alert.         Significant Labs:    BMP:  Recent Labs   Lab 01/07/25  1900   *   K 4.7      CO2 21*   BUN 19   CREATININE 1.3   CALCIUM 8.8       CBC:   Recent Labs   Lab 01/07/25  1900 01/08/25  0325   WBC 10.01 7.99  7.99   HGB 12.0* 10.5*  10.5*   HCT 35.5* 32.8*  32.8*    166  166       Significant Imaging:  All pertinent imaging results/findings from the past 24 hours have been reviewed.

## 2025-01-08 NOTE — SUBJECTIVE & OBJECTIVE
Past Medical History:   Diagnosis Date    Acute deep vein thrombosis (DVT) of left lower extremity 12/2023    Anxiety     Atrial fibrillation 12/2023    Cataract     Coronary artery disease     CAC score 1430    Depression     Diabetic neuropathy     Essential (primary) hypertension     GERD (gastroesophageal reflux disease)     History of bariatric surgery 07/08/2021    History of total right knee replacement 07/08/2021    Insomnia     Neuromyopathy     Possibly DM and/or alcohol related.    Pulmonary embolism 12/2023    Reactive airway disease     Type 2 diabetes mellitus        Past Surgical History:   Procedure Laterality Date    CATARACT EXTRACTION W/  INTRAOCULAR LENS IMPLANT Right 7/15/2024    Procedure: EXTRACTION, CATARACT, WITH IOL INSERTION;  Surgeon: Margot Jacobson MD;  Location: [facility] OR;  Service: Ophthalmology;  Laterality: Right;    CATARACT EXTRACTION W/  INTRAOCULAR LENS IMPLANT Left 8/29/2024    Procedure: EXTRACTION, CATARACT, WITH IOL INSERTION;  Surgeon: Margot Jacobson MD;  Location: [facility] OR;  Service: Ophthalmology;  Laterality: Left;    COLONOSCOPY      Normal around 2020    COLONOSCOPY N/A 9/6/2024    Procedure: COLONOSCOPY;  Surgeon: Alessandro Serna MD;  Location: [facility] (4TH FLR);  Service: Endoscopy;  Laterality: N/A;  8/28-new case created-lvm for pre call-pt has cataract surgery 8/29/24-tb  ref-dr rico goldstein-[facility]-[place]  ok to hold elivickie mukherjeeGT  9/5-LVM for precall-Kpvt    COLONOSCOPY N/A 9/6/2024    Procedure: COLONOSCOPY;  Surgeon: Alessandro Serna MD;  Location: [facility] (4TH FLR);  Service: Endoscopy;  Laterality: N/A;  + cologuard  8/8 ref by Isaias Goldstein MD, PeG, instr. to portal, Eliquis hold approval pending-  ok to hold Eliquis 2 days per Dr Goldstein-WALLACE    CYSTOSCOPY WITH URETEROSCOPY, RETROGRADE PYELOGRAPHY, AND INSERTION OF STENT Right 9/27/2024    Procedure: CYSTOSCOPY, WITH RETROGRADE PYELOGRAM AND URETERAL STENT INSERTION;  Surgeon: Joni Wagoner MD;   [facility];  Service: Urology;  Laterality: Right;    REMOVAL, CALCULUS, BLADDER N/A 9/27/2024    Procedure: REMOVAL, CALCULUS, BLADDER;  Surgeon: Joni Wagoner MD;  Location: [facility] OR [address] [place] [address];  Service: Urology;  Laterality: N/A;    TOTAL KNEE ARTHROPLASTY Right        Review of patient's allergies indicates:   Allergen Reactions    (d)-limonene flavor      Pt reports he is not allergic to anything        Family History       Problem Relation (Age of Onset)    Colon cancer Paternal Grandfather (89)    Hypertension Mother            Tobacco Use    Smoking status: Never    Smokeless tobacco: Never   Substance and Sexual Activity    Alcohol use: Yes     Comment: Former heavy use    Drug use: Never    Sexual activity: Yes     Comment: unknown       Review of Systems   Constitutional:  Negative for chills and fever.   Gastrointestinal:  Negative for nausea and vomiting.   Genitourinary:  Negative for difficulty urinating.       Objective:     Resp:  [17] 17     There is no height or weight on file to calculate BMI.           Drains       None                    Physical Exam  Constitutional:       General: He is not in acute distress.     Appearance: Normal appearance.   HENT:      Head: Normocephalic and atraumatic.   Eyes:      Extraocular Movements: Extraocular movements intact.      Pupils: Pupils are equal, round, and reactive to light.   Cardiovascular:      Rate and Rhythm: Normal rate.   Pulmonary:      Effort: Pulmonary effort is normal. No respiratory distress.   Abdominal:      General: Abdomen is flat. There is no distension.      Tenderness: There is no abdominal tenderness. There is no right CVA tenderness or left CVA tenderness.   Skin:     Coloration: Skin is not jaundiced.   Neurological:      General: No focal deficit present.      Mental Status: He is alert and oriented to person, place, and time.   Psychiatric:         Mood and Affect: Mood normal.         Behavior: Behavior normal.  "         Significant Labs:    BMP:  No results for input(s): "NA", "K", "CL", "CO2", "BUN", "CREATININE", "LABGLOM", "GLUCOSE", "CALCIUM" in the last 168 hours.    CBC:  No results for input(s): "WBC", "HGB", "HCT", "PLT" in the last 168 hours.    Blood Culture: No results for input(s): "LABBLOO" in the last 168 hours.  CMP: No results for input(s): "GLU", "NA", "K", "CL", "CO2", "BUN", "CREATININE", "CALCIUM", "MG" in the last 168 hours.  Urine Culture: No results for input(s): "LABURIN" in the last 168 hours.  Urine Studies: No results for input(s): "COLORU", "APPEARANCEUA", "PHUR", "SPECGRAV", "PROTEINUA", "GLUCUA", "KETONESU", "BILIRUBINUA", "OCCULTUA", "NITRITE", "UROBILINOGEN", "LEUKOCYTESUR", "RBCUA", "WBCUA", "BACTERIA", "SQUAMEPITHEL", "HYALINECASTS" in the last 168 hours.    Invalid input(s): "WRIGHTSUR"    Significant Imaging:  CT: I have reviewed all results within the past 24 hours and my personal findings are:  as noted in HPI                   "

## 2025-01-08 NOTE — ASSESSMENT & PLAN NOTE
Weakness of bilateral lower extremity   Back spasm  Recurrent falls   - Uses walker to ambulate at home  - Noted. Continue home meds.

## 2025-01-08 NOTE — HOSPITAL COURSE
Admitted for ESBL UTI associated with nephrolithiasis.  He has a right-sided double-J ureteral stent in place which was seen to be in stable position on CT.  No new or worsening hydro was noted.  Urology following, planning for lithotripsy 1.10.24.  He did undergo stent removal, but no stone was seen.  Received merrem; end date 1.13.24.  Developed ALYSIA after procedure which resolved prior to discharge.  Pt deemed appropriate for discharge; seen and examined prior to departure. Plan discussed with pt, who was agreeable and amenable; medications were discussed and reviewed, outpatient follow-up scheduled, ER precautions were given, all questions were answered to the pt's satisfaction, and was subsequently discharged.

## 2025-01-08 NOTE — CONSULTS
[facility] - [facility]  Infectious Disease  Progress Note    Patient Name: Anthony Ball  MRN: [id number]  Admission Date: 1/7/2025  Length of Stay: 1 days  Attending Physician: Kenny Mojica DO  Primary Care Provider: Isaias Myles MD    Isolation Status: No active isolations  Assessment/Plan:      Renal/  * UTI due to extended-spectrum beta lactamase (ESBL) producing Escherichia coli  I have reviewed hospital notes from  service and other specialty providers. I have also reviewed CBC, CMP/BMP, cultures and imaging with my interpretation as documented.      74 y.o. male patient with CAD, PE, Afib on Eliquis, HTN, DM2, spinal canal stenosis, ESBL E coli bacteremia in November, recurrent ESBL E. Coli UTIs in the setting of nephrolithiasis & stents, followed by ID previously. Patient admitted on 1/8 for IV antibiotics prior to ureteroscopy with urology scheduled on Friday.     Recommendations / Plan  Continue IV Meropenem, increased dose to 2 g q8 hrs  Continue IV meropenem for 3 days post procedure    -- Discussed with ID staff, Dr. Mac and primary team  -- ID will continue to follow for further recs            Anticipated Disposition: TBD    Thank you for your consult. I will follow-up with patient. Please contact us if you have any additional questions.    Becca Samuel, ESTEFANI  Infectious Disease  [facility]    Subjective:     Principal Problem:UTI due to extended-spectrum beta lactamase (ESBL) producing Escherichia coli    HPI: 74 y.o M with CAD, PE, polyneuropathy, Afib on apixaban, T2DM, recurrent UTIs with ESBL E coli with prior bacteremia, who will be undergoing stone removal on Friday 1/8/25. Urology is requesting direct admission to hospital medicine for management of ESBL E. Coli UTI in the setting of nephrolithiasis and stent with IV antibiotics prior to planned surgical intervention. C/o flank pain on 1/6/25, but denies any symptoms related to his UTI. Denies any increased frequency,  urgency, dysuria, hematuria. Denies any fever or chills. Patient denies dizziness, lightheadedness, headache, CP, SOB, vomiting or diarrhea. Per outpatient ID recs will plan to start ertapenem 1 g daily ( meropenem if albumin <3) during perioperative period and plans to continue for at least 3 days postop.   Interval History: NAEON  Afebrile  No leukocytosis    Review of Systems   Constitutional:  Negative for chills, fatigue and fever.   Respiratory:  Negative for chest tightness, shortness of breath and wheezing.    Cardiovascular:  Negative for chest pain.   Gastrointestinal:  Negative for abdominal pain, constipation, diarrhea, nausea and vomiting.   Genitourinary:  Negative for difficulty urinating, dysuria, flank pain and urgency.   Musculoskeletal:  Negative for arthralgias.   Skin:  Negative for rash and wound.   Neurological:  Negative for headaches.     Objective:     Vital Signs (Most Recent):  Temp: 97.8 °F (36.6 °C) (01/08/25 1543)  Pulse: 76 (01/08/25 1543)  Resp: 17 (01/08/25 1616)  BP: (!) 118/59 (01/08/25 1543)  SpO2: 98 % (01/08/25 1543) Vital Signs (24h Range):  Temp:  [97.5 °F (36.4 °C)-98.3 °F (36.8 °C)] 97.8 °F (36.6 °C)  Pulse:  [] 76  Resp:  [17-18] 17  SpO2:  [92 %-99 %] 98 %  BP: (111-148)/(58-87) 118/59     Weight: 88.3 kg (194 lb 10.7 oz)  Body mass index is 28.75 kg/m².    Estimated Creatinine Clearance: 61.2 mL/min (based on SCr of 1.2 mg/dL).     Physical Exam  Vitals and nursing note reviewed.   Constitutional:       General: He is not in acute distress.     Appearance: Normal appearance. He is not ill-appearing.   Cardiovascular:      Rate and Rhythm: Normal rate and regular rhythm.      Pulses: Normal pulses.      Heart sounds: Normal heart sounds. No murmur heard.  Pulmonary:      Effort: Pulmonary effort is normal. No respiratory distress.      Breath sounds: No wheezing.   Abdominal:      General: There is no distension.      Tenderness: There is no abdominal tenderness.    Musculoskeletal:         General: No swelling or tenderness.   Skin:     General: Skin is warm and dry.   Neurological:      Mental Status: He is alert.   Psychiatric:         Mood and Affect: Mood normal.         Behavior: Behavior normal.          Significant Labs: Blood Culture:   Recent Labs   Lab 11/13/24  1823 11/13/24  1830 11/14/24  1717 11/14/24  1728 01/07/25  1900   LABBLOO No growth after 5 days. Gram stain aer bottle: Gram negative rods  Results called to and read back by:Leonel Pereyra RN 11/14/2024  12:34  ESCHERICHIA COLI ESBL* No growth after 5 days. No growth after 5 days. No Growth to date  No Growth to date     CBC:   Recent Labs   Lab 01/07/25  1900 01/08/25  0325   WBC 10.01 7.99  7.99   HGB 12.0* 10.5*  10.5*   HCT 35.5* 32.8*  32.8*    166  166     CMP:   Recent Labs   Lab 01/07/25  1900 01/08/25  0946   * 134*   K 4.7 3.9    106   CO2 21* 20*   * 197*   BUN 19 18   CREATININE 1.3 1.2   CALCIUM 8.8 8.4*   PROT 7.3 6.6   ALBUMIN 2.8* 2.5*   BILITOT 0.4 0.3   ALKPHOS 128 118   AST 11 8*   ALT 6* 6*   ANIONGAP 9 8     All pertinent labs within the past 24 hours have been reviewed.    Significant Imaging: I have reviewed all pertinent imaging results/findings within the past 24 hours.

## 2025-01-08 NOTE — SUBJECTIVE & OBJECTIVE
Interval History: NAEON  Afebrile  No leukocytosis    Review of Systems   Constitutional:  Negative for chills, fatigue and fever.   Respiratory:  Negative for chest tightness, shortness of breath and wheezing.    Cardiovascular:  Negative for chest pain.   Gastrointestinal:  Negative for abdominal pain, constipation, diarrhea, nausea and vomiting.   Genitourinary:  Negative for difficulty urinating, dysuria, flank pain and urgency.   Musculoskeletal:  Negative for arthralgias.   Skin:  Negative for rash and wound.   Neurological:  Negative for headaches.     Objective:     Vital Signs (Most Recent):  Temp: 97.8 °F (36.6 °C) (01/08/25 1543)  Pulse: 76 (01/08/25 1543)  Resp: 17 (01/08/25 1616)  BP: (!) 118/59 (01/08/25 1543)  SpO2: 98 % (01/08/25 1543) Vital Signs (24h Range):  Temp:  [97.5 °F (36.4 °C)-98.3 °F (36.8 °C)] 97.8 °F (36.6 °C)  Pulse:  [] 76  Resp:  [17-18] 17  SpO2:  [92 %-99 %] 98 %  BP: (111-148)/(58-87) 118/59     Weight: 88.3 kg (194 lb 10.7 oz)  Body mass index is 28.75 kg/m².    Estimated Creatinine Clearance: 61.2 mL/min (based on SCr of 1.2 mg/dL).     Physical Exam  Vitals and nursing note reviewed.   Constitutional:       General: He is not in acute distress.     Appearance: Normal appearance. He is not ill-appearing.   Cardiovascular:      Rate and Rhythm: Normal rate and regular rhythm.      Pulses: Normal pulses.      Heart sounds: Normal heart sounds. No murmur heard.  Pulmonary:      Effort: Pulmonary effort is normal. No respiratory distress.      Breath sounds: No wheezing.   Abdominal:      General: There is no distension.      Tenderness: There is no abdominal tenderness.   Musculoskeletal:         General: No swelling or tenderness.   Skin:     General: Skin is warm and dry.   Neurological:      Mental Status: He is alert.   Psychiatric:         Mood and Affect: Mood normal.         Behavior: Behavior normal.          Significant Labs: Blood Culture:   Recent Labs   Lab  11/13/24  1823 11/13/24  1830 11/14/24  1717 11/14/24  1728 01/07/25  1900   LABBLOO No growth after 5 days. Gram stain aer bottle: Gram negative rods  Results called to and read back by:Leonel Pereyra RN 11/14/2024  12:34  ESCHERICHIA COLI ESBL* No growth after 5 days. No growth after 5 days. No Growth to date  No Growth to date     CBC:   Recent Labs   Lab 01/07/25  1900 01/08/25  0325   WBC 10.01 7.99  7.99   HGB 12.0* 10.5*  10.5*   HCT 35.5* 32.8*  32.8*    166  166     CMP:   Recent Labs   Lab 01/07/25  1900 01/08/25  0946   * 134*   K 4.7 3.9    106   CO2 21* 20*   * 197*   BUN 19 18   CREATININE 1.3 1.2   CALCIUM 8.8 8.4*   PROT 7.3 6.6   ALBUMIN 2.8* 2.5*   BILITOT 0.4 0.3   ALKPHOS 128 118   AST 11 8*   ALT 6* 6*   ANIONGAP 9 8     All pertinent labs within the past 24 hours have been reviewed.    Significant Imaging: I have reviewed all pertinent imaging results/findings within the past 24 hours.

## 2025-01-08 NOTE — PLAN OF CARE
Inpatient Upgrade Note    Anthony Ball has warranted treatment spanning two or more midnights of hospital level care for the management of  Patient will need a few days of IV antibiotics prior to ureteroscopy due to E. Coli ESBL UTI. Known ESBL E coli UTI in the setting of nephrolithiasis and stent. Suspect stone is colonized and leading to intermittent infections as well as persistently positive urine cultures. He is pending urologic intervention this week . He continues to require IV antibiotics. His condition is also complicated by the following comorbidities: Coronary Artery Disease, Diabetes, and CAD, PRIOR PE ON Eliquis .

## 2025-01-08 NOTE — HPI
74-year-old male with a past medical history of insulin-dependent T2DM, CAD, prior PE on Eliquis, afib, admitted for AMS. He underwent R ureteral stent placement on 9/27 for distal R ureteral stones in the setting of urosepsis and bacteremia. Ureteroscopy has had to be rescheduled several times due to UTI's. Most recently he has a urine culture growing E. Coli ESBL sensitive only to IV Ertapenem and Meropenem. Patient admitted on 1/8 for IV antibiotics prior to ureteroscopy scheduled on Friday.    On assessment the patient is AFVSS. Complains of low back pain that is chronic in nature. Denies fevers, chills, difficulty emptying his bladder.     There are no labs to review. Urine culture from 12/31 growing ESBL E. Coli.     CT from 11/17 with shows R ureteral stent in appropriate position. No left sided stones or hydronephrosis. Large left renal cyst. Probably bladder diverticulum.

## 2025-01-08 NOTE — ASSESSMENT & PLAN NOTE
Rate controlled at this time  Regular rhythm at this time  Hold eliquis in preparation for procedure  Cont heparin gtt at this time  Monitor

## 2025-01-08 NOTE — ASSESSMENT & PLAN NOTE
VSS, afebrile   Normal white cells  Lactic normal  CRP elevated  Urine culture from 12.31.24 showed ESBL e coli sensitive to carbapenems    Blood cultures pending   Cont merrem  ID consulted, appreciate recommendations  ID recommending continuing antibiotics through perioperative period and for at least 3 days after. Duration may be prolonged pending operative findings  Urology consulted, appreciate recs  Tentative plans for right ureteroscopy with laser lithotripsy and stone basket extraction with ureteral stent removal 1.10.24  NPO after midnight on Thursday  Hold eliquis starting now

## 2025-01-08 NOTE — ASSESSMENT & PLAN NOTE
Anemia is likely due to chronic disease due to diabetic nephropathy . Most recent hemoglobin and hematocrit are listed below.  Recent Labs     01/07/25  1900   HGB 12.0*   HCT 35.5*     Plan  - Monitor serial CBC: Daily  - Transfuse PRBC if patient becomes hemodynamically unstable, symptomatic or H/H drops below 7/21.  - Patient has not received any PRBC transfusions to date  - Patient's anemia is currently stable

## 2025-01-08 NOTE — HPI
74 y.o M with CAD, PE, polyneuropathy, Afib on apixaban, T2DM, recurrent UTIs with ESBL E coli with prior bacteremia, who will be undergoing stone removal on Friday 1/8/25. Urology is requesting direct admission to hospital medicine for management of ESBL E. Coli UTI in the setting of nephrolithiasis and stent with IV antibiotics prior to planned surgical intervention. C/o flank pain on 1/6/25, but denies any symptoms related to his UTI. Denies any increased frequency, urgency, dysuria, hematuria. Denies any fever or chills. Patient denies dizziness, lightheadedness, headache, CP, SOB, vomiting or diarrhea. Per outpatient ID recs will plan to start ertapenem 1 g daily ( meropenem if albumin <3) during perioperative period and plans to continue for at least 3 days postop.

## 2025-01-08 NOTE — ASSESSMENT & PLAN NOTE
- VSS, afebrile   - CBC and CMP pending   - UA pending  - Lactic acid and CRP pending  - Blood culture pending   - Urine culture 12/31/24 showed sensitivity to ertapenem and meropenem  - Started on meropenem  - ID consulted, appreciate recs. Reviewed recent outpatient recs.   - Start ertapenem 1 gm daily. If albumin is less than 3 then would start meropenem.  - Would recommend continuing antibiotics through perioperative period and for at least 3 days after. Duration may be prolonged pending operative findings.   - Urology consulted, appreciate recs   - Plan for right ureteroscopy with laser lithotripsy and stone basket extraction with ureteral stent removal and ureteral stent placement on 1/10/24.    - NPO after midnight on Thursday   - Hold eliquis starting now

## 2025-01-08 NOTE — ASSESSMENT & PLAN NOTE
74-year-old male with a past medical history of insulin-dependent T2DM, CAD, prior PE on Eliquis, afib, admitted for AMS. He underwent R ureteral stent placement on 9/27 for distal R ureteral stones in the setting of urosepsis and bacteremia. Ureteroscopy has had to be rescheduled several times due to UTI's. Most recently he has a urine culture growing E. Coli ESBL sensitive only to IV Ertapenem and Meropenem. Patient admitted on 1/8 for IV antibiotics prior to ureteroscopy scheduled on Friday.    -- Admitted to   -- Recommend IV culture appropriate Merrem vs Ertapenem for UTI.   -- Hold Eliquis prior to procedure  -- NPO Thursday night for procedure on Friday  -- Will consent  -- Urology to continue to follow

## 2025-01-08 NOTE — PLAN OF CARE
Problem: Adult Inpatient Plan of Care  Goal: Plan of Care Review  Outcome: Progressing  Goal: Patient-Specific Goal (Individualized)  Outcome: Progressing  Goal: Absence of Hospital-Acquired Illness or Injury  Outcome: Progressing  Intervention: Identify and Manage Fall Risk  Flowsheets (Taken 1/8/2025 0650)  Safety Promotion/Fall Prevention:   assistive device/personal item within reach   side rails raised x 2   patient expresses understanding of fall risk and prevention   nonskid shoes/socks when out of bed   family expresses understanding of fall risk and prevention  Intervention: Prevent Skin Injury  Flowsheets (Taken 1/8/2025 0650)  Body Position: position changed independently  Skin Protection: incontinence pads utilized  Device Skin Pressure Protection: adhesive use limited     Problem: Diabetes Comorbidity  Goal: Blood Glucose Level Within Targeted Range  Outcome: Progressing

## 2025-01-08 NOTE — SUBJECTIVE & OBJECTIVE
Interval History:  Seen and evaluated on general medical floor  No acute events overnight    Clinical status improved  No new complaints  He says his flan pain has improved    Objective:     Vital Signs (Most Recent):  Temp: 97.5 °F (36.4 °C) (01/08/25 1158)  Pulse: 82 (01/08/25 1158)  Resp: 17 (01/08/25 1158)  BP: (!) 148/73 (01/08/25 1158)  SpO2: 99 % (01/08/25 1158) Vital Signs (24h Range):  Temp:  [97.5 °F (36.4 °C)-98.3 °F (36.8 °C)] 97.5 °F (36.4 °C)  Pulse:  [] 82  Resp:  [17-18] 17  SpO2:  [92 %-99 %] 99 %  BP: (111-148)/(58-87) 148/73     Weight: 88.3 kg (194 lb 10.7 oz)  Body mass index is 28.75 kg/m².    Intake/Output Summary (Last 24 hours) at 1/8/2025 1324  Last data filed at 1/8/2025 0641  Gross per 24 hour   Intake --   Output 900 ml   Net -900 ml         Physical Exam  Vitals and nursing note reviewed.   Constitutional:       General: He is not in acute distress.     Appearance: He is well-developed. He is not toxic-appearing or diaphoretic.   HENT:      Head: Normocephalic and atraumatic.      Nose: Nose normal.      Mouth/Throat:      Mouth: Mucous membranes are moist.      Pharynx: No oropharyngeal exudate.   Eyes:      Conjunctiva/sclera: Conjunctivae normal.      Pupils: Pupils are equal, round, and reactive to light.   Cardiovascular:      Rate and Rhythm: Normal rate and regular rhythm.      Heart sounds: Normal heart sounds.   Pulmonary:      Effort: Pulmonary effort is normal. No respiratory distress.      Breath sounds: Normal breath sounds. No wheezing, rhonchi or rales.   Abdominal:      General: Bowel sounds are normal. There is no distension.      Palpations: Abdomen is soft.      Tenderness: There is no abdominal tenderness. There is no right CVA tenderness or left CVA tenderness.   Musculoskeletal:         General: No tenderness. Normal range of motion.      Cervical back: Normal range of motion and neck supple.      Right lower leg: No edema.      Left lower leg: No edema.    Lymphadenopathy:      Cervical: No cervical adenopathy.   Skin:     General: Skin is warm and dry.      Capillary Refill: Capillary refill takes less than 2 seconds.      Findings: No rash.   Neurological:      General: No focal deficit present.      Mental Status: He is alert and oriented to person, place, and time.   Psychiatric:         Behavior: Behavior normal.         Thought Content: Thought content normal.         Judgment: Judgment normal.             Significant Labs: All pertinent labs within the past 24 hours have been reviewed.    Significant Imaging: I have reviewed all pertinent imaging results/findings within the past 24 hours.   Wound Care: Petrolatum

## 2025-01-08 NOTE — HPI
Mr. Ball is a 74 y.o M with CAD, PE, polyneuropathy, Afib on apixaban, T2DM, recurrent UTIs with ESBL E coli with prior bacteremia, who will be undergoing stone removal on Friday. Urology is requesting direct admission to hospital medicine for management of ESBL E. Coli UTI in the setting of nephrolithiasis and stent with IV antibiotics prior to planned surgical intervention. At the time of my evaluation, patient was resting in bed. His only complaint was some nausea earlier this evening. He denies any symptoms related to his UTI. Denies any increased frequency, urgency, dysuria, hematuria, or flank pain. Denies any fever or chills. Patient denies dizziness, lightheadedness, headache, CP, SOB, vomiting or diarrhea. He notes that his asthma is well controlled. Patient voices no other concerns. Per outpatient ID recs will plan to start ertapenem 1 g daily ( meropenem if albumin <3) during perioperative period and plans to continue for atleast 3 days postop.

## 2025-01-08 NOTE — H&P
[facility] Medicine  History & Physical    Patient Name: Anthony Ball  MRN: [id number]  Patient Class: IP- Inpatient  Admission Date: 1/7/2025  Attending Physician: Adele Duncan MD   Primary Care Provider: Isaias Myles MD         Patient information was obtained from patient, past medical records, and ER records.     Subjective:     Principal Problem:UTI due to extended-spectrum beta lactamase (ESBL) producing Escherichia coli    Chief Complaint:   Chief Complaint   Patient presents with    Urinary Tract Infection        HPI: Mr. Ball is a 74 y.o M with CAD, PE, polyneuropathy, Afib on apixaban, T2DM, recurrent UTIs with ESBL E coli with prior bacteremia, who will be undergoing stone removal on Friday. Urology is requesting direct admission to hospital medicine for management of ESBL E. Coli UTI in the setting of nephrolithiasis and stent with IV antibiotics prior to planned surgical intervention. At the time of my evaluation, patient was resting in bed. His only complaint was some nausea earlier this evening. He denies any symptoms related to his UTI. Denies any increased frequency, urgency, dysuria, hematuria, or flank pain. Denies any fever or chills. Patient denies dizziness, lightheadedness, headache, CP, SOB, vomiting or diarrhea. He notes that his asthma is well controlled. Patient voices no other concerns. Per outpatient ID recs will plan to start ertapenem 1 g daily ( meropenem if albumin <3) during perioperative period and plans to continue for atleast 3 days postop.     Past Medical History:   Diagnosis Date    Acute deep vein thrombosis (DVT) of left lower extremity 12/2023    Anxiety     Atrial fibrillation 12/2023    Cataract     Coronary artery disease     CAC score 1430    Depression     Diabetic neuropathy     Essential (primary) hypertension     GERD (gastroesophageal reflux disease)     History of bariatric surgery 07/08/2021    History of total right knee replacement  07/08/2021    Insomnia     Neuromyopathy     Possibly DM and/or alcohol related.    Pulmonary embolism 12/2023    Reactive airway disease     Type 2 diabetes mellitus        Past Surgical History:   Procedure Laterality Date    CATARACT EXTRACTION W/  INTRAOCULAR LENS IMPLANT Right 7/15/2024    Procedure: EXTRACTION, CATARACT, WITH IOL INSERTION;  Surgeon: Margot Jacobson MD;  Location: Replaced by [facility] OR;  Service: Ophthalmology;  Laterality: Right;    CATARACT EXTRACTION W/  INTRAOCULAR LENS IMPLANT Left 8/29/2024    Procedure: EXTRACTION, CATARACT, WITH IOL INSERTION;  Surgeon: Margot Jacobson MD;  Location: Replaced by [facility] OR;  Service: Ophthalmology;  Laterality: Left;    COLONOSCOPY      Normal around 2020    COLONOSCOPY N/A 9/6/2024    Procedure: COLONOSCOPY;  Surgeon: Alessandro Serna MD;  Location: [facility] (4TH FLR);  Service: Endoscopy;  Laterality: N/A;  8/28-new case created-lvm for pre call-pt has cataract surgery 8/29/24-tb  ref-dr rico goldstein-peg-Sims  ok to hold taran Perdomo  9/5-LVM for precall-Kpvt    COLONOSCOPY N/A 9/6/2024    Procedure: COLONOSCOPY;  Surgeon: Alessandro Serna MD;  Location: [facility] (4TH FLR);  Service: Endoscopy;  Laterality: N/A;  + cologuard  8/8 ref by Isaias Goldstein MD, [facility], instr. to portal, Eliquis hold approval pending-  ok to hold Eliquis 2 days per Dr Perdomo    CYSTOSCOPY WITH URETEROSCOPY, RETROGRADE PYELOGRAPHY, AND INSERTION OF STENT Right 9/27/2024    Procedure: CYSTOSCOPY, WITH RETROGRADE PYELOGRAM AND URETERAL STENT INSERTION;  Surgeon: Joni Wagoner MD;  Location: [facility] OR 1ST FLR;  Service: Urology;  Laterality: Right;    REMOVAL, CALCULUS, BLADDER N/A 9/27/2024    Procedure: REMOVAL, CALCULUS, BLADDER;  Surgeon: Joni Wagoner MD;  Location: [facility] OR 1ST FLR;  Service: Urology;  Laterality: N/A;    TOTAL KNEE ARTHROPLASTY Right        Review of patient's allergies indicates:   Allergen Reactions    (d)-limonene flavor      Pt reports he is not allergic to  "anything        No current facility-administered medications on file prior to encounter.     Current Outpatient Medications on File Prior to Encounter   Medication Sig    albuterol (VENTOLIN HFA) 90 mcg/actuation inhaler Inhale 2 puffs into the lungs every 6 (six) hours as needed for Wheezing. Rescue    apixaban (ELIQUIS) 5 mg Tab TAKE 1 TABLET(5 MG) BY MOUTH TWICE DAILY    BD ULTRA-FINE MINI PEN NEEDLE 31 gauge x 3/16" Ndle SMARTSIG:SUB-Q 3-4 Times Daily    blood-glucose meter Misc To check BG 3 times daily, to use with insurance preferred meter    blood-glucose sensor (FREESTYLE SOPHY 3 PLUS SENSOR) Rima 1 Device by Misc.(Non-Drug; Combo Route) route every 14 (fourteen) days.    blood-glucose sensor (FREESTYLE SOPHY 3 SENSOR) Rima 1 Device by Misc.(Non-Drug; Combo Route) route every 14 (fourteen) days.    budesonide-formoterol 80-4.5 mcg (SYMBICORT) 80-4.5 mcg/actuation HFAA Inhale 2 puffs into the lungs.    busPIRone (BUSPAR) 15 MG tablet Take 1 tablet (15 mg total) by mouth 2 (two) times daily as needed (Anxiety).    capsaicin (ZOSTRIX) 0.025 % cream Apply topically every 3 (three) months. (to both feet)    ciprofloxacin HCl (CIPRO) 750 MG tablet     cyanocobalamin (VITAMIN B-12) 1000 MCG tablet Take 1 tablet (1,000 mcg total) by mouth once daily.    DULoxetine (CYMBALTA) 60 MG capsule Take 1 capsule (60 mg total) by mouth 2 (two) times daily.    empagliflozin (JARDIANCE) 10 mg tablet Take 1 tablet by mouth once daily.    ertapenem (INVANZ) 1 gram injection     flash glucose scanning reader (FREESTYLE SOPHY 14 DAY READER) Misc Use device to check blood glucose level 3 times daily.    fluticasone-salmeterol diskus inhaler 250-50 mcg     fluticasone-umeclidin-vilanter (TRELEGY ELLIPTA) 100-62.5-25 mcg DsDv Inhale 1 puff into the lungs once daily.    HYDROcodone-acetaminophen (NORCO) 5-325 mg per tablet Take 1 tablet by mouth every 6 (six) hours as needed for Pain.    hydrOXYzine HCL (ATARAX) 25 MG tablet     " hydrOXYzine pamoate (VISTARIL) 25 MG Cap Take 25 mg by mouth every 8 (eight) hours as needed (Itching).    insulin glargine-yfgn (SEMGLEE,INSULIN GLARG-YFGN,PEN) 100 unit/mL (3 mL) InPn Inject 15 Units into the skin once daily.    insulin regular human (NOVOLIN R FLEXPEN) 100 unit/mL (3 mL) InPn pen Inject 5 Units subcutaneously 2 to 3 times daily before breakfast, lunch and/or dinner.    lancets 33 gauge Misc To check BG 3 times daily, to use with insurance preferred meter    linaGLIPtin (TRADJENTA) 5 mg Tab tablet Take 1 tablet (5 mg total) by mouth once daily.    magnesium oxide 400 mg magnesium Tab Take 1 tablet by mouth every evening.    metFORMIN (GLUCOPHAGE-XR) 500 MG ER 24hr tablet Take 1 tablet (500 mg total) by mouth daily with breakfast.    methocarbamoL (ROBAXIN) 500 MG Tab Take 500 mg by mouth 3 (three) times daily.    methocarbamoL (ROBAXIN) 750 MG Tab Take 1 tablet (750 mg total) by mouth 3 (three) times daily as needed (Back pain).    nitrofurantoin, macrocrystal-monohydrate, (MACROBID) 100 MG capsule Take 1 capsule (100 mg total) by mouth 2 (two) times daily. for 7 days    nortriptyline (PAMELOR) 10 MG capsule Take 1 capsule (10 mg total) by mouth 3 (three) times daily.    NOVOLOG FLEXPEN U-100 INSULIN 100 unit/mL (3 mL) InPn pen SMARTSI Unit(s) SUB-Q 2-3 Times Daily    ondansetron (ZOFRAN) 8 MG tablet Take 1 tablet (8 mg total) by mouth every 8 (eight) hours as needed for Nausea.    oxybutynin (DITROPAN-XL) 5 MG TR24     oxyCODONE (ROXICODONE) 5 MG immediate release tablet Take 5 mg by mouth every 8 (eight) hours as needed.    pantoprazole (PROTONIX) 40 MG tablet Take 1 tablet (40 mg total) by mouth once daily.    pregabalin (LYRICA) 150 MG capsule Take 1 capsule (150 mg total) by mouth 3 (three) times daily.    pregabalin (LYRICA) 50 MG capsule Take 50 mg by mouth 3 (three) times daily.    tamsulosin (FLOMAX) 0.4 mg Cap Take 1 capsule (0.4 mg total) by mouth once daily. (Patient not taking:  Reported on 10/29/2024)    traZODone (DESYREL) 100 MG tablet TAKE 2 TABLETS(200 MG) BY MOUTH EVERY NIGHT AS NEEDED FOR INSOMNIA    TRUE METRIX GLUCOSE TEST STRIP Strp USE TO CHECK BLOOD SUGAR THREE TIMES DAILY OR AS DIRECTED    UNABLE TO FIND OMEGA XL CAP - Take 1 capsule by mouth once daily.     Family History       Problem Relation (Age of Onset)    Colon cancer Paternal Grandfather (89)    Hypertension Mother          Tobacco Use    Smoking status: Never    Smokeless tobacco: Never   Substance and Sexual Activity    Alcohol use: Yes     Comment: Former heavy use    Drug use: Never    Sexual activity: Yes     Comment: unknown     Review of Systems   Constitutional:  Negative for activity change, chills, diaphoresis and fever.   HENT:  Negative for trouble swallowing.    Eyes:  Negative for photophobia and visual disturbance.   Respiratory:  Negative for chest tightness, shortness of breath and wheezing.    Cardiovascular:  Negative for chest pain, palpitations and leg swelling.   Gastrointestinal:  Positive for nausea. Negative for abdominal pain, constipation, diarrhea and vomiting.   Genitourinary:  Negative for difficulty urinating, dysuria, flank pain, frequency, hematuria and urgency.   Musculoskeletal:  Positive for back pain (chronic). Negative for arthralgias, joint swelling and neck pain.   Skin:  Negative for color change, rash and wound.   Neurological:  Positive for weakness (chronic, BLE) and numbness (chronic, BLE). Negative for dizziness, syncope, light-headedness and headaches.   Psychiatric/Behavioral:  Negative for agitation and confusion. The patient is not nervous/anxious.      Objective:     Vital Signs (Most Recent):    Vital Signs (24h Range):           There is no height or weight on file to calculate BMI.     Physical Exam  Vitals and nursing note reviewed.   Constitutional:       General: He is not in acute distress.     Appearance: He is well-developed. He is not toxic-appearing or  diaphoretic.   HENT:      Head: Normocephalic and atraumatic.      Nose: Nose normal.      Mouth/Throat:      Mouth: Mucous membranes are moist.      Pharynx: No oropharyngeal exudate.   Eyes:      Conjunctiva/sclera: Conjunctivae normal.      Pupils: Pupils are equal, round, and reactive to light.   Cardiovascular:      Rate and Rhythm: Normal rate. Rhythm irregular.      Heart sounds: Normal heart sounds.   Pulmonary:      Effort: Pulmonary effort is normal. No respiratory distress.      Breath sounds: Normal breath sounds. No wheezing, rhonchi or rales.   Abdominal:      General: Bowel sounds are normal. There is no distension.      Palpations: Abdomen is soft.      Tenderness: There is no abdominal tenderness. There is no right CVA tenderness or left CVA tenderness.   Musculoskeletal:         General: No tenderness. Normal range of motion.      Cervical back: Normal range of motion and neck supple.      Right lower leg: No edema.      Left lower leg: No edema.   Lymphadenopathy:      Cervical: No cervical adenopathy.   Skin:     General: Skin is warm and dry.      Capillary Refill: Capillary refill takes less than 2 seconds.      Findings: No rash.   Neurological:      General: No focal deficit present.      Mental Status: He is alert and oriented to person, place, and time.      Sensory: Sensory deficit present.   Psychiatric:         Behavior: Behavior normal.         Thought Content: Thought content normal.         Judgment: Judgment normal.              CRANIAL NERVES     CN III, IV, VI   Pupils are equal, round, and reactive to light.       Significant Labs: All pertinent labs within the past 24 hours have been reviewed.    Significant Imaging: I have reviewed all pertinent imaging results/findings within the past 24 hours.  Assessment/Plan:     * UTI due to extended-spectrum beta lactamase (ESBL) producing Escherichia coli  - VSS, afebrile   - CBC and CMP pending   - UA pending  - Lactic acid and CRP  pending  - Blood culture pending   - Urine culture 12/31/24 showed sensitivity to ertapenem and meropenem  - Started on meropenem  - ID consulted, appreciate recs. Reviewed recent outpatient recs.   - Start ertapenem 1 gm daily. If albumin is less than 3 then would start meropenem.  - Would recommend continuing antibiotics through perioperative period and for at least 3 days after. Duration may be prolonged pending operative findings.   - Urology consulted, appreciate recs   - Plan for right ureteroscopy with laser lithotripsy and stone basket extraction with ureteral stent removal and ureteral stent placement on 1/10/24.    - NPO after midnight on Thursday   - Hold eliquis starting now    Urolithiasis  - ESBL E coli UTI in the setting of nephrolithiasis and stent. Suspect stone is colonized and leading to intermittent infections as well as persistently positive urine cultures.    -  Plan for right ureteroscopy with laser lithotripsy and stone basket extraction with ureteral stent removal and ureteral stent placement.     Back pain  Weakness of bilateral lower extremity   Back spasm  Recurrent falls   - Uses walker to ambulate at home  - Noted. Continue home meds.     Paroxysmal A-fib  Patient has paroxysmal (<7 days) atrial fibrillation. Patient is currently in atrial fibrillation. [id number] Score: 3. The patients heart rate in the last 24 hours is as follows:  Pulse  Min: 86  Max: 102     Antiarrhythmics       Anticoagulants  heparin 25,000 units in dextrose 5% (100 units/ml) IV bolus from bag LOW INTENSITY nomogram - [facility], Once, Intravenous  heparin 25,000 units in dextrose 5% 250 mL (100 units/mL) infusion LOW INTENSITY nomogram - [facility], Continuous, Intravenous  heparin 25,000 units in dextrose 5% (100 units/ml) IV bolus from bag LOW INTENSITY nomogram - [facility], As needed (PRN), Intravenous  heparin 25,000 units in dextrose 5% (100 units/ml) IV bolus from bag LOW INTENSITY nomogram - [facility], As needed (PRN),  Intravenous    Plan  - Replete lytes with a goal of K>4, Mg >2  - Patient is anticoagulated, see medications listed above.  - Patient's afib is currently controlled  - Hold Eliquis   - Start heparin drip, will need to pause midnight prior to procedure    Asthma  -No s/s of acute exacerbation.  -Continue daily inhalers.  - PRN Duo nebs    Normocytic anemia  Anemia is likely due to chronic disease due to diabetic nephropathy . Most recent hemoglobin and hematocrit are listed below.  Recent Labs     01/07/25  1900   HGB 12.0*   HCT 35.5*     Plan  - Monitor serial CBC: Daily  - Transfuse PRBC if patient becomes hemodynamically unstable, symptomatic or H/H drops below 7/21.  - Patient has not received any PRBC transfusions to date  - Patient's anemia is currently stable    Alcohol use  - History noted. No history of alcohol withdrawal.  - Reports drinking 2-3 drinks daily, last drink this morning.   - PETH pending.  - Nursing to assess CIWA q8h  - MVI, folic acid, and thiamine daily    Gastroesophageal reflux disease without esophagitis  - Chronic, stable.  - Continue PPI.    Primary insomnia  - Chronic, stable.  - Continue home meds PRN    Essential hypertension  Patient's blood pressure range in the last 24 hours was: No data recorded.The patient's inpatient anti-hypertensive regimen is listed below:  Current Antihypertensives       Plan  - BP is controlled, no changes needed to their regimen  - Not currently on antihypertensives    Diabetic polyneuropathy associated with type 2 diabetes mellitus  - Chronic, stable.  - Continue lyrica, amitriptyline, and cymbalta.    Type 2 diabetes mellitus with neurologic complication, without long-term current use of insulin  Patient's FSGs are controlled on current hypoglycemics.   Last A1c reviewed-   Lab Results   Component Value Date    HGBA1C 6.4 (H) 12/13/2024     Most recent fingerstick glucose reviewed-   Recent Labs   Lab 01/07/25 2052   POCTGLUCOSE 220*     Current  correctional scale  Low  Maintain anti-hyperglycemic dose as follows-   Antihyperglycemics (From admission, onward)      Start     Stop Route Frequency Ordered    01/08/25 0900  insulin glargine U-100 (Lantus) pen 10 Units         -- SubQ Daily 01/07/25 2057 01/08/25 0715  insulin aspart U-100 pen 3 Units         -- SubQ 3 times daily with meals 01/07/25 2057 01/07/25 2057  insulin aspart U-100 pen 5 Units         -- SubQ Once 01/07/25 2057 01/07/25 1940  insulin aspart U-100 pen 0-5 Units         -- SubQ Before meals & nightly PRN 01/07/25 1840          Hold Oral hypoglycemics while patient is in the hospital.  -Diabetic/cardiac diet.  -Accuchecks AC/HS- dexcom glucose readings verified using glucometer. Can use dexcom for accuchecks.      VTE Risk Mitigation (From admission, onward)           Ordered     heparin 25,000 units in dextrose 5% (100 units/ml) IV bolus from bag LOW INTENSITY nomogram - [facility]  As needed (PRN)        Question:  Heparin Infusion Adjustment (DO NOT MODIFY ANSWER)  Answer:  \\[email or web address]\epic\Images\Pharmacy\HeparinInfusions\heparin LOW INTENSITY nomogram for [facility] [id number]    01/07/25 1919     heparin 25,000 units in dextrose 5% (100 units/ml) IV bolus from bag LOW INTENSITY nomogram - [facility]  As needed (PRN)        Question:  Heparin Infusion Adjustment (DO NOT MODIFY ANSWER)  Answer:  \\[email or web address]\epic\Images\Pharmacy\HeparinInfusions\heparin LOW INTENSITY nomogram for [facility] [id number].pdf    01/07/25 1919     heparin 25,000 units in dextrose 5% (100 units/ml) IV bolus from bag LOW INTENSITY nomogram - [facility]  Once        Question:  Heparin Infusion Adjustment (DO NOT MODIFY ANSWER)  Answer:  \\[email or web address]\epic\Images\Pharmacy\HeparinInfusions\heparin LOW INTENSITY nomogram for [facility] [id number].pdf    01/07/25 1919     heparin 25,000 units in dextrose 5% 250 mL (100 units/mL) infusion LOW INTENSITY nomogram - [facility]  Continuous        Question:  Begin at (units/kg/hr)  Answer:  12    01/07/25 1919      IP VTE HIGH RISK PATIENT  Once         01/07/25 1810     Place sequential compression device  Until discontinued         01/07/25 1810                                    Kayleen Jessica NP  Department of Hospital Medicine  [facility]

## 2025-01-08 NOTE — PROGRESS NOTES
[facility] - Surgery  [facility] Medicine  Progress Note    Patient Name: Anthony Ball  MRN: [id number]  Patient Class: IP- Inpatient   Admission Date: 1/7/2025  Length of Stay: 1 days  Attending Physician: Kenny Mojica DO  Primary Care Provider: Isaias Myles MD        Subjective     Principal Problem:UTI due to extended-spectrum beta lactamase (ESBL) producing Escherichia coli        HPI:  Mr. Ball is a 74 y.o M with CAD, PE, polyneuropathy, Afib on apixaban, T2DM, recurrent UTIs with ESBL E coli with prior bacteremia, who will be undergoing stone removal on Friday. Urology is requesting direct admission to hospital medicine for management of ESBL E. Coli UTI in the setting of nephrolithiasis and stent with IV antibiotics prior to planned surgical intervention. At the time of my evaluation, patient was resting in bed. His only complaint was some nausea earlier this evening. He denies any symptoms related to his UTI. Denies any increased frequency, urgency, dysuria, hematuria, or flank pain. Denies any fever or chills. Patient denies dizziness, lightheadedness, headache, CP, SOB, vomiting or diarrhea. He notes that his asthma is well controlled. Patient voices no other concerns. Per outpatient ID recs will plan to start ertapenem 1 g daily ( meropenem if albumin <3) during perioperative period and plans to continue for atleast 3 days postop.     Overview/Hospital Course:  Admitted for ESBL UTI associated with nephrolithiasis.  He has a right-sided double-J ureteral stent in place which was seen to be in stable position on CT.  No new or worsening hydro was noted.  Urology following, planning for lithotripsy 1.10.24.  On merrem.    Interval History:  Seen and evaluated on general medical floor  No acute events overnight    Clinical status improved  No new complaints  He says his flan pain has improved    Objective:     Vital Signs (Most Recent):  Temp: 97.5 °F (36.4 °C) (01/08/25 1158)  Pulse: 82 (01/08/25  1158)  Resp: 17 (01/08/25 1158)  BP: (!) 148/73 (01/08/25 1158)  SpO2: 99 % (01/08/25 1158) Vital Signs (24h Range):  Temp:  [97.5 °F (36.4 °C)-98.3 °F (36.8 °C)] 97.5 °F (36.4 °C)  Pulse:  [] 82  Resp:  [17-18] 17  SpO2:  [92 %-99 %] 99 %  BP: (111-148)/(58-87) 148/73     Weight: 88.3 kg (194 lb 10.7 oz)  Body mass index is 28.75 kg/m².    Intake/Output Summary (Last 24 hours) at 1/8/2025 1324  Last data filed at 1/8/2025 0641  Gross per 24 hour   Intake --   Output 900 ml   Net -900 ml         Physical Exam  Vitals and nursing note reviewed.   Constitutional:       General: He is not in acute distress.     Appearance: He is well-developed. He is not toxic-appearing or diaphoretic.   HENT:      Head: Normocephalic and atraumatic.      Nose: Nose normal.      Mouth/Throat:      Mouth: Mucous membranes are moist.      Pharynx: No oropharyngeal exudate.   Eyes:      Conjunctiva/sclera: Conjunctivae normal.      Pupils: Pupils are equal, round, and reactive to light.   Cardiovascular:      Rate and Rhythm: Normal rate and regular rhythm.      Heart sounds: Normal heart sounds.   Pulmonary:      Effort: Pulmonary effort is normal. No respiratory distress.      Breath sounds: Normal breath sounds. No wheezing, rhonchi or rales.   Abdominal:      General: Bowel sounds are normal. There is no distension.      Palpations: Abdomen is soft.      Tenderness: There is no abdominal tenderness. There is no right CVA tenderness or left CVA tenderness.   Musculoskeletal:         General: No tenderness. Normal range of motion.      Cervical back: Normal range of motion and neck supple.      Right lower leg: No edema.      Left lower leg: No edema.   Lymphadenopathy:      Cervical: No cervical adenopathy.   Skin:     General: Skin is warm and dry.      Capillary Refill: Capillary refill takes less than 2 seconds.      Findings: No rash.   Neurological:      General: No focal deficit present.      Mental Status: He is alert and  oriented to person, place, and time.   Psychiatric:         Behavior: Behavior normal.         Thought Content: Thought content normal.         Judgment: Judgment normal.             Significant Labs: All pertinent labs within the past 24 hours have been reviewed.    Significant Imaging: I have reviewed all pertinent imaging results/findings within the past 24 hours.    Assessment and Plan     * UTI due to extended-spectrum beta lactamase (ESBL) producing Escherichia coli  VSS, afebrile   Normal white cells  Lactic normal  CRP elevated  Urine culture from 12.31.24 showed ESBL e coli sensitive to carbapenems    Blood cultures pending   Cont merrem  ID consulted, appreciate recommendations  ID recommending continuing antibiotics through perioperative period and for at least 3 days after. Duration may be prolonged pending operative findings  Urology consulted, appreciate recs  Tentative plans for right ureteroscopy with laser lithotripsy and stone basket extraction with ureteral stent removal 1.10.24  NPO after midnight on Thursday  Hold eliquis starting now    Back pain  Weakness of bilateral lower extremity   Back spasm  Recurrent falls   - Uses walker to ambulate at home  - Noted. Continue home meds.     Urolithiasis  See plan for UTI    Paroxysmal A-fib  Rate controlled at this time  Regular rhythm at this time  Hold eliquis in preparation for procedure  Cont heparin gtt at this time  Monitor    Asthma  -No s/s of acute exacerbation.  -Continue daily inhalers.  - PRN Duo nebs    Normocytic anemia  Monitor    Alcohol use  - History noted. No history of alcohol withdrawal.  - Reports drinking 2-3 drinks daily, last drink this morning.   - PETH pending.  - Nursing to assess CIWA q8h  - MVI, folic acid, and thiamine daily    Gastroesophageal reflux disease without esophagitis  Chronic, stable  Continue PPI    Primary insomnia  - Chronic, stable.  - Continue home meds PRN    Essential hypertension  Monitor  Add agents  as indicated    Diabetic polyneuropathy associated with type 2 diabetes mellitus  Chronic, stable  Continue lyrica, amitriptyline, and cymbalta    Type 2 diabetes mellitus with neurologic complication, without long-term current use of insulin  SSI  Diabetic diet when not NPO  ACHS glucose checks  Resume home insulin regimen, Lantus 15U daily, Novolog 5 TIDAC      VTE Risk Mitigation (From admission, onward)           Ordered     heparin 25,000 units in dextrose 5% (100 units/ml) IV bolus from bag LOW INTENSITY nomogram - [facility]  As needed (PRN)        Question:  Heparin Infusion Adjustment (DO NOT MODIFY ANSWER)  Answer:  \\[email or web address]\epic\Images\Pharmacy\HeparinInfusions\heparin LOW INTENSITY nomogram for [facility] [id number].pdf    01/07/25 1919     heparin 25,000 units in dextrose 5% (100 units/ml) IV bolus from bag LOW INTENSITY nomogram - [facility]  As needed (PRN)        Question:  Heparin Infusion Adjustment (DO NOT MODIFY ANSWER)  Answer:  \\[email or web address]\epic\Images\Pharmacy\HeparinInfusions\heparin LOW INTENSITY nomogram for [facility] [id number].pdf    01/07/25 1919     heparin 25,000 units in dextrose 5% 250 mL (100 units/mL) infusion LOW INTENSITY nomogram - [facility]  Continuous        Question:  Begin at (units/kg/hr)  Answer:  12    01/07/25 1919     IP VTE HIGH RISK PATIENT  Once         01/07/25 1810     Place sequential compression device  Until discontinued         01/07/25 1810                    Discharge Planning   CHRISTA: 1/12/2025     Code Status: Full Code   Medical Readiness for Discharge Date:   Discharge Plan A: Home with family                        Kenny Mojica DO  Department of Hospital Medicine   [facility] - Surgery

## 2025-01-08 NOTE — ASSESSMENT & PLAN NOTE
I have reviewed hospital notes from HM service and other specialty providers. I have also reviewed CBC, CMP/BMP, cultures and imaging with my interpretation as documented.      74 y.o. male patient with CAD, PE, Afib on Eliquis, HTN, DM2, spinal canal stenosis, ESBL E coli bacteremia in November, recurrent ESBL E. Coli UTIs in the setting of nephrolithiasis & stents, followed by ID previously. Patient admitted on 1/8 for IV antibiotics prior to ureteroscopy with urology scheduled on Friday.     Recommendations / Plan  Continue IV Meropenem, increased dose to 2 g q8 hrs  Continue IV meropenem for 3 days post procedure    -- Discussed with ID staff, Dr. Mac and primary team  -- ID will continue to follow for further recs

## 2025-01-08 NOTE — ASSESSMENT & PLAN NOTE
Rate controlled at this time  Regular rhythm at this time  Hold eliquis in preparation for procedure  Monitor

## 2025-01-08 NOTE — PLAN OF CARE
[facility] Surgery  Initial Discharge Assessment       Primary Care Provider: Isaias Myles MD    Admission Diagnosis: Urinary tract infection, site not specified [N39.0]  Urinary tract infection due to extended-spectrum beta lactamase (ESBL) producing Escherichia coli [N39.0, B96.29, Z16.12]    Admission Date: 1/7/2025  Expected Discharge Date: 1/12/2025         Payor: MEDICARE / Plan: MEDICARE PART A & B / Product Type: Government /     Extended Emergency Contact Information  Primary Emergency Contact: Bulmaro Melton   [place]  Mobile Phone: [phone]  Relation: Spouse    Discharge Plan A: Home with family         [id number] - [place] - [address] AT [facility] [address] [place] [address]  Phone: [phone] Fax: [phone]      Initial Assessment (most recent)       Adult Discharge Assessment - 01/08/25 1147          Discharge Assessment    Assessment Type Discharge Planning Assessment     Confirmed/corrected address, phone number and insurance Yes     Confirmed Demographics Correct on Facesheet     Source of Information patient     Does patient/caregiver understand observation status Yes     Communicated CHRISTA with patient/caregiver No     People in Home spouse     Facility Arrived From: Home     Do you expect to return to your current living situation? Yes     Do you have help at home or someone to help you manage your care at home? Yes     Who are your caregiver(s) and their phone number(s)? Bulmaro Linh (Spouse) [phone]     Prior to hospitilization cognitive status: Alert/Oriented     Current cognitive status: Alert/Oriented     Walking or Climbing Stairs Difficulty no     Dressing/Bathing Difficulty no     Equipment Currently Used at Home wheelchair;walker, rolling;shower chair     Readmission within 30 days? No     Patient currently being followed by outpatient case management? No     Do you currently  have service(s) that help you manage your care at home? No     Do you take prescription medications? Yes     Do you have prescription coverage? Yes     Do you have any problems affording any of your prescribed medications? No     Is the patient taking medications as prescribed? yes     Who is going to help you get home at discharge? Spouse-Bulmaro     How do you get to doctors appointments? family or friend will provide     Are you on dialysis? No     Do you take coumadin? No     Discharge Plan A Home with family        Physical Activity    On average, how many days per week do you engage in moderate to strenuous exercise (like a brisk walk)? 0 days     On average, how many minutes do you engage in exercise at this level? 0 min        Financial Resource Strain    How hard is it for you to pay for the very basics like food, housing, medical care, and heating? Not hard at all        Housing Stability    In the last 12 months, was there a time when you were not able to pay the mortgage or rent on time? No     At any time in the past 12 months, were you homeless or living in a shelter (including now)? No        Transportation Needs    Has the lack of transportation kept you from medical appointments, meetings, work or from getting things needed for daily living? No        Food Insecurity    Within the past 12 months, you worried that your food would run out before you got the money to buy more. Never true     Within the past 12 months, the food you bought just didn't last and you didn't have money to get more. Never true        Stress    Do you feel stress - tense, restless, nervous, or anxious, or unable to sleep at night because your mind is troubled all the time - these days? Not at all        Social Isolation    How often do you feel lonely or isolated from those around you?  Never        Alcohol Use    Q1: How often do you have a drink containing alcohol? 4 or more times a week     Q2: How many drinks containing  alcohol do you have on a typical day when you are drinking? 3 or 4     Q3: How often do you have six or more drinks on one occasion? Never        Utilities    In the past 12 months has the electric, gas, oil, or water company threatened to shut off services in your home? No        Health Literacy    How often do you need to have someone help you when you read instructions, pamphlets, or other written material from your doctor or pharmacy? Never                      Spoke with patient at bedside to complete d/c planning assessment. Patient lives with his spouse in a single story home with 2 steps to enter. Couple just recently moved. Address Updated in Epic. Independent with ADL's. Home DME includes a wheelchair, walker and shower chair. Patient to have ureteroscopy on Friday, admitted for IVAB prior to procedure. Verified PCP, Pharmacy and health insurance. Patient will have help at home from spouse. Discharge Plan A and Plan B have been determined by review of patient's clinical status, future medical and therapeutic needs, and coverage/benefits for post-acute care in coordination with multidisciplinary team members.        Caroline CONRAD  Case Management  [facility]  [phone]

## 2025-01-08 NOTE — CONSULTS
[facility]  Urology  Consult Note    Patient Name: Anthony Ball  MRN: [id number]  Admission Date: 1/7/2025  Hospital Length of Stay: 0   Code Status: Full Code   Attending Provider: Adele Duncan MD   Consulting Provider: Luis Croft DO  Primary Care Physician: Isaias Myles MD  Principal Problem:Acute cystitis    Inpatient consult to Urology  Consult performed by: Luis Croft DO  Consult ordered by: Luis Croft DO  Reason for consult: Ureteral Stones and UTI          Subjective:     HPI:  74-year-old male with a past medical history of insulin-dependent T2DM, CAD, prior PE on Eliquis, afib, admitted IV antibiotics prior to urologic procedure. He underwent R ureteral stent placement on 9/27 for distal R ureteral stones in the setting of urosepsis and bacteremia. Ureteroscopy has had to be rescheduled several times due to UTI's. Most recently he has a urine culture growing E. Coli ESBL sensitive only to IV Ertapenem and Meropenem. Patient admitted on 1/8 for IV antibiotics prior to ureteroscopy scheduled on Friday.    On assessment the patient is AFVSS. Complains of low back pain that is chronic in nature. Denies fevers, chills, difficulty emptying his bladder.     There are no labs to review. Urine culture from 12/31 growing ESBL E. Coli.     CT from 11/17 with shows R ureteral stent in appropriate position. No left sided stones or hydronephrosis. Large left renal cyst. Probably bladder diverticulum.     Past Medical History:   Diagnosis Date    Acute deep vein thrombosis (DVT) of left lower extremity 12/2023    Anxiety     Atrial fibrillation 12/2023    Cataract     Coronary artery disease     CAC score 1430    Depression     Diabetic neuropathy     Essential (primary) hypertension     GERD (gastroesophageal reflux disease)     History of bariatric surgery 07/08/2021    History of total right knee replacement 07/08/2021    Insomnia     Neuromyopathy     Possibly DM and/or alcohol related.     Pulmonary embolism 12/2023    Reactive airway disease     Type 2 diabetes mellitus        Past Surgical History:   Procedure Laterality Date    CATARACT EXTRACTION W/  INTRAOCULAR LENS IMPLANT Right 7/15/2024    Procedure: EXTRACTION, CATARACT, WITH IOL INSERTION;  Surgeon: Margot Jacobson MD;  Location: [facility] OR;  Service: Ophthalmology;  Laterality: Right;    CATARACT EXTRACTION W/  INTRAOCULAR LENS IMPLANT Left 8/29/2024    Procedure: EXTRACTION, CATARACT, WITH IOL INSERTION;  Surgeon: Margot Jacobson MD;  Location: [facility] OR;  Service: Ophthalmology;  Laterality: Left;    COLONOSCOPY      Normal around 2020    COLONOSCOPY N/A 9/6/2024    Procedure: COLONOSCOPY;  Surgeon: Alessandro Serna MD;  Location: [facility] ENDO (4TH FLR);  Service: Endoscopy;  Laterality: N/A;  8/28-new case created-lvm for pre call-pt has cataract surgery 8/29/24-tb  ref-dr rico goldstein-peg-portal  ok to hold taran Perdomo  9/5-LVM for precall-Kpvt    COLONOSCOPY N/A 9/6/2024    Procedure: COLONOSCOPY;  Surgeon: Alessandro Serna MD;  Location: [facility] ENDO (4TH FLR);  Service: Endoscopy;  Laterality: N/A;  + cologuard  8/8 ref by Isaias Goldstein MD, [facility], instr. to portal, Eliquis hold approval pending-  ok to hold Eliquis 2 days per Dr Goldstein-WALLACE    CYSTOSCOPY WITH URETEROSCOPY, RETROGRADE PYELOGRAPHY, AND INSERTION OF STENT Right 9/27/2024    Procedure: CYSTOSCOPY, WITH RETROGRADE PYELOGRAM AND URETERAL STENT INSERTION;  Surgeon: Joni Wagoner MD;  Location: [facility] OR 1ST FLR;  Service: Urology;  Laterality: Right;    REMOVAL, CALCULUS, BLADDER N/A 9/27/2024    Procedure: REMOVAL, CALCULUS, BLADDER;  Surgeon: Joni Wagoner MD;  Location: [facility] OR 1ST FLR;  Service: Urology;  Laterality: N/A;    TOTAL KNEE ARTHROPLASTY Right        Review of patient's allergies indicates:   Allergen Reactions    (d)-limonene flavor      Pt reports he is not allergic to anything        Family History       Problem Relation (Age of Onset)    Colon cancer  "Paternal Grandfather (89)    Hypertension Mother            Tobacco Use    Smoking status: Never    Smokeless tobacco: Never   Substance and Sexual Activity    Alcohol use: Yes     Comment: Former heavy use    Drug use: Never    Sexual activity: Yes     Comment: unknown       Review of Systems   Constitutional:  Negative for chills and fever.   Gastrointestinal:  Negative for nausea and vomiting.   Genitourinary:  Negative for difficulty urinating.       Objective:     Resp:  [17] 17     There is no height or weight on file to calculate BMI.           Drains       None                    Physical Exam  Constitutional:       General: He is not in acute distress.     Appearance: Normal appearance.   HENT:      Head: Normocephalic and atraumatic.   Eyes:      Extraocular Movements: Extraocular movements intact.      Pupils: Pupils are equal, round, and reactive to light.   Cardiovascular:      Rate and Rhythm: Normal rate.   Pulmonary:      Effort: Pulmonary effort is normal. No respiratory distress.   Abdominal:      General: Abdomen is flat. There is no distension.      Tenderness: There is no abdominal tenderness. There is no right CVA tenderness or left CVA tenderness.   Skin:     Coloration: Skin is not jaundiced.   Neurological:      General: No focal deficit present.      Mental Status: He is alert and oriented to person, place, and time.   Psychiatric:         Mood and Affect: Mood normal.         Behavior: Behavior normal.          Significant Labs:    BMP:  No results for input(s): "NA", "K", "CL", "CO2", "BUN", "CREATININE", "LABGLOM", "GLUCOSE", "CALCIUM" in the last 168 hours.    CBC:  No results for input(s): "WBC", "HGB", "HCT", "PLT" in the last 168 hours.    Blood Culture: No results for input(s): "LABBLOO" in the last 168 hours.  CMP: No results for input(s): "GLU", "NA", "K", "CL", "CO2", "BUN", "CREATININE", "CALCIUM", "MG" in the last 168 hours.  Urine Culture: No results for input(s): "LABURIN" in " "the last 168 hours.  Urine Studies: No results for input(s): "COLORU", "APPEARANCEUA", "PHUR", "SPECGRAV", "PROTEINUA", "GLUCUA", "KETONESU", "BILIRUBINUA", "OCCULTUA", "NITRITE", "UROBILINOGEN", "LEUKOCYTESUR", "RBCUA", "WBCUA", "BACTERIA", "SQUAMEPITHEL", "HYALINECASTS" in the last 168 hours.    Invalid input(s): "WRIGHTSUR"    Significant Imaging:  CT: I have reviewed all results within the past 24 hours and my personal findings are:  as noted in HPI                     Assessment and Plan:     Urolithiasis  74-year-old male with a past medical history of insulin-dependent T2DM, CAD, prior PE on Eliquis, afib, admitted for AMS. He underwent R ureteral stent placement on 9/27 for distal R ureteral stones in the setting of urosepsis and bacteremia. Ureteroscopy has had to be rescheduled several times due to UTI's. Most recently he has a urine culture growing E. Coli ESBL sensitive only to IV Ertapenem and Meropenem. Patient admitted on 1/8 for IV antibiotics prior to ureteroscopy scheduled on Friday.    -- Admitted to   -- Recommend IV culture appropriate Merrem vs Ertapenem for UTI.   -- Hold Eliquis prior to procedure  -- NPO Thursday night for procedure on Friday  -- Will consent  -- Urology to continue to follow         VTE Risk Mitigation (From admission, onward)           Ordered     heparin 25,000 units in dextrose 5% (100 units/ml) IV bolus from bag LOW INTENSITY nomogram - [facility]  As needed (PRN)        Question:  Heparin Infusion Adjustment (DO NOT MODIFY ANSWER)  Answer:  \[email or web address]\epic\Images\Pharmacy\HeparinInfusions\heparin LOW INTENSITY nomogram for [facility] [id number].pdf    01/07/25 1919     heparin 25,000 units in dextrose 5% (100 units/ml) IV bolus from bag LOW INTENSITY nomogram - [facility]  As needed (PRN)        Question:  Heparin Infusion Adjustment (DO NOT MODIFY ANSWER)  Answer:  \[email or web address]\epic\Images\Pharmacy\HeparinInfusions\heparin LOW INTENSITY nomogram for [facility] [id number].pdf    01/07/25 1919     " heparin 25,000 units in dextrose 5% (100 units/ml) IV bolus from bag LOW INTENSITY nomogram - [facility]  Once        Question:  Heparin Infusion Adjustment (DO NOT MODIFY ANSWER)  Answer:  \\[email or web address]\epic\Images\Pharmacy\HeparinInfusions\heparin LOW INTENSITY nomogram for [facility] [id number].pdf    01/07/25 1919     heparin 25,000 units in dextrose 5% 250 mL (100 units/mL) infusion LOW INTENSITY nomogram - [facility]  Continuous        Question:  Begin at (units/kg/hr)  Answer:  12    01/07/25 1919     IP VTE HIGH RISK PATIENT  Once         01/07/25 1810     Place sequential compression device  Until discontinued         01/07/25 1810                    Thank you for your consult. I will follow-up with patient. Please contact us if you have any additional questions.    Luis Croft, DO  Urology  [facility]

## 2025-01-09 ENCOUNTER — OUTPATIENT CASE MANAGEMENT (OUTPATIENT)
Dept: ADMINISTRATIVE | Facility: OTHER | Age: 75
End: 2025-01-09
Payer: MEDICARE

## 2025-01-09 ENCOUNTER — ANESTHESIA EVENT (OUTPATIENT)
Dept: SURGERY | Facility: HOSPITAL | Age: 75
End: 2025-01-09
Payer: MEDICARE

## 2025-01-09 LAB
ALBUMIN SERPL BCP-MCNC: 2.4 G/DL (ref 3.5–5.2)
ALP SERPL-CCNC: 113 U/L (ref 40–150)
ALT SERPL W/O P-5'-P-CCNC: 5 U/L (ref 10–44)
ANION GAP SERPL CALC-SCNC: 7 MMOL/L (ref 8–16)
APTT PPP: 51.4 SEC (ref 21–32)
AST SERPL-CCNC: 8 U/L (ref 10–40)
BACTERIA UR CULT: NO GROWTH
BASOPHILS # BLD AUTO: 0.06 K/UL (ref 0–0.2)
BASOPHILS # BLD AUTO: 0.06 K/UL (ref 0–0.2)
BASOPHILS NFR BLD: 1 % (ref 0–1.9)
BASOPHILS NFR BLD: 1 % (ref 0–1.9)
BILIRUB SERPL-MCNC: 0.3 MG/DL (ref 0.1–1)
BUN SERPL-MCNC: 16 MG/DL (ref 8–23)
CALCIUM SERPL-MCNC: 8.6 MG/DL (ref 8.7–10.5)
CHLORIDE SERPL-SCNC: 108 MMOL/L (ref 95–110)
CO2 SERPL-SCNC: 24 MMOL/L (ref 23–29)
CREAT SERPL-MCNC: 1.2 MG/DL (ref 0.5–1.4)
DIFFERENTIAL METHOD BLD: ABNORMAL
DIFFERENTIAL METHOD BLD: ABNORMAL
EOSINOPHIL # BLD AUTO: 0.5 K/UL (ref 0–0.5)
EOSINOPHIL # BLD AUTO: 0.5 K/UL (ref 0–0.5)
EOSINOPHIL NFR BLD: 8.9 % (ref 0–8)
EOSINOPHIL NFR BLD: 8.9 % (ref 0–8)
ERYTHROCYTE [DISTWIDTH] IN BLOOD BY AUTOMATED COUNT: 14.7 % (ref 11.5–14.5)
ERYTHROCYTE [DISTWIDTH] IN BLOOD BY AUTOMATED COUNT: 14.7 % (ref 11.5–14.5)
EST. GFR  (NO RACE VARIABLE): >60 ML/MIN/1.73 M^2
GLUCOSE SERPL-MCNC: 123 MG/DL (ref 70–110)
HCT VFR BLD AUTO: 32.5 % (ref 40–54)
HCT VFR BLD AUTO: 32.5 % (ref 40–54)
HGB BLD-MCNC: 10.6 G/DL (ref 14–18)
HGB BLD-MCNC: 10.6 G/DL (ref 14–18)
IMM GRANULOCYTES # BLD AUTO: 0.07 K/UL (ref 0–0.04)
IMM GRANULOCYTES # BLD AUTO: 0.07 K/UL (ref 0–0.04)
IMM GRANULOCYTES NFR BLD AUTO: 1.2 % (ref 0–0.5)
IMM GRANULOCYTES NFR BLD AUTO: 1.2 % (ref 0–0.5)
LYMPHOCYTES # BLD AUTO: 1.2 K/UL (ref 1–4.8)
LYMPHOCYTES # BLD AUTO: 1.2 K/UL (ref 1–4.8)
LYMPHOCYTES NFR BLD: 20.2 % (ref 18–48)
LYMPHOCYTES NFR BLD: 20.2 % (ref 18–48)
MCH RBC QN AUTO: 29.2 PG (ref 27–31)
MCH RBC QN AUTO: 29.2 PG (ref 27–31)
MCHC RBC AUTO-ENTMCNC: 32.6 G/DL (ref 32–36)
MCHC RBC AUTO-ENTMCNC: 32.6 G/DL (ref 32–36)
MCV RBC AUTO: 90 FL (ref 82–98)
MCV RBC AUTO: 90 FL (ref 82–98)
MONOCYTES # BLD AUTO: 0.4 K/UL (ref 0.3–1)
MONOCYTES # BLD AUTO: 0.4 K/UL (ref 0.3–1)
MONOCYTES NFR BLD: 7.3 % (ref 4–15)
MONOCYTES NFR BLD: 7.3 % (ref 4–15)
NEUTROPHILS # BLD AUTO: 3.7 K/UL (ref 1.8–7.7)
NEUTROPHILS # BLD AUTO: 3.7 K/UL (ref 1.8–7.7)
NEUTROPHILS NFR BLD: 61.4 % (ref 38–73)
NEUTROPHILS NFR BLD: 61.4 % (ref 38–73)
NRBC BLD-RTO: 0 /100 WBC
NRBC BLD-RTO: 0 /100 WBC
PLATELET # BLD AUTO: 142 K/UL (ref 150–450)
PLATELET # BLD AUTO: 142 K/UL (ref 150–450)
PMV BLD AUTO: 10.6 FL (ref 9.2–12.9)
PMV BLD AUTO: 10.6 FL (ref 9.2–12.9)
POTASSIUM SERPL-SCNC: 4.3 MMOL/L (ref 3.5–5.1)
PROT SERPL-MCNC: 6.3 G/DL (ref 6–8.4)
RBC # BLD AUTO: 3.63 M/UL (ref 4.6–6.2)
RBC # BLD AUTO: 3.63 M/UL (ref 4.6–6.2)
SODIUM SERPL-SCNC: 139 MMOL/L (ref 136–145)
WBC # BLD AUTO: 6.05 K/UL (ref 3.9–12.7)
WBC # BLD AUTO: 6.05 K/UL (ref 3.9–12.7)

## 2025-01-09 PROCEDURE — 36415 COLL VENOUS BLD VENIPUNCTURE: CPT | Performed by: NURSE PRACTITIONER

## 2025-01-09 PROCEDURE — 80053 COMPREHEN METABOLIC PANEL: CPT | Performed by: NURSE PRACTITIONER

## 2025-01-09 PROCEDURE — 63600175 PHARM REV CODE 636 W HCPCS: Performed by: NURSE PRACTITIONER

## 2025-01-09 PROCEDURE — 25000003 PHARM REV CODE 250

## 2025-01-09 PROCEDURE — 63600175 PHARM REV CODE 636 W HCPCS

## 2025-01-09 PROCEDURE — 25000003 PHARM REV CODE 250: Performed by: NURSE PRACTITIONER

## 2025-01-09 PROCEDURE — 99233 SBSQ HOSP IP/OBS HIGH 50: CPT | Mod: GC,,,

## 2025-01-09 PROCEDURE — 85025 COMPLETE CBC W/AUTO DIFF WBC: CPT

## 2025-01-09 PROCEDURE — 21400001 HC TELEMETRY ROOM

## 2025-01-09 PROCEDURE — 85730 THROMBOPLASTIN TIME PARTIAL: CPT | Performed by: NURSE PRACTITIONER

## 2025-01-09 RX ADMIN — Medication 400 MG: at 09:01

## 2025-01-09 RX ADMIN — TRAZODONE HYDROCHLORIDE 200 MG: 100 TABLET ORAL at 09:01

## 2025-01-09 RX ADMIN — MEROPENEM 2 G: 2 INJECTION, POWDER, FOR SOLUTION INTRAVENOUS at 09:01

## 2025-01-09 RX ADMIN — DULOXETINE HYDROCHLORIDE 60 MG: 60 CAPSULE, DELAYED RELEASE ORAL at 08:01

## 2025-01-09 RX ADMIN — HEPARIN SODIUM AND DEXTROSE 12 UNITS/KG/HR: 10000; 5 INJECTION INTRAVENOUS at 05:01

## 2025-01-09 RX ADMIN — PREGABALIN 150 MG: 150 CAPSULE ORAL at 04:01

## 2025-01-09 RX ADMIN — PANTOPRAZOLE SODIUM 40 MG: 40 TABLET, DELAYED RELEASE ORAL at 08:01

## 2025-01-09 RX ADMIN — PREGABALIN 150 MG: 150 CAPSULE ORAL at 08:01

## 2025-01-09 RX ADMIN — MEROPENEM 2 G: 2 INJECTION, POWDER, FOR SOLUTION INTRAVENOUS at 05:01

## 2025-01-09 RX ADMIN — HYDROCODONE BITARTRATE AND ACETAMINOPHEN 1 TABLET: 5; 325 TABLET ORAL at 06:01

## 2025-01-09 RX ADMIN — INSULIN GLARGINE 15 UNITS: 100 INJECTION, SOLUTION SUBCUTANEOUS at 08:01

## 2025-01-09 RX ADMIN — DOCUSATE SODIUM 100 MG: 100 CAPSULE, LIQUID FILLED ORAL at 08:01

## 2025-01-09 RX ADMIN — NORTRIPTYLINE HYDROCHLORIDE 10 MG: 10 CAPSULE ORAL at 09:01

## 2025-01-09 RX ADMIN — MEROPENEM 2 G: 2 INJECTION, POWDER, FOR SOLUTION INTRAVENOUS at 01:01

## 2025-01-09 RX ADMIN — INSULIN ASPART 5 UNITS: 100 INJECTION, SOLUTION INTRAVENOUS; SUBCUTANEOUS at 04:01

## 2025-01-09 RX ADMIN — NORTRIPTYLINE HYDROCHLORIDE 10 MG: 10 CAPSULE ORAL at 08:01

## 2025-01-09 RX ADMIN — NORTRIPTYLINE HYDROCHLORIDE 10 MG: 10 CAPSULE ORAL at 04:01

## 2025-01-09 RX ADMIN — INSULIN ASPART 5 UNITS: 100 INJECTION, SOLUTION INTRAVENOUS; SUBCUTANEOUS at 08:01

## 2025-01-09 RX ADMIN — DULOXETINE HYDROCHLORIDE 60 MG: 60 CAPSULE, DELAYED RELEASE ORAL at 09:01

## 2025-01-09 RX ADMIN — INSULIN ASPART 5 UNITS: 100 INJECTION, SOLUTION INTRAVENOUS; SUBCUTANEOUS at 12:01

## 2025-01-09 RX ADMIN — PREGABALIN 150 MG: 150 CAPSULE ORAL at 09:01

## 2025-01-09 RX ADMIN — HYDROCODONE BITARTRATE AND ACETAMINOPHEN 1 TABLET: 5; 325 TABLET ORAL at 01:01

## 2025-01-09 NOTE — PLAN OF CARE
Problem: Adult Inpatient Plan of Care  Goal: Plan of Care Review  Outcome: Progressing  Goal: Patient-Specific Goal (Individualized)  Outcome: Progressing  Goal: Absence of Hospital-Acquired Illness or Injury  Outcome: Progressing  Goal: Optimal Comfort and Wellbeing  Reactivated  Goal: Readiness for Transition of Care  Reactivated     Problem: Diabetes Comorbidity  Goal: Blood Glucose Level Within Targeted Range  Outcome: Progressing     Problem: Sepsis/Septic Shock  Goal: Optimal Coping  Reactivated  Goal: Absence of Bleeding  Reactivated  Goal: Blood Glucose Level Within Targeted Range  Reactivated  Goal: Absence of Infection Signs and Symptoms  Reactivated  Goal: Optimal Nutrition Intake  Reactivated     Problem: Acute Kidney Injury/Impairment  Goal: Fluid and Electrolyte Balance  Reactivated  Goal: Improved Oral Intake  Reactivated  Goal: Effective Renal Function  Reactivated     Problem: Infection  Goal: Absence of Infection Signs and Symptoms  Reactivated     Problem: Fall Injury Risk  Goal: Absence of Fall and Fall-Related Injury  Reactivated

## 2025-01-09 NOTE — PLAN OF CARE
Inpatient Upgrade Note    Anthony Ball has warranted treatment spanning two or more midnights of hospital level care for the management of  Patient will need a few days of IV antibiotics prior to ureteroscopy due to E. Coli ESBL UTI. Known ESBL E coli UTI in the setting of nephrolithiasis and stent. Suspect stone is colonized and leading to intermittent infections as well as persistently positive urine cultures. He is pending urologic intervention this week.  . He continues to require IV antibiotics. His condition is also complicated by the following comorbidities: Coronary Artery Disease, Diabetes, and CAD, PRIOR PE ON Eliquis.

## 2025-01-09 NOTE — SUBJECTIVE & OBJECTIVE
Interval History:  Seen and evaluated on general medical floor  No acute events overnight    Clinical status improved  No new complaints  Still having some back pain and flank pain    Objective:     Vital Signs (Most Recent):  Temp: 97.7 °F (36.5 °C) (01/09/25 1107)  Pulse: 80 (01/09/25 1107)  Resp: 16 (01/09/25 1349)  BP: 117/62 (01/09/25 1107)  SpO2: 99 % (01/09/25 1107) Vital Signs (24h Range):  Temp:  [97.7 °F (36.5 °C)-98.5 °F (36.9 °C)] 97.7 °F (36.5 °C)  Pulse:  [73-89] 80  Resp:  [16-18] 16  SpO2:  [94 %-99 %] 99 %  BP: (103-128)/(59-67) 117/62     Weight: 88.3 kg (194 lb 10.7 oz)  Body mass index is 28.75 kg/m².    Intake/Output Summary (Last 24 hours) at 1/9/2025 1530  Last data filed at 1/9/2025 1213  Gross per 24 hour   Intake 240 ml   Output --   Net 240 ml         Physical Exam  Vitals and nursing note reviewed.   Constitutional:       General: He is not in acute distress.     Appearance: He is well-developed. He is not toxic-appearing or diaphoretic.   HENT:      Head: Normocephalic and atraumatic.      Nose: Nose normal.      Mouth/Throat:      Mouth: Mucous membranes are moist.      Pharynx: No oropharyngeal exudate.   Eyes:      Conjunctiva/sclera: Conjunctivae normal.      Pupils: Pupils are equal, round, and reactive to light.   Cardiovascular:      Rate and Rhythm: Normal rate and regular rhythm.      Heart sounds: Normal heart sounds.   Pulmonary:      Effort: Pulmonary effort is normal. No respiratory distress.      Breath sounds: Normal breath sounds. No wheezing, rhonchi or rales.   Abdominal:      General: Bowel sounds are normal. There is no distension.      Palpations: Abdomen is soft.      Tenderness: There is no abdominal tenderness. There is no right CVA tenderness or left CVA tenderness.   Musculoskeletal:         General: No tenderness. Normal range of motion.      Cervical back: Normal range of motion and neck supple.      Right lower leg: No edema.      Left lower leg: No edema.    Lymphadenopathy:      Cervical: No cervical adenopathy.   Skin:     General: Skin is warm and dry.      Capillary Refill: Capillary refill takes less than 2 seconds.      Findings: No rash.   Neurological:      General: No focal deficit present.      Mental Status: He is alert and oriented to person, place, and time.   Psychiatric:         Behavior: Behavior normal.         Thought Content: Thought content normal.         Judgment: Judgment normal.             Significant Labs: All pertinent labs within the past 24 hours have been reviewed.    Significant Imaging: I have reviewed all pertinent imaging results/findings within the past 24 hours.

## 2025-01-09 NOTE — SUBJECTIVE & OBJECTIVE
"Interval History: NAEON  Afebrile  No leukocytosis  Urology procedure scheduled for stephen.     Review of Systems   Constitutional:  Negative for chills, fatigue and fever.   Respiratory:  Negative for chest tightness, shortness of breath and wheezing.    Cardiovascular:  Negative for chest pain.   Gastrointestinal:  Negative for abdominal pain, constipation, diarrhea, nausea and vomiting.   Genitourinary:  Negative for difficulty urinating, dysuria, flank pain and urgency.   Musculoskeletal:  Negative for arthralgias.   Skin:  Negative for rash and wound.   Neurological:  Negative for headaches.     Objective:     Vital Signs (Most Recent):  Temp: 97.7 °F (36.5 °C) (01/09/25 1107)  Pulse: 80 (01/09/25 1107)  Resp: 16 (01/09/25 1349)  BP: 117/62 (01/09/25 1107)  SpO2: 99 % (01/09/25 1107) Vital Signs (24h Range):  Temp:  [97.7 °F (36.5 °C)-98.5 °F (36.9 °C)] 97.7 °F (36.5 °C)  Pulse:  [73-89] 80  Resp:  [16-18] 16  SpO2:  [94 %-99 %] 99 %  BP: (103-128)/(59-67) 117/62     Weight: 88.3 kg (194 lb 10.7 oz)  Body mass index is 28.75 kg/m².    Estimated Creatinine Clearance: 61.2 mL/min (based on SCr of 1.2 mg/dL).     Physical Exam     Significant Labs: Blood Culture:   Recent Labs   Lab 11/13/24  1823 11/13/24  1830 11/14/24  1717 11/14/24  1728 01/07/25  1900   LABBLOO No growth after 5 days. Gram stain aer bottle: Gram negative rods  Results called to and read back by:Leonel Pereyra RN 11/14/2024  12:34  ESCHERICHIA COLI ESBL* No growth after 5 days. No growth after 5 days. No Growth to date  No Growth to date  No Growth to date  No Growth to date     CBC:   Recent Labs   Lab 01/07/25  1900 01/08/25  0325 01/09/25  0341   WBC 10.01 7.99  7.99 6.05  6.05   HGB 12.0* 10.5*  10.5* 10.6*  10.6*   HCT 35.5* 32.8*  32.8* 32.5*  32.5*    166  166 142*  142*     Wound Culture: No results for input(s): "LABAERO" in the last 4320 hours.  All pertinent labs within the past 24 hours have been " reviewed.    Significant Imaging: I have reviewed all pertinent imaging results/findings within the past 24 hours.

## 2025-01-09 NOTE — ANESTHESIA PREPROCEDURE EVALUATION
[facility]  Anesthesia Pre-Operative Evaluation    Patient Name: Anthony Ball  YOB: 1950  MRN: [id number]    SUBJECTIVE:   01/09/2025    Pre-operative evaluation for Procedure(s) (LRB):  CYSTOSCOPY, WITH RETROGRADE PYELOGRAM (Right)    Anthony Ball is a 74 y.o. male with a PMHx significant for HTN, T2DM, GERD, CAD, pAF, asthma, prior PE, recurrent ESBL E Coli UTIs s/p R ureteral stent. Patient now presents for the above procedure(s).    Previous Airway    Intubated:  Postinduction    Mask Ventilation:  Not attempted    Attempts:  1    Attempted By:  CRNA    Method of Intubation:  Video laryngoscopy    Blade:  Kathi 3    Laryngeal View Grade: Grade I - full view of cords      Difficult Airway Encountered?: No      Complications:  None    Airway Device:  Oral endotracheal tube    Airway Device Size:  7.5    Style/Cuff Inflation:  Cuffed    Tube secured:  24    Secured at:  The lips    LDA: None documented.       Peripheral IV - Single Lumen 01/07/25 1752 20 G Anterior;Right Upper Arm (Active)   Site Assessment Clean;Dry;Intact;No redness;No swelling 01/08/25 1600   Extremity Assessment Distal to IV No abnormal discoloration;No redness;No swelling;No warmth 01/08/25 0756   Line Status Saline locked 01/08/25 1600   Dressing Status Clean;Dry;Intact 01/08/25 1600   Dressing Intervention Integrity maintained 01/09/25 0400   Number of days: 1            Peripheral IV - Single Lumen 01/07/25 2049 22 G Anterior;Distal;Right Forearm (Active)   Site Assessment Clean;Dry;Intact;No redness;No swelling 01/08/25 1600   Extremity Assessment Distal to IV No abnormal discoloration;No redness;No swelling;No warmth 01/08/25 0756   Line Status Infusing 01/08/25 1600   Dressing Status Clean;Dry;Intact 01/08/25 1600   Dressing Intervention Integrity maintained 01/09/25 0400   Number of days: 1            Midline Catheter - Single Lumen 11/17/24 1218 Left basilic vein (medial side of arm) 18g x 10cm  "(Active)   Number of days: 52     Transthoracic Echo :  No results found for this or any previous visit.    Patient Active Problem List   Diagnosis    Type 2 diabetes mellitus with neurologic complication, without long-term current use of insulin    Diabetic polyneuropathy associated with type 2 diabetes mellitus    Essential hypertension    Recurrent falls    Mobility impaired    Primary insomnia    Gastroesophageal reflux disease without esophagitis    Weakness of both lower extremities    Abnormal gait    Agatston CAC score, >400    Class 1 obesity due to excess calories with body mass index (BMI) of 32.0 to 32.9 in adult    Balance disorder    Back spasm    Alcohol use    Impaired mobility and ADLs    Coordination impairment    Decreased  strength    Impaired functional mobility, balance, gait, and endurance    UTI due to extended-spectrum beta lactamase (ESBL) producing Escherichia coli    Positive colorectal cancer screening using Cologuard test    Sepsis    Normocytic anemia    Hypomagnesemia    Hypophosphatemia    Pseudohyponatremia    ALYSIA (acute kidney injury)    Asthma    Right ureteral stone    Hydronephrosis    Pulmonary nodule    Thrombocytopenia    Alcohol abuse    Paroxysmal A-fib    Bacteremia    Hypercoagulable state due to atrial fibrillation    Chronic anticoagulation    History of pulmonary embolus (PE)    Urolithiasis    Hallucination    Encephalopathy, metabolic    Back pain    Hyponatremia     Review of patient's allergies indicates:   Allergen Reactions    (d)-limonene flavor      Pt reports he is not allergic to anything      Current Outpatient Medications   Medication Instructions    albuterol (VENTOLIN HFA) 90 mcg/actuation inhaler 2 puffs, Inhalation, Every 6 hours PRN, Rescue    BD ULTRA-FINE MINI PEN NEEDLE 31 gauge x 3/16" Ndle SMARTSIG:SUB-Q 3-4 Times Daily    blood-glucose meter Misc To check BG 3 times daily, to use with insurance preferred meter    blood-glucose sensor " (FREESTYLE SOPHY 3 PLUS SENSOR) Rima 1 Device, Misc.(Non-Drug; Combo Route), Every 14 days    blood-glucose sensor (FREESTYLE SOPHY 3 SENSOR) Rima 1 Device, Misc.(Non-Drug; Combo Route), Every 14 days    budesonide-formoterol 80-4.5 mcg (SYMBICORT) 80-4.5 mcg/actuation HFAA 2 puffs    busPIRone (BUSPAR) 15 mg, Oral, 2 times daily PRN    capsaicin (ZOSTRIX) 0.025 % cream Every 3 months    ciprofloxacin HCl (CIPRO) 750 MG tablet     cyanocobalamin (VITAMIN B-12) 1,000 mcg, Oral, Daily    DULoxetine (CYMBALTA) 60 mg, Oral, 2 times daily    ELIQUIS 5 mg, Oral, 2 times daily    empagliflozin (JARDIANCE) 10 mg tablet 1 tablet, Daily    ertapenem (INVANZ) 1 gram injection     flash glucose scanning reader (FREESTYLE SOPHY 14 DAY READER) Misc Use device to check blood glucose level 3 times daily.    fluticasone-salmeterol diskus inhaler 250-50 mcg     fluticasone-umeclidin-vilanter (TRELEGY ELLIPTA) 100-62.5-25 mcg DsDv 1 puff, Inhalation, Daily    HYDROcodone-acetaminophen (NORCO) 5-325 mg per tablet 1 tablet, Oral, Every 6 hours PRN    hydrOXYzine HCL (ATARAX) 25 MG tablet     hydrOXYzine pamoate (VISTARIL) 25 mg, Every 8 hours PRN    insulin glargine-yfgn (SEMGLEE(INSULIN GLARG-YFGN)PEN) 15 Units, Subcutaneous, Daily    insulin regular human (NOVOLIN R FLEXPEN) 100 unit/mL (3 mL) InPn pen Inject 5 Units subcutaneously 2 to 3 times daily before breakfast, lunch and/or dinner.    lancets 33 gauge Misc To check BG 3 times daily, to use with insurance preferred meter    magnesium oxide 400 mg magnesium Tab 1 tablet, Oral, Nightly    metFORMIN (GLUCOPHAGE-XR) 500 mg, Oral, With breakfast    methocarbamoL (ROBAXIN) 750 mg, Oral, 3 times daily PRN    methocarbamoL (ROBAXIN) 500 mg, 3 times daily    nitrofurantoin, macrocrystal-monohydrate, (MACROBID) 100 MG capsule 100 mg, Oral, 2 times daily    nortriptyline (PAMELOR) 10 mg, Oral, 3 times daily    NOVOLOG FLEXPEN U-100 INSULIN 100 unit/mL (3 mL) InPn pen SMARTSI Unit(s)  SUB-Q 2-3 Times Daily    ondansetron (ZOFRAN) 8 mg, Oral, Every 8 hours PRN    oxybutynin (DITROPAN-XL) 5 MG TR24     oxyCODONE (ROXICODONE) 5 mg, Every 8 hours PRN    pantoprazole (PROTONIX) 40 mg, Oral, Daily    pregabalin (LYRICA) 150 mg, Oral, 3 times daily    pregabalin (LYRICA) 50 mg, 3 times daily    tamsulosin (FLOMAX) 0.4 mg, Oral, Daily    TRADJENTA 5 mg, Oral, Daily    traZODone (DESYREL) 100 MG tablet TAKE 2 TABLETS(200 MG) BY MOUTH EVERY NIGHT AS NEEDED FOR INSOMNIA    TRUE METRIX GLUCOSE TEST STRIP Strp USE TO CHECK BLOOD SUGAR THREE TIMES DAILY OR AS DIRECTED    UNABLE TO FIND OMEGA XL CAP - Take 1 capsule by mouth once daily.       Past Surgical History:   Procedure Laterality Date    CATARACT EXTRACTION W/  INTRAOCULAR LENS IMPLANT Right 7/15/2024    Procedure: EXTRACTION, CATARACT, WITH IOL INSERTION;  Surgeon: Margot Jacobson MD;  Location: [facility] OR;  Service: Ophthalmology;  Laterality: Right;    CATARACT EXTRACTION W/  INTRAOCULAR LENS IMPLANT Left 8/29/2024    Procedure: EXTRACTION, CATARACT, WITH IOL INSERTION;  Surgeon: Margot Jacobson MD;  Location: [facility] OR;  Service: Ophthalmology;  Laterality: Left;    COLONOSCOPY      Normal around 2020    COLONOSCOPY N/A 9/6/2024    Procedure: COLONOSCOPY;  Surgeon: Alessandro Serna MD;  Location: [facility]);  Service: Endoscopy;  Laterality: N/A;  8/28-new case created-lv for pre call-pt has cataract surgery 8/29/24-tb  ref-dr rico goldstein-peg-[facility] to hold taran Perdomo  9/5-LV for precall-Kpvt    COLONOSCOPY N/A 9/6/2024    Procedure: COLONOSCOPY;  Surgeon: Alessandro Serna MD;  Location: [facility] (4TH FLR);  Service: Endoscopy;  Laterality: N/A;  + cologuard  8/8 ref by Isaias Goldstein MD, PeG, instr. to portal, Eliquis hold approval pending-[facility] to hold Taran 2 days per Dr Denoux-GT    CYSTOSCOPY WITH URETEROSCOPY, RETROGRADE PYELOGRAPHY, AND INSERTION OF STENT Right 9/27/2024    Procedure: CYSTOSCOPY, WITH RETROGRADE  PYELOGRAM AND URETERAL STENT INSERTION;  Surgeon: Joni Wagoner MD;  Location: [facility] OR [address] [place] [address];  Service: Urology;  Laterality: Right;    REMOVAL, CALCULUS, BLADDER N/A 9/27/2024    Procedure: REMOVAL, CALCULUS, BLADDER;  Surgeon: Joni Wagoner MD;  Location: [facility] OR [address] [place] [address];  Service: Urology;  Laterality: N/A;    TOTAL KNEE ARTHROPLASTY Right        Social History     Substance and Sexual Activity   Drug Use Never     Alcohol Use: Alcohol Misuse (1/8/2025)    AUDIT-C     Frequency of Alcohol Consumption: 4 or more times a week     Average Number of Drinks: 3 or 4     Frequency of Binge Drinking: Never     Tobacco Use: Low Risk  (1/6/2025)    Patient History     Smoking Tobacco Use: Never     Smokeless Tobacco Use: Never     Passive Exposure: Not on file       OBJECTIVE:     Vital Signs Range:      1/7/2025     6:42 PM 1/7/2025     7:23 PM 1/7/2025     7:49 PM   Vitals - 1 value per visit   SYSTOLIC   140   DIASTOLIC   87   Pulse   86   Temp   36.7 °C (98 °F)   Resp 17     SPO2   92 %   Weight (lb)  194.67    Weight (kg)  88.3    BMI (Calculated)  28.7          CBC:   Lab Results   Component Value Date    WBC 6.05 01/09/2025    WBC 6.05 01/09/2025    HGB 10.6 (L) 01/09/2025    HGB 10.6 (L) 01/09/2025    HCT 32.5 (L) 01/09/2025    HCT 32.5 (L) 01/09/2025    MCV 90 01/09/2025    MCV 90 01/09/2025     (L) 01/09/2025     (L) 01/09/2025         CMP:   Sodium   Date Value Ref Range Status   01/09/2025 139 136 - 145 mmol/L Final     Potassium   Date Value Ref Range Status   01/09/2025 4.3 3.5 - 5.1 mmol/L Final     Chloride   Date Value Ref Range Status   01/09/2025 108 95 - 110 mmol/L Final     CO2   Date Value Ref Range Status   01/09/2025 24 23 - 29 mmol/L Final     Glucose   Date Value Ref Range Status   01/09/2025 123 (H) 70 - 110 mg/dL Final     BUN   Date Value Ref Range Status   01/09/2025 16 8 - 23 mg/dL Final     Creatinine   Date Value Ref Range Status   01/09/2025 1.2 0.5 - 1.4 mg/dL Final      Calcium   Date Value Ref Range Status   01/09/2025 8.6 (L) 8.7 - 10.5 mg/dL Final     Total Protein   Date Value Ref Range Status   01/09/2025 6.3 6.0 - 8.4 g/dL Final     Albumin   Date Value Ref Range Status   01/09/2025 2.4 (L) 3.5 - 5.2 g/dL Final     Total Bilirubin   Date Value Ref Range Status   01/09/2025 0.3 0.1 - 1.0 mg/dL Final     Comment:     For infants and newborns, interpretation of results should be based  on gestational age, weight and in agreement with clinical  observations.    Premature Infant recommended reference ranges:  Up to 24 hours.............<8.0 mg/dL  Up to 48 hours............<12.0 mg/dL  3-5 days..................<15.0 mg/dL  6-29 days.................<15.0 mg/dL       Alkaline Phosphatase   Date Value Ref Range Status   01/09/2025 113 40 - 150 U/L Final     AST   Date Value Ref Range Status   01/09/2025 8 (L) 10 - 40 U/L Final     ALT   Date Value Ref Range Status   01/09/2025 5 (L) 10 - 44 U/L Final     Anion Gap   Date Value Ref Range Status   01/09/2025 7 (L) 8 - 16 mmol/L Final     eGFR if    Date Value Ref Range Status   07/01/2022 53.8 (A) >60 mL/min/1.73 m^2 Final     eGFR if non    Date Value Ref Range Status   07/01/2022 46.5 (A) >60 mL/min/1.73 m^2 Final     Comment:     Calculation used to obtain the estimated glomerular filtration  rate (eGFR) is the CKD-EPI equation.          INR:  Lab Results   Component Value Date    INR 1.0 01/07/2025    INR 1.1 09/26/2024       Cardiac Studies    EKG:   Results for orders placed or performed during the hospital encounter of 01/07/25   EKG 12-lead    Collection Time: 01/07/25  8:28 PM   Result Value Ref Range    QRS Duration 84 ms    [facility] QTC Calculation 437 ms    Narrative    Test Reason : I48.0,    Vent. Rate :  95 BPM     Atrial Rate :  95 BPM     P-R Int : 112 ms          QRS Dur :  84 ms      QT Int : 348 ms       P-R-T Axes : -13  16  39 degrees    QTcB Int : 437 ms    Sinus rhythm with  Premature supraventricular complexes and Premature  ventricular complexes or Fusion complexes  Otherwise normal ECG  When compared with ECG of 13-Nov-2024 18:26,  Fusion complexes are now Present  Premature ventricular complexes are now Present  Premature supraventricular complexes are now Present  Minimal criteria for Inferior infarct are no longer Present  Nonspecific T wave abnormality no longer evident in Anterior-lateral leads  Confirmed by Phil De La Rosa ([id number]) on 1/8/2025 9:08:17 AM    Referred By: ELYSIA CAN           Confirmed By: Phil De La Rosa     Nuclear Stress Echo:  Results for orders placed during the hospital encounter of 03/18/22    Nuclear Stress - Cardiology Interpreted    Interpretation Summary    Normal myocardial perfusion scan. There is no evidence of myocardial ischemia or infarction.    The visually estimated ejection fraction is normal at rest and normal during stress.    There is normal wall motion at rest and post stress.    LV cavity size is normal at rest and normal at stress.    The EKG portion of this study is negative for ischemia.    The patient reported no chest pain during the stress test.    During stress, occasional PACs are noted.    There are no prior studies for comparison.    ASSESSMENT/PLAN:                                                                                                                01/09/2025  Anthony Ball is a 74 y.o., male.    Pre-op Assessment    I have reviewed the Patient Summary Reports.     I have reviewed the Nursing Notes. I have reviewed the NPO Status.   I have reviewed the Medications.     Review of Systems  Cardiovascular:     Hypertension   CAD                                          Pulmonary:    Asthma                    Hepatic/GI:     GERD                Endocrine:  Diabetes               Physical Exam  General: Well nourished, Cooperative and Alert    Airway:  Mallampati: II   Mouth Opening: Normal  TM Distance: Normal  Tongue: Normal  Neck  ROM: Normal ROM    Dental:  Intact        Anesthesia Plan  Type of Anesthesia, risks & benefits discussed:    Anesthesia Type: Gen ETT  Intra-op Monitoring Plan: Standard ASA Monitors  Post Op Pain Control Plan: multimodal analgesia and IV/PO Opioids PRN  Induction:  IV  Airway Plan: Direct and Video  Informed Consent: Informed consent signed with the Patient and all parties understand the risks and agree with anesthesia plan.  All questions answered.   ASA Score: 3  Day of Surgery Review of History & Physical: H&P Update referred to the surgeon/provider.    Ready For Surgery From Anesthesia Perspective.     .

## 2025-01-09 NOTE — ASSESSMENT & PLAN NOTE
74-year-old male with a past medical history of insulin-dependent T2DM, CAD, prior PE on Eliquis, afib, admitted for AMS. He underwent R ureteral stent placement on 9/27 for distal R ureteral stones in the setting of urosepsis and bacteremia. Ureteroscopy has had to be rescheduled several times due to UTI's. Most recently he has a urine culture growing E. Coli ESBL sensitive only to IV Ertapenem and Meropenem. Patient admitted on 1/8 for IV antibiotics prior to ureteroscopy scheduled on Friday.    -- Admitted to   -- Ucx with sensitivity to Meropenem and Ertapenem  - Appreciate ID reccs  - On Merrem  -- Blood cx with NGTD  -- Hold Eliquis prior to procedure  -- NPO Thursday night for procedure on Friday  -- Will consent today   -- Urology to continue to follow

## 2025-01-09 NOTE — ASSESSMENT & PLAN NOTE
I have reviewed hospital notes from HM service and other specialty providers. I have also reviewed CBC, CMP/BMP, cultures and imaging with my interpretation as documented.      74 y.o. male patient with CAD, PE, Afib on Eliquis, HTN, DM2, spinal canal stenosis, ESBL E coli bacteremia in November, recurrent ESBL E. Coli UTIs in the setting of nephrolithiasis & stents, followed by ID previously. Patient admitted on 1/8 for IV antibiotics prior to ureteroscopy with urology scheduled on Friday.     Recommendations / Plan  Continue IV Meropenem 2 g q 8hrs  Continue IV meropenem for 3 days post procedure    -- Discussed with ID staff, Dr. Mac and primary team  -- ID will sign off, please consult for any questions/concerns

## 2025-01-09 NOTE — PLAN OF CARE
Problem: Adult Inpatient Plan of Care  Goal: Plan of Care Review  Outcome: Progressing  Goal: Patient-Specific Goal (Individualized)  Outcome: Progressing  Goal: Absence of Hospital-Acquired Illness or Injury  Outcome: Progressing  Goal: Optimal Comfort and Wellbeing  Outcome: Progressing  Goal: Readiness for Transition of Care  Outcome: Progressing     Problem: Diabetes Comorbidity  Goal: Blood Glucose Level Within Targeted Range  Outcome: Progressing     Problem: Sepsis/Septic Shock  Goal: Optimal Coping  Outcome: Progressing  Goal: Absence of Bleeding  Outcome: Progressing  Goal: Blood Glucose Level Within Targeted Range  Outcome: Progressing  Goal: Absence of Infection Signs and Symptoms  Outcome: Progressing  Goal: Optimal Nutrition Intake  Outcome: Progressing     Problem: Acute Kidney Injury/Impairment  Goal: Fluid and Electrolyte Balance  Outcome: Progressing  Goal: Improved Oral Intake  Outcome: Progressing  Goal: Effective Renal Function  Outcome: Progressing     Problem: Infection  Goal: Absence of Infection Signs and Symptoms  Outcome: Progressing     Problem: Fall Injury Risk  Goal: Absence of Fall and Fall-Related Injury  Outcome: Progressing   Pt is aaox4, resting in bed throughout the day with no signs of acute distress. Pt currently on heparin drip with aPTT closely monitored and within therapeutic range. Blood glucose is effectively managed using the pt Dexcom monitor, schedule, and prn insulin as needed. Pt tolerating current diet well and will transition to NPO status at midnight in preparation for tomorrows scheduled procedure. Safety measures are in place, bed is in the lowest position with wheels locked, side rails up x2, call light and personal belongings within reach. Continue plan of care.

## 2025-01-09 NOTE — SUBJECTIVE & OBJECTIVE
Interval History: AFVSS. NAEO. Resting comfortably in bed. On IV Merrem. Currently scheduled for the OR on Friday.       Objective:     Temp:  [97.5 °F (36.4 °C)-98.5 °F (36.9 °C)] 97.8 °F (36.6 °C)  Pulse:  [73-89] 76  Resp:  [17-18] 18  SpO2:  [94 %-99 %] 98 %  BP: (103-148)/(59-73) 117/67     Body mass index is 28.75 kg/m².           Drains       None                    Physical Exam  Vitals reviewed.   Constitutional:       General: He is awake. He is not in acute distress.  Cardiovascular:      Rate and Rhythm: Normal rate.   Pulmonary:      Effort: Pulmonary effort is normal. No respiratory distress.   Genitourinary:     Comments: Clear yellow urine in container next to bed  Neurological:      General: No focal deficit present.      Mental Status: He is alert.           Significant Labs:    BMP:  Recent Labs   Lab 01/07/25  1900 01/08/25  0946 01/09/25  0342   * 134* 139   K 4.7 3.9 4.3    106 108   CO2 21* 20* 24   BUN 19 18 16   CREATININE 1.3 1.2 1.2   CALCIUM 8.8 8.4* 8.6*       CBC:   Recent Labs   Lab 01/07/25  1900 01/08/25  0325 01/09/25  0341   WBC 10.01 7.99  7.99 6.05  6.05   HGB 12.0* 10.5*  10.5* 10.6*  10.6*   HCT 35.5* 32.8*  32.8* 32.5*  32.5*    166  166 142*  142*       All pertinent labs results from the past 24 hours have been reviewed.    Significant Imaging:  All pertinent imaging results/findings from the past 24 hours have been reviewed.

## 2025-01-09 NOTE — PROGRESS NOTES
[facility] - Surgery  [facility] Medicine  Progress Note    Patient Name: Anthony Ball  MRN: [id number]  Patient Class: IP- Inpatient   Admission Date: 1/7/2025  Length of Stay: 2 days  Attending Physician: Kenny Mojica DO  Primary Care Provider: Isaias Myles MD        Subjective     Principal Problem:UTI due to extended-spectrum beta lactamase (ESBL) producing Escherichia coli        HPI:  Mr. Ball is a 74 y.o M with CAD, PE, polyneuropathy, Afib on apixaban, T2DM, recurrent UTIs with ESBL E coli with prior bacteremia, who will be undergoing stone removal on Friday. Urology is requesting direct admission to hospital medicine for management of ESBL E. Coli UTI in the setting of nephrolithiasis and stent with IV antibiotics prior to planned surgical intervention. At the time of my evaluation, patient was resting in bed. His only complaint was some nausea earlier this evening. He denies any symptoms related to his UTI. Denies any increased frequency, urgency, dysuria, hematuria, or flank pain. Denies any fever or chills. Patient denies dizziness, lightheadedness, headache, CP, SOB, vomiting or diarrhea. He notes that his asthma is well controlled. Patient voices no other concerns. Per outpatient ID recs will plan to start ertapenem 1 g daily ( meropenem if albumin <3) during perioperative period and plans to continue for atleast 3 days postop.     Overview/Hospital Course:  Admitted for ESBL UTI associated with nephrolithiasis.  He has a right-sided double-J ureteral stent in place which was seen to be in stable position on CT.  No new or worsening hydro was noted.  Urology following, planning for lithotripsy 1.10.24.  On merrem.    Interval History:  Seen and evaluated on general medical floor  No acute events overnight    Clinical status improved  No new complaints  Still having some back pain and flank pain    Objective:     Vital Signs (Most Recent):  Temp: 97.7 °F (36.5 °C) (01/09/25 1107)  Pulse: 80  (01/09/25 1107)  Resp: 16 (01/09/25 1349)  BP: 117/62 (01/09/25 1107)  SpO2: 99 % (01/09/25 1107) Vital Signs (24h Range):  Temp:  [97.7 °F (36.5 °C)-98.5 °F (36.9 °C)] 97.7 °F (36.5 °C)  Pulse:  [73-89] 80  Resp:  [16-18] 16  SpO2:  [94 %-99 %] 99 %  BP: (103-128)/(59-67) 117/62     Weight: 88.3 kg (194 lb 10.7 oz)  Body mass index is 28.75 kg/m².    Intake/Output Summary (Last 24 hours) at 1/9/2025 1530  Last data filed at 1/9/2025 1213  Gross per 24 hour   Intake 240 ml   Output --   Net 240 ml         Physical Exam  Vitals and nursing note reviewed.   Constitutional:       General: He is not in acute distress.     Appearance: He is well-developed. He is not toxic-appearing or diaphoretic.   HENT:      Head: Normocephalic and atraumatic.      Nose: Nose normal.      Mouth/Throat:      Mouth: Mucous membranes are moist.      Pharynx: No oropharyngeal exudate.   Eyes:      Conjunctiva/sclera: Conjunctivae normal.      Pupils: Pupils are equal, round, and reactive to light.   Cardiovascular:      Rate and Rhythm: Normal rate and regular rhythm.      Heart sounds: Normal heart sounds.   Pulmonary:      Effort: Pulmonary effort is normal. No respiratory distress.      Breath sounds: Normal breath sounds. No wheezing, rhonchi or rales.   Abdominal:      General: Bowel sounds are normal. There is no distension.      Palpations: Abdomen is soft.      Tenderness: There is no abdominal tenderness. There is no right CVA tenderness or left CVA tenderness.   Musculoskeletal:         General: No tenderness. Normal range of motion.      Cervical back: Normal range of motion and neck supple.      Right lower leg: No edema.      Left lower leg: No edema.   Lymphadenopathy:      Cervical: No cervical adenopathy.   Skin:     General: Skin is warm and dry.      Capillary Refill: Capillary refill takes less than 2 seconds.      Findings: No rash.   Neurological:      General: No focal deficit present.      Mental Status: He is alert  and oriented to person, place, and time.   Psychiatric:         Behavior: Behavior normal.         Thought Content: Thought content normal.         Judgment: Judgment normal.             Significant Labs: All pertinent labs within the past 24 hours have been reviewed.    Significant Imaging: I have reviewed all pertinent imaging results/findings within the past 24 hours.    Assessment and Plan     * UTI due to extended-spectrum beta lactamase (ESBL) producing Escherichia coli  VSS, afebrile   Normal white cells  Lactic normal  CRP elevated  Urine culture from 12.31.24 showed ESBL e coli sensitive to carbapenems  Blood cultures sterile    Cont merrem  ID consulted, appreciate recommendations  ID recommending continuing antibiotics through perioperative period and for at least 3 days after. Duration may be prolonged pending operative findings  Urology consulted, appreciate recs  Tentative plans for right ureteroscopy with laser lithotripsy and stone basket extraction with ureteral stent removal 1.10.24  NPO after midnight on Thursday  Cont to hold eliquis    Back pain  Weakness of bilateral lower extremity   Back spasm  Recurrent falls   - Uses walker to ambulate at home  - Noted. Continue home meds.     Urolithiasis  See plan for UTI    Paroxysmal A-fib  Rate controlled at this time  Regular rhythm at this time  Hold eliquis in preparation for procedure  Cont heparin gtt at this time  Monitor    Asthma  -No s/s of acute exacerbation.  -Continue daily inhalers.  - PRN Duo nebs    Normocytic anemia  Monitor    Alcohol use  - History noted. No history of alcohol withdrawal.  - Reports drinking 2-3 drinks daily, last drink this morning.   - PETH pending.  - Nursing to assess CIWA q8h  - MVI, folic acid, and thiamine daily    Gastroesophageal reflux disease without esophagitis  Chronic, stable  Continue PPI    Primary insomnia  - Chronic, stable.  - Continue home meds PRN    Essential hypertension  Monitor  Add agents as  indicated    Diabetic polyneuropathy associated with type 2 diabetes mellitus  Chronic, stable  Continue lyrica, amitriptyline, and cymbalta    Type 2 diabetes mellitus with neurologic complication, without long-term current use of insulin  SSI  Diabetic diet when not NPO  ACHS glucose checks  Resume home insulin regimen, Lantus 15U daily, Novolog 5 TIDAC      VTE Risk Mitigation (From admission, onward)           Ordered     heparin 25,000 units in dextrose 5% (100 units/ml) IV bolus from bag LOW INTENSITY nomogram - [facility]  As needed (PRN)        Question:  Heparin Infusion Adjustment (DO NOT MODIFY ANSWER)  Answer:  \\[email or web address]\epic\Images\Pharmacy\HeparinInfusions\heparin LOW INTENSITY nomogram for [facility] [id number].pdf    01/07/25 1919     heparin 25,000 units in dextrose 5% (100 units/ml) IV bolus from bag LOW INTENSITY nomogram - [facility]  As needed (PRN)        Question:  Heparin Infusion Adjustment (DO NOT MODIFY ANSWER)  Answer:  \[email or web address]\epic\Images\Pharmacy\HeparinInfusions\heparin LOW INTENSITY nomogram for [facility] [id number].pdf    01/07/25 1919     heparin 25,000 units in dextrose 5% 250 mL (100 units/mL) infusion LOW INTENSITY nomogram - [facility]  Continuous        Question:  Begin at (units/kg/hr)  Answer:  12    01/07/25 1919     IP VTE HIGH RISK PATIENT  Once         01/07/25 1810     Place sequential compression device  Until discontinued         01/07/25 1810                    Discharge Planning   CHRISTA: 1/12/2025     Code Status: Full Code   Medical Readiness for Discharge Date:   Discharge Plan A: Home with family                        Kenny Mojica DO  Department of Hospital Medicine   [facility] - Surgery

## 2025-01-09 NOTE — PROGRESS NOTES
[facility] - [facility]  Urology  Progress Note    Patient Name: Anthony Ball  MRN: [id number]  Admission Date: 1/7/2025  Hospital Length of Stay: 2 days  Code Status: Full Code   Attending Provider: Kenny Mojica DO   Primary Care Physician: Isaias Myles MD    Subjective:     HPI:  74-year-old male with a past medical history of insulin-dependent T2DM, CAD, prior PE on Eliquis, afib, admitted for AMS. He underwent R ureteral stent placement on 9/27 for distal R ureteral stones in the setting of urosepsis and bacteremia. Ureteroscopy has had to be rescheduled several times due to UTI's. Most recently he has a urine culture growing E. Coli ESBL sensitive only to IV Ertapenem and Meropenem. Patient admitted on 1/8 for IV antibiotics prior to ureteroscopy scheduled on Friday.    On assessment the patient is AFVSS. Complains of low back pain that is chronic in nature. Denies fevers, chills, difficulty emptying his bladder.     There are no labs to review. Urine culture from 12/31 growing ESBL E. Coli.     CT from 11/17 with shows R ureteral stent in appropriate position. No left sided stones or hydronephrosis. Large left renal cyst. Probably bladder diverticulum.     Interval History: AFVSS. NAEO. Resting comfortably in bed. On IV Merrem. Currently scheduled for the OR on Friday.       Objective:     Temp:  [97.5 °F (36.4 °C)-98.5 °F (36.9 °C)] 97.8 °F (36.6 °C)  Pulse:  [73-89] 76  Resp:  [17-18] 18  SpO2:  [94 %-99 %] 98 %  BP: (103-148)/(59-73) 117/67     Body mass index is 28.75 kg/m².           Drains       None                    Physical Exam  Vitals reviewed.   Constitutional:       General: He is awake. He is not in acute distress.  Cardiovascular:      Rate and Rhythm: Normal rate.   Pulmonary:      Effort: Pulmonary effort is normal. No respiratory distress.   Genitourinary:     Comments: Clear yellow urine in container next to bed  Neurological:      General: No focal deficit present.      Mental Status: He  is alert.           Significant Labs:    BMP:  Recent Labs   Lab 01/07/25  1900 01/08/25  0946 01/09/25  0342   * 134* 139   K 4.7 3.9 4.3    106 108   CO2 21* 20* 24   BUN 19 18 16   CREATININE 1.3 1.2 1.2   CALCIUM 8.8 8.4* 8.6*       CBC:   Recent Labs   Lab 01/07/25  1900 01/08/25  0325 01/09/25  0341   WBC 10.01 7.99  7.99 6.05  6.05   HGB 12.0* 10.5*  10.5* 10.6*  10.6*   HCT 35.5* 32.8*  32.8* 32.5*  32.5*    166  166 142*  142*       All pertinent labs results from the past 24 hours have been reviewed.    Significant Imaging:  All pertinent imaging results/findings from the past 24 hours have been reviewed.                  Assessment/Plan:     Urolithiasis  74-year-old male with a past medical history of insulin-dependent T2DM, CAD, prior PE on Eliquis, afib, admitted for AMS. He underwent R ureteral stent placement on 9/27 for distal R ureteral stones in the setting of urosepsis and bacteremia. Ureteroscopy has had to be rescheduled several times due to UTI's. Most recently he has a urine culture growing E. Coli ESBL sensitive only to IV Ertapenem and Meropenem. Patient admitted on 1/8 for IV antibiotics prior to ureteroscopy scheduled on Friday.    -- Admitted to   -- Ucx with sensitivity to Meropenem and Ertapenem  - Appreciate ID reccs  - On Merrem  -- Blood cx with NGTD  -- Hold Eliquis prior to procedure  -- NPO Thursday night for procedure on Friday  -- Will consent today   -- Urology to continue to follow         VTE Risk Mitigation (From admission, onward)           Ordered     heparin 25,000 units in dextrose 5% (100 units/ml) IV bolus from bag LOW INTENSITY nomogram - [facility]  As needed (PRN)        Question:  Heparin Infusion Adjustment (DO NOT MODIFY ANSWER)  Answer:  \\[email or web address]\epic\Images\Pharmacy\HeparinInfusions\heparin LOW INTENSITY nomogram for [facility] [id number].pdf    01/07/25 1919     heparin 25,000 units in dextrose 5% (100 units/ml) IV bolus from bag LOW INTENSITY  nomogram - [facility]  As needed (PRN)        Question:  Heparin Infusion Adjustment (DO NOT MODIFY ANSWER)  Answer:  \\[email or web address]\epic\Images\Pharmacy\HeparinInfusions\heparin LOW INTENSITY nomogram for [facility] [id number].pdf    01/07/25 1919     heparin 25,000 units in dextrose 5% 250 mL (100 units/mL) infusion LOW INTENSITY nomogram - [facility]  Continuous        Question:  Begin at (units/kg/hr)  Answer:  12    01/07/25 1919     IP VTE HIGH RISK PATIENT  Once         01/07/25 1810     Place sequential compression device  Until discontinued         01/07/25 1810                    Luis Croft DO  Urology  [facility] - Surgery

## 2025-01-09 NOTE — ASSESSMENT & PLAN NOTE
VSS, afebrile   Normal white cells  Lactic normal  CRP elevated  Urine culture from 12.31.24 showed ESBL e coli sensitive to carbapenems  Blood cultures sterile    Cont merrem  ID consulted, appreciate recommendations  ID recommending continuing antibiotics through perioperative period and for at least 3 days after. Duration may be prolonged pending operative findings  Urology consulted, appreciate recs  Tentative plans for right ureteroscopy with laser lithotripsy and stone basket extraction with ureteral stent removal 1.10.24  NPO after midnight on Thursday  Cont to hold eliquis

## 2025-01-09 NOTE — PROGRESS NOTES
[facility]  Infectious Disease  Progress Note    Patient Name: Anthony Ball  MRN: [id number]  Admission Date: 1/7/2025  Length of Stay: 2 days  Attending Physician: Kenny Mojica DO  Primary Care Provider: Isaias Myles MD    Isolation Status: No active isolations  Assessment/Plan:      Renal/  * UTI due to extended-spectrum beta lactamase (ESBL) producing Escherichia coli  I have reviewed hospital notes from  service and other specialty providers. I have also reviewed CBC, CMP/BMP, cultures and imaging with my interpretation as documented.      74 y.o. male patient with CAD, PE, Afib on Eliquis, HTN, DM2, spinal canal stenosis, ESBL E coli bacteremia in November, recurrent ESBL E. Coli UTIs in the setting of nephrolithiasis & stents, followed by ID previously. Patient admitted on 1/8 for IV antibiotics prior to ureteroscopy with urology scheduled on Friday.     Recommendations / Plan  Continue IV Meropenem 2 g q 8hrs  Continue IV meropenem for 3 days post procedure    -- Discussed with ID staff, Dr. Mac and primary team  -- ID will sign off, please consult for any questions/concerns          Anticipated Disposition: see above    Thank you for your consult. I will sign off. Please contact us if you have any additional questions.    ESTEFANI Calixto  Infectious Disease  [facility]    Subjective:     Principal Problem:UTI due to extended-spectrum beta lactamase (ESBL) producing Escherichia coli    HPI: 74 y.o M with CAD, PE, polyneuropathy, Afib on apixaban, T2DM, recurrent UTIs with ESBL E coli with prior bacteremia, who will be undergoing stone removal on Friday 1/8/25. Urology is requesting direct admission to hospital medicine for management of ESBL E. Coli UTI in the setting of nephrolithiasis and stent with IV antibiotics prior to planned surgical intervention. C/o flank pain on 1/6/25, but denies any symptoms related to his UTI. Denies any increased frequency, urgency, dysuria,  hematuria. Denies any fever or chills. Patient denies dizziness, lightheadedness, headache, CP, SOB, vomiting or diarrhea. Per outpatient ID recs will plan to start ertapenem 1 g daily ( meropenem if albumin <3) during perioperative period and plans to continue for at least 3 days postop.   Interval History: NAEON  Afebrile  No leukocytosis  Urology procedure scheduled for stephen.     Review of Systems   Constitutional:  Negative for chills, fatigue and fever.   Respiratory:  Negative for chest tightness, shortness of breath and wheezing.    Cardiovascular:  Negative for chest pain.   Gastrointestinal:  Negative for abdominal pain, constipation, diarrhea, nausea and vomiting.   Genitourinary:  Negative for difficulty urinating, dysuria, flank pain and urgency.   Musculoskeletal:  Negative for arthralgias.   Skin:  Negative for rash and wound.   Neurological:  Negative for headaches.     Objective:     Vital Signs (Most Recent):  Temp: 97.7 °F (36.5 °C) (01/09/25 1107)  Pulse: 80 (01/09/25 1107)  Resp: 16 (01/09/25 1349)  BP: 117/62 (01/09/25 1107)  SpO2: 99 % (01/09/25 1107) Vital Signs (24h Range):  Temp:  [97.7 °F (36.5 °C)-98.5 °F (36.9 °C)] 97.7 °F (36.5 °C)  Pulse:  [73-89] 80  Resp:  [16-18] 16  SpO2:  [94 %-99 %] 99 %  BP: (103-128)/(59-67) 117/62     Weight: 88.3 kg (194 lb 10.7 oz)  Body mass index is 28.75 kg/m².    Estimated Creatinine Clearance: 61.2 mL/min (based on SCr of 1.2 mg/dL).     Physical Exam     Significant Labs: Blood Culture:   Recent Labs   Lab 11/13/24  1823 11/13/24  1830 11/14/24  1717 11/14/24  1728 01/07/25  1900   LABBLOO No growth after 5 days. Gram stain aer bottle: Gram negative rods  Results called to and read back by:Leonel Pereyra RN 11/14/2024  12:34  ESCHERICHIA COLI ESBL* No growth after 5 days. No growth after 5 days. No Growth to date  No Growth to date  No Growth to date  No Growth to date     CBC:   Recent Labs   Lab 01/07/25  1900 01/08/25  0325 01/09/25  0341   WBC  "10.01 7.99  7.99 6.05  6.05   HGB 12.0* 10.5*  10.5* 10.6*  10.6*   HCT 35.5* 32.8*  32.8* 32.5*  32.5*    166  166 142*  142*     Wound Culture: No results for input(s): "LABAERO" in the last 4320 hours.  All pertinent labs within the past 24 hours have been reviewed.    Significant Imaging: I have reviewed all pertinent imaging results/findings within the past 24 hours.  "

## 2025-01-10 ENCOUNTER — ANESTHESIA (OUTPATIENT)
Dept: SURGERY | Facility: HOSPITAL | Age: 75
End: 2025-01-10
Payer: MEDICARE

## 2025-01-10 LAB
ALBUMIN SERPL BCP-MCNC: 2.3 G/DL (ref 3.5–5.2)
ALP SERPL-CCNC: 106 U/L (ref 40–150)
ALT SERPL W/O P-5'-P-CCNC: 5 U/L (ref 10–44)
ANION GAP SERPL CALC-SCNC: 9 MMOL/L (ref 8–16)
APTT PPP: 30.8 SEC (ref 21–32)
APTT PPP: 45.5 SEC (ref 21–32)
AST SERPL-CCNC: 9 U/L (ref 10–40)
BASOPHILS # BLD AUTO: 0.04 K/UL (ref 0–0.2)
BASOPHILS # BLD AUTO: 0.04 K/UL (ref 0–0.2)
BASOPHILS NFR BLD: 0.8 % (ref 0–1.9)
BASOPHILS NFR BLD: 0.8 % (ref 0–1.9)
BILIRUB SERPL-MCNC: 0.3 MG/DL (ref 0.1–1)
BUN SERPL-MCNC: 13 MG/DL (ref 8–23)
CALCIUM SERPL-MCNC: 8.5 MG/DL (ref 8.7–10.5)
CHLORIDE SERPL-SCNC: 105 MMOL/L (ref 95–110)
CO2 SERPL-SCNC: 22 MMOL/L (ref 23–29)
CREAT SERPL-MCNC: 1.2 MG/DL (ref 0.5–1.4)
DIFFERENTIAL METHOD BLD: ABNORMAL
DIFFERENTIAL METHOD BLD: ABNORMAL
EOSINOPHIL # BLD AUTO: 0.4 K/UL (ref 0–0.5)
EOSINOPHIL # BLD AUTO: 0.4 K/UL (ref 0–0.5)
EOSINOPHIL NFR BLD: 7.1 % (ref 0–8)
EOSINOPHIL NFR BLD: 7.1 % (ref 0–8)
ERYTHROCYTE [DISTWIDTH] IN BLOOD BY AUTOMATED COUNT: 14.6 % (ref 11.5–14.5)
ERYTHROCYTE [DISTWIDTH] IN BLOOD BY AUTOMATED COUNT: 14.6 % (ref 11.5–14.5)
EST. GFR  (NO RACE VARIABLE): >60 ML/MIN/1.73 M^2
GLUCOSE SERPL-MCNC: 104 MG/DL (ref 70–110)
HCT VFR BLD AUTO: 31.7 % (ref 40–54)
HCT VFR BLD AUTO: 31.7 % (ref 40–54)
HGB BLD-MCNC: 10.2 G/DL (ref 14–18)
HGB BLD-MCNC: 10.2 G/DL (ref 14–18)
IMM GRANULOCYTES # BLD AUTO: 0.07 K/UL (ref 0–0.04)
IMM GRANULOCYTES # BLD AUTO: 0.07 K/UL (ref 0–0.04)
IMM GRANULOCYTES NFR BLD AUTO: 1.4 % (ref 0–0.5)
IMM GRANULOCYTES NFR BLD AUTO: 1.4 % (ref 0–0.5)
LYMPHOCYTES # BLD AUTO: 1.1 K/UL (ref 1–4.8)
LYMPHOCYTES # BLD AUTO: 1.1 K/UL (ref 1–4.8)
LYMPHOCYTES NFR BLD: 21.8 % (ref 18–48)
LYMPHOCYTES NFR BLD: 21.8 % (ref 18–48)
MCH RBC QN AUTO: 29.1 PG (ref 27–31)
MCH RBC QN AUTO: 29.1 PG (ref 27–31)
MCHC RBC AUTO-ENTMCNC: 32.2 G/DL (ref 32–36)
MCHC RBC AUTO-ENTMCNC: 32.2 G/DL (ref 32–36)
MCV RBC AUTO: 90 FL (ref 82–98)
MCV RBC AUTO: 90 FL (ref 82–98)
MONOCYTES # BLD AUTO: 0.4 K/UL (ref 0.3–1)
MONOCYTES # BLD AUTO: 0.4 K/UL (ref 0.3–1)
MONOCYTES NFR BLD: 8.8 % (ref 4–15)
MONOCYTES NFR BLD: 8.8 % (ref 4–15)
NEUTROPHILS # BLD AUTO: 2.9 K/UL (ref 1.8–7.7)
NEUTROPHILS # BLD AUTO: 2.9 K/UL (ref 1.8–7.7)
NEUTROPHILS NFR BLD: 60.1 % (ref 38–73)
NEUTROPHILS NFR BLD: 60.1 % (ref 38–73)
NRBC BLD-RTO: 0 /100 WBC
NRBC BLD-RTO: 0 /100 WBC
PLATELET # BLD AUTO: 137 K/UL (ref 150–450)
PLATELET # BLD AUTO: 137 K/UL (ref 150–450)
PMV BLD AUTO: 10.3 FL (ref 9.2–12.9)
PMV BLD AUTO: 10.3 FL (ref 9.2–12.9)
POCT GLUCOSE: 416 MG/DL (ref 70–110)
POTASSIUM SERPL-SCNC: 3.8 MMOL/L (ref 3.5–5.1)
PROT SERPL-MCNC: 6.1 G/DL (ref 6–8.4)
RBC # BLD AUTO: 3.51 M/UL (ref 4.6–6.2)
RBC # BLD AUTO: 3.51 M/UL (ref 4.6–6.2)
SODIUM SERPL-SCNC: 136 MMOL/L (ref 136–145)
WBC # BLD AUTO: 4.9 K/UL (ref 3.9–12.7)
WBC # BLD AUTO: 4.9 K/UL (ref 3.9–12.7)

## 2025-01-10 PROCEDURE — 36415 COLL VENOUS BLD VENIPUNCTURE: CPT

## 2025-01-10 PROCEDURE — 71000015 HC POSTOP RECOV 1ST HR: Performed by: UROLOGY

## 2025-01-10 PROCEDURE — 11000001 HC ACUTE MED/SURG PRIVATE ROOM

## 2025-01-10 PROCEDURE — 85025 COMPLETE CBC W/AUTO DIFF WBC: CPT

## 2025-01-10 PROCEDURE — 25000003 PHARM REV CODE 250

## 2025-01-10 PROCEDURE — 63600175 PHARM REV CODE 636 W HCPCS: Performed by: NURSE PRACTITIONER

## 2025-01-10 PROCEDURE — 63600175 PHARM REV CODE 636 W HCPCS: Performed by: REGISTERED NURSE

## 2025-01-10 PROCEDURE — 85730 THROMBOPLASTIN TIME PARTIAL: CPT | Mod: 91

## 2025-01-10 PROCEDURE — 52352 CYSTOURETERO W/STONE REMOVE: CPT | Mod: RT,,, | Performed by: UROLOGY

## 2025-01-10 PROCEDURE — 37000008 HC ANESTHESIA 1ST 15 MINUTES: Performed by: UROLOGY

## 2025-01-10 PROCEDURE — 0TC08ZZ EXTIRPATION OF MATTER FROM RIGHT KIDNEY, VIA NATURAL OR ARTIFICIAL OPENING ENDOSCOPIC: ICD-10-PCS | Performed by: UROLOGY

## 2025-01-10 PROCEDURE — 25000003 PHARM REV CODE 250: Performed by: NURSE PRACTITIONER

## 2025-01-10 PROCEDURE — 25500020 PHARM REV CODE 255: Performed by: UROLOGY

## 2025-01-10 PROCEDURE — 36000709 HC OR TIME LEV III EA ADD 15 MIN: Performed by: UROLOGY

## 2025-01-10 PROCEDURE — 36000708 HC OR TIME LEV III 1ST 15 MIN: Performed by: UROLOGY

## 2025-01-10 PROCEDURE — 85730 THROMBOPLASTIN TIME PARTIAL: CPT | Performed by: NURSE PRACTITIONER

## 2025-01-10 PROCEDURE — 25000003 PHARM REV CODE 250: Performed by: REGISTERED NURSE

## 2025-01-10 PROCEDURE — 0TP98DZ REMOVAL OF INTRALUMINAL DEVICE FROM URETER, VIA NATURAL OR ARTIFICIAL OPENING ENDOSCOPIC: ICD-10-PCS | Performed by: UROLOGY

## 2025-01-10 PROCEDURE — 27201423 OPTIME MED/SURG SUP & DEVICES STERILE SUPPLY: Performed by: UROLOGY

## 2025-01-10 PROCEDURE — 37000009 HC ANESTHESIA EA ADD 15 MINS: Performed by: UROLOGY

## 2025-01-10 PROCEDURE — 80053 COMPREHEN METABOLIC PANEL: CPT | Performed by: NURSE PRACTITIONER

## 2025-01-10 PROCEDURE — C1758 CATHETER, URETERAL: HCPCS | Performed by: UROLOGY

## 2025-01-10 PROCEDURE — 36415 COLL VENOUS BLD VENIPUNCTURE: CPT | Performed by: NURSE PRACTITIONER

## 2025-01-10 PROCEDURE — 82962 GLUCOSE BLOOD TEST: CPT | Performed by: UROLOGY

## 2025-01-10 PROCEDURE — C1769 GUIDE WIRE: HCPCS | Performed by: UROLOGY

## 2025-01-10 PROCEDURE — 82365 CALCULUS SPECTROSCOPY: CPT

## 2025-01-10 PROCEDURE — 71000044 HC DOSC ROUTINE RECOVERY FIRST HOUR: Performed by: UROLOGY

## 2025-01-10 PROCEDURE — 63600175 PHARM REV CODE 636 W HCPCS

## 2025-01-10 RX ORDER — DEXAMETHASONE SODIUM PHOSPHATE 4 MG/ML
INJECTION, SOLUTION INTRA-ARTICULAR; INTRALESIONAL; INTRAMUSCULAR; INTRAVENOUS; SOFT TISSUE
Status: DISCONTINUED | OUTPATIENT
Start: 2025-01-10 | End: 2025-01-10

## 2025-01-10 RX ORDER — FLUCONAZOLE 2 MG/ML
INJECTION, SOLUTION INTRAVENOUS
Status: DISCONTINUED | OUTPATIENT
Start: 2025-01-10 | End: 2025-01-10

## 2025-01-10 RX ORDER — FLUCONAZOLE 2 MG/ML
INJECTION, SOLUTION INTRAVENOUS
Status: COMPLETED
Start: 2025-01-10 | End: 2025-01-10

## 2025-01-10 RX ORDER — PHENYLEPHRINE HYDROCHLORIDE 10 MG/ML
INJECTION INTRAVENOUS
Status: DISCONTINUED | OUTPATIENT
Start: 2025-01-10 | End: 2025-01-10

## 2025-01-10 RX ORDER — MEROPENEM 1 G/1
1 INJECTION, POWDER, FOR SOLUTION INTRAVENOUS
Status: DISCONTINUED | OUTPATIENT
Start: 2025-01-10 | End: 2025-01-10

## 2025-01-10 RX ORDER — VASOPRESSIN 20 [USP'U]/ML
INJECTION, SOLUTION INTRAMUSCULAR; SUBCUTANEOUS
Status: DISCONTINUED | OUTPATIENT
Start: 2025-01-10 | End: 2025-01-10

## 2025-01-10 RX ORDER — ONDANSETRON HYDROCHLORIDE 2 MG/ML
INJECTION, SOLUTION INTRAVENOUS
Status: DISCONTINUED | OUTPATIENT
Start: 2025-01-10 | End: 2025-01-10

## 2025-01-10 RX ORDER — PROPOFOL 10 MG/ML
VIAL (ML) INTRAVENOUS
Status: DISCONTINUED | OUTPATIENT
Start: 2025-01-10 | End: 2025-01-10

## 2025-01-10 RX ORDER — LIDOCAINE HYDROCHLORIDE 20 MG/ML
INJECTION INTRAVENOUS
Status: DISCONTINUED | OUTPATIENT
Start: 2025-01-10 | End: 2025-01-10

## 2025-01-10 RX ORDER — FENTANYL CITRATE 50 UG/ML
INJECTION, SOLUTION INTRAMUSCULAR; INTRAVENOUS
Status: DISCONTINUED | OUTPATIENT
Start: 2025-01-10 | End: 2025-01-10

## 2025-01-10 RX ORDER — SODIUM CHLORIDE, SODIUM LACTATE, POTASSIUM CHLORIDE, CALCIUM CHLORIDE 600; 310; 30; 20 MG/100ML; MG/100ML; MG/100ML; MG/100ML
INJECTION, SOLUTION INTRAVENOUS CONTINUOUS
Status: DISCONTINUED | OUTPATIENT
Start: 2025-01-10 | End: 2025-01-10

## 2025-01-10 RX ORDER — ROCURONIUM BROMIDE 10 MG/ML
INJECTION, SOLUTION INTRAVENOUS
Status: DISCONTINUED | OUTPATIENT
Start: 2025-01-10 | End: 2025-01-10

## 2025-01-10 RX ADMIN — FENTANYL CITRATE 50 MCG: 50 INJECTION, SOLUTION INTRAMUSCULAR; INTRAVENOUS at 07:01

## 2025-01-10 RX ADMIN — INSULIN ASPART 5 UNITS: 100 INJECTION, SOLUTION INTRAVENOUS; SUBCUTANEOUS at 11:01

## 2025-01-10 RX ADMIN — ONDANSETRON 4 MG: 2 INJECTION INTRAMUSCULAR; INTRAVENOUS at 07:01

## 2025-01-10 RX ADMIN — PROPOFOL 200 MG: 10 INJECTION, EMULSION INTRAVENOUS at 07:01

## 2025-01-10 RX ADMIN — SODIUM CHLORIDE, SODIUM LACTATE, POTASSIUM CHLORIDE, AND CALCIUM CHLORIDE: .6; .31; .03; .02 INJECTION, SOLUTION INTRAVENOUS at 07:01

## 2025-01-10 RX ADMIN — LIDOCAINE HYDROCHLORIDE 100 MG: 20 INJECTION INTRAVENOUS at 07:01

## 2025-01-10 RX ADMIN — NORTRIPTYLINE HYDROCHLORIDE 10 MG: 10 CAPSULE ORAL at 03:01

## 2025-01-10 RX ADMIN — Medication 400 MG: at 09:01

## 2025-01-10 RX ADMIN — PREGABALIN 150 MG: 150 CAPSULE ORAL at 03:01

## 2025-01-10 RX ADMIN — FLUCONAZOLE IN SODIUM CHLORIDE 200 MG: 2 INJECTION, SOLUTION INTRAVENOUS at 07:01

## 2025-01-10 RX ADMIN — PHENYLEPHRINE HYDROCHLORIDE 200 MCG: 10 INJECTION INTRAVENOUS at 07:01

## 2025-01-10 RX ADMIN — MEROPENEM 2 G: 2 INJECTION, POWDER, FOR SOLUTION INTRAVENOUS at 02:01

## 2025-01-10 RX ADMIN — ROCURONIUM BROMIDE 80 MG: 10 INJECTION, SOLUTION INTRAVENOUS at 07:01

## 2025-01-10 RX ADMIN — PREGABALIN 150 MG: 150 CAPSULE ORAL at 09:01

## 2025-01-10 RX ADMIN — NORTRIPTYLINE HYDROCHLORIDE 10 MG: 10 CAPSULE ORAL at 09:01

## 2025-01-10 RX ADMIN — SUGAMMADEX 200 MG: 100 INJECTION, SOLUTION INTRAVENOUS at 07:01

## 2025-01-10 RX ADMIN — DULOXETINE HYDROCHLORIDE 60 MG: 60 CAPSULE, DELAYED RELEASE ORAL at 09:01

## 2025-01-10 RX ADMIN — DEXAMETHASONE SODIUM PHOSPHATE 4 MG: 4 INJECTION, SOLUTION INTRAMUSCULAR; INTRAVENOUS at 07:01

## 2025-01-10 RX ADMIN — VASOPRESSIN 1 UNITS: 20 INJECTION INTRAVENOUS at 07:01

## 2025-01-10 RX ADMIN — INSULIN ASPART 2 UNITS: 100 INJECTION, SOLUTION INTRAVENOUS; SUBCUTANEOUS at 11:01

## 2025-01-10 RX ADMIN — INSULIN ASPART 3 UNITS: 100 INJECTION, SOLUTION INTRAVENOUS; SUBCUTANEOUS at 09:01

## 2025-01-10 RX ADMIN — PANTOPRAZOLE SODIUM 40 MG: 40 TABLET, DELAYED RELEASE ORAL at 09:01

## 2025-01-10 RX ADMIN — MEROPENEM 2 G: 2 INJECTION, POWDER, FOR SOLUTION INTRAVENOUS at 05:01

## 2025-01-10 RX ADMIN — MEROPENEM 2 G: 2 INJECTION, POWDER, FOR SOLUTION INTRAVENOUS at 09:01

## 2025-01-10 RX ADMIN — INSULIN ASPART 5 UNITS: 100 INJECTION, SOLUTION INTRAVENOUS; SUBCUTANEOUS at 04:01

## 2025-01-10 RX ADMIN — ACETAMINOPHEN 650 MG: 325 TABLET ORAL at 02:01

## 2025-01-10 NOTE — TRANSFER OF CARE
"Anesthesia Transfer of Care Note    Patient: Anthony Ball    Procedure(s) Performed: Procedure(s) (LRB):  URETEROSCOPY (Right)  REMOVAL, CALCULUS, URETER, URETEROSCOPIC (Right)  REMOVAL-STENT (Right)  NEPHROSCOPY (Right)    Patient location: PACU    Anesthesia Type: general    Transport from OR: Transported from OR on 6-10 L/min O2 by face mask with adequate spontaneous ventilation    Post pain: adequate analgesia    Post assessment: no apparent anesthetic complications and tolerated procedure well    Post vital signs: stable    Level of consciousness: awake and alert    Nausea/Vomiting: no nausea/vomiting    Complications: none    Transfer of care protocol was followedComments: Nurse at bedside, VSS, spont reg resp noted      Last vitals: Visit Vitals  /60   Pulse 86   Temp 37.1 °C (98.8 °F) (Temporal)   Resp 17   Ht 5' 9" (1.753 m)   Wt 88.3 kg (194 lb 10.7 oz)   SpO2 100%   BMI 28.75 kg/m²     "

## 2025-01-10 NOTE — ANESTHESIA PROCEDURE NOTES
Intubation    Date/Time: 1/10/2025 7:17 AM    Performed by: Elinor Meehan  Authorized by: Eric Jj MD    Intubation:     Induction:  Rapid sequence induction    Intubated:  Postinduction    Mask Ventilation:  Not attempted    Attempts:  1    Attempted By:  Student    Method of Intubation:  Video laryngoscopy    Blade:  Mondragon 3    Laryngeal View Grade: Grade I - full view of cords      Difficult Airway Encountered?: No      Complications:  None    Airway Device:  Oral endotracheal tube    Airway Device Size:  7.5    Style/Cuff Inflation:  Cuffed (inflated to minimal occlusive pressure)    Tube secured:  21    Secured at:  The lips    Placement Verified By:  Capnometry (auscultation)    Complicating Factors:  None    Findings Post-Intubation:  BS equal bilateral and atraumatic/condition of teeth unchanged  Notes:      Head and neck neutral

## 2025-01-10 NOTE — ASSESSMENT & PLAN NOTE
VSS, afebrile   Normal white cells  Lactic normal  CRP elevated  Urine culture from 12.31.24 showed ESBL e coli sensitive to carbapenems  Blood cultures sterile  S/p ureteral stent removal 1.10.24, no stone seen during procedure    Cont merrem  ID consulted, appreciate recommendations  ID recommending continuing antibiotics through perioperative period and for at least 3 days after. Duration may be prolonged pending operative findings  Urology consulted, appreciate recs  Will resume eliquis 1.11.24

## 2025-01-10 NOTE — PROGRESS NOTES
[facility] - Surgery  [facility] Medicine  Progress Note    Patient Name: Anthony Ball  MRN: [id number]  Patient Class: IP- Inpatient   Admission Date: 1/7/2025  Length of Stay: 3 days  Attending Physician: Kenny Mojica DO  Primary Care Provider: Isaias Myles MD        Subjective     Principal Problem:UTI due to extended-spectrum beta lactamase (ESBL) producing Escherichia coli        HPI:  Mr. Ball is a 74 y.o M with CAD, PE, polyneuropathy, Afib on apixaban, T2DM, recurrent UTIs with ESBL E coli with prior bacteremia, who will be undergoing stone removal on Friday. Urology is requesting direct admission to hospital medicine for management of ESBL E. Coli UTI in the setting of nephrolithiasis and stent with IV antibiotics prior to planned surgical intervention. At the time of my evaluation, patient was resting in bed. His only complaint was some nausea earlier this evening. He denies any symptoms related to his UTI. Denies any increased frequency, urgency, dysuria, hematuria, or flank pain. Denies any fever or chills. Patient denies dizziness, lightheadedness, headache, CP, SOB, vomiting or diarrhea. He notes that his asthma is well controlled. Patient voices no other concerns. Per outpatient ID recs will plan to start ertapenem 1 g daily ( meropenem if albumin <3) during perioperative period and plans to continue for atleast 3 days postop.     Overview/Hospital Course:  Admitted for ESBL UTI associated with nephrolithiasis.  He has a right-sided double-J ureteral stent in place which was seen to be in stable position on CT.  No new or worsening hydro was noted.  Urology following, planning for lithotripsy 1.10.24.  On merrem; end date 1.13.24.    Interval History:  Seen and evaluated on general medical floor after surgery  No acute events overnight    Clinical status improved  No new complaints  Still somewhat sleepy from surgery    Objective:     Vital Signs (Most Recent):  Temp: 99.7 °F (37.6 °C) (01/10/25  1339)  Pulse: 107 (01/10/25 1339)  Resp: 16 (01/10/25 1339)  BP: (!) 102/57 (01/10/25 1339)  SpO2: 95 % (01/10/25 1339) Vital Signs (24h Range):  Temp:  [97.7 °F (36.5 °C)-99.7 °F (37.6 °C)] 99.7 °F (37.6 °C)  Pulse:  [] 107  Resp:  [15-19] 16  SpO2:  [95 %-100 %] 95 %  BP: (102-161)/(57-94) 102/57     Weight: 88.3 kg (194 lb 10.7 oz)  Body mass index is 28.75 kg/m².    Intake/Output Summary (Last 24 hours) at 1/10/2025 1345  Last data filed at 1/10/2025 0836  Gross per 24 hour   Intake 200 ml   Output 750 ml   Net -550 ml         Physical Exam  Vitals and nursing note reviewed.   Constitutional:       General: He is not in acute distress.     Appearance: He is well-developed. He is not toxic-appearing or diaphoretic.   HENT:      Head: Normocephalic and atraumatic.      Nose: Nose normal.      Mouth/Throat:      Mouth: Mucous membranes are moist.      Pharynx: No oropharyngeal exudate.   Eyes:      Conjunctiva/sclera: Conjunctivae normal.      Pupils: Pupils are equal, round, and reactive to light.   Cardiovascular:      Rate and Rhythm: Normal rate and regular rhythm.      Heart sounds: Normal heart sounds.   Pulmonary:      Effort: Pulmonary effort is normal. No respiratory distress.      Breath sounds: Normal breath sounds. No wheezing, rhonchi or rales.   Abdominal:      General: Bowel sounds are normal. There is no distension.      Palpations: Abdomen is soft.      Tenderness: There is no abdominal tenderness. There is no right CVA tenderness or left CVA tenderness.   Musculoskeletal:         General: No tenderness. Normal range of motion.      Cervical back: Normal range of motion and neck supple.      Right lower leg: No edema.      Left lower leg: No edema.   Lymphadenopathy:      Cervical: No cervical adenopathy.   Skin:     General: Skin is warm and dry.      Capillary Refill: Capillary refill takes less than 2 seconds.      Findings: No rash.   Neurological:      General: No focal deficit present.       Mental Status: He is alert and oriented to person, place, and time.   Psychiatric:         Behavior: Behavior normal.         Thought Content: Thought content normal.         Judgment: Judgment normal.             Significant Labs: All pertinent labs within the past 24 hours have been reviewed.    Significant Imaging: I have reviewed all pertinent imaging results/findings within the past 24 hours.    Assessment and Plan     * UTI due to extended-spectrum beta lactamase (ESBL) producing Escherichia coli  VSS, afebrile   Normal white cells  Lactic normal  CRP elevated  Urine culture from 12.31.24 showed ESBL e coli sensitive to carbapenems  Blood cultures sterile  S/p ureteral stent removal 1.10.24, no stone seen during procedure    Cont merrem  ID consulted, appreciate recommendations  ID recommending continuing antibiotics through perioperative period and for at least 3 days after. Duration may be prolonged pending operative findings  Urology consulted, appreciate recs  Will resume eliquis 1.11.24    Back pain  Weakness of bilateral lower extremity   Back spasm  Recurrent falls   - Uses walker to ambulate at home  - Noted. Continue home meds.     Urolithiasis  See plan for UTI    Paroxysmal A-fib  Rate controlled at this time  Regular rhythm at this time  Hold eliquis in preparation for procedure  Cont heparin gtt at this time  Monitor    Asthma  -No s/s of acute exacerbation.  -Continue daily inhalers.  - PRN Duo nebs    Normocytic anemia  Monitor    Alcohol use  - History noted. No history of alcohol withdrawal.  - Reports drinking 2-3 drinks daily, last drink this morning.   - PETH pending.  - Nursing to assess CIWA q8h  - MVI, folic acid, and thiamine daily    Gastroesophageal reflux disease without esophagitis  Chronic, stable  Continue PPI    Primary insomnia  - Chronic, stable.  - Continue home meds PRN    Essential hypertension  Monitor  Add agents as indicated    Diabetic polyneuropathy associated with  type 2 diabetes mellitus  Chronic, stable  Continue lyrica, amitriptyline, and cymbalta    Type 2 diabetes mellitus with neurologic complication, without long-term current use of insulin  SSI  Diabetic diet when not NPO  ACHS glucose checks  Resume home insulin regimen, Lantus 15U daily, Novolog 5 TIDAC      VTE Risk Mitigation (From admission, onward)           Ordered     Place sequential compression device  Until discontinued         01/10/25 0615     heparin 25,000 units in dextrose 5% (100 units/ml) IV bolus from bag LOW INTENSITY nomogram - [facility]  As needed (PRN)        Question:  Heparin Infusion Adjustment (DO NOT MODIFY ANSWER)  Answer:  [email or web address]\epic\Images\Pharmacy\HeparinInfusions\heparin LOW INTENSITY nomogram for [facility] [id number]    01/07/25 1919     heparin 25,000 units in dextrose 5% (100 units/ml) IV bolus from bag LOW INTENSITY nomogram - [facility]  As needed (PRN)        Question:  Heparin Infusion Adjustment (DO NOT MODIFY ANSWER)  Answer:  \\[email or web address]\epic\Images\Pharmacy\HeparinInfusions\heparin LOW INTENSITY nomogram for [facility] [id number].pdf    01/07/25 1919     heparin 25,000 units in dextrose 5% 250 mL (100 units/mL) infusion LOW INTENSITY nomogram - [facility]  Continuous        Question:  Begin at (units/kg/hr)  Answer:  12    01/07/25 1919     Place sequential compression device  Until discontinued         01/07/25 1810                    Discharge Planning   CHRISTA: 1/12/2025     Code Status: Full Code   Medical Readiness for Discharge Date:   Discharge Plan A: Home with family                        Kenny Mojica DO  Department of Hospital Medicine   [facility]

## 2025-01-10 NOTE — OP NOTE
[facility]  Operative Note    Date: 01/10/2025    Pre-Op Diagnosis: Right ureteral stone    Post-Op Diagnosis: same    Procedure(s) Performed:   1.  Right ureteroscopy  2.  Cystoscopy  3.  Right pyeloscopy  4.  Stone basket extraction  5.  Stent removal    Specimen(s): stone for analysis    Staff Surgeon: Joni Wagoner MD    Assistant Surgeon: Raimundo Cope MD    Anesthesia: General endotracheal anesthesia    Indications: Anthony Ball is a 74 y.o. male with a right ureteral stone, presenting for definitive stone management.  He currently does have a JJ ureteral stent in place.      Findings:   Small area of erythema in bladder consistent with recent UTI  No stones encountered on ureteroscopy  Single mid-pole stone basket extracted. Some early Adelso's plaques identified. No additional stones encountered    1 baskets were used throughout the case.      Laser Fiber -  NA  Fiber Diameter - NA    Estimated Blood Loss: min    Drains: None    Procedure in detail:  After informed consent was obtained, the patient was brought the the cystoscopy suite and placed in the supine position.  SCDs were applied and working.  Anesthesia was administered.  The patient was then placed in the dorsal lithotomy position and prepped and draped in the usual sterile fashion.      A rigid cystoscope in a 22 Fr sheath was introduced into the patient's urethra.  This passed easily.  The entire urethra was visualized which showed no strictures or masses.  Formal cystoscopy was performed which revealed no masses or lesions suspicious for malignancy, now bladder stones, no bladder diverticuli, mild trabeculations.  The ureteral orifices were visualized in the normal anatomic position bilaterally and clear efflux was visualized.  There was a small area of erythema near the trigone consistent with recent UTI.  A stent was seen emanating from the right ureter.    A stent grasper was used to grasp the stent and pull it to the  urethral meatus.  This was done under fluoroscopy to ensure the proximal coil uncoiled satisfactorily.  A motion wire was then passed up the stent into the right kidney under fluoroscopy.  This passed easily.  The stent was removed over the wire.  A flexible ureteroscope was then assembled and passed through the urethra into the bladder under direct vision.  It was then passed into the right ureteral orifice and right ureteroscopy was performed.  No stones were encountered.  Systematic pyeloscopy was then performed.  Several earlier and was plaques were noted.  A small midpole stone was basket extracted in its entirety.  The ureteroscope was removed in the ureter was cleared.  The wire was removed and the bladder was drained.    The patient tolerated the procedure well and was transferred to the recovery room in stable condition.      Disposition:  The patient will follow up in 3 months with a renal ultrasound.    Raimundo Cope MD

## 2025-01-10 NOTE — ANESTHESIA POSTPROCEDURE EVALUATION
Anesthesia Post Evaluation    Patient: Anthony Ball    Procedure(s) Performed: Procedure(s) (LRB):  URETEROSCOPY (Right)  REMOVAL, CALCULUS, URETER, URETEROSCOPIC (Right)  REMOVAL-STENT (Right)  NEPHROSCOPY (Right)    Final Anesthesia Type: general      Patient location during evaluation: PACU  Patient participation: Yes- Able to Participate  Level of consciousness: awake and alert  Post-procedure vital signs: reviewed and stable  Pain management: adequate  Airway patency: patent    PONV status at discharge: No PONV  Anesthetic complications: no      Cardiovascular status: blood pressure returned to baseline  Respiratory status: unassisted  Hydration status: euvolemic  Follow-up not needed.              Vitals Value Taken Time   /73 01/10/25 0900   Temp 36.5 °C (97.7 °F) 01/10/25 0805   Pulse 99 01/10/25 0900   Resp 15 01/10/25 0900   SpO2 99 % 01/10/25 0900         No case tracking events are documented in the log.      Pain/Ze Score: Pain Rating Prior to Med Admin: 7 (1/9/2025  1:49 PM)  Pain Rating Post Med Admin: 6 (1/9/2025  2:49 PM)  Ze Score: 9 (1/10/2025  8:30 AM)

## 2025-01-10 NOTE — PLAN OF CARE
Patient came from hospital flood via bed. Pre procedure completed. Perioperative period discussed, all questions and concerns addressed.

## 2025-01-10 NOTE — PROGRESS NOTES
[facility] ([facility])  Urology  Progress Note    Patient Name: Anthony Ball  MRN: [id number]  Admission Date: 1/7/2025  Hospital Length of Stay: 3 days  Code Status: Full Code   Attending Provider: Kenny Mojica DO   Primary Care Physician: Isaias Myles MD    Subjective:     HPI:  74-year-old male with a past medical history of insulin-dependent T2DM, CAD, prior PE on Eliquis, afib, admitted for AMS. He underwent R ureteral stent placement on 9/27 for distal R ureteral stones in the setting of urosepsis and bacteremia. Ureteroscopy has had to be rescheduled several times due to UTI's. Most recently he has a urine culture growing E. Coli ESBL sensitive only to IV Ertapenem and Meropenem. Patient admitted on 1/8 for IV antibiotics prior to ureteroscopy scheduled on Friday.    On assessment the patient is AFVSS. Complains of low back pain that is chronic in nature. Denies fevers, chills, difficulty emptying his bladder.     There are no labs to review. Urine culture from 12/31 growing ESBL E. Coli.     CT from 11/17 with shows R ureteral stent in appropriate position. No left sided stones or hydronephrosis. Large left renal cyst. Probably bladder diverticulum.     Interval History: NAEO. AFVSS. Amenable to plan for OR today.    Objective:     Temp:  [97.7 °F (36.5 °C)-98.9 °F (37.2 °C)] 98.6 °F (37 °C)  Pulse:  [74-85] 85  Resp:  [16-18] 18  SpO2:  [97 %-99 %] 97 %  BP: (113-146)/(57-68) 124/57     Body mass index is 28.75 kg/m².           Drains       None                    Physical Exam  Constitutional:       General: He is not in acute distress.     Appearance: Normal appearance. He is not ill-appearing.   HENT:      Head: Normocephalic and atraumatic.      Mouth/Throat:      Mouth: Mucous membranes are moist.   Eyes:      Extraocular Movements: Extraocular movements intact.   Cardiovascular:      Rate and Rhythm: Normal rate.   Pulmonary:      Effort: Pulmonary effort is normal.   Abdominal:       Palpations: Abdomen is soft.   Musculoskeletal:      Cervical back: Normal range of motion.   Skin:     General: Skin is warm and dry.   Neurological:      Mental Status: He is alert and oriented to person, place, and time.   Psychiatric:         Mood and Affect: Mood normal.         Behavior: Behavior normal.           Significant Labs:    BMP:  Recent Labs   Lab 01/08/25  0946 01/09/25  0342 01/10/25  0317   * 139 136   K 3.9 4.3 3.8    108 105   CO2 20* 24 22*   BUN 18 16 13   CREATININE 1.2 1.2 1.2   CALCIUM 8.4* 8.6* 8.5*       CBC:   Recent Labs   Lab 01/08/25  0325 01/09/25  0341 01/10/25  0317   WBC 7.99  7.99 6.05  6.05 4.90  4.90   HGB 10.5*  10.5* 10.6*  10.6* 10.2*  10.2*   HCT 32.8*  32.8* 32.5*  32.5* 31.7*  31.7*     166 142*  142* 137*  137*       Urine Studies:   Recent Labs   Lab 01/08/25  1754   COLORU Yellow   APPEARANCEUA Clear   PHUR 6.0   SPECGRAV 1.010   PROTEINUA Trace*   GLUCUA Negative   KETONESU Negative   BILIRUBINUA Negative   OCCULTUA Trace*   NITRITE Positive*   LEUKOCYTESUR 3+*   RBCUA 3   WBCUA 88*   BACTERIA Moderate*   SQUAMEPITHEL 3     All pertinent labs results from the past 24 hours have been reviewed.    Significant Imaging:  All pertinent imaging results/findings from the past 24 hours have been reviewed.      Assessment/Plan:     Urolithiasis  74-year-old male with a past medical history of insulin-dependent T2DM, CAD, prior PE on Eliquis, afib, admitted for AMS. He underwent R ureteral stent placement on 9/27 for distal R ureteral stones in the setting of urosepsis and bacteremia. Ureteroscopy has had to be rescheduled several times due to UTI's. Most recently he has a urine culture growing E. Coli ESBL sensitive only to IV Ertapenem and Meropenem. Patient admitted on 1/8 for IV antibiotics prior to ureteroscopy scheduled on Friday.    -- OR today for right ureteroscopy  -- Ucx with sensitivity to Meropenem and Ertapenem  - Appreciate ID  reccs  - On Merrem  -- Blood cx with NGTD  -- Hold Eliquis prior to procedure  -- NPO Thursday night for procedure on Friday  -- Consent obtained        Raimundo Cope MD  Urology  [facility] - [facility] (1st Fl)   Decubitus ulcer of sacral region, stage 4

## 2025-01-10 NOTE — ASSESSMENT & PLAN NOTE
74-year-old male with a past medical history of insulin-dependent T2DM, CAD, prior PE on Eliquis, afib, admitted for AMS. He underwent R ureteral stent placement on 9/27 for distal R ureteral stones in the setting of urosepsis and bacteremia. Ureteroscopy has had to be rescheduled several times due to UTI's. Most recently he has a urine culture growing E. Coli ESBL sensitive only to IV Ertapenem and Meropenem. Patient admitted on 1/8 for IV antibiotics prior to ureteroscopy scheduled on Friday.    -- OR today for right ureteroscopy  -- Ucx with sensitivity to Meropenem and Ertapenem  - Appreciate ID reccs  - On Merrem  -- Blood cx with NGTD  -- Hold Eliquis prior to procedure  -- NPO Thursday night for procedure on Friday  -- Consent obtained

## 2025-01-10 NOTE — SUBJECTIVE & OBJECTIVE
Interval History:  Seen and evaluated on general medical floor after surgery  No acute events overnight    Clinical status improved  No new complaints  Still somewhat sleepy from surgery    Objective:     Vital Signs (Most Recent):  Temp: 99.7 °F (37.6 °C) (01/10/25 1339)  Pulse: 107 (01/10/25 1339)  Resp: 16 (01/10/25 1339)  BP: (!) 102/57 (01/10/25 1339)  SpO2: 95 % (01/10/25 1339) Vital Signs (24h Range):  Temp:  [97.7 °F (36.5 °C)-99.7 °F (37.6 °C)] 99.7 °F (37.6 °C)  Pulse:  [] 107  Resp:  [15-19] 16  SpO2:  [95 %-100 %] 95 %  BP: (102-161)/(57-94) 102/57     Weight: 88.3 kg (194 lb 10.7 oz)  Body mass index is 28.75 kg/m².    Intake/Output Summary (Last 24 hours) at 1/10/2025 1345  Last data filed at 1/10/2025 0836  Gross per 24 hour   Intake 200 ml   Output 750 ml   Net -550 ml         Physical Exam  Vitals and nursing note reviewed.   Constitutional:       General: He is not in acute distress.     Appearance: He is well-developed. He is not toxic-appearing or diaphoretic.   HENT:      Head: Normocephalic and atraumatic.      Nose: Nose normal.      Mouth/Throat:      Mouth: Mucous membranes are moist.      Pharynx: No oropharyngeal exudate.   Eyes:      Conjunctiva/sclera: Conjunctivae normal.      Pupils: Pupils are equal, round, and reactive to light.   Cardiovascular:      Rate and Rhythm: Normal rate and regular rhythm.      Heart sounds: Normal heart sounds.   Pulmonary:      Effort: Pulmonary effort is normal. No respiratory distress.      Breath sounds: Normal breath sounds. No wheezing, rhonchi or rales.   Abdominal:      General: Bowel sounds are normal. There is no distension.      Palpations: Abdomen is soft.      Tenderness: There is no abdominal tenderness. There is no right CVA tenderness or left CVA tenderness.   Musculoskeletal:         General: No tenderness. Normal range of motion.      Cervical back: Normal range of motion and neck supple.      Right lower leg: No edema.      Left  lower leg: No edema.   Lymphadenopathy:      Cervical: No cervical adenopathy.   Skin:     General: Skin is warm and dry.      Capillary Refill: Capillary refill takes less than 2 seconds.      Findings: No rash.   Neurological:      General: No focal deficit present.      Mental Status: He is alert and oriented to person, place, and time.   Psychiatric:         Behavior: Behavior normal.         Thought Content: Thought content normal.         Judgment: Judgment normal.             Significant Labs: All pertinent labs within the past 24 hours have been reviewed.    Significant Imaging: I have reviewed all pertinent imaging results/findings within the past 24 hours.

## 2025-01-10 NOTE — SUBJECTIVE & OBJECTIVE
Interval History: NAEO. AFVSS. Amenable to plan for OR today.    Objective:     Temp:  [97.7 °F (36.5 °C)-98.9 °F (37.2 °C)] 98.6 °F (37 °C)  Pulse:  [74-85] 85  Resp:  [16-18] 18  SpO2:  [97 %-99 %] 97 %  BP: (113-146)/(57-68) 124/57     Body mass index is 28.75 kg/m².           Drains       None                    Physical Exam  Constitutional:       General: He is not in acute distress.     Appearance: Normal appearance. He is not ill-appearing.   HENT:      Head: Normocephalic and atraumatic.      Mouth/Throat:      Mouth: Mucous membranes are moist.   Eyes:      Extraocular Movements: Extraocular movements intact.   Cardiovascular:      Rate and Rhythm: Normal rate.   Pulmonary:      Effort: Pulmonary effort is normal.   Abdominal:      Palpations: Abdomen is soft.   Musculoskeletal:      Cervical back: Normal range of motion.   Skin:     General: Skin is warm and dry.   Neurological:      Mental Status: He is alert and oriented to person, place, and time.   Psychiatric:         Mood and Affect: Mood normal.         Behavior: Behavior normal.           Significant Labs:    BMP:  Recent Labs   Lab 01/08/25  0946 01/09/25  0342 01/10/25  0317   * 139 136   K 3.9 4.3 3.8    108 105   CO2 20* 24 22*   BUN 18 16 13   CREATININE 1.2 1.2 1.2   CALCIUM 8.4* 8.6* 8.5*       CBC:   Recent Labs   Lab 01/08/25  0325 01/09/25  0341 01/10/25  0317   WBC 7.99  7.99 6.05  6.05 4.90  4.90   HGB 10.5*  10.5* 10.6*  10.6* 10.2*  10.2*   HCT 32.8*  32.8* 32.5*  32.5* 31.7*  31.7*     166 142*  142* 137*  137*       Urine Studies:   Recent Labs   Lab 01/08/25  1754   COLORU Yellow   APPEARANCEUA Clear   PHUR 6.0   SPECGRAV 1.010   PROTEINUA Trace*   GLUCUA Negative   KETONESU Negative   BILIRUBINUA Negative   OCCULTUA Trace*   NITRITE Positive*   LEUKOCYTESUR 3+*   RBCUA 3   WBCUA 88*   BACTERIA Moderate*   SQUAMEPITHEL 3     All pertinent labs results from the past 24 hours have been  reviewed.    Significant Imaging:  All pertinent imaging results/findings from the past 24 hours have been reviewed.

## 2025-01-10 NOTE — NURSING TRANSFER
Nursing Transfer Note      1/10/2025   9:20 AM    Nurse giving handoff:REFUGIO Bhakta  Nurse receiving handoff:Carine Calles RN    Reason patient is being transferred: Returning to inpatient room    Transfer To: Room 522    Transfer via bed    Transfer with cardiac monitoring    Transported by patient transporters    Transfer Vital Signs:  Blood Pressure:154/73(105)  Heart Rate:99  O2:99%  Temperature:97.7  Respirations:15      Chart send with patient: Yes    Notified: Charge RN    Patient reassessed at: 1/10/24, 9:20am (date, time)

## 2025-01-11 LAB
APTT PPP: 54.4 SEC (ref 21–32)
BASOPHILS # BLD AUTO: 0.02 K/UL (ref 0–0.2)
BASOPHILS NFR BLD: 0.2 % (ref 0–1.9)
CLINICAL BIOCHEMIST REVIEW: NORMAL
DIFFERENTIAL METHOD BLD: ABNORMAL
EOSINOPHIL # BLD AUTO: 0 K/UL (ref 0–0.5)
EOSINOPHIL NFR BLD: 0.3 % (ref 0–8)
ERYTHROCYTE [DISTWIDTH] IN BLOOD BY AUTOMATED COUNT: 14.8 % (ref 11.5–14.5)
HCT VFR BLD AUTO: 33.7 % (ref 40–54)
HGB BLD-MCNC: 10.8 G/DL (ref 14–18)
IMM GRANULOCYTES # BLD AUTO: 0.06 K/UL (ref 0–0.04)
IMM GRANULOCYTES NFR BLD AUTO: 0.5 % (ref 0–0.5)
LYMPHOCYTES # BLD AUTO: 1.5 K/UL (ref 1–4.8)
LYMPHOCYTES NFR BLD: 13.3 % (ref 18–48)
MCH RBC QN AUTO: 28.9 PG (ref 27–31)
MCHC RBC AUTO-ENTMCNC: 32 G/DL (ref 32–36)
MCV RBC AUTO: 90 FL (ref 82–98)
MONOCYTES # BLD AUTO: 1 K/UL (ref 0.3–1)
MONOCYTES NFR BLD: 9.2 % (ref 4–15)
NEUTROPHILS # BLD AUTO: 8.6 K/UL (ref 1.8–7.7)
NEUTROPHILS NFR BLD: 76.5 % (ref 38–73)
NRBC BLD-RTO: 0 /100 WBC
PLATELET # BLD AUTO: 127 K/UL (ref 150–450)
PLPETH BLD-MCNC: 135 NG/ML
PMV BLD AUTO: 10.3 FL (ref 9.2–12.9)
POPETH BLD-MCNC: 220 NG/ML
RBC # BLD AUTO: 3.74 M/UL (ref 4.6–6.2)
WBC # BLD AUTO: 11.24 K/UL (ref 3.9–12.7)

## 2025-01-11 PROCEDURE — 85730 THROMBOPLASTIN TIME PARTIAL: CPT | Performed by: NURSE PRACTITIONER

## 2025-01-11 PROCEDURE — 63600175 PHARM REV CODE 636 W HCPCS

## 2025-01-11 PROCEDURE — 25000003 PHARM REV CODE 250

## 2025-01-11 PROCEDURE — 36415 COLL VENOUS BLD VENIPUNCTURE: CPT | Performed by: NURSE PRACTITIONER

## 2025-01-11 PROCEDURE — 85025 COMPLETE CBC W/AUTO DIFF WBC: CPT | Performed by: NURSE PRACTITIONER

## 2025-01-11 PROCEDURE — 94761 N-INVAS EAR/PLS OXIMETRY MLT: CPT

## 2025-01-11 PROCEDURE — 11000001 HC ACUTE MED/SURG PRIVATE ROOM

## 2025-01-11 PROCEDURE — 25000003 PHARM REV CODE 250: Performed by: NURSE PRACTITIONER

## 2025-01-11 RX ADMIN — INSULIN GLARGINE 15 UNITS: 100 INJECTION, SOLUTION SUBCUTANEOUS at 08:01

## 2025-01-11 RX ADMIN — APIXABAN 5 MG: 5 TABLET, FILM COATED ORAL at 12:01

## 2025-01-11 RX ADMIN — NORTRIPTYLINE HYDROCHLORIDE 10 MG: 10 CAPSULE ORAL at 08:01

## 2025-01-11 RX ADMIN — NORTRIPTYLINE HYDROCHLORIDE 10 MG: 10 CAPSULE ORAL at 09:01

## 2025-01-11 RX ADMIN — MEROPENEM 2 G: 2 INJECTION, POWDER, FOR SOLUTION INTRAVENOUS at 02:01

## 2025-01-11 RX ADMIN — DULOXETINE HYDROCHLORIDE 60 MG: 60 CAPSULE, DELAYED RELEASE ORAL at 08:01

## 2025-01-11 RX ADMIN — INSULIN ASPART 5 UNITS: 100 INJECTION, SOLUTION INTRAVENOUS; SUBCUTANEOUS at 08:01

## 2025-01-11 RX ADMIN — MEROPENEM 2 G: 2 INJECTION, POWDER, FOR SOLUTION INTRAVENOUS at 09:01

## 2025-01-11 RX ADMIN — NORTRIPTYLINE HYDROCHLORIDE 10 MG: 10 CAPSULE ORAL at 02:01

## 2025-01-11 RX ADMIN — PREGABALIN 150 MG: 150 CAPSULE ORAL at 09:01

## 2025-01-11 RX ADMIN — APIXABAN 5 MG: 5 TABLET, FILM COATED ORAL at 09:01

## 2025-01-11 RX ADMIN — INSULIN ASPART 5 UNITS: 100 INJECTION, SOLUTION INTRAVENOUS; SUBCUTANEOUS at 12:01

## 2025-01-11 RX ADMIN — INSULIN ASPART 5 UNITS: 100 INJECTION, SOLUTION INTRAVENOUS; SUBCUTANEOUS at 05:01

## 2025-01-11 RX ADMIN — MEROPENEM 2 G: 2 INJECTION, POWDER, FOR SOLUTION INTRAVENOUS at 05:01

## 2025-01-11 RX ADMIN — PREGABALIN 150 MG: 150 CAPSULE ORAL at 02:01

## 2025-01-11 RX ADMIN — Medication 400 MG: at 09:01

## 2025-01-11 RX ADMIN — PREGABALIN 150 MG: 150 CAPSULE ORAL at 08:01

## 2025-01-11 RX ADMIN — PANTOPRAZOLE SODIUM 40 MG: 40 TABLET, DELAYED RELEASE ORAL at 08:01

## 2025-01-11 RX ADMIN — DULOXETINE HYDROCHLORIDE 60 MG: 60 CAPSULE, DELAYED RELEASE ORAL at 09:01

## 2025-01-11 NOTE — SUBJECTIVE & OBJECTIVE
Interval History:  Seen and evaluated on general medical floor  No acute events overnight    Clinical status improved  No new complaints  Having some hallucinations since anesthesia; says that he usually has some complications associated with general anesthesia    Objective:     Vital Signs (Most Recent):  Temp: 97.7 °F (36.5 °C) (01/11/25 0728)  Pulse: 64 (01/11/25 0728)  Resp: 18 (01/11/25 0728)  BP: (!) 110/58 (01/11/25 0728)  SpO2: 97 % (01/11/25 0728) Vital Signs (24h Range):  Temp:  [96 °F (35.6 °C)-99.7 °F (37.6 °C)] 97.7 °F (36.5 °C)  Pulse:  [] 64  Resp:  [16-18] 18  SpO2:  [92 %-99 %] 97 %  BP: (100-120)/(51-70) 110/58     Weight: 88.3 kg (194 lb 10.7 oz)  Body mass index is 28.75 kg/m².    Intake/Output Summary (Last 24 hours) at 1/11/2025 1057  Last data filed at 1/11/2025 0023  Gross per 24 hour   Intake --   Output 600 ml   Net -600 ml         Physical Exam  Vitals and nursing note reviewed.   Constitutional:       General: He is not in acute distress.     Appearance: He is well-developed. He is not toxic-appearing or diaphoretic.   HENT:      Head: Normocephalic and atraumatic.      Nose: Nose normal.      Mouth/Throat:      Mouth: Mucous membranes are moist.      Pharynx: No oropharyngeal exudate.   Eyes:      Conjunctiva/sclera: Conjunctivae normal.      Pupils: Pupils are equal, round, and reactive to light.   Cardiovascular:      Rate and Rhythm: Normal rate and regular rhythm.      Heart sounds: Normal heart sounds.   Pulmonary:      Effort: Pulmonary effort is normal. No respiratory distress.      Breath sounds: Normal breath sounds. No wheezing, rhonchi or rales.   Abdominal:      General: Bowel sounds are normal. There is no distension.      Palpations: Abdomen is soft.      Tenderness: There is no abdominal tenderness. There is no right CVA tenderness or left CVA tenderness.   Musculoskeletal:         General: No tenderness. Normal range of motion.      Cervical back: Normal range of  motion and neck supple.      Right lower leg: No edema.      Left lower leg: No edema.   Lymphadenopathy:      Cervical: No cervical adenopathy.   Skin:     General: Skin is warm and dry.      Capillary Refill: Capillary refill takes less than 2 seconds.      Findings: No rash.   Neurological:      General: No focal deficit present.      Mental Status: He is alert and oriented to person, place, and time.   Psychiatric:         Behavior: Behavior normal.         Thought Content: Thought content normal.         Judgment: Judgment normal.             Significant Labs: All pertinent labs within the past 24 hours have been reviewed.    Significant Imaging: I have reviewed all pertinent imaging results/findings within the past 24 hours.

## 2025-01-11 NOTE — SUBJECTIVE & OBJECTIVE
Interval History: NAOE. He states he feels better this morning, less confused than yesterday.       Objective:     Temp:  [96 °F (35.6 °C)-99.7 °F (37.6 °C)] 97.7 °F (36.5 °C)  Pulse:  [] 64  Resp:  [15-19] 18  SpO2:  [92 %-99 %] 97 %  BP: (100-161)/(51-94) 110/58     Body mass index is 28.75 kg/m².           Drains       None                    Physical Exam  Constitutional:       General: He is not in acute distress.     Appearance: Normal appearance. He is not ill-appearing.   HENT:      Head: Normocephalic and atraumatic.      Mouth/Throat:      Mouth: Mucous membranes are moist.   Eyes:      Extraocular Movements: Extraocular movements intact.   Cardiovascular:      Rate and Rhythm: Normal rate.   Pulmonary:      Effort: Pulmonary effort is normal.   Abdominal:      Palpations: Abdomen is soft.   Musculoskeletal:      Cervical back: Normal range of motion.   Skin:     General: Skin is warm and dry.   Neurological:      Mental Status: He is alert and oriented to person, place, and time.   Psychiatric:         Mood and Affect: Mood normal.         Behavior: Behavior normal.           Significant Labs:    BMP:  Recent Labs   Lab 01/08/25  0946 01/09/25  0342 01/10/25  0317   * 139 136   K 3.9 4.3 3.8    108 105   CO2 20* 24 22*   BUN 18 16 13   CREATININE 1.2 1.2 1.2   CALCIUM 8.4* 8.6* 8.5*       CBC:   Recent Labs   Lab 01/09/25  0341 01/10/25  0317 01/11/25  0346   WBC 6.05  6.05 4.90  4.90 11.24   HGB 10.6*  10.6* 10.2*  10.2* 10.8*   HCT 32.5*  32.5* 31.7*  31.7* 33.7*   *  142* 137*  137* 127*       All pertinent labs results from the past 24 hours have been reviewed.    Significant Imaging:  All pertinent imaging results/findings from the past 24 hours have been reviewed.

## 2025-01-11 NOTE — ASSESSMENT & PLAN NOTE
VSS, afebrile   Normal white cells  Lactic normal  CRP elevated  Urine culture from 12.31.24 showed ESBL e coli sensitive to carbapenems  Blood cultures sterile  S/p ureteral stent removal 1.10.24, no stone seen during procedure    Cont merrem  ID consulted, appreciate recommendations  ID recommending continuing antibiotics through perioperative period and for at least 3 days after. Duration may be prolonged pending operative findings  Urology consulted, appreciate recs

## 2025-01-11 NOTE — ASSESSMENT & PLAN NOTE
Rate controlled at this time  Regular rhythm at this time  DC heparin gtt  Eliquis resumed 1.11.25

## 2025-01-11 NOTE — PROGRESS NOTES
[facility] - Surgery  [facility] Medicine  Progress Note    Patient Name: Anthony Ball  MRN: [id number]  Patient Class: IP- Inpatient   Admission Date: 1/7/2025  Length of Stay: 4 days  Attending Physician: Kenny Mojica DO  Primary Care Provider: Isaias Myles MD        Subjective     Principal Problem:UTI due to extended-spectrum beta lactamase (ESBL) producing Escherichia coli        HPI:  Mr. Ball is a 74 y.o M with CAD, PE, polyneuropathy, Afib on apixaban, T2DM, recurrent UTIs with ESBL E coli with prior bacteremia, who will be undergoing stone removal on Friday. Urology is requesting direct admission to hospital medicine for management of ESBL E. Coli UTI in the setting of nephrolithiasis and stent with IV antibiotics prior to planned surgical intervention. At the time of my evaluation, patient was resting in bed. His only complaint was some nausea earlier this evening. He denies any symptoms related to his UTI. Denies any increased frequency, urgency, dysuria, hematuria, or flank pain. Denies any fever or chills. Patient denies dizziness, lightheadedness, headache, CP, SOB, vomiting or diarrhea. He notes that his asthma is well controlled. Patient voices no other concerns. Per outpatient ID recs will plan to start ertapenem 1 g daily ( meropenem if albumin <3) during perioperative period and plans to continue for atleast 3 days postop.     Overview/Hospital Course:  Admitted for ESBL UTI associated with nephrolithiasis.  He has a right-sided double-J ureteral stent in place which was seen to be in stable position on CT.  No new or worsening hydro was noted.  Urology following, planning for lithotripsy 1.10.24.  He did undergo stent removal, but no stone was seen.  On merrem; end date 1.13.24.    Interval History:  Seen and evaluated on general medical floor  No acute events overnight    Clinical status improved  No new complaints  Having some hallucinations since anesthesia; says that he usually has some  complications associated with general anesthesia    Objective:     Vital Signs (Most Recent):  Temp: 97.7 °F (36.5 °C) (01/11/25 0728)  Pulse: 64 (01/11/25 0728)  Resp: 18 (01/11/25 0728)  BP: (!) 110/58 (01/11/25 0728)  SpO2: 97 % (01/11/25 0728) Vital Signs (24h Range):  Temp:  [96 °F (35.6 °C)-99.7 °F (37.6 °C)] 97.7 °F (36.5 °C)  Pulse:  [] 64  Resp:  [16-18] 18  SpO2:  [92 %-99 %] 97 %  BP: (100-120)/(51-70) 110/58     Weight: 88.3 kg (194 lb 10.7 oz)  Body mass index is 28.75 kg/m².    Intake/Output Summary (Last 24 hours) at 1/11/2025 1057  Last data filed at 1/11/2025 0023  Gross per 24 hour   Intake --   Output 600 ml   Net -600 ml         Physical Exam  Vitals and nursing note reviewed.   Constitutional:       General: He is not in acute distress.     Appearance: He is well-developed. He is not toxic-appearing or diaphoretic.   HENT:      Head: Normocephalic and atraumatic.      Nose: Nose normal.      Mouth/Throat:      Mouth: Mucous membranes are moist.      Pharynx: No oropharyngeal exudate.   Eyes:      Conjunctiva/sclera: Conjunctivae normal.      Pupils: Pupils are equal, round, and reactive to light.   Cardiovascular:      Rate and Rhythm: Normal rate and regular rhythm.      Heart sounds: Normal heart sounds.   Pulmonary:      Effort: Pulmonary effort is normal. No respiratory distress.      Breath sounds: Normal breath sounds. No wheezing, rhonchi or rales.   Abdominal:      General: Bowel sounds are normal. There is no distension.      Palpations: Abdomen is soft.      Tenderness: There is no abdominal tenderness. There is no right CVA tenderness or left CVA tenderness.   Musculoskeletal:         General: No tenderness. Normal range of motion.      Cervical back: Normal range of motion and neck supple.      Right lower leg: No edema.      Left lower leg: No edema.   Lymphadenopathy:      Cervical: No cervical adenopathy.   Skin:     General: Skin is warm and dry.      Capillary Refill:  Capillary refill takes less than 2 seconds.      Findings: No rash.   Neurological:      General: No focal deficit present.      Mental Status: He is alert and oriented to person, place, and time.   Psychiatric:         Behavior: Behavior normal.         Thought Content: Thought content normal.         Judgment: Judgment normal.             Significant Labs: All pertinent labs within the past 24 hours have been reviewed.    Significant Imaging: I have reviewed all pertinent imaging results/findings within the past 24 hours.    Assessment and Plan     * UTI due to extended-spectrum beta lactamase (ESBL) producing Escherichia coli  VSS, afebrile   Normal white cells  Lactic normal  CRP elevated  Urine culture from 12.31.24 showed ESBL e coli sensitive to carbapenems  Blood cultures sterile  S/p ureteral stent removal 1.10.24, no stone seen during procedure    Cont merrem  ID consulted, appreciate recommendations  ID recommending continuing antibiotics through perioperative period and for at least 3 days after. Duration may be prolonged pending operative findings  Urology consulted, appreciate recs    Back pain  Weakness of bilateral lower extremity   Back spasm  Recurrent falls   - Uses walker to ambulate at home  - Noted. Continue home meds.     Urolithiasis  See plan for UTI    Paroxysmal A-fib  Rate controlled at this time  Regular rhythm at this time  DC heparin gtt  Eliquis resumed 1.11.25    Asthma  -No s/s of acute exacerbation.  -Continue daily inhalers.  - PRN Duo nebs    Normocytic anemia  Monitor    Alcohol use  - History noted. No history of alcohol withdrawal.  - Reports drinking 2-3 drinks daily, last drink this morning.   - PETH pending.  - Nursing to assess CIWA q8h  - MVI, folic acid, and thiamine daily    Gastroesophageal reflux disease without esophagitis  Chronic, stable  Continue PPI    Primary insomnia  - Chronic, stable.  - Continue home meds PRN    Essential hypertension  Monitor  Add agents as  indicated    Diabetic polyneuropathy associated with type 2 diabetes mellitus  Chronic, stable  Continue lyrica, amitriptyline, and cymbalta    Type 2 diabetes mellitus with neurologic complication, without long-term current use of insulin  SSI  Diabetic diet when not NPO  ACHS glucose checks  Resume home insulin regimen, Lantus 15U daily, Novolog 5 TIDAC      VTE Risk Mitigation (From admission, onward)           Ordered     apixaban tablet 5 mg  2 times daily         01/11/25 1058     Place sequential compression device  Until discontinued         01/10/25 0615     Place sequential compression device  Until discontinued         01/07/25 1810                    Discharge Planning   CHRISTA: 1/12/2025     Code Status: Full Code   Medical Readiness for Discharge Date:   Discharge Plan A: Home with family                        Kenny Mojica DO  Department of Hospital Medicine   [facility] - Surgery

## 2025-01-11 NOTE — PROGRESS NOTES
[facility] - [facility]  Urology  Progress Note    Patient Name: Anthony Ball  MRN: [id number]  Admission Date: 1/7/2025  Hospital Length of Stay: 4 days  Code Status: Full Code   Attending Provider: Kenny Mojica DO   Primary Care Physician: Isaias Myles MD    Subjective:     HPI:  74-year-old male with a past medical history of insulin-dependent T2DM, CAD, prior PE on Eliquis, afib, admitted for AMS. He underwent R ureteral stent placement on 9/27 for distal R ureteral stones in the setting of urosepsis and bacteremia. Ureteroscopy has had to be rescheduled several times due to UTI's. Most recently he has a urine culture growing E. Coli ESBL sensitive only to IV Ertapenem and Meropenem. Patient admitted on 1/8 for IV antibiotics prior to ureteroscopy scheduled on Friday.    On assessment the patient is AFVSS. Complains of low back pain that is chronic in nature. Denies fevers, chills, difficulty emptying his bladder.     There are no labs to review. Urine culture from 12/31 growing ESBL E. Coli.     CT from 11/17 with shows R ureteral stent in appropriate position. No left sided stones or hydronephrosis. Large left renal cyst. Probably bladder diverticulum.     Interval History: NAOE. He states he feels better this morning, less confused than yesterday.       Objective:     Temp:  [96 °F (35.6 °C)-99.7 °F (37.6 °C)] 97.7 °F (36.5 °C)  Pulse:  [] 64  Resp:  [15-19] 18  SpO2:  [92 %-99 %] 97 %  BP: (100-161)/(51-94) 110/58     Body mass index is 28.75 kg/m².           Drains       None                    Physical Exam  Constitutional:       General: He is not in acute distress.     Appearance: Normal appearance. He is not ill-appearing.   HENT:      Head: Normocephalic and atraumatic.      Mouth/Throat:      Mouth: Mucous membranes are moist.   Eyes:      Extraocular Movements: Extraocular movements intact.   Cardiovascular:      Rate and Rhythm: Normal rate.   Pulmonary:      Effort: Pulmonary effort is  normal.   Abdominal:      Palpations: Abdomen is soft.   Musculoskeletal:      Cervical back: Normal range of motion.   Skin:     General: Skin is warm and dry.   Neurological:      Mental Status: He is alert and oriented to person, place, and time.   Psychiatric:         Mood and Affect: Mood normal.         Behavior: Behavior normal.           Significant Labs:    BMP:  Recent Labs   Lab 01/08/25  0946 01/09/25  0342 01/10/25  0317   * 139 136   K 3.9 4.3 3.8    108 105   CO2 20* 24 22*   BUN 18 16 13   CREATININE 1.2 1.2 1.2   CALCIUM 8.4* 8.6* 8.5*       CBC:   Recent Labs   Lab 01/09/25  0341 01/10/25  0317 01/11/25  0346   WBC 6.05  6.05 4.90  4.90 11.24   HGB 10.6*  10.6* 10.2*  10.2* 10.8*   HCT 32.5*  32.5* 31.7*  31.7* 33.7*   *  142* 137*  137* 127*       All pertinent labs results from the past 24 hours have been reviewed.    Significant Imaging:  All pertinent imaging results/findings from the past 24 hours have been reviewed.                  Assessment/Plan:     Urolithiasis  74-year-old male with a past medical history of insulin-dependent T2DM, CAD, prior PE on Eliquis, afib, admitted for AMS. He underwent R ureteral stent placement on 9/27 for distal R ureteral stones in the setting of urosepsis and bacteremia. Ureteroscopy has had to be rescheduled several times due to UTI's. Most recently he has a urine culture growing E. Coli ESBL sensitive only to IV Ertapenem and Meropenem. Patient admitted on 1/8 for IV antibiotics prior to ureteroscopy scheduled on Friday.    -- S/p right ureteroscopy 1/11  -- Ucx with sensitivity to Meropenem and Ertapenem  - Appreciate ID reccs  - On Merrem  -- Blood cx with NGTD  -- Ok for anticoagulation from urologic perspective  -- Ok for discharge today after antibiotics  -- Will arrange follow up in 6 weeks with renal u/s        VTE Risk Mitigation (From admission, onward)           Ordered     Place sequential compression device  Until  discontinued         01/10/25 0615     heparin 25,000 units in dextrose 5% (100 units/ml) IV bolus from bag LOW INTENSITY nomogram - [facility]  As needed (PRN)        Question:  Heparin Infusion Adjustment (DO NOT MODIFY ANSWER)  Answer:  [email or web address]\epic\Images\Pharmacy\HeparinInfusions\heparin LOW INTENSITY nomogram for [facility] [id number]    01/07/25 1919     heparin 25,000 units in dextrose 5% (100 units/ml) IV bolus from bag LOW INTENSITY nomogram - [facility]  As needed (PRN)        Question:  Heparin Infusion Adjustment (DO NOT MODIFY ANSWER)  Answer:  [email or web address]\epic\Images\Pharmacy\HeparinInfusions\heparin LOW INTENSITY nomogram for [facility] [id number]    01/07/25 1919     heparin 25,000 units in dextrose 5% 250 mL (100 units/mL) infusion LOW INTENSITY nomogram - [facility]  Continuous        Question:  Begin at (units/kg/hr)  Answer:  12    01/07/25 1919     Place sequential compression device  Until discontinued         01/07/25 1810                    WENDY PEPE MD  Urology  [facility] - Surgery

## 2025-01-11 NOTE — ASSESSMENT & PLAN NOTE
74-year-old male with a past medical history of insulin-dependent T2DM, CAD, prior PE on Eliquis, afib, admitted for AMS. He underwent R ureteral stent placement on 9/27 for distal R ureteral stones in the setting of urosepsis and bacteremia. Ureteroscopy has had to be rescheduled several times due to UTI's. Most recently he has a urine culture growing E. Coli ESBL sensitive only to IV Ertapenem and Meropenem. Patient admitted on 1/8 for IV antibiotics prior to ureteroscopy scheduled on Friday.    -- S/p right ureteroscopy 1/11  -- Ucx with sensitivity to Meropenem and Ertapenem  - Appreciate ID reccs  - On Merrem  -- Blood cx with NGTD  -- Ok for anticoagulation from urologic perspective

## 2025-01-12 LAB
ALBUMIN SERPL BCP-MCNC: 2.2 G/DL (ref 3.5–5.2)
ANION GAP SERPL CALC-SCNC: 9 MMOL/L (ref 8–16)
BACTERIA BLD CULT: NORMAL
BACTERIA BLD CULT: NORMAL
BUN SERPL-MCNC: 18 MG/DL (ref 8–23)
CALCIUM SERPL-MCNC: 8.4 MG/DL (ref 8.7–10.5)
CHLORIDE SERPL-SCNC: 106 MMOL/L (ref 95–110)
CO2 SERPL-SCNC: 22 MMOL/L (ref 23–29)
CREAT SERPL-MCNC: 1.5 MG/DL (ref 0.5–1.4)
ERYTHROCYTE [DISTWIDTH] IN BLOOD BY AUTOMATED COUNT: 15.1 % (ref 11.5–14.5)
EST. GFR  (NO RACE VARIABLE): 49.2 ML/MIN/1.73 M^2
GLUCOSE SERPL-MCNC: 151 MG/DL (ref 70–110)
HCT VFR BLD AUTO: 33.2 % (ref 40–54)
HGB BLD-MCNC: 10.6 G/DL (ref 14–18)
MAGNESIUM SERPL-MCNC: 1.8 MG/DL (ref 1.6–2.6)
MCH RBC QN AUTO: 29.4 PG (ref 27–31)
MCHC RBC AUTO-ENTMCNC: 31.9 G/DL (ref 32–36)
MCV RBC AUTO: 92 FL (ref 82–98)
PHOSPHATE SERPL-MCNC: 2.1 MG/DL (ref 2.7–4.5)
PLATELET # BLD AUTO: 143 K/UL (ref 150–450)
PMV BLD AUTO: 9.6 FL (ref 9.2–12.9)
POCT GLUCOSE: 173 MG/DL (ref 70–110)
POTASSIUM SERPL-SCNC: 3.7 MMOL/L (ref 3.5–5.1)
RBC # BLD AUTO: 3.61 M/UL (ref 4.6–6.2)
SODIUM SERPL-SCNC: 137 MMOL/L (ref 136–145)
WBC # BLD AUTO: 6.9 K/UL (ref 3.9–12.7)

## 2025-01-12 PROCEDURE — 25000003 PHARM REV CODE 250

## 2025-01-12 PROCEDURE — 85027 COMPLETE CBC AUTOMATED: CPT

## 2025-01-12 PROCEDURE — 11000001 HC ACUTE MED/SURG PRIVATE ROOM

## 2025-01-12 PROCEDURE — 80069 RENAL FUNCTION PANEL: CPT

## 2025-01-12 PROCEDURE — 25000003 PHARM REV CODE 250: Performed by: NURSE PRACTITIONER

## 2025-01-12 PROCEDURE — 83735 ASSAY OF MAGNESIUM: CPT

## 2025-01-12 PROCEDURE — 99900035 HC TECH TIME PER 15 MIN (STAT)

## 2025-01-12 PROCEDURE — 63600175 PHARM REV CODE 636 W HCPCS

## 2025-01-12 PROCEDURE — 36415 COLL VENOUS BLD VENIPUNCTURE: CPT

## 2025-01-12 PROCEDURE — 94761 N-INVAS EAR/PLS OXIMETRY MLT: CPT

## 2025-01-12 RX ORDER — SODIUM CHLORIDE, SODIUM LACTATE, POTASSIUM CHLORIDE, CALCIUM CHLORIDE 600; 310; 30; 20 MG/100ML; MG/100ML; MG/100ML; MG/100ML
INJECTION, SOLUTION INTRAVENOUS CONTINUOUS
Status: ACTIVE | OUTPATIENT
Start: 2025-01-12 | End: 2025-01-12

## 2025-01-12 RX ORDER — PREGABALIN 150 MG/1
150 CAPSULE ORAL 2 TIMES DAILY
Status: DISCONTINUED | OUTPATIENT
Start: 2025-01-13 | End: 2025-01-13 | Stop reason: HOSPADM

## 2025-01-12 RX ADMIN — Medication 400 MG: at 09:01

## 2025-01-12 RX ADMIN — PREGABALIN 150 MG: 150 CAPSULE ORAL at 02:01

## 2025-01-12 RX ADMIN — MEROPENEM 2 G: 2 INJECTION, POWDER, FOR SOLUTION INTRAVENOUS at 02:01

## 2025-01-12 RX ADMIN — DULOXETINE HYDROCHLORIDE 60 MG: 60 CAPSULE, DELAYED RELEASE ORAL at 09:01

## 2025-01-12 RX ADMIN — NORTRIPTYLINE HYDROCHLORIDE 10 MG: 10 CAPSULE ORAL at 08:01

## 2025-01-12 RX ADMIN — NORTRIPTYLINE HYDROCHLORIDE 10 MG: 10 CAPSULE ORAL at 09:01

## 2025-01-12 RX ADMIN — INSULIN GLARGINE 15 UNITS: 100 INJECTION, SOLUTION SUBCUTANEOUS at 08:01

## 2025-01-12 RX ADMIN — APIXABAN 5 MG: 5 TABLET, FILM COATED ORAL at 08:01

## 2025-01-12 RX ADMIN — INSULIN ASPART 2 UNITS: 100 INJECTION, SOLUTION INTRAVENOUS; SUBCUTANEOUS at 11:01

## 2025-01-12 RX ADMIN — NORTRIPTYLINE HYDROCHLORIDE 10 MG: 10 CAPSULE ORAL at 02:01

## 2025-01-12 RX ADMIN — PANTOPRAZOLE SODIUM 40 MG: 40 TABLET, DELAYED RELEASE ORAL at 08:01

## 2025-01-12 RX ADMIN — MEROPENEM 2 G: 2 INJECTION, POWDER, FOR SOLUTION INTRAVENOUS at 05:01

## 2025-01-12 RX ADMIN — APIXABAN 5 MG: 5 TABLET, FILM COATED ORAL at 09:01

## 2025-01-12 RX ADMIN — INSULIN ASPART 5 UNITS: 100 INJECTION, SOLUTION INTRAVENOUS; SUBCUTANEOUS at 11:01

## 2025-01-12 RX ADMIN — DULOXETINE HYDROCHLORIDE 60 MG: 60 CAPSULE, DELAYED RELEASE ORAL at 08:01

## 2025-01-12 RX ADMIN — SODIUM CHLORIDE, SODIUM LACTATE, POTASSIUM CHLORIDE, AND CALCIUM CHLORIDE: .6; .31; .03; .02 INJECTION, SOLUTION INTRAVENOUS at 11:01

## 2025-01-12 RX ADMIN — INSULIN ASPART 5 UNITS: 100 INJECTION, SOLUTION INTRAVENOUS; SUBCUTANEOUS at 08:01

## 2025-01-12 RX ADMIN — DOCUSATE SODIUM 100 MG: 100 CAPSULE, LIQUID FILLED ORAL at 08:01

## 2025-01-12 RX ADMIN — PREGABALIN 150 MG: 150 CAPSULE ORAL at 08:01

## 2025-01-12 RX ADMIN — INSULIN ASPART 5 UNITS: 100 INJECTION, SOLUTION INTRAVENOUS; SUBCUTANEOUS at 04:01

## 2025-01-12 NOTE — PROGRESS NOTES
[facility] - Surgery  [facility] Medicine  Progress Note    Patient Name: Anthony Ball  MRN: [id number]  Patient Class: IP- Inpatient   Admission Date: 1/7/2025  Length of Stay: 5 days  Attending Physician: Kenny Mojica DO  Primary Care Provider: Isaias Myles MD        Subjective     Principal Problem:UTI due to extended-spectrum beta lactamase (ESBL) producing Escherichia coli        HPI:  Mr. Ball is a 74 y.o M with CAD, PE, polyneuropathy, Afib on apixaban, T2DM, recurrent UTIs with ESBL E coli with prior bacteremia, who will be undergoing stone removal on Friday. Urology is requesting direct admission to hospital medicine for management of ESBL E. Coli UTI in the setting of nephrolithiasis and stent with IV antibiotics prior to planned surgical intervention. At the time of my evaluation, patient was resting in bed. His only complaint was some nausea earlier this evening. He denies any symptoms related to his UTI. Denies any increased frequency, urgency, dysuria, hematuria, or flank pain. Denies any fever or chills. Patient denies dizziness, lightheadedness, headache, CP, SOB, vomiting or diarrhea. He notes that his asthma is well controlled. Patient voices no other concerns. Per outpatient ID recs will plan to start ertapenem 1 g daily ( meropenem if albumin <3) during perioperative period and plans to continue for atleast 3 days postop.     Overview/Hospital Course:  Admitted for ESBL UTI associated with nephrolithiasis.  He has a right-sided double-J ureteral stent in place which was seen to be in stable position on CT.  No new or worsening hydro was noted.  Urology following, planning for lithotripsy 1.10.24.  He did undergo stent removal, but no stone was seen.  On merrem; end date 1.13.24.    Interval History:  Seen and evaluated on general medical floor  No acute events overnight    Clinical status improved  No new complaints  Still pain free  Still having some hallucinations that he believes to be  associated with anesthesia; these are not distressing to him    Objective:     Vital Signs (Most Recent):  Temp: 98.2 °F (36.8 °C) (01/12/25 1111)  Pulse: 79 (01/12/25 1111)  Resp: 18 (01/12/25 1111)  BP: (!) 103/57 (01/12/25 1111)  SpO2: 98 % (01/12/25 1111) Vital Signs (24h Range):  Temp:  [97.8 °F (36.6 °C)-98.5 °F (36.9 °C)] 98.2 °F (36.8 °C)  Pulse:  [68-79] 79  Resp:  [18] 18  SpO2:  [96 %-98 %] 98 %  BP: ()/(55-75) 103/57     Weight: 88.3 kg (194 lb 10.7 oz)  Body mass index is 28.75 kg/m².    Intake/Output Summary (Last 24 hours) at 1/12/2025 1137  Last data filed at 1/11/2025 2240  Gross per 24 hour   Intake --   Output 1020 ml   Net -1020 ml         Physical Exam  Vitals and nursing note reviewed.   Constitutional:       General: He is not in acute distress.     Appearance: He is well-developed. He is not toxic-appearing or diaphoretic.   HENT:      Head: Normocephalic and atraumatic.      Nose: Nose normal.      Mouth/Throat:      Mouth: Mucous membranes are moist.      Pharynx: No oropharyngeal exudate.   Eyes:      Conjunctiva/sclera: Conjunctivae normal.      Pupils: Pupils are equal, round, and reactive to light.   Cardiovascular:      Rate and Rhythm: Normal rate and regular rhythm.      Heart sounds: Normal heart sounds.   Pulmonary:      Effort: Pulmonary effort is normal. No respiratory distress.      Breath sounds: Normal breath sounds. No wheezing, rhonchi or rales.   Abdominal:      General: Bowel sounds are normal. There is no distension.      Palpations: Abdomen is soft.      Tenderness: There is no abdominal tenderness. There is no right CVA tenderness or left CVA tenderness.   Musculoskeletal:         General: No tenderness. Normal range of motion.      Cervical back: Normal range of motion and neck supple.      Right lower leg: No edema.      Left lower leg: No edema.   Lymphadenopathy:      Cervical: No cervical adenopathy.   Skin:     General: Skin is warm and dry.      Capillary  Refill: Capillary refill takes less than 2 seconds.      Findings: No rash.   Neurological:      General: No focal deficit present.      Mental Status: He is alert and oriented to person, place, and time.   Psychiatric:         Behavior: Behavior normal.         Thought Content: Thought content normal.         Judgment: Judgment normal.             Significant Labs: All pertinent labs within the past 24 hours have been reviewed.    Significant Imaging: I have reviewed all pertinent imaging results/findings within the past 24 hours.    Assessment and Plan     * UTI due to extended-spectrum beta lactamase (ESBL) producing Escherichia coli  VSS, afebrile   Normal white cells  Lactic normal  CRP elevated  Urine culture from 12.31.24 showed ESBL e coli sensitive to carbapenems  Blood cultures sterile  S/p ureteral stent removal 1.10.25, no stone seen during procedure    Cont merrem; end date 1.13.25  ID consulted, appreciate recommendations  ID recommending continuing antibiotics through perioperative period and for at least 3 days after. Duration may be prolonged pending operative findings  Urology consulted, appreciate recs    Back pain  Weakness of bilateral lower extremity   Back spasm  Recurrent falls   - Uses walker to ambulate at home  - Noted. Continue home meds.     Urolithiasis  See plan for UTI    Paroxysmal A-fib  Rate controlled at this time  Regular rhythm at this time  DC heparin gtt  Eliquis resumed 1.11.25    Asthma  -No s/s of acute exacerbation.  -Continue daily inhalers.  - PRN Duo nebs    Normocytic anemia  Monitor    Alcohol use  - History noted. No history of alcohol withdrawal.  - Reports drinking 2-3 drinks daily, last drink this morning.   - PETH pending.  - Nursing to assess CIWA q8h  - MVI, folic acid, and thiamine daily    Gastroesophageal reflux disease without esophagitis  Chronic, stable  Continue PPI    Primary insomnia  - Chronic, stable.  - Continue home meds PRN    Essential  hypertension  Monitor  Add agents as indicated    Diabetic polyneuropathy associated with type 2 diabetes mellitus  Chronic, stable  Continue lyrica, amitriptyline, and cymbalta    Type 2 diabetes mellitus with neurologic complication, without long-term current use of insulin  SSI  Diabetic diet when not NPO  ACHS glucose checks  Resume home insulin regimen, Lantus 15U daily, Novolog 5 TIDAC      VTE Risk Mitigation (From admission, onward)           Ordered     apixaban tablet 5 mg  2 times daily         01/11/25 1058     Place sequential compression device  Until discontinued         01/10/25 0615     Place sequential compression device  Until discontinued         01/07/25 1810                    Discharge Planning   CHRISTA: 1/12/2025     Code Status: Full Code   Medical Readiness for Discharge Date:   Discharge Plan A: Home with family                        Kenny Mojica DO  Department of Hospital Medicine   [facility] - Surgery

## 2025-01-12 NOTE — SUBJECTIVE & OBJECTIVE
Interval History:  Seen and evaluated on general medical floor  No acute events overnight    Clinical status improved  No new complaints  Still pain free  Still having some hallucinations that he believes to be associated with anesthesia; these are not distressing to him    Objective:     Vital Signs (Most Recent):  Temp: 98.2 °F (36.8 °C) (01/12/25 1111)  Pulse: 79 (01/12/25 1111)  Resp: 18 (01/12/25 1111)  BP: (!) 103/57 (01/12/25 1111)  SpO2: 98 % (01/12/25 1111) Vital Signs (24h Range):  Temp:  [97.8 °F (36.6 °C)-98.5 °F (36.9 °C)] 98.2 °F (36.8 °C)  Pulse:  [68-79] 79  Resp:  [18] 18  SpO2:  [96 %-98 %] 98 %  BP: ()/(55-75) 103/57     Weight: 88.3 kg (194 lb 10.7 oz)  Body mass index is 28.75 kg/m².    Intake/Output Summary (Last 24 hours) at 1/12/2025 1137  Last data filed at 1/11/2025 2240  Gross per 24 hour   Intake --   Output 1020 ml   Net -1020 ml         Physical Exam  Vitals and nursing note reviewed.   Constitutional:       General: He is not in acute distress.     Appearance: He is well-developed. He is not toxic-appearing or diaphoretic.   HENT:      Head: Normocephalic and atraumatic.      Nose: Nose normal.      Mouth/Throat:      Mouth: Mucous membranes are moist.      Pharynx: No oropharyngeal exudate.   Eyes:      Conjunctiva/sclera: Conjunctivae normal.      Pupils: Pupils are equal, round, and reactive to light.   Cardiovascular:      Rate and Rhythm: Normal rate and regular rhythm.      Heart sounds: Normal heart sounds.   Pulmonary:      Effort: Pulmonary effort is normal. No respiratory distress.      Breath sounds: Normal breath sounds. No wheezing, rhonchi or rales.   Abdominal:      General: Bowel sounds are normal. There is no distension.      Palpations: Abdomen is soft.      Tenderness: There is no abdominal tenderness. There is no right CVA tenderness or left CVA tenderness.   Musculoskeletal:         General: No tenderness. Normal range of motion.      Cervical back: Normal  range of motion and neck supple.      Right lower leg: No edema.      Left lower leg: No edema.   Lymphadenopathy:      Cervical: No cervical adenopathy.   Skin:     General: Skin is warm and dry.      Capillary Refill: Capillary refill takes less than 2 seconds.      Findings: No rash.   Neurological:      General: No focal deficit present.      Mental Status: He is alert and oriented to person, place, and time.   Psychiatric:         Behavior: Behavior normal.         Thought Content: Thought content normal.         Judgment: Judgment normal.             Significant Labs: All pertinent labs within the past 24 hours have been reviewed.    Significant Imaging: I have reviewed all pertinent imaging results/findings within the past 24 hours.

## 2025-01-12 NOTE — PLAN OF CARE
Problem: Adult Inpatient Plan of Care  Goal: Plan of Care Review  Outcome: Progressing  Goal: Patient-Specific Goal (Individualized)  Outcome: Progressing  Goal: Absence of Hospital-Acquired Illness or Injury  Outcome: Progressing  Goal: Optimal Comfort and Wellbeing  Outcome: Progressing     Problem: Diabetes Comorbidity  Goal: Blood Glucose Level Within Targeted Range  Outcome: Progressing     Problem: Acute Kidney Injury/Impairment  Goal: Fluid and Electrolyte Balance  Outcome: Progressing     Problem: Infection  Goal: Absence of Infection Signs and Symptoms  Outcome: Progressing

## 2025-01-12 NOTE — PROGRESS NOTES
Pharmacist Renal Dose Adjustment Note    Anthony Ball is a 74 y.o. male being treated with the medication Meropenem    Patient Data:    Vital Signs (Most Recent):  Temp: 98.2 °F (36.8 °C) (01/12/25 1111)  Pulse: 78 (01/12/25 1200)  Resp: 17 (01/12/25 1200)  BP: (!) 103/57 (01/12/25 1111)  SpO2: 98 % (01/12/25 1200) Vital Signs (72h Range):  Temp:  [96 °F (35.6 °C)-99.7 °F (37.6 °C)]   Pulse:  []   Resp:  [15-19]   BP: ()/(51-94)   SpO2:  [92 %-100 %]      Recent Labs   Lab 01/09/25  0342 01/10/25  0317 01/12/25  1001   CREATININE 1.2 1.2 1.5*     Serum creatinine: 1.5 mg/dL (H) 01/12/25 1001  Estimated creatinine clearance: 48.9 mL/min (A)    Medication: Meropenem was renally adjusted from 2g q8h to 2g q12h per pharmacy protocol.     Pharmacist's Name: Peyton Urbano  Pharmacist's Extension: [phone]

## 2025-01-12 NOTE — ASSESSMENT & PLAN NOTE
VSS, afebrile   Normal white cells  Lactic normal  CRP elevated  Urine culture from 12.31.24 showed ESBL e coli sensitive to carbapenems  Blood cultures sterile  S/p ureteral stent removal 1.10.25, no stone seen during procedure    Cont merrem; end date 1.13.25  ID consulted, appreciate recommendations  ID recommending continuing antibiotics through perioperative period and for at least 3 days after. Duration may be prolonged pending operative findings  Urology consulted, appreciate recs

## 2025-01-13 VITALS
DIASTOLIC BLOOD PRESSURE: 64 MMHG | HEIGHT: 69 IN | BODY MASS INDEX: 28.84 KG/M2 | SYSTOLIC BLOOD PRESSURE: 130 MMHG | RESPIRATION RATE: 18 BRPM | TEMPERATURE: 99 F | OXYGEN SATURATION: 95 % | HEART RATE: 78 BPM | WEIGHT: 194.69 LBS

## 2025-01-13 LAB
ALBUMIN SERPL BCP-MCNC: 2.1 G/DL (ref 3.5–5.2)
ALBUMIN SERPL BCP-MCNC: 2.2 G/DL (ref 3.5–5.2)
ANION GAP SERPL CALC-SCNC: 5 MMOL/L (ref 8–16)
ANION GAP SERPL CALC-SCNC: 7 MMOL/L (ref 8–16)
BUN SERPL-MCNC: 17 MG/DL (ref 8–23)
BUN SERPL-MCNC: 18 MG/DL (ref 8–23)
CALCIUM SERPL-MCNC: 8.1 MG/DL (ref 8.7–10.5)
CALCIUM SERPL-MCNC: 8.4 MG/DL (ref 8.7–10.5)
CHLORIDE SERPL-SCNC: 105 MMOL/L (ref 95–110)
CHLORIDE SERPL-SCNC: 107 MMOL/L (ref 95–110)
CO2 SERPL-SCNC: 23 MMOL/L (ref 23–29)
CO2 SERPL-SCNC: 24 MMOL/L (ref 23–29)
CREAT SERPL-MCNC: 1.4 MG/DL (ref 0.5–1.4)
CREAT SERPL-MCNC: 1.5 MG/DL (ref 0.5–1.4)
ERYTHROCYTE [DISTWIDTH] IN BLOOD BY AUTOMATED COUNT: 15.3 % (ref 11.5–14.5)
EST. GFR  (NO RACE VARIABLE): 49.2 ML/MIN/1.73 M^2
EST. GFR  (NO RACE VARIABLE): 53.4 ML/MIN/1.73 M^2
GLUCOSE SERPL-MCNC: 108 MG/DL (ref 70–110)
GLUCOSE SERPL-MCNC: 160 MG/DL (ref 70–110)
HCT VFR BLD AUTO: 31.1 % (ref 40–54)
HGB BLD-MCNC: 10 G/DL (ref 14–18)
MAGNESIUM SERPL-MCNC: 1.8 MG/DL (ref 1.6–2.6)
MCH RBC QN AUTO: 29.3 PG (ref 27–31)
MCHC RBC AUTO-ENTMCNC: 32.2 G/DL (ref 32–36)
MCV RBC AUTO: 91 FL (ref 82–98)
PHOSPHATE SERPL-MCNC: 2.5 MG/DL (ref 2.7–4.5)
PHOSPHATE SERPL-MCNC: 2.5 MG/DL (ref 2.7–4.5)
PLATELET # BLD AUTO: 144 K/UL (ref 150–450)
PMV BLD AUTO: 10.8 FL (ref 9.2–12.9)
POTASSIUM SERPL-SCNC: 4.1 MMOL/L (ref 3.5–5.1)
POTASSIUM SERPL-SCNC: 4.4 MMOL/L (ref 3.5–5.1)
RBC # BLD AUTO: 3.41 M/UL (ref 4.6–6.2)
SODIUM SERPL-SCNC: 135 MMOL/L (ref 136–145)
SODIUM SERPL-SCNC: 136 MMOL/L (ref 136–145)
WBC # BLD AUTO: 7.03 K/UL (ref 3.9–12.7)

## 2025-01-13 PROCEDURE — 80069 RENAL FUNCTION PANEL: CPT

## 2025-01-13 PROCEDURE — 83735 ASSAY OF MAGNESIUM: CPT

## 2025-01-13 PROCEDURE — 25000003 PHARM REV CODE 250: Performed by: NURSE PRACTITIONER

## 2025-01-13 PROCEDURE — 25000003 PHARM REV CODE 250

## 2025-01-13 PROCEDURE — 63600175 PHARM REV CODE 636 W HCPCS

## 2025-01-13 PROCEDURE — 85027 COMPLETE CBC AUTOMATED: CPT

## 2025-01-13 PROCEDURE — 36415 COLL VENOUS BLD VENIPUNCTURE: CPT

## 2025-01-13 RX ADMIN — MEROPENEM 2 G: 2 INJECTION, POWDER, FOR SOLUTION INTRAVENOUS at 12:01

## 2025-01-13 RX ADMIN — MEROPENEM 2 G: 2 INJECTION, POWDER, FOR SOLUTION INTRAVENOUS at 02:01

## 2025-01-13 RX ADMIN — SODIUM CHLORIDE, POTASSIUM CHLORIDE, SODIUM LACTATE AND CALCIUM CHLORIDE 500 ML: 600; 310; 30; 20 INJECTION, SOLUTION INTRAVENOUS at 09:01

## 2025-01-13 RX ADMIN — NORTRIPTYLINE HYDROCHLORIDE 10 MG: 10 CAPSULE ORAL at 03:01

## 2025-01-13 RX ADMIN — INSULIN GLARGINE 15 UNITS: 100 INJECTION, SOLUTION SUBCUTANEOUS at 08:01

## 2025-01-13 RX ADMIN — APIXABAN 5 MG: 5 TABLET, FILM COATED ORAL at 08:01

## 2025-01-13 RX ADMIN — PREGABALIN 150 MG: 150 CAPSULE ORAL at 08:01

## 2025-01-13 RX ADMIN — PANTOPRAZOLE SODIUM 40 MG: 40 TABLET, DELAYED RELEASE ORAL at 08:01

## 2025-01-13 RX ADMIN — INSULIN ASPART 5 UNITS: 100 INJECTION, SOLUTION INTRAVENOUS; SUBCUTANEOUS at 08:01

## 2025-01-13 RX ADMIN — NORTRIPTYLINE HYDROCHLORIDE 10 MG: 10 CAPSULE ORAL at 08:01

## 2025-01-13 RX ADMIN — DOCUSATE SODIUM 100 MG: 100 CAPSULE, LIQUID FILLED ORAL at 08:01

## 2025-01-13 RX ADMIN — INSULIN ASPART 5 UNITS: 100 INJECTION, SOLUTION INTRAVENOUS; SUBCUTANEOUS at 11:01

## 2025-01-13 RX ADMIN — SODIUM CHLORIDE, POTASSIUM CHLORIDE, SODIUM LACTATE AND CALCIUM CHLORIDE 500 ML: 600; 310; 30; 20 INJECTION, SOLUTION INTRAVENOUS at 12:01

## 2025-01-13 RX ADMIN — DULOXETINE HYDROCHLORIDE 60 MG: 60 CAPSULE, DELAYED RELEASE ORAL at 08:01

## 2025-01-13 NOTE — PLAN OF CARE
CHW met with patient/family at bedside. Patient experience rounding completed and reviewed the following.     Do you know your discharge plan? Yes    If yes, what is the plan? Home    Have you discussed your needs and   preferences with your SW/CM? Yes     If you are discharging home, do you have help at home? Yes     Do you think you will need help additional at home at discharge?  No     Do you currently have difficulty keeping up with bills, affording medicine or buying food?  No    Assigned SW/CM notified of any patient/family needs or concerns. Appropriate resources provided to address patient's needs.      CM: Caroline Little

## 2025-01-13 NOTE — PLAN OF CARE
Problem: Adult Inpatient Plan of Care  Goal: Plan of Care Review  Outcome: Progressing  Goal: Patient-Specific Goal (Individualized)  Outcome: Progressing  Goal: Absence of Hospital-Acquired Illness or Injury  Outcome: Progressing  Intervention: Identify and Manage Fall Risk  Flowsheets (Taken 1/13/2025 1878)  Safety Promotion/Fall Prevention:   assistive device/personal item within reach   side rails raised x 2   medications reviewed   family expresses understanding of fall risk and prevention   instructed to call staff for mobility  Goal: Optimal Comfort and Wellbeing  Outcome: Progressing     Problem: Diabetes Comorbidity  Goal: Blood Glucose Level Within Targeted Range  Outcome: Progressing     Problem: Infection  Goal: Absence of Infection Signs and Symptoms  Outcome: Progressing     Problem: Fall Injury Risk  Goal: Absence of Fall and Fall-Related Injury  Outcome: Progressing

## 2025-01-14 ENCOUNTER — PATIENT MESSAGE (OUTPATIENT)
Dept: INTERNAL MEDICINE | Facility: CLINIC | Age: 75
End: 2025-01-14
Payer: MEDICARE

## 2025-01-14 ENCOUNTER — OUTPATIENT CASE MANAGEMENT (OUTPATIENT)
Dept: ADMINISTRATIVE | Facility: OTHER | Age: 75
End: 2025-01-14
Payer: MEDICARE

## 2025-01-14 DIAGNOSIS — E11.42 TYPE 2 DIABETES MELLITUS WITH DIABETIC POLYNEUROPATHY, WITHOUT LONG-TERM CURRENT USE OF INSULIN: Primary | Chronic | ICD-10-CM

## 2025-01-14 DIAGNOSIS — Z00.00 ENCOUNTER FOR MEDICARE ANNUAL WELLNESS EXAM: ICD-10-CM

## 2025-01-14 RX ORDER — PEN NEEDLE, DIABETIC, SAFETY 30 GX3/16"
1 NEEDLE, DISPOSABLE MISCELLANEOUS 3 TIMES DAILY
Qty: 100 EACH | Refills: 5 | Status: SHIPPED | OUTPATIENT
Start: 2025-01-14

## 2025-01-14 NOTE — PLAN OF CARE
[facility]  Discharge Final Note    Primary Care Provider: Isaias Myles MD    Expected Discharge Date: 1/13/2025    Final Discharge Note (most recent)       Final Note - 01/14/25 1211          Final Note    Assessment Type Final Discharge Note     Anticipated Discharge Disposition Home or Self Care     What phone number can be called within the next 1-3 days to see how you are doing after discharge? --   [phone]                    Important Message from Medicare  Important Message from Medicare regarding Discharge Appeal Rights: Explained to patient/caregiver     Date IMM was signed: 01/13/25  Time IMM was signed: 1410      Future Appointments   Date Time Provider Department Center   2/21/2025 11:00 AM [facility] OIC-US1 [facility] ULTR [facility]   2/24/2025 10:30 AM Joni Wagoner MD [facility] UROLOGC [facility]   2/24/2025  1:00 PM Boom Hoffman MD [facility] PULMON [place]   4/14/2025  2:00 PM Robina Zaman, NP [facility] NEPHRO [facility]     Patient discharged home to care of family on 1/13/25.    Caroline Figueroa RNCM  Case Management  [facility]  [phone]

## 2025-01-14 NOTE — PLAN OF CARE
[facility]      HOME HEALTH ORDERS  FACE TO FACE ENCOUNTER    Patient Name: Anthony Ball  YOB: 1950    PCP: Isaias Myles MD   PCP Address: [address] / [place] [address]  PCP Phone Number: [phone]  PCP Fax: [phone]    Encounter Date: 1/6/25    Admit to Home Health    Diagnoses:  Active Hospital Problems    Diagnosis  POA    *UTI due to extended-spectrum beta lactamase (ESBL) producing Escherichia coli [N39.0, B96.29, Z16.12]  Yes    Back pain [M54.9]  Yes    Urolithiasis [N20.9]  Yes    Paroxysmal A-fib [I48.0]  Yes    Asthma [J45.909]  Yes    Normocytic anemia [D64.9]  Yes     Chronic    Alcohol use [Z78.9]  Yes    Back spasm [M62.830]  Yes    Weakness of both lower extremities [R29.898]  Yes    Type 2 diabetes mellitus with neurologic complication, without long-term current use of insulin [E11.49]  Yes     Chronic    Essential hypertension [I10]  Yes    Gastroesophageal reflux disease without esophagitis [K21.9]  Yes     Chronic    Primary insomnia [F51.01]  Yes     Chronic    Diabetic polyneuropathy associated with type 2 diabetes mellitus [E11.42]  Yes     Chronic    Recurrent falls [R29.6]  Not Applicable      Resolved Hospital Problems   No resolved problems to display.       Follow Up Appointments:  Future Appointments   Date Time Provider Department Center   2/21/2025 11:00 AM [facility] OIC-US1 MASTER [facility] ULTR [facility]   2/24/2025 10:30 AM Joni Waogner MD [facility] UROLOGC [facility]   2/24/2025  1:00 PM Boom Hoffman MD [facility] PULMON [place]   4/14/2025  2:00 PM Robina Zaman NP [facility] NEPHRO [facility]       Allergies:Review of patient's allergies indicates:  No Known Allergies    Medications: Review discharge medications with patient and family and provide education.    No current facility-administered medications for this encounter.     Current Outpatient Medications   Medication Sig Dispense Refill    albuterol (VENTOLIN HFA) 90 mcg/actuation  "inhaler Inhale 2 puffs into the lungs every 6 (six) hours as needed for Wheezing. Rescue 8 g 2    apixaban (ELIQUIS) 5 mg Tab TAKE 1 TABLET(5 MG) BY MOUTH TWICE DAILY 180 tablet 3    BD ULTRA-FINE MINI PEN NEEDLE 31 gauge x 3/16" Ndle SMARTSIG:SUB-Q 3-4 Times Daily      blood-glucose meter Misc To check BG 3 times daily, to use with insurance preferred meter 1 each 0    blood-glucose sensor (FREESTYLE SOPHY 3 PLUS SENSOR) Rima 1 Device by Misc.(Non-Drug; Combo Route) route every 14 (fourteen) days. 6 each 3    blood-glucose sensor (FREESTYLE SOPHY 3 SENSOR) Rima 1 Device by Misc.(Non-Drug; Combo Route) route every 14 (fourteen) days. 6 each 3    budesonide-formoterol 80-4.5 mcg (SYMBICORT) 80-4.5 mcg/actuation HFAA Inhale 2 puffs into the lungs.      busPIRone (BUSPAR) 15 MG tablet Take 1 tablet (15 mg total) by mouth 2 (two) times daily as needed (Anxiety). 180 tablet 3    capsaicin (ZOSTRIX) 0.025 % cream Apply topically every 3 (three) months. (to both feet)      cyanocobalamin (VITAMIN B-12) 1000 MCG tablet Take 1 tablet (1,000 mcg total) by mouth once daily. 90 tablet 3    DULoxetine (CYMBALTA) 60 MG capsule Take 1 capsule (60 mg total) by mouth 2 (two) times daily. 180 capsule 3    empagliflozin (JARDIANCE) 10 mg tablet Take 1 tablet by mouth once daily.      flash glucose scanning reader (FREESTYLE SOPHY 14 DAY READER) Misc Use device to check blood glucose level 3 times daily. 1 each 0    fluticasone-salmeterol diskus inhaler 250-50 mcg       fluticasone-umeclidin-vilanter (TRELEGY ELLIPTA) 100-62.5-25 mcg DsDv Inhale 1 puff into the lungs once daily. 60 each 5    HYDROcodone-acetaminophen (NORCO) 5-325 mg per tablet Take 1 tablet by mouth every 6 (six) hours as needed for Pain. 8 tablet 0    hydrOXYzine HCL (ATARAX) 25 MG tablet       hydrOXYzine pamoate (VISTARIL) 25 MG Cap Take 25 mg by mouth every 8 (eight) hours as needed (Itching).      insulin glargine-yfgn (SEMGLEE,INSULIN GLARG-YFGN,PEN) 100 unit/mL " "(3 mL) InPn Inject 15 Units into the skin once daily. 6 mL 2    insulin regular human (NOVOLIN R FLEXPEN) 100 unit/mL (3 mL) InPn pen Inject 5 Units subcutaneously 2 to 3 times daily before breakfast, lunch and/or dinner. 9 mL 3    lancets 33 gauge Misc To check BG 3 times daily, to use with insurance preferred meter 100 each 2    linaGLIPtin (TRADJENTA) 5 mg Tab tablet Take 1 tablet (5 mg total) by mouth once daily. 90 tablet 3    magnesium oxide 400 mg magnesium Tab Take 1 tablet by mouth every evening. 90 tablet 3    metFORMIN (GLUCOPHAGE-XR) 500 MG ER 24hr tablet Take 1 tablet (500 mg total) by mouth daily with breakfast. 90 tablet 3    methocarbamoL (ROBAXIN) 500 MG Tab Take 500 mg by mouth 3 (three) times daily.      methocarbamoL (ROBAXIN) 750 MG Tab Take 1 tablet (750 mg total) by mouth 3 (three) times daily as needed (Back pain). 90 tablet 2    nortriptyline (PAMELOR) 10 MG capsule Take 1 capsule (10 mg total) by mouth 3 (three) times daily. 90 capsule 2    NOVOLOG FLEXPEN U-100 INSULIN 100 unit/mL (3 mL) InPn pen SMARTSI Unit(s) SUB-Q 2-3 Times Daily      ondansetron (ZOFRAN) 8 MG tablet Take 1 tablet (8 mg total) by mouth every 8 (eight) hours as needed for Nausea. 30 tablet 1    oxybutynin (DITROPAN-XL) 5 MG TR24       oxyCODONE (ROXICODONE) 5 MG immediate release tablet Take 5 mg by mouth every 8 (eight) hours as needed.      pantoprazole (PROTONIX) 40 MG tablet Take 1 tablet (40 mg total) by mouth once daily. 90 tablet 3    pen needle,diabetic dual safty (BD AUTOSHIELD DUO PEN NEEDLE) 30 gauge x 3/16" Ndle 1 Needle by Misc.(Non-Drug; Combo Route) route 3 (three) times daily. 100 each 5    pregabalin (LYRICA) 150 MG capsule Take 1 capsule (150 mg total) by mouth 3 (three) times daily. 90 capsule 2    tamsulosin (FLOMAX) 0.4 mg Cap Take 1 capsule (0.4 mg total) by mouth once daily. (Patient not taking: Reported on 10/29/2024) 30 capsule 0    traZODone (DESYREL) 100 MG tablet TAKE 2 TABLETS(200 MG) BY " "MOUTH EVERY NIGHT AS NEEDED FOR INSOMNIA 180 tablet 3    TRUE METRIX GLUCOSE TEST STRIP Strp USE TO CHECK BLOOD SUGAR THREE TIMES DAILY OR AS DIRECTED 300 strip 3    UNABLE TO FIND OMEGA XL CAP - Take 1 capsule by mouth once daily.          Medication List        CHANGE how you take these medications      pregabalin 150 MG capsule  Commonly known as: LYRICA  Take 1 capsule (150 mg total) by mouth 3 (three) times daily.  What changed: Another medication with the same name was removed. Continue taking this medication, and follow the directions you see here.            CONTINUE taking these medications      albuterol 90 mcg/actuation inhaler  Commonly known as: VENTOLIN HFA  Inhale 2 puffs into the lungs every 6 (six) hours as needed for Wheezing. Rescue     BD ULTRA-FINE MINI PEN NEEDLE 31 gauge x 3/16" Ndle  Generic drug: pen needle, diabetic  SMARTSIG:SUB-Q 3-4 Times Daily     blood-glucose meter Misc  To check BG 3 times daily, to use with insurance preferred meter     budesonide-formoterol 80-4.5 mcg 80-4.5 mcg/actuation Hfaa  Commonly known as: SYMBICORT  Inhale 2 puffs into the lungs.     busPIRone 15 MG tablet  Commonly known as: BUSPAR  Take 1 tablet (15 mg total) by mouth 2 (two) times daily as needed (Anxiety).     capsaicin 0.025 % cream  Commonly known as: ZOSTRIX  Apply topically every 3 (three) months. (to both feet)     cyanocobalamin 1000 MCG tablet  Commonly known as: VITAMIN B-12  Take 1 tablet (1,000 mcg total) by mouth once daily.     DULoxetine 60 MG capsule  Commonly known as: CYMBALTA  Take 1 capsule (60 mg total) by mouth 2 (two) times daily.     ELIQUIS 5 mg Tab  Generic drug: apixaban  TAKE 1 TABLET(5 MG) BY MOUTH TWICE DAILY     fluticasone-salmeterol 250-50 mcg/dose 250-50 mcg/dose diskus inhaler  Commonly known as: ADVAIR     fluticasone-umeclidin-vilanter 100-62.5-25 mcg Dsdv  Commonly known as: TRELEGY ELLIPTA  Inhale 1 puff into the lungs once daily.     FREESTYLE SOPHY 14 DAY READER " Misc  Generic drug: flash glucose scanning reader  Use device to check blood glucose level 3 times daily.     * FREESTYLE SOPHY 3 SENSOR Rima  Generic drug: blood-glucose sensor  1 Device by Misc.(Non-Drug; Combo Route) route every 14 (fourteen) days.     * FREESTYLE SOPHY 3 PLUS SENSOR Rima  Generic drug: blood-glucose sensor  1 Device by Misc.(Non-Drug; Combo Route) route every 14 (fourteen) days.     HYDROcodone-acetaminophen 5-325 mg per tablet  Commonly known as: NORCO  Take 1 tablet by mouth every 6 (six) hours as needed for Pain.     hydrOXYzine HCL 25 MG tablet  Commonly known as: ATARAX     hydrOXYzine pamoate 25 MG Cap  Commonly known as: VISTARIL  Take 25 mg by mouth every 8 (eight) hours as needed (Itching).     insulin glargine-yfgn 100 unit/mL (3 mL) Inpn  Commonly known as: SEMGLEE(INSULIN GLARG-YFGN)PEN  Inject 15 Units into the skin once daily.     JARDIANCE 10 mg tablet  Generic drug: empagliflozin  Take 1 tablet by mouth once daily.     lancets 33 gauge Misc  To check BG 3 times daily, to use with insurance preferred meter     magnesium oxide 400 mg magnesium Tab  Take 1 tablet by mouth every evening.     metFORMIN 500 MG ER 24hr tablet  Commonly known as: GLUCOPHAGE-XR  Take 1 tablet (500 mg total) by mouth daily with breakfast.     * methocarbamoL 500 MG Tab  Commonly known as: ROBAXIN  Take 500 mg by mouth 3 (three) times daily.     * methocarbamoL 750 MG Tab  Commonly known as: ROBAXIN  Take 1 tablet (750 mg total) by mouth 3 (three) times daily as needed (Back pain).     nortriptyline 10 MG capsule  Commonly known as: PAMELOR  Take 1 capsule (10 mg total) by mouth 3 (three) times daily.     NovoLIN R FlexPen 100 unit/mL (3 mL) Inpn pen  Generic drug: insulin regular human  Inject 5 Units subcutaneously 2 to 3 times daily before breakfast, lunch and/or dinner.     NovoLOG Flexpen U-100 Insulin 100 unit/mL (3 mL) Inpn pen  Generic drug: insulin aspart U-100  SMARTSI Unit(s) SUB-Q 2-3 Times  Daily     ondansetron 8 MG tablet  Commonly known as: ZOFRAN  Take 1 tablet (8 mg total) by mouth every 8 (eight) hours as needed for Nausea.     oxybutynin 5 MG Tr24  Commonly known as: DITROPAN-XL     oxyCODONE 5 MG immediate release tablet  Commonly known as: ROXICODONE  Take 5 mg by mouth every 8 (eight) hours as needed.     pantoprazole 40 MG tablet  Commonly known as: PROTONIX  Take 1 tablet (40 mg total) by mouth once daily.     tamsulosin 0.4 mg Cap  Commonly known as: FLOMAX  Take 1 capsule (0.4 mg total) by mouth once daily.     TRADJENTA 5 mg Tab tablet  Generic drug: linaGLIPtin  Take 1 tablet (5 mg total) by mouth once daily.     traZODone 100 MG tablet  Commonly known as: DESYREL  TAKE 2 TABLETS(200 MG) BY MOUTH EVERY NIGHT AS NEEDED FOR INSOMNIA     TRUE METRIX GLUCOSE TEST STRIP Strp  Generic drug: blood sugar diagnostic  USE TO CHECK BLOOD SUGAR THREE TIMES DAILY OR AS DIRECTED     UNABLE TO FIND  OMEGA XL CAP - Take 1 capsule by mouth once daily.           * This list has 4 medication(s) that are the same as other medications prescribed for you. Read the directions carefully, and ask your doctor or other care provider to review them with you.                STOP taking these medications      ciprofloxacin HCl 750 MG tablet  Commonly known as: CIPRO     ertapenem 1 gram injection  Commonly known as: INVANZ     nitrofurantoin (macrocrystal-monohydrate) 100 MG capsule  Commonly known as: MACROBID                I have seen and examined this patient within the last 30 days. My clinical findings that support the need for the home health skilled services and home bound status are the following:no   Weakness/numbness causing balance and gait disturbance due to Weakness/Debility making it taxing to leave home.     Diet:   diabetic diet 2000 calorie    Labs:  Routine    Referrals/ Consults  Physical Therapy to evaluate and treat. Evaluate for home safety and equipment needs; Establish/upgrade home exercise  program. Perform / instruct on therapeutic exercises, gait training, transfer training, and Range of Motion.  Occupational Therapy to evaluate and treat. Evaluate home environment for safety and equipment needs. Perform/Instruct on transfers, ADL training, ROM, and therapeutic exercises.  Aide to provide assistance with personal care, ADLs, and vital signs.    Activities:   activity as tolerated    Nursing:   Agency to admit patient within 24 hours of hospital discharge unless specified on physician order or at patient request    SN to complete comprehensive assessment including routine vital signs. Instruct on disease process and s/s of complications to report to MD. Review/verify medication list sent home with the patient at time of discharge  and instruct patient/caregiver as needed. Frequency may be adjusted depending on start of care date.     Skilled nurse to perform up to 3 visits PRN for symptoms related to diagnosis    Notify MD if SBP > 160 or < 90; DBP > 90 or < 50; HR > 120 or < 50; Temp > 101; O2 < 88%    Ok to schedule additional visits based on staff availability and patient request on consecutive days within the home health episode.    When multiple disciplines ordered:    Start of Care occurs on Sunday - Wednesday schedule remaining discipline evaluations as ordered on separate consecutive days following the start of care.    Thursday SOC -schedule subsequent evaluations Friday and Monday the following week.     Friday - Saturday SOC - schedule subsequent discipline evaluations on consecutive days starting Monday of the following week.    For all post-discharge communication and subsequent orders please contact patient's primary care physician. If unable to reach primary care physician or do not receive response within 30 minutes, please contact [facility] for clinical staff order clarification    Miscellaneous   Diabetic Care:   SN to perform and educate Diabetic management with blood glucose monitoring:,  Fingerstick blood sugar AC and HS, and Report CBG < 60 or > 350 to physician.    Home Health Aide:  Physical Therapy Three times weekly and Occupational Therapy Three times weekly Skilled nursing three times weekly    Wound Care Orders  no    I certify that this patient is confined to his home and needs intermittent skilled nursing care, physical therapy, and occupational therapy.

## 2025-01-17 ENCOUNTER — OUTPATIENT CASE MANAGEMENT (OUTPATIENT)
Dept: ADMINISTRATIVE | Facility: OTHER | Age: 75
End: 2025-01-17
Payer: MEDICARE

## 2025-01-17 NOTE — PROGRESS NOTES
Outpatient Care Management   - Care Plan Follow Up    Patient: Anthony Ball  MRN:  [id number]  Date of Service:  1/17/2025  Completed by:  Cathy Camejo LMSW  Referral Date: 09/27/2024    Reason for Visit   Patient presents with    OPCM Post Discharge     1/17/2025       Brief Summary:  completed post discharge assessment with patient via telephone. Patient stated he's doing well at home. Patient has his significant other Jacob assisting him with ADL's at home. Patient is receiving home health services and saw the nurse today. Jacob will be the one to bring patient to doctor's appointments. No SDOH needs at this time. Case closed, notified OPCM RN.    Complex Care Plan     Care plan was discussed and completed today with input from patient and/or caregiver.    Patient Instructions     No follow-ups on file.

## 2025-01-21 ENCOUNTER — DOCUMENT SCAN (OUTPATIENT)
Dept: HOME HEALTH SERVICES | Facility: HOSPITAL | Age: 75
End: 2025-01-21
Payer: MEDICARE

## 2025-01-23 ENCOUNTER — EXTERNAL HOME HEALTH (OUTPATIENT)
Dept: HOME HEALTH SERVICES | Facility: HOSPITAL | Age: 75
End: 2025-01-23
Payer: MEDICARE

## 2025-01-24 ENCOUNTER — OFFICE VISIT (OUTPATIENT)
Dept: SPINE | Facility: CLINIC | Age: 75
End: 2025-01-24
Payer: MEDICARE

## 2025-01-24 ENCOUNTER — PATIENT MESSAGE (OUTPATIENT)
Dept: PAIN MEDICINE | Facility: CLINIC | Age: 75
End: 2025-01-24
Payer: MEDICARE

## 2025-01-24 VITALS
TEMPERATURE: 97 F | SYSTOLIC BLOOD PRESSURE: 129 MMHG | BODY MASS INDEX: 28.75 KG/M2 | WEIGHT: 194.69 LBS | DIASTOLIC BLOOD PRESSURE: 76 MMHG | HEART RATE: 106 BPM

## 2025-01-24 DIAGNOSIS — E11.40 PAINFUL DIABETIC NEUROPATHY: Primary | ICD-10-CM

## 2025-01-24 PROCEDURE — 99999 PR PBB SHADOW E&M-EST. PATIENT-LVL IV: CPT | Mod: PBBFAC,,, | Performed by: NURSE PRACTITIONER

## 2025-01-24 PROCEDURE — 64999 UNLISTED PX NERVOUS SYSTEM: CPT | Mod: PBBFAC | Performed by: NURSE PRACTITIONER

## 2025-01-24 PROCEDURE — 99214 OFFICE O/P EST MOD 30 MIN: CPT | Mod: PBBFAC | Performed by: NURSE PRACTITIONER

## 2025-01-24 RX ADMIN — CAPSAICIN 4 PATCH: KIT at 02:01

## 2025-01-24 NOTE — ASSESSMENT & PLAN NOTE
Suspect secondary to ATN; likely transient hypotension during general anesthesia  Resolved prior to discharge

## 2025-01-24 NOTE — DISCHARGE SUMMARY
C[facility] Pollock - Surgery  [facility] Medicine  Discharge Summary      Patient Name: Anthony Ball  MRN: [id number]  [facility] ([facility]): [id number]  Patient Class: IP- Inpatient  Admission Date: 1/7/2025  Hospital Length of Stay: 6 days  Discharge Date and Time: 1/13/2025  5:16 PM  Attending Physician: Tabitha att. providers found   Discharging Provider: Kenny Mojica DO  Primary Care Provider: Isaias Myles MD  [facility] Medicine Team: [facility] MED J Kenny Mojica DO  Primary Care Team: [facility]    HPI:   Mr. Ball is a 74 y.o M with CAD, PE, polyneuropathy, Afib on apixaban, T2DM, recurrent UTIs with ESBL E coli with prior bacteremia, who will be undergoing stone removal on Friday. Urology is requesting direct admission to hospital medicine for management of ESBL E. Coli UTI in the setting of nephrolithiasis and stent with IV antibiotics prior to planned surgical intervention. At the time of my evaluation, patient was resting in bed. His only complaint was some nausea earlier this evening. He denies any symptoms related to his UTI. Denies any increased frequency, urgency, dysuria, hematuria, or flank pain. Denies any fever or chills. Patient denies dizziness, lightheadedness, headache, CP, SOB, vomiting or diarrhea. He notes that his asthma is well controlled. Patient voices no other concerns. Per outpatient ID recs will plan to start ertapenem 1 g daily ( meropenem if albumin <3) during perioperative period and plans to continue for atleast 3 days postop.     Procedure(s) (LRB):  URETEROSCOPY (Right)  REMOVAL, CALCULUS, URETER, URETEROSCOPIC (Right)  REMOVAL-STENT (Right)  NEPHROSCOPY (Right)      Hospital Course:   Admitted for ESBL UTI associated with nephrolithiasis.  He has a right-sided double-J ureteral stent in place which was seen to be in stable position on CT.  No new or worsening hydro was noted.  Urology following, planning for lithotripsy 1.10.24.  He did undergo stent removal, but no stone was seen.  Received merrem;  end date 1.13.24.  Developed ALYSIA after procedure which resolved prior to discharge.  Pt deemed appropriate for discharge; seen and examined prior to departure. Plan discussed with pt, who was agreeable and amenable; medications were discussed and reviewed, outpatient follow-up scheduled, ER precautions were given, all questions were answered to the pt's satisfaction, and was subsequently discharged.     Goals of Care Treatment Preferences:  Code Status: Full Code      SDOH Screening:  The patient was screened for utility difficulties, food insecurity, transport difficulties, housing insecurity, and interpersonal safety and there were no concerns identified this admission.     Consults:   Consults (From admission, onward)          Status Ordering Provider     Inpatient consult to Infectious Diseases  Once        Provider:  (Not yet assigned)    Completed GUNNAR DAHL     Inpatient consult to Urology  Once        Provider:  (Not yet assigned)    Completed AUSTIN CALLAWAY            * UTI due to extended-spectrum beta lactamase (ESBL) producing Escherichia coli  VSS, afebrile   Normal white cells  Lactic normal  CRP elevated  Urine culture from 12.31.24 showed ESBL e coli sensitive to carbapenems  Blood cultures sterile  S/p ureteral stent removal 1.10.25, no stone seen during procedure    Cont merrem; end date 1.13.25  ID consulted, appreciate recommendations  ID recommending continuing antibiotics through perioperative period and for at least 3 days after. Duration may be prolonged pending operative findings  Urology consulted, appreciate recs    Back pain  Weakness of bilateral lower extremity   Back spasm  Recurrent falls   - Uses walker to ambulate at home  - Noted. Continue home meds.     Urolithiasis  See plan for UTI    Paroxysmal A-fib  Rate controlled at this time  Regular rhythm at this time  DC heparin gtt  Eliquis resumed 1.11.25    Asthma  -No s/s of acute exacerbation.  -Continue daily inhalers.  -  PRN Duo nebs    ALYSIA (acute kidney injury)  Suspect secondary to ATN; likely transient hypotension during general anesthesia  Resolved prior to discharge    Normocytic anemia  Monitor    Alcohol use  - History noted. No history of alcohol withdrawal.  - Reports drinking 2-3 drinks daily, last drink this morning.   - PETH pending.  - Nursing to assess CIWA q8h  - MVI, folic acid, and thiamine daily    Gastroesophageal reflux disease without esophagitis  Chronic, stable  Continue PPI    Primary insomnia  - Chronic, stable.  - Continue home meds PRN    Essential hypertension  Monitor  Add agents as indicated    Diabetic polyneuropathy associated with type 2 diabetes mellitus  Chronic, stable  Continue lyrica, amitriptyline, and cymbalta    Type 2 diabetes mellitus with neurologic complication, without long-term current use of insulin  SSI  Diabetic diet when not NPO  ACHS glucose checks  Resume home insulin regimen, Lantus 15U daily, Novolog 5 TIDAC      Final Active Diagnoses:    Diagnosis Date Noted POA    PRINCIPAL PROBLEM:  UTI due to extended-spectrum beta lactamase (ESBL) producing Escherichia coli [N39.0, B96.29, Z16.12] 01/11/2024 Yes    Back pain [M54.9] 01/07/2025 Yes    Urolithiasis [N20.9] 11/15/2024 Yes    Paroxysmal A-fib [I48.0] 11/14/2024 Yes    Asthma [J45.909] 09/26/2024 Yes    Normocytic anemia [D64.9] 09/26/2024 Yes     Chronic    ALYSIA (acute kidney injury) [N17.9] 09/26/2024 Yes    Alcohol use [Z78.9] 03/19/2023 Yes    Back spasm [M62.830] 03/19/2023 Yes    Weakness of both lower extremities [R29.898] 08/05/2021 Yes    Type 2 diabetes mellitus with neurologic complication, without long-term current use of insulin [E11.49] 07/08/2021 Yes     Chronic    Essential hypertension [I10] 07/08/2021 Yes    Gastroesophageal reflux disease without esophagitis [K21.9] 07/08/2021 Yes     Chronic    Primary insomnia [F51.01] 07/08/2021 Yes     Chronic    Diabetic polyneuropathy associated with type 2 diabetes  "mellitus [E11.42] 07/08/2021 Yes     Chronic    Recurrent falls [R29.6] 07/08/2021 Not Applicable      Problems Resolved During this Admission:       Discharged Condition: good    Disposition: Home or Self Care    Follow Up:    Patient Instructions:      US Retroperitoneal Complete   Standing Status: Future Standing Exp. Date: 01/10/26     Order Specific Question Answer Comments   May the Radiologist modify the order per protocol to meet the clinical needs of the patient? Yes    Release to patient Immediate      Notify your health care provider if you experience any of the following:  temperature >100.4     Activity as tolerated       Significant Diagnostic Studies: N/A    Pending Diagnostic Studies:       None           Medications:  Reconciled Home Medications:      Medication List        CHANGE how you take these medications      pregabalin 150 MG capsule  Commonly known as: LYRICA  Take 1 capsule (150 mg total) by mouth 3 (three) times daily.  What changed: Another medication with the same name was removed. Continue taking this medication, and follow the directions you see here.            CONTINUE taking these medications      albuterol 90 mcg/actuation inhaler  Commonly known as: VENTOLIN HFA  Inhale 2 puffs into the lungs every 6 (six) hours as needed for Wheezing. Rescue     BD ULTRA-FINE MINI PEN NEEDLE 31 gauge x 3/16" Ndle  Generic drug: pen needle, diabetic  SMARTSIG:SUB-Q 3-4 Times Daily     blood-glucose meter Misc  To check BG 3 times daily, to use with insurance preferred meter     budesonide-formoterol 80-4.5 mcg 80-4.5 mcg/actuation Hfaa  Commonly known as: SYMBICORT  Inhale 2 puffs into the lungs.     busPIRone 15 MG tablet  Commonly known as: BUSPAR  Take 1 tablet (15 mg total) by mouth 2 (two) times daily as needed (Anxiety).     capsaicin 0.025 % cream  Commonly known as: ZOSTRIX  Apply topically every 3 (three) months. (to both feet)     cyanocobalamin 1000 MCG tablet  Commonly known as: " VITAMIN B-12  Take 1 tablet (1,000 mcg total) by mouth once daily.     DULoxetine 60 MG capsule  Commonly known as: CYMBALTA  Take 1 capsule (60 mg total) by mouth 2 (two) times daily.     ELIQUIS 5 mg Tab  Generic drug: apixaban  TAKE 1 TABLET(5 MG) BY MOUTH TWICE DAILY     fluticasone-salmeterol 250-50 mcg/dose 250-50 mcg/dose diskus inhaler  Commonly known as: ADVAIR     fluticasone-umeclidin-vilanter 100-62.5-25 mcg Dsdv  Commonly known as: TRELEGY ELLIPTA  Inhale 1 puff into the lungs once daily.     FREESTYLE SOPHY 14 DAY READER Misc  Generic drug: flash glucose scanning reader  Use device to check blood glucose level 3 times daily.     * FREESTYLE SOPHY 3 SENSOR Rima  Generic drug: blood-glucose sensor  1 Device by Misc.(Non-Drug; Combo Route) route every 14 (fourteen) days.     * FREESTYLE SOPHY 3 PLUS SENSOR Rima  Generic drug: blood-glucose sensor  1 Device by Misc.(Non-Drug; Combo Route) route every 14 (fourteen) days.     HYDROcodone-acetaminophen 5-325 mg per tablet  Commonly known as: NORCO  Take 1 tablet by mouth every 6 (six) hours as needed for Pain.     hydrOXYzine HCL 25 MG tablet  Commonly known as: ATARAX     hydrOXYzine pamoate 25 MG Cap  Commonly known as: VISTARIL  Take 25 mg by mouth every 8 (eight) hours as needed (Itching).     insulin glargine-yfgn 100 unit/mL (3 mL) Inpn  Commonly known as: SEMGLEE(INSULIN GLARG-YFGN)PEN  Inject 15 Units into the skin once daily.     JARDIANCE 10 mg tablet  Generic drug: empagliflozin  Take 1 tablet by mouth once daily.     lancets 33 gauge Misc  To check BG 3 times daily, to use with insurance preferred meter     magnesium oxide 400 mg magnesium Tab  Take 1 tablet by mouth every evening.     metFORMIN 500 MG ER 24hr tablet  Commonly known as: GLUCOPHAGE-XR  Take 1 tablet (500 mg total) by mouth daily with breakfast.     * methocarbamoL 500 MG Tab  Commonly known as: ROBAXIN  Take 500 mg by mouth 3 (three) times daily.     * methocarbamoL 750 MG  Tab  Commonly known as: ROBAXIN  Take 1 tablet (750 mg total) by mouth 3 (three) times daily as needed (Back pain).     nortriptyline 10 MG capsule  Commonly known as: PAMELOR  Take 1 capsule (10 mg total) by mouth 3 (three) times daily.     NovoLIN R FlexPen 100 unit/mL (3 mL) Inpn pen  Generic drug: insulin regular human  Inject 5 Units subcutaneously 2 to 3 times daily before breakfast, lunch and/or dinner.     NovoLOG Flexpen U-100 Insulin 100 unit/mL (3 mL) Inpn pen  Generic drug: insulin aspart U-100  SMARTSI Unit(s) SUB-Q 2-3 Times Daily     ondansetron 8 MG tablet  Commonly known as: ZOFRAN  Take 1 tablet (8 mg total) by mouth every 8 (eight) hours as needed for Nausea.     oxybutynin 5 MG Tr24  Commonly known as: DITROPAN-XL     oxyCODONE 5 MG immediate release tablet  Commonly known as: ROXICODONE  Take 5 mg by mouth every 8 (eight) hours as needed.     pantoprazole 40 MG tablet  Commonly known as: PROTONIX  Take 1 tablet (40 mg total) by mouth once daily.     tamsulosin 0.4 mg Cap  Commonly known as: FLOMAX  Take 1 capsule (0.4 mg total) by mouth once daily.     TRADJENTA 5 mg Tab tablet  Generic drug: linaGLIPtin  Take 1 tablet (5 mg total) by mouth once daily.     traZODone 100 MG tablet  Commonly known as: DESYREL  TAKE 2 TABLETS(200 MG) BY MOUTH EVERY NIGHT AS NEEDED FOR INSOMNIA     TRUE METRIX GLUCOSE TEST STRIP Strp  Generic drug: blood sugar diagnostic  USE TO CHECK BLOOD SUGAR THREE TIMES DAILY OR AS DIRECTED     UNABLE TO FIND  OMEGA XL CAP - Take 1 capsule by mouth once daily.           * This list has 4 medication(s) that are the same as other medications prescribed for you. Read the directions carefully, and ask your doctor or other care provider to review them with you.                STOP taking these medications      ciprofloxacin HCl 750 MG tablet  Commonly known as: CIPRO     ertapenem 1 gram injection  Commonly known as: INVANZ     nitrofurantoin (macrocrystal-monohydrate) 100 MG  capsule  Commonly known as: MACROBID              Indwelling Lines/Drains at time of discharge:   Lines/Drains/Airways       None                   Time spent on the discharge of patient: >35 minutes         Kenny Mojica DO  Department of Hospital Medicine  [facility] - Surgery
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
## 2025-01-28 ENCOUNTER — OUTPATIENT CASE MANAGEMENT (OUTPATIENT)
Dept: ADMINISTRATIVE | Facility: OTHER | Age: 75
End: 2025-01-28
Payer: MEDICARE

## 2025-01-28 ENCOUNTER — OFFICE VISIT (OUTPATIENT)
Dept: INTERNAL MEDICINE | Facility: CLINIC | Age: 75
End: 2025-01-28
Payer: MEDICARE

## 2025-01-28 ENCOUNTER — PATIENT MESSAGE (OUTPATIENT)
Dept: SPINE | Facility: CLINIC | Age: 75
End: 2025-01-28
Payer: MEDICARE

## 2025-01-28 DIAGNOSIS — F32.A DEPRESSION, UNSPECIFIED DEPRESSION TYPE: ICD-10-CM

## 2025-01-28 DIAGNOSIS — E11.42 TYPE 2 DIABETES MELLITUS WITH DIABETIC POLYNEUROPATHY, WITHOUT LONG-TERM CURRENT USE OF INSULIN: ICD-10-CM

## 2025-01-28 DIAGNOSIS — Z09 HOSPITAL DISCHARGE FOLLOW-UP: Primary | ICD-10-CM

## 2025-01-28 DIAGNOSIS — N39.0 URINARY TRACT INFECTION WITHOUT HEMATURIA, SITE UNSPECIFIED: ICD-10-CM

## 2025-01-28 NOTE — PROGRESS NOTES
"Subjective:       Patient ID: Anthony Ball is a 74 y.o. male.    Chief Complaint: Hospital f/u (audio visit; unable to come in to clinic today and then difficulty with audiovisual component)    HPI  Recently treated inpatient with IV abx for ESBL UTI in setting of recent stone rx. Overall doing well since. Partner, Jacob, notes no concern for UTI recurrence at this time. BG has been stable. Is still on Semglee 15 U qd and TID AC 5 U Novolin at the moment, as well as Tradjenta and Metformin. Recent A1c back under control since transient increase in setting of infections. HH has been following but may be d/c'ing tomorrow at which time we will likely plan to start outpt PT. Depression is an issue. Feels anxiety okay and would like to stop Buspar if he can to reduce meds. Is on Cymbalta 60 bid and Lyrica for neuropathic pain. This is doing well with addition of capcaisin rx recently. Pain Mgmt following.      Past Medical History:   Diagnosis Date    Acute deep vein thrombosis (DVT) of left lower extremity 12/2023    Anxiety     Atrial fibrillation 12/2023    Cataract     Coronary artery disease     CAC score 1430    Depression     Diabetic neuropathy     Essential (primary) hypertension     GERD (gastroesophageal reflux disease)     History of bariatric surgery 07/08/2021    History of total right knee replacement 07/08/2021    Insomnia     Neuromyopathy     Possibly DM and/or alcohol related.    Pulmonary embolism 12/2023    Reactive airway disease     Type 2 diabetes mellitus          Current Outpatient Medications:     albuterol (VENTOLIN HFA) 90 mcg/actuation inhaler, Inhale 2 puffs into the lungs every 6 (six) hours as needed for Wheezing. Rescue, Disp: 8 g, Rfl: 2    apixaban (ELIQUIS) 5 mg Tab, TAKE 1 TABLET(5 MG) BY MOUTH TWICE DAILY, Disp: 180 tablet, Rfl: 3    BD ULTRA-FINE MINI PEN NEEDLE 31 gauge x 3/16" Ndle, SMARTSIG:SUB-Q 3-4 Times Daily, Disp: , Rfl:     blood-glucose meter Misc, To check BG 3 times " daily, to use with insurance preferred meter, Disp: 1 each, Rfl: 0    blood-glucose sensor (FREESTYLE SOPHY 3 PLUS SENSOR) Rima, 1 Device by Misc.(Non-Drug; Combo Route) route every 14 (fourteen) days., Disp: 6 each, Rfl: 3    blood-glucose sensor (FREESTYLE SOPHY 3 SENSOR) Rima, 1 Device by Misc.(Non-Drug; Combo Route) route every 14 (fourteen) days., Disp: 6 each, Rfl: 3    budesonide-formoterol 80-4.5 mcg (SYMBICORT) 80-4.5 mcg/actuation HFAA, Inhale 2 puffs into the lungs., Disp: , Rfl:     busPIRone (BUSPAR) 15 MG tablet, Take 1 tablet (15 mg total) by mouth 2 (two) times daily as needed (Anxiety)., Disp: 180 tablet, Rfl: 3    capsaicin (ZOSTRIX) 0.025 % cream, Apply topically every 3 (three) months. (to both feet), Disp: , Rfl:     cyanocobalamin (VITAMIN B-12) 1000 MCG tablet, Take 1 tablet (1,000 mcg total) by mouth once daily., Disp: 90 tablet, Rfl: 3    DULoxetine (CYMBALTA) 60 MG capsule, Take 1 capsule (60 mg total) by mouth 2 (two) times daily., Disp: 180 capsule, Rfl: 3    flash glucose scanning reader (FREESTYLE SOPHY 14 DAY READER) Misc, Use device to check blood glucose level 3 times daily., Disp: 1 each, Rfl: 0    fluticasone-salmeterol diskus inhaler 250-50 mcg, , Disp: , Rfl:     fluticasone-umeclidin-vilanter (TRELEGY ELLIPTA) 100-62.5-25 mcg DsDv, Inhale 1 puff into the lungs once daily., Disp: 60 each, Rfl: 5    hydrOXYzine HCL (ATARAX) 25 MG tablet, , Disp: , Rfl:     hydrOXYzine pamoate (VISTARIL) 25 MG Cap, Take 25 mg by mouth every 8 (eight) hours as needed (Itching)., Disp: , Rfl:     insulin glargine-yfgn (SEMGLEE,INSULIN GLARG-YFGN,PEN) 100 unit/mL (3 mL) InPn, Inject 15 Units into the skin once daily., Disp: 6 mL, Rfl: 2    insulin regular human (NOVOLIN R FLEXPEN) 100 unit/mL (3 mL) InPn pen, Inject 5 Units subcutaneously 2 to 3 times daily before breakfast, lunch and/or dinner., Disp: 9 mL, Rfl: 3    lancets 33 gauge Misc, To check BG 3 times daily, to use with insurance preferred  "meter, Disp: 100 each, Rfl: 2    linaGLIPtin (TRADJENTA) 5 mg Tab tablet, Take 1 tablet (5 mg total) by mouth once daily., Disp: 90 tablet, Rfl: 3    magnesium oxide 400 mg magnesium Tab, Take 1 tablet by mouth every evening., Disp: 90 tablet, Rfl: 3    metFORMIN (GLUCOPHAGE-XR) 500 MG ER 24hr tablet, Take 1 tablet (500 mg total) by mouth daily with breakfast., Disp: 90 tablet, Rfl: 3    methocarbamoL (ROBAXIN) 750 MG Tab, Take 1 tablet (750 mg total) by mouth 3 (three) times daily as needed (Back pain)., Disp: 90 tablet, Rfl: 2    nortriptyline (PAMELOR) 10 MG capsule, Take 1 capsule (10 mg total) by mouth 3 (three) times daily., Disp: 90 capsule, Rfl: 2    NOVOLOG FLEXPEN U-100 INSULIN 100 unit/mL (3 mL) InPn pen, SMARTSI Unit(s) SUB-Q 2-3 Times Daily, Disp: , Rfl:     ondansetron (ZOFRAN) 8 MG tablet, Take 1 tablet (8 mg total) by mouth every 8 (eight) hours as needed for Nausea., Disp: 30 tablet, Rfl: 1    oxybutynin (DITROPAN-XL) 5 MG TR24, , Disp: , Rfl:     pantoprazole (PROTONIX) 40 MG tablet, Take 1 tablet (40 mg total) by mouth once daily., Disp: 90 tablet, Rfl: 3    pen needle,diabetic dual safty (BD AUTOSHIELD DUO PEN NEEDLE) 30 gauge x 3/16" Ndle, 1 Needle by Misc.(Non-Drug; Combo Route) route 3 (three) times daily., Disp: 100 each, Rfl: 5    pregabalin (LYRICA) 150 MG capsule, Take 1 capsule (150 mg total) by mouth 3 (three) times daily., Disp: 90 capsule, Rfl: 2    tamsulosin (FLOMAX) 0.4 mg Cap, Take 1 capsule (0.4 mg total) by mouth once daily. (Patient not taking: Reported on 10/29/2024), Disp: 30 capsule, Rfl: 0    traZODone (DESYREL) 100 MG tablet, TAKE 2 TABLETS(200 MG) BY MOUTH EVERY NIGHT AS NEEDED FOR INSOMNIA, Disp: 180 tablet, Rfl: 3    TRUE METRIX GLUCOSE TEST STRIP Strp, USE TO CHECK BLOOD SUGAR THREE TIMES DAILY OR AS DIRECTED, Disp: 300 strip, Rfl: 3    UNABLE TO FIND, OMEGA XL CAP - Take 1 capsule by mouth once daily., Disp: , Rfl:     Past Surgical History:   Procedure Laterality " Date    CATARACT EXTRACTION W/  INTRAOCULAR LENS IMPLANT Right 7/15/2024    Procedure: EXTRACTION, CATARACT, WITH IOL INSERTION;  Surgeon: Margot Jacobson MD;  Location: [facility] OR;  Service: Ophthalmology;  Laterality: Right;    CATARACT EXTRACTION W/  INTRAOCULAR LENS IMPLANT Left 8/29/2024    Procedure: EXTRACTION, CATARACT, WITH IOL INSERTION;  Surgeon: Margot Jacobson MD;  Location: [facility] OR;  Service: Ophthalmology;  Laterality: Left;    COLONOSCOPY      Normal around 2020    COLONOSCOPY N/A 9/6/2024    Procedure: COLONOSCOPY;  Surgeon: Alessandro Serna MD;  Location: [facility] ENDO (4TH FLR);  Service: Endoscopy;  Laterality: N/A;  8/28-new case created-lvm for pre call-pt has cataract surgery 8/29/24-tb  ref-dr rico goldstein-peg-[place]  ok to hold taran Perdomo  9/5-LVM for precall-Kpvt    COLONOSCOPY N/A 9/6/2024    Procedure: COLONOSCOPY;  Surgeon: Alessandro Serna MD;  Location: [facility] ENDO (4TH FLR);  Service: Endoscopy;  Laterality: N/A;  + cologuard  8/8 ref by Isaias Goldstein MD, PeG, instr. to portal, Eliquis hold approval pending-st ok to hold Eliqudevante 2 days per Dr Goldstein-WALLACE    CYSTOSCOPY WITH URETEROSCOPY, RETROGRADE PYELOGRAPHY, AND INSERTION OF STENT Right 9/27/2024    Procedure: CYSTOSCOPY, WITH RETROGRADE PYELOGRAM AND URETERAL STENT INSERTION;  Surgeon: Joni Wagoner MD;  Location: [facility] OR [address] [place] [address];  Service: Urology;  Laterality: Right;    NEPHROSCOPY Right 1/10/2025    Procedure: NEPHROSCOPY;  Surgeon: Jnoi Wagoner MD;  [facility];  Service: Urology;  Laterality: Right;    REMOVAL, CALCULUS, BLADDER N/A 9/27/2024    Procedure: REMOVAL, CALCULUS, BLADDER;  Surgeon: Joni Wagoner MD;  [facility];  Service: Urology;  Laterality: N/A;    REMOVAL-STENT Right 1/10/2025    Procedure: REMOVAL-STENT;  Surgeon: Joni Wagoner MD;  Location: [facility] OR 1ST FLR;  Service: Urology;  Laterality: Right;    TOTAL KNEE ARTHROPLASTY Right     URETEROSCOPIC REMOVAL OF URETERIC  CALCULUS Right 1/10/2025    Procedure: REMOVAL, CALCULUS, URETER, URETEROSCOPIC;  Surgeon: Joni Wagoner MD;  Location: [facility] OR [address] [place] [address];  Service: Urology;  Laterality: Right;    URETEROSCOPY Right 1/10/2025    Procedure: URETEROSCOPY;  Surgeon: Joni Wagoner MD;  Location: [facility] OR [address] [place] [address];  Service: Urology;  Laterality: Right;       Social History     Tobacco Use    Smoking status: Never    Smokeless tobacco: Never   Substance Use Topics    Alcohol use: Yes     Comment: Former heavy use    Drug use: Never         Objective:          Physical Exam  Neurological:      Mental Status: He is alert and oriented to person, place, and time.   Psychiatric:         Mood and Affect: Mood normal.         Behavior: Behavior normal.         Thought Content: Thought content normal.         Judgment: Judgment normal.           Assessment/Plan:     1) Hospital f/u, Resistant UTI, Nephrolithiasis - Overall seems to be doing well post IV abx rx and will be following up with Urology with repeat imaging in next month. Plan to repeat labs in next few wks to include bmp.  2) Depression - Trying increasing Trazodone from 100 to 200 qhs. Continuing Cymbalta 60 bid. Agreed on coming off Buspar since anxiety not the main issue recently and he would like to reduce meds where he can. Reduce from bid to qd for next 1 to 2 wks then stop.  3) A-fib hx - On Eliquis.    F/U in clinic within next few months. Repeat labs within next mth. Plan for outpt PT if  discharges next visit.

## 2025-01-29 ENCOUNTER — OUTPATIENT CASE MANAGEMENT (OUTPATIENT)
Dept: ADMINISTRATIVE | Facility: OTHER | Age: 75
End: 2025-01-29
Payer: MEDICARE

## 2025-01-31 PROCEDURE — G0179 MD RECERTIFICATION HHA PT: HCPCS | Mod: ,,, | Performed by: INTERNAL MEDICINE

## 2025-02-05 ENCOUNTER — DOCUMENT SCAN (OUTPATIENT)
Dept: HOME HEALTH SERVICES | Facility: HOSPITAL | Age: 75
End: 2025-02-05
Payer: MEDICARE

## 2025-02-07 ENCOUNTER — PATIENT MESSAGE (OUTPATIENT)
Dept: INTERNAL MEDICINE | Facility: CLINIC | Age: 75
End: 2025-02-07
Payer: MEDICARE

## 2025-02-16 NOTE — TELEPHONE ENCOUNTER
No care due was identified.  [facility] Embedded Care Due Messages. Reference number: [id number].   12/23/2024 9:24:00 PM CST   Improved

## 2025-02-21 ENCOUNTER — HOSPITAL ENCOUNTER (OUTPATIENT)
Dept: RADIOLOGY | Facility: HOSPITAL | Age: 75
Discharge: HOME | End: 2025-02-21
Payer: MEDICARE

## 2025-02-21 DIAGNOSIS — N20.9 UROLITHIASIS: ICD-10-CM

## 2025-02-21 PROCEDURE — 76770 US EXAM ABDO BACK WALL COMP: CPT | Mod: TC

## 2025-02-21 PROCEDURE — 76770 US EXAM ABDO BACK WALL COMP: CPT | Mod: 26,,, | Performed by: RADIOLOGY

## 2025-02-24 ENCOUNTER — OFFICE VISIT (OUTPATIENT)
Dept: UROLOGY | Facility: CLINIC | Age: 75
End: 2025-02-24
Payer: MEDICARE

## 2025-02-24 VITALS
HEART RATE: 91 BPM | BODY MASS INDEX: 28.84 KG/M2 | DIASTOLIC BLOOD PRESSURE: 72 MMHG | WEIGHT: 194.69 LBS | HEIGHT: 69 IN | SYSTOLIC BLOOD PRESSURE: 123 MMHG

## 2025-02-24 DIAGNOSIS — N20.1 RIGHT URETERAL STONE: Primary | ICD-10-CM

## 2025-02-24 PROCEDURE — G2211 COMPLEX E/M VISIT ADD ON: HCPCS | Mod: S$PBB,,, | Performed by: UROLOGY

## 2025-02-24 PROCEDURE — 99214 OFFICE O/P EST MOD 30 MIN: CPT | Mod: S$PBB,,, | Performed by: UROLOGY

## 2025-02-24 PROCEDURE — 99214 OFFICE O/P EST MOD 30 MIN: CPT | Mod: PBBFAC | Performed by: UROLOGY

## 2025-02-24 PROCEDURE — 99999 PR PBB SHADOW E&M-EST. PATIENT-LVL IV: CPT | Mod: PBBFAC,,, | Performed by: UROLOGY

## 2025-02-24 NOTE — PROGRESS NOTES
[facility]  Urologic Oncology      Date of Service: 02/24/2025    Urologic Oncology Problem List:  History of kidney stones and recurrent sepsis episodes    History of Present Illness:   History of Present Illness    CHIEF COMPLAINT:  Mr. Ball presents today for follow up after kidney stone removal    GENITOURINARY HISTORY:  He has a history of recurrent UTIs characterized by chemically-smelling urine and associated cognitive dysfunction. He denies burning with urination. He experienced his first episode of kidney stones recently. Ultrasound showed no stones or swelling.    MEDICATIONS:  He takes saw palmetto for prostate health and OTC cranberry supplements for UTI prevention.         Imaging: I have reviewed the imaging study ultrasound of the retroperitoneum complete on 02/21/2025 personally, have independently interpreted this study, and agree with the findings    Allergies:  Review of patient's allergies indicates:  No Known Allergies     Medications per EMR:  Prescriptions Prior to Admission[1]    Past Medical History:  Past Medical History:   Diagnosis Date    Acute deep vein thrombosis (DVT) of left lower extremity 12/2023    Anxiety     Atrial fibrillation 12/2023    Cataract     Coronary artery disease     CAC score 1430    Depression     Diabetic neuropathy     Essential (primary) hypertension     GERD (gastroesophageal reflux disease)     History of bariatric surgery 07/08/2021    History of total right knee replacement 07/08/2021    Insomnia     Neuromyopathy     Possibly DM and/or alcohol related.    Pulmonary embolism 12/2023    Reactive airway disease     Type 2 diabetes mellitus         Past Surgical History:  Past Surgical History:   Procedure Laterality Date    CATARACT EXTRACTION W/  INTRAOCULAR LENS IMPLANT Right 7/15/2024    Procedure: EXTRACTION, CATARACT, WITH IOL INSERTION;  Surgeon: Margot Jacobson MD;  Location: [facility] OR;  Service: Ophthalmology;  Laterality: Right;    CATARACT  EXTRACTION W/  INTRAOCULAR LENS IMPLANT Left 8/29/2024    Procedure: EXTRACTION, CATARACT, WITH IOL INSERTION;  Surgeon: Margot Jacobson MD;  Location: [facility] OR;  Service: Ophthalmology;  Laterality: Left;    COLONOSCOPY      Normal around 2020    COLONOSCOPY N/A 9/6/2024    Procedure: COLONOSCOPY;  Surgeon: Alessandro Serna MD;  Location: [facility] ENDO (4TH FLR);  Service: Endoscopy;  Laterality: N/A;  8/28-new case created-lvm for pre call-pt has cataract surgery 8/29/24-tb  ref-dr rico goldstein-[place]  ok to hold taran goldstein-[place]  9/5-LVM for precall-Kpvt    COLONOSCOPY N/A 9/6/2024    Procedure: COLONOSCOPY;  Surgeon: Alessandro Serna MD;  Location: [facility] ENDO (4TH FLR);  Service: Endoscopy;  Laterality: N/A;  + cologuard  8/8 ref by Isaias Goldstein MD, PeG, instr. to portal, Eliquis hold approval pending-  ok to hold Taran 2 days per Dr Goldstein-GT    CYSTOSCOPY WITH URETEROSCOPY, RETROGRADE PYELOGRAPHY, AND INSERTION OF STENT Right 9/27/2024    Procedure: CYSTOSCOPY, WITH RETROGRADE PYELOGRAM AND URETERAL STENT INSERTION;  Surgeon: Joni Wagoner MD;  Location: [facility] OR [address] [place] [address];  Service: Urology;  Laterality: Right;    NEPHROSCOPY Right 1/10/2025    Procedure: NEPHROSCOPY;  Surgeon: Joni Wagoner MD;  Location: [facility] OR [address] [place] [address];  Service: Urology;  Laterality: Right;    REMOVAL, CALCULUS, BLADDER N/A 9/27/2024    Procedure: REMOVAL, CALCULUS, BLADDER;  Surgeon: Joni Wagoner MD;  [facility];  Service: Urology;  Laterality: N/A;    REMOVAL-STENT Right 1/10/2025    Procedure: REMOVAL-STENT;  Surgeon: Joni Wagoner MD;  Location: [facility] OR [address] [place] [address];  Service: Urology;  Laterality: Right;    TOTAL KNEE ARTHROPLASTY Right     URETEROSCOPIC REMOVAL OF URETERIC CALCULUS Right 1/10/2025    Procedure: REMOVAL, CALCULUS, URETER, URETEROSCOPIC;  Surgeon: Joni Wagoner MD;  Location: [facility] OR [address] [place] [address];  Service: Urology;  Laterality: Right;    URETEROSCOPY Right 1/10/2025    Procedure: URETEROSCOPY;   "Surgeon: Joni Wagoner MD;  Location: [facility] OR [address] [place] [address];  Service: Urology;  Laterality: Right;        Family History:  Family History   Problem Relation Name Age of Onset    Hypertension Mother      Colon cancer Paternal Grandfather  89        Social History:  Social History     Tobacco Use    Smoking status: Never    Smokeless tobacco: Never   Substance Use Topics    Alcohol use: Yes     Comment: Former heavy use          OBJECTIVE:     Vitals:    02/24/25 1042   BP: 123/72   BP Location: Right arm   Patient Position: Sitting   Pulse: 91   Weight: 88.3 kg (194 lb 10.7 oz)   Height: 5' 9" (1.753 m)        Physical Exam    General: No acute distress. Nontoxic appearing.  HENT: Normocephalic. Atraumatic.  Respiratory: Normal respiratory effort. No conversational dyspnea. No audible wheezing.  Abdomen: No obvious distension.  Skin: No visible abnormalities.  Extremities: No edema upper extremities. No edema lower extremities.  Neurological: Alert and oriented x3. Normal speech.  Psychiatric: Normal mood. Normal affect. No evidence of SI.         LABS:    CBC:  Lab Results   Component Value Date    WBC 7.03 01/13/2025    HGB 10.0 (L) 01/13/2025    HCT 31.1 (L) 01/13/2025    MCV 91 01/13/2025     (L) 01/13/2025         BMP:  Lab Results   Component Value Date     (L) 01/13/2025    K 4.1 01/13/2025     01/13/2025    CO2 23 01/13/2025    BUN 18 01/13/2025    CREATININE 1.4 01/13/2025    CALCIUM 8.1 (L) 01/13/2025    ANIONGAP 5 (L) 01/13/2025    EGFRNORACEVR 53.4 (A) 01/13/2025         ASSESSMENT/PLAN:     Assessment & Plan      URINARY CALCULI (KIDNEY STONES):  Chronic, stable  - Mr. Ball's kidney stone has resolved without complications.  - No hydronephrosis present on recent imaging.  - Recent ultrasound showed no stones or swelling.  - As this was patient's 1st stone episode, will either monitor or perform follow-up ultrasound in 6 months.  - Discussed importance of hydration in preventing recurrent " kidney stones.  - Informed patient about existence of stone subspecialists for potential future referral if recurrence occurs.  - Mr. Ball to maintain hydration to produce over 2.5 L of urine daily.  - Follow up in 6 months for ultrasound to check for new stone formation.    URINARY TRACT INFECTIONS:  Chronic, stable  - No recurrent UTIs since stone removal.  - Decided against prophylactic medications due to patient's patient's current medication burden.  - Explained methenamine hipurate is not an antibiotic but requires concurrent vitamin C administration.  - Discussed preventative options for UTIs, including methenamine hipurate with vitamin C, probiotics, D-mannose, and cranberry tablets.  - Continued OTC cranberry supplements for UTI prevention.  - urinalysis and culture ordered.  - Contact the office immediately if any UTI symptoms develop.    OTHER HEALTH CONSIDERATIONS:  - Continued saw palmetto for prostate health.         - code applied: patient requires or will require a continuous, longitudinal, and active collaborative plan of care related to this patient's health condition, urolithiasis --the management of which requires the direction of a practitioner with specialized clinical knowledge, skill, and expertise.     This encounter was dictated and transcribed using DeepScribe and FluencyDirect, please excuse any typographical or grammatical errors.           [1] (Not in a hospital admission)

## 2025-03-05 ENCOUNTER — PATIENT MESSAGE (OUTPATIENT)
Dept: UROLOGY | Facility: CLINIC | Age: 75
End: 2025-03-05
Payer: MEDICARE

## 2025-03-06 ENCOUNTER — LAB VISIT (OUTPATIENT)
Dept: LAB | Facility: HOSPITAL | Age: 75
End: 2025-03-06
Attending: INTERNAL MEDICINE
Payer: MEDICARE

## 2025-03-06 ENCOUNTER — PATIENT MESSAGE (OUTPATIENT)
Dept: INTERNAL MEDICINE | Facility: CLINIC | Age: 75
End: 2025-03-06
Payer: MEDICARE

## 2025-03-06 DIAGNOSIS — R30.0 DYSURIA: Primary | ICD-10-CM

## 2025-03-06 DIAGNOSIS — R30.0 DYSURIA: ICD-10-CM

## 2025-03-06 LAB
BACTERIA #/AREA URNS AUTO: ABNORMAL /HPF
BILIRUB UR QL STRIP: NEGATIVE
CLARITY UR REFRACT.AUTO: ABNORMAL
COLOR UR AUTO: YELLOW
GLUCOSE UR QL STRIP: NEGATIVE
HGB UR QL STRIP: ABNORMAL
KETONES UR QL STRIP: ABNORMAL
LEUKOCYTE ESTERASE UR QL STRIP: ABNORMAL
MICROSCOPIC COMMENT: ABNORMAL
NITRITE UR QL STRIP: POSITIVE
PH UR STRIP: 6 [PH] (ref 5–8)
PROT UR QL STRIP: ABNORMAL
RBC #/AREA URNS AUTO: 24 /HPF (ref 0–4)
SP GR UR STRIP: 1.01 (ref 1–1.03)
SQUAMOUS #/AREA URNS AUTO: 2 /HPF
URN SPEC COLLECT METH UR: ABNORMAL
WBC #/AREA URNS AUTO: >100 /HPF (ref 0–5)
WBC CLUMPS UR QL AUTO: ABNORMAL

## 2025-03-06 PROCEDURE — 87086 URINE CULTURE/COLONY COUNT: CPT | Performed by: INTERNAL MEDICINE

## 2025-03-06 PROCEDURE — 81001 URINALYSIS AUTO W/SCOPE: CPT | Performed by: INTERNAL MEDICINE

## 2025-03-07 ENCOUNTER — RESULTS FOLLOW-UP (OUTPATIENT)
Dept: INTERNAL MEDICINE | Facility: CLINIC | Age: 75
End: 2025-03-07
Payer: MEDICARE

## 2025-03-07 DIAGNOSIS — E11.42 TYPE 2 DIABETES MELLITUS WITH DIABETIC POLYNEUROPATHY, WITHOUT LONG-TERM CURRENT USE OF INSULIN: Primary | ICD-10-CM

## 2025-03-07 DIAGNOSIS — E11.42 TYPE 2 DIABETES MELLITUS WITH DIABETIC POLYNEUROPATHY, WITHOUT LONG-TERM CURRENT USE OF INSULIN: Chronic | ICD-10-CM

## 2025-03-07 DIAGNOSIS — N39.0 URINARY TRACT INFECTION WITHOUT HEMATURIA, SITE UNSPECIFIED: Primary | ICD-10-CM

## 2025-03-07 RX ORDER — BLOOD-GLUCOSE SENSOR
1 EACH MISCELLANEOUS
Qty: 6 EACH | Refills: 3 | Status: SHIPPED | OUTPATIENT
Start: 2025-03-07

## 2025-03-07 RX ORDER — CIPROFLOXACIN 250 MG/1
250 TABLET, FILM COATED ORAL 2 TIMES DAILY
Qty: 14 TABLET | Refills: 0 | Status: SHIPPED | OUTPATIENT
Start: 2025-03-07 | End: 2025-03-14

## 2025-03-08 LAB
BACTERIA UR CULT: NORMAL
BACTERIA UR CULT: NORMAL

## 2025-03-12 ENCOUNTER — TELEPHONE (OUTPATIENT)
Dept: INTERNAL MEDICINE | Facility: CLINIC | Age: 75
End: 2025-03-12
Payer: MEDICARE

## 2025-03-13 ENCOUNTER — DOCUMENT SCAN (OUTPATIENT)
Dept: HOME HEALTH SERVICES | Facility: HOSPITAL | Age: 75
End: 2025-03-13
Payer: MEDICARE

## 2025-03-17 ENCOUNTER — EXTERNAL HOME HEALTH (OUTPATIENT)
Dept: HOME HEALTH SERVICES | Facility: HOSPITAL | Age: 75
End: 2025-03-17
Payer: MEDICARE

## 2025-03-17 ENCOUNTER — PATIENT MESSAGE (OUTPATIENT)
Dept: UROLOGY | Facility: CLINIC | Age: 75
End: 2025-03-17
Payer: MEDICARE

## 2025-03-17 RX ORDER — METHENAMINE HIPPURATE 1000 MG/1
1 TABLET ORAL 2 TIMES DAILY
Qty: 60 TABLET | Refills: 6 | Status: SHIPPED | OUTPATIENT
Start: 2025-03-17 | End: 2025-10-13

## 2025-03-19 ENCOUNTER — CLINICAL SUPPORT (OUTPATIENT)
Dept: REHABILITATION | Facility: HOSPITAL | Age: 75
End: 2025-03-19
Attending: INTERNAL MEDICINE
Payer: MEDICARE

## 2025-03-19 DIAGNOSIS — Z74.09 MOBILITY IMPAIRED: ICD-10-CM

## 2025-03-19 DIAGNOSIS — R29.898 WEAKNESS OF BOTH LOWER EXTREMITIES: ICD-10-CM

## 2025-03-19 DIAGNOSIS — Z74.09 IMPAIRED FUNCTIONAL MOBILITY, BALANCE, GAIT, AND ENDURANCE: Primary | ICD-10-CM

## 2025-03-19 DIAGNOSIS — E11.42 DIABETIC POLYNEUROPATHY ASSOCIATED WITH TYPE 2 DIABETES MELLITUS: Chronic | ICD-10-CM

## 2025-03-19 PROCEDURE — 97162 PT EVAL MOD COMPLEX 30 MIN: CPT | Mod: PO

## 2025-03-19 NOTE — PROGRESS NOTES
Outpatient Rehab    Physical Therapy Evaluation    Patient Name: Anthony Ball  MRN: [id number]  YOB: 1950  Encounter Date: 3/19/2025    Therapy Diagnosis:   Encounter Diagnoses   Name Primary?    Diabetic polyneuropathy associated with type 2 diabetes mellitus     Weakness of both lower extremities     Mobility impaired     Impaired functional mobility, balance, gait, and endurance Yes     Physician: Isaias Myles MD    Physician Orders: Eval and Treat  Medical Diagnosis: Diabetic polyneuropathy associated with type 2 diabetes mellitus  Weakness of both lower extremities  Mobility impaired    Visit # / Visits Authorized:  1 / 101/01  Date of Evaluation: 3/19/2025  Insurance Authorization Period: 3/12/2025 to 3/12/2026  Plan of Care Certification:  3/19/2025 to 05/14/2025      PT/PTA: PT   Number of PTA visits since last PT visit:0  Time In: 0935   Time Out: 1015  Total Time: 40   Total Billable Time: 40 minutes    Intake Outcome Measure for FOTO Survey    Therapist reviewed FOTO scores for Anthony Ball on 3/19/2025.   FOTO report - see Media section or FOTO account episode details.     Intake Score: 40%    Precautions     Standard, Fall, Neuropathy, Back pain      Subjective   History of Present Illness  Anthony is a 75 y.o. male who reports to physical therapy with a chief concern of Weakness.                 History of Present Condition/Illness: Anthony's  goal is to walk with a cane. He reports terrible back pain that limits his walking. He reports two recent falls; one out of bed and the other he doesn't remember. The fire department had to come and get him up both times. He uses a rolling walker around the house and a transport chair out in the community. He has altered sensation due to peripheral neuropathy.     Activities of Daily Living  Social history was obtained from Patient.                           Requires assist with bathing.         Pain     Patient reports a current pain level of 0/10.      Location: low back  Pain-Aggravating Factors: Walking         Treatment History  Treatments  Previously Received Treatments: Yes  Previous Treatments: Physical therapy  Additional Treatment Details: Finished Home health PT last Wednesday. Had prior OPPT at current facility.     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    Anthony Ball  has a past surgical history that includes Colonoscopy; Total knee arthroplasty (Right); Cataract extraction w/  intraocular lens implant (Right, 7/15/2024); Cataract extraction w/  intraocular lens implant (Left, 8/29/2024); Colonoscopy (N/A, 9/6/2024); Colonoscopy (N/A, 9/6/2024); Cystoscopy with ureteroscopy, retrograde pyelography, and insertion of stent (Right, 9/27/2024); removal, calculus, bladder (N/A, 9/27/2024); Ureteroscopy (Right, 1/10/2025); Ureteroscopic removal of ureteric calculus (Right, 1/10/2025); removal-stent (Right, 1/10/2025); and Nephroscopy (Right, 1/10/2025).    Anthony has a current medication list which includes the following prescription(s): albuterol, eliquis, bd ultra-fine mini pen needle, blood sugar diagnostic, blood-glucose meter, freestyle fausto 3 plus sensor, budesonide-formoterol 80-4.5 mcg, buspirone, capsaicin, cyanocobalamin, duloxetine, freestyle fausto 14 day  reader, fluticasone-salmeterol 250-50 mcg/dose, fluticasone-umeclidin-vilanter, hydroxyzine hcl, hydroxyzine pamoate, insulin glargine-yfgn, novolin r flexpen, lancets, tradjenta, magnesium oxide, metformin, methenamine, methocarbamol, nortriptyline, novolog flexpen u-100 insulin, ondansetron, oxybutynin, pantoprazole, bd autoshield duo pen needle, pregabalin, tamsulosin, trazodone, and UNABLE TO FIND.    Review of patient's allergies indicates:  No Known Allergies     Objective            Hip Strength - Planes of Motion   Right Strength Left Strength   Flexion (L2) 4 3+   Extension 5 5   ABduction 5 5   ADduction 5 5   Internal Rotation  (DNT)  (DNT)   External Rotation  (DNT)  (DNT)       Knee Strength   Right Strength Left Strength   Flexion (S2) 4 4+   Prone Flexion  (DNT)  (DNT)   Extension (L3) 5 5          Ankle/Foot Strength - Planes of Motion   Right Strength Left Strength   Dorsiflexion (L4) 4 4   Plantar Flexion (S1) 4 4   Inversion  (DNT)  (DNT)   Eversion  (DNT)  (DNT)   Great Toe Flexion  (DNT)  (DNT)   Great Toe Extension (L5)  (DNT)  (DNT)   Lesser Toes Flexion  (DNT)  (DNT)   Lesser Toes Extension  (DNT)  (DNT)          Coordination  Coordination Tests  Impaired: Right Finger to Nose, Right Heel to Shin, Right Rapid Alternating Movements (Pronation/Supination), Left Finger to Nose, Left Heel to Shin, and Left Rapid Alternating Movements (Pronation/Supination)     Coordination testing accurate but slow             Fall Risk  Functional mobility test results suggest the patient is: At Risk for Falls  Araujo Balance  Araujo Balance Scale  1. Sitting to Standing: Able to stand independently using hands  2. Standing Unsupported: Able to stand 2 minutes with supervision  3. Sitting with Back Unsupported but Feet Supported on Floor or on a Stool: Able to sit safely and securely for 2 minutes  4. Standing to Sitting: Controls descent by using hands  5.  Transfers: Able to transfer safely definite need of  hands  6. Standing Unsupported with Eyes Closed: Needs help to keep from falling  7. Standing Unsupported with Feet Together: Needs help to attain position but able to stand 15 seconds feet together  8. Reach Forward with Outstretched Arm While Standing: Loses balance while trying/requires external support  9.  Object from Floor from a Standing Position: Unable to  and needs supervision while trying  10. Turning to Look Behind Over Left and Right Shoulders While Standing: Needs assist to keep from losing balance or falling  11. Turn 360 Degrees: Needs assistance while turning  12. Place Alternate Foot on Step or Stool While Standing Unsupported: Needs assistance to keep from falling/unable to try  13. Standing Unsupported One Foot in Front: Loses balance while stepping or standing  14. Standing on One Leg: Unable to try needs assist to prevent fall  Araujo Balance Score: 18  Araujo Balance Fall Risk: High    Interpretation:  41-56 = Low Fall Risk  21-40 = Medium Fall Risk  0-20 = High Fall Risk    Sit to Stand Testing      The patient completed 3 repetitions of a sit to stand transfer in 30 seconds. with UE use         Ambulation Details    Standby by assist for short distances with RW - Gait speed 0.44 m/s    Gait Analysis  Walking Speed: Decreased            Trunk Observations During Gait: Forward lean           Treatment:       Time Entry(in minutes):  PT Evaluation (Moderate) Time Entry: 40    Assessment & Plan   Assessment  Anthony presents with a condition of Moderate complexity.   Presentation of Symptoms: Evolving  Will Comorbidities Impact Care: Yes  Diabetic neuropathy    Functional Limitations: Activity tolerance, Decreased ambulation distance/endurance, Maintaining balance, Increased risk of fall, Getting off the floor, Gait limitations, Functional mobility, Transfers  Impairments: Safety issue, Pain with functional activity, Impaired physical strength, Abnormal gait, Impaired  balance    Prognosis: Fair  Assessment Details: Patient presents to neurologic PT with a medical diagnosis of weakness and peripheral neuropathy. Patient reports the following chief complaint(s): impaired walking. Examination revealed the following impairments: impaired coordination, mild LE weakness, impaired gait, impaired balance, and fear of falling. Gait deviations include: excessive UE weightbearing, decreased speed, impaired posture. Stated impairments have resulted in the following functional limitations: impaired transfers, decreased ambulation tolerance, and decreased safety with functional mobility. Gait speed of 0.44 m/s indicates decreased safety with community-level mobility. He ambulates around his household with a rolling walker, but uses a transport chair in the community. Based on today's evaulation, patient will benefit from skilled neurological PT to improve functional mobility.    Plan  From a physical therapy perspective, the patient would benefit from: Skilled Rehab Services    Planned therapy interventions include: Therapeutic exercise, Therapeutic activities, Neuromuscular re-education, Gait training, and Sensory integration.    Planned modalities to include: Cryotherapy (cold pack), Electrical stimulation - attended, Electrical stimulation - passive/unattended, and Thermotherapy (hot pack).        Visit Frequency: 2 times Per Week for 8 Weeks.       This plan was discussed with Patient.   Discussion participants: Agreed Upon Plan of Care             Patient's spiritual, cultural, and educational needs considered and patient agreeable to plan of care and goals.           Goals:   Active       Mobility/Balance - Short Term Goals (STG) = 4 weeks; Long Term Goals (LTG) = 8 weeks        Pt will improve Self Selected Walking Speed to at least 0.5 m/s (STG) and 0.7 m/s (LTG) indicating improved safety with community mobility.        Start:  03/19/25    Expected End:  05/14/25       3/19/2025: 0.44  m/s         LTG - Patient will be independent with HEP emphasizing LE strengthening and balance training.         Start:  03/19/25    Expected End:  05/14/25       3/19/2025: Needs education         Patient will exhibit improved Araujo Balance score to >/= 22/56 (STG) and 26/56 (LTG) indicating improved static balance.         Start:  03/19/25    Expected End:  05/14/25       3/19/2025: 18/56         Patient will ambulate 50 feet (STG) and 150 feet (LTG) with a SPC indicating improved gait stability and independence.        Start:  03/19/25    Expected End:  05/14/25       3/19/2025: unable due to safety concerns and fearfulness         Patient will exhibit improved 30 Second Chair Rise to >/= 5 reps (STG) and 7 reps (LTG), indicating improved functional LE strength.           Start:  03/19/25    Expected End:  05/14/25                Codi Owen PT

## 2025-04-01 ENCOUNTER — CLINICAL SUPPORT (OUTPATIENT)
Dept: REHABILITATION | Facility: HOSPITAL | Age: 75
End: 2025-04-01
Payer: MEDICARE

## 2025-04-01 DIAGNOSIS — Z74.09 IMPAIRED FUNCTIONAL MOBILITY, BALANCE, GAIT, AND ENDURANCE: Primary | ICD-10-CM

## 2025-04-01 PROCEDURE — 97110 THERAPEUTIC EXERCISES: CPT | Mod: PO

## 2025-04-01 PROCEDURE — 97530 THERAPEUTIC ACTIVITIES: CPT | Mod: PO

## 2025-04-01 NOTE — PROGRESS NOTES
Outpatient Rehab    Physical Therapy Visit    Patient Name: Anthony Ball  MRN: [id number]  YOB: 1950  Encounter Date: 4/1/2025    Therapy Diagnosis:   Encounter Diagnosis   Name Primary?    Impaired functional mobility, balance, gait, and endurance Yes     Physician: Isaias Myles MD    Physician Orders: Eval and Treat  Medical Diagnosis: Diabetic polyneuropathy associated with type 2 diabetes mellitus  Weakness of both lower extremities  Mobility impaired    Visit # / Visits Authorized:  1 / 20  Insurance Authorization Period: 3/19/2025 to 12/31/2025  Date of Evaluation: 03/19/2025  Plan of Care Certification: 05/14/2025     PT/PTA: PT   Number of PTA visits since last PT visit:0  Time In: 1430   Time Out: 1518  Total Time: 48   Total Billable Time: 48    Precautions     Standard, Fall, Neuropathy, Back pain      Subjective    Quinn reports that he just moved houses. He has been working on going up and down two steps. He had another fall out of bed. His dogs sleep in the bed with him, so he ends up at the edge of the bed..  Pain reported as 0/10.      Objective            Treatment:  Therapeutic Exercise  TE 1: Scifit Recumbent L1 x 8 minutes  TE 2: Seated marches (HEP) 2 x 20 alternating reps  TE 3: Standing Hamstring curls 2 x 10 reps - Standing heel raises x 20 reps - Standing Toe raises x 20 reps  Therapeutic Activity  TA 1: 2 x 3 laps fwd ambulation with 1 UE support - x 3 laps backwards ambulation with 1 UE support, SBA  TA 2: Sidestepping along ballet bar x 2 laps each way, SBA  TA 3: x 200 feet, ambulation from ballet bar to lobby with RW, CGA-SBA    Time Entry(in minutes):  Therapeutic Activity Time Entry: 28  Therapeutic Exercise Time Entry: 20    Assessment & Plan   Assessment: Patient performed today's session fairly well. He was limited by fatigue thorughout session, requiring frequent seated breaks; good participation and effort noted. HEP for LE strengthening was established  and reviewed with patient. Ongoing PT to include more balance and gait training.  Evaluation/Treatment Tolerance: Patient limited by fatigue    Patient will continue to benefit from skilled outpatient physical therapy to address the deficits listed in the problem list box on initial evaluation, provide pt/family education and to maximize pt's level of independence in the home and community environment.     Patient's spiritual, cultural, and educational needs considered and patient agreeable to plan of care and goals.           Plan: Continued aerobic tolerance, LE strengthening, and balance training. Gait training with appropriate LRAD.    Goals:   Active       Mobility/Balance - Short Term Goals (STG) = 4 weeks; Long Term Goals (LTG) = 8 weeks        Pt will improve Self Selected Walking Speed to at least 0.5 m/s (STG) and 0.7 m/s (LTG) indicating improved safety with community mobility.        Start:  03/19/25    Expected End:  05/14/25       3/19/2025: 0.44 m/s         LTG - Patient will be independent with HEP emphasizing LE strengthening and balance training.         Start:  03/19/25    Expected End:  05/14/25       3/19/2025: Needs education         Patient will exhibit improved Araujo Balance score to >/= 22/56 (STG) and 26/56 (LTG) indicating improved static balance.         Start:  03/19/25    Expected End:  05/14/25       3/19/2025: 18/56         Patient will ambulate 50 feet (STG) and 150 feet (LTG) with a SPC indicating improved gait stability and independence.        Start:  03/19/25    Expected End:  05/14/25       3/19/2025: unable due to safety concerns and fearfulness         Patient will exhibit improved 30 Second Chair Rise to >/= 5 reps (STG) and 7 reps (LTG), indicating improved functional LE strength.           Start:  03/19/25    Expected End:  05/14/25                Codi Owen PT

## 2025-04-02 ENCOUNTER — CLINICAL SUPPORT (OUTPATIENT)
Dept: REHABILITATION | Facility: HOSPITAL | Age: 75
End: 2025-04-02
Payer: MEDICARE

## 2025-04-02 DIAGNOSIS — Z74.09 IMPAIRED FUNCTIONAL MOBILITY, BALANCE, GAIT, AND ENDURANCE: Primary | ICD-10-CM

## 2025-04-02 PROCEDURE — 97110 THERAPEUTIC EXERCISES: CPT | Mod: PO

## 2025-04-02 PROCEDURE — 97116 GAIT TRAINING THERAPY: CPT | Mod: PO

## 2025-04-02 PROCEDURE — 97530 THERAPEUTIC ACTIVITIES: CPT | Mod: PO

## 2025-04-02 NOTE — PROGRESS NOTES
Outpatient Rehab    Physical Therapy Visit    Patient Name: Anthony Ball  MRN: [id number]  YOB: 1950  Encounter Date: 4/2/2025    Therapy Diagnosis:   Encounter Diagnosis   Name Primary?    Impaired functional mobility, balance, gait, and endurance Yes     Physician: Isaias Myles MD    Physician Orders: Eval and Treat  Medical Diagnosis: Diabetic polyneuropathy associated with type 2 diabetes mellitus  Weakness of both lower extremities  Mobility impaired    Visit # / Visits Authorized:  2 / 20  Insurance Authorization Period: 3/19/2025 to 12/31/2025  Date of Evaluation: 03/19/2025  Plan of Care Certification: 05/14/2025     PT/PTA: PT   Number of PTA visits since last PT visit:0  Time In: 1041   Time Out: 1128  Total Time: 47   Total Billable Time: 47    Precautions     Standard, Fall, Neuropathy, Back pain      Subjective   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 He reports just a little stiffness upon arrival..  Pain reported as 0/10.      Objective            Treatment:  Therapeutic Exercise  TE 1: Scifit Recumbent L2.5 x 8 minutes  TE 2: Seated edge of chair: 2 x 30s bilaterally, Seated hamstring stretch - 3 x 40s, Seated clams with green theraband  Therapeutic Activity  TA 1: x 3 laps fwd ambulation with 1 UE support - x 3 laps backwards ambulation with 1 UE support, SBA  Gait Training  GT 1: Gait with SBQC: near ballet bar 2 x 6 laps, occasional UE - x 29 feet,    Time Entry(in minutes):  Gait Training Time Entry: 17  Therapeutic Activity Time Entry: 10  Therapeutic Exercise Time Entry: 20    Assessment & Plan   Assessment: 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 He reported increase to level 2.5 on the stepper was a good challenge. Gait training using SBQC was performed today, which he did fairly well. Due to fearfulness, gait training was initiated near the ballet bar for touchdown support as needed. then was transitioned to open space with a wheelchair  follow. He achieved ~30 feet with both trials.  Evaluation/Treatment Tolerance: Patient tolerated treatment well    Patient will continue to benefit from skilled outpatient physical therapy to address the deficits listed in the problem list box on initial evaluation, provide pt/family education and to maximize pt's level of independence in the home and community environment.     Patient's spiritual, cultural, and educational needs considered and patient agreeable to plan of care and goals.           Plan:      Goals:   Active       Mobility/Balance - Short Term Goals (STG) = 4 weeks; Long Term Goals (LTG) = 8 weeks        Pt will improve Self Selected Walking Speed to at least 0.5 m/s (STG) and 0.7 m/s (LTG) indicating improved safety with community mobility.        Start:  03/19/25    Expected End:  05/14/25       3/19/2025: 0.44 m/s         LTG - Patient will be independent with HEP emphasizing LE strengthening and balance training.         Start:  03/19/25    Expected End:  05/14/25       3/19/2025: Needs education         Patient will exhibit improved Araujo Balance score to >/= 22/56 (STG) and 26/56 (LTG) indicating improved static balance.         Start:  03/19/25    Expected End:  05/14/25       3/19/2025: 18/56         Patient will ambulate 50 feet (STG) and 150 feet (LTG) with a SPC indicating improved gait stability and independence.        Start:  03/19/25    Expected End:  05/14/25       3/19/2025: unable due to safety concerns and fearfulness         Patient will exhibit improved 30 Second Chair Rise to >/= 5 reps (STG) and 7 reps (LTG), indicating improved functional LE strength.           Start:  03/19/25    Expected End:  05/14/25                Codi Owen PT

## 2025-04-04 ENCOUNTER — DOCUMENTATION ONLY (OUTPATIENT)
Dept: REHABILITATION | Facility: HOSPITAL | Age: 75
End: 2025-04-04
Payer: MEDICARE

## 2025-04-04 NOTE — PROGRESS NOTES
PT/PTA met face to face to discuss pt's treatment plan and progress towards established goals.  Continue with current PT POC with focus on gait with the cane, endurance, balance and functional mobility.  Patient will be seen by physical therapist at least every sixth treatment or 30 days, whichever occurs first.    Ciara Pro, PTA  04/04/2025

## 2025-04-14 ENCOUNTER — OFFICE VISIT (OUTPATIENT)
Dept: NEPHROLOGY | Facility: CLINIC | Age: 75
End: 2025-04-14
Payer: MEDICARE

## 2025-04-14 VITALS
BODY MASS INDEX: 30.7 KG/M2 | OXYGEN SATURATION: 96 % | HEIGHT: 69 IN | DIASTOLIC BLOOD PRESSURE: 77 MMHG | SYSTOLIC BLOOD PRESSURE: 128 MMHG | HEART RATE: 94 BPM | WEIGHT: 207.25 LBS

## 2025-04-14 DIAGNOSIS — N17.9 AKI (ACUTE KIDNEY INJURY): ICD-10-CM

## 2025-04-14 DIAGNOSIS — N13.2 HYDRONEPHROSIS WITH URINARY OBSTRUCTION DUE TO URETERAL CALCULUS: ICD-10-CM

## 2025-04-14 PROCEDURE — 99999 PR PBB SHADOW E&M-EST. PATIENT-LVL IV: CPT | Mod: PBBFAC,,, | Performed by: NURSE PRACTITIONER

## 2025-04-14 PROCEDURE — 99214 OFFICE O/P EST MOD 30 MIN: CPT | Mod: PBBFAC | Performed by: NURSE PRACTITIONER

## 2025-04-15 ENCOUNTER — OFFICE VISIT (OUTPATIENT)
Dept: PULMONOLOGY | Facility: CLINIC | Age: 75
End: 2025-04-15
Payer: MEDICARE

## 2025-04-15 ENCOUNTER — CLINICAL SUPPORT (OUTPATIENT)
Dept: REHABILITATION | Facility: HOSPITAL | Age: 75
End: 2025-04-15
Payer: MEDICARE

## 2025-04-15 VITALS
DIASTOLIC BLOOD PRESSURE: 69 MMHG | OXYGEN SATURATION: 96 % | BODY MASS INDEX: 30.7 KG/M2 | WEIGHT: 207.25 LBS | SYSTOLIC BLOOD PRESSURE: 110 MMHG | HEART RATE: 106 BPM | HEIGHT: 69 IN

## 2025-04-15 DIAGNOSIS — Z86.711 HISTORY OF PULMONARY EMBOLUS (PE): Primary | ICD-10-CM

## 2025-04-15 DIAGNOSIS — Z74.09 IMPAIRED FUNCTIONAL MOBILITY, BALANCE, GAIT, AND ENDURANCE: Primary | ICD-10-CM

## 2025-04-15 DIAGNOSIS — R91.1 PULMONARY NODULE: ICD-10-CM

## 2025-04-15 PROCEDURE — 97530 THERAPEUTIC ACTIVITIES: CPT | Mod: PO,CQ

## 2025-04-15 PROCEDURE — 99999 PR PBB SHADOW E&M-EST. PATIENT-LVL IV: CPT | Mod: PBBFAC,,, | Performed by: INTERNAL MEDICINE

## 2025-04-15 PROCEDURE — 97110 THERAPEUTIC EXERCISES: CPT | Mod: PO,CQ

## 2025-04-15 PROCEDURE — 99214 OFFICE O/P EST MOD 30 MIN: CPT | Mod: PBBFAC | Performed by: INTERNAL MEDICINE

## 2025-04-15 NOTE — PROGRESS NOTES
Subjective:       Patient ID: Anthony Ball is a 75 y.o. male.    Chief Complaint: No chief complaint on file.    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 He has since had a repeat CT showing resolution of atelectasis noted on CT in Sept. 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 He takes Trelegy for some chronic bronchospasm and no issues with this. Never smoker. Discussed his normal lung parenchyma on CTA in Dec 2023 and the resolved area on repeat CT Abd and no need for further imaging. He asked about reducing Eliquis to once daily and we discussed evidence regarding full dose or Ppx dose AC and the need for Eliquis to always be dosed BID.  Review of Systems      Past Medical History[1]     Family History   Problem Relation Name Age of Onset    Hypertension Mother      Colon cancer Paternal Grandfather  89      If not mentioned in HPI, Family history is reviewed and not contributory    Social History[2]     Objective:        Vitals:    04/15/25 1313   BP: 110/69   Pulse: 106     Wt Readings from Last 3 Encounters:   04/15/25 94 kg (207 lb 3.7 oz)   04/14/25 94 kg (207 lb 3.7 oz)   02/24/25 88.3 kg (194 lb 10.7 oz)     Temp Readings from Last 3 Encounters:   01/24/25 97.4 °F (36.3 °C)   01/13/25 99 °F (37.2 °C) (Oral)   01/06/25 98 °F (36.7 °C) (Oral)     BP Readings from Last 3 Encounters:   04/15/25 110/69   04/14/25 128/77   02/24/25 123/72     Pulse Readings from Last 3 Encounters:   04/15/25 106   04/14/25 94   02/24/25 91       Physical Exam   Constitutional: He appears well-developed and well-nourished.   Pulmonary/Chest: Effort normal.   Vitals reviewed.    CBC  Lab Results   Component Value Date    WBC 7.03 01/13/2025    HGB 10.0 (L) 01/13/2025    HCT 31.1 (L) 01/13/2025    MCV 91 01/13/2025     (L) 01/13/2025         CMP  Sodium   Date Value Ref Range Status   01/13/2025 135 (L) 136 - 145 mmol/L Final      Potassium   Date Value Ref Range Status   01/13/2025 4.1 3.5 - 5.1 mmol/L Final     Chloride   Date Value Ref Range Status   01/13/2025 107 95 - 110 mmol/L Final     CO2   Date Value Ref Range Status   01/13/2025 23 23 - 29 mmol/L Final     Glucose   Date Value Ref Range Status   01/13/2025 160 (H) 70 - 110 mg/dL Final     BUN   Date Value Ref Range Status   01/13/2025 18 8 - 23 mg/dL Final     Creatinine   Date Value Ref Range Status   01/13/2025 1.4 0.5 - 1.4 mg/dL Final     Calcium   Date Value Ref Range Status   01/13/2025 8.1 (L) 8.7 - 10.5 mg/dL Final     Total Protein   Date Value Ref Range Status   01/10/2025 6.1 6.0 - 8.4 g/dL Final     Albumin   Date Value Ref Range Status   01/13/2025 2.1 (L) 3.5 - 5.2 g/dL Final     Total Bilirubin   Date Value Ref Range Status   01/10/2025 0.3 0.1 - 1.0 mg/dL Final     Comment:     For infants and newborns, interpretation of results should be based  on gestational age, weight and in agreement with clinical  observations.    Premature Infant recommended reference ranges:  Up to 24 hours.............<8.0 mg/dL  Up to 48 hours............<12.0 mg/dL  3-5 days..................<15.0 mg/dL  6-29 days.................<15.0 mg/dL       Alkaline Phosphatase   Date Value Ref Range Status   01/10/2025 106 40 - 150 U/L Final     AST   Date Value Ref Range Status   01/10/2025 9 (L) 10 - 40 U/L Final     ALT   Date Value Ref Range Status   01/10/2025 5 (L) 10 - 44 U/L Final     Anion Gap   Date Value Ref Range Status   01/13/2025 5 (L) 8 - 16 mmol/L Final     eGFR   Date Value Ref Range Status   01/13/2025 53.4 (A) >60 mL/min/1.73 m^2 Final       ABG  POC PH   Date Value Ref Range Status   10/21/2024 7.345 (L) 7.35 - 7.45 Final     POC PO2   Date Value Ref Range Status   10/21/2024 45 40 - 60 mmHg Final     POC PCO2   Date Value Ref Range Status   10/21/2024 37.9 35 - 45 mmHg Final           Personal Diagnostic Review  I have personally reviewed the following data and added my  "own interpretation as below:  CT Abd lung windows 11/13/24 images personally reviewed and shwos small granuloma, resolved LLL "nodule" adjacent to the diaphragm.      4/15/2025     1:13 PM 4/14/2025     2:12 PM 2/24/2025    10:42 AM 1/24/2025     1:20 PM 1/13/2025     3:38 PM 1/13/2025    11:09 AM 1/13/2025     7:30 AM   Pulmonary Function Tests   SpO2 96 % 96 %   95 % 97 % 97 %   Height 5' 9" (1.753 m) 5' 9" (1.753 m) 5' 9" (1.753 m)       Weight 94 kg (207 lb 3.7 oz) 94 kg (207 lb 3.7 oz) 88.3 kg (194 lb 10.7 oz) 88.3 kg (194 lb 10.7 oz)      BMI (Calculated) 30.6 30.6 28.7             Assessment:       1. History of pulmonary embolus (PE)    2. Pulmonary nodule        Encounter Medications[3]  1. Pulmonary nodule  - Ambulatory referral/consult to Pulmonology    2. History of pulmonary embolus (PE)    Plan:     Problem List Items Addressed This Visit          Pulmonary    Pulmonary nodule    Current Assessment & Plan   Has granuloma chronically. LLL atelectasis resolved on scan in November and does not require further follow up.            Hematology    History of pulmonary embolus (PE) - Primary    Current Assessment & Plan   Discussed continuing life long anticoagulation with BID Eliquis            Please note Overview Notes are historic documentation. Please review A/P for current updates.  No follow-ups on file.    Future Appointments   Date Time Provider Department Center   4/15/2025  4:15 PM Ciara Pro PTA [facility]   4/21/2025  9:30 AM Ciara Pro, PTA [facility]   4/23/2025  2:30 PM Ciara Pro [facility]   4/29/2025 10:30 AM Codi Owen, PT [facility]   5/1/2025 10:30 AM Codi Owen, PT [facility]   5/5/2025 11:15 AM Codi Owen, PT [facility]   5/8/2025 10:30 AM Aaliyah Malloy, PT [facility]   5/13/2025  9:30 AM [facility]  5/15/2025  9:30 AM OwenCodi, PT [facility]   5/20/2025  9:30 AM OwenRafaelee, PT [facility]   5/22/2025  9:30 AM OwenRafaelee, PT [facility]   5/27/2025  9:30 AM OwenRafaelee, PT [facility]   5/29/2025  9:30 AM OwenRafaelee, PT [facility]   6/3/2025  9:30 AM Owen, Codi, PT [facility]   6/5/2025  9:30 AM OwenRafaelee, PT [facility]   8/18/2025 11:00 AM [facility]   8/20/2025 11:00 AM Joni Wagoner MD [facility]OL[facility]GC Rodo Hoffman MD             [1]  Past Medical History:  Diagnosis Date    Acute deep vein thrombosis (DVT) of left lower extremity 12/2023    Anxiety     Atrial fibrillation 12/2023    Cataract     Coronary artery disease     CAC score 1430    Depression     Diabetic neuropathy     Essential (primary) hypertension     GERD (gastroesophageal reflux disease)     History of bariatric surgery 07/08/2021    History of total right knee replacement 07/08/2021    Insomnia     Neuromyopathy     Possibly DM and/or alcohol related.    Pulmonary embolism 12/2023    Reactive airway disease     Type 2 diabetes mellitus    [2]  Social History  Tobacco Use    Smoking status: Never    Smokeless tobacco: Never   Substance Use Topics    Alcohol use: Yes     Comment: Former heavy use    Drug use: Never   [3]  Outpatient Encounter Medications as of 4/15/2025   Medication Sig Dispense Refill    albuterol (VENTOLIN HFA) 90 mcg/actuation inhaler Inhale 2 puffs into the lungs every 6 (six) hours as needed for Wheezing. Rescue 8 g 2    apixaban (ELIQUIS) 5 mg Tab TAKE 1 TABLET(5 MG) BY MOUTH TWICE DAILY 180 tablet 3    BD ULTRA-FINE MINI PEN NEEDLE 31 gauge x 3/16" Ndle SMARTSIG:SUB-Q 3-4 Times Daily      blood sugar diagnostic (TRUE METRIX GLUCOSE TEST STRIP) Strp Check blood sugar 3 times daily. 300 strip " 5    blood-glucose meter Misc To check BG 3 times daily, to use with insurance preferred meter 1 each 0    blood-glucose sensor (FREESTYLE SOPHY 3 PLUS SENSOR) Rima 1 Device by Misc.(Non-Drug; Combo Route) route every 14 (fourteen) days. 6 each 3    budesonide-formoterol 80-4.5 mcg (SYMBICORT) 80-4.5 mcg/actuation HFAA Inhale 2 puffs into the lungs.      busPIRone (BUSPAR) 15 MG tablet Take 1 tablet (15 mg total) by mouth 2 (two) times daily as needed (Anxiety). 180 tablet 3    capsaicin (ZOSTRIX) 0.025 % cream Apply topically every 3 (three) months. (to both feet)      cyanocobalamin (VITAMIN B-12) 1000 MCG tablet Take 1 tablet (1,000 mcg total) by mouth once daily. 90 tablet 3    DULoxetine (CYMBALTA) 60 MG capsule Take 1 capsule (60 mg total) by mouth 2 (two) times daily. 180 capsule 3    flash glucose scanning reader (FREESTYLE SOPHY 14 DAY READER) Misc Use device to check blood glucose level 3 times daily. 1 each 0    fluticasone-salmeterol diskus inhaler 250-50 mcg       fluticasone-umeclidin-vilanter (TRELEGY ELLIPTA) 100-62.5-25 mcg DsDv Inhale 1 puff into the lungs once daily. 60 each 5    hydrOXYzine HCL (ATARAX) 25 MG tablet       hydrOXYzine pamoate (VISTARIL) 25 MG Cap Take 25 mg by mouth every 8 (eight) hours as needed (Itching).      insulin glargine-yfgn 100 unit/mL (3 mL) InPn INJECT 15 UNITS UNDER THE SKIN ONCE DAILY 6 mL 0    insulin regular human (NOVOLIN R FLEXPEN) 100 unit/mL (3 mL) InPn pen Inject 5 Units subcutaneously 2 to 3 times daily before breakfast, lunch and/or dinner. 9 mL 3    lancets 33 gauge Misc To check BG 3 times daily, to use with insurance preferred meter 100 each 2    linaGLIPtin (TRADJENTA) 5 mg Tab tablet Take 1 tablet (5 mg total) by mouth once daily. 90 tablet 3    magnesium oxide 400 mg magnesium Tab Take 1 tablet by mouth every evening. 90 tablet 3    metFORMIN (GLUCOPHAGE-XR) 500 MG ER 24hr tablet Take 1 tablet (500 mg total) by mouth daily with  "breakfast. 90 tablet 3    methenamine (HIPREX) 1 gram Tab Take 1 tablet (1 g total) by mouth 2 (two) times daily. 60 tablet 6    methocarbamoL (ROBAXIN) 750 MG Tab Take 1 tablet (750 mg total) by mouth 3 (three) times daily as needed (Back pain). 90 tablet 2    nortriptyline (PAMELOR) 10 MG capsule Take 1 capsule (10 mg total) by mouth 3 (three) times daily. 90 capsule 2    NOVOLOG FLEXPEN U-100 INSULIN 100 unit/mL (3 mL) InPn pen SMARTSI Unit(s) SUB-Q 2-3 Times Daily      ondansetron (ZOFRAN) 8 MG tablet Take 1 tablet (8 mg total) by mouth every 8 (eight) hours as needed for Nausea. 30 tablet 1    oxybutynin (DITROPAN-XL) 5 MG TR24       pantoprazole (PROTONIX) 40 MG tablet Take 1 tablet (40 mg total) by mouth once daily. 90 tablet 3    pen needle,diabetic dual safty (BD AUTOSHIELD DUO PEN NEEDLE) 30 gauge x 3/16" Ndle 1 Needle by Misc.(Non-Drug; Combo Route) route 3 (three) times daily. 100 each 5    pregabalin (LYRICA) 150 MG capsule Take 1 capsule (150 mg total) by mouth 3 (three) times daily. 90 capsule 2    traZODone (DESYREL) 100 MG tablet TAKE 2 TABLETS(200 MG) BY MOUTH EVERY NIGHT AS NEEDED FOR INSOMNIA 180 tablet 3    UNABLE TO FIND OMEGA XL CAP - Take 1 capsule by mouth once daily.      tamsulosin (FLOMAX) 0.4 mg Cap Take 1 capsule (0.4 mg total) by mouth once daily. (Patient not taking: Reported on 10/29/2024) 30 capsule 0    [DISCONTINUED] insulin glargine-yfgn (SEMGLEE,INSULIN GLARG-YFGN,PEN) 100 unit/mL (3 mL) InPn Inject 15 Units into the skin once daily. 6 mL 2     No facility-administered encounter medications on file as of 4/15/2025.   "

## 2025-04-15 NOTE — ASSESSMENT & PLAN NOTE
Has granuloma chronically. LLL atelectasis resolved on scan in November and does not require further follow up.

## 2025-04-15 NOTE — PROGRESS NOTES
Subjective:       Patient ID: Anthony Ball is a 75 y.o.  male who presents for evaluation of ALYSIA.      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   Baseline serum creatinine as of May 2024, 0.9. Had rise to peak of 2.0 in late 2024 during admission for sepsis, hypotension requiring vasopressor support, UTI,and R hydroureteronephrosis due to a cluster of stones in distal ureter requiring stent placement. Creatinine improved to 1.0 at discharge.   He was admitted in late 2024 for sepsis 2/2 UTI with peak creatinine 1.5 and again in November for metabolic encephalopathy, recurrent ESBL UTI, and ALYSIA with peak creatinine 3.0 which improved to 1.8 at discharge. He was admitted for treatment of UTI prior to stone removal per Urology in January. Stent was removed with no stones seen. No stones seen on US in 2025.   Serum creatinine now ranging 1.2-1.5. No acute issues at this time. He denies history of kidney stones prior to first occurrence in September. Reports usually having no urinary symptoms with UTIs. Reports typical signs of UTI is confusion and reported hallucinations that he experienced prior to previous admissions.     Significant family hx includes:   Mother () Hypertension   Paternal Grandfather Colon cancer (89 y)       Last renal US: 25 , reviewed.  FINDINGS:  Right kidney: The right kidney measures 11.4 cm. No cortical thinning. No loss of corticomedullary distinction. Resistive index measures 0.74.  Multiple simple appearing cysts, the largest at the midpole measures 2.1 x 1.6 x 1.9 cm.  No solid mass.  No renal stone. No hydronephrosis.     Left kidney: The left kidney measures 11.4 cm. No cortical thinning. No loss of corticomedullary  "distinction. Resistive index measures 0.73.  Large lower pole simple appearing cyst measuring 9.1 x 8.0 x 9.1 cm.  No solid mass.  No renal stone. No hydronephrosis.     Multiple bladder diverticuli, largest of which is right-sided urinary bladder diverticulum measuring 2.4 x 2.1 x 2.8 cm.     Impression:     No sonographic evidence of renal calculi.  No hydronephrosis.     Bilateral simple renal cysts.     Small urinary bladder diverticulum.      Review of Systems   Respiratory:  Negative for shortness of breath.    Cardiovascular:  Negative for leg swelling.   Gastrointestinal:  Negative for diarrhea, nausea and vomiting.   Genitourinary:  Negative for difficulty urinating, dysuria, frequency, hematuria and urgency.   All other systems reviewed and are negative.      Objective:       Blood pressure 128/77, pulse 94, height 5' 9" (1.753 m), weight 94 kg (207 lb 3.7 oz), SpO2 96%.  Physical Exam  Vitals reviewed.   Constitutional:       General: He is not in acute distress.     Appearance: Normal appearance.   HENT:      Head: Normocephalic and atraumatic.   Eyes:      General: No scleral icterus.  Cardiovascular:      Rate and Rhythm: Normal rate.   Pulmonary:      Effort: Pulmonary effort is normal. No respiratory distress.   Musculoskeletal:      Right lower leg: No edema.      Left lower leg: No edema.   Skin:     General: Skin is warm and dry.   Neurological:      Mental Status: He is alert and oriented to person, place, and time.   Psychiatric:         Mood and Affect: Mood normal.         Behavior: Behavior normal.           Current Outpatient Medications   Medication Sig Note    albuterol (VENTOLIN HFA) 90 mcg/actuation inhaler Inhale 2 puffs into the lungs every 6 (six) hours as needed for Wheezing. Rescue 12/9/2024: Take as Needed.     apixaban (ELIQUIS) 5 mg Tab TAKE 1 TABLET(5 MG) BY MOUTH TWICE DAILY 12/9/2024: Awaiting instructions from Dr. Shar MAX ULTRA-FINE MINI PEN NEEDLE 31 gauge x 3/16" Ndle " SMARTSIG:SUB-Q 3-4 Times Daily     blood sugar diagnostic (TRUE METRIX GLUCOSE TEST STRIP) Strp Check blood sugar 3 times daily.     blood-glucose meter Misc To check BG 3 times daily, to use with insurance preferred meter     blood-glucose sensor (FREESTYLE SOPHY 3 PLUS SENSOR) Rima 1 Device by Misc.(Non-Drug; Combo Route) route every 14 (fourteen) days.     budesonide-formoterol 80-4.5 mcg (SYMBICORT) 80-4.5 mcg/actuation HFAA Inhale 2 puffs into the lungs.     busPIRone (BUSPAR) 15 MG tablet Take 1 tablet (15 mg total) by mouth 2 (two) times daily as needed (Anxiety). 12/9/2024: Take as needed.     capsaicin (ZOSTRIX) 0.025 % cream Apply topically every 3 (three) months. (to both feet) 12/9/2024: Hold AM of Sx     cyanocobalamin (VITAMIN B-12) 1000 MCG tablet Take 1 tablet (1,000 mcg total) by mouth once daily. 12/9/2024: Hold 7 days prior to SX    DULoxetine (CYMBALTA) 60 MG capsule Take 1 capsule (60 mg total) by mouth 2 (two) times daily. 12/9/2024: Take as prescribed       flash glucose scanning reader (FREESTYLE SOPHY 14 DAY READER) Misc Use device to check blood glucose level 3 times daily.     fluticasone-salmeterol diskus inhaler 250-50 mcg      fluticasone-umeclidin-vilanter (TRELEGY ELLIPTA) 100-62.5-25 mcg DsDv Inhale 1 puff into the lungs once daily. 12/9/2024: Take as prescribed.     hydrOXYzine HCL (ATARAX) 25 MG tablet      hydrOXYzine pamoate (VISTARIL) 25 MG Cap Take 25 mg by mouth every 8 (eight) hours as needed (Itching). 12/9/2024: Take as needed.     insulin glargine-yfgn 100 unit/mL (3 mL) InPn INJECT 15 UNITS UNDER THE SKIN ONCE DAILY     insulin regular human (NOVOLIN R FLEXPEN) 100 unit/mL (3 mL) InPn pen Inject 5 Units subcutaneously 2 to 3 times daily before breakfast, lunch and/or dinner.     lancets 33 gauge Misc To check BG 3 times daily, to use with insurance preferred meter     linaGLIPtin (TRADJENTA) 5 mg Tab tablet Take 1 tablet (5 mg total) by mouth once daily.     magnesium  "oxide 400 mg magnesium Tab Take 1 tablet by mouth every evening. 2024: Hold 7 days prior to Sx     metFORMIN (GLUCOPHAGE-XR) 500 MG ER 24hr tablet Take 1 tablet (500 mg total) by mouth daily with breakfast. 2024: Hold AM of Sx     methenamine (HIPREX) 1 gram Tab Take 1 tablet (1 g total) by mouth 2 (two) times daily.     methocarbamoL (ROBAXIN) 750 MG Tab Take 1 tablet (750 mg total) by mouth 3 (three) times daily as needed (Back pain).     nortriptyline (PAMELOR) 10 MG capsule Take 1 capsule (10 mg total) by mouth 3 (three) times daily. 2024: Take as prescribed.     NOVOLOG FLEXPEN U-100 INSULIN 100 unit/mL (3 mL) InPn pen SMARTSI Unit(s) SUB-Q 2-3 Times Daily     ondansetron (ZOFRAN) 8 MG tablet Take 1 tablet (8 mg total) by mouth every 8 (eight) hours as needed for Nausea. 2024: Take as needed.     oxybutynin (DITROPAN-XL) 5 MG TR24      pantoprazole (PROTONIX) 40 MG tablet Take 1 tablet (40 mg total) by mouth once daily. 2024: Take as prescribed.     pen needle,diabetic dual safty (BD AUTOSHIELD DUO PEN NEEDLE) 30 gauge x 3/16" Ndle 1 Needle by Misc.(Non-Drug; Combo Route) route 3 (three) times daily.     pregabalin (LYRICA) 150 MG capsule Take 1 capsule (150 mg total) by mouth 3 (three) times daily.     traZODone (DESYREL) 100 MG tablet TAKE 2 TABLETS(200 MG) BY MOUTH EVERY NIGHT AS NEEDED FOR INSOMNIA     UNABLE TO FIND OMEGA XL CAP - Take 1 capsule by mouth once daily.     tamsulosin (FLOMAX) 0.4 mg Cap Take 1 capsule (0.4 mg total) by mouth once daily. (Patient not taking: Reported on 10/29/2024) 10/22/2024: Patient and spouse unsure if patient started taking.   Filled 10/1/24, #90/90 day supply   Last reviewed on 4/15/2025  1:13 PM by Taty Schilling MA       Lab Results   Component Value Date    CREATININE 1.4 2025     eGFR   Date Value Ref Range Status   2025 53.4 (A) >60 mL/min/1.73 m^2 Final     No results found for: "UTPCR"  Microalb/Creat Ratio   Date Value " Ref Range Status   05/17/2024 16.9 0.0 - 30.0 ug/mg Final     Lab Results   Component Value Date     (L) 01/13/2025    K 4.1 01/13/2025    CO2 23 01/13/2025     01/13/2025     Lab Results   Component Value Date    CALCIUM 8.1 (L) 01/13/2025    PHOS 2.5 (L) 01/13/2025     Lab Results   Component Value Date    HGB 10.0 (L) 01/13/2025    WBC 7.03 01/13/2025    HCT 31.1 (L) 01/13/2025      Iron   Date Value Ref Range Status   04/12/2023 64 45 - 160 ug/dL Final     Ferritin   Date Value Ref Range Status   04/12/2023 374 (H) 20.0 - 300.0 ng/mL Final     Lab Results   Component Value Date    HGBA1C 6.4 (H) 12/13/2024     (L) 01/13/2025    BUN 18 01/13/2025     Lab Results   Component Value Date    LDLCALC 49.2 (L) 03/18/2022         Assessment:       1. ALYSIA (acute kidney injury)    2. Hydronephrosis with urinary obstruction due to ureteral calculus        Plan:   CKD stage 3a -  - Baseline sCr ~0.9, now ranging 1.2-1.5 on last labs Dec-Jan  - Suspect progression due to repeated AKIs in setting of obstruction 2/2 stones and sepsis 2/2 recurrent UTIs  - sCr appears to have stabilized with minimal proteinuria on UA. No stones found on last US. No history of stones prior to episode in September. Following with Urology for stones and recurrent UTIs. Okay to follow with nephrology as needed for now. Return instructions given for any concerns or progression in CKD.     All questions patient had were answered.  Asked if further questions. None. F/u in clinic as needed  with labs and urine prior to next visit or sooner if needed.  ER for emergency concerns.

## 2025-04-15 NOTE — PROGRESS NOTES
"  Outpatient Rehab    Physical Therapy Visit    Patient Name: Anthony Ball  MRN: [id number]  YOB: 1950  Encounter Date: 4/15/2025    Therapy Diagnosis:   Encounter Diagnosis   Name Primary?    Impaired functional mobility, balance, gait, and endurance Yes     Physician: Isaias Myles MD    Physician Orders: Eval and Treat  Medical Diagnosis: Diabetic polyneuropathy associated with type 2 diabetes mellitus  Weakness of both lower extremities  Mobility impaired    Visit # / Visits Authorized:  3 / 20  Insurance Authorization Period: 3/19/2025 to 12/31/2025  Date of Evaluation: 03/19/2025  Plan of Care Certification: 05/14/2025     PT/PTA: PTA   Number of PTA visits since last PT visit:1  Time In: 1615   Time Out: 1700  Total Time: 45   Total Billable Time:  45             Subjective   " I feel last week, [place] and [place] my knee.".    Left knee    Objective            Treatment:  Therapeutic Exercise  TE 1: x 8 min on Sci Fit recumbent stepper.  B UE/B LE on level 2.5  TE 2: STanding near ballet bar: x 15 reps of B LE heel raises, x 15 reps of B LE toe raises, x 15 reps of B LE hip abd/add, and x 15 reps of B LE marching in place all with 2 UE support  Therapeutic Activity  TA 1: x 4 laps of side stepping along the ballet bar with 2 UE support  TA 2: x 5 reps total of sit to stand from transport chair with 2 UE support  TA 3: SQPT W/C <>stepper seat with CGA  TA 4: x 4 laps of forward ambulation with SBQC along the ballet bar with CGA.  Pt using the ballet bar for balance    Time Entry(in minutes):  Therapeutic Activity Time Entry: 20  Therapeutic Exercise Time Entry: 25    Assessment & Plan   Assessment: Dr. Ball tolerated his tx session fairly well today.  Dr. Ball was slightly limited today 2* left knee pain from [place]  Dr. Ball was unable to ambulate as far today with the cane and had a more difficult time getting up from his chair " today.  Evaluation/Treatment Tolerance: Patient limited by pain    Patient will continue to benefit from skilled outpatient physical therapy to address the deficits listed in the problem list box on initial evaluation, provide pt/family education and to maximize pt's level of independence in the home and community environment.     Patient's spiritual, cultural, and educational needs considered and patient agreeable to plan of care and goals.           Plan: Cont with strengthening, endurance and balance    Goals:   Active       Mobility/Balance - Short Term Goals (STG) = 4 weeks; Long Term Goals (LTG) = 8 weeks        Pt will improve Self Selected Walking Speed to at least 0.5 m/s (STG) and 0.7 m/s (LTG) indicating improved safety with community mobility.        Start:  03/19/25    Expected End:  05/14/25       3/19/2025: 0.44 m/s         LTG - Patient will be independent with HEP emphasizing LE strengthening and balance training.         Start:  03/19/25    Expected End:  05/14/25       3/19/2025: Needs education         Patient will exhibit improved Araujo Balance score to >/= 22/56 (STG) and 26/56 (LTG) indicating improved static balance.         Start:  03/19/25    Expected End:  05/14/25       3/19/2025: 18/56         Patient will ambulate 50 feet (STG) and 150 feet (LTG) with a SPC indicating improved gait stability and independence.        Start:  03/19/25    Expected End:  05/14/25       3/19/2025: unable due to safety concerns and fearfulness         Patient will exhibit improved 30 Second Chair Rise to >/= 5 reps (STG) and 7 reps (LTG), indicating improved functional LE strength.           Start:  03/19/25    Expected End:  05/14/25                Ciara Pro, PTA

## 2025-04-16 ENCOUNTER — CLINICAL SUPPORT (OUTPATIENT)
Dept: REHABILITATION | Facility: HOSPITAL | Age: 75
End: 2025-04-16
Payer: MEDICARE

## 2025-04-16 DIAGNOSIS — Z74.09 IMPAIRED FUNCTIONAL MOBILITY, BALANCE, GAIT, AND ENDURANCE: Primary | ICD-10-CM

## 2025-04-16 PROCEDURE — 97530 THERAPEUTIC ACTIVITIES: CPT | Mod: PO,CQ

## 2025-04-16 PROCEDURE — 97110 THERAPEUTIC EXERCISES: CPT | Mod: PO,CQ

## 2025-04-16 NOTE — PROGRESS NOTES
"  Outpatient Rehab    Physical Therapy Visit    Patient Name: Anthony Ball  MRN: [id number]  YOB: 1950  Encounter Date: 4/16/2025    Therapy Diagnosis:   Encounter Diagnosis   Name Primary?    Impaired functional mobility, balance, gait, and endurance Yes     Physician: sIaias Myles MD    Physician Orders: Eval and Treat  Medical Diagnosis: Diabetic polyneuropathy associated with type 2 diabetes mellitus  Weakness of both lower extremities  Mobility impaired    Visit # / Visits Authorized:  4 / 20  Insurance Authorization Period: 3/19/2025 to 12/31/2025  Date of Evaluation: 03/19/2025  Plan of Care Certification: 05/14/2025      PT/PTA: PTA   Number of PTA visits since last PT visit:2  Time In: 0845   Time Out: 0930  Total Time: 45   Total Billable Time: 45            Subjective   " My leg feels better today.  I am thrilled and delighted.".  Pain reported as 3/10. LEft knee    Objective            Treatment:  Therapeutic Exercise  TE 1: x 8 min on Sci Fit recumbent stepper.   BUE/B LE on level 2.0  TE 2: 2 x 10 reps of B LE sitting B hip flexion, B LE hip abd/add, and B LE LAQ all with #1lb cuff weights applied.  Therapeutic Activity  TA 1: // bars: x 6 laps of forward ambulation while using 1 finger support occasionally , x 6 laps of forward ambulation wtih 1 UE support like ambulating with a cane  TA 2: Pt ambulated ~ 35ft with transport W/C as a walker with SBA    Time Entry(in minutes):  Therapeutic Activity Time Entry: 25  Therapeutic Exercise Time Entry: 20    Assessment & Plan   Assessment: Dr. Ball had decreased pain in his knee today and was able to particpate in therapy more today.  Dr. Ball was able to increase his time ambulating with a SC and added weights to his sitting exercsies in his chair  Evaluation/Treatment Tolerance: Patient tolerated treatment well    Patient will continue to benefit from skilled outpatient physical therapy to address the deficits listed in the problem " list box on initial evaluation, provide pt/family education and to maximize pt's level of independence in the home and community environment.     Patient's spiritual, cultural, and educational needs considered and patient agreeable to plan of care and goals.           Plan: Cont with endurance, strengthening, balance and functional mobility    Goals:   Active       Mobility/Balance - Short Term Goals (STG) = 4 weeks; Long Term Goals (LTG) = 8 weeks        Pt will improve Self Selected Walking Speed to at least 0.5 m/s (STG) and 0.7 m/s (LTG) indicating improved safety with community mobility.        Start:  03/19/25    Expected End:  05/14/25       3/19/2025: 0.44 m/s         LTG - Patient will be independent with HEP emphasizing LE strengthening and balance training.         Start:  03/19/25    Expected End:  05/14/25       3/19/2025: Needs education         Patient will exhibit improved Araujo Balance score to >/= 22/56 (STG) and 26/56 (LTG) indicating improved static balance.         Start:  03/19/25    Expected End:  05/14/25       3/19/2025: 18/56         Patient will ambulate 50 feet (STG) and 150 feet (LTG) with a SPC indicating improved gait stability and independence.        Start:  03/19/25    Expected End:  05/14/25       3/19/2025: unable due to safety concerns and fearfulness         Patient will exhibit improved 30 Second Chair Rise to >/= 5 reps (STG) and 7 reps (LTG), indicating improved functional LE strength.           Start:  03/19/25    Expected End:  05/14/25                Ciara Pro, PTA

## 2025-04-17 ENCOUNTER — TELEPHONE (OUTPATIENT)
Dept: INTERNAL MEDICINE | Facility: CLINIC | Age: 75
End: 2025-04-17
Payer: MEDICARE

## 2025-04-17 DIAGNOSIS — E11.42 TYPE 2 DIABETES MELLITUS WITH DIABETIC POLYNEUROPATHY, WITHOUT LONG-TERM CURRENT USE OF INSULIN: Primary | Chronic | ICD-10-CM

## 2025-04-17 RX ORDER — INSULIN GLARGINE 100 [IU]/ML
15 INJECTION, SOLUTION SUBCUTANEOUS DAILY
Qty: 9 ML | Refills: 5 | Status: SHIPPED | OUTPATIENT
Start: 2025-04-17

## 2025-04-17 NOTE — TELEPHONE ENCOUNTER
----- Message from Nurse Tran sent at 4/17/2025  1:40 PM CDT -----  Regarding: Medication change  [facility] called asking if you can change his Insulin to Lantus so it will be covered.

## 2025-04-23 ENCOUNTER — CLINICAL SUPPORT (OUTPATIENT)
Dept: REHABILITATION | Facility: HOSPITAL | Age: 75
End: 2025-04-23
Payer: MEDICARE

## 2025-04-23 DIAGNOSIS — Z74.09 IMPAIRED FUNCTIONAL MOBILITY, BALANCE, GAIT, AND ENDURANCE: Primary | ICD-10-CM

## 2025-04-23 PROCEDURE — 97530 THERAPEUTIC ACTIVITIES: CPT | Mod: PO,CQ

## 2025-04-23 PROCEDURE — 97110 THERAPEUTIC EXERCISES: CPT | Mod: PO,CQ

## 2025-04-23 NOTE — PROGRESS NOTES
"  Outpatient Rehab    Physical Therapy Visit    Patient Name: Anthony Ball  MRN: [id number]  YOB: 1950  Encounter Date: 4/23/2025    Therapy Diagnosis:   Encounter Diagnosis   Name Primary?    Impaired functional mobility, balance, gait, and endurance Yes     Physician: Isaias Myles MD    Physician Orders: Eval and Treat  Medical Diagnosis: Diabetic polyneuropathy associated with type 2 diabetes mellitus  Weakness of both lower extremities  Mobility impaired    Visit # / Visits Authorized:  5 / 20  Insurance Authorization Period: 3/19/2025 to 12/31/2025  Date of Evaluation: 03/19/2025  Plan of Care Certification: 05/14/2025     PT/PTA: PTA   Number of PTA visits since last PT visit:3  Time In: 1430   Time Out: 1515  Total Time: 45   Total Billable Time: 45           Subjective   " Sorry I missed Monday's appointment.  My knee was hurting so bad from that fall, that I had to cancel.".  Pain reported as 6/10. Left knee pain    Objective            Treatment:  Therapeutic Exercise  TE 1: x 8 min on Sci Fit recumbent stepper.  B UE/B LE on level 3.0  TE 2: Sitting EOM: 2 x 10 reps of B LE DF/PF with RTB, 2 x 10 reps of B LE HS curls with RTB, 2 x 10 reps of B LE hip abd with RTB, 2 x 10 reps of B LE marching in place with RTB, 2 x 10 reps of B LE LAQs with no weight  Therapeutic Activity  TA 1: Pt performed SPT from W/C ---> stepper seat with RW and CGA  TA 2: Sit to stand from stepper seat to RW with Min A to elevate B hips  TA 3: Pt ambulated ~ 20 ft from stepper to mat with RW and CGA.  Pt with increased left knee pain and had to sit  TA 4: Pt ambulated ~ 6 laps in the // bars with 2 UE support.    Time Entry(in minutes):  Therapeutic Activity Time Entry: 15  Therapeutic Exercise Time Entry: 30    Assessment & Plan   Assessment: Anthony tolerated his tx session fairly today.  Anthony's knee was still hurting from a fall a week ago and he was unable to ambulate far with the RW and unable to try to ambulate " with the SC today.  Anthony was able to perform B LE's with RTB today.  Evaluation/Treatment Tolerance: Patient tolerated treatment well    Patient will continue to benefit from skilled outpatient physical therapy to address the deficits listed in the problem list box on initial evaluation, provide pt/family education and to maximize pt's level of independence in the home and community environment.     Patient's spiritual, cultural, and educational needs considered and patient agreeable to plan of care and goals.           Plan: Cont with strengthening, endurance, gait, balance and functional mobility    Goals:   Active       Mobility/Balance - Short Term Goals (STG) = 4 weeks; Long Term Goals (LTG) = 8 weeks        Pt will improve Self Selected Walking Speed to at least 0.5 m/s (STG) and 0.7 m/s (LTG) indicating improved safety with community mobility.        Start:  03/19/25    Expected End:  05/14/25       3/19/2025: 0.44 m/s         LTG - Patient will be independent with HEP emphasizing LE strengthening and balance training.         Start:  03/19/25    Expected End:  05/14/25       3/19/2025: Needs education         Patient will exhibit improved Araujo Balance score to >/= 22/56 (STG) and 26/56 (LTG) indicating improved static balance.         Start:  03/19/25    Expected End:  05/14/25       3/19/2025: 18/56         Patient will ambulate 50 feet (STG) and 150 feet (LTG) with a SPC indicating improved gait stability and independence.        Start:  03/19/25    Expected End:  05/14/25       3/19/2025: unable due to safety concerns and fearfulness         Patient will exhibit improved 30 Second Chair Rise to >/= 5 reps (STG) and 7 reps (LTG), indicating improved functional LE strength.           Start:  03/19/25    Expected End:  05/14/25                Ciara Pro, PTA

## 2025-04-29 ENCOUNTER — CLINICAL SUPPORT (OUTPATIENT)
Dept: REHABILITATION | Facility: HOSPITAL | Age: 75
End: 2025-04-29
Payer: MEDICARE

## 2025-04-29 VITALS — HEART RATE: 112 BPM | SYSTOLIC BLOOD PRESSURE: 85 MMHG | DIASTOLIC BLOOD PRESSURE: 62 MMHG

## 2025-04-29 DIAGNOSIS — Z74.09 IMPAIRED FUNCTIONAL MOBILITY, BALANCE, GAIT, AND ENDURANCE: Primary | ICD-10-CM

## 2025-04-29 PROCEDURE — 97112 NEUROMUSCULAR REEDUCATION: CPT | Mod: PO

## 2025-04-29 PROCEDURE — 97110 THERAPEUTIC EXERCISES: CPT | Mod: PO

## 2025-04-29 NOTE — PROGRESS NOTES
Outpatient Rehab    Physical Therapy Visit    Patient Name: Anthony Ball  MRN: [id number]  YOB: 1950  Encounter Date: 4/29/2025    Therapy Diagnosis:   Encounter Diagnosis   Name Primary?    Impaired functional mobility, balance, gait, and endurance Yes       Physician: Isaias Myles MD    Physician Orders: Eval and Treat  Medical Diagnosis: Diabetic polyneuropathy associated with type 2 diabetes mellitus  Weakness of both lower extremities  Mobility impaired    Visit # / Visits Authorized:  6 / 20  Insurance Authorization Period: 3/19/2025 to 12/31/2025  Date of Evaluation: 03/19/2025  Plan of Care Certification: 05/14/2025     PT/PTA:   PT  Number of PTA visits since last PT visit:   Time In:   10:30  Time Out:  11:15  Total Time:   45 min  Total Billable Time:             Subjective   Can you check my blood pressure? I'm feeling a little light headed, I think I flipped into Af[employer]..  Pain reported as 0/10.      Objective   Vital Signs  BP (!) 85/62   Pulse (!) 112   BP Location: Right arm  BP Position: Sitting     After seated rest breaks : BP 86/63  ;  After Scitfit 104/64     At end of session /65         Treatment:  Therapeutic Exercise  TE 1: x 8 min on Sci Fit recumbent stepper.  B UE/B LE on level 3.0  TE 2: Supine: hook lying lumbar rotation x 10 each side  Balance/Neuromuscular Re-Education  NMR 1: Standing at ballet bar: alternating LE tap onto green cone  10 each side  NMR 2: Standing at ballet bar: heel raise x 15;  squats x 15  NMR 3: Supine: x 10 bridge; bridge with ball squeeze ; x10 hip add with ball squeeze  NMR 4: Supine abd brace with isometric hold UE and opposite LE 5 sec hold x 10 each side    Time Entry(in minutes):       Assessment & Plan   Assessment: Dr. Ball arrived feeling lightheaded and with low blood pressure. Performedon Scifit with slight increased in blood pressure.  He was able to perform standing activity with seated rest breaks and  ended session with core strenght on mat.  He remains appropriate for skilled PT.       Patient will continue to benefit from skilled outpatient physical therapy to address the deficits listed in the problem list box on initial evaluation, provide pt/family education and to maximize pt's level of independence in the home and community environment.     Patient's spiritual, cultural, and educational needs considered and patient agreeable to plan of care and goals.           Plan:  Cont to progress standing activity as tolerated    Goals:   Active       Mobility/Balance - Short Term Goals (STG) = 4 weeks; Long Term Goals (LTG) = 8 weeks        Pt will improve Self Selected Walking Speed to at least 0.5 m/s (STG) and 0.7 m/s (LTG) indicating improved safety with community mobility.        Start:  03/19/25    Expected End:  05/14/25       3/19/2025: 0.44 m/s         LTG - Patient will be independent with HEP emphasizing LE strengthening and balance training.         Start:  03/19/25    Expected End:  05/14/25       3/19/2025: Needs education         Patient will exhibit improved Araujo Balance score to >/= 22/56 (STG) and 26/56 (LTG) indicating improved static balance.         Start:  03/19/25    Expected End:  05/14/25       3/19/2025: 18/56         Patient will ambulate 50 feet (STG) and 150 feet (LTG) with a SPC indicating improved gait stability and independence.        Start:  03/19/25    Expected End:  05/14/25       3/19/2025: unable due to safety concerns and fearfulness         Patient will exhibit improved 30 Second Chair Rise to >/= 5 reps (STG) and 7 reps (LTG), indicating improved functional LE strength.           Start:  03/19/25    Expected End:  05/14/25                Aaliyah Malloy, PT

## 2025-05-01 ENCOUNTER — CLINICAL SUPPORT (OUTPATIENT)
Dept: REHABILITATION | Facility: HOSPITAL | Age: 75
End: 2025-05-01
Payer: MEDICARE

## 2025-05-01 DIAGNOSIS — Z74.09 IMPAIRED FUNCTIONAL MOBILITY, BALANCE, GAIT, AND ENDURANCE: Primary | ICD-10-CM

## 2025-05-01 PROCEDURE — 97530 THERAPEUTIC ACTIVITIES: CPT | Mod: PO

## 2025-05-01 RX ORDER — METFORMIN HYDROCHLORIDE 500 MG/1
500 TABLET, EXTENDED RELEASE ORAL 2 TIMES DAILY WITH MEALS
Qty: 180 TABLET | Refills: 3 | Status: SHIPPED | OUTPATIENT
Start: 2025-05-01

## 2025-05-01 NOTE — TELEPHONE ENCOUNTER
No care due was identified.  [facility] Embedded Care Due Messages. Reference number: [id number].   4/30/2025 7:22:17 PM CDT

## 2025-05-01 NOTE — PROGRESS NOTES
Outpatient Rehab    Physical Therapy Progress Note : Updated Plan of Care    Patient Name: Anthony Ball  MRN: [id number]  YOB: 1950  Encounter Date: 5/1/2025    Therapy Diagnosis:   Encounter Diagnosis   Name Primary?    Impaired functional mobility, balance, gait, and endurance Yes     Physician: Isaias Myles MD    Physician Orders: Eval and Treat  Medical Diagnosis: Diabetic polyneuropathy associated with type 2 diabetes mellitus  Weakness of both lower extremities  Mobility impaired    Visit # / Visits Authorized:  7 / 20  Insurance Authorization Period: 3/19/2025 to 12/31/2025  Date of Evaluation: 03/19/2025  Plan of Care Certification: 06/26/2025     PT/PTA: PT   Number of PTA visits since last PT visit:0  Time In: 1030   Time Out: 1120  Total Time: 50   Total Billable Time: 50    FOTO:  Intake Score: 40%  Survey Score 1: 45%         Subjective   Dr. Ball reports that he is doign well with therapy. He would like to continue working on ambulation with a SPC and balance..         Objective         Araujo Balance  Araujo Balance Scale  1. Sitting to Standing: Able to stand independently using hands  2. Standing Unsupported: Able to stand 2 minutes with supervision  3. Sitting with Back Unsupported but Feet Supported on Floor or on a Stool: Able to sit safely and securely for 2 minutes  4. Standing to Sitting: Controls descent by using hands  5.  Transfers: Able to transfer safely definite need of hands  6. Standing Unsupported with Eyes Closed: Able to stand 10 seconds with supervision  7. Standing Unsupported with Feet Together: Needs help to attain position but able to stand 15 seconds feet together  8. Reach Forward with Outstretched Arm While Standing: Can reach forward 12 cm (5 inches)  9.  Object from Floor from a Standing Position: Unable to  and needs supervision while trying  10. Turning to Look Behind Over Left and Right Shoulders While Standing: Turns sideways only but  maintains balance  11. Turn 360 Degrees: Needs assistance while turning  12. Place Alternate Foot on Step or Stool While Standing Unsupported: Needs assistance to keep from falling/unable to try  13. Standing Unsupported One Foot in Front: Able to take small step independently and hold 30 seconds  14. Standing on One Leg: Unable to try needs assist to prevent fall  Araujo Balance Score: 28  Araujo Balance Fall Risk: Medium    Interpretation:  41-56 = Low Fall Risk  21-40 = Medium Fall Risk  0-20 = High Fall Risk         Treatment:  Therapeutic Exercise  TE 1: x 8 min on Sci Fit recumbent stepper.  B UE/B LE on level 3.5  Balance/Neuromuscular Re-Education  NMR 1: Includes time for balance testing.  Therapeutic Activity  TA 1: Includes time for funcitonal measures, includes self assessment. Includes POC discussion.    Time Entry(in minutes):  Neuromuscular Re-Education Time Entry: 10  Therapeutic Activity Time Entry: 40    Assessment & Plan   Assessment  Anthony presents with a condition of Moderate complexity.   Presentation of Symptoms: Evolving  Will Comorbidities Impact Care: Yes  Diabetic neuropathy    Functional Limitations: Activity tolerance, Decreased ambulation distance/endurance, Maintaining balance, Increased risk of fall, Getting off the floor, Gait limitations, Functional mobility, Transfers  Impairments: Safety issue, Pain with functional activity, Impaired physical strength, Abnormal gait, Impaired balance    Prognosis: Good  Assessment Details: Patient reassessed for progress today. At this time, patient demonstrates good progress. Patient exhibits improved static balance as indicated by Araujo Balance Scale. He has progressed to gait using a SPC for up to 30 feet. 30 Second Chair Rise reps have improved, but inability to rise from a standard chair without upper extremity use continues to indicated functional LE weakness. Patient is responding well to outpatient PT and will benefit from PT POC extension x 8  weeks to maximize functional capacity.    Plan  From a physical therapy perspective, the patient would benefit from: Skilled Rehab Services    Planned therapy interventions include: Therapeutic exercise, Therapeutic activities, Neuromuscular re-education, Gait training, and Sensory integration.    Planned modalities to include: Cryotherapy (cold pack), Electrical stimulation - attended, Electrical stimulation - passive/unattended, and Thermotherapy (hot pack).        Visit Frequency: 2 times Per Week for 8 Weeks.       This plan was discussed with Patient.   Discussion participants: Agreed Upon Plan of Care  Plan details: Updated certification dates: 5/1/2025-06/26/2025          Patient will continue to benefit from skilled outpatient physical therapy to address the deficits listed in the problem list box on initial evaluation, provide pt/family education and to maximize pt's level of independence in the home and community environment.     Patient's spiritual, cultural, and educational needs considered and patient agreeable to plan of care and goals.           Goals:   Active       Mobility/Balance - Short Term Goals (STG) = 4 weeks; Long Term Goals (LTG) = 8 weeks        Pt will improve Self Selected Walking Speed to at least 0.5 m/s (STG) and 0.7 m/s (LTG) indicating improved safety with community mobility.  (Ongoing)       Start:  03/19/25    Expected End:  06/26/25       3/19/2025: 0.44 m/s  5/1/2025: 0.15 m/s         LTG - Patient will be independent with HEP emphasizing LE strengthening and balance training.   (Ongoing)       Start:  03/19/25    Expected End:  06/26/25       3/19/2025: Needs education         Patient will exhibit improved Araujo Balance score to >/= 22/56 (STG) and 26/56 (LTG) indicating improved static balance.   (Progressing)       Start:  03/19/25    Expected End:  06/26/25       3/19/2025: 18/56  5/1/2025: 28/56         Patient will ambulate 50 feet (STG) and 150 feet (LTG) with a SPC  indicating improved gait stability and independence.  (Progressing)       Start:  03/19/25    Expected End:  06/26/25       3/19/2025: unable due to safety concerns and fearfulness  5/1/2025: 29 feet with SPC, Contact guard assistance          Patient will exhibit improved 30 Second Chair Rise to >/= 5 reps (STG) and 7 reps (LTG), indicating improved functional LE strength.     (Progressing)       Start:  03/19/25    Expected End:  06/26/25 03/19/2025: 3 reps with UE use  5/1/2025: 5.5 reps with UE use             Codi Owen PT

## 2025-05-07 ENCOUNTER — DOCUMENTATION ONLY (OUTPATIENT)
Dept: REHABILITATION | Facility: HOSPITAL | Age: 75
End: 2025-05-07
Payer: MEDICARE

## 2025-05-07 NOTE — PROGRESS NOTES
PT/PTA met face to face to discuss pt's treatment plan and progress towards established goals.  Continue with current PT POC with focus on gait with the SPC, balance and endurance.  Patient will be seen by physical therapist at least every sixth treatment or 30 days, whichever occurs first.    Ciara Pro, PTA  05/07/2025

## 2025-05-09 ENCOUNTER — PATIENT MESSAGE (OUTPATIENT)
Dept: INTERNAL MEDICINE | Facility: CLINIC | Age: 75
End: 2025-05-09
Payer: MEDICARE

## 2025-05-09 DIAGNOSIS — R07.81 RIB PAIN ON LEFT SIDE: ICD-10-CM

## 2025-05-09 DIAGNOSIS — N39.0 RECURRENT UTI: Primary | ICD-10-CM

## 2025-05-09 DIAGNOSIS — E11.40 PAINFUL DIABETIC NEUROPATHY: ICD-10-CM

## 2025-05-10 RX ORDER — NORTRIPTYLINE HYDROCHLORIDE 10 MG/1
10 CAPSULE ORAL 3 TIMES DAILY
Qty: 270 CAPSULE | Refills: 1 | Status: SHIPPED | OUTPATIENT
Start: 2025-05-10 | End: 2026-05-10

## 2025-05-10 RX ORDER — METHENAMINE HIPPURATE 1000 MG/1
1 TABLET ORAL 2 TIMES DAILY
Qty: 180 TABLET | Refills: 3 | Status: SHIPPED | OUTPATIENT
Start: 2025-05-10

## 2025-05-10 RX ORDER — TAMSULOSIN HYDROCHLORIDE 0.4 MG/1
0.4 CAPSULE ORAL DAILY
Qty: 90 CAPSULE | Refills: 3 | Status: SHIPPED | OUTPATIENT
Start: 2025-05-10

## 2025-05-10 RX ORDER — METHOCARBAMOL 750 MG/1
750 TABLET, FILM COATED ORAL 3 TIMES DAILY PRN
Qty: 270 TABLET | Refills: 1 | Status: SHIPPED | OUTPATIENT
Start: 2025-05-10

## 2025-05-10 RX ORDER — PREGABALIN 150 MG/1
150 CAPSULE ORAL 3 TIMES DAILY
Qty: 270 CAPSULE | Refills: 3 | Status: SHIPPED | OUTPATIENT
Start: 2025-05-10

## 2025-05-13 ENCOUNTER — CLINICAL SUPPORT (OUTPATIENT)
Dept: REHABILITATION | Facility: HOSPITAL | Age: 75
End: 2025-05-13
Payer: MEDICARE

## 2025-05-13 DIAGNOSIS — Z74.09 IMPAIRED FUNCTIONAL MOBILITY, BALANCE, GAIT, AND ENDURANCE: Primary | ICD-10-CM

## 2025-05-13 PROCEDURE — 97116 GAIT TRAINING THERAPY: CPT | Mod: PO

## 2025-05-13 PROCEDURE — 97110 THERAPEUTIC EXERCISES: CPT | Mod: PO

## 2025-05-13 NOTE — PROGRESS NOTES
Outpatient Rehab    Physical Therapy Visit    Patient Name: Anthony Ball  MRN: [id number]  YOB: 1950  Encounter Date: 5/13/2025    Therapy Diagnosis:   Encounter Diagnosis   Name Primary?    Impaired functional mobility, balance, gait, and endurance Yes     Physician: Isaias Myles MD    Physician Orders: Eval and Treat  Medical Diagnosis: Diabetic polyneuropathy associated with type 2 diabetes mellitus  Weakness of both lower extremities  Mobility impaired    Visit # / Visits Authorized:  8 / 20  Insurance Authorization Period: 3/19/2025 to 12/31/2025  Date of Evaluation: 3/19/2025  Plan of Care Certification: 3/19/2025 to 5/14/2025      PT/PTA: PT   Number of PTA visits since last PT visit:0  Time In: 0948   Time Out: 1015  Total Time (in minutes): 27   Total Billable Time (in minutes): 27    Precautions:     Standard, Fall, Neuropathy, Back pain      Subjective   Patient presented eating breakfast due to blood sugar issues..  Pain reported as 0/10.      Objective            Treatment:  Therapeutic Exercise  TE 1: x 10 min on Sci Fit recumbent stepper.  B UE/B LE on level 3.5  TE 2: Sidestepping at ballet bar x 2 laps each way  Gait Training  GT 1: Trial 1: RW x 80 feet, CGA Trial 2: Unable to ambulate with SPC today    Time Entry(in minutes):  Gait Training Time Entry: 17  Therapeutic Exercise Time Entry: 10    Assessment & Plan   Assessment: Dr. 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 Initially attempted gait with the SPC, but ceased due to instability noted. He was transitioned to the RW, which went well; 80 feet acheived. Attempted gait with SPC again, but he was unable. He reported unsteadiness of his legs. The remainder of the session was spent on Stepper for LE muscular endurance.       Patient will continue to benefit from skilled outpatient physical therapy to address the deficits listed in the problem list box on initial evaluation, provide pt/family education and to maximize pt's  level of independence in the home and community environment.     Patient's spiritual, cultural, and educational needs considered and patient agreeable to plan of care and goals.           Plan: Cont with strengthening, endurance, gait, balance and functional mobility    Goals:   Active       Mobility/Balance - Short Term Goals (STG) = 4 weeks; Long Term Goals (LTG) = 8 weeks        Pt will improve Self Selected Walking Speed to at least 0.5 m/s (STG) and 0.7 m/s (LTG) indicating improved safety with community mobility.  (Ongoing)       Start:  03/19/25    Expected End:  06/26/25       3/19/2025: 0.44 m/s  5/1/2025: 0.15 m/s         LTG - Patient will be independent with HEP emphasizing LE strengthening and balance training.   (Progressing)       Start:  03/19/25    Expected End:  06/26/25       3/19/2025: Needs education         Patient will exhibit improved Ara[place] Balance score to >/= 22/56 (STG) and 26/56 (LTG) indicating improved static balance.   (Progressing)       Start:  03/19/25    Expected End:  06/26/25       3/19/2025: 18/56  5/1/2025: 28/56         Patient will ambulate 50 feet (STG) and 150 feet (LTG) with a SPC indicating improved gait stability and independence.  (Progressing)       Start:  03/19/25    Expected End:  06/26/25       3/19/2025: unable due to safety concerns and fearfulness  5/1/2025: 29 feet with SPC, Contact guard assistance          Patient will exhibit improved 30 Second Chair Rise to >/= 5 reps (STG) and 7 reps (LTG), indicating improved functional LE strength.     (Progressing)       Start:  03/19/25    Expected End:  06/26/25 03/19/2025: 3 reps with UE use  5/1/2025: 5.5 reps with UE use             Codi Owen PT

## 2025-05-15 ENCOUNTER — CLINICAL SUPPORT (OUTPATIENT)
Dept: REHABILITATION | Facility: HOSPITAL | Age: 75
End: 2025-05-15
Payer: MEDICARE

## 2025-05-15 DIAGNOSIS — Z74.09 IMPAIRED FUNCTIONAL MOBILITY, BALANCE, GAIT, AND ENDURANCE: Primary | ICD-10-CM

## 2025-05-15 PROCEDURE — 97530 THERAPEUTIC ACTIVITIES: CPT | Mod: PO

## 2025-05-15 PROCEDURE — 97116 GAIT TRAINING THERAPY: CPT | Mod: PO

## 2025-05-15 NOTE — PROGRESS NOTES
Outpatient Rehab    Physical Therapy Visit    Patient Name: Anthony Ball  MRN: [id number]  YOB: 1950  Encounter Date: 5/15/2025    Therapy Diagnosis:   Encounter Diagnosis   Name Primary?    Impaired functional mobility, balance, gait, and endurance Yes     Physician: Isaias Myles MD    Physician Orders: Eval and Treat  Medical Diagnosis: Diabetic polyneuropathy associated with type 2 diabetes mellitus  Weakness of both lower extremities  Mobility impaired    Visit # / Visits Authorized:  9 / 20  Insurance Authorization Period: 3/19/2025 to 12/31/2025  Date of Evaluation: 3/19/2025  Plan of Care Certification: 5/15/2025 to 6/26/2025      PT/PTA: PT   Number of PTA visits since last PT visit:0  Time In: 0930   Time Out: 1015  Total Time (in minutes): 45   Total Billable Time (in minutes): 45        Precautions:     Standard, Fall, Neuropathy, Back pain      Subjective   Anthony reports feeling better today. He has his glasses today which helps..  Pain reported as 0/10.      Objective            Treatment:  Therapeutic Exercise  TE 1: x 10 min on Sci Fit recumbent stepper.  B UE/B LE on level 4  Balance/Neuromuscular Re-Education  NMR 1: x 45s, Heel raises without UE Support  Therapeutic Activity  TA 1: //bars: Negotiating obstacles (low cones and hurdles) x 4 laps  Gait Training  GT 1: Trial 1: SPC x 44 feet, CGA Trial 2: SPC, 40 feet, CGA    Time Entry(in minutes):  Gait Training Time Entry: 25  Neuromuscular Re-Education Time Entry: 2  Therapeutic Activity Time Entry: 8  Therapeutic Exercise Time Entry: 10    Assessment & Plan   Assessment: Dr. Ball performed today's session well. He achieved increased gait distance using SPC today. Attempted obstacle negotation with one UE support but he struggled with this, frequently using BUE support; he did achieve a few reps with 1 UE support with his L LE leading. He requires continued balance training without UE support to improve balance  confidence.  Evaluation/Treatment Tolerance: Patient tolerated treatment well    Patient will continue to benefit from skilled outpatient physical therapy to address the deficits listed in the problem list box on initial evaluation, provide pt/family education and to maximize pt's level of independence in the home and community environment.     Patient's spiritual, cultural, and educational needs considered and patient agreeable to plan of care and goals.           Plan: Cont with strengthening, endurance, gait, balance and functional mobility    Goals:   Active       Mobility/Balance - Short Term Goals (STG) = 4 weeks; Long Term Goals (LTG) = 8 weeks        Pt will improve Self Selected Walking Speed to at least 0.5 m/s (STG) and 0.7 m/s (LTG) indicating improved safety with community mobility.  (Ongoing)       Start:  03/19/25    Expected End:  06/26/25       3/19/2025: 0.44 m/s  5/1/2025: 0.15 m/s         LTG - Patient will be independent with HEP emphasizing LE strengthening and balance training.   (Progressing)       Start:  03/19/25    Expected End:  06/26/25       3/19/2025: Needs education         Patient will exhibit improved Araujo Balance score to >/= 22/56 (STG) and 26/56 (LTG) indicating improved static balance.   (Progressing)       Start:  03/19/25    Expected End:  06/26/25       3/19/2025: 18/56  5/1/2025: 28/56         Patient will ambulate 50 feet (STG) and 150 feet (LTG) with a SPC indicating improved gait stability and independence.  (Progressing)       Start:  03/19/25    Expected End:  06/26/25       3/19/2025: unable due to safety concerns and fearfulness  5/1/2025: 29 feet with SPC, Contact guard assistance            Patient will exhibit improved 30 Second Chair Rise to >/= 5 reps (STG) and 7 reps (LTG), indicating improved functional LE strength.     (Progressing)       Start:  03/19/25    Expected End:  06/26/25 03/19/2025: 3 reps with UE use  5/1/2025: 5.5 reps with UE use              Codi Owen, PT

## 2025-05-16 RX ORDER — AMOXICILLIN 875 MG/1
875 TABLET, COATED ORAL 2 TIMES DAILY
Qty: 14 TABLET | Refills: 0 | Status: SHIPPED | OUTPATIENT
Start: 2025-05-16

## 2025-05-24 DIAGNOSIS — F41.9 ANXIETY: ICD-10-CM

## 2025-05-24 NOTE — TELEPHONE ENCOUNTER
No care due was identified.  [facility] Embedded Care Due Messages. Reference number: [id number].   5/24/2025 3:43:56 AM CDT

## 2025-05-24 NOTE — TELEPHONE ENCOUNTER
Refill Routing Note   Medication(s) are not appropriate for processing by [facility] for the following reason(s):        Outside of protocol    [facility] action(s):  Route               Appointments  past 12m or future 3m with PCP    Date Provider   Last Visit   1/28/2025 Isaias Myles MD   Next Visit   Visit date not found Isaias Myles MD   ED visits in past 90 days: 0        Note composed:12:17 PM 05/24/2025

## 2025-05-26 RX ORDER — BUSPIRONE HYDROCHLORIDE 15 MG/1
TABLET ORAL
Qty: 180 TABLET | Refills: 3 | Status: SHIPPED | OUTPATIENT
Start: 2025-05-26

## 2025-05-27 ENCOUNTER — CLINICAL SUPPORT (OUTPATIENT)
Dept: REHABILITATION | Facility: HOSPITAL | Age: 75
End: 2025-05-27
Payer: MEDICARE

## 2025-05-27 DIAGNOSIS — Z74.09 IMPAIRED FUNCTIONAL MOBILITY, BALANCE, GAIT, AND ENDURANCE: Primary | ICD-10-CM

## 2025-05-27 PROCEDURE — 97530 THERAPEUTIC ACTIVITIES: CPT | Mod: PO

## 2025-05-27 PROCEDURE — 97110 THERAPEUTIC EXERCISES: CPT | Mod: PO

## 2025-05-27 PROCEDURE — 97116 GAIT TRAINING THERAPY: CPT | Mod: PO

## 2025-05-27 NOTE — PROGRESS NOTES
Outpatient Rehab    Physical Therapy Visit    Patient Name: Anthony Ball  MRN: [id number]  YOB: 1950  Encounter Date: 5/27/2025    Therapy Diagnosis:   Encounter Diagnosis   Name Primary?    Impaired functional mobility, balance, gait, and endurance Yes     Physician: Isaias Myles MD    Physician Orders: Eval and Treat  Medical Diagnosis: Diabetic polyneuropathy associated with type 2 diabetes mellitus  Weakness of both lower extremities  Mobility impaired    Visit # / Visits Authorized:  10 / 20  Insurance Authorization Period: 3/19/2025 to 12/31/2025  Date of Evaluation: 3/19/2025  Plan of Care Certification: 5/15/2025 to 6/26/2025      PT/PTA:     Number of PTA visits since last PT visit:   Time In: 0930   Time Out: 1013  Total Time (in minutes): 43   Total Billable Time (in minutes): 43        Precautions:     Standard, Fall, Neuropathy, Back pain      Subjective   Anthony reports that he took Robaxin this morning, so his back feels ok..  Pain reported as 0/10.      Objective            Treatment:  Therapeutic Exercise  TE 1: x 8 min on Sci Fit recumbent stepper.  B UE/B LE on level 5 [place]amp  Therapeutic Activity  TA 1: Ascending<>descending training steps x 2 trials, BUE support, CGA  Gait Training  GT 1: Trial 1: SPC x 18 feet, CGA - Trial 2: SPC, 28 feet, CGA - Trial 3: Rollator, 238 feet, CGA - Trial 4: RW, 40 feet, CGA    Time Entry(in minutes):  Gait Training Time Entry: 27  Therapeutic Activity Time Entry: 8  Therapeutic Exercise Time Entry: 8    Assessment & Plan   Assessment: Dr. Ball performed today's session fairly well. Frequent seated rest breaks required throughout session due to fatigue and back pain. His gait with SPC is very cautious with small step lengths bilaterally. His gait with the rollator was more much efficient; improved stride length and distance achieved. However, he is not fond of use of the rollator, stating that it moves too freely. Discussed with patient that  the most realistic goal for PT is household ambulation with the SPC and community ambulation using a walker, due to the inefficiency and poor tolerance with gait with the SPC. Also discussed eventual discharge from outpatient PT. Informed him that outpatient PT POC is meant to be temporary. He demonstrated some reluctance; reinforcement likely required.  Evaluation/Treatment Tolerance: Patient tolerated treatment well    The patient will continue to benefit from skilled outpatient physical therapy in order to address the deficits listed in the problem list on the initial evaluation, provide patient and family education, and maximize the patients level of independence in the home and community environments.     The patient's spiritual, cultural, and educational needs were considered, and the patient is agreeable to the plan of care and goals.           Plan: Cont with strengthening, endurance, gait, balance and functional mobility    Goals:   Active       Mobility/Balance - Short Term Goals (STG) = 4 weeks; Long Term Goals (LTG) = 8 weeks        Pt will improve Self Selected Walking Speed to at least 0.5 m/s (STG) and 0.7 m/s (LTG) indicating improved safety with community mobility.  (Ongoing)       Start:  03/19/25    Expected End:  06/26/25       3/19/2025: 0.44 m/s  5/1/2025: 0.15 m/s         LTG - Patient will be independent with HEP emphasizing LE strengthening and balance training.   (Progressing)       Start:  03/19/25    Expected End:  06/26/25       3/19/2025: Needs education         Patient will exhibit improved Araujo Balance score to >/= 22/56 (STG) and 26/56 (LTG) indicating improved static balance.   (Progressing)       Start:  03/19/25    Expected End:  06/26/25       3/19/2025: 18/56  5/1/2025: 28/56         Patient will ambulate 50 feet (STG) and 150 feet (LTG) with a SPC indicating improved gait stability and independence.  (Progressing)       Start:  03/19/25    Expected End:  06/26/25        3/19/2025: unable due to safety concerns and fearfulness  5/1/2025: 29 feet with SPC, Contact guard assistance            Patient will exhibit improved 30 Second Chair Rise to >/= 5 reps (STG) and 7 reps (LTG), indicating improved functional LE strength.     (Progressing)       Start:  03/19/25    Expected End:  06/26/25 03/19/2025: 3 reps with UE use  5/1/2025: 5.5 reps with UE use             Codi Owen PT

## 2025-05-29 ENCOUNTER — CLINICAL SUPPORT (OUTPATIENT)
Dept: REHABILITATION | Facility: HOSPITAL | Age: 75
End: 2025-05-29
Payer: MEDICARE

## 2025-05-29 DIAGNOSIS — Z74.09 IMPAIRED FUNCTIONAL MOBILITY, BALANCE, GAIT, AND ENDURANCE: Primary | ICD-10-CM

## 2025-05-29 PROCEDURE — 97110 THERAPEUTIC EXERCISES: CPT | Mod: PO

## 2025-05-29 PROCEDURE — 97530 THERAPEUTIC ACTIVITIES: CPT | Mod: PO

## 2025-05-29 NOTE — PROGRESS NOTES
"  Outpatient Rehab    Physical Therapy Visit    Patient Name: Anthony Ball  MRN: [id number]  YOB: 1950  Encounter Date: 5/29/2025    Therapy Diagnosis:   Encounter Diagnosis   Name Primary?    Impaired functional mobility, balance, gait, and endurance Yes     Physician: Isaias Myles MD    Physician Orders: Eval and Treat  Medical Diagnosis: Diabetic polyneuropathy associated with type 2 diabetes mellitus  Weakness of both lower extremities  Mobility impaired    Visit # / Visits Authorized:  11 / 20  Insurance Authorization Period: 3/19/2025 to 12/31/2025  Date of Evaluation: 3/19/2025  Plan of Care Certification: 5/15/2025 to 6/26/2025      PT/PTA: PT   Number of PTA visits since last PT visit:0  Time In: 0934   Time Out: 1017  Total Time (in minutes): 43   Total Billable Time (in minutes): 43      Precautions:     Standard, Fall, Neuropathy, Back pain      Subjective   Dr. Ball took a robaxin this morning for back pain..  Pain reported as 0/10.      Objective            Treatment:  Therapeutic Exercise  TE 1: x 8 min on Sci Fit recumbent stepper.  B UE/B LE on level 5 [place] Ramp (progress to L6 next visit)  Balance/Neuromuscular Re-Education  NMR 1: Ballet bar: (next visit) Split stance (4") balloon taps - Fwd stepping over orange hurdles  Therapeutic Activity  TA 1: Ballet bar: Ambulation with 1 UE support x 4 laps  TA 2: Ambulation tolerance: Trial 1 - RW, SBA/CGA x 145 feet - Trial 2: SPC, CGA x 14 feet - Trial 3: SPC, CGA x 22 feet    Time Entry(in minutes):  Therapeutic Activity Time Entry: 35  Therapeutic Exercise Time Entry: 8    Assessment & Plan   Assessment: Dr. Ball performed today's session fairly well. His gait tolerance using SPC remains very limited compared to use of RW; his limiting factor is his back pain. Participation is limited by poor activity tolerance (pain, lightheadedness).  Evaluation/Treatment Tolerance: Patient limited by pain    The patient will continue to benefit " from skilled outpatient physical therapy in order to address the deficits listed in the problem list on the initial evaluation, provide patient and family education, and maximize the patients level of independence in the home and community environments.     The patient's spiritual, cultural, and educational needs were considered, and the patient is agreeable to the plan of care and goals.           Plan: Cont with strengthening, endurance, gait, balance and functional mobility    Goals:   Active       Mobility/Balance - Short Term Goals (STG) = 4 weeks; Long Term Goals (LTG) = 8 weeks        Pt will improve Self Selected Walking Speed to at least 0.5 m/s (STG) and 0.7 m/s (LTG) indicating improved safety with community mobility.  (Ongoing)       Start:  03/19/25    Expected End:  06/26/25       3/19/2025: 0.44 m/s  5/1/2025: 0.15 m/s         LTG - Patient will be independent with HEP emphasizing LE strengthening and balance training.   (Progressing)       Start:  03/19/25    Expected End:  06/26/25       3/19/2025: Needs education         Patient will exhibit improved Araujo Balance score to >/= 22/56 (STG) and 26/56 (LTG) indicating improved static balance.   (Progressing)       Start:  03/19/25    Expected End:  06/26/25       3/19/2025: 18/56  5/1/2025: 28/56         Patient will ambulate 50 feet (STG) and 150 feet (LTG) with a SPC indicating improved gait stability and independence.  (Progressing)       Start:  03/19/25    Expected End:  06/26/25       3/19/2025: unable due to safety concerns and fearfulness  5/1/2025: 29 feet with SPC, Contact guard assistance            Patient will exhibit improved 30 Second Chair Rise to >/= 5 reps (STG) and 7 reps (LTG), indicating improved functional LE strength.     (Progressing)       Start:  03/19/25    Expected End:  06/26/25 03/19/2025: 3 reps with UE use  5/1/2025: 5.5 reps with UE use             Codi Owen PT

## 2025-06-03 ENCOUNTER — CLINICAL SUPPORT (OUTPATIENT)
Dept: REHABILITATION | Facility: HOSPITAL | Age: 75
End: 2025-06-03
Payer: MEDICARE

## 2025-06-03 DIAGNOSIS — Z74.09 IMPAIRED FUNCTIONAL MOBILITY, BALANCE, GAIT, AND ENDURANCE: Primary | ICD-10-CM

## 2025-06-03 PROCEDURE — 97110 THERAPEUTIC EXERCISES: CPT | Mod: PO

## 2025-06-03 PROCEDURE — 97530 THERAPEUTIC ACTIVITIES: CPT | Mod: PO

## 2025-06-05 ENCOUNTER — CLINICAL SUPPORT (OUTPATIENT)
Dept: REHABILITATION | Facility: HOSPITAL | Age: 75
End: 2025-06-05
Payer: MEDICARE

## 2025-06-05 DIAGNOSIS — Z74.09 IMPAIRED FUNCTIONAL MOBILITY, BALANCE, GAIT, AND ENDURANCE: Primary | ICD-10-CM

## 2025-06-05 PROCEDURE — 97110 THERAPEUTIC EXERCISES: CPT | Mod: PO

## 2025-06-05 PROCEDURE — 97530 THERAPEUTIC ACTIVITIES: CPT | Mod: PO

## 2025-06-06 ENCOUNTER — DOCUMENTATION ONLY (OUTPATIENT)
Dept: REHABILITATION | Facility: HOSPITAL | Age: 75
End: 2025-06-06
Payer: MEDICARE

## 2025-06-13 ENCOUNTER — CLINICAL SUPPORT (OUTPATIENT)
Dept: INTERNAL MEDICINE | Facility: CLINIC | Age: 75
End: 2025-06-13
Payer: MEDICARE

## 2025-06-13 ENCOUNTER — OFFICE VISIT (OUTPATIENT)
Dept: INTERNAL MEDICINE | Facility: CLINIC | Age: 75
End: 2025-06-13
Payer: MEDICARE

## 2025-06-13 ENCOUNTER — PATIENT MESSAGE (OUTPATIENT)
Dept: INTERNAL MEDICINE | Facility: CLINIC | Age: 75
End: 2025-06-13

## 2025-06-13 VITALS
SYSTOLIC BLOOD PRESSURE: 111 MMHG | HEART RATE: 106 BPM | DIASTOLIC BLOOD PRESSURE: 76 MMHG | WEIGHT: 199.5 LBS | OXYGEN SATURATION: 98 % | TEMPERATURE: 98 F | HEIGHT: 69 IN | BODY MASS INDEX: 29.55 KG/M2

## 2025-06-13 DIAGNOSIS — E53.8 DEFICIENCY OF OTHER SPECIFIED B GROUP VITAMINS: ICD-10-CM

## 2025-06-13 DIAGNOSIS — Z12.5 ENCOUNTER FOR SCREENING FOR MALIGNANT NEOPLASM OF PROSTATE: ICD-10-CM

## 2025-06-13 DIAGNOSIS — Z00.00 ANNUAL PHYSICAL EXAM: Primary | ICD-10-CM

## 2025-06-13 DIAGNOSIS — Z00.00 ENCOUNTER FOR ANNUAL HEALTH EXAMINATION: Primary | ICD-10-CM

## 2025-06-13 DIAGNOSIS — I48.0 PAROXYSMAL A-FIB: ICD-10-CM

## 2025-06-13 DIAGNOSIS — E55.9 VITAMIN D DEFICIENCY, UNSPECIFIED: ICD-10-CM

## 2025-06-13 DIAGNOSIS — F51.01 PRIMARY INSOMNIA: Chronic | ICD-10-CM

## 2025-06-13 DIAGNOSIS — F41.9 ANXIETY: ICD-10-CM

## 2025-06-13 DIAGNOSIS — I10 ESSENTIAL HYPERTENSION: ICD-10-CM

## 2025-06-13 DIAGNOSIS — R26.89 IMPAIRED GAIT AND MOBILITY: ICD-10-CM

## 2025-06-13 DIAGNOSIS — E11.42 TYPE 2 DIABETES MELLITUS WITH DIABETIC POLYNEUROPATHY, WITHOUT LONG-TERM CURRENT USE OF INSULIN: ICD-10-CM

## 2025-06-13 DIAGNOSIS — E55.9 VITAMIN D DEFICIENCY: ICD-10-CM

## 2025-06-13 LAB
25(OH)D3+25(OH)D2 SERPL-MCNC: 6 NG/ML (ref 30–96)
ABSOLUTE EOSINOPHIL (OHS): 0.2 K/UL
ABSOLUTE MONOCYTE (OHS): 0.32 K/UL (ref 0.3–1)
ABSOLUTE NEUTROPHIL COUNT (OHS): 7.08 K/UL (ref 1.8–7.7)
ALBUMIN SERPL BCP-MCNC: 3 G/DL (ref 3.5–5.2)
ALP SERPL-CCNC: 175 UNIT/L (ref 40–150)
ALT SERPL W/O P-5'-P-CCNC: 22 UNIT/L (ref 10–44)
ANION GAP (OHS): 12 MMOL/L (ref 8–16)
AST SERPL-CCNC: 20 UNIT/L (ref 11–45)
BASOPHILS # BLD AUTO: 0.04 K/UL
BASOPHILS NFR BLD AUTO: 0.4 %
BILIRUB SERPL-MCNC: 0.4 MG/DL (ref 0.1–1)
BNP SERPL-MCNC: 60 PG/ML (ref 0–99)
BUN SERPL-MCNC: 16 MG/DL (ref 8–23)
CALCIUM SERPL-MCNC: 8.3 MG/DL (ref 8.7–10.5)
CHLORIDE SERPL-SCNC: 104 MMOL/L (ref 95–110)
CHOLEST SERPL-MCNC: 130 MG/DL (ref 120–199)
CHOLEST/HDLC SERPL: 1.8 {RATIO} (ref 2–5)
CO2 SERPL-SCNC: 20 MMOL/L (ref 23–29)
CREAT SERPL-MCNC: 1.5 MG/DL (ref 0.5–1.4)
EAG (OHS): 97 MG/DL (ref 68–131)
ERYTHROCYTE [DISTWIDTH] IN BLOOD BY AUTOMATED COUNT: 13.9 % (ref 11.5–14.5)
FERRITIN SERPL-MCNC: 198 NG/ML (ref 20–300)
FOLATE SERPL-MCNC: 9.4 NG/ML (ref 4–24)
GFR SERPLBLD CREATININE-BSD FMLA CKD-EPI: 49 ML/MIN/1.73/M2
GLUCOSE SERPL-MCNC: 130 MG/DL (ref 70–110)
HBA1C MFR BLD: 5 % (ref 4–5.6)
HCT VFR BLD AUTO: 39.8 % (ref 40–54)
HDLC SERPL-MCNC: 74 MG/DL (ref 40–75)
HDLC SERPL: 56.9 % (ref 20–50)
HGB BLD-MCNC: 13.3 GM/DL (ref 14–18)
IMM GRANULOCYTES # BLD AUTO: 0.08 K/UL (ref 0–0.04)
IMM GRANULOCYTES NFR BLD AUTO: 0.9 % (ref 0–0.5)
IRON SATN MFR SERPL: 30 % (ref 20–50)
IRON SERPL-MCNC: 75 UG/DL (ref 45–160)
LDLC SERPL CALC-MCNC: 33 MG/DL (ref 63–159)
LYMPHOCYTES # BLD AUTO: 1.61 K/UL (ref 1–4.8)
MAGNESIUM SERPL-MCNC: 1.8 MG/DL (ref 1.6–2.6)
MCH RBC QN AUTO: 30.6 PG (ref 27–31)
MCHC RBC AUTO-ENTMCNC: 33.4 G/DL (ref 32–36)
MCV RBC AUTO: 92 FL (ref 82–98)
NONHDLC SERPL-MCNC: 56 MG/DL
NUCLEATED RBC (/100WBC) (OHS): 0 /100 WBC
PLATELET # BLD AUTO: 174 K/UL (ref 150–450)
PMV BLD AUTO: 10 FL (ref 9.2–12.9)
POTASSIUM SERPL-SCNC: 3.5 MMOL/L (ref 3.5–5.1)
PROT SERPL-MCNC: 6.4 GM/DL (ref 6–8.4)
PSA SERPL-MCNC: 0.97 NG/ML
RBC # BLD AUTO: 4.35 M/UL (ref 4.6–6.2)
RELATIVE EOSINOPHIL (OHS): 2.1 %
RELATIVE LYMPHOCYTE (OHS): 17.3 % (ref 18–48)
RELATIVE MONOCYTE (OHS): 3.4 % (ref 4–15)
RELATIVE NEUTROPHIL (OHS): 75.9 % (ref 38–73)
SODIUM SERPL-SCNC: 136 MMOL/L (ref 136–145)
TIBC SERPL-MCNC: 252 UG/DL (ref 250–450)
TRANSFERRIN SERPL-MCNC: 170 MG/DL (ref 200–375)
TRIGL SERPL-MCNC: 115 MG/DL (ref 30–150)
TSH SERPL-ACNC: 3.07 UIU/ML (ref 0.4–4)
VIT B12 SERPL-MCNC: >2000 PG/ML (ref 210–950)
WBC # BLD AUTO: 9.33 K/UL (ref 3.9–12.7)

## 2025-06-13 PROCEDURE — 82746 ASSAY OF FOLIC ACID SERUM: CPT

## 2025-06-13 PROCEDURE — 82728 ASSAY OF FERRITIN: CPT

## 2025-06-13 PROCEDURE — 83880 ASSAY OF NATRIURETIC PEPTIDE: CPT

## 2025-06-13 PROCEDURE — 82607 VITAMIN B-12: CPT

## 2025-06-13 PROCEDURE — 85025 COMPLETE CBC W/AUTO DIFF WBC: CPT

## 2025-06-13 PROCEDURE — 99214 OFFICE O/P EST MOD 30 MIN: CPT | Mod: S$PBB,,, | Performed by: INTERNAL MEDICINE

## 2025-06-13 PROCEDURE — 83036 HEMOGLOBIN GLYCOSYLATED A1C: CPT

## 2025-06-13 PROCEDURE — 84450 TRANSFERASE (AST) (SGOT): CPT

## 2025-06-13 PROCEDURE — 80061 LIPID PANEL: CPT

## 2025-06-13 PROCEDURE — 84443 ASSAY THYROID STIM HORMONE: CPT

## 2025-06-13 PROCEDURE — 84425 ASSAY OF VITAMIN B-1: CPT

## 2025-06-13 PROCEDURE — 99215 OFFICE O/P EST HI 40 MIN: CPT | Mod: PBBFAC | Performed by: INTERNAL MEDICINE

## 2025-06-13 PROCEDURE — 99999 PR PBB SHADOW E&M-EST. PATIENT-LVL V: CPT | Mod: PBBFAC,,, | Performed by: INTERNAL MEDICINE

## 2025-06-13 PROCEDURE — 83540 ASSAY OF IRON: CPT

## 2025-06-13 PROCEDURE — 84153 ASSAY OF PSA TOTAL: CPT

## 2025-06-13 PROCEDURE — 82306 VITAMIN D 25 HYDROXY: CPT

## 2025-06-13 PROCEDURE — 83735 ASSAY OF MAGNESIUM: CPT

## 2025-06-13 RX ORDER — CHLORHEXIDINE GLUCONATE ORAL RINSE 1.2 MG/ML
SOLUTION DENTAL
COMMUNITY
Start: 2025-06-04 | End: 2025-06-13

## 2025-06-13 RX ORDER — AMITRIPTYLINE HYDROCHLORIDE 10 MG/1
TABLET, FILM COATED ORAL
COMMUNITY
Start: 2025-01-31 | End: 2025-06-13

## 2025-06-13 RX ORDER — HYDROCODONE BITARTRATE AND ACETAMINOPHEN 7.5; 325 MG/1; MG/1
1 TABLET ORAL EVERY 6 HOURS PRN
COMMUNITY
Start: 2025-06-04 | End: 2025-06-13

## 2025-06-13 RX ORDER — ACETAMINOPHEN 500 MG
5000 TABLET ORAL DAILY
Qty: 90 TABLET | Refills: 3 | Status: SHIPPED | OUTPATIENT
Start: 2025-06-13

## 2025-06-17 NOTE — PROGRESS NOTES
Subjective:       Patient ID: Anthony Ball is a 75 y.o. male.    Chief Complaint: Annual Exam      HPI  Annual health exam. Reviewed medical, surgical, social and family history, medications, appropriate preventive health screenings, as well as vaccination history. Updates as noted below or in assessment and plan.    Doing relatively well. No UTI recently. Feels like building muscle with PT. Taking prescribed meds. Using Lantus 15 U daily without lows and feels fine using this regimen for now. Tradjenta was too expensive.  Does note anxiousness. Again, is taking and tolerating current meds but admits he hesitates to add more for now.    Review of Systems   All other systems reviewed and are negative.      Past Medical History:   Diagnosis Date    Acute deep vein thrombosis (DVT) of left lower extremity 12/2023    Anxiety     Atrial fibrillation 12/2023    Cataract     Coronary artery disease     CAC score 1430    Depression     Diabetic neuropathy     Essential (primary) hypertension     GERD (gastroesophageal reflux disease)     History of bariatric surgery 07/08/2021    History of total right knee replacement 07/08/2021    Insomnia     Neuromyopathy     Possibly DM and/or alcohol related.    Pulmonary embolism 12/2023    Reactive airway disease     Type 2 diabetes mellitus        Current Medications[1]    Past Surgical History:   Procedure Laterality Date    CATARACT EXTRACTION W/  INTRAOCULAR LENS IMPLANT Right 7/15/2024    Procedure: EXTRACTION, CATARACT, WITH IOL INSERTION;  Surgeon: Margot Jacobson MD;  Location: [facility] OR;  Service: Ophthalmology;  Laterality: Right;    CATARACT EXTRACTION W/  INTRAOCULAR LENS IMPLANT Left 8/29/2024    Procedure: EXTRACTION, CATARACT, WITH IOL INSERTION;  Surgeon: Margot Jacobson MD;  Location: [facility] OR;  Service: Ophthalmology;  Laterality: Left;    COLONOSCOPY      Normal around 2020    COLONOSCOPY N/A 9/6/2024    Procedure: COLONOSCOPY;  Surgeon: Alessandro Serna MD;   Location: [facility] ENDO (4TH FLR);  Service: Endoscopy;  Laterality: N/A;  8/28-new case created-lvm for pre call-pt has cataract surgery 8/29/24-tb  ref-dr rico myles-[facility]-[place]  ok to hold taran myles-GT  9/5-LVM for precall-Kp[facility]    COLONOSCOPY N/A 9/6/2024    Procedure: COLONOSCOPY;  Surgeon: Alessandro Serna MD;  Location: [facility] ENDO (4TH FLR);  Service: Endoscopy;  Laterality: N/A;  + cologuard  8/8 ref by Isaias Myles MD, [facility], instr. to portal, Eliquis hold approval pending-  ok to hold Eliquis 2 days per Dr Myles-    CYSTOSCOPY WITH URETEROSCOPY, RETROGRADE PYELOGRAPHY, AND INSERTION OF STENT Right 9/27/2024    Procedure: CYSTOSCOPY, WITH RETROGRADE PYELOGRAM AND URETERAL STENT INSERTION;  Surgeon: Joni Wagoner MD;  [facility];  Service: Urology;  Laterality: Right;    NEPHROSCOPY Right 1/10/2025    Procedure: NEPHROSCOPY;  Surgeon: Joni Wagoner MD;  [facility];  Service: Urology;  Laterality: Right;    REMOVAL, CALCULUS, BLADDER N/A 9/27/2024    Procedure: REMOVAL, CALCULUS, BLADDER;  Surgeon: Joni Wagoner MD;  [facility];  Service: Urology;  Laterality: N/A;    REMOVAL-STENT Right 1/10/2025    Procedure: REMOVAL-STENT;  Surgeon: Joni Wagoner MD;  [facility];  Service: Urology;  Laterality: Right;    TOTAL KNEE ARTHROPLASTY Right     URETEROSCOPIC REMOVAL OF URETERIC CALCULUS Right 1/10/2025    Procedure: REMOVAL, CALCULUS, URETER, URETEROSCOPIC;  Surgeon: Joni Wagoner MD;  Location: [facility] OR [address] [place] [address];  Service: Urology;  Laterality: Right;    URETEROSCOPY Right 1/10/2025    Procedure: URETEROSCOPY;  Surgeon: Joni Wagoner MD;  [facility];  Service: Urology;  Laterality: Right;       Family History   Problem Relation Name Age of Onset    Hypertension Mother      Colon cancer Paternal Grandfather  89       Social History[2]    Immunization History   Administered Date(s) Administered    COVID-19, MRNA, LN-S, PF  (Pfizer) (Gray Cap) 03/24/2022    COVID-19, MRNA, LN-S, PF (Pfizer) (Purple Cap) 12/18/2020, 12/18/2020, 01/08/2021, 01/08/2021, 08/17/2021, 03/24/2022, 12/13/2022    COVID-19, mRNA, LNP-S, PF, aniceto-sucrose, 30 mcg/0.3 mL (Pfizer Ages 12+) 12/13/2024    COVID-19, mRNA, LNP-S, bivalent booster, PF (PFIZER OMICRON) 12/13/2022    Influenza (FLUAD) - Quadrivalent - Adjuvanted - PF *Preferred* (65+) 09/28/2021, 09/15/2022    Influenza - Quadrivalent - PF *Preferred* (6 months and older) 09/28/2007    Influenza - Trivalent - Fluad - Adjuvanted - PF (65 years and older 10/07/2024    Influenza - Trivalent - Fluzone High Dose - PF (65 years and older) 06/30/2021    PPD Test 01/16/2024    Pneumococcal Conjugate - 20 Valent 07/21/2022    Tdap 12/02/2014    Vaccinia, smallpox monkeypox vaccine live, PF 09/15/2022    Zoster Recombinant 07/21/2021, 09/28/2021         Objective:      Vitals:    06/13/25 1026   BP: 111/76   Pulse: 106   Temp: 98 °F (36.7 °C)       Physical Exam  Constitutional:       General: He is not in acute distress.     Appearance: Normal appearance. He is well-developed. He is not ill-appearing.   HENT:      Head: Normocephalic and atraumatic.      Right Ear: Hearing and tympanic membrane normal. There is no impacted cerumen.      Left Ear: Hearing and tympanic membrane normal. There is no impacted cerumen.      Nose: Nose normal.      Mouth/Throat:      Mouth: Mucous membranes are moist.      Pharynx: Oropharynx is clear.   Eyes:      Extraocular Movements: Extraocular movements intact.      Conjunctiva/sclera: Conjunctivae normal.      Pupils: Pupils are equal, round, and reactive to light.   Neck:      Vascular: No carotid bruit.   Cardiovascular:      Rate and Rhythm: Normal rate and regular rhythm.      Heart sounds: Normal heart sounds. No murmur heard.  Pulmonary:      Effort: Pulmonary effort is normal. No respiratory distress.      Breath sounds: Normal breath sounds. No wheezing, rhonchi or rales.    Abdominal:      General: Abdomen is flat. There is no distension.      Palpations: Abdomen is soft. There is no mass.      Tenderness: There is no abdominal tenderness.      Hernia: No hernia is present.   Musculoskeletal:         General: No swelling or deformity. Normal range of motion.      Cervical back: No tenderness.      Right lower leg: No edema.      Left lower leg: No edema.   Lymphadenopathy:      Cervical: No cervical adenopathy.   Skin:     General: Skin is warm and dry.      Findings: No lesion or rash.   Neurological:      General: No focal deficit present.      Mental Status: He is alert and oriented to person, place, and time. Mental status is at baseline.      Motor: Weakness (Generalized) present.   Psychiatric:         Mood and Affect: Mood normal.         Behavior: Behavior normal.         Thought Content: Thought content normal.         Judgment: Judgment normal.         Recent Results (from the past 12 weeks)   Comprehensive Metabolic Panel    Collection Time: 06/13/25 10:13 AM   Result Value Ref Range    Sodium 136 136 - 145 mmol/L    Potassium 3.5 3.5 - 5.1 mmol/L    Chloride 104 95 - 110 mmol/L    CO2 20 (L) 23 - 29 mmol/L    Glucose 130 (H) 70 - 110 mg/dL    BUN 16 8 - 23 mg/dL    Creatinine 1.5 (H) 0.5 - 1.4 mg/dL    Calcium 8.3 (L) 8.7 - 10.5 mg/dL    Protein Total 6.4 6.0 - 8.4 gm/dL    Albumin 3.0 (L) 3.5 - 5.2 g/dL    Bilirubin Total 0.4 0.1 - 1.0 mg/dL     (H) 40 - 150 unit/L    AST 20 11 - 45 unit/L    ALT 22 10 - 44 unit/L    Anion Gap 12 8 - 16 mmol/L    eGFR 49 (L) >60 mL/min/1.73/m2   Hemoglobin A1C    Collection Time: 06/13/25 10:13 AM   Result Value Ref Range    Hemoglobin A1c 5.0 4.0 - 5.6 %    Estimated Average Glucose 97 68 - 131 mg/dL   Lipid Panel    Collection Time: 06/13/25 10:13 AM   Result Value Ref Range    Cholesterol Total 130 120 - 199 mg/dL    Triglyceride 115 30 - 150 mg/dL    HDL Cholesterol 74 40 - 75 mg/dL    LDL Cholesterol 33.0 (L) 63.0 - 159.0  mg/dL    HDL/Cholesterol Ratio 56.9 (H) 20.0 - 50.0 %    Cholesterol/HDL Ratio 1.8 (L) 2.0 - 5.0    Non HDL Cholesterol 56 mg/dL   TSH    Collection Time: 06/13/25 10:13 AM   Result Value Ref Range    TSH 3.071 0.400 - 4.000 uIU/mL   PSA, Screening    Collection Time: 06/13/25 10:13 AM   Result Value Ref Range    Prostate Specific Antigen 0.97 <=4.00 ng/mL   Vitamin D    Collection Time: 06/13/25 10:13 AM   Result Value Ref Range    Vitamin D 6 (L) 30 - 96 ng/mL   Vitamin B12    Collection Time: 06/13/25 10:13 AM   Result Value Ref Range    Vitamin B12 >2,000 (H) 210 - 950 pg/mL   Magnesium    Collection Time: 06/13/25 10:13 AM   Result Value Ref Range    Magnesium  1.8 1.6 - 2.6 mg/dL   Ferritin    Collection Time: 06/13/25 10:13 AM   Result Value Ref Range    Ferritin 198.0 20.0 - 300.0 ng/mL   Folate    Collection Time: 06/13/25 10:13 AM   Result Value Ref Range    Folate Level 9.4 4.0 - 24.0 ng/mL   Iron and TIBC    Collection Time: 06/13/25 10:13 AM   Result Value Ref Range    Iron Level 75 45 - 160 ug/dL    Transferrin 170 (L) 200 - 375 mg/dL    Iron Binding Capacity Total 252 250 - 450 ug/dL    Iron Saturation 30 20 - 50 %   CBC with Differential    Collection Time: 06/13/25 10:13 AM   Result Value Ref Range    WBC 9.33 3.90 - 12.70 K/uL    RBC 4.35 (L) 4.60 - 6.20 M/uL    HGB 13.3 (L) 14.0 - 18.0 gm/dL    HCT 39.8 (L) 40.0 - 54.0 %    MCV 92 82 - 98 fL    MCH 30.6 27.0 - 31.0 pg    MCHC 33.4 32.0 - 36.0 g/dL    RDW 13.9 11.5 - 14.5 %    Platelet Count 174 150 - 450 K/uL    MPV 10.0 9.2 - 12.9 fL    Nucleated RBC 0 <=0 /100 WBC    Neut % 75.9 (H) 38 - 73 %    Lymph % 17.3 (L) 18 - 48 %    Mono % 3.4 (L) 4 - 15 %    Eos % 2.1 <=8 %    Basophil % 0.4 <=1.9 %    Imm Grans % 0.9 (H) 0.0 - 0.5 %    Neut # 7.08 1.8 - 7.7 K/uL    Lymph # 1.61 1 - 4.8 K/uL    Mono # 0.32 0.3 - 1 K/uL    Eos # 0.20 <=0.5 K/uL    Baso # 0.04 <=0.2 K/uL    Imm Grans # 0.08 (H) 0.00 - 0.04 K/uL   BNP    Collection Time: 06/13/25 10:13 AM  "  Result Value Ref Range    BNP 60 0 - 99 pg/mL          Assessment/Plan:     1) Annual wellness exam  2) T2DM - Controlled on current regimen without lows. He would like to continue current regimen. Is monitor levels at home. A1c repeat 3 to 6 mths. Yearly eye exam. Foot exam intact.  3) GERD - Controlled on daily PPI.  4) Insomnia, Depression, Anxiety - Some anxiousness but depression seems stable on current regimen. He feels comfortable continuing current regimen for now. Didn't feel Buspar did anything in past. Trazodone at 200 is helping sleepl.  5) BCC hx - See Derm yearly for screening.  6) Neuropathy (diabetic and previous alcohol related) - Stable. Does seem to be building muscle with PT. Would continue PT long-term.  7) A-fib hx - RRR today. On Eliquis. Notes he can tell he does go in and out of a-fib at times but not significantly elevated hr's.    - Colonoscopy 9/24 with plan for 5 yr f/u.  - PSA normal.  - Vaccines reviewed.  - Lipids normal.         [1]   Current Outpatient Medications:     albuterol (VENTOLIN HFA) 90 mcg/actuation inhaler, Inhale 2 puffs into the lungs every 6 (six) hours as needed for Wheezing. Rescue, Disp: 8 g, Rfl: 2    apixaban (ELIQUIS) 5 mg Tab, TAKE 1 TABLET(5 MG) BY MOUTH TWICE DAILY, Disp: 180 tablet, Rfl: 3    BD ULTRA-FINE MINI PEN NEEDLE 31 gauge x 3/16" Ndle, SMARTSIG:SUB-Q 3-4 Times Daily, Disp: , Rfl:     blood sugar diagnostic (TRUE METRIX GLUCOSE TEST STRIP) Strp, Check blood sugar 3 times daily., Disp: 300 strip, Rfl: 5    blood-glucose meter Misc, To check BG 3 times daily, to use with insurance preferred meter, Disp: 1 each, Rfl: 0    blood-glucose sensor (FREESTYLE SOPHY 3 PLUS SENSOR) Rima, 1 Device by Misc.(Non-Drug; Combo Route) route every 14 (fourteen) days., Disp: 6 each, Rfl: 3    budesonide-formoterol 80-4.5 mcg (SYMBICORT) 80-4.5 mcg/actuation HFAA, Inhale 2 puffs into the lungs., Disp: , Rfl:     cyanocobalamin (VITAMIN B-12) 1000 MCG tablet, Take 1 " tablet (1,000 mcg total) by mouth once daily., Disp: 90 tablet, Rfl: 3    DULoxetine (CYMBALTA) 60 MG capsule, Take 1 capsule (60 mg total) by mouth 2 (two) times daily., Disp: 180 capsule, Rfl: 3    flash glucose scanning reader (FREESTYLE SOPHY 14 DAY READER) Misc, Use device to check blood glucose level 3 times daily., Disp: 1 each, Rfl: 0    fluticasone-salmeterol diskus inhaler 250-50 mcg, , Disp: , Rfl:     hydrOXYzine pamoate (VISTARIL) 25 MG Cap, Take 25 mg by mouth every 8 (eight) hours as needed (Itching)., Disp: , Rfl:     insulin glargine U-100, Lantus, (LANTUS SOLOSTAR U-100 INSULIN) 100 unit/mL (3 mL) InPn pen, Inject 15 Units into the skin once daily., Disp: 9 mL, Rfl: 5    insulin glargine-yfgn 100 unit/mL (3 mL) InPn, INJECT 15 UNITS UNDER THE SKIN ONCE DAILY, Disp: 6 mL, Rfl: 0    insulin regular human (NOVOLIN R FLEXPEN) 100 unit/mL (3 mL) InPn pen, Inject 5 Units subcutaneously 2 to 3 times daily before breakfast, lunch and/or dinner., Disp: 9 mL, Rfl: 3    lancets 33 gauge Misc, To check BG 3 times daily, to use with insurance preferred meter, Disp: 100 each, Rfl: 2    magnesium oxide 400 mg magnesium Tab, Take 1 tablet by mouth every evening., Disp: 90 tablet, Rfl: 3    metFORMIN (GLUCOPHAGE-XR) 500 MG ER 24hr tablet, TAKE 1 TABLET(500 MG) BY MOUTH TWICE DAILY WITH MEALS, Disp: 180 tablet, Rfl: 3    methenamine (HIPREX) 1 gram Tab, Take 1 tablet (1 g total) by mouth 2 (two) times daily., Disp: 180 tablet, Rfl: 3    methocarbamoL (ROBAXIN) 750 MG Tab, Take 1 tablet (750 mg total) by mouth 3 (three) times daily as needed (Back pain)., Disp: 270 tablet, Rfl: 1    NOVOLOG FLEXPEN U-100 INSULIN 100 unit/mL (3 mL) InPn pen, SMARTSI Unit(s) SUB-Q 2-3 Times Daily, Disp: , Rfl:     ondansetron (ZOFRAN) 8 MG tablet, Take 1 tablet (8 mg total) by mouth every 8 (eight) hours as needed for Nausea., Disp: 30 tablet, Rfl: 1    pantoprazole (PROTONIX) 40 MG tablet, Take 1 tablet (40 mg total) by mouth once  "daily., Disp: 90 tablet, Rfl: 3    pen needle,diabetic dual safty (BD AUTOSHIELD DUO PEN NEEDLE) 30 gauge x 3/16" Ndle, 1 Needle by Misc.(Non-Drug; Combo Route) route 3 (three) times daily., Disp: 100 each, Rfl: 5    pregabalin (LYRICA) 150 MG capsule, Take 1 capsule (150 mg total) by mouth 3 (three) times daily., Disp: 270 capsule, Rfl: 3    tamsulosin (FLOMAX) 0.4 mg Cap, Take 1 capsule (0.4 mg total) by mouth once daily., Disp: 90 capsule, Rfl: 3    traZODone (DESYREL) 100 MG tablet, TAKE 2 TABLETS(200 MG) BY MOUTH EVERY NIGHT AS NEEDED FOR INSOMNIA, Disp: 180 tablet, Rfl: 3    capsaicin (ZOSTRIX) 0.025 % cream, Apply topically every 3 (three) months. (to both feet) (Patient not taking: Reported on 6/13/2025), Disp: , Rfl:     cholecalciferol, vitamin D3, (VITAMIN D3) 125 mcg (5,000 unit) Tab, Take 1 tablet (5,000 Units total) by mouth once daily., Disp: 90 tablet, Rfl: 3    fluticasone-umeclidin-vilanter (TRELEGY ELLIPTA) 100-62.5-25 mcg DsDv, Inhale 1 puff into the lungs once daily. (Patient not taking: Reported on 6/13/2025), Disp: 60 each, Rfl: 5    hydrOXYzine HCL (ATARAX) 25 MG tablet, , Disp: , Rfl:     nortriptyline (PAMELOR) 10 MG capsule, Take 1 capsule (10 mg total) by mouth 3 (three) times daily. (Patient not taking: Reported on 6/13/2025), Disp: 270 capsule, Rfl: 1  [2]   Social History  Tobacco Use    Smoking status: Never    Smokeless tobacco: Never   Substance Use Topics    Alcohol use: Yes     Comment: Former heavy use    Drug use: Never     "

## 2025-06-18 ENCOUNTER — CLINICAL SUPPORT (OUTPATIENT)
Dept: REHABILITATION | Facility: HOSPITAL | Age: 75
End: 2025-06-18
Payer: MEDICARE

## 2025-06-18 VITALS — HEART RATE: 111 BPM | DIASTOLIC BLOOD PRESSURE: 60 MMHG | SYSTOLIC BLOOD PRESSURE: 93 MMHG

## 2025-06-18 DIAGNOSIS — Z74.09 IMPAIRED FUNCTIONAL MOBILITY, BALANCE, GAIT, AND ENDURANCE: Primary | ICD-10-CM

## 2025-06-18 PROCEDURE — 97110 THERAPEUTIC EXERCISES: CPT | Mod: PO,CQ

## 2025-06-18 NOTE — PROGRESS NOTES
"  Outpatient Rehab    Physical Therapy Visit    Patient Name: Anthony Ball  MRN: [id number]  YOB: 1950  Encounter Date: 6/18/2025    Therapy Diagnosis:   Encounter Diagnosis   Name Primary?    Impaired functional mobility, balance, gait, and endurance Yes     Physician: Isaias Myles MD    Physician Orders: Eval and Treat  Medical Diagnosis: Diabetic polyneuropathy associated with type 2 diabetes mellitus  Weakness of both lower extremities  Mobility impaired  Surgical Diagnosis: Not applicable for this Episode   Surgical Date: Not applicable for this Episode  Days Since Last Surgery: Not applicable for this Episode    Visit # / Visits Authorized:  14 / 20  Insurance Authorization Period: 3/19/2025 to 12/31/2025  Date of Evaluation: 3/19/2025  Plan of Care Certification: 5/15/2025 to 6/26/2025      PT/PTA: PTA   Number of PTA visits since last PT visit:1  Time In: 1115   Time Out: 1200  Total Time (in minutes): 45   Total Billable Time (in minutes): 45        Precautions:       Subjective   " I was sick all last week with a horrible stomach bug.".         Objective   Vital Signs  BP 93/60   Pulse (!) 111   BP Location: Right arm  BP Position: Sitting  BP Cuff Size: Adult  After 5 mins of sitting, pts blood pressure elevated slightly to 101/60.  Pt cont to feel weak and agreeable to seated therex.              Treatment:  Therapeutic Exercise  TE 1: 2 x 10 reps of B LE seated therex in W/C with #2lb cuff weights donned on ankles.  Exercises including LAQ, and marching in place.  RTB for hip abd/add and ankle DF/PF  Therapeutic Activity  TA 1: sit to stand from W/c --> ballet bar with CGA.  Pt reported feeling lightheaded and had to sit down.    Time Entry(in minutes):  Therapeutic Exercise Time Entry: 45    Assessment & Plan   Assessment: Pt tolerated his tx session fairly today.  Pt arrived feeling fine, but upon standing reported feeling lightheaded.   Anthony was agreeable to sitting therex today.   " Will attempt standing and gait next session if he is feeling better.  Cont with plan of care.   Evaluation/Treatment Tolerance: Treatment limited secondary to medical complications (Comment) (Lightheaded and dizzy)    The patient will continue to benefit from skilled outpatient physical therapy in order to address the deficits listed in the problem list on the initial evaluation, provide patient and family education, and maximize the patients level of independence in the home and community environments.     The patient's spiritual, cultural, and educational needs were considered, and the patient is agreeable to the plan of care and goals.           Plan: Cont with strengthening, balance, endurance and gait with SC    Goals:   Active       Mobility/Balance - Short Term Goals (STG) = 4 weeks; Long Term Goals (LTG) = 8 weeks        Pt will improve Self Selected Walking Speed to at least 0.5 m/s (STG) and 0.7 m/s (LTG) indicating improved safety with community mobility.  (Ongoing)       Start:  03/19/25    Expected End:  06/26/25       3/19/2025: 0.44 m/s  5/1/2025: 0.15 m/s  6/5/2025: 0.48 m/s         LTG - Patient will be independent with HEP emphasizing LE strengthening and balance training.   (Progressing)       Start:  03/19/25    Expected End:  06/26/25       3/19/2025: Needs education         Patient will exhibit improved [place] Balance score to >/= 22/56 (STG) and 26/56 (LTG) indicating improved static balance.   (Ongoing)       Start:  03/19/25    Expected End:  06/26/25       3/19/2025: 18/56  5/1/2025: 28/56         Patient will ambulate 50 feet (STG) and 150 feet (LTG) with a SPC indicating improved gait stability and independence.  (Progressing)       Start:  03/19/25    Expected End:  06/26/25       3/19/2025: unable due to safety concerns and fearfulness  5/1/2025: 29 feet with SPC, Contact guard assistance   6/5/2025: 66 feet with SPC, Contact guard assistance          Patient will exhibit improved 30  Second Chair Rise to >/= 5 reps (STG) and 7 reps (LTG), indicating improved functional LE strength.     (Progressing)       Start:  03/19/25    Expected End:  06/26/25 03/19/2025: 3 reps with UE use  5/1/2025: 5.5 reps with UE use  6/5/2025: 5.5 reps with UE use             Ciara Pro, PTA

## 2025-06-20 ENCOUNTER — CLINICAL SUPPORT (OUTPATIENT)
Dept: REHABILITATION | Facility: HOSPITAL | Age: 75
End: 2025-06-20
Payer: MEDICARE

## 2025-06-20 DIAGNOSIS — Z74.09 IMPAIRED FUNCTIONAL MOBILITY, BALANCE, GAIT, AND ENDURANCE: Primary | ICD-10-CM

## 2025-06-20 PROCEDURE — 97530 THERAPEUTIC ACTIVITIES: CPT | Mod: PO

## 2025-06-20 PROCEDURE — 97112 NEUROMUSCULAR REEDUCATION: CPT | Mod: PO

## 2025-06-20 NOTE — PROGRESS NOTES
"  Outpatient Rehab    Physical Therapy Visit    Patient Name: Anthony Ball  MRN: [id number]  YOB: 1950  Encounter Date: 6/20/2025    Therapy Diagnosis:   Encounter Diagnosis   Name Primary?    Impaired functional mobility, balance, gait, and endurance Yes     Physician: Isaias Myles MD    Physician Orders: Eval and Treat  Medical Diagnosis: Diabetic polyneuropathy associated with type 2 diabetes mellitus  Weakness of both lower extremities  Mobility impaired  Surgical Diagnosis: Not applicable for this Episode   Surgical Date: Not applicable for this Episode  Days Since Last Surgery: Not applicable for this Episode    Visit # / Visits Authorized:  15 / 20  Insurance Authorization Period: 3/19/2025 to 12/31/2025  Date of Evaluation: 3/19/2025  Plan of Care Certification: 5/15/2025 to 6/26/2025      PT/PTA: PT   Number of PTA visits since last PT visit:0  Time In: 1348   Time Out: 1431  Total Time (in minutes): 43   Total Billable Time (in minutes): 43    Precautions:     Standard, Fall, Neuropathy, Back pain      Subjective   Doing well upon arrival. Denies pain. Took Robaxin prior to today's session. He requested to walk with SPC first..  Pain reported as 0/10.      Objective            Treatment:  Therapeutic Exercise  TE 1: x 8 min on Sci Fit recumbent stepper. B UE/B LE on level 6.5 [place] Ramp  Balance/Neuromuscular Re-Education  NMR 1: Fwd reach to retrieve cylinder from dowel placed on his w/c <> placing on ergotron to the L x 5 reps and R x 5 reps  Therapeutic Activity  TA 1: Attempted 2 gait trials with SPC and 1 trial with SBQC but no function distance achieved  TA 2: Step Ups 4" step BUE support x 10 reps with each LE leading - sidestepping onto 4" step x 10 reps each LE leading    Time Entry(in minutes):  Neuromuscular Re-Education Time Entry: 10  Therapeutic Activity Time Entry: 25  Therapeutic Exercise Time Entry: 8    Assessment & Plan   Assessment: 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 He reported back pain as the main limiting factor of today's session. Three gait trials were attempted; no functional gait distance achieved. He performed simple mobility interventions fairly well, but UE use frequently used. He is self-limiting which is likely affecting his progress.   Evaluation/Treatment Tolerance: Patient limited by pain    The patient will continue to benefit from skilled outpatient physical therapy in order to address the deficits listed in the problem list on the initial evaluation, provide patient and family education, and maximize the patients level of independence in the home and community environments.     The patient's spiritual, cultural, and educational needs were considered, and the patient is agreeable to the plan of care and goals.           Plan: Cont with strengthening, balance, endurance and gait with SC    Goals:   Active       Mobility/Balance - Short Term Goals (STG) = 4 weeks; Long Term Goals (LTG) = 8 weeks        Pt will improve Self Selected Walking Speed to at least 0.5 m/s (STG) and 0.7 m/s (LTG) indicating improved safety with community mobility.  (Ongoing)       Start:  03/19/25    Expected End:  06/26/25       3/19/2025: 0.44 m/s  5/1/2025: 0.15 m/s  6/5/2025: 0.48 m/s         LTG - Patient will be independent with HEP emphasizing LE strengthening and balance training.   (Progressing)       Start:  03/19/25    Expected End:  06/26/25       3/19/2025: Needs education         Patient will exhibit improved Araujo Balance score to >/= 22/56 (STG) and 26/56 (LTG) indicating improved static balance.   (Ongoing)       Start:  03/19/25    Expected End:  06/26/25       3/19/2025: 18/56  5/1/2025: 28/56         Patient will ambulate 50 feet (STG) and 150 feet (LTG) with a SPC indicating improved gait stability and independence.  (Progressing)       Start:  03/19/25    Expected End:  06/26/25       3/19/2025: unable due to safety concerns and  fearfulness  5/1/2025: 29 feet with SPC, Contact guard assistance   6/5/2025: 66 feet with SPC, Contact guard assistance          Patient will exhibit improved 30 Second Chair Rise to >/= 5 reps (STG) and 7 reps (LTG), indicating improved functional LE strength.     (Progressing)       Start:  03/19/25    Expected End:  06/26/25 03/19/2025: 3 reps with UE use  5/1/2025: 5.5 reps with UE use  6/5/2025: 5.5 reps with UE use             Codi Owen, PT

## 2025-06-27 ENCOUNTER — CLINICAL SUPPORT (OUTPATIENT)
Dept: REHABILITATION | Facility: HOSPITAL | Age: 75
End: 2025-06-27
Payer: MEDICARE

## 2025-06-27 DIAGNOSIS — Z74.09 IMPAIRED FUNCTIONAL MOBILITY, BALANCE, GAIT, AND ENDURANCE: Primary | ICD-10-CM

## 2025-06-27 PROCEDURE — 97112 NEUROMUSCULAR REEDUCATION: CPT | Mod: PO

## 2025-06-27 PROCEDURE — 97530 THERAPEUTIC ACTIVITIES: CPT | Mod: PO

## 2025-06-27 NOTE — PROGRESS NOTES
Outpatient Rehab    Physical Therapy Progress Note : Updated Plan of Care    Patient Name: Anthony Ball  MRN: [id number]  YOB: 1950  Encounter Date: 6/27/2025    Therapy Diagnosis:   Encounter Diagnosis   Name Primary?    Impaired functional mobility, balance, gait, and endurance Yes     Physician: Isaias Myles MD    Physician Orders: Eval and Treat  Medical Diagnosis: Diabetic polyneuropathy associated with type 2 diabetes mellitus  Weakness of both lower extremities  Mobility impaired  Surgical Diagnosis: Not applicable for this Episode   Surgical Date: Not applicable for this Episode  Days Since Last Surgery: Not applicable for this Episode    Visit # / Visits Authorized:  16 / 20  Insurance Authorization Period: 3/19/2025 to 12/31/2025  Date of Evaluation: 3/19/2025   Plan of Care Certification: 5/15/2025 to 6/26/2025      PT/PTA: PT   Number of PTA visits since last PT visit:0  Time In: 1035   Time Out: 1117  Total Time (in minutes): 42   Total Billable Time (in minutes): 42    Precautions:     Standard, Fall, Neuropathy, Back pain      Subjective   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 He notices improvements in his walking with a cane at home. He pre-medicated for pain prior to today's session..         Objective         Araujo Balance  Araujo Balance Scale  1. Sitting to Standing: Able to stand using hands after several tries  2. Standing Unsupported: Able to stand 2 minutes with supervision  3. Sitting with Back Unsupported but Feet Supported on Floor or on a Stool: Able to sit safely and securely for 2 minutes  4. Standing to Sitting: Controls descent by using hands  5.  Transfers: Able to transfer safely definite need of hands  6. Standing Unsupported with Eyes Closed: Able to stand 3 seconds  7. Standing Unsupported with Feet Together: Needs help to attain position but able to stand 15 seconds feet together  8. Reach Forward with Outstretched Arm While  Standing: Can reach forward 12 cm (5 inches)  9.  Object from Floor from a Standing Position: Unable to  and needs supervision while trying  10. Turning to Look Behind Over Left and Right Shoulders While Standing: Turns sideways only but maintains balance  11. Turn 360 Degrees: Needs assistance while turning  12. Place Alternate Foot on Step or Stool While Standing Unsupported: Needs assistance to keep from falling/unable to try  13. Standing Unsupported One Foot in Front: Needs help to step but can hold 15 seconds  14. Standing on One Leg: Unable to try needs assist to prevent fall  Araujo Balance Score: 25  Araujo Balance Fall Risk: Medium    Interpretation:  41-56 = Low Fall Risk  21-40 = Medium Fall Risk  0-20 = High Fall Risk         Treatment:  Therapeutic Exercise  TE 1: x 8 min on Sci Fit recumbent stepper. B UE/B LE on level 7 [place] Ramp  Balance/Neuromuscular Re-Education  NMR 1: Includes time for balance testing. See goals below.  Therapeutic Activity  TA 1: Includes time for functional mobility testing. See goals below.    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 Unfortunately, progress is very limited. He has maintained gains in gait speed with RW and gait tolerance with a SPC well, but today's measurements reflect a decline in functional LE strength (30 Sec Chair Rise) and static balance (Araujo Balance Scale). Ultimately, reassessment today does not reflect any positive change since prior reassessment. Therefore, discharge planning is appropriate at this time. PT plan of care to extend x 4 weeks to continue gait tolerance training and balance training. At the end of July, Anthony will be discharged from outpatient neuro PT with updated home exercise program and wellness resources.  Evaluation/Treatment Tolerance:  Patient tolerated treatment well  Plan  From a physical therapy perspective, the patient would benefit from: Skilled Rehab Services    Planned therapy interventions include: Therapeutic exercise, Therapeutic activities, Neuromuscular re-education, Gait training, and Sensory integration.    Planned modalities to include: Cryotherapy (cold pack), Electrical stimulation - attended, Electrical stimulation - passive/unattended, and Thermotherapy (hot pack).        Visit Frequency: 2 times Per Week for 4 Weeks.       This plan was discussed with Patient.   Discussion participants: Agreed Upon Plan of Care             The patient will continue to benefit from skilled outpatient physical therapy in order to address the deficits listed in the problem list on the initial evaluation, provide patient and family education, and maximize the patients level of independence in the home and community environments.     The patient's spiritual, cultural, and educational needs were considered, and the patient is agreeable to the plan of care and goals.           Goals:   Active       Mobility/Balance - Short Term Goals (STG) = 4 weeks; Long Term Goals (LTG) = 8 weeks        Pt will improve Self Selected Walking Speed to at least 0.5 m/s (STG) and 0.7 m/s (LTG) indicating improved safety with community mobility.  (Ongoing)       Start:  03/19/25    Expected End:  07/25/25       3/19/2025: 0.44 m/s  5/1/2025: 0.15 m/s  6/5/2025: 0.48 m/s  6/27/2025: 0.43 m/s using RW            LTG - Patient will be independent with HEP emphasizing LE strengthening and balance training.   (Ongoing)       Start:  03/19/25    Expected End:  07/25/25       3/19/2025: Needs education         Patient will exhibit improved Araujo Balance score to >/= 22/56 (STG) and 26/56 (LTG) indicating improved static balance.   (Not Progressing)       Start:  03/19/25    Expected End:  07/25/25       3/19/2025: 18/56  5/1/2025: 28/56  6/27/2025: 25/56         Patient will  ambulate 50 feet (STG) and 150 feet (LTG) with a SPC indicating improved gait stability and independence.  (Ongoing)       Start:  03/19/25    Expected End:  07/25/25       3/19/2025: unable due to safety concerns and fearfulness  5/1/2025: 29 feet with SPC, Contact guard assistance   6/5/2025: 66 feet with SPC, Contact guard assistance   6/27/2025: 66 with SPC, Contact guard assistance          Patient will exhibit improved 30 Second Chair Rise to >/= 5 reps (STG) and 7 reps (LTG), indicating improved functional LE strength.     (Not Progressing)       Start:  03/19/25    Expected End:  07/25/25 03/19/2025: 3 reps with UE use  5/1/2025: 5.5 reps with UE use  6/5/2025: 5.5 reps with UE use  6/27/2025: 4 reps with UE use             Codi Owen, PT

## 2025-06-30 ENCOUNTER — CLINICAL SUPPORT (OUTPATIENT)
Dept: REHABILITATION | Facility: HOSPITAL | Age: 75
End: 2025-06-30
Payer: MEDICARE

## 2025-06-30 DIAGNOSIS — Z74.09 IMPAIRED FUNCTIONAL MOBILITY, BALANCE, GAIT, AND ENDURANCE: Primary | ICD-10-CM

## 2025-06-30 PROCEDURE — 97110 THERAPEUTIC EXERCISES: CPT | Mod: PO

## 2025-06-30 PROCEDURE — 97530 THERAPEUTIC ACTIVITIES: CPT | Mod: PO

## 2025-06-30 NOTE — PROGRESS NOTES
"  Outpatient Rehab    Physical Therapy Visit    Patient Name: Anthony Ball  MRN: [id number]  YOB: 1950  Encounter Date: 6/30/2025    Therapy Diagnosis:   Encounter Diagnosis   Name Primary?    Impaired functional mobility, balance, gait, and endurance Yes     Physician: Isaias Myles MD    Physician Orders: Eval and Treat  Medical Diagnosis: Diabetic polyneuropathy associated with type 2 diabetes mellitus  Weakness of both lower extremities  Mobility impaired  Surgical Diagnosis: Not applicable for this Episode   Surgical Date: Not applicable for this Episode  Days Since Last Surgery: Not applicable for this Episode    Visit # / Visits Authorized:  17 / 20  Insurance Authorization Period: 3/19/2025 to 12/31/2025  Date of Evaluation: 3/19/2025  Plan of Care Certification: 6/27/2025 to 7/25/2025      PT/PTA: PT   Number of PTA visits since last PT visit:0  Time In: 1530   Time Out: 1612  Total Time (in minutes): 42   Total Billable Time (in minutes): 42    Precautions:     Standard, Fall, Neuropathy, Back pain      Subjective     Anthony reports taking medication for pain control. No changes to report upon arrival.     No objective measurements taken this date.         Treatment:    Anthony received therapeutic exercises to develop strength and endurance including:    Scifit Recumbent Stepper L8 [place] Ramp x 8 minutes    Anthony participated in neuromuscular re-education activities to improve balance. The following activities were included:      Anthony participated in dynamic functional therapeutic activities to improve functional performance, including:    Sidestepping along ballet bar, BUE support x 4 laps each way  Step ups using 4" step, 2 UE support 2 x 10 reps with each LE leading  Gait tolerance:   Using RW (not performed)  Using SBQC: no meaningful     Time Entry(in minutes):  Therapeutic Activity Time Entry: 34  Therapeutic Exercise Time Entry: 8    Assessment & Plan   Assessment: Anthony performed today's " session fairly-poorly. No meaningful gait distance achieved with ambulation trial using SBQC today. Attempted step ups with just 1 UE support, but he required use of both UE. He was very challenged standing up from his w/c today due to forgetting his wheelchair cushion; he failed attempts to get out of his chair multiple times throughout today's session.       The patient will continue to benefit from skilled outpatient physical therapy in order to address the deficits listed in the problem list on the initial evaluation, provide patient and family education, and maximize the patients level of independence in the home and community environments.     The patient's spiritual, cultural, and educational needs were considered, and the patient is agreeable to the plan of care and goals.           Plan: Continue per established plan of care, emphasizing functional LE strengthening and ambulation activity tolerance.       Goals:   Active       Mobility/Balance - Short Term Goals (STG) = 4 weeks; Long Term Goals (LTG) = 8 weeks        Pt will improve Self Selected Walking Speed to at least 0.5 m/s (STG) and 0.7 m/s (LTG) indicating improved safety with community mobility.  (Ongoing)       Start:  03/19/25    Expected End:  07/25/25       3/19/2025: 0.44 m/s  5/1/2025: 0.15 m/s  6/5/2025: 0.48 m/s  6/27/2025: 0.43 m/s using RW            LTG - Patient will be independent with HEP emphasizing LE strengthening and balance training.   (Ongoing)       Start:  03/19/25    Expected End:  07/25/25       3/19/2025: Needs education         Patient will exhibit improved Araujo Balance score to >/= 22/56 (STG) and 26/56 (LTG) indicating improved static balance.   (Not Progressing)       Start:  03/19/25    Expected End:  07/25/25       3/19/2025: 18/56  5/1/2025: 28/56  6/27/2025: 25/56         Patient will ambulate 50 feet (STG) and 150 feet (LTG) with a SPC indicating improved gait stability and independence.  (Ongoing)       Start:   03/19/25    Expected End:  07/25/25       3/19/2025: unable due to safety concerns and fearfulness  5/1/2025: 29 feet with SPC, Contact guard assistance   6/5/2025: 66 feet with SPC, Contact guard assistance   6/27/2025: 66 with SPC, Contact guard assistance          Patient will exhibit improved 30 Second Chair Rise to >/= 5 reps (STG) and 7 reps (LTG), indicating improved functional LE strength.     (Not Progressing)       Start:  03/19/25    Expected End:  07/25/25 03/19/2025: 3 reps with UE use  5/1/2025: 5.5 reps with UE use  6/5/2025: 5.5 reps with UE use  6/27/2025: 4 reps with UE use             Codi Owen, PT

## 2025-07-08 ENCOUNTER — CLINICAL SUPPORT (OUTPATIENT)
Dept: REHABILITATION | Facility: HOSPITAL | Age: 75
End: 2025-07-08
Payer: MEDICARE

## 2025-07-08 DIAGNOSIS — Z74.09 IMPAIRED FUNCTIONAL MOBILITY, BALANCE, GAIT, AND ENDURANCE: Primary | ICD-10-CM

## 2025-07-08 PROCEDURE — 97110 THERAPEUTIC EXERCISES: CPT | Mod: PO

## 2025-07-08 PROCEDURE — 97112 NEUROMUSCULAR REEDUCATION: CPT | Mod: PO

## 2025-07-08 PROCEDURE — 97530 THERAPEUTIC ACTIVITIES: CPT | Mod: PO

## 2025-07-08 NOTE — PROGRESS NOTES
"  Outpatient Rehab    Physical Therapy Visit    Patient Name: Anthony Ball  MRN: [id number]  YOB: 1950  Encounter Date: 7/8/2025    Therapy Diagnosis:   Encounter Diagnosis   Name Primary?    Impaired functional mobility, balance, gait, and endurance Yes     Physician: Isaias Myles MD    Physician Orders: Eval and Treat  Medical Diagnosis: Diabetic polyneuropathy associated with type 2 diabetes mellitus  Weakness of both lower extremities  Mobility impaired  Surgical Diagnosis: Not applicable for this Episode   Surgical Date: Not applicable for this Episode  Days Since Last Surgery: Not applicable for this Episode    Visit # / Visits Authorized:  18 / 30  Insurance Authorization Period: 3/19/2025 to 12/31/2025  Date of Evaluation: 3/19/2025  Plan of Care Certification: 6/27/2025 to 7/25/2025      PT/PTA: PT   Number of PTA visits since last PT visit:0  Time In: 0931   Time Out: 1015  Total Time (in minutes): 44   Total Billable Time (in minutes): 44    Precautions:     Standard, Fall, Neuropathy, Back pain      Subjective   Anthony reports    Pain: 0/10  Pain location: n/a today    Anthony reports that he forgot his wheelchair cushion at home again today.    No objective measurements taken this date.         Treatment:    Anthony received therapeutic exercises to develop strength and endurance including:    Scifit Recumbent stepper L8 [place] Ramp x 8 minutes    Anthony participated in neuromuscular re-education activities to improve balance. The following activities were included:    //bars  Stair taps 6" BUE support, standby by assist x 30s (unable to perform with UE support)  Marches without UE support, Contact guard assistance 2 x 30s    Anthony participated in dynamic functional therapeutic activities to improve functional performance, including:    Gait tolerance: w/c follow  Trial 1: Using SBQC x 47 feet, Contact guard assistance   Trial 2: Using SBQC x 35 feet, Contact guard assistance     Time Entry(in " minutes):  Neuromuscular Re-Education Time Entry: 15  Therapeutic Activity Time Entry: 21  Therapeutic Exercise Time Entry: 8    Assessment & Plan   Assessment: Anthony requested to try his quad cane in his L UE today since he feels that his L side is weaker. PT discussed that the cane should be used contralateral to weaker limb but willing to attempt. He actually did exhibit slightly improved stability with the cane in his L UE due to R Trendelenburg pattern. Initial transitions to sit to stand for w/c without cushion was good, but increasing difficulty noted as session progressed due to fatigue. He is very reluctant to perform balance interventions without UE support. He reports that he wants to try the SPC next visit.    The patient will continue to benefit from skilled outpatient physical therapy in order to address the deficits listed in the problem list on the initial evaluation, provide patient and family education, and maximize the patients level of independence in the home and community environments.     The patient's spiritual, cultural, and educational needs were considered, and the patient is agreeable to the plan of care and goals.           Plan: Continue per established plan of care, emphasizing gait tolerance, functional LE strengthening, and balance     Goals:   Active       Mobility/Balance - Short Term Goals (STG) = 4 weeks; Long Term Goals (LTG) = 8 weeks        Pt will improve Self Selected Walking Speed to at least 0.5 m/s (STG) and 0.7 m/s (LTG) indicating improved safety with community mobility.  (Ongoing)       Start:  03/19/25    Expected End:  07/25/25       3/19/2025: 0.44 m/s  5/1/2025: 0.15 m/s  6/5/2025: 0.48 m/s  6/27/2025: 0.43 m/s using RW            LTG - Patient will be independent with HEP emphasizing LE strengthening and balance training.   (Ongoing)       Start:  03/19/25    Expected End:  07/25/25       3/19/2025: Needs education         Patient will exhibit improved Araujo  Balance score to >/= 22/56 (STG) and 26/56 (LTG) indicating improved static balance.   (Not Progressing)       Start:  03/19/25    Expected End:  07/25/25       3/19/2025: 18/56 5/1/2025: 28/56 6/27/2025: 25/56         Patient will ambulate 50 feet (STG) and 150 feet (LTG) with a SPC indicating improved gait stability and independence.  (Ongoing)       Start:  03/19/25    Expected End:  07/25/25       3/19/2025: unable due to safety concerns and fearfulness  5/1/2025: 29 feet with SPC, Contact guard assistance   6/5/2025: 66 feet with SPC, Contact guard assistance   6/27/2025: 66 with SPC, Contact guard assistance          Patient will exhibit improved 30 Second Chair Rise to >/= 5 reps (STG) and 7 reps (LTG), indicating improved functional LE strength.     (Not Progressing)       Start:  03/19/25    Expected End:  07/25/25 03/19/2025: 3 reps with UE use  5/1/2025: 5.5 reps with UE use  6/5/2025: 5.5 reps with UE use  6/27/2025: 4 reps with UE use             Codi Owen PT

## 2025-07-10 ENCOUNTER — CLINICAL SUPPORT (OUTPATIENT)
Dept: REHABILITATION | Facility: HOSPITAL | Age: 75
End: 2025-07-10
Payer: MEDICARE

## 2025-07-10 DIAGNOSIS — Z74.09 IMPAIRED FUNCTIONAL MOBILITY, BALANCE, GAIT, AND ENDURANCE: Primary | ICD-10-CM

## 2025-07-10 PROCEDURE — 97530 THERAPEUTIC ACTIVITIES: CPT | Mod: PO

## 2025-07-10 PROCEDURE — 97110 THERAPEUTIC EXERCISES: CPT | Mod: PO

## 2025-07-10 NOTE — PROGRESS NOTES
Outpatient Rehab    Physical Therapy Visit    Patient Name: Anthony Ball  MRN: [id number]  YOB: 1950  Encounter Date: 7/10/2025    Therapy Diagnosis:   Encounter Diagnosis   Name Primary?    Impaired functional mobility, balance, gait, and endurance Yes     Physician: Isaias Myles MD    Physician Orders: Eval and Treat  Medical Diagnosis: Diabetic polyneuropathy associated with type 2 diabetes mellitus  Weakness of both lower extremities  Mobility impaired  Surgical Diagnosis: Not applicable for this Episode   Surgical Date: Not applicable for this Episode  Days Since Last Surgery: Not applicable for this Episode    Visit # / Visits Authorized:  19 / 30  Insurance Authorization Period: 3/19/2025 to 12/31/2025  Date of Evaluation: 3/19/2025  Plan of Care Certification: 6/27/2025 to 7/25/2025      PT/PTA: PT   Number of PTA visits since last PT visit:0  Time In: 1030   Time Out: 1115  Total Time (in minutes): 45   Total Billable Time (in minutes): 45    Precautions:     Standard, Fall, Neuropathy, Back pain      Subjective   Pt requested more walking today reporting that he is here to walk with a cane. Pt reported no pain in the beginning of the session but reported back pain as ambulation trials progressed..    4-5/10 at worse during todays session.    Objective            Treatment:     Therapeutic exercises:   TE: Scifit Recumbent stepper L8 [place] Ramp x 9 minutes; 1 x 1 minute during cool down phase.       Therapeutic activities:  TA 1:Gait tolerance: w/c follow; Verbal cues for two point gait pattern; Verbal cues for improve posture;   Trial 1: Using RW  x 200 feet, Contact guard assistance   Trial 2: Using SPC x 44 feet, Contact guard assistance   Trial 3: Using SPC x 34 feet, Contact guard assistance   Trial 4: Using SPC x 59 feet, Contact guard assistance   TA 2: Parallel bar hip flexion marches, One UE <> No UE <> BL UE support, Contact guard assistance, 3 x 30s    Time Entry(in  minutes):  Therapeutic Activity Time Entry: 35  Therapeutic Exercise Time Entry: 10    Assessment & Plan   Assessment: Pt tolerated therapy well today. Despite experiencing back pain, the patient actively engaged in ambulation activities. He initially used a RW before transitioning to SPC. Verbal cues required for maintaining a two point gait pattern, additional cues were provided to encourage proper upright posture. During marches in the parallel bars, the patient was unable to complete the exercise without upper extremity support, as he had in the previous session. Today, back pain was the primary limiting factor affecting his participation. The patient will continue to benefit from skilled physical therapy to improve gait tolerance, balance, and prepare for discharge at the end of this month.  Evaluation/Treatment Tolerance: Patient tolerated treatment well    The patient will continue to benefit from skilled outpatient physical therapy in order to address the deficits listed in the problem list on the initial evaluation, provide patient and family education, and maximize the patients level of independence in the home and community environments.     The patient's spiritual, cultural, and educational needs were considered, and the patient is agreeable to the plan of care and goals.           Plan: Cont with strengthening, balance, endurance and gait with SC    Goals:   Active       Mobility/Balance - Short Term Goals (STG) = 4 weeks; Long Term Goals (LTG) = 8 weeks        Pt will improve Self Selected Walking Speed to at least 0.5 m/s (STG) and 0.7 m/s (LTG) indicating improved safety with community mobility.  (Progressing)       Start:  03/19/25    Expected End:  07/25/25       3/19/2025: 0.44 m/s  5/1/2025: 0.15 m/s  6/5/2025: 0.48 m/s  6/27/2025: 0.43 m/s using RW            LTG - Patient will be independent with HEP emphasizing LE strengthening and balance training.   (Progressing)       Start:  03/19/25     Expected End:  07/25/25       3/19/2025: Needs education         Patient will exhibit improved Araujo Balance score to >/= 22/56 (STG) and 26/56 (LTG) indicating improved static balance.   (Progressing)       Start:  03/19/25    Expected End:  07/25/25       3/19/2025: 18/56  5/1/2025: 28/56  6/27/2025: 25/56         Patient will ambulate 50 feet (STG) and 150 feet (LTG) with a SPC indicating improved gait stability and independence.  (Progressing)       Start:  03/19/25    Expected End:  07/25/25       3/19/2025: unable due to safety concerns and fearfulness  5/1/2025: 29 feet with SPC, Contact guard assistance   6/5/2025: 66 feet with SPC, Contact guard assistance   6/27/2025: 66 with SPC, Contact guard assistance          Patient will exhibit improved 30 Second Chair Rise to >/= 5 reps (STG) and 7 reps (LTG), indicating improved functional LE strength.     (Progressing)       Start:  03/19/25    Expected End:  07/25/25 03/19/2025: 3 reps with UE use  5/1/2025: 5.5 reps with UE use  6/5/2025: 5.5 reps with UE use  6/27/2025: 4 reps with UE use             Miguel Ángel Day, SPT    I certify that I was present in the room directing the student in service delivery and guiding them using my skilled judgment. As the co-signing therapist, I have reviewed the student's documentation and am responsible for the treatment, assessment and plan.    Ba Diaz, PT

## 2025-07-14 ENCOUNTER — CLINICAL SUPPORT (OUTPATIENT)
Dept: REHABILITATION | Facility: HOSPITAL | Age: 75
End: 2025-07-14
Payer: MEDICARE

## 2025-07-14 DIAGNOSIS — Z74.09 IMPAIRED FUNCTIONAL MOBILITY, BALANCE, GAIT, AND ENDURANCE: Primary | ICD-10-CM

## 2025-07-14 PROCEDURE — 97530 THERAPEUTIC ACTIVITIES: CPT | Mod: PO

## 2025-07-14 PROCEDURE — 97110 THERAPEUTIC EXERCISES: CPT | Mod: PO

## 2025-07-14 NOTE — PROGRESS NOTES
Outpatient Rehab    Physical Therapy Visit    Patient Name: Anthony Ball  MRN: [id number]  YOB: 1950  Encounter Date: 7/14/2025    Therapy Diagnosis:   Encounter Diagnosis   Name Primary?    Impaired functional mobility, balance, gait, and endurance Yes     Physician: Isaias Myles MD    Physician Orders: Eval and Treat  Medical Diagnosis: Diabetic polyneuropathy associated with type 2 diabetes mellitus  Weakness of both lower extremities  Mobility impaired  Surgical Diagnosis: Not applicable for this Episode   Surgical Date: Not applicable for this Episode  Days Since Last Surgery: Not applicable for this Episode    Visit # / Visits Authorized:  20 / 30  Insurance Authorization Period: 3/19/2025 to 12/31/2025  Date of Evaluation: 3/19/2025  Plan of Care Certification: 6/27/2025 to 7/25/2025      PT/PTA: PT   Number of PTA visits since last PT visit:0  Time In: 1433   Time Out: 1515  Total Time (in minutes): 42   Total Billable Time (in minutes): 42    Precautions:     Standard, Fall, Neuropathy, Back pain      Subjective     Anthony reports doing well upon arrival. No pain upon arrival. He still presents without his wheelchair cushion.    Pain: 0/10  Pain location: n/a      No objective measurements taken this date.         Treatment:    Anthony received therapeutic exercises to develop strength and endurance including:     Scifit Recumbent stepper L9 [place] Ramp x 8 minutes  Standing tolerance while performing the following interventions:   Trial 1: 4 minutes   Standing hip abduction 2 x 10 reps  Static standing without UE support x 60s  Trial 2: 4 minutes  Standing hip extension  Static standing without UE support x 60s  Anthony participated in neuromuscular re-education activities to improve balance. The following activities were included:      Anthony participated in dynamic functional therapeutic activities to improve functional performance, including:     Gait tolerance: w/c follow  Trial 1: Using RW x  250 feet, standby by assist   Trial 2: Using SBQC x 7 feet, Contact guard assistance (had to sit due to back pain)  Trial 3: Using SBQC x 19 feet, Contact guard assistance (had to sit due to back pain)      Time Entry(in minutes):  Therapeutic Activity Time Entry: 30  Therapeutic Exercise Time Entry: 15    Assessment & Plan   Assessment: Anthony performed today's session fairly. Ambulation tolerance with SPC was poor today, requiring seated rest breaks following short ambulation bouts due to back pain. Standing tolerance today was 4 minutes, with signs of fatigue noted.       The patient will continue to benefit from skilled outpatient physical therapy in order to address the deficits listed in the problem list on the initial evaluation, provide patient and family education, and maximize the patients level of independence in the home and community environments.     The patient's spiritual, cultural, and educational needs were considered, and the patient is agreeable to the plan of care and goals.           Plan: Continue per established plan of care, emphasizing ambulation tolerance and LE strengthening      Goals:   Active       Mobility/Balance - Short Term Goals (STG) = 4 weeks; Long Term Goals (LTG) = 8 weeks        Pt will improve Self Selected Walking Speed to at least 0.5 m/s (STG) and 0.7 m/s (LTG) indicating improved safety with community mobility.  (Progressing)       Start:  03/19/25    Expected End:  07/25/25       3/19/2025: 0.44 m/s  5/1/2025: 0.15 m/s  6/5/2025: 0.48 m/s  6/27/2025: 0.43 m/s using RW            LTG - Patient will be independent with HEP emphasizing LE strengthening and balance training.   (Progressing)       Start:  03/19/25    Expected End:  07/25/25       3/19/2025: Needs education         Patient will exhibit improved Araujo Balance score to >/= 22/56 (STG) and 26/56 (LTG) indicating improved static balance.   (Progressing)       Start:  03/19/25    Expected End:  07/25/25        3/19/2025: 18/56 5/1/2025: 28/56 6/27/2025: 25/56         Patient will ambulate 50 feet (STG) and 150 feet (LTG) with a SPC indicating improved gait stability and independence.  (Progressing)       Start:  03/19/25    Expected End:  07/25/25       3/19/2025: unable due to safety concerns and fearfulness  5/1/2025: 29 feet with SPC, Contact guard assistance   6/5/2025: 66 feet with SPC, Contact guard assistance   6/27/2025: 66 with SPC, Contact guard assistance          Patient will exhibit improved 30 Second Chair Rise to >/= 5 reps (STG) and 7 reps (LTG), indicating improved functional LE strength.     (Progressing)       Start:  03/19/25    Expected End:  07/25/25 03/19/2025: 3 reps with UE use  5/1/2025: 5.5 reps with UE use  6/5/2025: 5.5 reps with UE use  6/27/2025: 4 reps with UE use             Codi Owen PT

## 2025-07-15 NOTE — TELEPHONE ENCOUNTER
No care due was identified.  [facility] Embedded Care Due Messages. Reference number: [id number].   7/15/2025 10:09:51 AM CDT

## 2025-07-16 RX ORDER — PEN NEEDLE, DIABETIC 31 GX5/16"
NEEDLE, DISPOSABLE MISCELLANEOUS 3 TIMES DAILY
Qty: 100 EACH | Refills: 5 | Status: SHIPPED | OUTPATIENT
Start: 2025-07-16

## 2025-07-16 NOTE — TELEPHONE ENCOUNTER
Refill Routing Note   Medication(s) are not appropriate for processing by [facility] for the following reason(s):        No active prescription written by provider    [facility] action(s):  Defer        Medication Therapy Plan: Historical Medication, order not active.      Appointments  past 12m or future 3m with PCP    Date Provider   Last Visit   6/13/2025 Isaias Myles MD   Next Visit   Visit date not found Isaias Myles MD   ED visits in past 90 days: 0        Note composed:8:56 PM 07/15/2025

## 2025-07-23 ENCOUNTER — DOCUMENTATION ONLY (OUTPATIENT)
Dept: REHABILITATION | Facility: HOSPITAL | Age: 75
End: 2025-07-23
Payer: MEDICARE

## 2025-07-23 NOTE — PROGRESS NOTES
PT/PTA met face to face to discuss pt's treatment plan and progress towards established goals.  Continue with current PT POC with focus on gait, cardio, and functional strengthening.  Patient will be seen by physical therapist at least every sixth treatment or 30 days, whichever occurs first.    Ciara Pro, PTA  07/23/2025

## 2025-07-25 ENCOUNTER — CLINICAL SUPPORT (OUTPATIENT)
Dept: REHABILITATION | Facility: HOSPITAL | Age: 75
End: 2025-07-25
Payer: MEDICARE

## 2025-07-25 DIAGNOSIS — Z74.09 IMPAIRED FUNCTIONAL MOBILITY, BALANCE, GAIT, AND ENDURANCE: Primary | ICD-10-CM

## 2025-07-25 PROCEDURE — 97110 THERAPEUTIC EXERCISES: CPT | Mod: PO

## 2025-07-25 PROCEDURE — 97530 THERAPEUTIC ACTIVITIES: CPT | Mod: PO

## 2025-07-25 NOTE — PROGRESS NOTES
"  Outpatient Rehab    Physical Therapy Visit    Patient Name: Anthony Ball  MRN: [id number]  YOB: 1950  Encounter Date: 7/25/2025    Therapy Diagnosis:   Encounter Diagnosis   Name Primary?    Impaired functional mobility, balance, gait, and endurance Yes     Physician: Isaias Myles MD    Physician Orders: Eval and Treat  Medical Diagnosis: Diabetic polyneuropathy associated with type 2 diabetes mellitus  Weakness of both lower extremities  Mobility impaired  Surgical Diagnosis: Not applicable for this Episode   Surgical Date: Not applicable for this Episode  Days Since Last Surgery: Not applicable for this Episode    Visit # / Visits Authorized:  21 / 30  Insurance Authorization Period: 3/19/2025 to 12/31/2025  Date of Evaluation: 3/19/2025  Plan of Care Certification: 6/27/2025 to 7/25/2025      PT/PTA: PT   Number of PTA visits since last PT visit:0  Time In: 1300   Time Out: 1344  Total Time (in minutes): 44   Total Billable Time (in minutes): 44    Precautions:       Subjective     Anthony reports doing well upon arrival. He enjoyed his trip to [facility]. He presented today with his RW instead of the wheelchair.     Pain: 0/10  Pain location: n/a      No objective measurements taken this date.         Treatment:    Anthony received therapeutic exercises to develop strength and endurance including:     Scifit Recumbent stepper L9 [place] Ramp x 8 minutes  Standing tolerance while performing the following interventions:   Trial 1: 2 x 4 minutes   6" step taps bilaterally, 1 UE support  Heel raises  Static standing without UE support x 60s    Anthony participated in neuromuscular re-education activities to improve balance. The following activities were included:      Anthnoy participated in dynamic functional therapeutic activities to improve functional performance, including:     Gait tolerance: w/c follow  Trial 1: Using SBQC x 61 feet, Contact guard assistance (had to sit due to back pain)  Trial 2: Using SPC x " 47 feet, Contact guard assistance (had to sit due to back pain)      Time Entry(in minutes):  Therapeutic Activity Time Entry: 36  Therapeutic Exercise Time Entry: 8    Assessment & Plan   Assessment: Anthony performed today's session well. PT very pleased that patient arrived using a rollator today. Today, gait tolerance was still limited by back pain. Next session to reassess progress in preparation for discharge at the end of the week.       The patient will continue to benefit from skilled outpatient physical therapy in order to address the deficits listed in the problem list on the initial evaluation, provide patient and family education, and maximize the patients level of independence in the home and community environments.     The patient's spiritual, cultural, and educational needs were considered, and the patient is agreeable to the plan of care and goals.           Plan: Continue per established plan of care, emphasizing ambulation tolerance and LE strengthening      Goals:   Active       Mobility/Balance - Short Term Goals (STG) = 4 weeks; Long Term Goals (LTG) = 8 weeks        Pt will improve Self Selected Walking Speed to at least 0.5 m/s (STG) and 0.7 m/s (LTG) indicating improved safety with community mobility.  (Progressing)       Start:  03/19/25    Expected End:  07/25/25       3/19/2025: 0.44 m/s  5/1/2025: 0.15 m/s  6/5/2025: 0.48 m/s  6/27/2025: 0.43 m/s using RW            LTG - Patient will be independent with HEP emphasizing LE strengthening and balance training.   (Progressing)       Start:  03/19/25    Expected End:  07/25/25       3/19/2025: Needs education         Patient will exhibit improved Araujo Balance score to >/= 22/56 (STG) and 26/56 (LTG) indicating improved static balance.   (Progressing)       Start:  03/19/25    Expected End:  07/25/25       3/19/2025: 18/56  5/1/2025: 28/56  6/27/2025: 25/56         Patient will ambulate 50 feet (STG) and 150 feet (LTG) with a SPC indicating  improved gait stability and independence.  (Progressing)       Start:  03/19/25    Expected End:  07/25/25       3/19/2025: unable due to safety concerns and fearfulness  5/1/2025: 29 feet with SPC, Contact guard assistance   6/5/2025: 66 feet with SPC, Contact guard assistance   6/27/2025: 66 with SPC, Contact guard assistance          Patient will exhibit improved 30 Second Chair Rise to >/= 5 reps (STG) and 7 reps (LTG), indicating improved functional LE strength.     (Progressing)       Start:  03/19/25    Expected End:  07/25/25 03/19/2025: 3 reps with UE use  5/1/2025: 5.5 reps with UE use  6/5/2025: 5.5 reps with UE use  6/27/2025: 4 reps with UE use             Codi Owen, PT

## 2025-08-16 DIAGNOSIS — E11.42 TYPE 2 DIABETES MELLITUS WITH DIABETIC POLYNEUROPATHY, WITHOUT LONG-TERM CURRENT USE OF INSULIN: Chronic | ICD-10-CM

## 2025-08-16 RX ORDER — BLOOD-GLUCOSE SENSOR
EACH MISCELLANEOUS
Qty: 6 EACH | Refills: 3 | Status: SHIPPED | OUTPATIENT
Start: 2025-08-16

## 2025-08-19 RX ORDER — INSULIN ASPART 100 [IU]/ML
INJECTION, SOLUTION INTRAVENOUS; SUBCUTANEOUS
Qty: 9 ML | Refills: 5 | Status: SHIPPED | OUTPATIENT
Start: 2025-08-19

## 2025-08-28 ENCOUNTER — CLINICAL SUPPORT (OUTPATIENT)
Dept: REHABILITATION | Facility: HOSPITAL | Age: 75
End: 2025-08-28
Payer: MEDICARE

## 2025-08-28 DIAGNOSIS — Z74.09 IMPAIRED FUNCTIONAL MOBILITY, BALANCE, GAIT, AND ENDURANCE: Primary | ICD-10-CM

## 2025-08-28 PROCEDURE — 97110 THERAPEUTIC EXERCISES: CPT | Mod: PO

## 2025-08-28 PROCEDURE — 97530 THERAPEUTIC ACTIVITIES: CPT | Mod: PO

## 2025-09-04 ENCOUNTER — PATIENT MESSAGE (OUTPATIENT)
Dept: INTERNAL MEDICINE | Facility: CLINIC | Age: 75
End: 2025-09-04
Payer: MEDICARE

## 2025-09-04 DIAGNOSIS — Z23 NEED FOR COVID-19 VACCINE: Primary | ICD-10-CM

## 2025-09-05 RX ORDER — COVID-19 VACCINE, MRNA 0.04 MG/.418ML
0.3 INJECTION, SUSPENSION INTRAMUSCULAR ONCE
Qty: 0.3 ML | Refills: 0 | Status: SHIPPED | OUTPATIENT
Start: 2025-09-05 | End: 2025-09-05

## (undated) DEVICE — GLOVE SENSICARE PI SURG 7.5

## (undated) DEVICE — GUIDEWIRE STR TIP HIWIRE 150CM

## (undated) DEVICE — SYR LL 20ML

## (undated) DEVICE — TRAY CYSTO BASIN OMC

## (undated) DEVICE — UNDERGLOVES BIOGEL PI SIZE 7.5

## (undated) DEVICE — GUIDE WIRE MOTION .035 X 150CM

## (undated) DEVICE — SOL NACL IRR 3000ML

## (undated) DEVICE — SOL BETADINE 5%

## (undated) DEVICE — PACK CYSTOSCOPY III SIRUS

## (undated) DEVICE — CATH POLLACK OPEN-END FLEXI-TI

## (undated) DEVICE — SOL IRRI STRL WATER 1000ML

## (undated) DEVICE — Device

## (undated) DEVICE — SOL POVIDONE SCRUB IODINE 4 OZ

## (undated) DEVICE — EXTRACTOR TIPLESS 2.4FRX1115CM